# Patient Record
Sex: FEMALE | Race: WHITE | NOT HISPANIC OR LATINO | Employment: OTHER | URBAN - NONMETROPOLITAN AREA
[De-identification: names, ages, dates, MRNs, and addresses within clinical notes are randomized per-mention and may not be internally consistent; named-entity substitution may affect disease eponyms.]

---

## 2017-05-08 ENCOUNTER — HOSPITAL ENCOUNTER (EMERGENCY)
Facility: HOSPITAL | Age: 52
Discharge: HOME | End: 2017-05-08
Attending: EMERGENCY MEDICINE | Admitting: EMERGENCY MEDICINE

## 2017-05-08 VITALS
RESPIRATION RATE: 18 BRPM | TEMPERATURE: 98.3 F | WEIGHT: 168 LBS | OXYGEN SATURATION: 98 % | BODY MASS INDEX: 27 KG/M2 | SYSTOLIC BLOOD PRESSURE: 124 MMHG | HEIGHT: 66 IN | HEART RATE: 81 BPM | DIASTOLIC BLOOD PRESSURE: 82 MMHG

## 2017-05-08 DIAGNOSIS — L03.211 CELLULITIS, FACE: Primary | ICD-10-CM

## 2017-05-08 LAB
ALBUMIN SERPL-MCNC: 4 G/DL (ref 3.5–5)
ALBUMIN/GLOB SERPL: 1.6 G/DL (ref 1.5–2.5)
ALP SERPL-CCNC: 103 U/L (ref 35–104)
ALT SERPL W P-5'-P-CCNC: 11 U/L (ref 10–36)
ANION GAP SERPL CALCULATED.3IONS-SCNC: 9.8 MMOL/L (ref 3.6–11.2)
AST SERPL-CCNC: 18 U/L (ref 10–30)
BASOPHILS # BLD AUTO: 0.02 10*3/MM3 (ref 0–0.3)
BASOPHILS NFR BLD AUTO: 0.2 % (ref 0–2)
BILIRUB SERPL-MCNC: 0.4 MG/DL (ref 0.2–1.8)
BUN BLD-MCNC: 11 MG/DL (ref 7–21)
BUN/CREAT SERPL: 15.7 (ref 7–25)
CALCIUM SPEC-SCNC: 9.2 MG/DL (ref 7.7–10)
CHLORIDE SERPL-SCNC: 107 MMOL/L (ref 99–112)
CO2 SERPL-SCNC: 25.2 MMOL/L (ref 24.3–31.9)
CREAT BLD-MCNC: 0.7 MG/DL (ref 0.43–1.29)
DEPRECATED RDW RBC AUTO: 45.3 FL (ref 37–54)
EOSINOPHIL # BLD AUTO: 0.16 10*3/MM3 (ref 0–0.7)
EOSINOPHIL NFR BLD AUTO: 1.8 % (ref 0–5)
ERYTHROCYTE [DISTWIDTH] IN BLOOD BY AUTOMATED COUNT: 16.6 % (ref 11.5–14.5)
GFR SERPL CREATININE-BSD FRML MDRD: 89 ML/MIN/1.73
GLOBULIN UR ELPH-MCNC: 2.5 GM/DL
GLUCOSE BLD-MCNC: 102 MG/DL (ref 70–110)
HCT VFR BLD AUTO: 33.3 % (ref 37–47)
HGB BLD-MCNC: 10.2 G/DL (ref 12–16)
IMM GRANULOCYTES # BLD: 0.01 10*3/MM3 (ref 0–0.03)
IMM GRANULOCYTES NFR BLD: 0.1 % (ref 0–0.5)
LYMPHOCYTES # BLD AUTO: 1.39 10*3/MM3 (ref 1–3)
LYMPHOCYTES NFR BLD AUTO: 15.6 % (ref 21–51)
MCH RBC QN AUTO: 23 PG (ref 27–33)
MCHC RBC AUTO-ENTMCNC: 30.6 G/DL (ref 33–37)
MCV RBC AUTO: 75.2 FL (ref 80–94)
MONOCYTES # BLD AUTO: 0.57 10*3/MM3 (ref 0.1–0.9)
MONOCYTES NFR BLD AUTO: 6.4 % (ref 0–10)
NEUTROPHILS # BLD AUTO: 6.78 10*3/MM3 (ref 1.4–6.5)
NEUTROPHILS NFR BLD AUTO: 75.9 % (ref 30–70)
OSMOLALITY SERPL CALC.SUM OF ELEC: 282.7 MOSM/KG (ref 273–305)
PLATELET # BLD AUTO: 209 10*3/MM3 (ref 130–400)
PMV BLD AUTO: 9.7 FL (ref 6–10)
POTASSIUM BLD-SCNC: 3.2 MMOL/L (ref 3.5–5.3)
PROT SERPL-MCNC: 6.5 G/DL (ref 6–8)
RBC # BLD AUTO: 4.43 10*6/MM3 (ref 4.2–5.4)
SODIUM BLD-SCNC: 142 MMOL/L (ref 135–153)
WBC NRBC COR # BLD: 8.93 10*3/MM3 (ref 4.5–12.5)

## 2017-05-08 PROCEDURE — 25010000002 DEXAMETHASONE PER 1 MG: Performed by: EMERGENCY MEDICINE

## 2017-05-08 PROCEDURE — 80053 COMPREHEN METABOLIC PANEL: CPT | Performed by: EMERGENCY MEDICINE

## 2017-05-08 PROCEDURE — 96375 TX/PRO/DX INJ NEW DRUG ADDON: CPT

## 2017-05-08 PROCEDURE — 25010000002 HYDROMORPHONE PER 4 MG: Performed by: EMERGENCY MEDICINE

## 2017-05-08 PROCEDURE — 99283 EMERGENCY DEPT VISIT LOW MDM: CPT

## 2017-05-08 PROCEDURE — 85025 COMPLETE CBC W/AUTO DIFF WBC: CPT | Performed by: EMERGENCY MEDICINE

## 2017-05-08 PROCEDURE — 96365 THER/PROPH/DIAG IV INF INIT: CPT

## 2017-05-08 PROCEDURE — 25010000002 HEPARIN FLUSH (PORCINE) 100 UNIT/ML SOLUTION

## 2017-05-08 RX ORDER — CLINDAMYCIN HYDROCHLORIDE 300 MG/1
300 CAPSULE ORAL 3 TIMES DAILY
Qty: 18 CAPSULE | Refills: 0 | Status: SHIPPED | OUTPATIENT
Start: 2017-05-08 | End: 2017-07-22

## 2017-05-08 RX ORDER — DEXAMETHASONE SODIUM PHOSPHATE 4 MG/ML
8 INJECTION, SOLUTION INTRA-ARTICULAR; INTRALESIONAL; INTRAMUSCULAR; INTRAVENOUS; SOFT TISSUE ONCE
Status: COMPLETED | OUTPATIENT
Start: 2017-05-08 | End: 2017-05-08

## 2017-05-08 RX ORDER — SODIUM CHLORIDE 0.9 % (FLUSH) 0.9 %
10 SYRINGE (ML) INJECTION AS NEEDED
Status: DISCONTINUED | OUTPATIENT
Start: 2017-05-08 | End: 2017-05-08 | Stop reason: HOSPADM

## 2017-05-08 RX ORDER — CLINDAMYCIN PHOSPHATE 600 MG/50ML
600 INJECTION INTRAVENOUS ONCE
Status: COMPLETED | OUTPATIENT
Start: 2017-05-08 | End: 2017-05-08

## 2017-05-08 RX ADMIN — HEPARIN SODIUM (PORCINE) LOCK FLUSH IV SOLN 100 UNIT/ML 300 UNITS: 100 SOLUTION at 10:30

## 2017-05-08 RX ADMIN — CLINDAMYCIN PHOSPHATE 600 MG: 12 INJECTION, SOLUTION INTRAVENOUS at 10:03

## 2017-05-08 RX ADMIN — DEXAMETHASONE SODIUM PHOSPHATE 8 MG: 4 INJECTION, SOLUTION INTRAMUSCULAR; INTRAVENOUS at 09:53

## 2017-05-08 RX ADMIN — HYDROMORPHONE HYDROCHLORIDE 1 MG: 1 INJECTION, SOLUTION INTRAMUSCULAR; INTRAVENOUS; SUBCUTANEOUS at 09:56

## 2017-07-05 ENCOUNTER — APPOINTMENT (OUTPATIENT)
Dept: CT IMAGING | Facility: HOSPITAL | Age: 52
End: 2017-07-05

## 2017-07-05 ENCOUNTER — HOSPITAL ENCOUNTER (EMERGENCY)
Facility: HOSPITAL | Age: 52
Discharge: OTHER INSTITUTION | End: 2017-07-05
Attending: EMERGENCY MEDICINE | Admitting: EMERGENCY MEDICINE

## 2017-07-05 VITALS
WEIGHT: 168 LBS | OXYGEN SATURATION: 99 % | RESPIRATION RATE: 18 BRPM | HEIGHT: 66 IN | SYSTOLIC BLOOD PRESSURE: 144 MMHG | DIASTOLIC BLOOD PRESSURE: 89 MMHG | BODY MASS INDEX: 27 KG/M2 | TEMPERATURE: 99 F | HEART RATE: 98 BPM

## 2017-07-05 DIAGNOSIS — L03.211 FACIAL CELLULITIS: Primary | ICD-10-CM

## 2017-07-05 DIAGNOSIS — L02.01 FACIAL ABSCESS: ICD-10-CM

## 2017-07-05 LAB
6-ACETYL MORPHINE: NEGATIVE
ALBUMIN SERPL-MCNC: 4.1 G/DL (ref 3.5–5)
ALBUMIN/GLOB SERPL: 1.4 G/DL (ref 1.5–2.5)
ALP SERPL-CCNC: 123 U/L (ref 35–104)
ALT SERPL W P-5'-P-CCNC: 19 U/L (ref 10–36)
AMPHET+METHAMPHET UR QL: NEGATIVE
ANION GAP SERPL CALCULATED.3IONS-SCNC: 6.6 MMOL/L (ref 3.6–11.2)
AST SERPL-CCNC: 26 U/L (ref 10–30)
B-HCG UR QL: NEGATIVE
BACTERIA UR QL AUTO: ABNORMAL /HPF
BARBITURATES UR QL SCN: NEGATIVE
BASOPHILS # BLD AUTO: 0.05 10*3/MM3 (ref 0–0.3)
BASOPHILS NFR BLD AUTO: 0.5 % (ref 0–2)
BENZODIAZ UR QL SCN: NEGATIVE
BILIRUB SERPL-MCNC: 0.4 MG/DL (ref 0.2–1.8)
BILIRUB UR QL STRIP: NEGATIVE
BUN BLD-MCNC: 6 MG/DL (ref 7–21)
BUN/CREAT SERPL: 9.7 (ref 7–25)
BUPRENORPHINE SERPL-MCNC: NEGATIVE NG/ML
CALCIUM SPEC-SCNC: 9.7 MG/DL (ref 7.7–10)
CANNABINOIDS SERPL QL: NEGATIVE
CHLORIDE SERPL-SCNC: 105 MMOL/L (ref 99–112)
CLARITY UR: ABNORMAL
CO2 SERPL-SCNC: 29.4 MMOL/L (ref 24.3–31.9)
COCAINE UR QL: NEGATIVE
COLOR UR: YELLOW
CREAT BLD-MCNC: 0.62 MG/DL (ref 0.43–1.29)
CRP SERPL-MCNC: 2.53 MG/DL (ref 0–0.99)
DEPRECATED RDW RBC AUTO: 50 FL (ref 37–54)
EOSINOPHIL # BLD AUTO: 0.31 10*3/MM3 (ref 0–0.7)
EOSINOPHIL NFR BLD AUTO: 2.8 % (ref 0–5)
ERYTHROCYTE [DISTWIDTH] IN BLOOD BY AUTOMATED COUNT: 18.6 % (ref 11.5–14.5)
ERYTHROCYTE [SEDIMENTATION RATE] IN BLOOD: 9 MM/HR (ref 0–20)
GFR SERPL CREATININE-BSD FRML MDRD: 102 ML/MIN/1.73
GLOBULIN UR ELPH-MCNC: 2.9 GM/DL
GLUCOSE BLD-MCNC: 104 MG/DL (ref 70–110)
GLUCOSE UR STRIP-MCNC: NEGATIVE MG/DL
HCT VFR BLD AUTO: 35.3 % (ref 37–47)
HGB BLD-MCNC: 10.9 G/DL (ref 12–16)
HGB UR QL STRIP.AUTO: ABNORMAL
HYALINE CASTS UR QL AUTO: ABNORMAL /LPF
IMM GRANULOCYTES # BLD: 0.01 10*3/MM3 (ref 0–0.03)
IMM GRANULOCYTES NFR BLD: 0.1 % (ref 0–0.5)
KETONES UR QL STRIP: NEGATIVE
LEUKOCYTE ESTERASE UR QL STRIP.AUTO: ABNORMAL
LYMPHOCYTES # BLD AUTO: 1.46 10*3/MM3 (ref 1–3)
LYMPHOCYTES NFR BLD AUTO: 13.4 % (ref 21–51)
MCH RBC QN AUTO: 24.1 PG (ref 27–33)
MCHC RBC AUTO-ENTMCNC: 30.9 G/DL (ref 33–37)
MCV RBC AUTO: 78.1 FL (ref 80–94)
MDMA UR QL SCN: NEGATIVE
METHADONE UR QL SCN: NEGATIVE
MONOCYTES # BLD AUTO: 0.86 10*3/MM3 (ref 0.1–0.9)
MONOCYTES NFR BLD AUTO: 7.9 % (ref 0–10)
NEUTROPHILS # BLD AUTO: 8.19 10*3/MM3 (ref 1.4–6.5)
NEUTROPHILS NFR BLD AUTO: 75.3 % (ref 30–70)
NITRITE UR QL STRIP: NEGATIVE
OPIATES UR QL: NEGATIVE
OSMOLALITY SERPL CALC.SUM OF ELEC: 279.2 MOSM/KG (ref 273–305)
OXYCODONE UR QL SCN: POSITIVE
PCP UR QL SCN: NEGATIVE
PH UR STRIP.AUTO: 7 [PH] (ref 5–8)
PLATELET # BLD AUTO: 234 10*3/MM3 (ref 130–400)
PMV BLD AUTO: 9.6 FL (ref 6–10)
POTASSIUM BLD-SCNC: 3.4 MMOL/L (ref 3.5–5.3)
PROT SERPL-MCNC: 7 G/DL (ref 6–8)
PROT UR QL STRIP: NEGATIVE
RBC # BLD AUTO: 4.52 10*6/MM3 (ref 4.2–5.4)
RBC # UR: ABNORMAL /HPF
REF LAB TEST METHOD: ABNORMAL
SODIUM BLD-SCNC: 141 MMOL/L (ref 135–153)
SP GR UR STRIP: 1.02 (ref 1–1.03)
SQUAMOUS #/AREA URNS HPF: ABNORMAL /HPF
UROBILINOGEN UR QL STRIP: ABNORMAL
WBC NRBC COR # BLD: 10.88 10*3/MM3 (ref 4.5–12.5)
WBC UR QL AUTO: ABNORMAL /HPF

## 2017-07-05 PROCEDURE — 80307 DRUG TEST PRSMV CHEM ANLYZR: CPT | Performed by: PHYSICIAN ASSISTANT

## 2017-07-05 PROCEDURE — 87077 CULTURE AEROBIC IDENTIFY: CPT | Performed by: PHYSICIAN ASSISTANT

## 2017-07-05 PROCEDURE — 87040 BLOOD CULTURE FOR BACTERIA: CPT | Performed by: EMERGENCY MEDICINE

## 2017-07-05 PROCEDURE — 70487 CT MAXILLOFACIAL W/DYE: CPT | Performed by: RADIOLOGY

## 2017-07-05 PROCEDURE — 81001 URINALYSIS AUTO W/SCOPE: CPT | Performed by: PHYSICIAN ASSISTANT

## 2017-07-05 PROCEDURE — 85025 COMPLETE CBC W/AUTO DIFF WBC: CPT | Performed by: PHYSICIAN ASSISTANT

## 2017-07-05 PROCEDURE — 25010000002 LORAZEPAM PER 2 MG: Performed by: EMERGENCY MEDICINE

## 2017-07-05 PROCEDURE — 87186 SC STD MICRODIL/AGAR DIL: CPT | Performed by: PHYSICIAN ASSISTANT

## 2017-07-05 PROCEDURE — 96375 TX/PRO/DX INJ NEW DRUG ADDON: CPT

## 2017-07-05 PROCEDURE — 80053 COMPREHEN METABOLIC PANEL: CPT | Performed by: PHYSICIAN ASSISTANT

## 2017-07-05 PROCEDURE — 70487 CT MAXILLOFACIAL W/DYE: CPT

## 2017-07-05 PROCEDURE — 85652 RBC SED RATE AUTOMATED: CPT | Performed by: PHYSICIAN ASSISTANT

## 2017-07-05 PROCEDURE — 81025 URINE PREGNANCY TEST: CPT | Performed by: PHYSICIAN ASSISTANT

## 2017-07-05 PROCEDURE — 87147 CULTURE TYPE IMMUNOLOGIC: CPT | Performed by: PHYSICIAN ASSISTANT

## 2017-07-05 PROCEDURE — 25010000002 MORPHINE SULFATE (PF) 2 MG/ML SOLUTION: Performed by: EMERGENCY MEDICINE

## 2017-07-05 PROCEDURE — 25010000002 LORAZEPAM PER 2 MG

## 2017-07-05 PROCEDURE — 99285 EMERGENCY DEPT VISIT HI MDM: CPT

## 2017-07-05 PROCEDURE — 87086 URINE CULTURE/COLONY COUNT: CPT | Performed by: PHYSICIAN ASSISTANT

## 2017-07-05 PROCEDURE — 86140 C-REACTIVE PROTEIN: CPT | Performed by: PHYSICIAN ASSISTANT

## 2017-07-05 PROCEDURE — 25010000002 DIPHENHYDRAMINE PER 50 MG

## 2017-07-05 PROCEDURE — 96376 TX/PRO/DX INJ SAME DRUG ADON: CPT

## 2017-07-05 PROCEDURE — 25010000002 DALBAVANCIN 500 MG RECONSTITUTED SOLUTION 1 EACH VIAL: Performed by: PHYSICIAN ASSISTANT

## 2017-07-05 PROCEDURE — 0 IOPAMIDOL 61 % SOLUTION: Performed by: EMERGENCY MEDICINE

## 2017-07-05 PROCEDURE — 25010000002 MORPHINE PER 10 MG: Performed by: EMERGENCY MEDICINE

## 2017-07-05 PROCEDURE — 25010000002 MORPHINE SULFATE (PF) 2 MG/ML SOLUTION

## 2017-07-05 PROCEDURE — 96365 THER/PROPH/DIAG IV INF INIT: CPT

## 2017-07-05 RX ORDER — LORAZEPAM 2 MG/ML
1 INJECTION INTRAMUSCULAR ONCE
Status: COMPLETED | OUTPATIENT
Start: 2017-07-05 | End: 2017-07-05

## 2017-07-05 RX ORDER — DEXTROSE 5 % IN WATER
50 PIGGYBACK WITH THREADED PORT (ML) INTRAVENOUS ONCE
Status: COMPLETED | OUTPATIENT
Start: 2017-07-05 | End: 2017-07-05

## 2017-07-05 RX ORDER — SODIUM CHLORIDE 0.9 % (FLUSH) 0.9 %
10 SYRINGE (ML) INJECTION AS NEEDED
Status: DISCONTINUED | OUTPATIENT
Start: 2017-07-05 | End: 2017-07-05 | Stop reason: HOSPADM

## 2017-07-05 RX ORDER — DIPHENHYDRAMINE HYDROCHLORIDE 50 MG/ML
25 INJECTION INTRAMUSCULAR; INTRAVENOUS ONCE
Status: COMPLETED | OUTPATIENT
Start: 2017-07-05 | End: 2017-07-05

## 2017-07-05 RX ORDER — MORPHINE SULFATE 2 MG/ML
2 INJECTION, SOLUTION INTRAMUSCULAR; INTRAVENOUS ONCE
Status: COMPLETED | OUTPATIENT
Start: 2017-07-05 | End: 2017-07-05

## 2017-07-05 RX ORDER — MORPHINE SULFATE 2 MG/ML
INJECTION, SOLUTION INTRAMUSCULAR; INTRAVENOUS
Status: COMPLETED
Start: 2017-07-05 | End: 2017-07-05

## 2017-07-05 RX ORDER — DIPHENHYDRAMINE HYDROCHLORIDE 50 MG/ML
INJECTION INTRAMUSCULAR; INTRAVENOUS
Status: COMPLETED
Start: 2017-07-05 | End: 2017-07-05

## 2017-07-05 RX ORDER — LORAZEPAM 2 MG/ML
INJECTION INTRAMUSCULAR
Status: DISCONTINUED
Start: 2017-07-05 | End: 2017-07-05 | Stop reason: HOSPADM

## 2017-07-05 RX ORDER — LORAZEPAM 2 MG/ML
INJECTION INTRAMUSCULAR
Status: COMPLETED
Start: 2017-07-05 | End: 2017-07-05

## 2017-07-05 RX ADMIN — DALBAVANCIN 1500 MG: 500 INJECTION, POWDER, FOR SOLUTION INTRAVENOUS at 10:19

## 2017-07-05 RX ADMIN — MORPHINE SULFATE 4 MG: 4 INJECTION, SOLUTION INTRAMUSCULAR; INTRAVENOUS at 13:29

## 2017-07-05 RX ADMIN — IOPAMIDOL 100 ML: 612 INJECTION, SOLUTION INTRAVENOUS at 08:54

## 2017-07-05 RX ADMIN — DEXTROSE MONOHYDRATE 25 ML: 5 INJECTION, SOLUTION INTRAVENOUS at 10:14

## 2017-07-05 RX ADMIN — LORAZEPAM 1 MG: 2 INJECTION INTRAMUSCULAR at 10:48

## 2017-07-05 RX ADMIN — LORAZEPAM 1 MG: 2 INJECTION INTRAMUSCULAR; INTRAVENOUS at 13:30

## 2017-07-05 RX ADMIN — MORPHINE SULFATE 2 MG: 2 INJECTION, SOLUTION INTRAMUSCULAR; INTRAVENOUS at 08:27

## 2017-07-05 RX ADMIN — DIPHENHYDRAMINE HYDROCHLORIDE 25 MG: 50 INJECTION INTRAMUSCULAR; INTRAVENOUS at 10:47

## 2017-07-05 RX ADMIN — MORPHINE SULFATE 4 MG: 4 INJECTION, SOLUTION INTRAMUSCULAR; INTRAVENOUS at 11:19

## 2017-07-05 RX ADMIN — MORPHINE SULFATE 2 MG: 2 INJECTION, SOLUTION INTRAMUSCULAR; INTRAVENOUS at 06:46

## 2017-07-05 RX ADMIN — LORAZEPAM 1 MG: 2 INJECTION INTRAMUSCULAR; INTRAVENOUS at 10:48

## 2017-07-05 NOTE — ED NOTES
Called CT for a disc at this     [facility] Clarisa Dominguez RN  07/05/17 1325

## 2017-07-05 NOTE — ED NOTES
"Upon entering patient's room to administer antibiotics pt is requesting to speak with \"whoever is in charge and the house patient care person\"; pt reports she is dissatisfied with the care she has received thus far. Patient reports she is scared to go home and feels like she needs to be admitted to this hospital. Lead nurse made aware of pt situation.      Sin Dominguez RN  07/05/17 1024       Sin Dominguez RN  07/05/17 1024    "

## 2017-07-05 NOTE — ED PROVIDER NOTES
Subjective   Patient is a 51 y.o. female presenting with rash.   History provided by:  Patient   used: No    Rash   Location:  Face  Facial rash location:  Face  Quality: not blistering and not bruising    Severity:  Moderate  Onset quality:  Sudden  Duration:  1 day  Timing:  Constant  Progression:  Worsening  Chronicity:  New  Context: not animal contact, not chemical exposure, not hot tub use, not insect bite/sting, not medications, not new detergent/soap, not pollen and not pregnancy    Relieved by:  Nothing  Worsened by:  Nothing  Associated symptoms: no abdominal pain, no myalgias and no nausea        Review of Systems   Gastrointestinal: Negative for abdominal pain and nausea.   Musculoskeletal: Negative for myalgias.   Skin: Positive for rash.   All other systems reviewed and are negative.      Past Medical History:   Diagnosis Date   • Anxiety    • Anxiety    • Chronic headache    • Depression    • Disease of thyroid gland     Patient states she has no thyroid   • Gastritis    • Henoch-Schonlein purpura    • Hypertension    • Lower GI bleeding    • Migraine    • Patent foramen ovale    • Pneumonia    • Renal disorder    • Rhabdomyolysis    • Sjogren's syndrome    • Stroke    • Systemic lupus erythematosus    • Temporal arteritis        Allergies   Allergen Reactions   • Compazine [Prochlorperazine Edisylate]    • Imitrex [Sumatriptan]    • Nsaids    • Reglan [Metoclopramide]    • Solu-Medrol [Methylprednisolone]    • Zyprexa [Olanzapine]        Past Surgical History:   Procedure Laterality Date   • APPENDECTOMY     • CARDIAC CATHETERIZATION      Pt reports she had a hole in her heart repaired last week and had a loop monitor placed.    •  SECTION     • ENDOSCOPY     • LUMBAR FUSION     • THYROID SURGERY         Family History   Problem Relation Age of Onset   • Hypertension Mother    • Hypertension Father        Social History     Social History   • Marital status:       Spouse name: N/A   • Number of children: N/A   • Years of education: N/A     Social History Main Topics   • Smoking status: Never Smoker   • Smokeless tobacco: None   • Alcohol use No      Comment: Occassionally   • Drug use: No   • Sexual activity: Defer     Other Topics Concern   • None     Social History Narrative           Objective   Physical Exam   Constitutional: She is oriented to person, place, and time. She appears well-developed and well-nourished.   HENT:   Head: Normocephalic.   Right Ear: External ear normal.   Left Ear: External ear normal.   Nose: Nose normal.   Mouth/Throat: Oropharynx is clear and moist.   Eyes: Conjunctivae and EOM are normal. Pupils are equal, round, and reactive to light.   Neck: Normal range of motion. Neck supple. No tracheal deviation present. No thyromegaly present.   Cardiovascular: Normal rate, regular rhythm, normal heart sounds and intact distal pulses.    Pulmonary/Chest: Effort normal and breath sounds normal.   Abdominal: Soft. Bowel sounds are normal.   Musculoskeletal: Normal range of motion.   Neurological: She is alert and oriented to person, place, and time. She has normal reflexes.   Skin: Skin is dry. Rash noted.   Extensive facial cellulitis right mandible and left mandible that extends distally to neck.  No definite fluctuance, induration.   Psychiatric: She has a normal mood and affect. Her behavior is normal. Judgment and thought content normal.   Nursing note and vitals reviewed.      Procedures         ED Course  ED Course   Comment By Time   Endorsed to NAVDEEP Lopez PA 07/05 4113   IMPRESSION:   1. Probable changes of right facial soft tissue tissue cellulitis mainly  in the right mandibular region.  2. Ill-defined questionable 0.6 cm area of low attenuation within the  inflamed tissues adjacent to the right mandibular body also likely  infectious in etiology and could represent early immature abscess.  3. Inflammatory changes  which are situated in close proximity to the  right mandible may reflect secondary infection stemming from dental  etiology. Correlation with dental exam is recommended.  4. Chronic appearing ethmoid and right maxillary sinusitis. Reyes Blakely PA-C 07/05 1052   Gave patient option to go to . She states that she would like to wait and talk to her family. Patient was seen previously and given Im injections of steroids and rocephin for facial cellulitis. Reyes Blakely PA-C 07/05 1053   Discussed care with Dr. Borges at [facility]. Advised to transfer patient to ER for further evaluation. Reyes Blakely PA-C 07/05 1117                  [facility]    Final diagnoses:   Facial cellulitis   Facial abscess            Reyes Blakely PA-C  07/05/17 1120

## 2017-07-05 NOTE — ED NOTES
INTO ROOM WITH PT AND DR. SEQUEIRA.  PT VERY AGITATED AND  NERVOUS ABOUT HER CARE AND POSSIBLE TRANSFER.  AFTER SPEAKING WITH PT AND THEN DISCUSSING THE PROS AND CONS PT IS NOW MORE CALM AND PT WAS REMINDED SHE CAN CALL OUT ALL THE TIME     Katia Delarosa RN  07/05/17 1475

## 2017-07-05 NOTE — ED NOTES
Pt states that she has SLE lupus, states that for about a week she has had an open area/wound to her chin and right side of face that she has been going to her doctors office for, states they have been giving her Rocephin and steroid shots. States that yesterday the swelling started to get worse, right side of pts face appears moderately swollen. Pt c/o SOA when lying flat     Diana Liu RN  07/05/17 0461

## 2017-07-05 NOTE — ED PROVIDER NOTES
Subjective   HPI Comments: Patient presents to the ED with complaints of facial swelling with draining.  Patient reports that she has SLE lupus and has recurrent skin irritations/wounds on her face.  Reported that 1 week ago the irritations started getting swollen.  Patients PCP has been seeing her every other day for the last week to get rocephin and decadron shots. Reports that it is getting worse.  Reports increased swelling, yellow drainage from facial lesions and worsening in pain.     Patient is a 51 y.o. female presenting with abscess.   History provided by:  Patient   used: No    Abscess   Location:  Face  Abscess quality: draining, fluctuance, induration, painful and redness    Red streaking: no    Duration:  1 week  Progression:  Worsening  Pain details:     Quality:  Throbbing    Severity:  Moderate    Duration:  1 week    Timing:  Constant    Progression:  Worsening  Chronicity:  Recurrent  Relieved by:  Nothing  Worsened by:  Nothing  Ineffective treatments:  None tried  Risk factors: hx of MRSA        Review of Systems   Constitutional: Negative.    HENT: Negative.    Eyes: Negative.    Respiratory: Negative.    Cardiovascular: Negative.    Gastrointestinal: Negative.    Endocrine: Negative.    Genitourinary: Negative.    Musculoskeletal: Negative.    Skin: Negative.    Allergic/Immunologic: Negative.    Neurological: Negative.    Hematological: Negative.    Psychiatric/Behavioral: Negative.    All other systems reviewed and are negative.      Past Medical History:   Diagnosis Date   • Anxiety    • Anxiety    • Chronic headache    • Depression    • Disease of thyroid gland     Patient states she has no thyroid   • Gastritis    • Henoch-Schonlein purpura    • Hypertension    • Lower GI bleeding    • Migraine    • Patent foramen ovale    • Pneumonia    • Renal disorder    • Rhabdomyolysis    • Sjogren's syndrome    • Stroke    • Systemic lupus erythematosus    • Temporal arteritis         Allergies   Allergen Reactions   • Compazine [Prochlorperazine Edisylate]    • Imitrex [Sumatriptan]    • Nsaids    • Reglan [Metoclopramide]    • Solu-Medrol [Methylprednisolone]    • Zyprexa [Olanzapine]        Past Surgical History:   Procedure Laterality Date   • APPENDECTOMY     • CARDIAC CATHETERIZATION      Pt reports she had a hole in her heart repaired last week and had a loop monitor placed.    •  SECTION     • ENDOSCOPY     • LUMBAR FUSION     • THYROID SURGERY         Family History   Problem Relation Age of Onset   • Hypertension Mother    • Hypertension Father        Social History     Social History   • Marital status:      Spouse name: N/A   • Number of children: N/A   • Years of education: N/A     Social History Main Topics   • Smoking status: Never Smoker   • Smokeless tobacco: None   • Alcohol use No      Comment: Occassionally   • Drug use: No   • Sexual activity: Defer     Other Topics Concern   • None     Social History Narrative           Objective   Physical Exam   Constitutional: She is oriented to person, place, and time. She appears well-developed and well-nourished.   HENT:   Head: Normocephalic and atraumatic.   Right Ear: External ear normal.   Left Ear: External ear normal.   Nose: Nose normal.   Mouth/Throat: Oropharynx is clear and moist.   Eyes: Conjunctivae and EOM are normal. Pupils are equal, round, and reactive to light.   Neck: Normal range of motion. Neck supple.   Cardiovascular: Normal rate, regular rhythm, normal heart sounds and intact distal pulses.    Pulmonary/Chest: Effort normal and breath sounds normal.   Abdominal: Soft. Bowel sounds are normal.   Musculoskeletal: Normal range of motion.   Neurological: She is alert and oriented to person, place, and time.   Skin: Skin is warm and dry.        Nursing note and vitals reviewed.      Procedures         ED Course  ED Course   Comment By Time   Endorsed to NAVDEEP Lopez PA  07/05 0802   IMPRESSION:   1. Probable changes of right facial soft tissue tissue cellulitis mainly  in the right mandibular region.  2. Ill-defined questionable 0.6 cm area of low attenuation within the  inflamed tissues adjacent to the right mandibular body also likely  infectious in etiology and could represent early immature abscess.  3. Inflammatory changes which are situated in close proximity to the  right mandible may reflect secondary infection stemming from dental  etiology. Correlation with dental exam is recommended.  4. Chronic appearing ethmoid and right maxillary sinusitis. Reyes Blakely PA-C 07/05 4242   Gave patient option to go to . She states that she would like to wait and talk to her family. Patient was seen previously and given Im injections of steroids and rocephin for facial cellulitis. Reyes Blakely PA-C 07/05 6056   Discussed care with Dr. Borges at [facility]. Advised to transfer patient to ER for further evaluation. Reyes Blakely PA-C 07/05 5860                  [facility]    Final diagnoses:   Facial cellulitis   Facial abscess            VICTOR HUGO Romano  07/10/17 1954

## 2017-07-05 NOTE — ED NOTES
"Patient now reporting she feels much better after administration of benadryl and ativan. Pt states \"I'm positive I was having a panic attack, I've been going through so much\"     Sin Dominguez RN  07/05/17 1111    "

## 2017-07-08 LAB
BACTERIA SPEC AEROBE CULT: ABNORMAL
STREP GROUPING: ABNORMAL

## 2017-07-10 LAB
BACTERIA SPEC AEROBE CULT: NORMAL
BACTERIA SPEC AEROBE CULT: NORMAL

## 2017-07-21 ENCOUNTER — HOSPITAL ENCOUNTER (OUTPATIENT)
Facility: HOSPITAL | Age: 52
Setting detail: OBSERVATION
Discharge: HOME | End: 2017-07-23
Attending: FAMILY MEDICINE | Admitting: INTERNAL MEDICINE

## 2017-07-21 ENCOUNTER — APPOINTMENT (OUTPATIENT)
Dept: CT IMAGING | Facility: HOSPITAL | Age: 52
End: 2017-07-21

## 2017-07-21 ENCOUNTER — APPOINTMENT (OUTPATIENT)
Dept: GENERAL RADIOLOGY | Facility: HOSPITAL | Age: 52
End: 2017-07-21

## 2017-07-21 DIAGNOSIS — R00.1 BRADYCARDIA WITH 51-60 BEATS PER MINUTE: ICD-10-CM

## 2017-07-21 DIAGNOSIS — R07.9 CHEST PAIN IN ADULT: Primary | ICD-10-CM

## 2017-07-21 DIAGNOSIS — R42 DIZZINESS: ICD-10-CM

## 2017-07-21 LAB
6-ACETYL MORPHINE: NEGATIVE
ALBUMIN SERPL-MCNC: 3.8 G/DL (ref 3.5–5)
ALBUMIN/GLOB SERPL: 1.4 G/DL (ref 1.5–2.5)
ALP SERPL-CCNC: 125 U/L (ref 35–104)
ALT SERPL W P-5'-P-CCNC: 15 U/L (ref 10–36)
AMPHET+METHAMPHET UR QL: NEGATIVE
AMYLASE SERPL-CCNC: 34 U/L (ref 28–100)
ANION GAP SERPL CALCULATED.3IONS-SCNC: 2.7 MMOL/L (ref 3.6–11.2)
ANISOCYTOSIS BLD QL: NORMAL
AST SERPL-CCNC: 21 U/L (ref 10–30)
BACTERIA UR QL AUTO: ABNORMAL /HPF
BARBITURATES UR QL SCN: NEGATIVE
BASOPHILS # BLD MANUAL: 0.07 10*3/MM3 (ref 0–0.3)
BASOPHILS NFR BLD AUTO: 1 % (ref 0–2)
BENZODIAZ UR QL SCN: NEGATIVE
BILIRUB SERPL-MCNC: 0.2 MG/DL (ref 0.2–1.8)
BILIRUB UR QL STRIP: NEGATIVE
BNP SERPL-MCNC: 60 PG/ML (ref 0–100)
BUN BLD-MCNC: 14 MG/DL (ref 7–21)
BUN/CREAT SERPL: 18.4 (ref 7–25)
BUPRENORPHINE SERPL-MCNC: NEGATIVE NG/ML
CALCIUM SPEC-SCNC: 8.9 MG/DL (ref 7.7–10)
CANNABINOIDS SERPL QL: NEGATIVE
CHLORIDE SERPL-SCNC: 109 MMOL/L (ref 99–112)
CLARITY UR: ABNORMAL
CO2 SERPL-SCNC: 29.3 MMOL/L (ref 24.3–31.9)
COCAINE UR QL: NEGATIVE
COLOR UR: YELLOW
CREAT BLD-MCNC: 0.76 MG/DL (ref 0.43–1.29)
CRP SERPL-MCNC: <0.5 MG/DL (ref 0–0.99)
D-LACTATE SERPL-SCNC: 0.8 MMOL/L (ref 0.5–2)
DEPRECATED RDW RBC AUTO: 45.8 FL (ref 37–54)
EOSINOPHIL # BLD MANUAL: 0.14 10*3/MM3 (ref 0–0.7)
EOSINOPHIL NFR BLD MANUAL: 2 % (ref 0–5)
ERYTHROCYTE [DISTWIDTH] IN BLOOD BY AUTOMATED COUNT: 17.5 % (ref 11.5–14.5)
ERYTHROCYTE [SEDIMENTATION RATE] IN BLOOD: 31 MM/HR (ref 0–30)
ETHANOL BLD-MCNC: <10 MG/DL
ETHANOL UR QL: <0.01 %
GFR SERPL CREATININE-BSD FRML MDRD: 80 ML/MIN/1.73
GLOBULIN UR ELPH-MCNC: 2.8 GM/DL
GLUCOSE BLD-MCNC: 111 MG/DL (ref 70–110)
GLUCOSE UR STRIP-MCNC: NEGATIVE MG/DL
HCG SERPL QL: NEGATIVE
HCT VFR BLD AUTO: 31.8 % (ref 37–47)
HGB BLD-MCNC: 10 G/DL (ref 12–16)
HGB UR QL STRIP.AUTO: ABNORMAL
HYALINE CASTS UR QL AUTO: ABNORMAL /LPF
HYPOCHROMIA BLD QL: NORMAL
KETONES UR QL STRIP: NEGATIVE
LEUKOCYTE ESTERASE UR QL STRIP.AUTO: ABNORMAL
LIPASE SERPL-CCNC: 34 U/L (ref 13–60)
LYMPHOCYTES # BLD MANUAL: 2.81 10*3/MM3 (ref 1–3)
LYMPHOCYTES NFR BLD MANUAL: 40 % (ref 21–51)
LYMPHOCYTES NFR BLD MANUAL: 7 % (ref 0–10)
MAGNESIUM SERPL-MCNC: 1.6 MG/DL (ref 1.7–2.6)
MCH RBC QN AUTO: 23.6 PG (ref 27–33)
MCHC RBC AUTO-ENTMCNC: 31.4 G/DL (ref 33–37)
MCV RBC AUTO: 75 FL (ref 80–94)
METHADONE UR QL SCN: NEGATIVE
MICROCYTES BLD QL: NORMAL
MONOCYTES # BLD AUTO: 0.49 10*3/MM3 (ref 0.1–0.9)
NEUTROPHILS # BLD AUTO: 3.51 10*3/MM3 (ref 1.4–6.5)
NEUTROPHILS NFR BLD MANUAL: 50 % (ref 30–70)
NITRITE UR QL STRIP: NEGATIVE
OPIATES UR QL: NEGATIVE
OSMOLALITY SERPL CALC.SUM OF ELEC: 282.4 MOSM/KG (ref 273–305)
OXYCODONE UR QL SCN: POSITIVE
PCP UR QL SCN: NEGATIVE
PH UR STRIP.AUTO: 6 [PH] (ref 5–8)
PHOSPHATE SERPL-MCNC: 3.4 MG/DL (ref 2.7–4.5)
PLAT MORPH BLD: NORMAL
PLATELET # BLD AUTO: 260 10*3/MM3 (ref 130–400)
PMV BLD AUTO: 9.8 FL (ref 6–10)
POTASSIUM BLD-SCNC: 3 MMOL/L (ref 3.5–5.3)
PROT SERPL-MCNC: 6.6 G/DL (ref 6–8)
PROT UR QL STRIP: NEGATIVE
RBC # BLD AUTO: 4.24 10*6/MM3 (ref 4.2–5.4)
RBC # UR: ABNORMAL /HPF
REF LAB TEST METHOD: ABNORMAL
SCAN SLIDE: NORMAL
SODIUM BLD-SCNC: 141 MMOL/L (ref 135–153)
SP GR UR STRIP: 1.02 (ref 1–1.03)
SQUAMOUS #/AREA URNS HPF: ABNORMAL /HPF
TROPONIN I SERPL-MCNC: <0.006 NG/ML
TROPONIN I SERPL-MCNC: <0.006 NG/ML
UROBILINOGEN UR QL STRIP: ABNORMAL
WBC NRBC COR # BLD: 7.02 10*3/MM3 (ref 4.5–12.5)
WBC UR QL AUTO: ABNORMAL /HPF

## 2017-07-21 PROCEDURE — 71010 XR CHEST 1 VW: CPT | Performed by: RADIOLOGY

## 2017-07-21 PROCEDURE — 96376 TX/PRO/DX INJ SAME DRUG ADON: CPT

## 2017-07-21 PROCEDURE — 87086 URINE CULTURE/COLONY COUNT: CPT | Performed by: FAMILY MEDICINE

## 2017-07-21 PROCEDURE — 80053 COMPREHEN METABOLIC PANEL: CPT | Performed by: FAMILY MEDICINE

## 2017-07-21 PROCEDURE — 93005 ELECTROCARDIOGRAM TRACING: CPT | Performed by: FAMILY MEDICINE

## 2017-07-21 PROCEDURE — 83690 ASSAY OF LIPASE: CPT | Performed by: FAMILY MEDICINE

## 2017-07-21 PROCEDURE — 83605 ASSAY OF LACTIC ACID: CPT | Performed by: FAMILY MEDICINE

## 2017-07-21 PROCEDURE — 99285 EMERGENCY DEPT VISIT HI MDM: CPT

## 2017-07-21 PROCEDURE — 80307 DRUG TEST PRSMV CHEM ANLYZR: CPT | Performed by: FAMILY MEDICINE

## 2017-07-21 PROCEDURE — 83880 ASSAY OF NATRIURETIC PEPTIDE: CPT | Performed by: FAMILY MEDICINE

## 2017-07-21 PROCEDURE — 87040 BLOOD CULTURE FOR BACTERIA: CPT | Performed by: FAMILY MEDICINE

## 2017-07-21 PROCEDURE — 82150 ASSAY OF AMYLASE: CPT | Performed by: FAMILY MEDICINE

## 2017-07-21 PROCEDURE — 84484 ASSAY OF TROPONIN QUANT: CPT | Performed by: FAMILY MEDICINE

## 2017-07-21 PROCEDURE — 83735 ASSAY OF MAGNESIUM: CPT | Performed by: FAMILY MEDICINE

## 2017-07-21 PROCEDURE — 84100 ASSAY OF PHOSPHORUS: CPT | Performed by: FAMILY MEDICINE

## 2017-07-21 PROCEDURE — 71275 CT ANGIOGRAPHY CHEST: CPT

## 2017-07-21 PROCEDURE — 25010000003 POTASSIUM CHLORIDE 10 MEQ/100ML SOLUTION: Performed by: FAMILY MEDICINE

## 2017-07-21 PROCEDURE — 25010000002 LORAZEPAM PER 2 MG: Performed by: FAMILY MEDICINE

## 2017-07-21 PROCEDURE — 85652 RBC SED RATE AUTOMATED: CPT | Performed by: FAMILY MEDICINE

## 2017-07-21 PROCEDURE — 36415 COLL VENOUS BLD VENIPUNCTURE: CPT

## 2017-07-21 PROCEDURE — 0 IOPAMIDOL PER 1 ML: Performed by: FAMILY MEDICINE

## 2017-07-21 PROCEDURE — 93010 ELECTROCARDIOGRAM REPORT: CPT | Performed by: INTERNAL MEDICINE

## 2017-07-21 PROCEDURE — 96361 HYDRATE IV INFUSION ADD-ON: CPT

## 2017-07-21 PROCEDURE — 96375 TX/PRO/DX INJ NEW DRUG ADDON: CPT

## 2017-07-21 PROCEDURE — 86140 C-REACTIVE PROTEIN: CPT | Performed by: FAMILY MEDICINE

## 2017-07-21 PROCEDURE — 71275 CT ANGIOGRAPHY CHEST: CPT | Performed by: RADIOLOGY

## 2017-07-21 PROCEDURE — 84703 CHORIONIC GONADOTROPIN ASSAY: CPT | Performed by: FAMILY MEDICINE

## 2017-07-21 PROCEDURE — 81001 URINALYSIS AUTO W/SCOPE: CPT | Performed by: FAMILY MEDICINE

## 2017-07-21 PROCEDURE — 85025 COMPLETE CBC W/AUTO DIFF WBC: CPT | Performed by: FAMILY MEDICINE

## 2017-07-21 PROCEDURE — 71010 HC CHEST PA OR AP: CPT

## 2017-07-21 PROCEDURE — 85007 BL SMEAR W/DIFF WBC COUNT: CPT | Performed by: FAMILY MEDICINE

## 2017-07-21 PROCEDURE — 96365 THER/PROPH/DIAG IV INF INIT: CPT

## 2017-07-21 RX ORDER — SODIUM CHLORIDE 9 MG/ML
75 INJECTION, SOLUTION INTRAVENOUS CONTINUOUS
Status: DISCONTINUED | OUTPATIENT
Start: 2017-07-21 | End: 2017-07-23 | Stop reason: HOSPADM

## 2017-07-21 RX ORDER — MECLIZINE HCL 12.5 MG/1
25 TABLET ORAL ONCE
Status: COMPLETED | OUTPATIENT
Start: 2017-07-21 | End: 2017-07-22

## 2017-07-21 RX ORDER — LORAZEPAM 2 MG/ML
0.5 INJECTION INTRAMUSCULAR ONCE
Status: COMPLETED | OUTPATIENT
Start: 2017-07-21 | End: 2017-07-21

## 2017-07-21 RX ORDER — POTASSIUM CHLORIDE 7.45 MG/ML
10 INJECTION INTRAVENOUS ONCE
Status: COMPLETED | OUTPATIENT
Start: 2017-07-21 | End: 2017-07-21

## 2017-07-21 RX ORDER — SODIUM CHLORIDE 0.9 % (FLUSH) 0.9 %
10 SYRINGE (ML) INJECTION AS NEEDED
Status: DISCONTINUED | OUTPATIENT
Start: 2017-07-21 | End: 2017-07-23 | Stop reason: HOSPADM

## 2017-07-21 RX ORDER — ACETAMINOPHEN AND CODEINE PHOSPHATE 300; 30 MG/1; MG/1
1 TABLET ORAL ONCE
Status: COMPLETED | OUTPATIENT
Start: 2017-07-21 | End: 2017-07-21

## 2017-07-21 RX ORDER — PANTOPRAZOLE SODIUM 40 MG/1
40 TABLET, DELAYED RELEASE ORAL DAILY
COMMUNITY
End: 2019-07-18

## 2017-07-21 RX ORDER — POTASSIUM CHLORIDE 20 MEQ/1
40 TABLET, EXTENDED RELEASE ORAL ONCE
Status: COMPLETED | OUTPATIENT
Start: 2017-07-21 | End: 2017-07-21

## 2017-07-21 RX ORDER — QUETIAPINE FUMARATE 50 MG/1
50 TABLET, FILM COATED ORAL NIGHTLY
COMMUNITY
End: 2017-07-22

## 2017-07-21 RX ORDER — ASPIRIN 81 MG/1
324 TABLET, CHEWABLE ORAL ONCE
Status: COMPLETED | OUTPATIENT
Start: 2017-07-21 | End: 2017-07-21

## 2017-07-21 RX ORDER — LEVETIRACETAM 500 MG/1
500 TABLET ORAL 2 TIMES DAILY PRN
COMMUNITY
End: 2021-11-06 | Stop reason: HOSPADM

## 2017-07-21 RX ADMIN — POTASSIUM CHLORIDE 10 MEQ: 7.46 INJECTION, SOLUTION INTRAVENOUS at 21:29

## 2017-07-21 RX ADMIN — POTASSIUM CHLORIDE 40 MEQ: 1500 TABLET, EXTENDED RELEASE ORAL at 21:24

## 2017-07-21 RX ADMIN — ASPIRIN 324 MG: 81 TABLET, CHEWABLE ORAL at 23:07

## 2017-07-21 RX ADMIN — LORAZEPAM 0.5 MG: 2 INJECTION INTRAMUSCULAR; INTRAVENOUS at 21:25

## 2017-07-21 RX ADMIN — IOPAMIDOL 80 ML: 755 INJECTION, SOLUTION INTRAVENOUS at 21:13

## 2017-07-21 RX ADMIN — SODIUM CHLORIDE 1000 ML: 9 INJECTION, SOLUTION INTRAVENOUS at 21:44

## 2017-07-21 RX ADMIN — ACETAMINOPHEN AND CODEINE PHOSPHATE 1 TABLET: 300; 30 TABLET ORAL at 23:05

## 2017-07-21 RX ADMIN — SODIUM CHLORIDE 1000 ML: 9 INJECTION, SOLUTION INTRAVENOUS at 20:10

## 2017-07-21 RX ADMIN — LORAZEPAM 0.5 MG: 2 INJECTION INTRAMUSCULAR; INTRAVENOUS at 22:19

## 2017-07-21 RX ADMIN — SODIUM CHLORIDE 125 ML/HR: 9 INJECTION, SOLUTION INTRAVENOUS at 23:04

## 2017-07-21 NOTE — ED PROVIDER NOTES
Subjective   History of Present Illness  53 y/o F w/h/o SLE and previous blood clots p/w dizziness and CP. Pt states that she has had palpitations and L sided CP. Pt states that she has SOA. Pt is not on anticoagulation b/c of h/o gastric ulcer. Pt states that her dizziness is worse with standing and head movement. Pt denies any fevers/chills.   Review of Systems   Constitutional: Negative for chills, fatigue and fever.   HENT: Negative for trouble swallowing and voice change.    Eyes: Negative for photophobia and visual disturbance.   Respiratory: Positive for shortness of breath. Negative for cough, chest tightness and wheezing.    Cardiovascular: Positive for chest pain and palpitations.   Gastrointestinal: Negative for abdominal distention, abdominal pain, constipation, diarrhea, nausea and vomiting.   Genitourinary: Negative for difficulty urinating.   Musculoskeletal: Negative for neck pain and neck stiffness.   Skin: Negative for color change and pallor.   Neurological: Positive for dizziness, weakness and light-headedness. Negative for headaches.   Hematological: Does not bruise/bleed easily.   All other systems reviewed and are negative.      Past Medical History:   Diagnosis Date   • Anxiety    • Anxiety    • Chronic headache    • Depression    • Disease of thyroid gland     Patient states she has no thyroid   • Gastritis    • Henoch-Schonlein purpura    • Hypertension    • Lower GI bleeding    • Migraine    • Patent foramen ovale    • Pneumonia    • Renal disorder    • Rhabdomyolysis    • Sjogren's syndrome    • Stroke    • Systemic lupus erythematosus    • Temporal arteritis        Allergies   Allergen Reactions   • Compazine [Prochlorperazine Edisylate]    • Imitrex [Sumatriptan]    • Nsaids    • Reglan [Metoclopramide]    • Solu-Medrol [Methylprednisolone]    • Zyprexa [Olanzapine]        Past Surgical History:   Procedure Laterality Date   • APPENDECTOMY     • CARDIAC CATHETERIZATION      Pt reports  she had a hole in her heart repaired last week and had a loop monitor placed.    •  SECTION     • ENDOSCOPY     • LUMBAR FUSION     • THYROID SURGERY         Family History   Problem Relation Age of Onset   • Hypertension Mother    • Hypertension Father        Social History     Social History   • Marital status:      Spouse name: N/A   • Number of children: N/A   • Years of education: N/A     Social History Main Topics   • Smoking status: Never Smoker   • Smokeless tobacco: Never Used   • Alcohol use No      Comment: Occassionally   • Drug use: No   • Sexual activity: Defer     Other Topics Concern   • None     Social History Narrative   • None           Objective   Physical Exam   Constitutional: She is oriented to person, place, and time. She appears well-developed and well-nourished. She is active.   HENT:   Head: Normocephalic and atraumatic.       Right Ear: Hearing, tympanic membrane, external ear and ear canal normal.   Left Ear: Hearing, tympanic membrane, external ear and ear canal normal.   Nose: Nose normal.   Mouth/Throat: Uvula is midline, oropharynx is clear and moist and mucous membranes are normal.   Eyes: Conjunctivae, EOM and lids are normal. Pupils are equal, round, and reactive to light.   Neck: Trachea normal, normal range of motion, full passive range of motion without pain and phonation normal. Neck supple.   Cardiovascular: Normal rate, regular rhythm and normal heart sounds.    Pulmonary/Chest: Effort normal and breath sounds normal.   Abdominal: Normal appearance. There is no tenderness.   Neurological: She is alert and oriented to person, place, and time.   Skin: Skin is warm, dry and intact.   Psychiatric: She has a normal mood and affect. Her speech is normal and behavior is normal. Judgment and thought content normal. Cognition and memory are normal.   Nursing note and vitals reviewed.      Procedures         ED Course  ED Course   Value Comment By Time   ECG 12 Lead  Sinus rhythm, rate 55. No st abnormalities concerning for STEMI Reyes Jarvis MD 07/21 1954      Pt noted to be extremely anxious after returning from CT scan and requested something to help with her anxiety. Pt given ativan IV which she states greatly relieved her symptoms of palpitations and CP. Pt states that her CP is worse with deep breathing rather than exertion. Vitals while laying, sitting, standing were negative for orthostasis. Pt states that her dizziness is worse with standing and head movement. I spoke with Dr Bacon who recommended that the pt be admitted to medicine given chronic medical issues. Dr Bacon reviewed pt's EKG and stated that pt had no acute cardiac issues but recommended admission so that he could contact pt's loop recorder representative in the morning to find out why they called pt to come to ED.            MDM  Number of Diagnoses or Management Options  Bradycardia with 51-60 beats per minute: established and worsening  Chest pain in adult: new and requires workup  Dizziness: new and requires workup     Amount and/or Complexity of Data Reviewed  Clinical lab tests: reviewed and ordered  Tests in the radiology section of CPT®: reviewed and ordered  Tests in the medicine section of CPT®: reviewed and ordered  Decide to obtain previous medical records or to obtain history from someone other than the patient: yes  Discuss the patient with other providers: yes  Independent visualization of images, tracings, or specimens: yes    Risk of Complications, Morbidity, and/or Mortality  Presenting problems: high  Diagnostic procedures: high  Management options: high    Patient Progress  Patient progress: stable      Final diagnoses:   Chest pain in adult   Dizziness   Bradycardia with 51-60 beats per minute            Reyes Jarvis MD  07/22/17 0023

## 2017-07-22 PROBLEM — R07.9 CHEST PAIN IN ADULT: Status: ACTIVE | Noted: 2017-07-22

## 2017-07-22 LAB
CK MB SERPL-CCNC: 1.53 NG/ML (ref 0–5)
CK MB SERPL-CCNC: 1.79 NG/ML (ref 0–5)
CK MB SERPL-CCNC: 2.01 NG/ML (ref 0–5)
CK MB SERPL-RTO: 2.9 % (ref 0–3)
CK SERPL-CCNC: 65 U/L (ref 24–173)
CK SERPL-CCNC: 70 U/L (ref 24–173)
CK SERPL-CCNC: 78 U/L (ref 24–173)
MYOGLOBIN SERPL-MCNC: 39 NG/ML (ref 0–109)
MYOGLOBIN SERPL-MCNC: 39 NG/ML (ref 0–109)
MYOGLOBIN SERPL-MCNC: 40 NG/ML (ref 0–109)
POTASSIUM BLD-SCNC: 3.7 MMOL/L (ref 3.5–5.3)
TROPONIN I SERPL-MCNC: <0.006 NG/ML

## 2017-07-22 PROCEDURE — 83874 ASSAY OF MYOGLOBIN: CPT | Performed by: INTERNAL MEDICINE

## 2017-07-22 PROCEDURE — 82550 ASSAY OF CK (CPK): CPT | Performed by: INTERNAL MEDICINE

## 2017-07-22 PROCEDURE — G0378 HOSPITAL OBSERVATION PER HR: HCPCS

## 2017-07-22 PROCEDURE — 84484 ASSAY OF TROPONIN QUANT: CPT | Performed by: INTERNAL MEDICINE

## 2017-07-22 PROCEDURE — 25010000002 LORAZEPAM PER 2 MG: Performed by: INTERNAL MEDICINE

## 2017-07-22 PROCEDURE — 84132 ASSAY OF SERUM POTASSIUM: CPT | Performed by: PHYSICIAN ASSISTANT

## 2017-07-22 PROCEDURE — 99220 PR INITIAL OBSERVATION CARE/DAY 70 MINUTES: CPT | Performed by: INTERNAL MEDICINE

## 2017-07-22 PROCEDURE — 94799 UNLISTED PULMONARY SVC/PX: CPT

## 2017-07-22 PROCEDURE — 96372 THER/PROPH/DIAG INJ SC/IM: CPT

## 2017-07-22 PROCEDURE — 96376 TX/PRO/DX INJ SAME DRUG ADON: CPT

## 2017-07-22 PROCEDURE — 82553 CREATINE MB FRACTION: CPT | Performed by: INTERNAL MEDICINE

## 2017-07-22 PROCEDURE — 99214 OFFICE O/P EST MOD 30 MIN: CPT | Performed by: INTERNAL MEDICINE

## 2017-07-22 PROCEDURE — 25010000002 HEPARIN (PORCINE) PER 1000 UNITS: Performed by: INTERNAL MEDICINE

## 2017-07-22 RX ORDER — LORAZEPAM 2 MG/ML
0.5 INJECTION INTRAMUSCULAR ONCE
Status: COMPLETED | OUTPATIENT
Start: 2017-07-22 | End: 2017-07-22

## 2017-07-22 RX ORDER — POTASSIUM CHLORIDE 20 MEQ/1
20 TABLET, EXTENDED RELEASE ORAL DAILY
Status: DISCONTINUED | OUTPATIENT
Start: 2017-07-22 | End: 2017-07-23 | Stop reason: HOSPADM

## 2017-07-22 RX ORDER — SULFAMETHOXAZOLE AND TRIMETHOPRIM 800; 160 MG/1; MG/1
1 TABLET ORAL ONCE
Status: COMPLETED | OUTPATIENT
Start: 2017-07-22 | End: 2017-07-22

## 2017-07-22 RX ORDER — ASPIRIN 81 MG/1
81 TABLET, CHEWABLE ORAL DAILY
Status: DISCONTINUED | OUTPATIENT
Start: 2017-07-22 | End: 2017-07-22

## 2017-07-22 RX ORDER — POTASSIUM CHLORIDE 1.5 G/1.77G
40 POWDER, FOR SOLUTION ORAL AS NEEDED
Status: DISCONTINUED | OUTPATIENT
Start: 2017-07-22 | End: 2017-07-23 | Stop reason: HOSPADM

## 2017-07-22 RX ORDER — OXYCODONE HYDROCHLORIDE 15 MG/1
15 TABLET ORAL EVERY 8 HOURS PRN
Status: DISCONTINUED | OUTPATIENT
Start: 2017-07-22 | End: 2017-07-23 | Stop reason: HOSPADM

## 2017-07-22 RX ORDER — POTASSIUM CHLORIDE 750 MG/1
40 CAPSULE, EXTENDED RELEASE ORAL AS NEEDED
Status: DISCONTINUED | OUTPATIENT
Start: 2017-07-22 | End: 2017-07-23 | Stop reason: HOSPADM

## 2017-07-22 RX ORDER — CLONAZEPAM 0.5 MG/1
0.5 TABLET ORAL EVERY 8 HOURS SCHEDULED
Status: DISCONTINUED | OUTPATIENT
Start: 2017-07-22 | End: 2017-07-23 | Stop reason: HOSPADM

## 2017-07-22 RX ORDER — ONDANSETRON 4 MG/1
8 TABLET, ORALLY DISINTEGRATING ORAL EVERY 6 HOURS PRN
Status: DISCONTINUED | OUTPATIENT
Start: 2017-07-22 | End: 2017-07-23 | Stop reason: HOSPADM

## 2017-07-22 RX ORDER — ASPIRIN 81 MG/1
81 TABLET, CHEWABLE ORAL DAILY
COMMUNITY
End: 2019-02-17

## 2017-07-22 RX ORDER — FENTANYL 25 UG/H
1 PATCH TRANSDERMAL
COMMUNITY
End: 2018-06-21

## 2017-07-22 RX ORDER — ASPIRIN 81 MG/1
81 TABLET, CHEWABLE ORAL DAILY
Status: DISCONTINUED | OUTPATIENT
Start: 2017-07-22 | End: 2017-07-23 | Stop reason: HOSPADM

## 2017-07-22 RX ORDER — LEVETIRACETAM 500 MG/1
500 TABLET ORAL EVERY 12 HOURS SCHEDULED
Status: DISCONTINUED | OUTPATIENT
Start: 2017-07-22 | End: 2017-07-23 | Stop reason: HOSPADM

## 2017-07-22 RX ORDER — SULFAMETHOXAZOLE AND TRIMETHOPRIM 800; 160 MG/1; MG/1
1 TABLET ORAL EVERY 12 HOURS SCHEDULED
Status: DISCONTINUED | OUTPATIENT
Start: 2017-07-22 | End: 2017-07-23 | Stop reason: HOSPADM

## 2017-07-22 RX ORDER — OXYCODONE HYDROCHLORIDE 15 MG/1
15 TABLET ORAL EVERY 8 HOURS PRN
COMMUNITY
End: 2019-02-17

## 2017-07-22 RX ORDER — POTASSIUM CHLORIDE 7.45 MG/ML
10 INJECTION INTRAVENOUS
Status: DISCONTINUED | OUTPATIENT
Start: 2017-07-22 | End: 2017-07-23 | Stop reason: HOSPADM

## 2017-07-22 RX ORDER — LEVOTHYROXINE SODIUM 175 UG/1
175 TABLET ORAL
Status: DISCONTINUED | OUTPATIENT
Start: 2017-07-22 | End: 2017-07-23 | Stop reason: HOSPADM

## 2017-07-22 RX ORDER — MAGNESIUM SULFATE HEPTAHYDRATE 40 MG/ML
2 INJECTION, SOLUTION INTRAVENOUS AS NEEDED
Status: DISCONTINUED | OUTPATIENT
Start: 2017-07-22 | End: 2017-07-23 | Stop reason: HOSPADM

## 2017-07-22 RX ORDER — MAGNESIUM SULFATE HEPTAHYDRATE 40 MG/ML
4 INJECTION, SOLUTION INTRAVENOUS ONCE
Status: COMPLETED | OUTPATIENT
Start: 2017-07-22 | End: 2017-07-22

## 2017-07-22 RX ORDER — NITROGLYCERIN 0.4 MG/1
0.4 TABLET SUBLINGUAL
Status: DISCONTINUED | OUTPATIENT
Start: 2017-07-22 | End: 2017-07-23 | Stop reason: HOSPADM

## 2017-07-22 RX ORDER — BACITRACIN ZINC AND POLYMYXIN B SULFATE 500; 1000 [USP'U]/G; [USP'U]/G
1 OINTMENT TOPICAL 2 TIMES DAILY
COMMUNITY
End: 2019-02-17

## 2017-07-22 RX ORDER — MAGNESIUM SULFATE HEPTAHYDRATE 40 MG/ML
4 INJECTION, SOLUTION INTRAVENOUS AS NEEDED
Status: DISCONTINUED | OUTPATIENT
Start: 2017-07-22 | End: 2017-07-23 | Stop reason: HOSPADM

## 2017-07-22 RX ORDER — HEPARIN SODIUM 5000 [USP'U]/ML
5000 INJECTION, SOLUTION INTRAVENOUS; SUBCUTANEOUS EVERY 12 HOURS SCHEDULED
Status: DISCONTINUED | OUTPATIENT
Start: 2017-07-22 | End: 2017-07-23 | Stop reason: HOSPADM

## 2017-07-22 RX ORDER — FENTANYL 25 UG/H
1 PATCH TRANSDERMAL
Status: DISCONTINUED | OUTPATIENT
Start: 2017-07-22 | End: 2017-07-23 | Stop reason: HOSPADM

## 2017-07-22 RX ORDER — METOPROLOL TARTRATE 50 MG/1
50 TABLET, FILM COATED ORAL DAILY
Status: DISCONTINUED | OUTPATIENT
Start: 2017-07-22 | End: 2017-07-22

## 2017-07-22 RX ORDER — HYDROXYCHLOROQUINE SULFATE 200 MG/1
200 TABLET, FILM COATED ORAL 2 TIMES DAILY
Status: DISCONTINUED | OUTPATIENT
Start: 2017-07-22 | End: 2017-07-23 | Stop reason: HOSPADM

## 2017-07-22 RX ORDER — PANTOPRAZOLE SODIUM 40 MG/1
40 TABLET, DELAYED RELEASE ORAL DAILY
Status: DISCONTINUED | OUTPATIENT
Start: 2017-07-22 | End: 2017-07-23 | Stop reason: HOSPADM

## 2017-07-22 RX ORDER — SULFAMETHOXAZOLE AND TRIMETHOPRIM 800; 160 MG/1; MG/1
1 TABLET ORAL 2 TIMES DAILY
COMMUNITY
End: 2019-02-17

## 2017-07-22 RX ORDER — DULOXETIN HYDROCHLORIDE 20 MG/1
40 CAPSULE, DELAYED RELEASE ORAL DAILY
Status: DISCONTINUED | OUTPATIENT
Start: 2017-07-22 | End: 2017-07-23 | Stop reason: HOSPADM

## 2017-07-22 RX ORDER — PROMETHAZINE HYDROCHLORIDE 25 MG/1
25 SUPPOSITORY RECTAL EVERY 6 HOURS PRN
Status: DISCONTINUED | OUTPATIENT
Start: 2017-07-22 | End: 2017-07-23 | Stop reason: HOSPADM

## 2017-07-22 RX ORDER — SODIUM CHLORIDE 0.9 % (FLUSH) 0.9 %
1-10 SYRINGE (ML) INJECTION AS NEEDED
Status: DISCONTINUED | OUTPATIENT
Start: 2017-07-22 | End: 2017-07-23 | Stop reason: HOSPADM

## 2017-07-22 RX ORDER — BACITRACIN, NEOMYCIN, POLYMYXIN B 400; 3.5; 5 [USP'U]/G; MG/G; [USP'U]/G
OINTMENT TOPICAL EVERY 12 HOURS SCHEDULED
Status: DISCONTINUED | OUTPATIENT
Start: 2017-07-22 | End: 2017-07-23 | Stop reason: HOSPADM

## 2017-07-22 RX ORDER — HYDROCHLOROTHIAZIDE 25 MG/1
25 TABLET ORAL DAILY
Status: CANCELLED | OUTPATIENT
Start: 2017-07-22

## 2017-07-22 RX ADMIN — CLONAZEPAM 0.5 MG: 0.5 TABLET ORAL at 21:59

## 2017-07-22 RX ADMIN — DULOXETINE HYDROCHLORIDE 40 MG: 20 CAPSULE, DELAYED RELEASE ORAL at 07:51

## 2017-07-22 RX ADMIN — OXYCODONE HYDROCHLORIDE 15 MG: 15 TABLET ORAL at 15:44

## 2017-07-22 RX ADMIN — OXYCODONE HYDROCHLORIDE 15 MG: 15 TABLET ORAL at 07:20

## 2017-07-22 RX ADMIN — MECLIZINE HCL 25 MG: 12.5 TABLET ORAL at 00:17

## 2017-07-22 RX ADMIN — HYDROXYCHLOROQUINE SULFATE 200 MG: 200 TABLET ORAL at 07:50

## 2017-07-22 RX ADMIN — ONDANSETRON 8 MG: 4 TABLET, ORALLY DISINTEGRATING ORAL at 07:20

## 2017-07-22 RX ADMIN — NEOMYCIN AND POLYMYXIN B SULFATES AND BACITRACIN ZINC: 400; 3.5; 5 OINTMENT TOPICAL at 19:41

## 2017-07-22 RX ADMIN — SODIUM CHLORIDE 75 ML/HR: 9 INJECTION, SOLUTION INTRAVENOUS at 10:42

## 2017-07-22 RX ADMIN — HEPARIN SODIUM 5000 UNITS: 5000 INJECTION, SOLUTION INTRAVENOUS; SUBCUTANEOUS at 01:36

## 2017-07-22 RX ADMIN — METOPROLOL TARTRATE 25 MG: 25 TABLET, FILM COATED ORAL at 19:48

## 2017-07-22 RX ADMIN — LEVOTHYROXINE SODIUM 175 MCG: 175 TABLET ORAL at 07:50

## 2017-07-22 RX ADMIN — HEPARIN SODIUM 5000 UNITS: 5000 INJECTION, SOLUTION INTRAVENOUS; SUBCUTANEOUS at 07:48

## 2017-07-22 RX ADMIN — FENTANYL 1 PATCH: 25 PATCH, EXTENDED RELEASE TRANSDERMAL at 17:43

## 2017-07-22 RX ADMIN — MAGNESIUM SULFATE HEPTAHYDRATE 4 G: 40 INJECTION, SOLUTION INTRAVENOUS at 07:52

## 2017-07-22 RX ADMIN — METOPROLOL TARTRATE 50 MG: 50 TABLET, FILM COATED ORAL at 13:09

## 2017-07-22 RX ADMIN — SULFAMETHOXAZOLE AND TRIMETHOPRIM 160 MG: 800; 160 TABLET ORAL at 19:41

## 2017-07-22 RX ADMIN — SODIUM CHLORIDE 75 ML/HR: 9 INJECTION, SOLUTION INTRAVENOUS at 19:42

## 2017-07-22 RX ADMIN — Medication 10 ML: at 19:42

## 2017-07-22 RX ADMIN — NEOMYCIN AND POLYMYXIN B SULFATES AND BACITRACIN ZINC: 400; 3.5; 5 OINTMENT TOPICAL at 07:49

## 2017-07-22 RX ADMIN — ONDANSETRON 8 MG: 4 TABLET, ORALLY DISINTEGRATING ORAL at 19:43

## 2017-07-22 RX ADMIN — CLONAZEPAM 0.5 MG: 0.5 TABLET ORAL at 06:08

## 2017-07-22 RX ADMIN — PANTOPRAZOLE SODIUM 40 MG: 40 TABLET, DELAYED RELEASE ORAL at 07:51

## 2017-07-22 RX ADMIN — LEVETIRACETAM 500 MG: 500 TABLET, FILM COATED ORAL at 19:41

## 2017-07-22 RX ADMIN — SULFAMETHOXAZOLE AND TRIMETHOPRIM 160 MG: 800; 160 TABLET ORAL at 00:42

## 2017-07-22 RX ADMIN — LORAZEPAM 0.5 MG: 2 INJECTION INTRAMUSCULAR; INTRAVENOUS at 07:47

## 2017-07-22 RX ADMIN — LEVETIRACETAM 500 MG: 500 TABLET, FILM COATED ORAL at 07:51

## 2017-07-22 RX ADMIN — CLONAZEPAM 0.5 MG: 0.5 TABLET ORAL at 13:42

## 2017-07-22 RX ADMIN — HEPARIN SODIUM 5000 UNITS: 5000 INJECTION, SOLUTION INTRAVENOUS; SUBCUTANEOUS at 19:41

## 2017-07-22 RX ADMIN — SULFAMETHOXAZOLE AND TRIMETHOPRIM 160 MG: 800; 160 TABLET ORAL at 07:51

## 2017-07-22 RX ADMIN — HYDROXYCHLOROQUINE SULFATE 200 MG: 200 TABLET ORAL at 17:44

## 2017-07-22 RX ADMIN — OXYCODONE HYDROCHLORIDE 15 MG: 15 TABLET ORAL at 23:17

## 2017-07-22 RX ADMIN — ONDANSETRON 8 MG: 4 TABLET, ORALLY DISINTEGRATING ORAL at 13:36

## 2017-07-22 NOTE — PROGRESS NOTES
Brief Note:     General was admitted earlier today by Dr. Hastings for an abnormal cardiac rhythm of unknown etiology.  Today, the patient's telemetry has revealed sinus rhythm in the 60s to 70s with a first-degree AV block and frequent PVCs.  When I evaluated the patient this evening she states that overall she is feeling better.  She states that it was hard for her to differentiate the palpitations from her anxiety attacks.  She also states that she was concerned when the loop recorder company and started her to report to the nearest emergency department.  She currently denies any palpitations.  She denies any nausea and/or vomiting.  She has no chest pain.  He was also found to be hypokalemic and also had hypomagnesemia.    On exam, the patient is well-nourished and well-developed.  She is in no acute distress.  She is noted to have some minimal edema in the right mandible area with a healing scar.  Her cardiac exam reveals an occasional PVC.  I do not appreciate any murmur and or rub.  Her lungs are clear to auscultation bilaterally.  Abdomen is soft nontender, nondistended with normoactive bowel sounds.  Her lower extremities are without edema and neurologically she is intact.    I have decreased the dose of her metoprolol tartrate but have increased in frequency.  I have changed her dose from 50 mg daily to 25 mg twice a day with holding parameters.  Patient has had some low blood pressures today but overall they are improving.  Continue with her gentle IV fluid hydration and aggressive supplementation of her electrolytes.  She has been evaluated by Dr. Bacon and I appreciate his assistance.    I will repeat a CBC, CMP and Mg level in the morning. Further management pending final results from the [employer] company. Continue to monitor closely on telemetry.    Present during visit: TIFF Dow

## 2017-07-22 NOTE — CONSULTS
Inpatient Consult to Cardiology  Consult performed by: CODEY BACON  Consult ordered by: JULEE FITCH        Date of Admit: 7/21/2017  Date of Consult: 07/22/17  No ref. provider found  Karely Villarreal  1965  Consulting Physician: Dr. Codey Bacon MD, [facility]    Cardiology consultation    Reason for consultation: Abnormal loop recorder reading.     Assessment:  1. Unspecified arrhythmia appreciated on loop recorder home download.   2. Frequent PVCs on telemetry  3. Hypokalemia  4. Hypomagnesemia   5. History of type II NSTEMI in the setting of sepsis and gastric ulcer in 03/2016.  6. History of CVA/TIA with subsequent PFO closure  7. Hypertension  8. Systemic lupus erythematosus   9. Sjogren's syndrome    Recommendations:  1. [employer] has been contacted to come interrogate her loop recorder.   2. For now, we will continue to monitor for any significant tachy or janina- arrhythmias and intervene as needed.   3. If she has any appreciated atrial fibrillation, will have to consider chronic oral anticoagulation as she has already had multiple strokes.     History of Present Illness    Subjective     Chief Complaint   Patient presents with   • Dizziness   • Shortness of Breath       Karely Villarreal is a 52 y.o. female with past medical history significant for multiple autoimmune and connective tissue disorders, including sjogren's syndrome, systemic lupus erythematosus, rheumatoid arthritis.She also has a history of hypertension, diastolic congestive heart failure, and history of CVA and TIA for a total of 3 with the last being about a year ago.  At that time she had a PFO closure and a loop recorder implanted.  She denies there being any appreciated any atrial fibrillation or atrial flutter on her loop recorder interrogations.  She was recently admitted to [facility] from July 6 - July 11, 2017 due to MRSA infection.  Since discharge, she has been having low blood pressure and recently her Norvasc was  discontinued and her metoprolol tartrate was changed from 50 mg twice a day to 50 mg once a day.  She has continued to have intermittent dizziness as well as low blood pressure and stopped the metoprolol altogether for the last 3 days.  She has had near-syncope but no actual syncopal episodes.  When these spells occur she quickly sits or will lay down and the dizziness improves.  She was contacted yesterday that there were some abnormalities on her home download from her loop recorder and she was instructed to come to the emergency department.  At this time, it is unclear what arrhythmia was occurring.  Since admission to the telemetry floor she has been in a sinus rhythm in the 60s with frequent PVCs.  She denies any chest discomfort or shortness of breath. She was noted to have hypokalemia and hypomagnesemia.  She does report recent diarrhea since she was discharged from [facility].    Cardiac risk factors:hypertension    Past Medical History:   Diagnosis Date   • Anxiety    • Chronic headache    • Depression    • Diastolic CHF, chronic    • DVT (deep venous thrombosis)     left leg   • Encephalitis 12/2016    treated at the [facility]   • Gastric ulcer with perforation 03/2016    Microperforation and air in the biliary tree   • Gastritis    • Henoch-Schonlein purpura    • Hypertension    • Hypothyroidism (acquired)     Removed due to groiter   • Lower GI bleeding    • Migraine    • Mixed connective tissue disease    • MRSA cellulitis    • NSTEMI (non-ST elevated myocardial infarction)    • Patent foramen ovale    • Pneumonia    • PVC (premature ventricular contraction)    • RA (rheumatoid arthritis)    • Renal disorder    • Rhabdomyolysis    • Seizures     when had encephalitis   • Sjogren's syndrome    • Stroke 09/2015    x 1   • Systemic lupus erythematosus     Discoid and systemic   • Temporal arteritis    • TIA (transient ischemic attack)     x 3     Past Surgical History:   Procedure  Laterality Date   • APPENDECTOMY     • CARDIAC CATHETERIZATION  2016    PFO repair and had a loop monitor placed at the [facility]   •  SECTION      x 2   • ENDOSCOPY     • KNEE ARTHROSCOPY Left    • LUMBAR FUSION     • PORTACATH PLACEMENT Right 2016   • THYROID SURGERY      Removed due to a goiter     Family History   Problem Relation Age of Onset   • Hypertension Mother    • Arthritis Mother      RA   • Hypertension Father    • Arthritis Father      RA   • Diabetes Father      Social History   Substance Use Topics   • Smoking status: Never Smoker   • Smokeless tobacco: Never Used   • Alcohol use No      Comment: Occassionally, social drinker in the past     Prescriptions Prior to Admission   Medication Sig Dispense Refill Last Dose   • aspirin 81 MG chewable tablet Chew 81 mg Daily.   2017 at am   • bacitracin-polymyxin b (POLYSPORIN) 500-28412 UNIT/GM ointment Apply 1 application topically 2 (Two) Times a Day.   2017 at pm   • clonazePAM (KlonoPIN) 0.5 MG tablet Take 0.5 mg by mouth 3 (three) times a day.   2017 at midday   • DULoxetine (CYMBALTA) 60 MG capsule Take 40 mg by mouth daily      2017 at am   • fentaNYL (DURAGESIC) 25 MCG/HR patch Place 1 patch on the skin Every 72 (Seventy-Two) Hours. Due for change 17 pm   2017 at Unknown time   • hydrochlorothiazide (HYDRODIURIL) 25 MG tablet Take 25 mg by mouth Daily.   2017 at am   • hydroxychloroquine (PLAQUENIL) 200 MG tablet Take 200 mg by mouth 2 (two) times a day.   2017 at am   • levETIRAcetam (KEPPRA) 500 MG tablet Take 500 mg by mouth 2 (Two) Times a Day.   2017 at am   • levothyroxine (SYNTHROID, LEVOTHROID) 175 MCG tablet Take 175 mcg by mouth Daily.   2017 at am   • metoprolol tartrate (LOPRESSOR) 50 MG tablet Take 50 mg by mouth Daily.   2017 at am   • ondansetron ODT (ZOFRAN-ODT) 4 MG disintegrating tablet Take 1 tablet by mouth every 6 (six) hours as needed for nausea  or vomiting. (Patient taking differently: Take 8 mg by mouth Every 6 (Six) Hours As Needed for Nausea or Vomiting.) 10 tablet 0 Past Week at Unknown time   • oxyCODONE (ROXICODONE) 15 MG immediate release tablet Take 15 mg by mouth Every 8 (Eight) Hours As Needed for Moderate Pain (4-6).   7/21/2017 at midday   • pantoprazole (PROTONIX) 40 MG EC tablet Take 40 mg by mouth Daily.   7/21/2017 at am   • potassium chloride (KLOR-CON) 20 MEQ CR tablet Take 20 mEq by mouth daily.   7/21/2017 at am   • promethazine (PHENERGAN) 25 MG suppository Insert 1 suppository into the rectum every 6 (six) hours as needed for nausea or vomiting. 4 suppository 0 Past Month at Unknown time   • sulfamethoxazole-trimethoprim (BACTRIM DS,SEPTRA DS) 800-160 MG per tablet Take 1 tablet by mouth 2 (Two) Times a Day. Two days left in course of therapy   7/21/2017 at am     Allergies:  Compazine [prochlorperazine edisylate]; Imitrex [sumatriptan]; Nsaids; Reglan [metoclopramide]; Solu-medrol [methylprednisolone]; and Zyprexa [olanzapine]    Review of Systems   Constitutional: Positive for fatigue. Negative for chills, diaphoresis and fever.   Respiratory: Negative for cough and shortness of breath.    Cardiovascular: Positive for palpitations and leg swelling (Intermittent). Negative for chest pain.   Gastrointestinal: Positive for diarrhea and nausea. Negative for rectal pain and vomiting.   Genitourinary: Negative for hematuria.   Musculoskeletal: Positive for joint swelling.   Skin: Positive for wound.   Neurological: Positive for dizziness and light-headedness. Negative for syncope and weakness.   Hematological: Does not bruise/bleed easily.   Psychiatric/Behavioral: Negative for behavioral problems and confusion.     Objective      Vital Signs  Temp:  [96.9 °F (36.1 °C)-98.6 °F (37 °C)] 96.9 °F (36.1 °C)  Heart Rate:  [52-72] 64  Resp:  [16-18] 18  BP: ()/(43-89) 112/74  Body mass index is 32.44 kg/(m^2).    Intake/Output Summary  (Last 24 hours) at 07/22/17 0915  Last data filed at 07/21/17 2304   Gross per 24 hour   Intake             2000 ml   Output                0 ml   Net             2000 ml       Physical Exam   Constitutional: She is oriented to person, place, and time. She appears well-developed and well-nourished. No distress.   HENT:   Head: Normocephalic and atraumatic.   Bandage on her left chin.   Eyes: Conjunctivae are normal. Right eye exhibits no discharge. Left eye exhibits no discharge. No scleral icterus.   Neck: Normal range of motion. Neck supple. No JVD present. Carotid bruit is not present.   Cardiovascular: Normal rate, regular rhythm and normal heart sounds.  Exam reveals no gallop and no friction rub.    No murmur heard.  Pulmonary/Chest: Effort normal and breath sounds normal. No respiratory distress. She has no wheezes. She has no rales. She exhibits no tenderness.   Abdominal: Soft. Bowel sounds are normal. She exhibits no distension and no mass. There is no tenderness. There is no rebound and no guarding. No hernia.   Musculoskeletal: Normal range of motion. She exhibits no edema.   Neurological: She is alert and oriented to person, place, and time.   Skin: Skin is warm and dry. No rash noted. She is not diaphoretic. No erythema. No pallor.   Psychiatric: She has a normal mood and affect. Her behavior is normal.   Nursing note and vitals reviewed.    Results Review:   I reviewed the patient's new clinical results.    Results from last 7 days  Lab Units 07/22/17  0534 07/22/17  0055 07/21/17  2219 07/21/17 2005   CK TOTAL U/L 65 70  --   --    TROPONIN I ng/mL <0.006 <0.006 <0.006 <0.006   CKMB ng/mL 1.53 2.01  --   --    MYOGLOBIN ng/mL 39.0 40.0  --   --        Results from last 7 days  Lab Units 07/21/17 2005   WBC 10*3/mm3 7.02   HEMOGLOBIN g/dL 10.0*   PLATELETS 10*3/mm3 260       Results from last 7 days  Lab Units 07/21/17 2005   SODIUM mmol/L 141   POTASSIUM mmol/L 3.0*   CHLORIDE mmol/L 109   CO2  mmol/L 29.3   BUN mg/dL 14   CREATININE mg/dL 0.76   CALCIUM mg/dL 8.9   GLUCOSE mg/dL 111*   ALT (SGPT) U/L 15   AST (SGOT) U/L 21     Lab Results   Component Value Date    INR 0.93 11/13/2016    INR 0.90 04/10/2016    INR <0.90 03/20/2016    INR 0.92 12/01/2015    INR 0.97 11/02/2015     Lab Results   Component Value Date    MG 1.6 (L) 07/21/2017    MG 1.9 04/10/2016    MG 1.8 03/20/2016     Lab Results   Component Value Date    TSH 1.662 11/13/2016    CHLPL 149 03/13/2016    TRIG 90 03/13/2016    HDL 26 (L) 03/13/2016     (H) 03/13/2016      Lab Results   Component Value Date    BNP 60.0 07/21/2017    BNP 9.0 12/24/2016    BNP 17 04/10/2016     EKG:       CXR: 07/21/17      Imaging Results (last 72 hours)     Procedure Component Value Units Date/Time    XR Chest 1 View [[id number]] Resulted:  07/21/17 2013     Updated:  07/21/17 2013    CT Chest Pulmonary Embolism With Contrast [[id number]] Resulted:  07/21/17 2147     Updated:  07/21/17 2148        Thank you very much for asking us to be involved in this patient's care.  We will follow along with you.    VICTOR UHGO Carter acting as scribe for Dr. Codey Bacon MD, [facility]  07/22/17  9:15 AM    Attestation: I have seen and evaluated Ms. Villarreal and did a full history and physical and formulated the impression and recommendation plan.  I have discussed with Mary Teixeira who has scribed this note for me.    Dr. Codey Bacon MD, [facility]   07/22/17  9:15 AM

## 2017-07-22 NOTE — PLAN OF CARE
Problem: Pain, Acute (Adult)  Goal: Identify Related Risk Factors and Signs and Symptoms  Outcome: Ongoing (interventions implemented as appropriate)    07/22/17 0917   Pain, Acute   Related Risk Factors (Acute Pain) disease process;fear       Goal: Acceptable Pain Control/Comfort Level  Outcome: Ongoing (interventions implemented as appropriate)    07/22/17 0917   Pain, Acute (Adult)   Acceptable Pain Control/Comfort Level making progress toward outcome         Problem: Cardiac Output, Decreased (Adult)  Goal: Identify Related Risk Factors and Signs and Symptoms  Outcome: Ongoing (interventions implemented as appropriate)    07/22/17 0917   Cardiac Output, Decreased   Cardiac Output, Decreased: Related Risk Factors heart rhythm altered;medication effects   Signs and Symptoms (Cardiac Output Decreased) heart rate/rhythm alteration       Goal: Adequate Cardiac Output/Effective Tissue Perfusion  Outcome: Ongoing (interventions implemented as appropriate)    07/22/17 0134   Cardiac Output, Decreased (Adult)   Adequate Cardiac Output/Effective Tissue Perfusion making progress toward outcome         Problem: Arrhythmia/Dysrhythmia (Symptomatic) (Adult)  Goal: Signs and Symptoms of Listed Potential Problems Will be Absent or Manageable (Arrhythmia/Dysrhythmia)  Outcome: Ongoing (interventions implemented as appropriate)    07/22/17 0917   Arrhythmia/Dysrhythmia (Symptomatic)   Problems Assessed (Arrhythmia/Dysrhythmia) all   Problems Present (Arrhythmia/Dysrhythmia) chest pain (angina)         Problem: Patient Care Overview (Adult)  Goal: Plan of Care Review  Outcome: Ongoing (interventions implemented as appropriate)    07/22/17 0917   Patient Care Overview   Progress improving   Coping/Psychosocial Response Interventions   Plan Of Care Reviewed With patient       Goal: Discharge Needs Assessment  Outcome: Ongoing (interventions implemented as appropriate)    07/22/17 0917   Discharge Needs Assessment   Concerns To Be  Addressed no discharge needs identified

## 2017-07-22 NOTE — ED NOTES
Accessed pts port on right infraclavicular fossa at this time. Sterile technique maintained throughout procedure. Pt tolerated well. Able to obtain blood return and flushes with no difficulty. 19 g, 1.5 in needle used. Dressed line appropriately and labeled.      Valentina Clifford RN  07/21/17 2013

## 2017-07-22 NOTE — PLAN OF CARE
Problem: Pain, Acute (Adult)  Goal: Identify Related Risk Factors and Signs and Symptoms  Outcome: Ongoing (interventions implemented as appropriate)  Goal: Acceptable Pain Control/Comfort Level  Outcome: Ongoing (interventions implemented as appropriate)    Problem: Cardiac Output, Decreased (Adult)  Goal: Identify Related Risk Factors and Signs and Symptoms  Outcome: Ongoing (interventions implemented as appropriate)  Goal: Adequate Cardiac Output/Effective Tissue Perfusion  Outcome: Ongoing (interventions implemented as appropriate)    Problem: Arrhythmia/Dysrhythmia (Symptomatic) (Adult)  Goal: Signs and Symptoms of Listed Potential Problems Will be Absent or Manageable (Arrhythmia/Dysrhythmia)  Outcome: Ongoing (interventions implemented as appropriate)

## 2017-07-22 NOTE — PLAN OF CARE
Problem: Pain, Acute (Adult)  Goal: Identify Related Risk Factors and Signs and Symptoms  Outcome: Ongoing (interventions implemented as appropriate)  Goal: Acceptable Pain Control/Comfort Level  Outcome: Ongoing (interventions implemented as appropriate)    Problem: Cardiac Output, Decreased (Adult)  Goal: Identify Related Risk Factors and Signs and Symptoms  Outcome: Ongoing (interventions implemented as appropriate)  Goal: Adequate Cardiac Output/Effective Tissue Perfusion  Outcome: Ongoing (interventions implemented as appropriate)    Problem: Arrhythmia/Dysrhythmia (Symptomatic) (Adult)  Goal: Signs and Symptoms of Listed Potential Problems Will be Absent or Manageable (Arrhythmia/Dysrhythmia)  Outcome: Ongoing (interventions implemented as appropriate)    Problem: Patient Care Overview (Adult)  Goal: Plan of Care Review  Outcome: Ongoing (interventions implemented as appropriate)  Goal: Adult Individualization and Mutuality  Outcome: Ongoing (interventions implemented as appropriate)  Goal: Discharge Needs Assessment  Outcome: Ongoing (interventions implemented as appropriate)

## 2017-07-22 NOTE — H&P
"    [facility] HOSPITALIST HISTORY AND PHYSICAL    Patient Identification:  Name:  Karely Villarreal  Age:  52 y.o.  Sex:  female  :  1965  MRN:  [id number]   Visit Number:  [id number]  Primary Care Physician:  Wang Contreras MD     Chief complaint:  Told to come in due to abnormality on loop recorder    History of presenting illness:  52 y.o. female who presented to [facility] emergency department at the request of the cardiology department at the [facility] because of an abnormality on her loop recorder.  The patient has many autoimmune diseases (rheumatoid arthritis, lupus, mixed connective tissue disease) and is currently on Plaquenil.  She was recently hospitalized at [facility] from 2017 through 7/10/2017 because of an MRSA infection of the lupus skin lesion on her chin; she states that she still has to have 2 teeth removed because they have \"\" and that she nearly lost her jaw; she thinks that the bone was also infected.  She is currently taking Bactrim for 2 weeks after receiving vancomycin in the hospital.  She has follow-up with [facility] with both ENT and infectious disease.  The patient does have a history of a non-ST elevation MI when she had a microperforation of a gastric ulcer and she also has history of stroke for which she underwent PFO closure in 2016 at [facility].  During the PFO closure, the loop recorder was inserted in her chest to monitor for left ventricular damage from multiple blood clots.  The patient has automatic download of her loop recorder at 1 AM every day.  She was contacted by the loop recorder currently at 3 PM on 2017 and told that there was a problem.  She did a manual download and afterwards they told her to come to the hospital.  She denies any chest pain and denies any passing out.  She has experienced low blood pressure for last 7 days.  Her cardiologist at [facility], " Dr. Bateman, has been adjusting her blood pressure medications.  Recently the Norvasc was stopped because of low blood pressures.  For the last week, the patient's blood pressures have been systolic of 70-80 with a diastolic of 50-60.  Her heart rates have been running 40s to 50s and her metoprolol was changed from twice a day to once a day.  She states that occasionally she becomes dizzy with changing positions.  She states that the lesion on her chin is healing well and that everybody is happy with the healing of this MRSA abscess.  She denies any syncopal episodes recently.  ---------------------------------------------------------------------------------------------------------------------   Review of Systems   Constitutional: Negative for chills, fatigue and fever.        Low blood pressures for last week.   HENT: Negative for postnasal drip, rhinorrhea, sneezing and sore throat.    Eyes: Negative for discharge and redness.   Respiratory: Negative for cough, shortness of breath and wheezing.    Cardiovascular: Negative for chest pain, palpitations and leg swelling.   Gastrointestinal: Negative for diarrhea, nausea and vomiting.   Genitourinary: Negative for decreased urine volume, dysuria and hematuria.   Musculoskeletal: Negative for arthralgias and joint swelling.   Skin: Positive for wound (but it is improving). Negative for pallor and rash.   Neurological: Negative for seizures, speech difficulty and headaches.   Hematological: Does not bruise/bleed easily.   Psychiatric/Behavioral: Negative for confusion, self-injury and suicidal ideas. The patient is not nervous/anxious.     ---------------------------------------------------------------------------------------------------------------------   Past Medical History:   Diagnosis Date   • Anxiety    • Chronic headache    • Depression    • Diastolic CHF, chronic    • DVT (deep venous thrombosis)     left leg   • Encephalitis 12/2016    treated at [facility]   • Gastric ulcer with perforation 2016    Microperforation and air in the biliary tree   • Gastritis    • Henoch-Schonlein purpura    • Hypertension    • Hypothyroidism (acquired)     Removed due to groiter   • Lower GI bleeding    • Migraine    • Mixed connective tissue disease    • MRSA cellulitis    • NSTEMI (non-ST elevated myocardial infarction)    • Patent foramen ovale    • Pneumonia    • PVC (premature ventricular contraction)    • RA (rheumatoid arthritis)    • Renal disorder    • Rhabdomyolysis    • Seizures     when had encephalitis   • Sjogren's syndrome    • Stroke 09/2015    x 1   • Systemic lupus erythematosus     Discoid and systemic   • Temporal arteritis    • TIA (transient ischemic attack)     x 3     Past Surgical History:   Procedure Laterality Date   • APPENDECTOMY     • CARDIAC CATHETERIZATION  2016    PFO repair and had a loop monitor placed at the [facility]   •  SECTION      x 2   • ENDOSCOPY     • KNEE ARTHROSCOPY Left    • LUMBAR FUSION     • PORTACATH PLACEMENT Right 2016   • THYROID SURGERY      Removed due to a goiter     Family History   Problem Relation Age of Onset   • Hypertension Mother    • Arthritis Mother      RA   • Hypertension Father    • Arthritis Father      RA   • Diabetes Father      Social History   • Marital status:      Social History Main Topics   • Smoking status: Never Smoker   • Smokeless tobacco: Never Used   • Alcohol use No      Comment: Occassionally, social drinker in the past   • Drug use: No   • Sexual activity: Defer   ---------------------------------------------------------------------------------------------------------------------   Allergies:  Compazine [prochlorperazine edisylate]; Imitrex [sumatriptan]; Nsaids; Reglan [metoclopramide]; Solu-medrol [methylprednisolone]; and Zyprexa  [olanzapine]  ---------------------------------------------------------------------------------------------------------------------   Prior to Admission Medications     Prescriptions Last Dose Informant Patient Reported? Taking?    aspirin 81 MG chewable tablet 7/21/2017 Medication Bottle Yes Yes    Chew 81 mg Daily.    bacitracin-polymyxin b (POLYSPORIN) 500-20289 UNIT/GM ointment 7/21/2017 Self Yes Yes    Apply 1 application topically 2 (Two) Times a Day.    clonazePAM (KlonoPIN) 0.5 MG tablet 7/21/2017 Medication Bottle Yes Yes    Take 0.5 mg by mouth 3 (three) times a day.    DULoxetine (CYMBALTA) 60 MG capsule 7/21/2017 Medication Bottle Yes Yes    Take 40 mg by mouth daily       fentaNYL (DURAGESIC) 25 MCG/HR patch 07/19/2017 Self Yes Yes    Place 1 patch on the skin Every 72 (Seventy-Two) Hours. Due for change 7/22/17 pm    hydrochlorothiazide (HYDRODIURIL) 25 MG tablet 7/21/2017 Medication Bottle Yes Yes    Take 25 mg by mouth Daily.    hydroxychloroquine (PLAQUENIL) 200 MG tablet 7/21/2017 Medication Bottle Yes Yes    Take 200 mg by mouth 2 (two) times a day.    levETIRAcetam (KEPPRA) 500 MG tablet 7/21/2017 Medication Bottle Yes Yes    Take 500 mg by mouth 2 (Two) Times a Day.    levothyroxine (SYNTHROID, LEVOTHROID) 175 MCG tablet 7/21/2017 Medication Bottle Yes Yes    Take 175 mcg by mouth Daily.    metoprolol tartrate (LOPRESSOR) 50 MG tablet 7/21/2017 Medication Bottle Yes Yes    Take 50 mg by mouth Daily.    ondansetron ODT (ZOFRAN-ODT) 4 MG disintegrating tablet Past Week Medication Bottle No Yes    Take 1 tablet by mouth every 6 (six) hours as needed for nausea or vomiting.    Patient taking differently:  Take 8 mg by mouth Every 6 (Six) Hours As Needed for Nausea or Vomiting.    oxyCODONE (ROXICODONE) 15 MG immediate release tablet 7/21/2017 Medication Bottle Yes Yes    Take 15 mg by mouth Every 8 (Eight) Hours As Needed for Moderate Pain (4-6).    pantoprazole (PROTONIX) 40 MG EC tablet 7/21/2017  Medication Bottle Yes Yes    Take 40 mg by mouth Daily.    potassium chloride (KLOR-CON) 20 MEQ CR tablet 7/21/2017 Medication Bottle Yes Yes    Take 20 mEq by mouth daily.    promethazine (PHENERGAN) 25 MG suppository Past Month Self No Yes    Insert 1 suppository into the rectum every 6 (six) hours as needed for nausea or vomiting.    sulfamethoxazole-trimethoprim (BACTRIM DS,SEPTRA DS) 800-160 MG per tablet 7/21/2017 Medication Bottle Yes Yes    Take 1 tablet by mouth 2 (Two) Times a Day. Two days left in course of therapy        Hospital Scheduled Meds:  aspirin 81 mg Oral Daily   heparin (porcine) 5,000 Units Subcutaneous Q12H   Pharmacy Meds to Bed Consult  Does not apply Daily     sodium chloride 75 mL/hr Last Rate: Stopped (07/22/17 0137)   ---------------------------------------------------------------------------------------------------------------------   Vital Signs:  Temp:  [97.6 °F (36.4 °C)-98.6 °F (37 °C)] 97.6 °F (36.4 °C)  Heart Rate:  [52-72] 67  Resp:  [16-18] 18  BP: ()/(43-89) 103/48  Last 3 weights    07/21/17  1851 07/22/17  0128   Weight: 160 lb (72.6 kg) 201 lb (91.2 kg)     Body mass index is 32.44 kg/(m^2).  ---------------------------------------------------------------------------------------------------------------------   Physical Exam:  Constitutional:  Well-developed and well-nourished.  No respiratory distress.      HENT:  Head: Normocephalic and atraumatic.  Mouth:  Moist mucous membranes.    Eyes:  Conjunctivae and EOM are normal.  Pupils are equal, round, and reactive to light.  No scleral icterus.  Neck:  Neck supple.  No JVD present.    Cardiovascular:  Normal rate, regular rhythm and normal heart sounds with no murmur.  Pulmonary/Chest:  No respiratory distress, no wheezes, no crackles, with normal breath sounds and good air movement.  Abdominal:  Soft.  Bowel sounds are normal.  No distension and no tenderness.   Musculoskeletal:  No edema, no tenderness, and no  deformity.  No red or swollen joints anywhere.    Neurological:  Alert and oriented to person, place, and time.  No cranial nerve deficit.  No tongue deviation.  No facial droop.  No slurred speech.   Skin:  Skin is warm and dry.  No rash noted.  No pallor.  Chin with a healing vertical incision on the right side with no drainage and no cellulitis.  Peripheral vascular:  No edema and strong pulses on all 4 extremities.  Genitourinary:  No Crowley catheter in place.  ---------------------------------------------------------------------------------------------------------------------  EKG:  Sinus bradycardia with heart rate of 59 and QTc 467 ms.  Telemetry:  Sinus bradycardia 50-60.  I have personally looked at both the EKG and the telemetry strips.  ---------------------------------------------------------------------------------------------------------------------  Results from last 7 days  Lab Units 07/21/17 2005   CRP mg/dL <0.50   LACTATE mmol/L 0.8   WBC 10*3/mm3 7.02   HEMOGLOBIN g/dL 10.0*   HEMATOCRIT % 31.8*   MCV fL 75.0*   MCHC g/dL 31.4*   PLATELETS 10*3/mm3 260     Results from last 7 days  Lab Units 07/21/17 2005   SODIUM mmol/L 141   POTASSIUM mmol/L 3.0*   MAGNESIUM mg/dL 1.6*   CHLORIDE mmol/L 109   CO2 mmol/L 29.3   BUN mg/dL 14   CREATININE mg/dL 0.76   EGFR IF NONAFRICN AM mL/min/1.73 80   CALCIUM mg/dL 8.9   GLUCOSE mg/dL 111*   ALBUMIN g/dL 3.80   BILIRUBIN mg/dL 0.2   ALK PHOS U/L 125*   AST (SGOT) U/L 21   ALT (SGPT) U/L 15   Estimated Creatinine Clearance: 99.7 mL/min (by C-G formula based on Cr of 0.76).    Results from last 7 days  Lab Units 07/22/17  0055 07/21/17  2219 07/21/17 2005   CK TOTAL U/L 70  --   --    TROPONIN I ng/mL <0.006 <0.006 <0.006   CK MB INDEX % 2.9  --   --    MYOGLOBIN ng/mL 40.0  --   --        Lab Results   Component Value Date    HGBA1C 5.7 03/20/2016     Lab Results   Component Value Date    TSH 1.662 11/13/2016    FREET4 0.57 (L) 04/11/2016     Lab Results    Component Value Date    PREGTESTUR Negative 07/05/2017     I have personally looked at the labs and they are stated above.  ---------------------------------------------------------------------------------------------------------------------  Imaging Results (last 7 days)     Procedure Component Value Units Date/Time    XR Chest 1 View [[id number]]:  I have personally looked at the images and I do not see any infiltrates; the loop recorder is in the left chest and the port is in the right chest.     Resulted:  07/21/17 2013  Updated:  07/21/17 2013         CT Chest Pulmonary Embolism With Contrast [[id number]] Resulted:  07/21/17 2147     Updated:  07/21/17 2148      ---------------------------------------------------------------------------------------------------------------------  Assessment and Plan:  -Abnormality on the loop recorder that is unknown at this time as the company did not tell the patient with probable was and we're unable to contact field  -Acute hypokalemia and acute hypomagnesemia  -Microcytic anemia  -Mixed connective tissue disorder, systemic and discoid lupus, and rheumatoid arthritis  -History of essential hypertension but now with hypotension and lightheadedness/presyncope  -Sinus bradycardia, mild  -History of premature ventricular contractions  -History of diastolic congestive heart failure with no current exacerbation  -Recent MRSA infection of her shin with possible osteomyelitis; awaiting the [facility] records  -Hypothyroidism  -History of stroke and TIA as result of vasculitis from her multiple autoimmune diseases  -Past type II myocardial infarction/non-ST elevation MI when she had microperforation of a gastric ulcer in March 2016  -Borderline prolonged QTc of 467 ms    I have admitted the patient to telemetry floor.  We will consult cardiology about the abnormality on the loop recorder.  Based on what the abnormality on the loop recorder is, further instructions on her  medications may be necessary.  For now, we'll monitor on telemetry and see if any abnormality show on that.  I will replace her potassium and magnesium per our protocols.  She will also receive serial cardiac enzymes.  I await cardiology to give any further instructions on any cardiac test as she does have a cardiologist at the [facility] and likely has had recent cardiac studies.  For now, I will hold all her blood pressure medications but continue her Toprol as her heart rates are high 50s to low 60s.  I will also continue her home aspirin and her antiseizure medication.  Her TSH was at an acceptable level within last year and thus will continue her current dose of Synthroid.    Alonzo Hastings MD  07/22/17  4:45 AM

## 2017-07-22 NOTE — NURSING NOTE
"DR. FITCH MADE AWARE OF PATIENT C/O NAUSEA.  NO NEW ORDERS NOTED AT THIS TIME.  ZOFRAN ODT REQUESTED FROM PHARMACY.  WILL ADMINISTER WHEN IT ARRIVES TO THE FLOOR.  PATIENT STATES THAT SHE \"THREW UP\" THE KLONOPIN GIVEN TO HER EARLIER, MD MADE AWARE.    "

## 2017-07-23 VITALS
RESPIRATION RATE: 18 BRPM | SYSTOLIC BLOOD PRESSURE: 113 MMHG | OXYGEN SATURATION: 97 % | HEART RATE: 60 BPM | HEIGHT: 66 IN | TEMPERATURE: 97.5 F | BODY MASS INDEX: 32.16 KG/M2 | DIASTOLIC BLOOD PRESSURE: 59 MMHG | WEIGHT: 200.13 LBS

## 2017-07-23 LAB
ALBUMIN SERPL-MCNC: 3.6 G/DL (ref 3.5–5)
ALBUMIN/GLOB SERPL: 1.4 G/DL (ref 1.5–2.5)
ALP SERPL-CCNC: 113 U/L (ref 35–104)
ALT SERPL W P-5'-P-CCNC: 12 U/L (ref 10–36)
ANION GAP SERPL CALCULATED.3IONS-SCNC: 6.3 MMOL/L (ref 3.6–11.2)
ANISOCYTOSIS BLD QL: ABNORMAL
AST SERPL-CCNC: 17 U/L (ref 10–30)
BILIRUB SERPL-MCNC: 0.1 MG/DL (ref 0.2–1.8)
BUN BLD-MCNC: 8 MG/DL (ref 7–21)
BUN/CREAT SERPL: 10.5 (ref 7–25)
CALCIUM SPEC-SCNC: 8.7 MG/DL (ref 7.7–10)
CHLORIDE SERPL-SCNC: 106 MMOL/L (ref 99–112)
CO2 SERPL-SCNC: 28.7 MMOL/L (ref 24.3–31.9)
CREAT BLD-MCNC: 0.76 MG/DL (ref 0.43–1.29)
DEPRECATED RDW RBC AUTO: 46.1 FL (ref 37–54)
EOSINOPHIL # BLD MANUAL: 0.13 10*3/MM3 (ref 0–0.7)
EOSINOPHIL NFR BLD MANUAL: 2 % (ref 0–5)
ERYTHROCYTE [DISTWIDTH] IN BLOOD BY AUTOMATED COUNT: 17.6 % (ref 11.5–14.5)
GFR SERPL CREATININE-BSD FRML MDRD: 80 ML/MIN/1.73
GLOBULIN UR ELPH-MCNC: 2.6 GM/DL
GLUCOSE BLD-MCNC: 100 MG/DL (ref 70–110)
HCT VFR BLD AUTO: 31.1 % (ref 37–47)
HGB BLD-MCNC: 9.3 G/DL (ref 12–16)
HYPOCHROMIA BLD QL: ABNORMAL
LYMPHOCYTES # BLD MANUAL: 3.64 10*3/MM3 (ref 1–3)
LYMPHOCYTES NFR BLD MANUAL: 54 % (ref 21–51)
LYMPHOCYTES NFR BLD MANUAL: 7 % (ref 0–10)
MAGNESIUM SERPL-MCNC: 2.2 MG/DL (ref 1.7–2.6)
MCH RBC QN AUTO: 22.7 PG (ref 27–33)
MCHC RBC AUTO-ENTMCNC: 29.9 G/DL (ref 33–37)
MCV RBC AUTO: 76 FL (ref 80–94)
MICROCYTES BLD QL: ABNORMAL
MONOCYTES # BLD AUTO: 0.47 10*3/MM3 (ref 0.1–0.9)
NEUTROPHILS # BLD AUTO: 2.49 10*3/MM3 (ref 1.4–6.5)
NEUTROPHILS NFR BLD MANUAL: 37 % (ref 30–70)
OSMOLALITY SERPL CALC.SUM OF ELEC: 279.7 MOSM/KG (ref 273–305)
PLAT MORPH BLD: NORMAL
PLATELET # BLD AUTO: 220 10*3/MM3 (ref 130–400)
PMV BLD AUTO: 9.4 FL (ref 6–10)
POTASSIUM BLD-SCNC: 3.7 MMOL/L (ref 3.5–5.3)
PROT SERPL-MCNC: 6.2 G/DL (ref 6–8)
RBC # BLD AUTO: 4.09 10*6/MM3 (ref 4.2–5.4)
SODIUM BLD-SCNC: 141 MMOL/L (ref 135–153)
WBC NRBC COR # BLD: 6.74 10*3/MM3 (ref 4.5–12.5)

## 2017-07-23 PROCEDURE — 99213 OFFICE O/P EST LOW 20 MIN: CPT | Performed by: INTERNAL MEDICINE

## 2017-07-23 PROCEDURE — 99217 PR OBSERVATION CARE DISCHARGE MANAGEMENT: CPT | Performed by: INTERNAL MEDICINE

## 2017-07-23 PROCEDURE — 85007 BL SMEAR W/DIFF WBC COUNT: CPT | Performed by: INTERNAL MEDICINE

## 2017-07-23 PROCEDURE — 83735 ASSAY OF MAGNESIUM: CPT | Performed by: INTERNAL MEDICINE

## 2017-07-23 PROCEDURE — 94799 UNLISTED PULMONARY SVC/PX: CPT

## 2017-07-23 PROCEDURE — 96372 THER/PROPH/DIAG INJ SC/IM: CPT

## 2017-07-23 PROCEDURE — G0378 HOSPITAL OBSERVATION PER HR: HCPCS

## 2017-07-23 PROCEDURE — 80053 COMPREHEN METABOLIC PANEL: CPT | Performed by: INTERNAL MEDICINE

## 2017-07-23 PROCEDURE — 25010000002 HEPARIN (PORCINE) PER 1000 UNITS: Performed by: INTERNAL MEDICINE

## 2017-07-23 PROCEDURE — 85025 COMPLETE CBC W/AUTO DIFF WBC: CPT | Performed by: INTERNAL MEDICINE

## 2017-07-23 RX ORDER — SODIUM CHLORIDE 0.9 % (FLUSH) 0.9 %
10 SYRINGE (ML) INJECTION AS NEEDED
Status: DISCONTINUED | OUTPATIENT
Start: 2017-07-23 | End: 2017-07-23 | Stop reason: HOSPADM

## 2017-07-23 RX ORDER — SODIUM CHLORIDE 9 MG/ML
INJECTION, SOLUTION INTRAVENOUS
Status: DISCONTINUED
Start: 2017-07-23 | End: 2017-07-23 | Stop reason: HOSPADM

## 2017-07-23 RX ORDER — SODIUM CHLORIDE 0.9 % (FLUSH) 0.9 %
10 SYRINGE (ML) INJECTION EVERY 12 HOURS SCHEDULED
Status: DISCONTINUED | OUTPATIENT
Start: 2017-07-23 | End: 2017-07-23 | Stop reason: HOSPADM

## 2017-07-23 RX ADMIN — OXYCODONE HYDROCHLORIDE 15 MG: 15 TABLET ORAL at 08:12

## 2017-07-23 RX ADMIN — ASPIRIN 81 MG: 81 TABLET, CHEWABLE ORAL at 11:46

## 2017-07-23 RX ADMIN — HEPARIN SODIUM 5000 UNITS: 5000 INJECTION, SOLUTION INTRAVENOUS; SUBCUTANEOUS at 08:11

## 2017-07-23 RX ADMIN — LEVETIRACETAM 500 MG: 500 TABLET, FILM COATED ORAL at 08:11

## 2017-07-23 RX ADMIN — DULOXETINE HYDROCHLORIDE 40 MG: 20 CAPSULE, DELAYED RELEASE ORAL at 08:11

## 2017-07-23 RX ADMIN — METOPROLOL TARTRATE 25 MG: 25 TABLET, FILM COATED ORAL at 08:12

## 2017-07-23 RX ADMIN — NEOMYCIN AND POLYMYXIN B SULFATES AND BACITRACIN ZINC: 400; 3.5; 5 OINTMENT TOPICAL at 08:13

## 2017-07-23 RX ADMIN — ONDANSETRON 8 MG: 4 TABLET, ORALLY DISINTEGRATING ORAL at 05:12

## 2017-07-23 RX ADMIN — PANTOPRAZOLE SODIUM 40 MG: 40 TABLET, DELAYED RELEASE ORAL at 08:11

## 2017-07-23 RX ADMIN — CLONAZEPAM 0.5 MG: 0.5 TABLET ORAL at 05:12

## 2017-07-23 RX ADMIN — HYDROXYCHLOROQUINE SULFATE 200 MG: 200 TABLET ORAL at 08:11

## 2017-07-23 RX ADMIN — SULFAMETHOXAZOLE AND TRIMETHOPRIM 160 MG: 800; 160 TABLET ORAL at 08:11

## 2017-07-23 RX ADMIN — LEVOTHYROXINE SODIUM 175 MCG: 175 TABLET ORAL at 05:12

## 2017-07-23 RX ADMIN — CLONAZEPAM 0.5 MG: 0.5 TABLET ORAL at 13:11

## 2017-07-23 RX ADMIN — POTASSIUM CHLORIDE 20 MEQ: 1500 TABLET, EXTENDED RELEASE ORAL at 08:11

## 2017-07-23 NOTE — PROGRESS NOTES
LOS: 0 days     Name: Karely Villarreal  Age/Sex: 52 y.o. female  :  1965        PCP: Wang Contreras MD    Active Problems:    Chest pain in adult      Admission Information: Karely Villarreal is a 52 y.o. female with a past medical history significant for  multiple autoimmune and connective tissue disorders, including sjogren's syndrome, systemic lupus erythematosus, rheumatoid arthritis.She also has a history of hypertension, diastolic congestive heart failure, and history of CVA and TIA for a total of 3 with the last being about a year ago.  At that time she had a PFO closure and a loop recorder implanted.  She denies there being any appreciated any atrial fibrillation or atrial flutter on her loop recorder interrogations.  She was recently admitted to [facility] from  - 2017 due to MRSA infection.  Since discharge, she has been having low blood pressure and recently her Norvasc was discontinued and her metoprolol tartrate was changed from 50 mg twice a day to 50 mg once a day.  She has continued to have intermittent dizziness as well as low blood pressure and stopped the metoprolol altogether for the last 3 days. She was contacted for some abnormalities on her home download from her loop recorder and she was instructed to come to the emergency department.  At this time, it is unclear what arrhythmia was occurring.  Since admission to the telemetry floor she has been in a sinus rhythm in the 60s with frequent PVCs.  She denies any chest discomfort or shortness of breath. She was noted to have hypokalemia and hypomagnesemia.  She does report recent diarrhea since she was discharged from [facility].    Following for: Frequent PVCs/bigeminy.     Telemetry Monitoring: Sinus 60s with intermittent trigeminal PVCs.     Interval history: She still has some mild dizziness when she sits up, but overall this has improved. She has some lateral left chest discomfort which is pleuritic in  nature. CT with PE protocol at admission was negative.     Review of Systems   Cardiovascular: Positive for chest pain. Negative for leg swelling, near-syncope and syncope.   Respiratory: Positive for shortness of breath.    Neurological: Positive for dizziness.     Vital Signs  Vital Signs (last 72 hrs)       07/20 0700  -  07/21 0659 07/21 0700  -  07/22 0659 07/22 0700  -  07/23 0659 07/23 0700  -  07/23 1137   Most Recent    Temp (°F)   96.9 -  98.6    97.2 -  98.3      97     97 (36.1)    Heart Rate   52 -  72    53 -  85    59 -  68     68    Resp   16 -  18    18 -  20      20     20    BP   (!)85/50 -  119/71    90/48 -  144/70      126/64     126/64    SpO2 (%)   94 -  100    95 -  98      95     95        Temp:  [97 °F (36.1 °C)-98.3 °F (36.8 °C)] 97 °F (36.1 °C)  Heart Rate:  [53-85] 68  Resp:  [18-20] 20  BP: ()/(48-92) 126/64  Body mass index is 32.3 kg/(m^2).      Intake/Output Summary (Last 24 hours) at 07/23/17 1137  Last data filed at 07/23/17 0304   Gross per 24 hour   Intake          1469.58 ml   Output              900 ml   Net           569.58 ml     Physical Exam   Constitutional: She is oriented to person, place, and time. She appears well-developed and well-nourished. No distress.   HENT:   Head: Normocephalic and atraumatic.   Eyes: Conjunctivae are normal. Right eye exhibits no discharge. Left eye exhibits no discharge.   Neck: Normal range of motion. Neck supple. Carotid bruit is not present.   Cardiovascular: Normal rate, regular rhythm and normal heart sounds.  Exam reveals no gallop and no friction rub.    No murmur heard.  Regularly irregular.    Pulmonary/Chest: Effort normal and breath sounds normal. No respiratory distress. She has no wheezes. She has no rales. She exhibits no tenderness.   Musculoskeletal: Normal range of motion. She exhibits no edema.   Neurological: She is alert and oriented to person, place, and time.   Skin: Skin is warm and dry. No rash noted. She is not  diaphoretic. No erythema. No pallor.   Psychiatric: She has a normal mood and affect. Her behavior is normal.   Nursing note and vitals reviewed.    Results Review:       Results from last 7 days  Lab Units 07/23/17 0217 07/21/17 2005   WBC 10*3/mm3 6.74 7.02   HEMOGLOBIN g/dL 9.3* 10.0*   PLATELETS 10*3/mm3 220 260       Results from last 7 days  Lab Units 07/23/17 0217 07/22/17  1304 07/21/17 2005   SODIUM mmol/L 141  --  141   POTASSIUM mmol/L 3.7 3.7 3.0*   CHLORIDE mmol/L 106  --  109   CO2 mmol/L 28.7  --  29.3   BUN mg/dL 8  --  14   CREATININE mg/dL 0.76  --  0.76   CALCIUM mg/dL 8.7  --  8.9   GLUCOSE mg/dL 100  --  111*       Results from last 7 days  Lab Units 07/22/17  1304 07/22/17  0534 07/22/17  0055 07/21/17 2219 07/21/17 2005   CK TOTAL U/L 78 65 70  --   --    TROPONIN I ng/mL <0.006 <0.006 <0.006 <0.006 <0.006   CKMB ng/mL 1.79 1.53 2.01  --   --    MYOGLOBIN ng/mL 39.0 39.0 40.0  --   --      CT CHEST PULMONARY EMBOLISM WITH CONTRAST-      CLINICAL INDICATION: Evaluate for PE.      COMPARISON: Chest radiograph 07/21/2017.       PROCEDURE: Thin cut axial images were acquired through the pulmonary  vessels during the rapid infusion of IV contrast.      Additional 3-D reformatted images obtained via post-processing for  improved diagnostic accuracy and procedural planning.      Radiation dose reduction techniques were utilized per ALARA protocol.   Automated exposure control was initiated through either or Globitel or  DoseRight software packages by  protocol.         FINDINGS: Today's study demonstrates opacification of the central  pulmonary vessels.  There are no filling defects.   There is no truncation.      No evidence of a pulmonary embolus.      Otherwise there are no parenchymal soft tissue nodules or masses.      There is no mediastinal lymph node enlargement.      No pericardial or pleural effusion.      IMPRESSION:  No evidence of a pulmonary embolus.      This report  was finalized on 7/22/2017 10:47 AM by Dr. Masood Staley MD.    I reviewed the patient's new clinical results.  I reviewed the patient's new imaging results and agree with the interpretation.  I personally viewed and interpreted the patient's EKG/Telemetry data    Medication Review:     aspirin 81 mg Oral Daily   clonazePAM 0.5 mg Oral Q8H   DULoxetine 40 mg Oral Daily   fentaNYL 1 patch Transdermal Q72H   heparin (porcine) 5,000 Units Subcutaneous Q12H   hydroxychloroquine 200 mg Oral BID   levETIRAcetam 500 mg Oral Q12H   levothyroxine 175 mcg Oral Q AM   metoprolol tartrate 25 mg Oral Q12H   neomycin-bacitracin-polymyxin  Topical Q12H   pantoprazole 40 mg Oral Daily   Pharmacy Meds to Bed Consult  Does not apply Daily   potassium chloride 20 mEq Oral Daily   sulfamethoxazole-trimethoprim 1 tablet Oral Q12H       sodium chloride 75 mL/hr Last Rate: 75 mL/hr (07/23/17 1042)     Assessment:  1. Frequent PVCs and bigeminy noted on loop recorder interrogation. No evidence of atrial fibrillation.  2. Hypokalemia: corrected.  3. Hypomagnesemia: corrected.  4. History of type II NSTEMI in the setting of sepsis and gastric ulcer in 03/2016.  5. History of CVA/TIA with subsequent PFO closure  6. Hypertension: with recent hypotension: improved with medication adjustment.  7. Systemic lupus erythematosus   8. Sjogren's syndrome    Recommendations:  1. Loop recorder interrogation revealed frequent PVCs/Bigeminy.  No evidence of atrial fibrillation.  Her electrolytes have been corrected and she has been restarted on a lower dose of the metoprolol.    2. Continue current therapy and she will be monitored as an outpatient with her loop recorder.  3. She is okay for discharge from cardiac standpoint.  She will continue to follow-up with [facility]ology as an outpatient.  Will go ahead and sign off.  Thanks.    I discussed the patients findings and my recommendations with patient and Dr. Whittaker.      VICTOR HUGO Crater,  acting as scribe for Dr. Codey Bacon MD, [facility].  07/23/17  11:37 AM    Addendum:         Disposition        [facility]. Code[facility] Bacon [facility], [facility]  07/23/17  11:37 AM

## 2017-07-23 NOTE — DISCHARGE SUMMARY
Discharge Summary:    Date of Admission: 7/21/2017  Date of Discharge: 7/23/2017    PCP: Wang Contreras MD      DISCHARGE DIAGNOSIS  -Frequent premature ventricular contractions with bigeminy and trigeminy noted on loop recorder interrogation  -Presyncope that has improved  -Acute hypomagnesemia that has resolved with supplementation  -Acute hypokalemia that has corrected with supplementation  -Essential hypertension with recent hypotension  -Recent MRSA mandibular infection with possible osteomyelitis on Bactrim therapy    SECONDARY DIAGNOSES  Past Medical History:   Diagnosis Date   • Anxiety    • Chronic headache    • Depression    • Diastolic CHF, chronic    • DVT (deep venous thrombosis)    • Encephalitis 12/2016   • Gastric ulcer with perforation 03/2016    Microperforation and air in the biliary tree   • Henoch-Schonlein purpura    • Hypothyroidism (acquired)    • Lower GI bleeding    • Migraine    • Mixed connective tissue disease    • NSTEMI (non-ST elevated myocardial infarction)    • Patent foramen ovale    • Pneumonia    • RA (rheumatoid arthritis)    • Renal disorder    • Rhabdomyolysis    • Seizures during encephalitis    • Sjogren's syndrome    • Stroke 09/2015   • Systemic lupus erythematosus     Discoid and systemic   • Temporal arteritis    • TIA (transient ischemic attack) x3        CONSULTS   Dr. Bacon - Cardiology    PROCEDURES PERFORMED  None    HOSPITAL COURSE  Patient is a 52 y.o. female presented to [facility] complaining of loop recorder abnormality.  Please see the admitting history and physical for further details.        The patient reported to the emergency department at radiology Department [facility] on her regarding a possible abnormality on her loop recorder.  The patient's EKG on admission revealed a sinus bradycardia at 59bpm.  She was admitted to the telemetry and was followed throughout the duration of her hospital stay.  Patient was found to have  "hypomagnesemia and hypokalemia and these were replaced per protocol.  Her magnesium and potassium have normalized by the time of discharge.  Patient was evaluated by cardiology and medicine for chronic was contacted to interrogate her loop recorder.  The loop recorder interrogation revealed frequent PVCs and bigeminy.  There was no evidence of atrial fibrillation.  The patient's continuous telemetry monitor here in the hospital has revealed sinus rhythm with frequent PVCs (with some bigeminy and intermittent trigeminal PVCs).    Prior to admission, the patient states she recently decreased her metoprolol tartrate from 50 mg twice a day to 50 mg daily.  During her hospitalization, we have instructed the patient to take 25 mg of metoprolol tartrate twice a day.  Patient's electrolytes have been supplemented and overall she states that her eating is improving.  The patient has been ambulatory in her room and is tolerating her diet well.  The patient had reported some diarrhea but this seems to be improving.  The patient also reports some left-sided pleuritic-like chest pain.  The patient had a CT scan of her chest with PE protocol in the emergency department which was negative for pulmonary embolism.  The patient had no mediastinal lymph node enlargement and no pericardial or pleural effusion.    Patient is encouraged to follow-up with her primary cardiologist within the upcoming week.  The patient can be discharged home.  The patient is encouraged to return to the nearest emergency department should her symptoms recur.                      CONDITION ON DISCHARGE  Stable.      VITAL SIGNS  /84 (BP Location: Right arm)  Pulse 73  Temp 98 °F (36.7 °C) (Oral)   Resp 20  Ht 66\" (167.6 cm)  Wt 200 lb 2 oz (90.8 kg)  SpO2 98%  BMI 32.3 kg/m2  Objective:  General Appearance:  Comfortable, well-appearing and in no acute distress.    Vital signs: (most recent): Blood pressure 122/84, pulse 73, temperature 98 °F " "(36.7 °C), temperature source Oral, resp. rate 20, height 66\" (167.6 cm), weight 200 lb 2 oz (90.8 kg), SpO2 98 %.  Vital signs are normal.    HEENT: Normal HEENT exam.    Lungs:  Normal effort.  Breath sounds clear to auscultation.  No wheezes, rales or rhonchi.    Heart: Normal rate.  S1 normal and S2 normal.  No murmur, gallop or friction rub.   Chest: Symmetric chest wall expansion.   Abdomen: Abdomen is soft and non-distended.  Bowel sounds are normal.   There is no abdominal tenderness.     Extremities: Normal range of motion.  There is no deformity or dependent edema.    Pulses: Distal pulses are intact.    Neurological: Patient is alert and oriented to person, place and time.  Normal strength.    Skin:  Warm and dry.  No rash or ulceration. (Healing wound noted on right mandible)         Present during visit: TIFF Dow    DISCHARGE DISPOSITION   Home or Self Care    DISCHARGE MEDICATIONS   Karely Villarreal   [place] Medication Instructions TU:[id number]    Printed on:07/23/17 1321   Medication Information                      aspirin 81 MG chewable tablet  Chew 81 mg Daily.             bacitracin-polymyxin b (POLYSPORIN) 500-78035 UNIT/GM ointment  Apply 1 application topically 2 (Two) Times a Day.             clonazePAM (KlonoPIN) 0.5 MG tablet  Take 0.5 mg by mouth 3 (three) times a day.             DULoxetine (CYMBALTA) 60 MG capsule  Take 40 mg by mouth daily                fentaNYL (DURAGESIC) 25 MCG/HR patch  Place 1 patch on the skin Every 72 (Seventy-Two) Hours. Due for change 7/22/17 pm             hydroxychloroquine (PLAQUENIL) 200 MG tablet  Take 200 mg by mouth 2 (two) times a day.             levETIRAcetam (KEPPRA) 500 MG tablet  Take 500 mg by mouth 2 (Two) Times a Day.             levothyroxine (SYNTHROID, LEVOTHROID) 175 MCG tablet  Take 175 mcg by mouth Daily.             metoprolol tartrate (LOPRESSOR) 25 MG tablet  Take 1 tablet by mouth Every 12 (Twelve) Hours. Do not take if BP <100/60 " and/or HR <60    **Patient has 50 mg tablets: take 0.5 tablets by mouth every 12 hours.**             ondansetron ODT (ZOFRAN-ODT) 4 MG disintegrating tablet  Take 1 tablet by mouth every 6 (six) hours as needed for nausea or vomiting.             oxyCODONE (ROXICODONE) 15 MG immediate release tablet  Take 15 mg by mouth Every 8 (Eight) Hours As Needed for Moderate Pain (4-6).             pantoprazole (PROTONIX) 40 MG EC tablet  Take 40 mg by mouth Daily.             promethazine (PHENERGAN) 25 MG suppository  Insert 1 suppository into the rectum every 6 (six) hours as needed for nausea or vomiting.             sulfamethoxazole-trimethoprim (BACTRIM DS,SEPTRA DS) 800-160 MG per tablet  Take 1 tablet by mouth 2 (Two) Times a Day. Two days left in course of therapy                 DISCHARGE DIET  cardiac diet    ACTIVITY AT DISCHARGE   activity as tolerated; caution with change in position    Additional Instructions for the Follow-ups that You Need to Schedule     Discharge Follow-Up With Specified Provider    As directed    To:  Dr. Bateman ([facility])   Follow Up:  1 Week   Follow Up Details:  Hospital follow up: frequent PVCs       Discharge Follow-up with PCP    As directed    Follow Up Details:  7-10 days                 TEST  RESULTS PENDING AT DISCHARGE   Order Current Status    Blood Culture Preliminary result    Blood Culture Preliminary result    Urine Culture Preliminary result                     Tiffany Whittaker DO  07/23/17  1:21 PM      Time: less than 30 minutes.

## 2017-07-24 ENCOUNTER — TELEPHONE (OUTPATIENT)
Dept: TELEMETRY | Facility: HOSPITAL | Age: 52
End: 2017-07-24

## 2017-07-24 LAB — BACTERIA SPEC AEROBE CULT: NORMAL

## 2017-07-24 NOTE — TELEPHONE ENCOUNTER
Calling patient to give her the follow up appts made but no answer. Left message for Ms. Villarreal to call back, but will try again this afternoon.

## 2017-07-24 NOTE — TELEPHONE ENCOUNTER
Called Ms. Villarreal to give her the follow appts made for her. Left message because no answer. Will try again this afternoon.

## 2017-07-26 LAB
BACTERIA SPEC AEROBE CULT: NORMAL
BACTERIA SPEC AEROBE CULT: NORMAL

## 2017-11-05 ENCOUNTER — HOSPITAL ENCOUNTER (EMERGENCY)
Facility: HOSPITAL | Age: 52
Discharge: HOME | End: 2017-11-05
Attending: EMERGENCY MEDICINE | Admitting: EMERGENCY MEDICINE

## 2017-11-05 VITALS
WEIGHT: 168 LBS | DIASTOLIC BLOOD PRESSURE: 68 MMHG | HEIGHT: 66 IN | HEART RATE: 72 BPM | OXYGEN SATURATION: 100 % | BODY MASS INDEX: 27 KG/M2 | TEMPERATURE: 98 F | RESPIRATION RATE: 16 BRPM | SYSTOLIC BLOOD PRESSURE: 128 MMHG

## 2017-11-05 DIAGNOSIS — G43.101 MIGRAINE WITH AURA AND WITH STATUS MIGRAINOSUS, NOT INTRACTABLE: Primary | ICD-10-CM

## 2017-11-05 LAB
6-ACETYL MORPHINE: NEGATIVE
ALBUMIN SERPL-MCNC: 3.9 G/DL (ref 3.5–5)
ALBUMIN/GLOB SERPL: 1.4 G/DL (ref 1.5–2.5)
ALP SERPL-CCNC: 149 U/L (ref 35–104)
ALT SERPL W P-5'-P-CCNC: 51 U/L (ref 10–36)
AMPHET+METHAMPHET UR QL: NEGATIVE
ANION GAP SERPL CALCULATED.3IONS-SCNC: 7.5 MMOL/L (ref 3.6–11.2)
ANISOCYTOSIS BLD QL: NORMAL
AST SERPL-CCNC: 58 U/L (ref 10–30)
BACTERIA UR QL AUTO: ABNORMAL /HPF
BARBITURATES UR QL SCN: NEGATIVE
BASOPHILS # BLD AUTO: 0.02 10*3/MM3 (ref 0–0.3)
BASOPHILS NFR BLD AUTO: 0.3 % (ref 0–2)
BENZODIAZ UR QL SCN: NEGATIVE
BILIRUB SERPL-MCNC: 0.2 MG/DL (ref 0.2–1.8)
BILIRUB UR QL STRIP: NEGATIVE
BUN BLD-MCNC: 9 MG/DL (ref 7–21)
BUN/CREAT SERPL: 13.2 (ref 7–25)
BUPRENORPHINE SERPL-MCNC: NEGATIVE NG/ML
CALCIUM SPEC-SCNC: 9.6 MG/DL (ref 7.7–10)
CANNABINOIDS SERPL QL: NEGATIVE
CHLORIDE SERPL-SCNC: 107 MMOL/L (ref 99–112)
CLARITY UR: CLEAR
CO2 SERPL-SCNC: 28.5 MMOL/L (ref 24.3–31.9)
COCAINE UR QL: NEGATIVE
COLOR UR: YELLOW
CREAT BLD-MCNC: 0.68 MG/DL (ref 0.43–1.29)
DEPRECATED RDW RBC AUTO: 41.9 FL (ref 37–54)
EOSINOPHIL # BLD AUTO: 0.16 10*3/MM3 (ref 0–0.7)
EOSINOPHIL NFR BLD AUTO: 2.2 % (ref 0–5)
ERYTHROCYTE [DISTWIDTH] IN BLOOD BY AUTOMATED COUNT: 16.5 % (ref 11.5–14.5)
GFR SERPL CREATININE-BSD FRML MDRD: 91 ML/MIN/1.73
GLOBULIN UR ELPH-MCNC: 2.7 GM/DL
GLUCOSE BLD-MCNC: 123 MG/DL (ref 70–110)
GLUCOSE UR STRIP-MCNC: NEGATIVE MG/DL
HCT VFR BLD AUTO: 35.4 % (ref 37–47)
HGB BLD-MCNC: 10.8 G/DL (ref 12–16)
HGB UR QL STRIP.AUTO: NEGATIVE
HYALINE CASTS UR QL AUTO: ABNORMAL /LPF
HYPOCHROMIA BLD QL: NORMAL
IMM GRANULOCYTES # BLD: 0.02 10*3/MM3 (ref 0–0.03)
IMM GRANULOCYTES NFR BLD: 0.3 % (ref 0–0.5)
KETONES UR QL STRIP: NEGATIVE
LEUKOCYTE ESTERASE UR QL STRIP.AUTO: ABNORMAL
LYMPHOCYTES # BLD AUTO: 1.18 10*3/MM3 (ref 1–3)
LYMPHOCYTES NFR BLD AUTO: 16.5 % (ref 21–51)
MCH RBC QN AUTO: 22.2 PG (ref 27–33)
MCHC RBC AUTO-ENTMCNC: 30.5 G/DL (ref 33–37)
MCV RBC AUTO: 72.8 FL (ref 80–94)
METHADONE UR QL SCN: NEGATIVE
MICROCYTES BLD QL: NORMAL
MONOCYTES # BLD AUTO: 0.47 10*3/MM3 (ref 0.1–0.9)
MONOCYTES NFR BLD AUTO: 6.6 % (ref 0–10)
NEUTROPHILS # BLD AUTO: 5.3 10*3/MM3 (ref 1.4–6.5)
NEUTROPHILS NFR BLD AUTO: 74.1 % (ref 30–70)
NITRITE UR QL STRIP: NEGATIVE
OPIATES UR QL: POSITIVE
OSMOLALITY SERPL CALC.SUM OF ELEC: 285 MOSM/KG (ref 273–305)
OXYCODONE UR QL SCN: POSITIVE
PCP UR QL SCN: NEGATIVE
PH UR STRIP.AUTO: 6.5 [PH] (ref 5–8)
PLAT MORPH BLD: NORMAL
PLATELET # BLD AUTO: 330 10*3/MM3 (ref 130–400)
PMV BLD AUTO: 10 FL (ref 6–10)
POTASSIUM BLD-SCNC: 3.1 MMOL/L (ref 3.5–5.3)
PROT SERPL-MCNC: 6.6 G/DL (ref 6–8)
PROT UR QL STRIP: NEGATIVE
RBC # BLD AUTO: 4.86 10*6/MM3 (ref 4.2–5.4)
RBC # UR: ABNORMAL /HPF
REF LAB TEST METHOD: ABNORMAL
SODIUM BLD-SCNC: 143 MMOL/L (ref 135–153)
SP GR UR STRIP: 1.01 (ref 1–1.03)
SQUAMOUS #/AREA URNS HPF: ABNORMAL /HPF
UROBILINOGEN UR QL STRIP: ABNORMAL
WBC NRBC COR # BLD: 7.15 10*3/MM3 (ref 4.5–12.5)
WBC UR QL AUTO: ABNORMAL /HPF

## 2017-11-05 PROCEDURE — 80307 DRUG TEST PRSMV CHEM ANLYZR: CPT | Performed by: PHYSICIAN ASSISTANT

## 2017-11-05 PROCEDURE — 85007 BL SMEAR W/DIFF WBC COUNT: CPT | Performed by: PHYSICIAN ASSISTANT

## 2017-11-05 PROCEDURE — 25010000002 PROMETHAZINE PER 50 MG: Performed by: EMERGENCY MEDICINE

## 2017-11-05 PROCEDURE — 96361 HYDRATE IV INFUSION ADD-ON: CPT

## 2017-11-05 PROCEDURE — 96375 TX/PRO/DX INJ NEW DRUG ADDON: CPT

## 2017-11-05 PROCEDURE — 25010000002 HYDROMORPHONE PER 4 MG: Performed by: EMERGENCY MEDICINE

## 2017-11-05 PROCEDURE — 96376 TX/PRO/DX INJ SAME DRUG ADON: CPT

## 2017-11-05 PROCEDURE — 80053 COMPREHEN METABOLIC PANEL: CPT | Performed by: PHYSICIAN ASSISTANT

## 2017-11-05 PROCEDURE — 96374 THER/PROPH/DIAG INJ IV PUSH: CPT

## 2017-11-05 PROCEDURE — 99284 EMERGENCY DEPT VISIT MOD MDM: CPT

## 2017-11-05 PROCEDURE — 81001 URINALYSIS AUTO W/SCOPE: CPT | Performed by: PHYSICIAN ASSISTANT

## 2017-11-05 PROCEDURE — 85025 COMPLETE CBC W/AUTO DIFF WBC: CPT | Performed by: PHYSICIAN ASSISTANT

## 2017-11-05 RX ORDER — PROMETHAZINE HYDROCHLORIDE 25 MG/ML
12.5 INJECTION, SOLUTION INTRAMUSCULAR; INTRAVENOUS ONCE
Status: COMPLETED | OUTPATIENT
Start: 2017-11-05 | End: 2017-11-05

## 2017-11-05 RX ORDER — SODIUM CHLORIDE 0.9 % (FLUSH) 0.9 %
10 SYRINGE (ML) INJECTION AS NEEDED
Status: DISCONTINUED | OUTPATIENT
Start: 2017-11-05 | End: 2017-11-05 | Stop reason: HOSPADM

## 2017-11-05 RX ORDER — OXYCODONE AND ACETAMINOPHEN 10; 325 MG/1; MG/1
1 TABLET ORAL ONCE
Status: COMPLETED | OUTPATIENT
Start: 2017-11-05 | End: 2017-11-05

## 2017-11-05 RX ADMIN — PROMETHAZINE HYDROCHLORIDE 12.5 MG: 25 INJECTION INTRAMUSCULAR; INTRAVENOUS at 12:09

## 2017-11-05 RX ADMIN — HYDROMORPHONE HYDROCHLORIDE 1 MG: 1 INJECTION, SOLUTION INTRAMUSCULAR; INTRAVENOUS; SUBCUTANEOUS at 13:24

## 2017-11-05 RX ADMIN — SODIUM CHLORIDE 500 ML: 9 INJECTION, SOLUTION INTRAVENOUS at 12:07

## 2017-11-05 RX ADMIN — HYDROMORPHONE HYDROCHLORIDE 1 MG: 1 INJECTION, SOLUTION INTRAMUSCULAR; INTRAVENOUS; SUBCUTANEOUS at 12:07

## 2017-11-05 RX ADMIN — OXYCODONE HYDROCHLORIDE AND ACETAMINOPHEN 1 TABLET: 10; 325 TABLET ORAL at 14:14

## 2017-11-05 RX ADMIN — Medication 10 ML: at 13:21

## 2017-11-05 RX ADMIN — PROMETHAZINE HYDROCHLORIDE 12.5 MG: 25 INJECTION INTRAMUSCULAR; INTRAVENOUS at 13:21

## 2017-11-05 NOTE — ED PROVIDER NOTES
Subjective   HPI Comments: 52-year-old female presents with chief complaint migraine headache for the past day.  Patient describes left-sided sharp, temporal pain.  Patient does state she has had associated nausea, vomiting.  Patient denies any fever, chills, neck stiffness.  Patient is port long history of migraine headaches and feels this feels like a typical migraine.    Patient is a 52 y.o. female presenting with migraines.   History provided by:  Patient   used: No    Migraine   Pain location:  L temporal  Quality:  Sharp  Severity at highest:  8/10  Onset quality:  Sudden  Duration:  1 day  Timing:  Constant  Progression:  Worsening  Similar to prior headaches: no    Context: bright light and loud noise    Context: not caffeine and not coughing    Relieved by:  Nothing  Worsened by:  Nothing  Ineffective treatments:  None tried  Associated symptoms: nausea and vomiting    Associated symptoms: no abdominal pain, no blurred vision, no cough, no diarrhea, no facial pain, no fatigue, no loss of balance, no neck pain, no neck stiffness and no paresthesias    Risk factors: no anger, no family hx of SAH and does not have insomnia        Review of Systems   Constitutional: Negative for fatigue.   Eyes: Negative for blurred vision.   Respiratory: Negative for cough.    Gastrointestinal: Positive for nausea and vomiting. Negative for abdominal pain and diarrhea.   Musculoskeletal: Negative for neck pain and neck stiffness.   Neurological: Negative for paresthesias and loss of balance.   All other systems reviewed and are negative.      Past Medical History:   Diagnosis Date   • Anxiety    • Chronic headache    • Depression    • Diastolic CHF, chronic    • DVT (deep venous thrombosis)     left leg   • Encephalitis 12/2016    treated at the [facility]   • Gastric ulcer with perforation 03/2016    Microperforation and air in the biliary tree   • Gastritis    • Henoch-Schonlein purpura    •  Hypertension    • Hypothyroidism (acquired)     Removed due to groiter   • Lower GI bleeding    • Migraine    • Mixed connective tissue disease    • MRSA cellulitis    • NSTEMI (non-ST elevated myocardial infarction)    • Patent foramen ovale    • Pneumonia    • PVC (premature ventricular contraction)    • RA (rheumatoid arthritis)    • Renal disorder    • Rhabdomyolysis    • Seizures     when had encephalitis   • Sjogren's syndrome    • Stroke 09/2015    x 1   • Systemic lupus erythematosus     Discoid and systemic   • Temporal arteritis    • TIA (transient ischemic attack)     x 3       Allergies   Allergen Reactions   • Compazine [Prochlorperazine Edisylate]    • Imitrex [Sumatriptan]    • Nsaids    • Reglan [Metoclopramide]    • Solu-Medrol [Methylprednisolone]    • Zyprexa [Olanzapine]        Past Surgical History:   Procedure Laterality Date   • APPENDECTOMY     • CARDIAC CATHETERIZATION  2016    PFO repair and had a loop monitor placed at the [facility]   •  SECTION      x 2   • ENDOSCOPY     • KNEE ARTHROSCOPY Left    • LUMBAR FUSION     • PORTACATH PLACEMENT Right 2016   • THYROID SURGERY      Removed due to a goiter       Family History   Problem Relation Age of Onset   • Hypertension Mother    • Arthritis Mother      RA   • Hypertension Father    • Arthritis Father      RA   • Diabetes Father        Social History     Social History   • Marital status:      Spouse name: N/A   • Number of children: N/A   • Years of education: N/A     Social History Main Topics   • Smoking status: Never Smoker   • Smokeless tobacco: Never Used   • Alcohol use No      Comment: Occassionally, social drinker in the past   • Drug use: No   • Sexual activity: Defer     Other Topics Concern   • None     Social History Narrative           Objective   Physical Exam   Constitutional: She is oriented to person, place, and time. She appears well-developed and well-nourished.   HENT:   Head:  Normocephalic.   Right Ear: External ear normal.   Left Ear: External ear normal.   Nose: Nose normal.   Mouth/Throat: Oropharynx is clear and moist.   Eyes: Conjunctivae and EOM are normal. Pupils are equal, round, and reactive to light.   Neck: Normal range of motion. Neck supple. No rigidity. No tracheal deviation, no edema, no erythema and normal range of motion present. No thyromegaly present.   Cardiovascular: Normal rate, regular rhythm, normal heart sounds and intact distal pulses.    Pulmonary/Chest: Effort normal and breath sounds normal.   Abdominal: Soft. Bowel sounds are normal.   Musculoskeletal: Normal range of motion.   Neurological: She is alert and oriented to person, place, and time. She has normal reflexes.   Skin: Skin is warm and dry.   Psychiatric: She has a normal mood and affect. Her behavior is normal. Judgment and thought content normal.   Nursing note and vitals reviewed.      Procedures         ED Course  ED Course   Comment By Time   Patient still complains of pain at this time. Reyes Blakely PA-C 11/05 1305                  MDM  Number of Diagnoses or Management Options  Migraine with aura and with status migrainosus, not intractable: new and requires workup     Amount and/or Complexity of Data Reviewed  Clinical lab tests: ordered and reviewed  Decide to obtain previous medical records or to obtain history from someone other than the patient: yes    Risk of Complications, Morbidity, and/or Mortality  Presenting problems: moderate  Diagnostic procedures: moderate  Management options: moderate    Patient Progress  Patient progress: stable      Final diagnoses:   Migraine with aura and with status migrainosus, not intractable            eRyes Blakely PA-C  11/05/17 2048       Reyes Blakely PA-C  11/05/17 9526

## 2017-12-18 ENCOUNTER — HOSPITAL ENCOUNTER (EMERGENCY)
Facility: HOSPITAL | Age: 52
Discharge: HOME | End: 2017-12-18
Attending: EMERGENCY MEDICINE | Admitting: EMERGENCY MEDICINE

## 2017-12-18 VITALS
RESPIRATION RATE: 18 BRPM | WEIGHT: 185 LBS | OXYGEN SATURATION: 95 % | SYSTOLIC BLOOD PRESSURE: 142 MMHG | HEART RATE: 114 BPM | HEIGHT: 66 IN | TEMPERATURE: 97.8 F | DIASTOLIC BLOOD PRESSURE: 82 MMHG | BODY MASS INDEX: 29.73 KG/M2

## 2017-12-18 DIAGNOSIS — L98.491: Primary | ICD-10-CM

## 2017-12-18 DIAGNOSIS — L03.211 CELLULITIS OF FACE: ICD-10-CM

## 2017-12-18 DIAGNOSIS — L93.0 DISCOID LUPUS: ICD-10-CM

## 2017-12-18 PROCEDURE — 99282 EMERGENCY DEPT VISIT SF MDM: CPT

## 2017-12-18 RX ORDER — SULFAMETHOXAZOLE AND TRIMETHOPRIM 800; 160 MG/1; MG/1
2 TABLET ORAL 2 TIMES DAILY
Qty: 40 TABLET | Refills: 0 | Status: SHIPPED | OUTPATIENT
Start: 2017-12-18 | End: 2018-06-19

## 2017-12-18 RX ORDER — GINSENG 100 MG
CAPSULE ORAL 2 TIMES DAILY
Qty: 14 G | Refills: 0 | Status: SHIPPED | OUTPATIENT
Start: 2017-12-18 | End: 2019-02-17

## 2017-12-18 NOTE — ED PROVIDER NOTES
Subjective   History of Present Illness  52-year-old white female here today for infection on her chin.  Patient has a history of abscess due to MRSA which was drained at  6 months ago.  She also has a history of discoid lupus and has new recent discoid lesions on her chin.  She is afraid that she is having an infection and abscess that will need to be drained again.  She denies any fever, does complain of some sore lymph nodes in her neck.  She recently finished a course of clindamycin.  She also previously taken oral vancomycin.  She shows picture on her phone of these lesions prior to her course of clindamycin, and they appear to be improved at this time.  Review of Systems   All other systems reviewed and are negative.      Past Medical History:   Diagnosis Date   • Anxiety    • Chronic headache    • Depression    • Diastolic CHF, chronic    • DVT (deep venous thrombosis)     left leg   • Encephalitis 12/2016    treated at the [facility]   • Gastric ulcer with perforation 03/2016    Microperforation and air in the biliary tree   • Gastritis    • Henoch-Schonlein purpura    • Hypertension    • Hypothyroidism (acquired)     Removed due to groiter   • Lower GI bleeding    • Migraine    • Mixed connective tissue disease    • MRSA cellulitis    • NSTEMI (non-ST elevated myocardial infarction)    • Patent foramen ovale    • Pneumonia    • PVC (premature ventricular contraction)    • RA (rheumatoid arthritis)    • Renal disorder    • Rhabdomyolysis    • Seizures     when had encephalitis   • Sjogren's syndrome    • Stroke 09/2015    x 1   • Systemic lupus erythematosus     Discoid and systemic   • Temporal arteritis    • TIA (transient ischemic attack)     x 3       Allergies   Allergen Reactions   • Compazine [Prochlorperazine Edisylate]    • Imitrex [Sumatriptan]    • Nsaids    • Reglan [Metoclopramide]    • Solu-Medrol [Methylprednisolone]    • Zyprexa [Olanzapine]        Past Surgical History:    Procedure Laterality Date   • APPENDECTOMY     • CARDIAC CATHETERIZATION  2016    PFO repair and had a loop monitor placed at the [facility]   •  SECTION      x 2   • ENDOSCOPY     • KNEE ARTHROSCOPY Left    • LUMBAR FUSION     • PORTACATH PLACEMENT Right 2016   • THYROID SURGERY      Removed due to a goiter       Family History   Problem Relation Age of Onset   • Hypertension Mother    • Arthritis Mother      RA   • Hypertension Father    • Arthritis Father      RA   • Diabetes Father        Social History     Social History   • Marital status:      Spouse name: N/A   • Number of children: N/A   • Years of education: N/A     Social History Main Topics   • Smoking status: Never Smoker   • Smokeless tobacco: Never Used   • Alcohol use No      Comment: Occassionally, social drinker in the past   • Drug use: No   • Sexual activity: Defer     Other Topics Concern   • None     Social History Narrative           Objective   Physical Exam   Constitutional: She is oriented to person, place, and time. She appears well-developed and well-nourished.   HENT:   Head: Normocephalic and atraumatic.   Neck: Neck supple.   Pulmonary/Chest: Effort normal.   Lymphadenopathy:     She has no cervical adenopathy (Mild right submandibular lymph node tenderness.).   Neurological: She is alert and oriented to person, place, and time.   Skin: Skin is warm and dry.   Superficial ulcerations on her anterior and right chin about one and a half centimeters in diameter.  These are mildly tender with some serosanguineous drainage.  There is no fluctuance.  Multiple other scars consistent with her history of discoid lupus.   Psychiatric: She has a normal mood and affect.   Nursing note and vitals reviewed.      Procedures         ED Course  ED Course                  MDM  Number of Diagnoses or Management Options  Cellulitis of face:   Discoid lupus:   Skin ulcer of chin, limited to breakdown of skin:   Risk of  Complications, Morbidity, and/or Mortality  Presenting problems: moderate  Diagnostic procedures: minimal  Management options: moderate        Final diagnoses:   Skin ulcer of chin, limited to breakdown of skin   Cellulitis of face   Discoid lupus            Frank Saldana MD  12/18/17 1274

## 2018-04-28 ENCOUNTER — HOSPITAL ENCOUNTER (EMERGENCY)
Facility: HOSPITAL | Age: 53
Discharge: HOME | End: 2018-04-28
Attending: EMERGENCY MEDICINE | Admitting: EMERGENCY MEDICINE

## 2018-04-28 ENCOUNTER — APPOINTMENT (OUTPATIENT)
Dept: ULTRASOUND IMAGING | Facility: HOSPITAL | Age: 53
End: 2018-04-28

## 2018-04-28 ENCOUNTER — APPOINTMENT (OUTPATIENT)
Dept: GENERAL RADIOLOGY | Facility: HOSPITAL | Age: 53
End: 2018-04-28

## 2018-04-28 VITALS
WEIGHT: 185 LBS | SYSTOLIC BLOOD PRESSURE: 98 MMHG | HEART RATE: 67 BPM | OXYGEN SATURATION: 99 % | BODY MASS INDEX: 29.73 KG/M2 | RESPIRATION RATE: 18 BRPM | TEMPERATURE: 97 F | HEIGHT: 66 IN | DIASTOLIC BLOOD PRESSURE: 61 MMHG

## 2018-04-28 DIAGNOSIS — M71.21 POPLITEAL CYST, RIGHT: Primary | ICD-10-CM

## 2018-04-28 PROCEDURE — 99283 EMERGENCY DEPT VISIT LOW MDM: CPT

## 2018-04-28 PROCEDURE — 73564 X-RAY EXAM KNEE 4 OR MORE: CPT | Performed by: RADIOLOGY

## 2018-04-28 PROCEDURE — 73564 X-RAY EXAM KNEE 4 OR MORE: CPT

## 2018-04-28 PROCEDURE — 93971 EXTREMITY STUDY: CPT

## 2018-04-28 PROCEDURE — 93971 EXTREMITY STUDY: CPT | Performed by: RADIOLOGY

## 2018-04-28 RX ORDER — HYDROCODONE BITARTRATE AND ACETAMINOPHEN 5; 325 MG/1; MG/1
1 TABLET ORAL ONCE
Status: COMPLETED | OUTPATIENT
Start: 2018-04-28 | End: 2018-04-28

## 2018-04-28 RX ADMIN — HYDROCODONE BITARTRATE AND ACETAMINOPHEN 1 TABLET: 5; 325 TABLET ORAL at 16:19

## 2018-05-26 ENCOUNTER — HOSPITAL ENCOUNTER (EMERGENCY)
Facility: HOSPITAL | Age: 53
Discharge: HOME | End: 2018-05-27
Attending: FAMILY MEDICINE | Admitting: FAMILY MEDICINE

## 2018-05-26 DIAGNOSIS — L03.211 CELLULITIS OF CHIN: Primary | ICD-10-CM

## 2018-05-26 LAB
6-ACETYL MORPHINE: NEGATIVE
ALBUMIN SERPL-MCNC: 3.9 G/DL (ref 3.5–5)
ALBUMIN/GLOB SERPL: 1.4 G/DL (ref 1.5–2.5)
ALP SERPL-CCNC: 150 U/L (ref 35–104)
ALT SERPL W P-5'-P-CCNC: 37 U/L (ref 10–36)
AMPHET+METHAMPHET UR QL: NEGATIVE
ANION GAP SERPL CALCULATED.3IONS-SCNC: 7.5 MMOL/L (ref 3.6–11.2)
APTT PPP: 39.3 SECONDS (ref 23.8–36.1)
AST SERPL-CCNC: 32 U/L (ref 10–30)
B-HCG UR QL: NEGATIVE
BARBITURATES UR QL SCN: NEGATIVE
BASOPHILS # BLD AUTO: 0.03 10*3/MM3 (ref 0–0.3)
BASOPHILS NFR BLD AUTO: 0.4 % (ref 0–2)
BENZODIAZ UR QL SCN: POSITIVE
BILIRUB SERPL-MCNC: 0.2 MG/DL (ref 0.2–1.8)
BUN BLD-MCNC: 10 MG/DL (ref 7–21)
BUN/CREAT SERPL: 16.4 (ref 7–25)
BUPRENORPHINE SERPL-MCNC: NEGATIVE NG/ML
CALCIUM SPEC-SCNC: 9 MG/DL (ref 7.7–10)
CANNABINOIDS SERPL QL: NEGATIVE
CHLORIDE SERPL-SCNC: 107 MMOL/L (ref 99–112)
CO2 SERPL-SCNC: 24.5 MMOL/L (ref 24.3–31.9)
COCAINE UR QL: NEGATIVE
CREAT BLD-MCNC: 0.61 MG/DL (ref 0.43–1.29)
CRP SERPL-MCNC: 2.73 MG/DL (ref 0–0.99)
D-LACTATE SERPL-SCNC: 1.8 MMOL/L (ref 0.5–2)
DEPRECATED RDW RBC AUTO: 46 FL (ref 37–54)
EOSINOPHIL # BLD AUTO: 0.14 10*3/MM3 (ref 0–0.7)
EOSINOPHIL NFR BLD AUTO: 2 % (ref 0–5)
ERYTHROCYTE [DISTWIDTH] IN BLOOD BY AUTOMATED COUNT: 16.9 % (ref 11.5–14.5)
GFR SERPL CREATININE-BSD FRML MDRD: 103 ML/MIN/1.73
GLOBULIN UR ELPH-MCNC: 2.8 GM/DL
GLUCOSE BLD-MCNC: 111 MG/DL (ref 70–110)
HCT VFR BLD AUTO: 32.9 % (ref 37–47)
HGB BLD-MCNC: 10 G/DL (ref 12–16)
IMM GRANULOCYTES # BLD: 0.02 10*3/MM3 (ref 0–0.03)
IMM GRANULOCYTES NFR BLD: 0.3 % (ref 0–0.5)
INR PPP: 1.04 (ref 0.9–1.1)
LYMPHOCYTES # BLD AUTO: 1.35 10*3/MM3 (ref 1–3)
LYMPHOCYTES NFR BLD AUTO: 19 % (ref 21–51)
MCH RBC QN AUTO: 23.7 PG (ref 27–33)
MCHC RBC AUTO-ENTMCNC: 30.4 G/DL (ref 33–37)
MCV RBC AUTO: 78 FL (ref 80–94)
METHADONE UR QL SCN: NEGATIVE
MONOCYTES # BLD AUTO: 0.45 10*3/MM3 (ref 0.1–0.9)
MONOCYTES NFR BLD AUTO: 6.3 % (ref 0–10)
NEUTROPHILS # BLD AUTO: 5.11 10*3/MM3 (ref 1.4–6.5)
NEUTROPHILS NFR BLD AUTO: 72 % (ref 30–70)
OPIATES UR QL: POSITIVE
OSMOLALITY SERPL CALC.SUM OF ELEC: 277.3 MOSM/KG (ref 273–305)
OXYCODONE UR QL SCN: POSITIVE
PCP UR QL SCN: NEGATIVE
PLATELET # BLD AUTO: 322 10*3/MM3 (ref 130–400)
PMV BLD AUTO: 8.8 FL (ref 6–10)
POTASSIUM BLD-SCNC: 3.2 MMOL/L (ref 3.5–5.3)
PROT SERPL-MCNC: 6.7 G/DL (ref 6–8)
PROTHROMBIN TIME: 13.8 SECONDS (ref 11–15.4)
RBC # BLD AUTO: 4.22 10*6/MM3 (ref 4.2–5.4)
SODIUM BLD-SCNC: 139 MMOL/L (ref 135–153)
TROPONIN I SERPL-MCNC: <0.006 NG/ML
WBC NRBC COR # BLD: 7.1 10*3/MM3 (ref 4.5–12.5)

## 2018-05-26 PROCEDURE — 96376 TX/PRO/DX INJ SAME DRUG ADON: CPT

## 2018-05-26 PROCEDURE — 80307 DRUG TEST PRSMV CHEM ANLYZR: CPT | Performed by: FAMILY MEDICINE

## 2018-05-26 PROCEDURE — 96366 THER/PROPH/DIAG IV INF ADDON: CPT

## 2018-05-26 PROCEDURE — 85730 THROMBOPLASTIN TIME PARTIAL: CPT | Performed by: FAMILY MEDICINE

## 2018-05-26 PROCEDURE — 25010000002 PROMETHAZINE PER 50 MG: Performed by: FAMILY MEDICINE

## 2018-05-26 PROCEDURE — 36415 COLL VENOUS BLD VENIPUNCTURE: CPT

## 2018-05-26 PROCEDURE — 25010000002 ONDANSETRON PER 1 MG: Performed by: FAMILY MEDICINE

## 2018-05-26 PROCEDURE — 87040 BLOOD CULTURE FOR BACTERIA: CPT | Performed by: FAMILY MEDICINE

## 2018-05-26 PROCEDURE — 96365 THER/PROPH/DIAG IV INF INIT: CPT

## 2018-05-26 PROCEDURE — 85025 COMPLETE CBC W/AUTO DIFF WBC: CPT | Performed by: FAMILY MEDICINE

## 2018-05-26 PROCEDURE — 84484 ASSAY OF TROPONIN QUANT: CPT | Performed by: FAMILY MEDICINE

## 2018-05-26 PROCEDURE — 81025 URINE PREGNANCY TEST: CPT | Performed by: FAMILY MEDICINE

## 2018-05-26 PROCEDURE — 25010000002 HYDROMORPHONE PER 4 MG: Performed by: FAMILY MEDICINE

## 2018-05-26 PROCEDURE — 25010000002 VANCOMYCIN PER 500 MG: Performed by: FAMILY MEDICINE

## 2018-05-26 PROCEDURE — 96375 TX/PRO/DX INJ NEW DRUG ADDON: CPT

## 2018-05-26 PROCEDURE — 99285 EMERGENCY DEPT VISIT HI MDM: CPT

## 2018-05-26 PROCEDURE — 85610 PROTHROMBIN TIME: CPT | Performed by: FAMILY MEDICINE

## 2018-05-26 PROCEDURE — 80053 COMPREHEN METABOLIC PANEL: CPT | Performed by: FAMILY MEDICINE

## 2018-05-26 PROCEDURE — 86140 C-REACTIVE PROTEIN: CPT | Performed by: FAMILY MEDICINE

## 2018-05-26 PROCEDURE — 25010000002 MORPHINE PER 10 MG: Performed by: FAMILY MEDICINE

## 2018-05-26 PROCEDURE — 83605 ASSAY OF LACTIC ACID: CPT | Performed by: FAMILY MEDICINE

## 2018-05-26 RX ORDER — HYDROMORPHONE HCL 110MG/55ML
0.5 PATIENT CONTROLLED ANALGESIA SYRINGE INTRAVENOUS ONCE
Status: COMPLETED | OUTPATIENT
Start: 2018-05-26 | End: 2018-05-26

## 2018-05-26 RX ORDER — PROMETHAZINE HYDROCHLORIDE 25 MG/ML
6.25 INJECTION, SOLUTION INTRAMUSCULAR; INTRAVENOUS ONCE
Status: COMPLETED | OUTPATIENT
Start: 2018-05-26 | End: 2018-05-26

## 2018-05-26 RX ORDER — SODIUM CHLORIDE 0.9 % (FLUSH) 0.9 %
10 SYRINGE (ML) INJECTION AS NEEDED
Status: DISCONTINUED | OUTPATIENT
Start: 2018-05-26 | End: 2018-05-27 | Stop reason: HOSPADM

## 2018-05-26 RX ORDER — ONDANSETRON 2 MG/ML
4 INJECTION INTRAMUSCULAR; INTRAVENOUS ONCE
Status: COMPLETED | OUTPATIENT
Start: 2018-05-26 | End: 2018-05-26

## 2018-05-26 RX ORDER — MORPHINE SULFATE 2 MG/ML
2 INJECTION, SOLUTION INTRAMUSCULAR; INTRAVENOUS ONCE
Status: COMPLETED | OUTPATIENT
Start: 2018-05-26 | End: 2018-05-26

## 2018-05-26 RX ADMIN — MORPHINE SULFATE 2 MG: 2 INJECTION, SOLUTION INTRAMUSCULAR; INTRAVENOUS at 20:38

## 2018-05-26 RX ADMIN — PROMETHAZINE HYDROCHLORIDE 6.25 MG: 25 INJECTION INTRAMUSCULAR; INTRAVENOUS at 23:19

## 2018-05-26 RX ADMIN — ONDANSETRON 4 MG: 2 INJECTION, SOLUTION INTRAMUSCULAR; INTRAVENOUS at 22:30

## 2018-05-26 RX ADMIN — VANCOMYCIN HYDROCHLORIDE 1750 MG: 5 INJECTION, POWDER, LYOPHILIZED, FOR SOLUTION INTRAVENOUS at 20:41

## 2018-05-26 RX ADMIN — HYDROMORPHONE HYDROCHLORIDE 0.5 MG: 2 INJECTION INTRAMUSCULAR; INTRAVENOUS; SUBCUTANEOUS at 23:07

## 2018-05-26 RX ADMIN — SODIUM CHLORIDE 1000 ML: 9 INJECTION, SOLUTION INTRAVENOUS at 23:36

## 2018-05-26 RX ADMIN — ONDANSETRON 4 MG: 2 INJECTION, SOLUTION INTRAMUSCULAR; INTRAVENOUS at 20:35

## 2018-05-27 VITALS
HEIGHT: 66 IN | HEART RATE: 89 BPM | DIASTOLIC BLOOD PRESSURE: 76 MMHG | WEIGHT: 185 LBS | OXYGEN SATURATION: 99 % | BODY MASS INDEX: 29.73 KG/M2 | TEMPERATURE: 98.5 F | RESPIRATION RATE: 16 BRPM | SYSTOLIC BLOOD PRESSURE: 132 MMHG

## 2018-05-27 RX ADMIN — MUPIROCIN: 20 OINTMENT TOPICAL at 00:10

## 2018-05-31 LAB
BACTERIA SPEC AEROBE CULT: NORMAL
BACTERIA SPEC AEROBE CULT: NORMAL

## 2018-06-04 ENCOUNTER — HOSPITAL ENCOUNTER (EMERGENCY)
Facility: HOSPITAL | Age: 53
Discharge: HOME | End: 2018-06-04
Attending: EMERGENCY MEDICINE | Admitting: EMERGENCY MEDICINE

## 2018-06-04 ENCOUNTER — APPOINTMENT (OUTPATIENT)
Dept: GENERAL RADIOLOGY | Facility: HOSPITAL | Age: 53
End: 2018-06-04

## 2018-06-04 VITALS
HEART RATE: 88 BPM | RESPIRATION RATE: 17 BRPM | OXYGEN SATURATION: 98 % | HEIGHT: 66 IN | SYSTOLIC BLOOD PRESSURE: 141 MMHG | TEMPERATURE: 98 F | DIASTOLIC BLOOD PRESSURE: 77 MMHG | WEIGHT: 185 LBS | BODY MASS INDEX: 29.73 KG/M2

## 2018-06-04 DIAGNOSIS — T78.40XA ALLERGIC REACTION TO DRUG, INITIAL ENCOUNTER: ICD-10-CM

## 2018-06-04 DIAGNOSIS — R10.13 EPIGASTRIC ABDOMINAL PAIN: Primary | ICD-10-CM

## 2018-06-04 DIAGNOSIS — R11.2 NAUSEA AND VOMITING IN ADULT: ICD-10-CM

## 2018-06-04 LAB
6-ACETYL MORPHINE: NEGATIVE
ALBUMIN SERPL-MCNC: 4.5 G/DL (ref 3.5–5)
ALBUMIN/GLOB SERPL: 1.5 G/DL (ref 1.5–2.5)
ALP SERPL-CCNC: 133 U/L (ref 35–104)
ALT SERPL W P-5'-P-CCNC: 21 U/L (ref 10–36)
AMPHET+METHAMPHET UR QL: NEGATIVE
AMYLASE SERPL-CCNC: 33 U/L (ref 28–100)
ANION GAP SERPL CALCULATED.3IONS-SCNC: 5.7 MMOL/L (ref 3.6–11.2)
AST SERPL-CCNC: 24 U/L (ref 10–30)
BACTERIA UR QL AUTO: ABNORMAL /HPF
BARBITURATES UR QL SCN: NEGATIVE
BASOPHILS # BLD AUTO: 0.02 10*3/MM3 (ref 0–0.3)
BASOPHILS NFR BLD AUTO: 0.2 % (ref 0–2)
BENZODIAZ UR QL SCN: NEGATIVE
BILIRUB SERPL-MCNC: 0.4 MG/DL (ref 0.2–1.8)
BILIRUB UR QL STRIP: NEGATIVE
BUN BLD-MCNC: 9 MG/DL (ref 7–21)
BUN/CREAT SERPL: 13.2 (ref 7–25)
BUPRENORPHINE SERPL-MCNC: NEGATIVE NG/ML
CALCIUM SPEC-SCNC: 9.6 MG/DL (ref 7.7–10)
CANNABINOIDS SERPL QL: NEGATIVE
CHLORIDE SERPL-SCNC: 107 MMOL/L (ref 99–112)
CLARITY UR: CLEAR
CO2 SERPL-SCNC: 26.3 MMOL/L (ref 24.3–31.9)
COCAINE UR QL: NEGATIVE
COLOR UR: YELLOW
CREAT BLD-MCNC: 0.68 MG/DL (ref 0.43–1.29)
DEPRECATED RDW RBC AUTO: 45.8 FL (ref 37–54)
EOSINOPHIL # BLD AUTO: 0.03 10*3/MM3 (ref 0–0.7)
EOSINOPHIL NFR BLD AUTO: 0.3 % (ref 0–5)
ERYTHROCYTE [DISTWIDTH] IN BLOOD BY AUTOMATED COUNT: 16.6 % (ref 11.5–14.5)
GFR SERPL CREATININE-BSD FRML MDRD: 91 ML/MIN/1.73
GLOBULIN UR ELPH-MCNC: 3.1 GM/DL
GLUCOSE BLD-MCNC: 91 MG/DL (ref 70–110)
GLUCOSE UR STRIP-MCNC: NEGATIVE MG/DL
HCT VFR BLD AUTO: 36.5 % (ref 37–47)
HGB BLD-MCNC: 11.1 G/DL (ref 12–16)
HGB UR QL STRIP.AUTO: ABNORMAL
HYALINE CASTS UR QL AUTO: ABNORMAL /LPF
IMM GRANULOCYTES # BLD: 0.02 10*3/MM3 (ref 0–0.03)
IMM GRANULOCYTES NFR BLD: 0.2 % (ref 0–0.5)
KETONES UR QL STRIP: ABNORMAL
LEUKOCYTE ESTERASE UR QL STRIP.AUTO: NEGATIVE
LIPASE SERPL-CCNC: 38 U/L (ref 13–60)
LYMPHOCYTES # BLD AUTO: 1.65 10*3/MM3 (ref 1–3)
LYMPHOCYTES NFR BLD AUTO: 17.5 % (ref 21–51)
MCH RBC QN AUTO: 23.2 PG (ref 27–33)
MCHC RBC AUTO-ENTMCNC: 30.4 G/DL (ref 33–37)
MCV RBC AUTO: 76.2 FL (ref 80–94)
METHADONE UR QL SCN: NEGATIVE
MONOCYTES # BLD AUTO: 0.62 10*3/MM3 (ref 0.1–0.9)
MONOCYTES NFR BLD AUTO: 6.6 % (ref 0–10)
NEUTROPHILS # BLD AUTO: 7.09 10*3/MM3 (ref 1.4–6.5)
NEUTROPHILS NFR BLD AUTO: 75.2 % (ref 30–70)
NITRITE UR QL STRIP: NEGATIVE
OPIATES UR QL: NEGATIVE
OSMOLALITY SERPL CALC.SUM OF ELEC: 275.8 MOSM/KG (ref 273–305)
OXYCODONE UR QL SCN: POSITIVE
PCP UR QL SCN: NEGATIVE
PH UR STRIP.AUTO: 6.5 [PH] (ref 5–8)
PLATELET # BLD AUTO: 400 10*3/MM3 (ref 130–400)
PMV BLD AUTO: 8.6 FL (ref 6–10)
POTASSIUM BLD-SCNC: 3.8 MMOL/L (ref 3.5–5.3)
PROT SERPL-MCNC: 7.6 G/DL (ref 6–8)
PROT UR QL STRIP: NEGATIVE
RBC # BLD AUTO: 4.79 10*6/MM3 (ref 4.2–5.4)
RBC # UR: ABNORMAL /HPF
REF LAB TEST METHOD: ABNORMAL
SODIUM BLD-SCNC: 139 MMOL/L (ref 135–153)
SP GR UR STRIP: 1.02 (ref 1–1.03)
SQUAMOUS #/AREA URNS HPF: ABNORMAL /HPF
UROBILINOGEN UR QL STRIP: ABNORMAL
WBC NRBC COR # BLD: 9.43 10*3/MM3 (ref 4.5–12.5)
WBC UR QL AUTO: ABNORMAL /HPF

## 2018-06-04 PROCEDURE — 96375 TX/PRO/DX INJ NEW DRUG ADDON: CPT

## 2018-06-04 PROCEDURE — 74022 RADEX COMPL AQT ABD SERIES: CPT | Performed by: RADIOLOGY

## 2018-06-04 PROCEDURE — 85025 COMPLETE CBC W/AUTO DIFF WBC: CPT | Performed by: EMERGENCY MEDICINE

## 2018-06-04 PROCEDURE — 25010000002 DEXAMETHASONE PER 1 MG: Performed by: EMERGENCY MEDICINE

## 2018-06-04 PROCEDURE — 96361 HYDRATE IV INFUSION ADD-ON: CPT

## 2018-06-04 PROCEDURE — 99283 EMERGENCY DEPT VISIT LOW MDM: CPT

## 2018-06-04 PROCEDURE — 80307 DRUG TEST PRSMV CHEM ANLYZR: CPT | Performed by: EMERGENCY MEDICINE

## 2018-06-04 PROCEDURE — 82150 ASSAY OF AMYLASE: CPT | Performed by: EMERGENCY MEDICINE

## 2018-06-04 PROCEDURE — 25010000002 DIPHENHYDRAMINE PER 50 MG: Performed by: PHYSICIAN ASSISTANT

## 2018-06-04 PROCEDURE — 80053 COMPREHEN METABOLIC PANEL: CPT | Performed by: EMERGENCY MEDICINE

## 2018-06-04 PROCEDURE — 81001 URINALYSIS AUTO W/SCOPE: CPT | Performed by: EMERGENCY MEDICINE

## 2018-06-04 PROCEDURE — 74022 RADEX COMPL AQT ABD SERIES: CPT

## 2018-06-04 PROCEDURE — 25010000002 PROMETHAZINE PER 50 MG: Performed by: PHYSICIAN ASSISTANT

## 2018-06-04 PROCEDURE — 83690 ASSAY OF LIPASE: CPT | Performed by: EMERGENCY MEDICINE

## 2018-06-04 PROCEDURE — 96374 THER/PROPH/DIAG INJ IV PUSH: CPT

## 2018-06-04 PROCEDURE — 25010000002 MORPHINE PER 10 MG: Performed by: EMERGENCY MEDICINE

## 2018-06-04 RX ORDER — MEPERIDINE HYDROCHLORIDE 25 MG/ML
25 INJECTION INTRAMUSCULAR; INTRAVENOUS; SUBCUTANEOUS ONCE
Status: COMPLETED | OUTPATIENT
Start: 2018-06-04 | End: 2018-06-04

## 2018-06-04 RX ORDER — DIPHENHYDRAMINE HYDROCHLORIDE 50 MG/ML
25 INJECTION INTRAMUSCULAR; INTRAVENOUS EVERY 6 HOURS PRN
Status: DISCONTINUED | OUTPATIENT
Start: 2018-06-04 | End: 2018-06-04 | Stop reason: HOSPADM

## 2018-06-04 RX ORDER — DIPHENHYDRAMINE HYDROCHLORIDE 50 MG/ML
25 INJECTION INTRAMUSCULAR; INTRAVENOUS ONCE
Status: DISCONTINUED | OUTPATIENT
Start: 2018-06-04 | End: 2018-06-04 | Stop reason: HOSPADM

## 2018-06-04 RX ORDER — PROMETHAZINE HYDROCHLORIDE 25 MG/1
25 TABLET ORAL EVERY 6 HOURS PRN
Qty: 20 TABLET | Refills: 0 | Status: SHIPPED | OUTPATIENT
Start: 2018-06-04 | End: 2019-02-17

## 2018-06-04 RX ORDER — PROMETHAZINE HYDROCHLORIDE 25 MG/ML
12.5 INJECTION, SOLUTION INTRAMUSCULAR; INTRAVENOUS ONCE
Status: COMPLETED | OUTPATIENT
Start: 2018-06-04 | End: 2018-06-04

## 2018-06-04 RX ORDER — SODIUM CHLORIDE 0.9 % (FLUSH) 0.9 %
10 SYRINGE (ML) INJECTION AS NEEDED
Status: DISCONTINUED | OUTPATIENT
Start: 2018-06-04 | End: 2018-06-04 | Stop reason: HOSPADM

## 2018-06-04 RX ORDER — PANTOPRAZOLE SODIUM 40 MG/10ML
40 INJECTION, POWDER, LYOPHILIZED, FOR SOLUTION INTRAVENOUS ONCE
Status: COMPLETED | OUTPATIENT
Start: 2018-06-04 | End: 2018-06-04

## 2018-06-04 RX ADMIN — SODIUM CHLORIDE 1000 ML: 9 INJECTION, SOLUTION INTRAVENOUS at 17:58

## 2018-06-04 RX ADMIN — SODIUM CHLORIDE 500 ML: 9 INJECTION, SOLUTION INTRAVENOUS at 15:10

## 2018-06-04 RX ADMIN — MORPHINE SULFATE 4 MG: 4 INJECTION, SOLUTION INTRAMUSCULAR; INTRAVENOUS at 15:10

## 2018-06-04 RX ADMIN — DEXAMETHASONE SODIUM PHOSPHATE 10 MG: 4 INJECTION, SOLUTION INTRAMUSCULAR; INTRAVENOUS at 17:48

## 2018-06-04 RX ADMIN — PANTOPRAZOLE SODIUM 40 MG: 40 INJECTION, POWDER, FOR SOLUTION INTRAVENOUS at 15:10

## 2018-06-04 RX ADMIN — PROMETHAZINE HYDROCHLORIDE 12.5 MG: 25 INJECTION INTRAMUSCULAR; INTRAVENOUS at 16:34

## 2018-06-04 RX ADMIN — DIPHENHYDRAMINE HYDROCHLORIDE 25 MG: 50 INJECTION, SOLUTION INTRAMUSCULAR; INTRAVENOUS at 17:10

## 2018-06-04 RX ADMIN — MEPERIDINE HYDROCHLORIDE 25 MG: 25 INJECTION INTRAMUSCULAR; INTRAVENOUS; SUBCUTANEOUS at 17:58

## 2018-06-04 NOTE — ED PROVIDER NOTES
Subjective   This is a 52-year-old female comes in with chief complaint nausea, vomiting, epigastric abdominal pain times one day.  Patient states she has had bright red blood since emesis.  Does complain of gnawing epigastric pain.  Denies any associated fever, chills, diarrhea, constipation.  Patient does state she has a history of bleeding ulcers. Patient does have history of htn, hypothyroidism.         History provided by:  Patient   used: No    Abdominal Pain   Pain location:  Epigastric  Pain quality: gnawing and stabbing    Pain radiates to:  Does not radiate  Pain severity:  Moderate  Onset quality:  Sudden  Duration:  2 days  Timing:  Intermittent  Progression:  Worsening  Chronicity:  New  Context: not alcohol use, not awakening from sleep, not diet changes, not eating, not medication withdrawal, not previous surgeries, not recent illness, not recent sexual activity, not retching and not sick contacts    Relieved by:  Nothing  Worsened by:  Nothing  Ineffective treatments:  None tried  Associated symptoms: fatigue, nausea and vomiting    Associated symptoms: no anorexia, no belching, no chills, no cough, no diarrhea, no flatus, no sore throat and no vaginal bleeding    Risk factors: no alcohol abuse, no aspirin use, has not had multiple surgeries and not pregnant        Review of Systems   Constitutional: Positive for diaphoresis and fatigue. Negative for chills.   HENT: Negative for sore throat.    Respiratory: Negative for apnea, cough, choking and chest tightness.    Gastrointestinal: Positive for abdominal distention, abdominal pain, nausea and vomiting. Negative for anorexia, diarrhea and flatus.   Genitourinary: Negative for vaginal bleeding.   Musculoskeletal: Negative for arthralgias, back pain and gait problem.   All other systems reviewed and are negative.      Past Medical History:   Diagnosis Date   • Anxiety    • Chronic headache    • Depression    • Diastolic CHF, chronic     • DVT (deep venous thrombosis)     left leg   • Encephalitis 2016    treated at the [facility]   • Gastric ulcer with perforation 2016    Microperforation and air in the biliary tree   • Gastritis    • Henoch-Schonlein purpura    • Hypertension    • Hypothyroidism (acquired)     Removed due to groiter   • Lower GI bleeding    • Migraine    • Mixed connective tissue disease    • MRSA cellulitis    • NSTEMI (non-ST elevated myocardial infarction)    • Patent foramen ovale    • Pneumonia    • PVC (premature ventricular contraction)    • RA (rheumatoid arthritis)    • Renal disorder    • Rhabdomyolysis    • Seizures     when had encephalitis   • Sjogren's syndrome    • Stroke 09/2015    x 1   • Systemic lupus erythematosus     Discoid and systemic   • Temporal arteritis    • TIA (transient ischemic attack)     x 3       Allergies   Allergen Reactions   • Compazine [Prochlorperazine Edisylate]    • Imitrex [Sumatriptan]    • Nsaids    • Reglan [Metoclopramide]    • Solu-Medrol [Methylprednisolone]    • Zyprexa [Olanzapine]        Past Surgical History:   Procedure Laterality Date   • APPENDECTOMY     • CARDIAC CATHETERIZATION  2016    PFO repair and had a loop monitor placed at the [facility]   •  SECTION      x 2   • ENDOSCOPY     • KNEE ARTHROSCOPY Left    • LUMBAR FUSION     • PORTACATH PLACEMENT Right 2016   • THYROID SURGERY      Removed due to a goiter       Family History   Problem Relation Age of Onset   • Hypertension Mother    • Arthritis Mother         RA   • Hypertension Father    • Arthritis Father         RA   • Diabetes Father        Social History     Social History   • Marital status:      Social History Main Topics   • Smoking status: Never Smoker   • Smokeless tobacco: Never Used   • Alcohol use No      Comment: Occassionally, social drinker in the past   • Drug use: No   • Sexual activity: Defer     Other Topics Concern   • Not on file            Objective   Physical Exam   Constitutional: She is oriented to person, place, and time. She appears well-developed and well-nourished.   HENT:   Head: Normocephalic and atraumatic.   Right Ear: External ear normal.   Left Ear: External ear normal.   Nose: Nose normal.   Mouth/Throat: Oropharynx is clear and moist. No oropharyngeal exudate.   Eyes: Conjunctivae and EOM are normal. Pupils are equal, round, and reactive to light. Right eye exhibits no discharge. Left eye exhibits no discharge. No scleral icterus.   Neck: Normal range of motion. Neck supple. No JVD present. No tracheal deviation present. No thyromegaly present.   Cardiovascular: Normal rate, regular rhythm, normal heart sounds and intact distal pulses.  Exam reveals no friction rub.    No murmur heard.  Pulmonary/Chest: Effort normal and breath sounds normal. No stridor. No respiratory distress. She has no wheezes. She has no rales.   Abdominal: Soft. Normal appearance and bowel sounds are normal. There is generalized tenderness. There is guarding.   Musculoskeletal: Normal range of motion.   Neurological: She is alert and oriented to person, place, and time. No cranial nerve deficit. Coordination normal.   Skin: Skin is warm and dry. Capillary refill takes less than 2 seconds. No rash noted. No erythema. No pallor.   Psychiatric: She has a normal mood and affect. Her behavior is normal. Judgment and thought content normal.   Nursing note and vitals reviewed.      Procedures           ED Course  ED Course as of Jun 04 1832   Mon Jun 04, 2018   1548 IMPRESSION:   1. Unremarkable chest.  2. Non-obstructive bowel gas pattern.    This report was finalized on 6/4/2018 3:45 PM by Dr. Arie Sancehz MD.   [BH]   2152 Patient continues to complain of abdominal discomfort.  Patient also now complains of rash located to her face and chest.  Rash does appear to be hives.  She has had medication that was given previously with no reaction.  [BH]      ED  Course User Index  [BH] Reyes Blakely PA-C                  [facility]      Final diagnoses:   Epigastric abdominal pain   Allergic reaction to drug, initial encounter   Nausea and vomiting in adult            Reyes Blakely PA-C  06/04/18 8298

## 2018-06-19 ENCOUNTER — APPOINTMENT (OUTPATIENT)
Dept: CT IMAGING | Facility: HOSPITAL | Age: 53
End: 2018-06-19

## 2018-06-19 ENCOUNTER — HOSPITAL ENCOUNTER (EMERGENCY)
Facility: HOSPITAL | Age: 53
Discharge: HOME | End: 2018-06-19
Attending: INTERNAL MEDICINE | Admitting: INTERNAL MEDICINE

## 2018-06-19 VITALS
TEMPERATURE: 98.6 F | WEIGHT: 185 LBS | HEIGHT: 66 IN | SYSTOLIC BLOOD PRESSURE: 137 MMHG | HEART RATE: 105 BPM | DIASTOLIC BLOOD PRESSURE: 79 MMHG | BODY MASS INDEX: 29.73 KG/M2 | RESPIRATION RATE: 18 BRPM | OXYGEN SATURATION: 97 %

## 2018-06-19 DIAGNOSIS — L03.211 FACIAL CELLULITIS: Primary | ICD-10-CM

## 2018-06-19 DIAGNOSIS — L03.116 LEFT LEG CELLULITIS: ICD-10-CM

## 2018-06-19 LAB
ALBUMIN SERPL-MCNC: 4.2 G/DL (ref 3.5–5)
ALBUMIN/GLOB SERPL: 1.6 G/DL (ref 1.5–2.5)
ALP SERPL-CCNC: 149 U/L (ref 35–104)
ALT SERPL W P-5'-P-CCNC: 24 U/L (ref 10–36)
ANION GAP SERPL CALCULATED.3IONS-SCNC: 7.7 MMOL/L (ref 3.6–11.2)
AST SERPL-CCNC: 32 U/L (ref 10–30)
BACTERIA UR QL AUTO: ABNORMAL /HPF
BASOPHILS # BLD AUTO: 0.04 10*3/MM3 (ref 0–0.3)
BASOPHILS NFR BLD AUTO: 0.3 % (ref 0–2)
BILIRUB SERPL-MCNC: 0.4 MG/DL (ref 0.2–1.8)
BILIRUB UR QL STRIP: NEGATIVE
BUN BLD-MCNC: <5 MG/DL (ref 7–21)
BUN/CREAT SERPL: ABNORMAL (ref 7–25)
CALCIUM SPEC-SCNC: 9 MG/DL (ref 7.7–10)
CHLORIDE SERPL-SCNC: 102 MMOL/L (ref 99–112)
CLARITY UR: CLEAR
CO2 SERPL-SCNC: 30.3 MMOL/L (ref 24.3–31.9)
COLOR UR: YELLOW
CREAT BLD-MCNC: 0.63 MG/DL (ref 0.43–1.29)
CRP SERPL-MCNC: 6.89 MG/DL (ref 0–0.99)
D-LACTATE SERPL-SCNC: 1.9 MMOL/L (ref 0.5–2)
DEPRECATED RDW RBC AUTO: 47.5 FL (ref 37–54)
EOSINOPHIL # BLD AUTO: 0.16 10*3/MM3 (ref 0–0.7)
EOSINOPHIL NFR BLD AUTO: 1.1 % (ref 0–5)
ERYTHROCYTE [DISTWIDTH] IN BLOOD BY AUTOMATED COUNT: 17 % (ref 11.5–14.5)
ERYTHROCYTE [SEDIMENTATION RATE] IN BLOOD: 20 MM/HR (ref 0–30)
GFR SERPL CREATININE-BSD FRML MDRD: 100 ML/MIN/1.73
GLOBULIN UR ELPH-MCNC: 2.7 GM/DL
GLUCOSE BLD-MCNC: 97 MG/DL (ref 70–110)
GLUCOSE UR STRIP-MCNC: ABNORMAL MG/DL
HCT VFR BLD AUTO: 37.3 % (ref 37–47)
HGB BLD-MCNC: 11.5 G/DL (ref 12–16)
HGB UR QL STRIP.AUTO: ABNORMAL
HYALINE CASTS UR QL AUTO: ABNORMAL /LPF
IMM GRANULOCYTES # BLD: 0.03 10*3/MM3 (ref 0–0.03)
IMM GRANULOCYTES NFR BLD: 0.2 % (ref 0–0.5)
KETONES UR QL STRIP: NEGATIVE
LEUKOCYTE ESTERASE UR QL STRIP.AUTO: NEGATIVE
LYMPHOCYTES # BLD AUTO: 1.56 10*3/MM3 (ref 1–3)
LYMPHOCYTES NFR BLD AUTO: 11 % (ref 21–51)
MCH RBC QN AUTO: 24.2 PG (ref 27–33)
MCHC RBC AUTO-ENTMCNC: 30.8 G/DL (ref 33–37)
MCV RBC AUTO: 78.5 FL (ref 80–94)
MONOCYTES # BLD AUTO: 1.05 10*3/MM3 (ref 0.1–0.9)
MONOCYTES NFR BLD AUTO: 7.4 % (ref 0–10)
NEUTROPHILS # BLD AUTO: 11.33 10*3/MM3 (ref 1.4–6.5)
NEUTROPHILS NFR BLD AUTO: 80 % (ref 30–70)
NITRITE UR QL STRIP: NEGATIVE
OSMOLALITY SERPL CALC.SUM OF ELEC: NORMAL MOSM/KG (ref 273–305)
PH UR STRIP.AUTO: 8.5 [PH] (ref 5–8)
PLATELET # BLD AUTO: 270 10*3/MM3 (ref 130–400)
PMV BLD AUTO: 9.5 FL (ref 6–10)
POTASSIUM BLD-SCNC: 2.9 MMOL/L (ref 3.5–5.3)
PROT SERPL-MCNC: 6.9 G/DL (ref 6–8)
PROT UR QL STRIP: NEGATIVE
RBC # BLD AUTO: 4.75 10*6/MM3 (ref 4.2–5.4)
RBC # UR: ABNORMAL /HPF
REF LAB TEST METHOD: ABNORMAL
SODIUM BLD-SCNC: 140 MMOL/L (ref 135–153)
SP GR UR STRIP: 1.02 (ref 1–1.03)
SQUAMOUS #/AREA URNS HPF: ABNORMAL /HPF
UROBILINOGEN UR QL STRIP: ABNORMAL
WBC NRBC COR # BLD: 14.17 10*3/MM3 (ref 4.5–12.5)
WBC UR QL AUTO: ABNORMAL /HPF

## 2018-06-19 PROCEDURE — 25010000002 ONDANSETRON PER 1 MG: Performed by: PHYSICIAN ASSISTANT

## 2018-06-19 PROCEDURE — 25010000002 DALBAVANCIN 500 MG RECONSTITUTED SOLUTION 1 EACH VIAL: Performed by: PHYSICIAN ASSISTANT

## 2018-06-19 PROCEDURE — 81001 URINALYSIS AUTO W/SCOPE: CPT | Performed by: PHYSICIAN ASSISTANT

## 2018-06-19 PROCEDURE — 73700 CT LOWER EXTREMITY W/O DYE: CPT | Performed by: RADIOLOGY

## 2018-06-19 PROCEDURE — 83605 ASSAY OF LACTIC ACID: CPT | Performed by: PHYSICIAN ASSISTANT

## 2018-06-19 PROCEDURE — 96375 TX/PRO/DX INJ NEW DRUG ADDON: CPT

## 2018-06-19 PROCEDURE — 25010000002 MORPHINE PER 10 MG: Performed by: INTERNAL MEDICINE

## 2018-06-19 PROCEDURE — 96361 HYDRATE IV INFUSION ADD-ON: CPT

## 2018-06-19 PROCEDURE — 70487 CT MAXILLOFACIAL W/DYE: CPT

## 2018-06-19 PROCEDURE — 70487 CT MAXILLOFACIAL W/DYE: CPT | Performed by: RADIOLOGY

## 2018-06-19 PROCEDURE — 99284 EMERGENCY DEPT VISIT MOD MDM: CPT

## 2018-06-19 PROCEDURE — 96376 TX/PRO/DX INJ SAME DRUG ADON: CPT

## 2018-06-19 PROCEDURE — 25010000002 MORPHINE (PF) 10 MG/ML SOLUTION: Performed by: INTERNAL MEDICINE

## 2018-06-19 PROCEDURE — 87040 BLOOD CULTURE FOR BACTERIA: CPT | Performed by: PHYSICIAN ASSISTANT

## 2018-06-19 PROCEDURE — 80053 COMPREHEN METABOLIC PANEL: CPT | Performed by: PHYSICIAN ASSISTANT

## 2018-06-19 PROCEDURE — 96365 THER/PROPH/DIAG IV INF INIT: CPT

## 2018-06-19 PROCEDURE — 86140 C-REACTIVE PROTEIN: CPT | Performed by: PHYSICIAN ASSISTANT

## 2018-06-19 PROCEDURE — 85025 COMPLETE CBC W/AUTO DIFF WBC: CPT | Performed by: PHYSICIAN ASSISTANT

## 2018-06-19 PROCEDURE — 73700 CT LOWER EXTREMITY W/O DYE: CPT

## 2018-06-19 PROCEDURE — 85652 RBC SED RATE AUTOMATED: CPT | Performed by: PHYSICIAN ASSISTANT

## 2018-06-19 PROCEDURE — 25010000002 IOPAMIDOL 61 % SOLUTION: Performed by: INTERNAL MEDICINE

## 2018-06-19 RX ORDER — MORPHINE SULFATE 10 MG/ML
4 INJECTION INTRAMUSCULAR; INTRAVENOUS; SUBCUTANEOUS ONCE
Status: COMPLETED | OUTPATIENT
Start: 2018-06-19 | End: 2018-06-19

## 2018-06-19 RX ORDER — ONDANSETRON 2 MG/ML
4 INJECTION INTRAMUSCULAR; INTRAVENOUS ONCE
Status: COMPLETED | OUTPATIENT
Start: 2018-06-19 | End: 2018-06-19

## 2018-06-19 RX ORDER — SODIUM CHLORIDE 0.9 % (FLUSH) 0.9 %
10 SYRINGE (ML) INJECTION AS NEEDED
Status: DISCONTINUED | OUTPATIENT
Start: 2018-06-19 | End: 2018-06-19 | Stop reason: HOSPADM

## 2018-06-19 RX ORDER — POTASSIUM CHLORIDE 20 MEQ/1
40 TABLET, EXTENDED RELEASE ORAL ONCE
Status: COMPLETED | OUTPATIENT
Start: 2018-06-19 | End: 2018-06-19

## 2018-06-19 RX ORDER — ACETAMINOPHEN 500 MG
1000 TABLET ORAL ONCE
Status: COMPLETED | OUTPATIENT
Start: 2018-06-19 | End: 2018-06-19

## 2018-06-19 RX ADMIN — MORPHINE SULFATE 4 MG: 10 INJECTION, SOLUTION INTRAMUSCULAR; INTRAVENOUS at 17:29

## 2018-06-19 RX ADMIN — MORPHINE SULFATE 4 MG: 10 INJECTION INTRAVENOUS at 14:43

## 2018-06-19 RX ADMIN — SODIUM CHLORIDE 1000 ML: 9 INJECTION, SOLUTION INTRAVENOUS at 14:40

## 2018-06-19 RX ADMIN — POTASSIUM CHLORIDE 40 MEQ: 1500 TABLET, EXTENDED RELEASE ORAL at 15:55

## 2018-06-19 RX ADMIN — IOPAMIDOL 100 ML: 612 INJECTION, SOLUTION INTRAVENOUS at 16:13

## 2018-06-19 RX ADMIN — ONDANSETRON 4 MG: 2 INJECTION, SOLUTION INTRAMUSCULAR; INTRAVENOUS at 14:40

## 2018-06-19 RX ADMIN — ACETAMINOPHEN 1000 MG: 500 TABLET ORAL at 15:56

## 2018-06-19 RX ADMIN — DALBAVANCIN 1500 MG: 500 INJECTION, POWDER, FOR SOLUTION INTRAVENOUS at 17:38

## 2018-06-19 NOTE — ED NOTES
Pt alert and oriented, skin pwd, no respiratory distress. Pt reports feels better.      Shaneka Chan RN  06/19/18 9737

## 2018-06-19 NOTE — ED NOTES
Pt alert and oriented, skin pwd, no respiratory distress. Pt assisted to bathroom and back to bed. Pt tolerated well.      Shaneka Chan RN  06/19/18 4602

## 2018-06-19 NOTE — DISCHARGE INSTRUCTIONS
Cellulitis, Adult  Cellulitis is a skin infection. The infected area is usually red and tender. This condition occurs most often in the arms and lower legs. The infection can travel to the muscles, blood, and underlying tissue and become serious. It is very important to get treated for this condition.  What are the causes?  Cellulitis is caused by bacteria. The bacteria enter through a break in the skin, such as a cut, burn, insect bite, open sore, or crack.  What increases the risk?  This condition is more likely to occur in people who:  · Have a weak defense system (immune system).  · Have open wounds on the skin such as cuts, burns, bites, and scrapes. Bacteria can enter the body through these open wounds.  · Are older.  · Have diabetes.  · Have a type of long-lasting (chronic) liver disease (cirrhosis) or kidney disease.  · Use IV drugs.    What are the signs or symptoms?  Symptoms of this condition include:  · Redness, streaking, or spotting on the skin.  · Swollen area of the skin.  · Tenderness or pain when an area of the skin is touched.  · Warm skin.  · Fever.  · Chills.  · Blisters.    How is this diagnosed?  This condition is diagnosed based on a medical history and physical exam. You may also have tests, including:  · Blood tests.  · Lab tests.  · Imaging tests.    How is this treated?  Treatment for this condition may include:  · Medicines, such as antibiotic medicines or antihistamines.  · Supportive care, such as rest and application of cold or warm cloths (cold or warm compresses) to the skin.  · Hospital care, if the condition is severe.    The infection usually gets better within 1-2 days of treatment.  Follow these instructions at home:  · Take over-the-counter and prescription medicines only as told by your health care provider.  · If you were prescribed an antibiotic medicine, take it as told by your health care provider. Do not stop taking the antibiotic even if you start to feel  better.  · Drink enough fluid to keep your urine clear or pale yellow.  · Do not touch or rub the infected area.  · Raise (elevate) the infected area above the level of your heart while you are sitting or lying down.  · Apply warm or cold compresses to the affected area as told by your health care provider.  · Keep all follow-up visits as told by your health care provider. This is important. These visits let your health care provider make sure a more serious infection is not developing.  Contact a health care provider if:  · You have a fever.  · Your symptoms do not improve within 1-2 days of starting treatment.  · Your bone or joint underneath the infected area becomes painful after the skin has healed.  · Your infection returns in the same area or another area.  · You notice a swollen bump in the infected area.  · You develop new symptoms.  · You have a general ill feeling (malaise) with muscle aches and pains.  Get help right away if:  · Your symptoms get worse.  · You feel very sleepy.  · You develop vomiting or diarrhea that persists.  · You notice red streaks coming from the infected area.  · Your red area gets larger or turns dark in color.  This information is not intended to replace advice given to you by your health care provider. Make sure you discuss any questions you have with your health care provider.  Document Released: 09/27/2006 Document Revised: 04/27/2017 Document Reviewed: 10/26/2016  [employer] Interactive Patient Education © 2018 [employer]

## 2018-06-19 NOTE — ED PROVIDER NOTES
Subjective   52-year-old female who presents to the ED today for an abscess to the face as well as the right axilla and left upper leg.  She states this started last night.  Since the swelling to the face started this morning.  She has a chronic history of frequent abscesses due to a history of lupus and pyoderma.  She had a facial abscess lanced at the [facility] about a month ago.  It tested positive for MRSA.  She states she's had these issues with abscesses for about a year.  She is followed by Dr. Crenshaw at the [facility] dermatology.  She states she did get a little drainage out of the abscess in the right axilla but none of the other areas are draining.  She states they are painful to touch.  She denies any fever or nausea or vomiting.        History provided by:  Patient  Abscess   Location:  Face, leg and shoulder/arm  Facial abscess location:  Chin  Shoulder/arm abscess location:  R axilla  Leg abscess location:  L upper leg  Abscess quality: induration, painful, redness and warmth    Abscess quality: not draining    Red streaking: no    Duration:  1 day  Progression:  Worsening  Pain details:     Quality:  Throbbing    Severity:  Moderate    Timing:  Constant    Progression:  Worsening  Chronicity:  Recurrent  Relieved by:  Nothing  Worsened by:  Nothing  Ineffective treatments:  None tried  Associated symptoms: no fever, no nausea and no vomiting    Risk factors: hx of MRSA and prior abscess        Review of Systems   Constitutional: Negative for fever.   HENT: Positive for facial swelling.    Eyes: Negative.    Respiratory: Negative.    Cardiovascular: Negative.    Gastrointestinal: Negative for nausea and vomiting.   Genitourinary: Negative.    Musculoskeletal: Negative.    Skin: Positive for color change and wound.   Neurological: Negative.    Psychiatric/Behavioral: Negative.    All other systems reviewed and are negative.      Past Medical History:   Diagnosis Date   •  Anxiety    • Chronic headache    • Depression    • Diastolic CHF, chronic    • DVT (deep venous thrombosis)     left leg   • Encephalitis 2016    treated at the [facility]   • Gastric ulcer with perforation 2016    Microperforation and air in the biliary tree   • Gastritis    • Henoch-Schonlein purpura    • Hypertension    • Hypothyroidism (acquired)     Removed due to groiter   • Lower GI bleeding    • Migraine    • Mixed connective tissue disease    • MRSA cellulitis    • NSTEMI (non-ST elevated myocardial infarction)    • Patent foramen ovale    • Pneumonia    • PVC (premature ventricular contraction)    • RA (rheumatoid arthritis)    • Renal disorder    • Rhabdomyolysis    • Seizures     when had encephalitis   • Sjogren's syndrome    • Stroke 09/2015    x 1   • Systemic lupus erythematosus     Discoid and systemic   • Temporal arteritis    • TIA (transient ischemic attack)     x 3       Allergies   Allergen Reactions   • Compazine [Prochlorperazine Edisylate]    • Imitrex [Sumatriptan]    • Nsaids    • Reglan [Metoclopramide]    • Solu-Medrol [Methylprednisolone]    • Zyprexa [Olanzapine]        Past Surgical History:   Procedure Laterality Date   • APPENDECTOMY     • CARDIAC CATHETERIZATION  2016    PFO repair and had a loop monitor placed at the [facility]   •  SECTION      x 2   • ENDOSCOPY     • KNEE ARTHROSCOPY Left    • LUMBAR FUSION     • PORTACATH PLACEMENT Right 2016   • THYROID SURGERY      Removed due to a goiter       Family History   Problem Relation Age of Onset   • Hypertension Mother    • Arthritis Mother         RA   • Hypertension Father    • Arthritis Father         RA   • Diabetes Father        Social History     Social History   • Marital status:      Social History Main Topics   • Smoking status: Never Smoker   • Smokeless tobacco: Never Used   • Alcohol use No      Comment: Occassionally, social drinker in the past   • Drug use: No   •  Sexual activity: Defer     Other Topics Concern   • Not on file           Objective   Physical Exam   Constitutional: She is oriented to person, place, and time. She appears well-developed and well-nourished. No distress.   HENT:   Head: Normocephalic and atraumatic.   Right Ear: External ear normal.   Left Ear: External ear normal.   Nose: Nose normal.   Mouth/Throat: Oropharynx is clear and moist.   Patient has 2 areas of superficial ulcerations to her chin with soft tissue swelling to the left chin, no areas of drainage.  Area is firm and indurated, tender to palpation, warm to touch.  No abscess seen inside the mouth.   Eyes: Conjunctivae and EOM are normal. Pupils are equal, round, and reactive to light.   Neck: Normal range of motion. Neck supple. No tracheal deviation present.   Cardiovascular: Regular rhythm, normal heart sounds and intact distal pulses.  Tachycardia present.    Pulmonary/Chest: Effort normal and breath sounds normal. No stridor. No respiratory distress. She has no wheezes. She exhibits no tenderness.   Abdominal: Soft. Bowel sounds are normal. There is no tenderness. There is no rebound and no guarding.   Musculoskeletal: Normal range of motion.   Lymphadenopathy:     She has no cervical adenopathy.   Neurological: She is alert and oriented to person, place, and time.   Skin: Skin is warm and dry. Capillary refill takes less than 2 seconds. There is erythema.   Patient has an area of cellulitis to the left upper anterior leg, no palpable abscess, area is warm to touch and indurated, tender to palpation.   Psychiatric: She has a normal mood and affect. Her behavior is normal. Judgment and thought content normal.   Nursing note and vitals reviewed.      Procedures           ED Course  ED Course as of Jun 19 1853   Tue Jun 19, 2018   1659 No areas of abscess seen on CT scans.  Will give a dose of Dalvance and then patient will be discharged home to follow up with her dermatologist.  [AH]       ED Course User Index  [AH] VICTOR HUGO Renteria                  MDM  Number of Diagnoses or Management Options  Facial cellulitis:   Left leg cellulitis:      Amount and/or Complexity of Data Reviewed  Clinical lab tests: reviewed  Tests in the radiology section of CPT®: reviewed    Patient Progress  Patient progress: stable        Final diagnoses:   Facial cellulitis   Left leg cellulitis            VICTOR HUGO Renteria  06/19/18 5482

## 2018-06-19 NOTE — ED NOTES
Pt resting quietly with eyes closed, awakens easily to verbal stimuli.      Shaneka Chan RN  06/19/18 2694

## 2018-06-21 ENCOUNTER — HOSPITAL ENCOUNTER (EMERGENCY)
Facility: HOSPITAL | Age: 53
Discharge: ANOTHER HOSPITAL | End: 2018-06-21
Attending: EMERGENCY MEDICINE | Admitting: EMERGENCY MEDICINE

## 2018-06-21 VITALS
WEIGHT: 185 LBS | BODY MASS INDEX: 29.73 KG/M2 | SYSTOLIC BLOOD PRESSURE: 140 MMHG | OXYGEN SATURATION: 98 % | TEMPERATURE: 98.3 F | DIASTOLIC BLOOD PRESSURE: 76 MMHG | HEART RATE: 96 BPM | HEIGHT: 66 IN | RESPIRATION RATE: 18 BRPM

## 2018-06-21 DIAGNOSIS — L03.211 CELLULITIS OF FACE: Primary | ICD-10-CM

## 2018-06-21 DIAGNOSIS — L88 PYODERMA GANGRENOSA: ICD-10-CM

## 2018-06-21 DIAGNOSIS — L03.111 CELLULITIS OF RIGHT AXILLA: ICD-10-CM

## 2018-06-21 DIAGNOSIS — L03.116 CELLULITIS OF LEFT THIGH: ICD-10-CM

## 2018-06-21 LAB
ALBUMIN SERPL-MCNC: 3.7 G/DL (ref 3.5–5)
ALBUMIN/GLOB SERPL: 1.5 G/DL (ref 1.5–2.5)
ALP SERPL-CCNC: 125 U/L (ref 35–104)
ALT SERPL W P-5'-P-CCNC: 28 U/L (ref 10–36)
ANION GAP SERPL CALCULATED.3IONS-SCNC: 4.9 MMOL/L (ref 3.6–11.2)
AST SERPL-CCNC: 18 U/L (ref 10–30)
BASOPHILS # BLD AUTO: 0.03 10*3/MM3 (ref 0–0.3)
BASOPHILS NFR BLD AUTO: 0.3 % (ref 0–2)
BILIRUB SERPL-MCNC: 0.3 MG/DL (ref 0.2–1.8)
BUN BLD-MCNC: 7 MG/DL (ref 7–21)
BUN/CREAT SERPL: 10.4 (ref 7–25)
CALCIUM SPEC-SCNC: 8.6 MG/DL (ref 7.7–10)
CHLORIDE SERPL-SCNC: 105 MMOL/L (ref 99–112)
CO2 SERPL-SCNC: 30.1 MMOL/L (ref 24.3–31.9)
CREAT BLD-MCNC: 0.67 MG/DL (ref 0.43–1.29)
CRP SERPL-MCNC: 8.23 MG/DL (ref 0–0.99)
D-LACTATE SERPL-SCNC: 0.8 MMOL/L (ref 0.5–2)
DEPRECATED RDW RBC AUTO: 47.8 FL (ref 37–54)
EOSINOPHIL # BLD AUTO: 0.34 10*3/MM3 (ref 0–0.7)
EOSINOPHIL NFR BLD AUTO: 3 % (ref 0–5)
ERYTHROCYTE [DISTWIDTH] IN BLOOD BY AUTOMATED COUNT: 17 % (ref 11.5–14.5)
ERYTHROCYTE [SEDIMENTATION RATE] IN BLOOD: 41 MM/HR (ref 0–30)
GFR SERPL CREATININE-BSD FRML MDRD: 93 ML/MIN/1.73
GLOBULIN UR ELPH-MCNC: 2.4 GM/DL
GLUCOSE BLD-MCNC: 100 MG/DL (ref 70–110)
HCT VFR BLD AUTO: 32.3 % (ref 37–47)
HGB BLD-MCNC: 9.6 G/DL (ref 12–16)
IMM GRANULOCYTES # BLD: 0.04 10*3/MM3 (ref 0–0.03)
IMM GRANULOCYTES NFR BLD: 0.4 % (ref 0–0.5)
LYMPHOCYTES # BLD AUTO: 1.59 10*3/MM3 (ref 1–3)
LYMPHOCYTES NFR BLD AUTO: 14 % (ref 21–51)
MCH RBC QN AUTO: 23.6 PG (ref 27–33)
MCHC RBC AUTO-ENTMCNC: 29.7 G/DL (ref 33–37)
MCV RBC AUTO: 79.4 FL (ref 80–94)
MONOCYTES # BLD AUTO: 0.82 10*3/MM3 (ref 0.1–0.9)
MONOCYTES NFR BLD AUTO: 7.2 % (ref 0–10)
NEUTROPHILS # BLD AUTO: 8.57 10*3/MM3 (ref 1.4–6.5)
NEUTROPHILS NFR BLD AUTO: 75.1 % (ref 30–70)
OSMOLALITY SERPL CALC.SUM OF ELEC: 277.5 MOSM/KG (ref 273–305)
PLATELET # BLD AUTO: 235 10*3/MM3 (ref 130–400)
PMV BLD AUTO: 9.4 FL (ref 6–10)
POTASSIUM BLD-SCNC: 3.4 MMOL/L (ref 3.5–5.3)
PROT SERPL-MCNC: 6.1 G/DL (ref 6–8)
RBC # BLD AUTO: 4.07 10*6/MM3 (ref 4.2–5.4)
SODIUM BLD-SCNC: 140 MMOL/L (ref 135–153)
WBC NRBC COR # BLD: 11.39 10*3/MM3 (ref 4.5–12.5)

## 2018-06-21 PROCEDURE — 25010000002 VANCOMYCIN PER 500 MG: Performed by: EMERGENCY MEDICINE

## 2018-06-21 PROCEDURE — 25010000002 HYDROMORPHONE PER 4 MG: Performed by: EMERGENCY MEDICINE

## 2018-06-21 PROCEDURE — 96366 THER/PROPH/DIAG IV INF ADDON: CPT

## 2018-06-21 PROCEDURE — 99284 EMERGENCY DEPT VISIT MOD MDM: CPT

## 2018-06-21 PROCEDURE — 96367 TX/PROPH/DG ADDL SEQ IV INF: CPT

## 2018-06-21 PROCEDURE — 96376 TX/PRO/DX INJ SAME DRUG ADON: CPT

## 2018-06-21 PROCEDURE — 83605 ASSAY OF LACTIC ACID: CPT | Performed by: EMERGENCY MEDICINE

## 2018-06-21 PROCEDURE — 25010000002 ONDANSETRON PER 1 MG: Performed by: EMERGENCY MEDICINE

## 2018-06-21 PROCEDURE — 25010000002 MORPHINE PER 10 MG: Performed by: EMERGENCY MEDICINE

## 2018-06-21 PROCEDURE — 96365 THER/PROPH/DIAG IV INF INIT: CPT

## 2018-06-21 PROCEDURE — 96375 TX/PRO/DX INJ NEW DRUG ADDON: CPT

## 2018-06-21 PROCEDURE — 86140 C-REACTIVE PROTEIN: CPT | Performed by: EMERGENCY MEDICINE

## 2018-06-21 PROCEDURE — 36415 COLL VENOUS BLD VENIPUNCTURE: CPT

## 2018-06-21 PROCEDURE — 87040 BLOOD CULTURE FOR BACTERIA: CPT | Performed by: EMERGENCY MEDICINE

## 2018-06-21 PROCEDURE — 80053 COMPREHEN METABOLIC PANEL: CPT | Performed by: EMERGENCY MEDICINE

## 2018-06-21 PROCEDURE — 85652 RBC SED RATE AUTOMATED: CPT | Performed by: EMERGENCY MEDICINE

## 2018-06-21 PROCEDURE — 25010000002 PIPERACILLIN-TAZOBACTAM: Performed by: EMERGENCY MEDICINE

## 2018-06-21 PROCEDURE — 85025 COMPLETE CBC W/AUTO DIFF WBC: CPT | Performed by: EMERGENCY MEDICINE

## 2018-06-21 RX ORDER — SODIUM CHLORIDE 9 MG/ML
125 INJECTION, SOLUTION INTRAVENOUS CONTINUOUS
Status: DISCONTINUED | OUTPATIENT
Start: 2018-06-21 | End: 2018-06-21 | Stop reason: HOSPADM

## 2018-06-21 RX ORDER — POTASSIUM CHLORIDE 1.5 G/1.77G
20 POWDER, FOR SOLUTION ORAL 2 TIMES DAILY
COMMUNITY
End: 2019-02-17

## 2018-06-21 RX ORDER — MORPHINE SULFATE 30 MG/1
30 TABLET, FILM COATED, EXTENDED RELEASE ORAL 2 TIMES DAILY
Status: ON HOLD | COMMUNITY
End: 2019-05-04

## 2018-06-21 RX ORDER — ONDANSETRON 2 MG/ML
4 INJECTION INTRAMUSCULAR; INTRAVENOUS ONCE
Status: COMPLETED | OUTPATIENT
Start: 2018-06-21 | End: 2018-06-21

## 2018-06-21 RX ORDER — MORPHINE SULFATE 10 MG/ML
4 INJECTION INTRAMUSCULAR; INTRAVENOUS; SUBCUTANEOUS ONCE
Status: COMPLETED | OUTPATIENT
Start: 2018-06-21 | End: 2018-06-21

## 2018-06-21 RX ORDER — SODIUM CHLORIDE 0.9 % (FLUSH) 0.9 %
10 SYRINGE (ML) INJECTION AS NEEDED
Status: DISCONTINUED | OUTPATIENT
Start: 2018-06-21 | End: 2018-06-21 | Stop reason: HOSPADM

## 2018-06-21 RX ADMIN — TAZOBACTAM SODIUM AND PIPERACILLIN SODIUM 4.5 G: .5; 4 INJECTION, POWDER, LYOPHILIZED, FOR SOLUTION INTRAVENOUS at 05:15

## 2018-06-21 RX ADMIN — HYDROMORPHONE HYDROCHLORIDE 1 MG: 1 INJECTION, SOLUTION INTRAMUSCULAR; INTRAVENOUS; SUBCUTANEOUS at 06:24

## 2018-06-21 RX ADMIN — MORPHINE SULFATE 4 MG: 10 INJECTION INTRAVENOUS at 05:38

## 2018-06-21 RX ADMIN — HYDROMORPHONE HYDROCHLORIDE 1 MG: 1 INJECTION, SOLUTION INTRAMUSCULAR; INTRAVENOUS; SUBCUTANEOUS at 07:59

## 2018-06-21 RX ADMIN — VANCOMYCIN HYDROCHLORIDE 1750 MG: 5 INJECTION, POWDER, LYOPHILIZED, FOR SOLUTION INTRAVENOUS at 05:50

## 2018-06-21 RX ADMIN — SODIUM CHLORIDE 125 ML/HR: 9 INJECTION, SOLUTION INTRAVENOUS at 05:38

## 2018-06-21 RX ADMIN — ONDANSETRON 4 MG: 2 INJECTION, SOLUTION INTRAMUSCULAR; INTRAVENOUS at 05:38

## 2018-06-24 LAB
BACTERIA SPEC AEROBE CULT: NORMAL
BACTERIA SPEC AEROBE CULT: NORMAL

## 2018-06-26 LAB
BACTERIA SPEC AEROBE CULT: NORMAL
BACTERIA SPEC AEROBE CULT: NORMAL

## 2018-11-21 ENCOUNTER — HOSPITAL ENCOUNTER (EMERGENCY)
Facility: HOSPITAL | Age: 53
Discharge: HOME | End: 2018-11-21
Attending: EMERGENCY MEDICINE | Admitting: EMERGENCY MEDICINE

## 2018-11-21 ENCOUNTER — APPOINTMENT (OUTPATIENT)
Dept: GENERAL RADIOLOGY | Facility: HOSPITAL | Age: 53
End: 2018-11-21

## 2018-11-21 VITALS
HEART RATE: 90 BPM | DIASTOLIC BLOOD PRESSURE: 70 MMHG | BODY MASS INDEX: 29.73 KG/M2 | TEMPERATURE: 97.9 F | RESPIRATION RATE: 24 BRPM | OXYGEN SATURATION: 96 % | HEIGHT: 66 IN | SYSTOLIC BLOOD PRESSURE: 141 MMHG | WEIGHT: 185 LBS

## 2018-11-21 DIAGNOSIS — B96.89 ACUTE BACTERIAL BRONCHITIS: Primary | ICD-10-CM

## 2018-11-21 DIAGNOSIS — J20.8 ACUTE BACTERIAL BRONCHITIS: Primary | ICD-10-CM

## 2018-11-21 LAB
A-A DO2: 30.3 MMHG (ref 0–300)
ALBUMIN SERPL-MCNC: 4.4 G/DL (ref 3.5–5)
ALBUMIN/GLOB SERPL: 1.6 G/DL (ref 1.5–2.5)
ALP SERPL-CCNC: 142 U/L (ref 35–104)
ALT SERPL W P-5'-P-CCNC: 15 U/L (ref 10–36)
ANION GAP SERPL CALCULATED.3IONS-SCNC: 11.8 MMOL/L (ref 3.6–11.2)
ARTERIAL PATENCY WRIST A: POSITIVE
AST SERPL-CCNC: 24 U/L (ref 10–30)
ATMOSPHERIC PRESS: 734 MMHG
BASE EXCESS BLDA CALC-SCNC: 4.5 MMOL/L
BASOPHILS # BLD AUTO: 0.04 10*3/MM3 (ref 0–0.3)
BASOPHILS NFR BLD AUTO: 0.3 % (ref 0–2)
BDY SITE: ABNORMAL
BILIRUB SERPL-MCNC: 0.3 MG/DL (ref 0.2–1.8)
BNP SERPL-MCNC: 19 PG/ML (ref 0–100)
BODY TEMPERATURE: 98.6 C
BUN BLD-MCNC: 12 MG/DL (ref 7–21)
BUN/CREAT SERPL: 16.2 (ref 7–25)
CALCIUM SPEC-SCNC: 9.4 MG/DL (ref 7.7–10)
CHLORIDE SERPL-SCNC: 100 MMOL/L (ref 99–112)
CO2 SERPL-SCNC: 29.2 MMOL/L (ref 24.3–31.9)
COHGB MFR BLD: 1.1 % (ref 0–5)
CREAT BLD-MCNC: 0.74 MG/DL (ref 0.43–1.29)
D-LACTATE SERPL-SCNC: 3 MMOL/L (ref 0.5–2)
DEPRECATED RDW RBC AUTO: 49.8 FL (ref 37–54)
EOSINOPHIL # BLD AUTO: 0.24 10*3/MM3 (ref 0–0.7)
EOSINOPHIL NFR BLD AUTO: 2 % (ref 0–5)
ERYTHROCYTE [DISTWIDTH] IN BLOOD BY AUTOMATED COUNT: 17.5 % (ref 11.5–14.5)
FLUAV AG NPH QL: NEGATIVE
FLUBV AG NPH QL IA: NEGATIVE
GFR SERPL CREATININE-BSD FRML MDRD: 82 ML/MIN/1.73
GLOBULIN UR ELPH-MCNC: 2.8 GM/DL
GLUCOSE BLD-MCNC: 116 MG/DL (ref 70–110)
HCO3 BLDA-SCNC: 29.7 MMOL/L (ref 22–26)
HCT VFR BLD AUTO: 36 % (ref 37–47)
HCT VFR BLD CALC: 34 % (ref 37–47)
HGB BLD-MCNC: 10.9 G/DL (ref 12–16)
HGB BLDA-MCNC: 11.7 G/DL (ref 12–16)
HOLD SPECIMEN: NORMAL
HOROWITZ INDEX BLD+IHG-RTO: 21 %
IMM GRANULOCYTES # BLD: 0.03 10*3/MM3 (ref 0–0.03)
IMM GRANULOCYTES NFR BLD: 0.3 % (ref 0–0.5)
LIPASE SERPL-CCNC: 29 U/L (ref 13–60)
LYMPHOCYTES # BLD AUTO: 1.9 10*3/MM3 (ref 1–3)
LYMPHOCYTES NFR BLD AUTO: 16.2 % (ref 21–51)
MCH RBC QN AUTO: 24.1 PG (ref 27–33)
MCHC RBC AUTO-ENTMCNC: 30.3 G/DL (ref 33–37)
MCV RBC AUTO: 79.5 FL (ref 80–94)
METHGB BLD QL: 0.5 % (ref 0–3)
MODALITY: ABNORMAL
MONOCYTES # BLD AUTO: 0.64 10*3/MM3 (ref 0.1–0.9)
MONOCYTES NFR BLD AUTO: 5.4 % (ref 0–10)
NEUTROPHILS # BLD AUTO: 8.91 10*3/MM3 (ref 1.4–6.5)
NEUTROPHILS NFR BLD AUTO: 75.8 % (ref 30–70)
OSMOLALITY SERPL CALC.SUM OF ELEC: 282 MOSM/KG (ref 273–305)
OXYHGB MFR BLDV: 88.9 % (ref 85–100)
PCO2 BLDA: 47.1 MM HG (ref 35–45)
PH BLDA: 7.42 PH UNITS (ref 7.35–7.45)
PLATELET # BLD AUTO: 259 10*3/MM3 (ref 130–400)
PMV BLD AUTO: 9.3 FL (ref 6–10)
PO2 BLDA: 57.6 MM HG (ref 80–100)
POTASSIUM BLD-SCNC: 3.3 MMOL/L (ref 3.5–5.3)
PROT SERPL-MCNC: 7.2 G/DL (ref 6–8)
RBC # BLD AUTO: 4.53 10*6/MM3 (ref 4.2–5.4)
SAO2 % BLDCOA: 90.3 % (ref 90–100)
SODIUM BLD-SCNC: 141 MMOL/L (ref 135–153)
TROPONIN I SERPL-MCNC: <0.006 NG/ML
WBC NRBC COR # BLD: 11.76 10*3/MM3 (ref 4.5–12.5)
WHOLE BLOOD HOLD SPECIMEN: NORMAL
WHOLE BLOOD HOLD SPECIMEN: NORMAL

## 2018-11-21 PROCEDURE — 82805 BLOOD GASES W/O2 SATURATION: CPT | Performed by: EMERGENCY MEDICINE

## 2018-11-21 PROCEDURE — 71045 X-RAY EXAM CHEST 1 VIEW: CPT | Performed by: RADIOLOGY

## 2018-11-21 PROCEDURE — 71045 X-RAY EXAM CHEST 1 VIEW: CPT

## 2018-11-21 PROCEDURE — 94640 AIRWAY INHALATION TREATMENT: CPT

## 2018-11-21 PROCEDURE — 83605 ASSAY OF LACTIC ACID: CPT | Performed by: EMERGENCY MEDICINE

## 2018-11-21 PROCEDURE — 87804 INFLUENZA ASSAY W/OPTIC: CPT | Performed by: EMERGENCY MEDICINE

## 2018-11-21 PROCEDURE — 83880 ASSAY OF NATRIURETIC PEPTIDE: CPT | Performed by: EMERGENCY MEDICINE

## 2018-11-21 PROCEDURE — 96365 THER/PROPH/DIAG IV INF INIT: CPT

## 2018-11-21 PROCEDURE — 25010000002 DEXAMETHASONE PER 1 MG: Performed by: EMERGENCY MEDICINE

## 2018-11-21 PROCEDURE — 83050 HGB METHEMOGLOBIN QUAN: CPT | Performed by: EMERGENCY MEDICINE

## 2018-11-21 PROCEDURE — 84484 ASSAY OF TROPONIN QUANT: CPT | Performed by: EMERGENCY MEDICINE

## 2018-11-21 PROCEDURE — 36600 WITHDRAWAL OF ARTERIAL BLOOD: CPT | Performed by: EMERGENCY MEDICINE

## 2018-11-21 PROCEDURE — 85025 COMPLETE CBC W/AUTO DIFF WBC: CPT | Performed by: EMERGENCY MEDICINE

## 2018-11-21 PROCEDURE — 93005 ELECTROCARDIOGRAM TRACING: CPT | Performed by: EMERGENCY MEDICINE

## 2018-11-21 PROCEDURE — 25010000002 CEFTRIAXONE: Performed by: EMERGENCY MEDICINE

## 2018-11-21 PROCEDURE — 80053 COMPREHEN METABOLIC PANEL: CPT | Performed by: EMERGENCY MEDICINE

## 2018-11-21 PROCEDURE — 82375 ASSAY CARBOXYHB QUANT: CPT | Performed by: EMERGENCY MEDICINE

## 2018-11-21 PROCEDURE — 83690 ASSAY OF LIPASE: CPT | Performed by: EMERGENCY MEDICINE

## 2018-11-21 PROCEDURE — 87040 BLOOD CULTURE FOR BACTERIA: CPT | Performed by: EMERGENCY MEDICINE

## 2018-11-21 PROCEDURE — 96375 TX/PRO/DX INJ NEW DRUG ADDON: CPT

## 2018-11-21 PROCEDURE — 99285 EMERGENCY DEPT VISIT HI MDM: CPT

## 2018-11-21 PROCEDURE — 94799 UNLISTED PULMONARY SVC/PX: CPT

## 2018-11-21 RX ORDER — METOPROLOL TARTRATE 5 MG/5ML
5 INJECTION INTRAVENOUS ONCE
Status: COMPLETED | OUTPATIENT
Start: 2018-11-21 | End: 2018-11-21

## 2018-11-21 RX ORDER — POTASSIUM CHLORIDE 20 MEQ/1
20 TABLET, EXTENDED RELEASE ORAL ONCE
Status: DISCONTINUED | OUTPATIENT
Start: 2018-11-21 | End: 2018-11-21 | Stop reason: HOSPADM

## 2018-11-21 RX ORDER — LEVOFLOXACIN 750 MG/1
750 TABLET ORAL DAILY
Qty: 8 TABLET | Refills: 0 | Status: SHIPPED | OUTPATIENT
Start: 2018-11-21 | End: 2019-02-17

## 2018-11-21 RX ORDER — GUAIFENESIN/DEXTROMETHORPHAN 100-10MG/5
5 SYRUP ORAL ONCE
Status: COMPLETED | OUTPATIENT
Start: 2018-11-21 | End: 2018-11-21

## 2018-11-21 RX ORDER — SODIUM CHLORIDE 0.9 % (FLUSH) 0.9 %
10 SYRINGE (ML) INJECTION AS NEEDED
Status: DISCONTINUED | OUTPATIENT
Start: 2018-11-21 | End: 2018-11-21 | Stop reason: HOSPADM

## 2018-11-21 RX ORDER — IPRATROPIUM BROMIDE AND ALBUTEROL SULFATE 2.5; .5 MG/3ML; MG/3ML
3 SOLUTION RESPIRATORY (INHALATION) ONCE
Status: COMPLETED | OUTPATIENT
Start: 2018-11-21 | End: 2018-11-21

## 2018-11-21 RX ORDER — DEXAMETHASONE SODIUM PHOSPHATE 4 MG/ML
4 INJECTION, SOLUTION INTRA-ARTICULAR; INTRALESIONAL; INTRAMUSCULAR; INTRAVENOUS; SOFT TISSUE ONCE
Status: COMPLETED | OUTPATIENT
Start: 2018-11-21 | End: 2018-11-21

## 2018-11-21 RX ADMIN — METOPROLOL TARTRATE 5 MG: 1 INJECTION, SOLUTION INTRAVENOUS at 15:16

## 2018-11-21 RX ADMIN — METOROPROLOL TARTRATE 5 MG: 5 INJECTION, SOLUTION INTRAVENOUS at 15:38

## 2018-11-21 RX ADMIN — IPRATROPIUM BROMIDE AND ALBUTEROL SULFATE 3 ML: .5; 3 SOLUTION RESPIRATORY (INHALATION) at 14:20

## 2018-11-21 RX ADMIN — DEXAMETHASONE SODIUM PHOSPHATE 4 MG: 4 INJECTION, SOLUTION INTRA-ARTICULAR; INTRALESIONAL; INTRAMUSCULAR; INTRAVENOUS; SOFT TISSUE at 14:03

## 2018-11-21 RX ADMIN — SODIUM CHLORIDE 1000 ML: 9 INJECTION, SOLUTION INTRAVENOUS at 15:14

## 2018-11-21 RX ADMIN — CEFTRIAXONE 1 G: 1 INJECTION, POWDER, FOR SOLUTION INTRAMUSCULAR; INTRAVENOUS at 15:19

## 2018-11-21 RX ADMIN — GUAIFENESIN AND DEXTROMETHORPHAN 5 ML: 100; 10 SYRUP ORAL at 14:03

## 2018-11-21 NOTE — ED NOTES
Patient left ER via wheelchair, patient refused to take discharge instructions and prescriptions with her, patient is alert and oriented X4, NADN. Lead nurse and provider made aware     Janel Morales RN  11/21/18 6489

## 2018-11-21 NOTE — ED NOTES
"Pt states that she feels as though staff \"Are not listening to me, I've been through this before.  I'm not telling you how to do your job, but I know what I need.\"  Reassurance given to patient, attempted to alleviate pt concerns.  Dr Shaw made aware.  [facility], Jillian RN notified that pt requests to speak to the Charge RN, (me), then requests to speak to an  about her concerns.      Galilea Carrera RN  11/21/18 0169    "

## 2018-11-21 NOTE — ED NOTES
Pt sitting up on side of bed, encouraged pt to remain lying in bed.  Pt declines.  Fall precautions maintained.     Galilea Carrera RN  11/21/18 0873

## 2018-11-21 NOTE — ED PROVIDER NOTES
Subjective   Patient presents to ER with cough, shortness of air.        Shortness of Breath   Severity:  Moderate  Onset quality:  Gradual  Timing:  Constant  Progression:  Worsening  Chronicity:  Recurrent  Context: activity    Relieved by:  Nothing  Worsened by:  Exertion and movement  Ineffective treatments:  None tried  Associated symptoms: wheezing        Review of Systems   Constitutional: Positive for activity change and fatigue.   HENT: Negative.    Eyes: Negative.    Respiratory: Positive for shortness of breath and wheezing.    Cardiovascular: Negative.    Gastrointestinal: Negative.    Endocrine: Negative.    Genitourinary: Negative.    Musculoskeletal: Negative.    Allergic/Immunologic: Negative.    Neurological: Negative.    Hematological: Negative.    Psychiatric/Behavioral: Negative.        Past Medical History:   Diagnosis Date   • Anxiety    • Chronic headache    • Depression    • Diastolic CHF, chronic (CMS/[facility])    • DVT (deep venous thrombosis) (CMS/[facility])     left leg   • Encephalitis 12/2016    treated at the [facility]   • Gastric ulcer with perforation (CMS/[facility]) 03/2016    Microperforation and air in the biliary tree   • Gastritis    • Henoch-Schonlein purpura (CMS/[facility])    • Hypertension    • Hypothyroidism (acquired)     Removed due to groiter   • Lower GI bleeding    • Migraine    • Mixed connective tissue disease (CMS/[facility])    • MRSA cellulitis    • NSTEMI (non-ST elevated myocardial infarction) (CMS/[facility])    • Patent foramen ovale    • Pneumonia    • PVC (premature ventricular contraction)    • RA (rheumatoid arthritis) (CMS/[facility])    • Renal disorder    • Rhabdomyolysis    • Seizures (CMS/[facility])     when had encephalitis   • Sjogren's syndrome (CMS/[facility])    • Stroke (CMS/[facility]) 09/2015    x 1   • Systemic lupus erythematosus (CMS/[facility])     Discoid and systemic   • Temporal arteritis (CMS/[facility])    • TIA (transient ischemic attack)     x 3       Allergies   Allergen Reactions   • Compazine  [Prochlorperazine Edisylate]    • Imitrex [Sumatriptan]    • Nsaids    • Reglan [Metoclopramide]    • Solu-Medrol [Methylprednisolone]    • Zyprexa [Olanzapine]        Past Surgical History:   Procedure Laterality Date   • APPENDECTOMY     • CARDIAC CATHETERIZATION  2016    PFO repair and had a loop monitor placed at the [facility]   •  SECTION      x 2   • ENDOSCOPY     • KNEE ARTHROSCOPY Left    • LUMBAR FUSION     • PORTACATH PLACEMENT Right 2016   • THYROID SURGERY      Removed due to a goiter       Family History   Problem Relation Age of Onset   • Hypertension Mother    • Arthritis Mother         RA   • Hypertension Father    • Arthritis Father         RA   • Diabetes Father        Social History     Socioeconomic History   • Marital status:      Spouse name: Not on file   • Number of children: Not on file   • Years of education: Not on file   • Highest education level: Not on file   Tobacco Use   • Smoking status: Never Smoker   • Smokeless tobacco: Never Used   Substance and Sexual Activity   • Alcohol use: No     Comment: Occassionally, social drinker in the past   • Drug use: No   • Sexual activity: Defer           Objective   Physical Exam   Constitutional: She appears well-developed and well-nourished.   HENT:   Head: Normocephalic and atraumatic.   Mouth/Throat: Oropharynx is clear and moist.   Eyes: Pupils are equal, round, and reactive to light.   Neck: Normal range of motion.   Cardiovascular:   Tachycardic, hasnt take metoprolol today   Pulmonary/Chest: She has decreased breath sounds. She has wheezes.   Abdominal: Soft.   Musculoskeletal: Normal range of motion.        Right lower leg: Normal.   Neurological: She is alert.   Skin: Skin is warm.   Psychiatric: She has a normal mood and affect.   Nursing note and vitals reviewed.      Procedures           ED Course  ED Course as of    1535 Oxygen sat has improved to 98  [ULI]   1540  Patients condition has improved. He oxygen saturation is now 98% on room air  [ULI]      ED Course User Index  [ULI] Bijan Shaw MD                  [facility]      Final diagnoses:   Acute bacterial bronchitis            Bijan Shaw MD  11/21/18 9995

## 2018-11-21 NOTE — ED NOTES
"Pt continues to bend right arm, stopping IV infusion.  Educated patient on need to leave right arm in neutral position.  Pt states \"I can't hold my arm straight forever!\".  Offered patient assistance in repositioning, pt declines. IV site appears patent, no sign of infiltration, flushes without difficulty, blood return present.  Pt educated on medication, plan of care extensively.        Galilea Carrera, RN  11/21/18 7205    "

## 2018-11-21 NOTE — ED NOTES
"Pt declines Rocephin admin at this time.  Pt states \"I know more about my infection, Vancomycin is the only thing that works.\".  Pt continually bending right arm, holding cloth to genital area.  Educated pt on need to leave right arm in neutral position in order to have infusion of medication.  Dr Shaw to bedside to discuss plan of care with patient. Pt voiced concern to MD that her preferred antibiotic is Vancomycin.  Dr Shaw educates patient on clinical appropriateness of treatment. Pt consents to Rocephin infusion.     Galilea Carrera, RN  11/21/18 1527    "

## 2018-11-21 NOTE — ED NOTES
Dr Shaw made aware simple sepsis positive screening, abnormal vital signs. Protocol orders in place.     Galilea Carrera RN  11/21/18 5042

## 2018-11-21 NOTE — DISCHARGE INSTRUCTIONS
Call one of the offices below to establish a primary care provider.  If you are unable to get an appointment and feel it is an emergency and need to be seen immediately please return to the Emergency Department.    Call one of the office below to set up a primary care provider.    Dr. Alessandro Villar                                                                                                       [address] [facility] [address]  [phone]    Dr. Alejo, Dr. SHERRY Calderon, Dr. CHANTELL Calderon ([facility])  [address] [facility] [address]  [phone]    Dr. Iglesias, Dr. Jung, Dr. Lopez ([facility])  [address] [facility] [address]  [phone]    Dr. Cheng  [address] [facility] [address]  [phone]    Dr. Lott, Dr. Barclay, Dr. Wyatt, Dr. Garay ([facility])  [address] [place] [address] [address]  [facility] [address]  [phone]    Dr. María Molina  [address] [facility] [place] [address]  [phone]    Dr. Kavitha Clemons  [address]   [address]  [facility] [address]  [phone]    Dr. Villar  [address] [facility] [address]  [phone]    Dr. Roger, Dr. Noel  [address] [facility] [place] [address]  [phone]    Dr. Chester  [address]                                                              [address]  [facility] [address]  [phone]    Dr. Woodall  [address] [facility] [id number] [phone]    Dr. Jacklyn Garrett  [address]  [address]  [facility] [address]  [phone]

## 2018-11-21 NOTE — ED NOTES
Dr Shaw to bedside to discuss plan of care, pt verbalization to staff that she wants to sign out AMA.  Dr Shaw speaks to patient and  at bedside at great length about pt concerns, plan of care, medications, risk of hypoxia related to sedating medications.  Pt voices concerns about medication for treatment of cough.  Dr Shaw advises that he will give pt medication to treat cough.  Dr Shaw advises pt to continue care, follow up with PCP/return to ED.       Galilea Carrera RN  11/21/18 4119

## 2018-11-26 LAB
BACTERIA SPEC AEROBE CULT: NORMAL
BACTERIA SPEC AEROBE CULT: NORMAL

## 2019-01-25 ENCOUNTER — APPOINTMENT (OUTPATIENT)
Dept: CT IMAGING | Facility: HOSPITAL | Age: 54
End: 2019-01-25

## 2019-01-25 ENCOUNTER — HOSPITAL ENCOUNTER (EMERGENCY)
Facility: HOSPITAL | Age: 54
Discharge: HOME | End: 2019-01-25
Attending: EMERGENCY MEDICINE | Admitting: EMERGENCY MEDICINE

## 2019-01-25 VITALS
RESPIRATION RATE: 18 BRPM | BODY MASS INDEX: 29.73 KG/M2 | TEMPERATURE: 97.8 F | SYSTOLIC BLOOD PRESSURE: 148 MMHG | DIASTOLIC BLOOD PRESSURE: 78 MMHG | OXYGEN SATURATION: 97 % | HEIGHT: 66 IN | HEART RATE: 78 BPM | WEIGHT: 185 LBS

## 2019-01-25 DIAGNOSIS — H57.89 PERIORBITAL SWELLING: Primary | ICD-10-CM

## 2019-01-25 DIAGNOSIS — H10.9 CONJUNCTIVITIS OF LEFT EYE, UNSPECIFIED CONJUNCTIVITIS TYPE: ICD-10-CM

## 2019-01-25 LAB
ALBUMIN SERPL-MCNC: 4.2 G/DL (ref 3.5–5)
ALBUMIN/GLOB SERPL: 1.6 G/DL (ref 1.5–2.5)
ALP SERPL-CCNC: 119 U/L (ref 35–104)
ALT SERPL W P-5'-P-CCNC: 23 U/L (ref 10–36)
ANION GAP SERPL CALCULATED.3IONS-SCNC: 5.1 MMOL/L (ref 3.6–11.2)
AST SERPL-CCNC: 29 U/L (ref 10–30)
BASOPHILS # BLD AUTO: 0.03 10*3/MM3 (ref 0–0.3)
BASOPHILS NFR BLD AUTO: 0.5 % (ref 0–2)
BILIRUB SERPL-MCNC: 0.5 MG/DL (ref 0.2–1.8)
BUN BLD-MCNC: 9 MG/DL (ref 7–21)
BUN/CREAT SERPL: 14.8 (ref 7–25)
CALCIUM SPEC-SCNC: 9.1 MG/DL (ref 7.7–10)
CHLORIDE SERPL-SCNC: 105 MMOL/L (ref 99–112)
CO2 SERPL-SCNC: 26.9 MMOL/L (ref 24.3–31.9)
CREAT BLD-MCNC: 0.61 MG/DL (ref 0.43–1.29)
CRP SERPL-MCNC: 1.32 MG/DL (ref 0–0.99)
DEPRECATED RDW RBC AUTO: 46.6 FL (ref 37–54)
EOSINOPHIL # BLD AUTO: 0.2 10*3/MM3 (ref 0–0.7)
EOSINOPHIL NFR BLD AUTO: 3.2 % (ref 0–5)
ERYTHROCYTE [DISTWIDTH] IN BLOOD BY AUTOMATED COUNT: 16.3 % (ref 11.5–14.5)
ERYTHROCYTE [SEDIMENTATION RATE] IN BLOOD: 35 MM/HR (ref 0–30)
GFR SERPL CREATININE-BSD FRML MDRD: 103 ML/MIN/1.73
GLOBULIN UR ELPH-MCNC: 2.6 GM/DL
GLUCOSE BLD-MCNC: 87 MG/DL (ref 70–110)
HCT VFR BLD AUTO: 33.1 % (ref 37–47)
HGB BLD-MCNC: 9.8 G/DL (ref 12–16)
IMM GRANULOCYTES # BLD AUTO: 0.01 10*3/MM3 (ref 0–0.03)
IMM GRANULOCYTES NFR BLD AUTO: 0.2 % (ref 0–0.5)
LYMPHOCYTES # BLD AUTO: 1.24 10*3/MM3 (ref 1–3)
LYMPHOCYTES NFR BLD AUTO: 19.7 % (ref 21–51)
MCH RBC QN AUTO: 23.7 PG (ref 27–33)
MCHC RBC AUTO-ENTMCNC: 29.6 G/DL (ref 33–37)
MCV RBC AUTO: 80 FL (ref 80–94)
MONOCYTES # BLD AUTO: 0.44 10*3/MM3 (ref 0.1–0.9)
MONOCYTES NFR BLD AUTO: 7 % (ref 0–10)
NEUTROPHILS # BLD AUTO: 4.37 10*3/MM3 (ref 1.4–6.5)
NEUTROPHILS NFR BLD AUTO: 69.4 % (ref 30–70)
OSMOLALITY SERPL CALC.SUM OF ELEC: 271.9 MOSM/KG (ref 273–305)
PLATELET # BLD AUTO: 274 10*3/MM3 (ref 130–400)
PMV BLD AUTO: 9.2 FL (ref 6–10)
POTASSIUM BLD-SCNC: 4.5 MMOL/L (ref 3.5–5.3)
PROT SERPL-MCNC: 6.8 G/DL (ref 6–8)
RBC # BLD AUTO: 4.14 10*6/MM3 (ref 4.2–5.4)
SODIUM BLD-SCNC: 137 MMOL/L (ref 135–153)
WBC NRBC COR # BLD: 6.29 10*3/MM3 (ref 4.5–12.5)

## 2019-01-25 PROCEDURE — 25010000002 ONDANSETRON PER 1 MG: Performed by: EMERGENCY MEDICINE

## 2019-01-25 PROCEDURE — 80053 COMPREHEN METABOLIC PANEL: CPT | Performed by: PHYSICIAN ASSISTANT

## 2019-01-25 PROCEDURE — 96374 THER/PROPH/DIAG INJ IV PUSH: CPT

## 2019-01-25 PROCEDURE — 25010000002 IOPAMIDOL 61 % SOLUTION: Performed by: EMERGENCY MEDICINE

## 2019-01-25 PROCEDURE — 70481 CT ORBIT/EAR/FOSSA W/DYE: CPT | Performed by: RADIOLOGY

## 2019-01-25 PROCEDURE — 70450 CT HEAD/BRAIN W/O DYE: CPT | Performed by: RADIOLOGY

## 2019-01-25 PROCEDURE — 25010000002 MORPHINE PER 10 MG: Performed by: EMERGENCY MEDICINE

## 2019-01-25 PROCEDURE — 85025 COMPLETE CBC W/AUTO DIFF WBC: CPT | Performed by: PHYSICIAN ASSISTANT

## 2019-01-25 PROCEDURE — 96375 TX/PRO/DX INJ NEW DRUG ADDON: CPT

## 2019-01-25 PROCEDURE — 86140 C-REACTIVE PROTEIN: CPT | Performed by: PHYSICIAN ASSISTANT

## 2019-01-25 PROCEDURE — 99284 EMERGENCY DEPT VISIT MOD MDM: CPT

## 2019-01-25 PROCEDURE — 85652 RBC SED RATE AUTOMATED: CPT | Performed by: PHYSICIAN ASSISTANT

## 2019-01-25 PROCEDURE — 70450 CT HEAD/BRAIN W/O DYE: CPT

## 2019-01-25 PROCEDURE — 70481 CT ORBIT/EAR/FOSSA W/DYE: CPT

## 2019-01-25 RX ORDER — SODIUM CHLORIDE 0.9 % (FLUSH) 0.9 %
10 SYRINGE (ML) INJECTION AS NEEDED
Status: DISCONTINUED | OUTPATIENT
Start: 2019-01-25 | End: 2019-01-25 | Stop reason: HOSPADM

## 2019-01-25 RX ORDER — PURIFIED WATER 986 MG/ML
SOLUTION OPHTHALMIC ONCE AS NEEDED
Status: COMPLETED | OUTPATIENT
Start: 2019-01-25 | End: 2019-01-25

## 2019-01-25 RX ORDER — ERYTHROMYCIN 5 MG/G
OINTMENT OPHTHALMIC NIGHTLY
Qty: 3.5 G | Refills: 0 | Status: SHIPPED | OUTPATIENT
Start: 2019-01-25 | End: 2019-02-17

## 2019-01-25 RX ORDER — AMOXICILLIN 500 MG/1
500 CAPSULE ORAL 2 TIMES DAILY
Qty: 20 CAPSULE | Refills: 0 | Status: SHIPPED | OUTPATIENT
Start: 2019-01-25 | End: 2019-02-04

## 2019-01-25 RX ORDER — MORPHINE SULFATE 10 MG/ML
2 INJECTION INTRAMUSCULAR; INTRAVENOUS; SUBCUTANEOUS ONCE
Status: COMPLETED | OUTPATIENT
Start: 2019-01-25 | End: 2019-01-25

## 2019-01-25 RX ORDER — TETRACAINE HYDROCHLORIDE 5 MG/ML
2 SOLUTION OPHTHALMIC ONCE
Status: COMPLETED | OUTPATIENT
Start: 2019-01-25 | End: 2019-01-25

## 2019-01-25 RX ORDER — POLYMYXIN B SULFATE AND TRIMETHOPRIM 1; 10000 MG/ML; [USP'U]/ML
1 SOLUTION OPHTHALMIC EVERY 6 HOURS
Qty: 10 ML | Refills: 0 | Status: SHIPPED | OUTPATIENT
Start: 2019-01-25 | End: 2019-02-17

## 2019-01-25 RX ORDER — ONDANSETRON 2 MG/ML
4 INJECTION INTRAMUSCULAR; INTRAVENOUS ONCE
Status: COMPLETED | OUTPATIENT
Start: 2019-01-25 | End: 2019-01-25

## 2019-01-25 RX ADMIN — ONDANSETRON 4 MG: 2 INJECTION, SOLUTION INTRAMUSCULAR; INTRAVENOUS at 17:01

## 2019-01-25 RX ADMIN — PURIFIED WATER 1 DROP: 986 SOLUTION OPHTHALMIC at 16:50

## 2019-01-25 RX ADMIN — MORPHINE SULFATE 2 MG: 10 INJECTION INTRAVENOUS at 17:01

## 2019-01-25 RX ADMIN — IOPAMIDOL 100 ML: 612 INJECTION, SOLUTION INTRAVENOUS at 16:48

## 2019-01-25 RX ADMIN — FLUORESCEIN SODIUM 1 STRIP: 1 STRIP OPHTHALMIC at 17:07

## 2019-01-25 RX ADMIN — TETRACAINE HYDROCHLORIDE 2 DROP: 5 SOLUTION OPHTHALMIC at 16:50

## 2019-01-25 NOTE — ED PROVIDER NOTES
Subjective   Pt presents to ED with c/o left eye pain that began suddenly this morning upon waking. Pt denies any trauma or possible foreign body, but does state she has been moving boxes at home, so it is possible something could have gotten into her eye without noticing. Pt states she had some crusting over left eye upon waking. States her vision in that eye is very blurry. She is able to see objects in front of her, but is unable to make out any letters or any specifics. She states she does wear contacts, she took them out before she went to bed last night. States she just saw her optometrist for a regular checkup last week and they adjusted her contact prescription, but otherwise everything checked out well. She denies a sensation of pressure behind eye.         History provided by:  Patient   used: No    Eye Problem   Location:  Left eye  Quality:  Burning and aching  Severity:  Moderate  Onset quality:  Sudden  Duration:  8 hours  Timing:  Constant  Progression:  Worsening  Chronicity:  New  Context: not burn, not chemical exposure, not contact lens problem, not direct trauma, not foreign body, not scratch and not smoke exposure    Relieved by:  Nothing  Worsened by:  Contact  Ineffective treatments:  None tried  Associated symptoms: blurred vision, decreased vision, discharge, inflammation, redness and tearing    Associated symptoms: no headaches, no nausea and no vomiting    Risk factors: no conjunctival hemorrhage and no exposure to pinkeye        Review of Systems   Constitutional: Negative for activity change and fever.   HENT: Negative for congestion, rhinorrhea and sore throat.    Eyes: Positive for blurred vision, pain, discharge, redness and visual disturbance.   Respiratory: Negative for cough, shortness of breath and wheezing.    Cardiovascular: Negative for chest pain.   Gastrointestinal: Negative for abdominal distention, diarrhea, nausea and vomiting.   Endocrine: Negative for  polyphagia and polyuria.   Genitourinary: Negative for decreased urine volume, difficulty urinating and dysuria.   Musculoskeletal: Negative for arthralgias and myalgias.   Skin: Negative for rash and wound.   Allergic/Immunologic: Negative for food allergies and immunocompromised state.   Neurological: Negative for dizziness and headaches.   Hematological: Negative for adenopathy. Does not bruise/bleed easily.   Psychiatric/Behavioral: Negative for agitation and confusion.   All other systems reviewed and are negative.      Past Medical History:   Diagnosis Date   • Anxiety    • Chronic headache    • Depression    • Diastolic CHF, chronic (CMS/[facility])    • DVT (deep venous thrombosis) (CMS/[facility])     left leg   • Encephalitis 12/2016    treated at the [facility]   • Gastric ulcer with perforation (CMS/[facility]) 03/2016    Microperforation and air in the biliary tree   • Gastritis    • Henoch-Schonlein purpura (CMS/[facility])    • Hypertension    • Hypothyroidism (acquired)     Removed due to groiter   • Lower GI bleeding    • Migraine    • Mixed connective tissue disease (CMS/[facility])    • MRSA cellulitis    • NSTEMI (non-ST elevated myocardial infarction) (CMS/[facility])    • Patent foramen ovale    • Pneumonia    • PVC (premature ventricular contraction)    • RA (rheumatoid arthritis) (CMS/[facility])    • Renal disorder    • Rhabdomyolysis    • Seizures (CMS/[facility])     when had encephalitis   • Sjogren's syndrome (CMS/[facility])    • Stroke (CMS/[facility]) 09/2015    x 1   • Systemic lupus erythematosus (CMS/[facility])     Discoid and systemic   • Temporal arteritis (CMS/[facility])    • TIA (transient ischemic attack)     x 3       Allergies   Allergen Reactions   • Compazine [Prochlorperazine Edisylate]    • Imitrex [Sumatriptan]    • Nsaids    • Reglan [Metoclopramide]    • Solu-Medrol [Methylprednisolone]    • Zyprexa [Olanzapine]        Past Surgical History:   Procedure Laterality Date   • APPENDECTOMY     • CARDIAC CATHETERIZATION  08/2016    PFO  repair and had a loop monitor placed at the [facility]   •  SECTION      x 2   • ENDOSCOPY     • KNEE ARTHROSCOPY Left    • LUMBAR FUSION     • PORTACATH PLACEMENT Right 2016   • THYROID SURGERY      Removed due to a goiter       Family History   Problem Relation Age of Onset   • Hypertension Mother    • Arthritis Mother         RA   • Hypertension Father    • Arthritis Father         RA   • Diabetes Father        Social History     Socioeconomic History   • Marital status:      Spouse name: Not on file   • Number of children: Not on file   • Years of education: Not on file   • Highest education level: Not on file   Tobacco Use   • Smoking status: Never Smoker   • Smokeless tobacco: Never Used   Substance and Sexual Activity   • Alcohol use: No     Comment: Occassionally, social drinker in the past   • Drug use: No   • Sexual activity: Defer           Objective   Physical Exam   Constitutional: She is oriented to person, place, and time. She appears well-developed and well-nourished.   HENT:   Head: Normocephalic and atraumatic.   Eyes: EOM are normal. Pupils are equal, round, and reactive to light. Left conjunctiva is injected. Left conjunctiva has no hemorrhage.   Slit lamp exam:       The left eye shows no corneal abrasion, no corneal ulcer, no foreign body and no fluorescein uptake.   Bilateral periorbital swelling   Neck: Normal range of motion. Neck supple.   Cardiovascular: Normal rate, regular rhythm and normal heart sounds.   Pulmonary/Chest: Effort normal and breath sounds normal.   Abdominal: Soft. Bowel sounds are normal.   Musculoskeletal: Normal range of motion.   Neurological: She is alert and oriented to person, place, and time.   Skin: Skin is warm and dry.   Psychiatric: She has a normal mood and affect. Her behavior is normal. Judgment and thought content normal.   Nursing note and vitals reviewed.      Procedures           ED Course  ED Course as of 2000    Fri Jan 25, 2019   1625 PT came up to nurses station and said that she wanted to leave prior to CT scanning and fluoroscein staining. She is advised that she will have to sign out AGAINST MEDICAL ADVICE, and she is agreeable with this. She is alert and oriented x3. Encouraged to return if necessary.   [JULISSA]   1633 Pt is now agreeable to stay for workup.   [JULISSA]   1722 D/w Dr. Reynolds who recommended polytrim q6hrs, erythromycin at bedtime, and amoxicillin, and will see patient in the office Monday morning at 8am.   [JULISSA]      ED Course User Index  [JULISSA] Ti Calles PA                  MDM  Number of Diagnoses or Management Options  Irritation of left eye:   Periorbital swelling:      Amount and/or Complexity of Data Reviewed  Clinical lab tests: ordered and reviewed  Tests in the radiology section of CPT®: reviewed and ordered  Tests in the medicine section of CPT®: reviewed and ordered  Decide to obtain previous medical records or to obtain history from someone other than the patient: yes  Independent visualization of images, tracings, or specimens: yes    Patient Progress  Patient progress: stable        Final diagnoses:   Periorbital swelling   Conjunctivitis of left eye, unspecified conjunctivitis type            Ti Calles PA  01/25/19 2001

## 2019-02-17 ENCOUNTER — HOSPITAL ENCOUNTER (EMERGENCY)
Facility: HOSPITAL | Age: 54
Discharge: PSYCHIATRIC OR SUBSTANCE USE FACILITY | End: 2019-02-18
Attending: FAMILY MEDICINE | Admitting: GENERAL ACUTE CARE HOSPITAL

## 2019-02-17 DIAGNOSIS — F32.A DEPRESSION WITH SUICIDAL IDEATION: Primary | ICD-10-CM

## 2019-02-17 DIAGNOSIS — E87.6 HYPOKALEMIA: ICD-10-CM

## 2019-02-17 DIAGNOSIS — R45.851 DEPRESSION WITH SUICIDAL IDEATION: Primary | ICD-10-CM

## 2019-02-17 LAB
6-ACETYL MORPHINE: NEGATIVE
ALBUMIN SERPL-MCNC: 4.1 G/DL (ref 3.5–5)
ALBUMIN/GLOB SERPL: 1.5 G/DL (ref 1.5–2.5)
ALP SERPL-CCNC: 103 U/L (ref 35–104)
ALT SERPL W P-5'-P-CCNC: 13 U/L (ref 10–36)
AMPHET+METHAMPHET UR QL: NEGATIVE
ANION GAP SERPL CALCULATED.3IONS-SCNC: 10 MMOL/L (ref 3.6–11.2)
AST SERPL-CCNC: 19 U/L (ref 10–30)
BACTERIA UR QL AUTO: ABNORMAL /HPF
BARBITURATES UR QL SCN: NEGATIVE
BASOPHILS # BLD AUTO: 0.03 10*3/MM3 (ref 0–0.3)
BASOPHILS NFR BLD AUTO: 0.3 % (ref 0–2)
BENZODIAZ UR QL SCN: POSITIVE
BILIRUB SERPL-MCNC: 0.3 MG/DL (ref 0.2–1.8)
BILIRUB UR QL STRIP: ABNORMAL
BUN BLD-MCNC: 11 MG/DL (ref 7–21)
BUN/CREAT SERPL: 12.9 (ref 7–25)
BUPRENORPHINE SERPL-MCNC: POSITIVE NG/ML
CALCIUM SPEC-SCNC: 9.3 MG/DL (ref 7.7–10)
CANNABINOIDS SERPL QL: NEGATIVE
CHLORIDE SERPL-SCNC: 102 MMOL/L (ref 99–112)
CLARITY UR: ABNORMAL
CO2 SERPL-SCNC: 29 MMOL/L (ref 24.3–31.9)
COCAINE UR QL: NEGATIVE
COLOR UR: ABNORMAL
CREAT BLD-MCNC: 0.85 MG/DL (ref 0.43–1.29)
DEPRECATED RDW RBC AUTO: 46.1 FL (ref 37–54)
EOSINOPHIL # BLD AUTO: 0.19 10*3/MM3 (ref 0–0.7)
EOSINOPHIL NFR BLD AUTO: 2.1 % (ref 0–5)
ERYTHROCYTE [DISTWIDTH] IN BLOOD BY AUTOMATED COUNT: 16.1 % (ref 11.5–14.5)
ETHANOL BLD-MCNC: <10 MG/DL
ETHANOL UR QL: <0.01 %
GFR SERPL CREATININE-BSD FRML MDRD: 70 ML/MIN/1.73
GLOBULIN UR ELPH-MCNC: 2.8 GM/DL
GLUCOSE BLD-MCNC: 92 MG/DL (ref 70–110)
GLUCOSE UR STRIP-MCNC: NEGATIVE MG/DL
HCT VFR BLD AUTO: 38.4 % (ref 37–47)
HGB BLD-MCNC: 11.5 G/DL (ref 12–16)
HGB UR QL STRIP.AUTO: ABNORMAL
HYALINE CASTS UR QL AUTO: ABNORMAL /LPF
IMM GRANULOCYTES # BLD AUTO: 0.02 10*3/MM3 (ref 0–0.03)
IMM GRANULOCYTES NFR BLD AUTO: 0.2 % (ref 0–0.5)
KETONES UR QL STRIP: ABNORMAL
LEUKOCYTE ESTERASE UR QL STRIP.AUTO: ABNORMAL
LYMPHOCYTES # BLD AUTO: 1.81 10*3/MM3 (ref 1–3)
LYMPHOCYTES NFR BLD AUTO: 19.8 % (ref 21–51)
MAGNESIUM SERPL-MCNC: 1.9 MG/DL (ref 1.7–2.6)
MCH RBC QN AUTO: 23.8 PG (ref 27–33)
MCHC RBC AUTO-ENTMCNC: 29.9 G/DL (ref 33–37)
MCV RBC AUTO: 79.5 FL (ref 80–94)
METHADONE UR QL SCN: NEGATIVE
MONOCYTES # BLD AUTO: 0.65 10*3/MM3 (ref 0.1–0.9)
MONOCYTES NFR BLD AUTO: 7.1 % (ref 0–10)
NEUTROPHILS # BLD AUTO: 6.46 10*3/MM3 (ref 1.4–6.5)
NEUTROPHILS NFR BLD AUTO: 70.5 % (ref 30–70)
NITRITE UR QL STRIP: NEGATIVE
OPIATES UR QL: POSITIVE
OSMOLALITY SERPL CALC.SUM OF ELEC: 280.3 MOSM/KG (ref 273–305)
OXYCODONE UR QL SCN: POSITIVE
PCP UR QL SCN: NEGATIVE
PH UR STRIP.AUTO: 6 [PH] (ref 5–8)
PLATELET # BLD AUTO: 261 10*3/MM3 (ref 130–400)
PMV BLD AUTO: 9.6 FL (ref 6–10)
POTASSIUM BLD-SCNC: 2.7 MMOL/L (ref 3.5–5.3)
POTASSIUM BLD-SCNC: 2.9 MMOL/L (ref 3.5–5.3)
PROT SERPL-MCNC: 6.9 G/DL (ref 6–8)
PROT UR QL STRIP: NEGATIVE
RBC # BLD AUTO: 4.83 10*6/MM3 (ref 4.2–5.4)
RBC # UR: ABNORMAL /HPF
REF LAB TEST METHOD: ABNORMAL
SODIUM BLD-SCNC: 141 MMOL/L (ref 135–153)
SP GR UR STRIP: 1.02 (ref 1–1.03)
SQUAMOUS #/AREA URNS HPF: ABNORMAL /HPF
UROBILINOGEN UR QL STRIP: ABNORMAL
WBC NRBC COR # BLD: 9.16 10*3/MM3 (ref 4.5–12.5)
WBC UR QL AUTO: ABNORMAL /HPF

## 2019-02-17 PROCEDURE — 80307 DRUG TEST PRSMV CHEM ANLYZR: CPT | Performed by: PHYSICIAN ASSISTANT

## 2019-02-17 PROCEDURE — 99284 EMERGENCY DEPT VISIT MOD MDM: CPT

## 2019-02-17 PROCEDURE — 83735 ASSAY OF MAGNESIUM: CPT | Performed by: PHYSICIAN ASSISTANT

## 2019-02-17 PROCEDURE — 80053 COMPREHEN METABOLIC PANEL: CPT | Performed by: PHYSICIAN ASSISTANT

## 2019-02-17 PROCEDURE — 93005 ELECTROCARDIOGRAM TRACING: CPT | Performed by: NURSE PRACTITIONER

## 2019-02-17 PROCEDURE — 85025 COMPLETE CBC W/AUTO DIFF WBC: CPT | Performed by: PHYSICIAN ASSISTANT

## 2019-02-17 PROCEDURE — 81001 URINALYSIS AUTO W/SCOPE: CPT | Performed by: PHYSICIAN ASSISTANT

## 2019-02-17 PROCEDURE — 84132 ASSAY OF SERUM POTASSIUM: CPT | Performed by: NURSE PRACTITIONER

## 2019-02-17 PROCEDURE — 93010 ELECTROCARDIOGRAM REPORT: CPT | Performed by: INTERNAL MEDICINE

## 2019-02-17 RX ORDER — ZOLPIDEM TARTRATE 5 MG/1
10 TABLET ORAL NIGHTLY PRN
Status: CANCELLED | OUTPATIENT
Start: 2019-02-17

## 2019-02-17 RX ORDER — QUETIAPINE FUMARATE 100 MG/1
100 TABLET, FILM COATED ORAL NIGHTLY
COMMUNITY
End: 2019-02-19 | Stop reason: HOSPADM

## 2019-02-17 RX ORDER — METOPROLOL SUCCINATE 25 MG/1
25 TABLET, EXTENDED RELEASE ORAL DAILY
Status: ON HOLD | COMMUNITY
End: 2019-05-04

## 2019-02-17 RX ORDER — ZOLPIDEM TARTRATE 10 MG/1
10 TABLET ORAL NIGHTLY PRN
COMMUNITY
End: 2019-02-19 | Stop reason: HOSPADM

## 2019-02-17 RX ORDER — HYDROCHLOROTHIAZIDE 25 MG/1
25 TABLET ORAL DAILY
Status: CANCELLED | OUTPATIENT
Start: 2019-02-17

## 2019-02-17 RX ORDER — BUPROPION HYDROCHLORIDE 100 MG/1
100 TABLET, EXTENDED RELEASE ORAL DAILY
COMMUNITY
End: 2019-02-19 | Stop reason: HOSPADM

## 2019-02-17 RX ORDER — OXYCODONE HCL 10 MG/1
20 TABLET, FILM COATED, EXTENDED RELEASE ORAL EVERY 12 HOURS SCHEDULED
Status: CANCELLED | OUTPATIENT
Start: 2019-02-17

## 2019-02-17 RX ORDER — LEVOTHYROXINE SODIUM 175 UG/1
175 TABLET ORAL DAILY
Status: CANCELLED | OUTPATIENT
Start: 2019-02-17

## 2019-02-17 RX ORDER — LEVETIRACETAM 250 MG/1
500 TABLET ORAL EVERY 12 HOURS SCHEDULED
Status: CANCELLED | OUTPATIENT
Start: 2019-02-17

## 2019-02-17 RX ORDER — POTASSIUM CHLORIDE 7.45 MG/ML
10 INJECTION INTRAVENOUS
Status: COMPLETED | OUTPATIENT
Start: 2019-02-17 | End: 2019-02-18

## 2019-02-17 RX ORDER — LEVOTHYROXINE SODIUM 175 UG/1
175 TABLET ORAL DAILY
COMMUNITY
End: 2019-07-22 | Stop reason: SDUPTHER

## 2019-02-17 RX ORDER — POTASSIUM CHLORIDE 20 MEQ/1
20 TABLET, EXTENDED RELEASE ORAL ONCE
Status: COMPLETED | OUTPATIENT
Start: 2019-02-17 | End: 2019-02-18

## 2019-02-17 RX ORDER — MORPHINE SULFATE 30 MG/1
30 TABLET, FILM COATED, EXTENDED RELEASE ORAL EVERY 12 HOURS SCHEDULED
Status: CANCELLED | OUTPATIENT
Start: 2019-02-17

## 2019-02-17 RX ORDER — PANTOPRAZOLE SODIUM 40 MG/1
40 TABLET, DELAYED RELEASE ORAL DAILY
Status: CANCELLED | OUTPATIENT
Start: 2019-02-17

## 2019-02-17 RX ORDER — HYDROXYCHLOROQUINE SULFATE 200 MG/1
200 TABLET, FILM COATED ORAL
Status: CANCELLED | OUTPATIENT
Start: 2019-02-17

## 2019-02-17 RX ORDER — BUPROPION HYDROCHLORIDE 100 MG/1
100 TABLET, EXTENDED RELEASE ORAL DAILY
Status: CANCELLED | OUTPATIENT
Start: 2019-02-17

## 2019-02-17 RX ORDER — CLONAZEPAM 1 MG/1
1 TABLET ORAL EVERY 6 HOURS PRN
Status: ON HOLD | COMMUNITY
End: 2019-02-19 | Stop reason: SDUPTHER

## 2019-02-17 RX ORDER — POTASSIUM CHLORIDE 20 MEQ/1
20 TABLET, EXTENDED RELEASE ORAL DAILY
COMMUNITY
End: 2020-04-07 | Stop reason: SDUPTHER

## 2019-02-17 RX ORDER — POTASSIUM CHLORIDE 20 MEQ/1
40 TABLET, EXTENDED RELEASE ORAL ONCE
Status: COMPLETED | OUTPATIENT
Start: 2019-02-17 | End: 2019-02-17

## 2019-02-17 RX ORDER — CLONAZEPAM 0.5 MG/1
1 TABLET ORAL EVERY 6 HOURS PRN
Status: CANCELLED | OUTPATIENT
Start: 2019-02-17

## 2019-02-17 RX ORDER — METOPROLOL SUCCINATE 25 MG/1
25 TABLET, EXTENDED RELEASE ORAL DAILY
Status: CANCELLED | OUTPATIENT
Start: 2019-02-17

## 2019-02-17 RX ORDER — DULOXETIN HYDROCHLORIDE 20 MG/1
40 CAPSULE, DELAYED RELEASE ORAL 2 TIMES DAILY
Status: CANCELLED | OUTPATIENT
Start: 2019-02-17

## 2019-02-17 RX ORDER — QUETIAPINE FUMARATE 100 MG/1
100 TABLET, FILM COATED ORAL NIGHTLY
Status: CANCELLED | OUTPATIENT
Start: 2019-02-17

## 2019-02-17 RX ORDER — HYDROCHLOROTHIAZIDE 25 MG/1
25 TABLET ORAL DAILY
COMMUNITY
End: 2021-11-06 | Stop reason: HOSPADM

## 2019-02-17 RX ORDER — POTASSIUM CHLORIDE 20 MEQ/1
20 TABLET, EXTENDED RELEASE ORAL DAILY
Status: CANCELLED | OUTPATIENT
Start: 2019-02-17

## 2019-02-17 RX ADMIN — POTASSIUM CHLORIDE 40 MEQ: 1500 TABLET, EXTENDED RELEASE ORAL at 19:03

## 2019-02-17 NOTE — NURSING NOTE
Patient presented to ED with suicidal thoughts with a plan to slit wrist in a bathtub. Patient rated anxiety and depression 10/10 Patient reported her stressors as I lost my daughter 10 years ago and I have never recovered from this loss and I have multiple health related issues as well as always being depressed and full of anxiety.Patient denies HI. Patient denies all drug and ETOH use. Patient is tearful during assessment. Patient reported poor sleep and appetite.Patient had a potassium of 2.9 treated with 40Meq of K-Miriam

## 2019-02-17 NOTE — NURSING NOTE
Patient pockets emptied. Search completed with two staff members present.The patient was placed in hospital attire. Items logged and placed in cabinet in intake area.Room was swept for any potential safety hazards room cleared and patient placed in treatment room for evaluation. Will continue to monitor pt status.Patient intake assessment complete. Waiting for ED provider to clear pt medically. Waiting on Lab results.

## 2019-02-18 ENCOUNTER — HOSPITAL ENCOUNTER (INPATIENT)
Facility: HOSPITAL | Age: 54
LOS: 1 days | Discharge: HOME | End: 2019-02-19
Attending: PSYCHIATRY & NEUROLOGY | Admitting: PSYCHIATRY & NEUROLOGY

## 2019-02-18 VITALS
SYSTOLIC BLOOD PRESSURE: 118 MMHG | RESPIRATION RATE: 17 BRPM | OXYGEN SATURATION: 100 % | WEIGHT: 185 LBS | DIASTOLIC BLOOD PRESSURE: 86 MMHG | HEIGHT: 66 IN | TEMPERATURE: 97.2 F | HEART RATE: 71 BPM | BODY MASS INDEX: 29.73 KG/M2

## 2019-02-18 PROBLEM — F33.2 SEVERE RECURRENT MAJOR DEPRESSION WITHOUT PSYCHOTIC FEATURES (HCC): Status: ACTIVE | Noted: 2019-02-18

## 2019-02-18 PROBLEM — E03.9 HYPOTHYROIDISM (ACQUIRED): Status: ACTIVE | Noted: 2019-02-18

## 2019-02-18 PROBLEM — M32.9 SYSTEMIC LUPUS ERYTHEMATOSUS (HCC): Status: ACTIVE | Noted: 2019-02-18

## 2019-02-18 PROBLEM — F41.1 GAD (GENERALIZED ANXIETY DISORDER): Status: ACTIVE | Noted: 2019-02-18

## 2019-02-18 PROBLEM — F32.A DEPRESSION WITH SUICIDAL IDEATION: Status: ACTIVE | Noted: 2019-02-18

## 2019-02-18 PROBLEM — I10 HYPERTENSION: Status: ACTIVE | Noted: 2019-02-18

## 2019-02-18 PROBLEM — R45.851 DEPRESSION WITH SUICIDAL IDEATION: Status: ACTIVE | Noted: 2019-02-18

## 2019-02-18 PROBLEM — R07.9 CHEST PAIN IN ADULT: Status: RESOLVED | Noted: 2017-07-22 | Resolved: 2019-02-18

## 2019-02-18 LAB
ANION GAP SERPL CALCULATED.3IONS-SCNC: 7 MMOL/L (ref 3.6–11.2)
BUN BLD-MCNC: 10 MG/DL (ref 7–21)
BUN/CREAT SERPL: 12.8 (ref 7–25)
CALCIUM SPEC-SCNC: 9.2 MG/DL (ref 7.7–10)
CHLORIDE SERPL-SCNC: 103 MMOL/L (ref 99–112)
CO2 SERPL-SCNC: 34 MMOL/L (ref 24.3–31.9)
CREAT BLD-MCNC: 0.78 MG/DL (ref 0.43–1.29)
GFR SERPL CREATININE-BSD FRML MDRD: 78 ML/MIN/1.73
GLUCOSE BLD-MCNC: 128 MG/DL (ref 70–110)
OSMOLALITY SERPL CALC.SUM OF ELEC: 287.5 MOSM/KG (ref 273–305)
POTASSIUM BLD-SCNC: 2.8 MMOL/L (ref 3.5–5.3)
POTASSIUM BLD-SCNC: 3.2 MMOL/L (ref 3.5–5.3)
SODIUM BLD-SCNC: 144 MMOL/L (ref 135–153)

## 2019-02-18 PROCEDURE — 99223 1ST HOSP IP/OBS HIGH 75: CPT | Performed by: PSYCHIATRY & NEUROLOGY

## 2019-02-18 PROCEDURE — 96375 TX/PRO/DX INJ NEW DRUG ADDON: CPT

## 2019-02-18 PROCEDURE — 80048 BASIC METABOLIC PNL TOTAL CA: CPT | Performed by: NURSE PRACTITIONER

## 2019-02-18 PROCEDURE — 25010000002 ZIPRASIDONE MESYLATE PER 10 MG: Performed by: NURSE PRACTITIONER

## 2019-02-18 PROCEDURE — 84132 ASSAY OF SERUM POTASSIUM: CPT | Performed by: NURSE PRACTITIONER

## 2019-02-18 PROCEDURE — 96366 THER/PROPH/DIAG IV INF ADDON: CPT

## 2019-02-18 PROCEDURE — 25010000003 POTASSIUM CHLORIDE 10 MEQ/100ML SOLUTION: Performed by: NURSE PRACTITIONER

## 2019-02-18 PROCEDURE — 25010000002 LORAZEPAM PER 2 MG: Performed by: GENERAL ACUTE CARE HOSPITAL

## 2019-02-18 PROCEDURE — 96372 THER/PROPH/DIAG INJ SC/IM: CPT

## 2019-02-18 PROCEDURE — 96365 THER/PROPH/DIAG IV INF INIT: CPT

## 2019-02-18 RX ORDER — OXYCODONE HCL 10 MG/1
20 TABLET, FILM COATED, EXTENDED RELEASE ORAL EVERY 12 HOURS SCHEDULED
Status: CANCELLED | OUTPATIENT
Start: 2019-02-18

## 2019-02-18 RX ORDER — POTASSIUM CHLORIDE 7.45 MG/ML
INJECTION INTRAVENOUS
Status: DISCONTINUED
Start: 2019-02-18 | End: 2019-02-18 | Stop reason: HOSPADM

## 2019-02-18 RX ORDER — BENZONATATE 100 MG/1
100 CAPSULE ORAL 3 TIMES DAILY PRN
Status: DISCONTINUED | OUTPATIENT
Start: 2019-02-18 | End: 2019-02-19 | Stop reason: HOSPADM

## 2019-02-18 RX ORDER — ONDANSETRON 4 MG/1
4 TABLET, FILM COATED ORAL EVERY 6 HOURS PRN
Status: DISCONTINUED | OUTPATIENT
Start: 2019-02-18 | End: 2019-02-19 | Stop reason: HOSPADM

## 2019-02-18 RX ORDER — HYDROXYCHLOROQUINE SULFATE 200 MG/1
200 TABLET, FILM COATED ORAL EVERY 12 HOURS SCHEDULED
Status: DISCONTINUED | OUTPATIENT
Start: 2019-02-18 | End: 2019-02-19 | Stop reason: HOSPADM

## 2019-02-18 RX ORDER — ACETAMINOPHEN 325 MG/1
650 TABLET ORAL EVERY 4 HOURS PRN
Status: DISCONTINUED | OUTPATIENT
Start: 2019-02-18 | End: 2019-02-19 | Stop reason: HOSPADM

## 2019-02-18 RX ORDER — ECHINACEA PURPUREA EXTRACT 125 MG
2 TABLET ORAL AS NEEDED
Status: DISCONTINUED | OUTPATIENT
Start: 2019-02-18 | End: 2019-02-19 | Stop reason: HOSPADM

## 2019-02-18 RX ORDER — ZOLPIDEM TARTRATE 5 MG/1
5 TABLET ORAL NIGHTLY PRN
Status: DISCONTINUED | OUTPATIENT
Start: 2019-02-18 | End: 2019-02-19 | Stop reason: HOSPADM

## 2019-02-18 RX ORDER — ALUMINA, MAGNESIA, AND SIMETHICONE 2400; 2400; 240 MG/30ML; MG/30ML; MG/30ML
15 SUSPENSION ORAL EVERY 6 HOURS PRN
Status: DISCONTINUED | OUTPATIENT
Start: 2019-02-18 | End: 2019-02-19 | Stop reason: HOSPADM

## 2019-02-18 RX ORDER — TRAZODONE HYDROCHLORIDE 50 MG/1
50 TABLET ORAL NIGHTLY PRN
Status: DISCONTINUED | OUTPATIENT
Start: 2019-02-18 | End: 2019-02-19 | Stop reason: HOSPADM

## 2019-02-18 RX ORDER — CLONAZEPAM 1 MG/1
1 TABLET ORAL EVERY 6 HOURS PRN
Status: DISCONTINUED | OUTPATIENT
Start: 2019-02-18 | End: 2019-02-19 | Stop reason: HOSPADM

## 2019-02-18 RX ORDER — BENZTROPINE MESYLATE 1 MG/1
1 TABLET ORAL DAILY PRN
Status: DISCONTINUED | OUTPATIENT
Start: 2019-02-18 | End: 2019-02-19 | Stop reason: HOSPADM

## 2019-02-18 RX ORDER — LEVETIRACETAM 500 MG/1
500 TABLET ORAL EVERY 12 HOURS SCHEDULED
Status: DISCONTINUED | OUTPATIENT
Start: 2019-02-18 | End: 2019-02-19 | Stop reason: HOSPADM

## 2019-02-18 RX ORDER — HYDROXYZINE 50 MG/1
50 TABLET, FILM COATED ORAL EVERY 6 HOURS PRN
Status: DISCONTINUED | OUTPATIENT
Start: 2019-02-18 | End: 2019-02-19 | Stop reason: HOSPADM

## 2019-02-18 RX ORDER — HYDROCHLOROTHIAZIDE 25 MG/1
25 TABLET ORAL DAILY
Status: DISCONTINUED | OUTPATIENT
Start: 2019-02-18 | End: 2019-02-19 | Stop reason: HOSPADM

## 2019-02-18 RX ORDER — BENZTROPINE MESYLATE 1 MG/ML
0.5 INJECTION INTRAMUSCULAR; INTRAVENOUS DAILY PRN
Status: DISCONTINUED | OUTPATIENT
Start: 2019-02-18 | End: 2019-02-19 | Stop reason: HOSPADM

## 2019-02-18 RX ORDER — POTASSIUM CHLORIDE 20 MEQ/1
40 TABLET, EXTENDED RELEASE ORAL ONCE
Status: COMPLETED | OUTPATIENT
Start: 2019-02-18 | End: 2019-02-18

## 2019-02-18 RX ORDER — LORAZEPAM 2 MG/ML
1 INJECTION INTRAMUSCULAR ONCE
Status: COMPLETED | OUTPATIENT
Start: 2019-02-18 | End: 2019-02-18

## 2019-02-18 RX ORDER — LOPERAMIDE HYDROCHLORIDE 2 MG/1
2 CAPSULE ORAL 4 TIMES DAILY PRN
Status: DISCONTINUED | OUTPATIENT
Start: 2019-02-18 | End: 2019-02-19 | Stop reason: HOSPADM

## 2019-02-18 RX ORDER — METOPROLOL SUCCINATE 25 MG/1
25 TABLET, EXTENDED RELEASE ORAL DAILY
Status: DISCONTINUED | OUTPATIENT
Start: 2019-02-18 | End: 2019-02-19 | Stop reason: HOSPADM

## 2019-02-18 RX ORDER — MORPHINE SULFATE 30 MG/1
30 TABLET, FILM COATED, EXTENDED RELEASE ORAL EVERY 12 HOURS SCHEDULED
Status: DISCONTINUED | OUTPATIENT
Start: 2019-02-18 | End: 2019-02-19 | Stop reason: HOSPADM

## 2019-02-18 RX ORDER — PANTOPRAZOLE SODIUM 40 MG/1
40 TABLET, DELAYED RELEASE ORAL DAILY
Status: DISCONTINUED | OUTPATIENT
Start: 2019-02-18 | End: 2019-02-19 | Stop reason: HOSPADM

## 2019-02-18 RX ORDER — POTASSIUM CHLORIDE 20 MEQ/1
20 TABLET, EXTENDED RELEASE ORAL DAILY
Status: DISCONTINUED | OUTPATIENT
Start: 2019-02-18 | End: 2019-02-19 | Stop reason: HOSPADM

## 2019-02-18 RX ORDER — BUPROPION HYDROCHLORIDE 100 MG/1
100 TABLET, EXTENDED RELEASE ORAL DAILY
Status: DISCONTINUED | OUTPATIENT
Start: 2019-02-18 | End: 2019-02-19 | Stop reason: HOSPADM

## 2019-02-18 RX ORDER — POTASSIUM CHLORIDE 7.45 MG/ML
10 INJECTION INTRAVENOUS
Status: COMPLETED | OUTPATIENT
Start: 2019-02-18 | End: 2019-02-18

## 2019-02-18 RX ORDER — DULOXETIN HYDROCHLORIDE 20 MG/1
40 CAPSULE, DELAYED RELEASE ORAL 2 TIMES DAILY
Status: DISCONTINUED | OUTPATIENT
Start: 2019-02-18 | End: 2019-02-19 | Stop reason: HOSPADM

## 2019-02-18 RX ORDER — QUETIAPINE FUMARATE 100 MG/1
100 TABLET, FILM COATED ORAL NIGHTLY
Status: DISCONTINUED | OUTPATIENT
Start: 2019-02-18 | End: 2019-02-18

## 2019-02-18 RX ORDER — LEVOTHYROXINE SODIUM 175 UG/1
175 TABLET ORAL EVERY MORNING
Status: DISCONTINUED | OUTPATIENT
Start: 2019-02-18 | End: 2019-02-19 | Stop reason: HOSPADM

## 2019-02-18 RX ORDER — FAMOTIDINE 20 MG/1
20 TABLET, FILM COATED ORAL 2 TIMES DAILY PRN
Status: DISCONTINUED | OUTPATIENT
Start: 2019-02-18 | End: 2019-02-19 | Stop reason: HOSPADM

## 2019-02-18 RX ORDER — ARIPIPRAZOLE 2 MG/1
2 TABLET ORAL DAILY
Status: DISCONTINUED | OUTPATIENT
Start: 2019-02-18 | End: 2019-02-19 | Stop reason: HOSPADM

## 2019-02-18 RX ADMIN — LEVETIRACETAM 500 MG: 500 TABLET, FILM COATED ORAL at 20:04

## 2019-02-18 RX ADMIN — ALUMINUM HYDROXIDE, MAGNESIUM HYDROXIDE, AND DIMETHICONE 15 ML: 400; 400; 40 SUSPENSION ORAL at 14:32

## 2019-02-18 RX ADMIN — HYDROCHLOROTHIAZIDE 25 MG: 25 TABLET ORAL at 10:26

## 2019-02-18 RX ADMIN — HYDROXYCHLOROQUINE SULFATE 200 MG: 200 TABLET, FILM COATED ORAL at 10:25

## 2019-02-18 RX ADMIN — ACETAMINOPHEN 650 MG: 325 TABLET, FILM COATED ORAL at 17:11

## 2019-02-18 RX ADMIN — CLONAZEPAM 1 MG: 1 TABLET ORAL at 16:28

## 2019-02-18 RX ADMIN — POTASSIUM CHLORIDE 40 MEQ: 1500 TABLET, EXTENDED RELEASE ORAL at 04:05

## 2019-02-18 RX ADMIN — BUPROPION HYDROCHLORIDE 100 MG: 100 TABLET, EXTENDED RELEASE ORAL at 10:25

## 2019-02-18 RX ADMIN — ZOLPIDEM TARTRATE 5 MG: 5 TABLET ORAL at 20:04

## 2019-02-18 RX ADMIN — WATER 10 MG: 1 INJECTION INTRAMUSCULAR; INTRAVENOUS; SUBCUTANEOUS at 00:33

## 2019-02-18 RX ADMIN — METOPROLOL SUCCINATE 25 MG: 25 TABLET, FILM COATED, EXTENDED RELEASE ORAL at 10:26

## 2019-02-18 RX ADMIN — LEVETIRACETAM 500 MG: 500 TABLET, FILM COATED ORAL at 10:26

## 2019-02-18 RX ADMIN — ARIPIPRAZOLE 2 MG: 2 TABLET ORAL at 10:26

## 2019-02-18 RX ADMIN — POTASSIUM CHLORIDE 10 MEQ: 10 INJECTION, SOLUTION INTRAVENOUS at 00:35

## 2019-02-18 RX ADMIN — POTASSIUM CHLORIDE 10 MEQ: 7.46 INJECTION, SOLUTION INTRAVENOUS at 04:05

## 2019-02-18 RX ADMIN — POTASSIUM CHLORIDE 10 MEQ: 7.46 INJECTION, SOLUTION INTRAVENOUS at 05:19

## 2019-02-18 RX ADMIN — ONDANSETRON 4 MG: 4 TABLET, FILM COATED ORAL at 14:32

## 2019-02-18 RX ADMIN — CLONAZEPAM 1 MG: 1 TABLET ORAL at 10:30

## 2019-02-18 RX ADMIN — LEVOTHYROXINE SODIUM 175 MCG: 175 TABLET ORAL at 10:25

## 2019-02-18 RX ADMIN — MORPHINE SULFATE 30 MG: 30 TABLET, EXTENDED RELEASE ORAL at 11:38

## 2019-02-18 RX ADMIN — LORAZEPAM 1 MG: 2 INJECTION INTRAMUSCULAR; INTRAVENOUS at 04:30

## 2019-02-18 RX ADMIN — PANTOPRAZOLE SODIUM 40 MG: 40 TABLET, DELAYED RELEASE ORAL at 10:26

## 2019-02-18 RX ADMIN — DULOXETINE HYDROCHLORIDE 40 MG: 20 CAPSULE, DELAYED RELEASE ORAL at 10:25

## 2019-02-18 RX ADMIN — MORPHINE SULFATE 30 MG: 30 TABLET, EXTENDED RELEASE ORAL at 20:04

## 2019-02-18 RX ADMIN — POTASSIUM CHLORIDE 20 MEQ: 1500 TABLET, EXTENDED RELEASE ORAL at 00:34

## 2019-02-18 RX ADMIN — POTASSIUM CHLORIDE 10 MEQ: 10 INJECTION, SOLUTION INTRAVENOUS at 01:35

## 2019-02-18 RX ADMIN — POTASSIUM CHLORIDE 20 MEQ: 1500 TABLET, EXTENDED RELEASE ORAL at 10:26

## 2019-02-18 NOTE — ED PROVIDER NOTES
Subjective   History of Present Illness    Review of Systems    Past Medical History:   Diagnosis Date   • Anxiety    • Chronic headache    • Depression    • Diastolic CHF, chronic (CMS/[facility])    • DVT (deep venous thrombosis) (CMS/[facility])     left leg   • Encephalitis 2016    treated at the [facility]   • Gastric ulcer with perforation (CMS/[facility]) 2016    Microperforation and air in the biliary tree   • Gastritis    • Henoch-Schonlein purpura (CMS/[facility])    • Hypertension    • Hypothyroidism (acquired)     Removed due to groiter   • Lower GI bleeding    • Migraine    • Mixed connective tissue disease (CMS/[facility])    • MRSA cellulitis    • NSTEMI (non-ST elevated myocardial infarction) (CMS/[facility])    • Patent foramen ovale    • Pneumonia    • PVC (premature ventricular contraction)    • RA (rheumatoid arthritis) (CMS/[facility])    • Renal disorder    • Rhabdomyolysis    • Seizures (CMS/[facility])     when had encephalitis   • Sjogren's syndrome (CMS/[facility])    • Stroke (CMS/[facility]) 09/2015    x 1   • Systemic lupus erythematosus (CMS/[facility])     Discoid and systemic   • Temporal arteritis (CMS/[facility])    • TIA (transient ischemic attack)     x 3       Allergies   Allergen Reactions   • Compazine [Prochlorperazine Edisylate]    • Imitrex [Sumatriptan]    • Nsaids    • Reglan [Metoclopramide]    • Solu-Medrol [Methylprednisolone]    • Zyprexa [Olanzapine]        Past Surgical History:   Procedure Laterality Date   • APPENDECTOMY     • CARDIAC CATHETERIZATION  2016    PFO repair and had a loop monitor placed at the [facility]   •  SECTION      x 2   • ENDOSCOPY     • KNEE ARTHROSCOPY Left    • LUMBAR FUSION     • PORTACATH PLACEMENT Right 2016   • THYROID SURGERY      Removed due to a goiter       Family History   Problem Relation Age of Onset   • Hypertension Mother    • Arthritis Mother         RA   • Hypertension Father    • Arthritis Father         RA   • Diabetes Father        Social History      Socioeconomic History   • Marital status:      Spouse name: Not on file   • Number of children: Not on file   • Years of education: Not on file   • Highest education level: Not on file   Tobacco Use   • Smoking status: Never Smoker   • Smokeless tobacco: Never Used   Substance and Sexual Activity   • Alcohol use: No     Comment: Occassionally, social drinker in the past   • Drug use: No     Comment: Patient denies all street drugs and ETOH.   • Sexual activity: Yes     Partners: Male           Objective   Physical Exam    Procedures           ED Course  ED Course as of Feb 18 0658   Sun Feb 17, 2019   1924 Mentally clear for psych  []   Mon Feb 18, 2019   0001 Patient demanding her pain medication. Patient adv her home medications will have to be reconciled and started by psychiatry due to her complaint of SI  [KK]      ED Course User Index  [AH] Jeny Love, PA  [KK] Rafi Gregory, MP                  [facility]      Final diagnoses:   Depression with suicidal ideation   Hypokalemia            Rafi Gregory, MP  02/18/19 0659

## 2019-02-18 NOTE — NURSING NOTE
Pt clinicals presented to dr suarez; he requests an ekg, and pt's potassium level to be at least 3.2; ed provider , ayush, made aware; pt also advised of prolonged wait time. Pt has attempted to recant her story, stating she is not suicidal; advised pt of her initial information given to triage, intake, and to ed provider. Ed provider advises pt will be placed on 72 hr hold if not voluntarily admitted. Pt continues to be monitored in intake area. Also given gatorade while waiting for potassium redraw.

## 2019-02-18 NOTE — H&P
"INITIAL PSYCHIATRIC HISTORY & PHYSICAL    Patient Identification:  Name:   Karely Villarreal  Age:   53 y.o.  Sex:   female  :   1965  MRN:   [id number]  Visit Number:   [id number]  Primary Care Physician:   Provider, No Known    SUBJECTIVE    CC/Focus of Exam: MDD, SI, KEREN    HPI: Karely Villarreal is a 53 y.o. female who was admitted on 2019 with complaints of suicidal thoughts with a plan to slit wrist in a bathtub. Patient rated anxiety and depression 10/10. Patient is tearful during initial assessment. Patient denies HI..  Patient reported her stressors as\" I lost my daughter 10 years ago and I have never recovered from this loss and I have multiple health related issues as well as always being depressed and full of anxiety. 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   Patient reported poor sleep and appetite. She reports feeling lonely and inability to be alone. \"Why do I have to suffer so much.\"  She reports feeling abandoned, \" my  just couldn't handle my illness and the death of my daughter after three years I guess, we were  for 25 years.\"  Reports that she isolates from her other daughter, granddaughter, mother, father and others to avoid being a burden to her family. She reports that her isolation is due to her chronic illnesses of Lupus, Back pain r/t extensive back surgery, thyroidectomy.  Pt reports a hx of poor sleep related to encephalitis and being in a coma.  Pt reports, \" I haven't slept well since.\" She reports her recent stressors as her Father being dx with cancer. She also reports, financial stressors as, \" I don't have extra money to go do things I enjoy with the amount of money I have from disability of 1400.00 monthly, reports current availability " "of funds to be $9.00. She reports that with her financial stress she will have to ask her Father to help financially. Upon initial assessment with medical Emergency room staff it is reported that patient become non-compliant and agitated when she felt accused of \"having a problem with her medications.\" It is reported that pt expressed toward ER staff that she \"was having to wait too long.\" Pt escalated in agitation toward medical staff verbalizing to ER provider to \"kiss my ass.\"   Pt is advised on medication overdosing and risks of her home medication list and use of suboxone that pt denies being aware of using.   Pt has an extensive home medication list that she reports a poor historical memory on the current pain medications and the dates she has filled them. She reports passing out in her home, \"passing out for 24 hrs and they found me.\"     UDS- positive for Benzodiazepine, Bupronorphine, Opiate and Oxycodone.    Patient denies all drug and ETOH use. Pt reports that her only substance use is her prescription medications. She denies memory of using suboxone and retracts with a possibility of a friend could have given it to patient without pt being aware.     Patient had a potassium of 2.9 treated with 40Meq of K-Miriam.   Pt has an extensive medical hx: Lupus, Thyroidectomy, extensive back surgery, vasculitis, knee and joint pain, neuropathy pain of legs, hx of MRSA on facial area with full treatment. Pt has a port due to her reported vasculitis.         Available medical/psychiatric records reviewed and incorporated into the current document.   PAST PSYCHIATRIC HX: Pt reports that she has a long time psychiatric provider in [place], [place]. (Dr. Demetri Lay) Pt has had 6 inpt admissions for length of 5 days each admission, over the past 8 yrs at [facility] in [place].   Psychiatric Medications recently hx: Cymbalta, Wellbutrin, Seroquel, Ambien, Morphine, Clonopin.   SUBSTANCE USE HX: Pt denies any hx " "of substance abuse and reports that her only use is her prescribed medications. Pt reports that she has no memory of how her UDS was positive for suboxone.     SOCIAL HX: Pt is a 53 year old female. She moved to [place], [place] due to cheaper apartment rent. After living in [place] for 4 years, her apartment home was needed by her renters for a family member. She reports that she is not able to live closer to her family due to her financial stress and inability to afford more expensive rent. She reports her monthly disability is $1400.00. 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 She reports that she was employed with [employer] for 25 years. She has one daughter and granddaughter. She reports her younger daughter was killed in an ATV accident at age 13. Her older daughter is graduated from Law School and recently has pt's first grandchild. Pt reports that she is   after being  to her  for 25 years. She reports living in [facility] [place] to her hx of employment. Her parents live in [place]. She reports that her Mother is her Healthcare surrogate for [employer]. She self isolates she reports in an attempt to \"not be a burden to my family.\"    Past Medical History:   Diagnosis Date   • Anxiety    • Chronic headache    • Depression    • Diastolic CHF, chronic (CMS/HCC)    • DVT (deep venous thrombosis) (CMS/HCC)     left leg   • Encephalitis 12/2016    [facility]   • Gastric ulcer with perforation (CMS/HCC) 03/2016    Microperforation and air in the biliary tree   • Gastritis    • Henoch-Schonlein purpura (CMS/HCC)    • Hypertension    • Hypothyroidism (acquired)     Removed due to groiter   • Lower GI bleeding    • Migraine    • Mixed connective tissue disease (CMS/HCC)    • MRSA cellulitis    • NSTEMI (non-ST elevated myocardial infarction) (CMS/HCC)    • Patent foramen ovale    • Pneumonia  "   • PVC (premature ventricular contraction)    • RA (rheumatoid arthritis) (CMS/HCC)    • Renal disorder    • Rhabdomyolysis    • Seizures (CMS/HCC)     when had encephalitis   • Sjogren's syndrome (CMS/HCC)    • Stroke (CMS/HCC) 09/2015    x 1   • Systemic lupus erythematosus (CMS/HCC)     Discoid and systemic   • Temporal arteritis (CMS/HCC)    • TIA (transient ischemic attack)     x 3       Past Surgical History:   Procedure Laterality Date   • APPENDECTOMY     • CARDIAC CATHETERIZATION  2016    PFO repair and had a loop monitor placed at the [facility]   •  SECTION      x 2   • ENDOSCOPY     • KNEE ARTHROSCOPY Left    • LUMBAR FUSION     • PORTACATH PLACEMENT Right 2016   • THYROID SURGERY      Removed due to a goiter       Family History   Problem Relation Age of Onset   • Hypertension Mother    • Arthritis Mother         RA   • Hypertension Father    • Arthritis Father         RA   • Diabetes Father          Medications Prior to Admission   Medication Sig Dispense Refill Last Dose   • buPROPion SR (WELLBUTRIN SR) 100 MG 12 hr tablet Take 100 mg by mouth Daily.   2019 at Unknown time   • clonazePAM (KlonoPIN) 1 MG tablet Take 1 mg by mouth Every 6 (Six) Hours As Needed for Anxiety.   2019 at Unknown time   • DULoxetine (CYMBALTA) 60 MG capsule Take 40 mg by mouth 2 (Two) Times a Day.   2019 at Unknown time   • hydrochlorothiazide (HYDRODIURIL) 25 MG tablet Take 25 mg by mouth Daily.   2019 at Unknown time   • hydroxychloroquine (PLAQUENIL) 200 MG tablet Take 200 mg by mouth 2 (two) times a day.   2019 at Unknown time   • levETIRAcetam (KEPPRA) 500 MG tablet Take 500 mg by mouth 2 (Two) Times a Day.   2019 at Unknown time   • levothyroxine (SYNTHROID, LEVOTHROID) 175 MCG tablet Take 175 mcg by mouth Daily.   2019 at Unknown time   • metoprolol succinate XL (TOPROL-XL) 25 MG 24 hr tablet Take 25 mg by mouth Daily.   2019 at Unknown time   •  Morphine (MS CONTIN) 30 MG 12 hr tablet Take 30 mg by mouth 2 (Two) Times a Day.   2/17/2019 at Unknown time   • oxyMORphone ER (OPANA ER) 10 MG tablet extended-release 12 hour 12 hr tablet Take 10 mg by mouth Every 12 (Twelve) Hours.   2/17/2019 at Unknown time   • pantoprazole (PROTONIX) 40 MG EC tablet Take 40 mg by mouth Daily.   2/17/2019 at Unknown time   • potassium chloride (K-DUR,KLOR-CON) 20 MEQ CR tablet Take 20 mEq by mouth Daily.   2/17/2019 at Unknown time   • QUEtiapine (SEROquel) 100 MG tablet Take 100 mg by mouth Every Night.   2/16/2019   • zolpidem (AMBIEN) 10 MG tablet Take 10 mg by mouth At Night As Needed for Sleep.   2/16/2019           ALLERGIES:  Compazine [prochlorperazine edisylate]; Imitrex [sumatriptan]; Nsaids; Reglan [metoclopramide]; Solu-medrol [methylprednisolone]; and Zyprexa [olanzapine]    Temp:  [96.6 °F (35.9 °C)-97.8 °F (36.6 °C)] 96.6 °F (35.9 °C)  Heart Rate:  [71-94] 76  Resp:  [17-18] 18  BP: (112-133)/(70-87) 116/76    REVIEW OF SYSTEMS:  Review of Systems   Constitutional: Positive for fatigue.   HENT: Negative.    Eyes: Negative.    Respiratory: Negative.    Cardiovascular: Negative.    Gastrointestinal: Negative.    Endocrine: Negative.    Genitourinary: Negative.    Musculoskeletal: Positive for arthralgias, back pain, myalgias and neck pain.        See hpi   Skin: Positive for color change.        Facial color pigment change, pt reports due to hx of MRSA tx of facial area.    Allergic/Immunologic: Negative.    Neurological: Negative.    Hematological: Negative.    Psychiatric/Behavioral: Positive for agitation, dysphoric mood and suicidal ideas. The patient is nervous/anxious.         See hpi      OBJECTIVE    PHYSICAL EXAM:  Physical Exam     Physical Exam   Constitutional: oriented to person, place, and time. Appears well-developed and well-nourished.   HENT:   Head: Normocephalic and atraumatic.   Right Ear: External ear normal.   Left Ear: External ear normal.    Mouth/Throat: Oropharynx is clear and moist.   Eyes: Pupils are equal, round, and reactive to light. Conjunctivae and EOM are normal.   Neck: Normal range of motion. Neck supple.   Cardiovascular: Normal rate, regular rhythm and normal heart sounds.    Pulmonary/Chest: Effort normal and breath sounds normal. No respiratory distress. No wheezes.   Abdominal: Soft. Bowel sounds are normal.No distension. There is no tenderness.   Musculoskeletal: Normal range of motion. No edema or deformity.   Neurological:Alert and oriented to person, place, and time. No cranial nerve deficit. Coordination normal.   Skin: Skin is warm and dry. No rash noted. No erythema.         MENTAL STATUS EXAM:               Hygiene:   fair  Cooperation:  Evasive  Eye Contact:  Fair  Psychomotor Behavior:  Restless  Affect:  Appropriate  Hopelessness: 7  Speech:  Normal  Thought Progress:  Linear  Thought Content:  Mood congurent  Suicidal:  Suicidal Ideation  Homicidal:  None  Hallucinations:  None  Delusion:  None  Memory:  Deficits  Orientation:  Person, Place, Time and Situation  Reliability:  fair  Insight:  Poor  Judgement:  Poor  Impulse Control:  Poor      Imaging Results (last 24 hours)     ** No results found for the last 24 hours. **           ECG/EMG Results (most recent)     None           Lab Results   Component Value Date    GLUCOSE 128 (H) 02/18/2019    BUN 10 02/18/2019    CREATININE 0.78 02/18/2019    EGFRIFNONA 78 02/18/2019    EGFRIFAFRI  09/11/2016      Comment:      <15 Indicative of kidney failure.    BCR 12.8 02/18/2019    CO2 34.0 (H) 02/18/2019    CALCIUM 9.2 02/18/2019    ALBUMIN 4.10 02/17/2019    LABIL2 1.2 (L) 05/11/2016    AST 19 02/17/2019    ALT 13 02/17/2019       Lab Results   Component Value Date    WBC 9.16 02/17/2019    HGB 11.5 (L) 02/17/2019    HCT 38.4 02/17/2019    MCV 79.5 (L) 02/17/2019     02/17/2019       Pain Management Panel     Pain Management Panel Latest Ref Rng & Units 2/17/2019  6/4/2018    AMPHETAMINES SCREEN, URINE Negative Negative Negative    BARBITURATES SCREEN Negative Negative Negative    BENZODIAZEPINE SCREEN, URINE Negative Positive(A) Negative    BUPRENORPHINE Negative Positive(A) Negative    COCAINE SCREEN, URINE Negative Negative Negative    METHADONE SCREEN, URINE Negative Negative Negative          Brief Urine Lab Results  (Last result in the past 365 days)      Color   Clarity   Blood   Leuk Est   Nitrite   Protein   CREAT   Urine HCG        02/17/19 1744 Dark Yellow Cloudy Trace Small (1+) Negative Negative               Reviewed labs and studies done with this admission.       ASSESSMENT & PLAN:      Severe recurrent major depression without psychotic features (CMS/HCC)  - Continue Cymbalta  - Continue Wellbutrin  - Stop Seroquel  - Start Abilify       Depression with suicidal ideation  - SP3       Systemic lupus erythematosus (CMS/HCC)  - Plaquenil       Hypertension  - HCTZ  - Toprol XL       Hypothyroidism (acquired)  - Synthroid       KEREN (generalized anxiety disorder)  - Cymbalta  - Klonopin       Chronic pain  - MS Contin        Seizure disorder  - Keppra        Hypokalemia  - Potassium supplementation    The patient has been advised of the risks of developing dependency on her medications and the also the risk of accidental overdose on the combination of her medication. She is unable to explain the presence of Suboxone in her system, but claims she has been taking her medications as prescribed. She agreed for us contact her provider (Dr. Demetri Tobin in [place], [place]) writing the pain medications, Klonopin and Ambien but later revoked the consent.      The patient has been admitted for safety and stabilization.  Patient will be monitored for suicidality daily and maintained on Suicide precaution Level 3 (q15 min checks) .  The patient will have individual and group therapy with a master's level therapist. A master treatment plan will be developed and agreed upon  by the patient and his/her treatment team.  The patient's estimated length of stay in the hospital is 5-7 days.       Written by Candelaria Looney, acting as scribe for Dr. MOSES Jean. Dr. MOSES Jean's signature on this note affirms that the note adequately documents the care provided.     Candelaria Looney  02/18/19  12:40 PM    I, Myriam Jean MD, personally performed the services described in this documentation as scribed by the above named individual in my presence, and it is both accurate and complete.

## 2019-02-18 NOTE — ED PROVIDER NOTES
Subjective   53-year-old female who presents to the ED today for mental health evaluation.  She states she has been having suicidal ideations for about 2 weeks.  She states they have progressively gotten worse.  She states her plan is to lay in the bathtub and slit her wrists.  She states she has a history of anxiety and PTSD as well as depression.  She states her 13-year-old child passed away 10 years ago due to an ATV accident.  She states she recently moved to [place] from [place] and is no longer near her family.  She states she has lots of health problems and is tired of dealing with them every day.  She states she just doesn't feel happy.  She thinks her family would be better off without having to deal with her.  She denies any drug or alcohol use.  She states her appetite and sleep have been poor.  She denies any hallucinations.        History provided by:  Patient  Mental Health Problem   Presenting symptoms: depression and suicidal thoughts    Presenting symptoms: no hallucinations    Degree of incapacity (severity):  Severe  Onset quality:  Gradual  Duration:  2 weeks  Timing:  Constant  Progression:  Worsening  Chronicity:  Recurrent  Context: stressful life event    Context: not alcohol use and not drug abuse    Relieved by:  Nothing  Worsened by:  Nothing  Associated symptoms: anxiety, appetite change, feelings of worthlessness, insomnia and irritability    Risk factors: hx of mental illness        Review of Systems   Constitutional: Positive for appetite change and irritability.   HENT: Negative.    Eyes: Negative.    Respiratory: Negative.    Cardiovascular: Negative.    Gastrointestinal: Negative.    Genitourinary: Negative.    Musculoskeletal: Negative.    Skin: Negative.    Neurological: Negative.    Psychiatric/Behavioral: Positive for dysphoric mood, sleep disturbance and suicidal ideas. Negative for hallucinations. The patient is nervous/anxious and has insomnia.    All other systems reviewed  and are negative.      Past Medical History:   Diagnosis Date   • Anxiety    • Chronic headache    • Depression    • Diastolic CHF, chronic (CMS/[facility])    • DVT (deep venous thrombosis) (CMS/[facility])     left leg   • Encephalitis 2016    treated at the [facility]   • Gastric ulcer with perforation (CMS/[facility]) 2016    Microperforation and air in the biliary tree   • Gastritis    • Henoch-Schonlein purpura (CMS/[facility])    • Hypertension    • Hypothyroidism (acquired)     Removed due to groiter   • Lower GI bleeding    • Migraine    • Mixed connective tissue disease (CMS/[facility])    • MRSA cellulitis    • NSTEMI (non-ST elevated myocardial infarction) (CMS/[facility])    • Patent foramen ovale    • Pneumonia    • PVC (premature ventricular contraction)    • RA (rheumatoid arthritis) (CMS/[facility])    • Renal disorder    • Rhabdomyolysis    • Seizures (CMS/[facility])     when had encephalitis   • Sjogren's syndrome (CMS/[facility])    • Stroke (CMS/[facility]) 09/2015    x 1   • Systemic lupus erythematosus (CMS/[facility])     Discoid and systemic   • Temporal arteritis (CMS/[facility])    • TIA (transient ischemic attack)     x 3       Allergies   Allergen Reactions   • Compazine [Prochlorperazine Edisylate]    • Imitrex [Sumatriptan]    • Nsaids    • Reglan [Metoclopramide]    • Solu-Medrol [Methylprednisolone]    • Zyprexa [Olanzapine]        Past Surgical History:   Procedure Laterality Date   • APPENDECTOMY     • CARDIAC CATHETERIZATION  2016    PFO repair and had a loop monitor placed at the [facility]   •  SECTION      x 2   • ENDOSCOPY     • KNEE ARTHROSCOPY Left    • LUMBAR FUSION     • PORTACATH PLACEMENT Right 2016   • THYROID SURGERY      Removed due to a goiter       Family History   Problem Relation Age of Onset   • Hypertension Mother    • Arthritis Mother         RA   • Hypertension Father    • Arthritis Father         RA   • Diabetes Father        Social History     Socioeconomic History   • Marital status:       Spouse name: Not on file   • Number of children: Not on file   • Years of education: Not on file   • Highest education level: Not on file   Tobacco Use   • Smoking status: Never Smoker   • Smokeless tobacco: Never Used   Substance and Sexual Activity   • Alcohol use: No     Comment: Occassionally, social drinker in the past   • Drug use: No     Comment: Patient denies all street drugs and ETOH.   • Sexual activity: Yes     Partners: Male           Objective   Physical Exam   Constitutional: She is oriented to person, place, and time. She appears well-developed and well-nourished. No distress.   HENT:   Head: Normocephalic and atraumatic.   Right Ear: External ear normal.   Left Ear: External ear normal.   Nose: Nose normal.   Mouth/Throat: Oropharynx is clear and moist.   Eyes: Conjunctivae and EOM are normal. Pupils are equal, round, and reactive to light.   Neck: Normal range of motion. Neck supple.   Cardiovascular: Normal rate, regular rhythm, normal heart sounds and intact distal pulses.   Pulmonary/Chest: Effort normal and breath sounds normal.   Abdominal: Soft. Bowel sounds are normal.   Musculoskeletal: Normal range of motion.   Neurological: She is alert and oriented to person, place, and time.   Skin: Skin is warm and dry. Capillary refill takes less than 2 seconds.   Psychiatric: Her speech is normal and behavior is normal. Judgment normal. Her mood appears anxious. Cognition and memory are normal. She exhibits a depressed mood (crying). She expresses suicidal ideation. She expresses no homicidal ideation. She expresses suicidal plans.   Nursing note and vitals reviewed.      Procedures           ED Course  ED Course as of Feb 18 1122   Sun Feb 17, 2019   1924 Mentally clear for psych  [AH]   Mon Feb 18, 2019   0001 Patient demanding her pain medication. Patient adv her home medications will have to be reconciled and started by psychiatry due to her complaint of SI  [KK]      ED Course User  Index  [AH] Jeyn Love, PA  [KK] Rafi Gregory, APRN                  MDM  Number of Diagnoses or Management Options  Depression with suicidal ideation:   Hypokalemia:      Amount and/or Complexity of Data Reviewed  Clinical lab tests: reviewed    Patient Progress  Patient progress: stable        Final diagnoses:   Depression with suicidal ideation   Hypokalemia            Jeny Love PA  02/17/19 1926       Jeny Love PA  02/18/19 1122

## 2019-02-18 NOTE — NURSING NOTE
Received call from dr suarez regarding pt status; he has looked at the ekg, and the drop in potassium; stated the ekg  is abnormal. He also requests pt have repeat cmp, and potassium > 3.2. Pt advised of need to go to medical bed in ed, and additional wait time. Appears to understand.

## 2019-02-18 NOTE — NURSING NOTE
Pt's potassium of 2.7 reported to ayush ed provider. Pt now moved to ed rm 105. Per johanna ed lead.

## 2019-02-18 NOTE — NURSING NOTE
Pt's potassium being redrawn at this time; pt has been more calm; given blankets and pillow; pt did state she was sorry for being so upset.advised we were here to help her and sorry for the required time delay for lab to be drawn. continue to monitor.

## 2019-02-18 NOTE — NURSING NOTE
Pt states she is anxious, getting more upset about having to stay; continually asking about time frame for additional lab and ekg; advised ed provider, ayush, who told pt we had to wait to give potassium level time to increase. Pt expressed not being satisfied, stating she was being accused of prob's with her meds, having to wait too long, and finally told ed provider to kiss her ass. Advised pt test and lab would be done as soon as possible in attempt to de-escalate her current behavior.

## 2019-02-18 NOTE — NURSING NOTE
Called and reviewed info and labs with  admit orders received [id number].Patient and ED provider notified.Dr Jean made aware of the health history of this patient.Routine orders SP3.

## 2019-02-19 VITALS
RESPIRATION RATE: 18 BRPM | WEIGHT: 204.4 LBS | HEIGHT: 66 IN | TEMPERATURE: 97.2 F | BODY MASS INDEX: 32.85 KG/M2 | OXYGEN SATURATION: 98 % | DIASTOLIC BLOOD PRESSURE: 60 MMHG | SYSTOLIC BLOOD PRESSURE: 101 MMHG | HEART RATE: 75 BPM

## 2019-02-19 PROCEDURE — 99238 HOSP IP/OBS DSCHRG MGMT 30/<: CPT | Performed by: PSYCHIATRY & NEUROLOGY

## 2019-02-19 RX ORDER — ARIPIPRAZOLE 2 MG/1
2 TABLET ORAL DAILY
Qty: 30 TABLET | Refills: 0 | Status: ON HOLD | OUTPATIENT
Start: 2019-02-20 | End: 2019-05-04

## 2019-02-19 RX ORDER — CLONAZEPAM 1 MG/1
1 TABLET ORAL 3 TIMES DAILY PRN
Status: ON HOLD
Start: 2019-02-19 | End: 2019-05-04

## 2019-02-19 RX ADMIN — HYDROXYCHLOROQUINE SULFATE 200 MG: 200 TABLET, FILM COATED ORAL at 08:08

## 2019-02-19 RX ADMIN — ARIPIPRAZOLE 2 MG: 2 TABLET ORAL at 08:08

## 2019-02-19 RX ADMIN — LEVETIRACETAM 500 MG: 500 TABLET, FILM COATED ORAL at 08:08

## 2019-02-19 RX ADMIN — MORPHINE SULFATE 30 MG: 30 TABLET, EXTENDED RELEASE ORAL at 08:09

## 2019-02-19 RX ADMIN — BUPROPION HYDROCHLORIDE 100 MG: 100 TABLET, EXTENDED RELEASE ORAL at 08:07

## 2019-02-19 RX ADMIN — CLONAZEPAM 1 MG: 1 TABLET ORAL at 00:14

## 2019-02-19 RX ADMIN — DULOXETINE HYDROCHLORIDE 40 MG: 20 CAPSULE, DELAYED RELEASE ORAL at 08:08

## 2019-02-19 RX ADMIN — PANTOPRAZOLE SODIUM 40 MG: 40 TABLET, DELAYED RELEASE ORAL at 08:08

## 2019-02-19 RX ADMIN — ACETAMINOPHEN 650 MG: 325 TABLET, FILM COATED ORAL at 00:14

## 2019-02-19 RX ADMIN — POTASSIUM CHLORIDE 20 MEQ: 1500 TABLET, EXTENDED RELEASE ORAL at 08:08

## 2019-02-19 RX ADMIN — LEVOTHYROXINE SODIUM 175 MCG: 175 TABLET ORAL at 06:09

## 2019-02-19 RX ADMIN — HYDROXYZINE HYDROCHLORIDE 50 MG: 50 TABLET, FILM COATED ORAL at 08:09

## 2019-02-19 NOTE — PLAN OF CARE
Problem: Patient Care Overview  Goal: Plan of Care Review  Outcome: Ongoing (interventions implemented as appropriate)   02/19/19 0103   Coping/Psychosocial   Plan of Care Reviewed With patient   Coping/Psychosocial   Patient Agreement with Plan of Care agrees   Plan of Care Review   Progress no change   OTHER   Outcome Summary PT RATED ANXIETY 6, DEPRESSION 8, DENIED SI/HI/HALLUCINATIONS.  QUIET UNEVENTFUL NIGHT.       Problem: Overarching Goals (Adult)  Goal: Adheres to Safety Considerations for Self and Others  Outcome: Ongoing (interventions implemented as appropriate)    Goal: Optimized Coping Skills in Response to Life Stressors  Outcome: Ongoing (interventions implemented as appropriate)    Goal: Develops/Participates in Therapeutic [place] to Support Successful Transition  Outcome: Ongoing (interventions implemented as appropriate)

## 2019-02-19 NOTE — DISCHARGE SUMMARY
PSYCHIATRIC DISCHARGE SUMMARY     Patient Identification:  Name:  Karely Villarreal  Age:  53 y.o.  Sex:  female  :  1965  MRN:  [id number]  Visit Number:  [id number]      Date of Admission:2019   Date of Discharge:  2019    Discharge Diagnosis:  Principal Problem:    Severe recurrent major depression without psychotic features (CMS/HCC)  Active Problems:    Systemic lupus erythematosus (CMS/HCC)    Hypertension    Hypothyroidism (acquired)    KEREN (generalized anxiety disorder)        Admission Diagnosis:  Depression with suicidal ideation [F32.9, R45.851]     Hospital Course  Patient is a 53 y.o. female presented with depression and suicidal ideations. She reported struggling with losing her daughter 10 years ago, and recently moving away from her parents because she didn't want to be a burden and was feeling alone. She came to the ED and had low potassium and after initial supplementation her potassium level decreased and she was given more potassium and was eventually admitted to the psych unit when her potassium came up to 3.2. She reported thoughts of suicide but no plans. She reported a history of chronic pain and anxiety and was on prescription opioids along with Klonopin and Ambien. Her UDS was also positive for buprenorphine but she was not able to provide a reason for it as she denied taking any. The patient was informed about the risks of taking these medications long term including dependence and accidental overdose. She was advised to consider reducing the medications. She was not interested and stated that she would discuss it with her precribing provider.  Her medications were reviewed. She reported Seroquel was not helping and it was stopped also because her QTc was prolonged and she was started on Abilify.   The patient also reported a history of seizures, and was also taking Wellbutrin and it was stopped.   The patient was able to sleep good overnight and the next day reported feeling  "better and denied any thoughts of harm to self or others. She felt ready to be discharged and continue her outpatient follow-up.      Mental Status Exam upon discharge:   Mood \"good\"   Affect-congruent, appropriate, stable  Thought Content-goal directed, no delusional material present  Thought process-linear, organized.  Suicidality: No SI  Homicidality: No HI  Perception: No AH/VH    Procedures Performed         Consults:   Consults     No orders found for last 30 day(s).          Pertinent Test Results:   Lab Results (last 7 days)     ** No results found for the last 168 hours. **          Condition on Discharge:  improved    Vital Signs  Temp:  [97.2 °F (36.2 °C)-97.9 °F (36.6 °C)] 97.2 °F (36.2 °C)  Heart Rate:  [68-81] 75  Resp:  [18] 18  BP: (101-134)/(60-84) 101/60      Discharge Disposition:  Home or Self Care    Discharge Medications:     Discharge Medications      New Medications      Instructions Start Date   ARIPiprazole 2 MG tablet  Commonly known as:  ABILIFY   2 mg, Oral, Daily         Changes to Medications      Instructions Start Date   clonazePAM 1 MG tablet  Commonly known as:  KlonoPIN  What changed:  when to take this   1 mg, Oral, 3 Times Daily PRN         Continue These Medications      Instructions Start Date   DULoxetine 60 MG capsule  Commonly known as:  CYMBALTA   40 mg, Oral, 2 Times Daily      hydrochlorothiazide 25 MG tablet  Commonly known as:  HYDRODIURIL   25 mg, Oral, Daily      hydroxychloroquine 200 MG tablet  Commonly known as:  PLAQUENIL   200 mg, Oral, 2 Times Daily      levETIRAcetam 500 MG tablet  Commonly known as:  KEPPRA   500 mg, Oral, 2 Times Daily      levothyroxine 175 MCG tablet  Commonly known as:  SYNTHROID, LEVOTHROID   175 mcg, Oral, Daily      metoprolol succinate XL 25 MG 24 hr tablet  Commonly known as:  TOPROL-XL   25 mg, Oral, Daily      Morphine 30 MG 12 hr tablet  Commonly known as:  MS CONTIN   30 mg, Oral, 2 Times Daily      pantoprazole 40 MG EC " tablet  Commonly known as:  PROTONIX   40 mg, Oral, Daily      potassium chloride 20 MEQ CR tablet  Commonly known as:  K-DUR,KLOR-CON   20 mEq, Oral, Daily         Stop These Medications    buPROPion  MG 12 hr tablet  Commonly known as:  WELLBUTRIN SR     oxyMORphone ER 10 MG tablet extended-release 12 hour 12 hr tablet  Commonly known as:  OPANA ER     QUEtiapine 100 MG tablet  Commonly known as:  SEROquel     zolpidem 10 MG tablet  Commonly known as:  AMBIEN            Discharge Diet: Regular    Activity at Discharge: As tolerated    Follow-up Appointments  Future Appointments   Date Time Provider Department Center   2/25/2019  9:30 AM Lucero Deluna, [facility] None         Test Results Pending at Discharge      Clinician:   Myriam Jean MD  02/19/19  9:31 AM

## 2019-02-19 NOTE — PLAN OF CARE
Problem: Patient Care Overview  Goal: Plan of Care Review  Outcome: Outcome(s) achieved Date Met: 02/19/19 02/19/19 0963   Coping/Psychosocial   Plan of Care Reviewed With patient   Coping/Psychosocial   Patient Agreement with Plan of Care agrees   Plan of Care Review   Progress improving   OTHER   Outcome Summary Ready for discharge.       Problem: Overarching Goals (Adult)  Goal: Adheres to Safety Considerations for Self and Others  Outcome: Outcome(s) achieved Date Met: 02/19/19    Goal: Optimized Coping Skills in Response to Life Stressors  Outcome: Outcome(s) achieved Date Met: 02/19/19    Goal: Develops/Participates in Therapeutic [place] to Support Successful Transition  Outcome: Outcome(s) achieved Date Met: 02/19/19      Problem: Depression (Adult,Obstetrics,Pediatric)  Goal: Identify Related Risk Factors and Signs and Symptoms  Outcome: Outcome(s) achieved Date Met: 02/19/19    Goal: Establish/Maintain Self-Care  Outcome: Outcome(s) achieved Date Met: 02/19/19    Goal: Improved/Stable Mood  Outcome: Outcome(s) achieved Date Met: 02/19/19

## 2019-02-19 NOTE — PLAN OF CARE
Problem: Patient Care Overview  Goal: Plan of Care Review  Outcome: Ongoing (interventions implemented as appropriate)   02/19/19 0913   Coping/Psychosocial   Plan of Care Reviewed With patient   Coping/Psychosocial   Patient Agreement with Plan of Care agrees   Plan of Care Review   Progress no change   OTHER   Outcome Summary Therapist met with Patient to complete initial assessment and afterare recommendations; Patient agreeable.    Coping/Psychosocial   Consent Given to Review Plan with Gail Garcia      Goal: Individualization and Mutuality  Outcome: Ongoing (interventions implemented as appropriate)   02/19/19 0852 02/19/19 0913   Individualization   Patient Specific Goals (Include Timeframe) --  Identify healthy coping skills and process emotions effectively during treatment stay.    Patient Specific Interventions --  Therapist to offer 1-4 therapy sessions, daily group, family eduation, and saftey planning.    Personal Strengths/Vulnerabilities   Patient Personal Strengths self-reliant;socioeconomic stability;spiritual/Sikh support;stable living environment;tolerant;self-awareness;resourceful;resilient;realistic evaluation of current/future capabilities;positive attitude;positive educational history;positive vocational history;no history of violence;independent living skills;insight into illness/situation;expressive of needs;expressive of emotions --    Patient Vulnerabilities multiple stressors, ineffective coping, limited support  --      Goal: Discharge Needs Assessment  Outcome: Ongoing (interventions implemented as appropriate)   02/19/19 0913   Discharge Needs Assessment   Readmission Within the Last 30 Days no previous admission in last 30 days   Concerns to be Addressed mental health;medication;suicidal;financial/insurance;grief and loss;compliance issue;coping/stress;decision making   Patient/Family Anticipates Transition to home   Patient/Family Anticipated Services at Transition mental  "health services;medical specialist;outpatient care   Transportation Concerns car, none   Transportation Anticipated family or friend will provide   Offered/Gave Vendor List yes   Patient's Choice of Community Agency(s) [facility]    Current Discharge Risk psychiatric illness;lives alone   Discharge Coordination/Progress Therapist met with Ric to complete discharge needs assessment; Patient agreeable.    Discharge Needs Assessment,    Outpatient/Agency/Support Group Needs outpatient counseling;outpatient medication management   Anticipated Discharge Disposition home or self-care     Goal: Interprofessional Rounds/Family Conf  Outcome: Ongoing (interventions implemented as appropriate)   02/19/19 0913   Interdisciplinary Rounds/Family Conf   Summary Treatment Team Evaluations and Staffing    Interdisciplinary Rounds/Family Conf   Participants psychiatrist;social work;patient;nursing;other (see comments)  ( Navigator )     [id number]  DATA:    Therapist met individually with Patient this date for initial evaluation.  Introduced self as Therapist and the role of a positive therapeutic relationship; Patient agreeable. Patient engaged Therapist stating the need to \"change doctors\". Patient reports the belief that Dr. Jean thinks Patient is \"hiding something\". Patient was educated as to how to change providers.     Therapist completed psychosocial assessment, integrated summary, reviewed care plans, disposition planning and discussed hospitalization expectations and treatment goals this date. Patient agreeable for [facility] for outpatient therapy.     ASSESSMENT:   Ms. Karely Villarreal is a 53 year old, unemployed, , , female who recently resides alone in [place], [place]. Patient presented self to treatment due to worsening depression and SI with plan to cut wrist. Patient reports multiple stressors including the death of her daughter due to a ATV accident 10 years ago whenever she was out of town due to " "work responsibilities. Patient reports that after the death of her daughter the dynamics of her family drastically changed. Patient reports that her  requested a divorce three years after the death of their daughter.Patient reports multiple health concerns and physical discomfort. Patient reports being financially stressed and worried related to her family members due to the belief that she is a \"burden\".     PLAN:   Patient will receive 24/7 nursing monitoring and daily psychiatrist evaluation by a multidisciplinary team.    Patient will continue stabilization at this time.     Patient is agreeable for outpatient services with [facility].     Public assistance with transportation will not be needed. Patient drove self to treatment.       "

## 2019-05-03 ENCOUNTER — HOSPITAL ENCOUNTER (EMERGENCY)
Facility: HOSPITAL | Age: 54
Discharge: HOME | End: 2019-05-03
Attending: EMERGENCY MEDICINE | Admitting: EMERGENCY MEDICINE

## 2019-05-03 VITALS
RESPIRATION RATE: 18 BRPM | TEMPERATURE: 98.1 F | HEART RATE: 98 BPM | DIASTOLIC BLOOD PRESSURE: 65 MMHG | WEIGHT: 185 LBS | BODY MASS INDEX: 29.73 KG/M2 | OXYGEN SATURATION: 96 % | HEIGHT: 66 IN | SYSTOLIC BLOOD PRESSURE: 111 MMHG

## 2019-05-03 DIAGNOSIS — L02.411 ABSCESS OF RIGHT AXILLA: Primary | ICD-10-CM

## 2019-05-03 PROCEDURE — 99283 EMERGENCY DEPT VISIT LOW MDM: CPT

## 2019-05-03 PROCEDURE — 0X940ZZ DRAINAGE OF RIGHT AXILLA, OPEN APPROACH: ICD-10-PCS | Performed by: EMERGENCY MEDICINE

## 2019-05-03 RX ORDER — LIDOCAINE HYDROCHLORIDE 10 MG/ML
10 INJECTION, SOLUTION EPIDURAL; INFILTRATION; INTRACAUDAL; PERINEURAL ONCE
Status: COMPLETED | OUTPATIENT
Start: 2019-05-03 | End: 2019-05-03

## 2019-05-03 RX ORDER — HYDROCODONE BITARTRATE AND ACETAMINOPHEN 5; 325 MG/1; MG/1
1 TABLET ORAL EVERY 6 HOURS PRN
Qty: 8 TABLET | Refills: 0 | Status: ON HOLD | OUTPATIENT
Start: 2019-05-03 | End: 2019-05-04

## 2019-05-03 RX ORDER — SULFAMETHOXAZOLE AND TRIMETHOPRIM 800; 160 MG/1; MG/1
1 TABLET ORAL 2 TIMES DAILY
Qty: 20 TABLET | Refills: 0 | Status: ON HOLD | OUTPATIENT
Start: 2019-05-03 | End: 2019-05-04

## 2019-05-03 RX ADMIN — LIDOCAINE HYDROCHLORIDE 10 ML: 10 INJECTION, SOLUTION EPIDURAL; INFILTRATION; INTRACAUDAL; PERINEURAL at 11:23

## 2019-05-03 NOTE — ED PROVIDER NOTES
Subjective   53-year-old female who comes in with chief complaint abscess located in the right axilla for the past 2 days.  Patient states she has had increased swelling, tenderness over the past 2 days.  Denies any fever, chills, body aches.        History provided by:  Patient   used: No    Abscess   Location:  Shoulder/arm  Shoulder/arm abscess location:  R axilla  Size:  3.0  Abscess quality: painful and redness    Abscess quality: not draining, no fluctuance, no induration and no itching    Red streaking: no    Duration:  2 days  Progression:  Worsening  Pain details:     Quality:  Pressure and throbbing    Severity:  Moderate    Duration:  2 days    Timing:  Intermittent    Progression:  Worsening  Chronicity:  New  Context: not diabetes, not immunosuppression, not injected drug use and not insect bite/sting    Relieved by:  Nothing  Worsened by:  Nothing  Ineffective treatments:  None tried  Associated symptoms: no anorexia, no fatigue, no fever, no headaches, no nausea and no vomiting    Risk factors: no family hx of MRSA        Review of Systems   Constitutional: Negative.  Negative for fatigue and fever.   HENT: Negative.    Respiratory: Negative.  Negative for apnea, choking, chest tightness and shortness of breath.    Gastrointestinal: Negative for anorexia, nausea and vomiting.   Genitourinary: Negative.  Negative for difficulty urinating, dyspareunia, enuresis, flank pain, frequency, genital sores and hematuria.   Musculoskeletal: Negative.  Negative for back pain, gait problem, joint swelling and myalgias.   Skin: Positive for wound. Negative for color change and pallor.   Neurological: Negative for headaches.   Hematological: Negative.  Negative for adenopathy. Does not bruise/bleed easily.   Psychiatric/Behavioral: Negative.  Negative for behavioral problems, decreased concentration, dysphoric mood and hallucinations.   All other systems reviewed and are negative.      Past  Medical History:   Diagnosis Date   • Anxiety    • Chronic headache    • Depression    • Diastolic CHF, chronic (CMS/[facility])    • DVT (deep venous thrombosis) (CMS/[facility])     left leg   • Encephalitis 2016    treated at the [facility]   • Gastric ulcer with perforation (CMS/[facility]) 2016    Microperforation and air in the biliary tree   • Gastritis    • Henoch-Schonlein purpura (CMS/[facility])    • Hypertension    • Hypothyroidism (acquired)     Removed due to groiter   • Lower GI bleeding    • Migraine    • Mixed connective tissue disease (CMS/[facility])    • MRSA cellulitis    • NSTEMI (non-ST elevated myocardial infarction) (CMS/[facility])    • Patent foramen ovale    • Pneumonia    • PVC (premature ventricular contraction)    • RA (rheumatoid arthritis) (CMS/[facility])    • Renal disorder    • Rhabdomyolysis    • Seizures (CMS/[facility])     when had encephalitis   • Sjogren's syndrome (CMS/[facility])    • Stroke (CMS/[facility]) 09/2015    x 1   • Systemic lupus erythematosus (CMS/[facility])     Discoid and systemic   • Temporal arteritis (CMS/[facility])    • TIA (transient ischemic attack)     x 3       Allergies   Allergen Reactions   • Compazine [Prochlorperazine Edisylate]    • Imitrex [Sumatriptan]    • Nsaids    • Reglan [Metoclopramide]    • Solu-Medrol [Methylprednisolone]    • Zyprexa [Olanzapine]        Past Surgical History:   Procedure Laterality Date   • APPENDECTOMY     • CARDIAC CATHETERIZATION  2016    PFO repair and had a loop monitor placed at the [facility]   •  SECTION      x 2   • ENDOSCOPY     • KNEE ARTHROSCOPY Left    • LUMBAR FUSION     • PORTACATH PLACEMENT Right 2016   • THYROID SURGERY      Removed due to a goiter       Family History   Problem Relation Age of Onset   • Hypertension Mother    • Arthritis Mother         RA   • Hypertension Father    • Arthritis Father         RA   • Diabetes Father        Social History     Socioeconomic History   • Marital status:      Spouse name: Not on file    • Number of children: Not on file   • Years of education: Not on file   • Highest education level: Not on file   Tobacco Use   • Smoking status: Never Smoker   • Smokeless tobacco: Never Used   Substance and Sexual Activity   • Alcohol use: No     Comment: Occassionally, social drinker in the past   • Drug use: No     Comment: Patient denies all street drugs and ETOH.   • Sexual activity: Yes     Partners: Male           Objective   Physical Exam   Constitutional: She is oriented to person, place, and time. She appears well-developed and well-nourished. No distress.   HENT:   Head: Normocephalic and atraumatic.   Right Ear: External ear normal.   Left Ear: External ear normal.   Nose: Nose normal.   Mouth/Throat: Oropharynx is clear and moist. No oropharyngeal exudate.   Eyes: Conjunctivae and EOM are normal. Pupils are equal, round, and reactive to light. Right eye exhibits no discharge. Left eye exhibits no discharge. No scleral icterus.   Neck: Normal range of motion. Neck supple. No JVD present. No tracheal deviation present. No thyromegaly present.   Cardiovascular: Normal rate, regular rhythm, normal heart sounds and intact distal pulses. Exam reveals no friction rub.   No murmur heard.  Pulmonary/Chest: Effort normal and breath sounds normal. No stridor. No respiratory distress. She has no wheezes. She has no rales. She exhibits no tenderness.   Abdominal: Soft. Bowel sounds are normal. She exhibits no distension and no mass. There is no tenderness. There is no rebound and no guarding.   Musculoskeletal: Normal range of motion. She exhibits no edema, tenderness or deformity.   Lymphadenopathy:     She has no cervical adenopathy.   Neurological: She is alert and oriented to person, place, and time. She displays normal reflexes. No cranial nerve deficit or sensory deficit. She exhibits normal muscle tone. Coordination normal.   Skin: Skin is warm and dry. Capillary refill takes less than 2 seconds. Rash  noted. She is not diaphoretic. There is erythema. No pallor.   Abscess to right axilla. Moderate fluctuance, tenderness, induration. Small amount of pointing present. Surrounding cellulitis.    Psychiatric: She has a normal mood and affect. Her behavior is normal. Judgment and thought content normal.   Nursing note and vitals reviewed.      Incision & Drainage  Date/Time: 5/3/2019 11:26 AM  Performed by: Reyes Blakely PA-C  Authorized by: Ivan Ashton MD     Consent:     Consent obtained:  Verbal    Consent given by:  Patient    Risks discussed:  Pain    Alternatives discussed:  No treatment  Location:     Type:  Abscess    Size:  3.0    Location:  Upper extremity    Upper extremity location:  Arm    Arm location:  R upper arm  Anesthesia (see MAR for exact dosages):     Anesthesia method:  None  Procedure details:     Needle aspiration: no      Incision types:  Stab incision    Incision depth:  Subcutaneous    Scalpel blade:  11    Wound management:  Probed and deloculated    Drainage:  Purulent    Drainage amount:  Copious    Wound treatment:  Wound left open    Packing materials:  None  Post-procedure details:     Patient tolerance of procedure:  Tolerated well, no immediate complications               ED Course                  MDM      Final diagnoses:   Abscess of right axilla            Reyes Blakely PA-C  05/03/19 1130

## 2019-05-03 NOTE — DISCHARGE INSTRUCTIONS
Call one of the offices below to establish a primary care provider.  If you are unable to get an appointment and feel it is an emergency and need to be seen immediately please return to the Emergency Department.    Call one of the office below to set up a primary care provider.    Dr. Alessandro Villar                                                                                                       [address] [facility] [address]  [phone]    Dr. Alejo, Dr. SHERRY Calderon, Dr. CHANTELL Calderon ([facility])  [address] [facility] [address]  [phone]    Dr. Iglesias, Dr. Jung, Dr. Lopez ([facility])  [address] [facility] [address]  [phone]    Dr. Cheng  [address] [facility] [address]  [phone]    Dr. Lott, Dr. Barclay, Dr. Wyatt, Dr. Garay ([facility])  [address] [place] [address] [address]  [facility] [address]  [phone]    Dr. María Molina  [address] [facility] [place] [address]  [phone]    Dr. Kavitha Clemons  [address]   [address]  [facility] [address]  [phone]    Dr. Vilalr  [address] [facility] [address]  [phone]    Dr. Roger, Dr. Noel  [address] [facility] [place] [address]  [phone]    Dr. Chester  [address] [facility] [address]                                                              [address]  [facility] [address]  [phone]    Dr. Woodall  [address] [facility] [id number] [phone]    Dr. Jacklyn Garrett  [address]  [address]  [facility] [address]  [phone]

## 2019-05-04 ENCOUNTER — HOSPITAL ENCOUNTER (INPATIENT)
Facility: HOSPITAL | Age: 54
LOS: 1 days | Discharge: HOME | End: 2019-05-05
Attending: EMERGENCY MEDICINE | Admitting: SURGERY

## 2019-05-04 DIAGNOSIS — L02.411 ABSCESS OF AXILLA, RIGHT: ICD-10-CM

## 2019-05-04 DIAGNOSIS — L02.413 ABSCESS OF RIGHT ARM: Primary | ICD-10-CM

## 2019-05-04 LAB
ALBUMIN SERPL-MCNC: 4 G/DL (ref 3.5–5.2)
ALBUMIN/GLOB SERPL: 1.2 G/DL
ALP SERPL-CCNC: 185 U/L (ref 39–117)
ALT SERPL W P-5'-P-CCNC: 45 U/L (ref 1–33)
ANION GAP SERPL CALCULATED.3IONS-SCNC: 13.7 MMOL/L
AST SERPL-CCNC: 43 U/L (ref 1–32)
BASOPHILS # BLD AUTO: 0.04 10*3/MM3 (ref 0–0.2)
BASOPHILS NFR BLD AUTO: 0.3 % (ref 0–1.5)
BILIRUB SERPL-MCNC: 0.2 MG/DL (ref 0.2–1.2)
BUN BLD-MCNC: 10 MG/DL (ref 6–20)
BUN/CREAT SERPL: 14.9 (ref 7–25)
CALCIUM SPEC-SCNC: 9.1 MG/DL (ref 8.6–10.5)
CHLORIDE SERPL-SCNC: 95 MMOL/L (ref 98–107)
CO2 SERPL-SCNC: 30.3 MMOL/L (ref 22–29)
CREAT BLD-MCNC: 0.67 MG/DL (ref 0.57–1)
CRP SERPL-MCNC: 6.22 MG/DL (ref 0–0.5)
D-LACTATE SERPL-SCNC: 1.6 MMOL/L (ref 0.5–2)
DEPRECATED RDW RBC AUTO: 48.9 FL (ref 37–54)
EOSINOPHIL # BLD AUTO: 0.34 10*3/MM3 (ref 0–0.4)
EOSINOPHIL NFR BLD AUTO: 2.9 % (ref 0.3–6.2)
ERYTHROCYTE [DISTWIDTH] IN BLOOD BY AUTOMATED COUNT: 17.9 % (ref 12.3–15.4)
GFR SERPL CREATININE-BSD FRML MDRD: 92 ML/MIN/1.73
GLOBULIN UR ELPH-MCNC: 3.4 GM/DL
GLUCOSE BLD-MCNC: 141 MG/DL (ref 65–99)
HCT VFR BLD AUTO: 34.2 % (ref 34–46.6)
HGB BLD-MCNC: 10.5 G/DL (ref 12–15.9)
IMM GRANULOCYTES # BLD AUTO: 0.02 10*3/MM3 (ref 0–0.05)
IMM GRANULOCYTES NFR BLD AUTO: 0.2 % (ref 0–0.5)
LYMPHOCYTES # BLD AUTO: 1.5 10*3/MM3 (ref 0.7–3.1)
LYMPHOCYTES NFR BLD AUTO: 12.8 % (ref 19.6–45.3)
MCH RBC QN AUTO: 24 PG (ref 26.6–33)
MCHC RBC AUTO-ENTMCNC: 30.7 G/DL (ref 31.5–35.7)
MCV RBC AUTO: 78.3 FL (ref 79–97)
MONOCYTES # BLD AUTO: 0.62 10*3/MM3 (ref 0.1–0.9)
MONOCYTES NFR BLD AUTO: 5.3 % (ref 5–12)
NEUTROPHILS # BLD AUTO: 9.22 10*3/MM3 (ref 1.7–7)
NEUTROPHILS NFR BLD AUTO: 78.5 % (ref 42.7–76)
PLATELET # BLD AUTO: 317 10*3/MM3 (ref 140–450)
PMV BLD AUTO: 8.8 FL (ref 6–12)
POTASSIUM BLD-SCNC: 3 MMOL/L (ref 3.5–5.2)
PROT SERPL-MCNC: 7.4 G/DL (ref 6–8.5)
RBC # BLD AUTO: 4.37 10*6/MM3 (ref 3.77–5.28)
SODIUM BLD-SCNC: 139 MMOL/L (ref 136–145)
WBC NRBC COR # BLD: 11.74 10*3/MM3 (ref 3.4–10.8)

## 2019-05-04 PROCEDURE — 25010000002 VANCOMYCIN 5 G RECONSTITUTED SOLUTION 5,000 MG VIAL: Performed by: PHYSICIAN ASSISTANT

## 2019-05-04 PROCEDURE — 99223 1ST HOSP IP/OBS HIGH 75: CPT | Performed by: SURGERY

## 2019-05-04 PROCEDURE — 25010000002 HEPARIN (PORCINE) PER 1000 UNITS: Performed by: SURGERY

## 2019-05-04 PROCEDURE — 85025 COMPLETE CBC W/AUTO DIFF WBC: CPT | Performed by: PHYSICIAN ASSISTANT

## 2019-05-04 PROCEDURE — 80053 COMPREHEN METABOLIC PANEL: CPT | Performed by: PHYSICIAN ASSISTANT

## 2019-05-04 PROCEDURE — 25010000002 PIPERACILLIN-TAZOBACTAM: Performed by: PHYSICIAN ASSISTANT

## 2019-05-04 PROCEDURE — 83605 ASSAY OF LACTIC ACID: CPT | Performed by: PHYSICIAN ASSISTANT

## 2019-05-04 PROCEDURE — 99284 EMERGENCY DEPT VISIT MOD MDM: CPT

## 2019-05-04 PROCEDURE — 25010000002 HYDROMORPHONE 1 MG/ML SOLUTION: Performed by: EMERGENCY MEDICINE

## 2019-05-04 PROCEDURE — 25010000002 ONDANSETRON PER 1 MG: Performed by: EMERGENCY MEDICINE

## 2019-05-04 PROCEDURE — 87040 BLOOD CULTURE FOR BACTERIA: CPT | Performed by: PHYSICIAN ASSISTANT

## 2019-05-04 PROCEDURE — 86140 C-REACTIVE PROTEIN: CPT | Performed by: PHYSICIAN ASSISTANT

## 2019-05-04 RX ORDER — CLONAZEPAM 0.5 MG/1
0.25 TABLET ORAL 4 TIMES DAILY
COMMUNITY
End: 2019-09-18 | Stop reason: HOSPADM

## 2019-05-04 RX ORDER — ONDANSETRON 2 MG/ML
4 INJECTION INTRAMUSCULAR; INTRAVENOUS ONCE
Status: COMPLETED | OUTPATIENT
Start: 2019-05-04 | End: 2019-05-04

## 2019-05-04 RX ORDER — OXYCODONE HCL 20 MG/1
20 TABLET, FILM COATED, EXTENDED RELEASE ORAL EVERY 12 HOURS SCHEDULED
Status: CANCELLED | OUTPATIENT
Start: 2019-05-04

## 2019-05-04 RX ORDER — BUPROPION HYDROCHLORIDE 100 MG/1
100 TABLET, EXTENDED RELEASE ORAL DAILY
Status: DISCONTINUED | OUTPATIENT
Start: 2019-05-05 | End: 2019-05-05 | Stop reason: HOSPADM

## 2019-05-04 RX ORDER — SODIUM CHLORIDE 0.9 % (FLUSH) 0.9 %
3 SYRINGE (ML) INJECTION EVERY 12 HOURS SCHEDULED
Status: DISCONTINUED | OUTPATIENT
Start: 2019-05-04 | End: 2019-05-05 | Stop reason: HOSPADM

## 2019-05-04 RX ORDER — OXYCODONE HYDROCHLORIDE 15 MG/1
15 TABLET ORAL EVERY 6 HOURS PRN
Status: CANCELLED | OUTPATIENT
Start: 2019-05-04

## 2019-05-04 RX ORDER — DEXTROSE, SODIUM CHLORIDE, AND POTASSIUM CHLORIDE 5; .45; .15 G/100ML; G/100ML; G/100ML
100 INJECTION INTRAVENOUS CONTINUOUS
Status: DISCONTINUED | OUTPATIENT
Start: 2019-05-04 | End: 2019-05-05 | Stop reason: HOSPADM

## 2019-05-04 RX ORDER — ARIPIPRAZOLE 10 MG/1
5 TABLET ORAL DAILY
Status: DISCONTINUED | OUTPATIENT
Start: 2019-05-05 | End: 2019-05-05 | Stop reason: HOSPADM

## 2019-05-04 RX ORDER — HEPARIN SODIUM 5000 [USP'U]/ML
5000 INJECTION, SOLUTION INTRAVENOUS; SUBCUTANEOUS EVERY 8 HOURS SCHEDULED
Status: DISCONTINUED | OUTPATIENT
Start: 2019-05-04 | End: 2019-05-05 | Stop reason: HOSPADM

## 2019-05-04 RX ORDER — HYDROCODONE BITARTRATE AND ACETAMINOPHEN 5; 325 MG/1; MG/1
1 TABLET ORAL EVERY 6 HOURS PRN
Status: DISCONTINUED | OUTPATIENT
Start: 2019-05-04 | End: 2019-05-05 | Stop reason: HOSPADM

## 2019-05-04 RX ORDER — ARIPIPRAZOLE 5 MG/1
5 TABLET ORAL DAILY
COMMUNITY
End: 2019-07-18

## 2019-05-04 RX ORDER — POTASSIUM CHLORIDE 20 MEQ/1
40 TABLET, EXTENDED RELEASE ORAL ONCE
Status: COMPLETED | OUTPATIENT
Start: 2019-05-04 | End: 2019-05-04

## 2019-05-04 RX ORDER — BUPROPION HYDROCHLORIDE 100 MG/1
100 TABLET, EXTENDED RELEASE ORAL DAILY
COMMUNITY
End: 2019-09-18 | Stop reason: HOSPADM

## 2019-05-04 RX ORDER — HYDROXYCHLOROQUINE SULFATE 200 MG/1
200 TABLET, FILM COATED ORAL EVERY 12 HOURS SCHEDULED
Status: DISCONTINUED | OUTPATIENT
Start: 2019-05-04 | End: 2019-05-05 | Stop reason: HOSPADM

## 2019-05-04 RX ORDER — HYDROCHLOROTHIAZIDE 25 MG/1
25 TABLET ORAL DAILY
Status: DISCONTINUED | OUTPATIENT
Start: 2019-05-05 | End: 2019-05-05 | Stop reason: HOSPADM

## 2019-05-04 RX ORDER — ACETAMINOPHEN 325 MG/1
650 TABLET ORAL EVERY 4 HOURS PRN
Status: DISCONTINUED | OUTPATIENT
Start: 2019-05-04 | End: 2019-05-05 | Stop reason: HOSPADM

## 2019-05-04 RX ORDER — POTASSIUM CHLORIDE 20 MEQ/1
20 TABLET, EXTENDED RELEASE ORAL DAILY
Status: DISCONTINUED | OUTPATIENT
Start: 2019-05-05 | End: 2019-05-05 | Stop reason: HOSPADM

## 2019-05-04 RX ORDER — CLONAZEPAM 0.5 MG/1
0.5 TABLET ORAL 4 TIMES DAILY
Status: DISCONTINUED | OUTPATIENT
Start: 2019-05-04 | End: 2019-05-05 | Stop reason: HOSPADM

## 2019-05-04 RX ORDER — OXYCODONE HYDROCHLORIDE 15 MG/1
15 TABLET ORAL EVERY 6 HOURS PRN
COMMUNITY
End: 2019-07-18

## 2019-05-04 RX ORDER — LEVOTHYROXINE SODIUM 175 UG/1
175 TABLET ORAL DAILY
Status: DISCONTINUED | OUTPATIENT
Start: 2019-05-05 | End: 2019-05-05 | Stop reason: HOSPADM

## 2019-05-04 RX ORDER — SODIUM CHLORIDE 0.9 % (FLUSH) 0.9 %
3-10 SYRINGE (ML) INJECTION AS NEEDED
Status: DISCONTINUED | OUTPATIENT
Start: 2019-05-04 | End: 2019-05-05 | Stop reason: HOSPADM

## 2019-05-04 RX ORDER — PANTOPRAZOLE SODIUM 40 MG/1
40 TABLET, DELAYED RELEASE ORAL DAILY
Status: DISCONTINUED | OUTPATIENT
Start: 2019-05-05 | End: 2019-05-05 | Stop reason: HOSPADM

## 2019-05-04 RX ORDER — DULOXETIN HYDROCHLORIDE 60 MG/1
60 CAPSULE, DELAYED RELEASE ORAL 2 TIMES DAILY
Status: DISCONTINUED | OUTPATIENT
Start: 2019-05-04 | End: 2019-05-05 | Stop reason: HOSPADM

## 2019-05-04 RX ORDER — LEVETIRACETAM 500 MG/1
500 TABLET ORAL EVERY 12 HOURS SCHEDULED
Status: DISCONTINUED | OUTPATIENT
Start: 2019-05-04 | End: 2019-05-05 | Stop reason: HOSPADM

## 2019-05-04 RX ORDER — SODIUM CHLORIDE 0.9 % (FLUSH) 0.9 %
10 SYRINGE (ML) INJECTION AS NEEDED
Status: DISCONTINUED | OUTPATIENT
Start: 2019-05-04 | End: 2019-05-05 | Stop reason: HOSPADM

## 2019-05-04 RX ADMIN — ONDANSETRON 4 MG: 2 INJECTION, SOLUTION INTRAMUSCULAR; INTRAVENOUS at 16:52

## 2019-05-04 RX ADMIN — PIPERACILLIN SODIUM,TAZOBACTAM SODIUM 3.38 G: 3; .375 INJECTION, POWDER, FOR SOLUTION INTRAVENOUS at 16:38

## 2019-05-04 RX ADMIN — HYDROXYCHLOROQUINE SULFATE 200 MG: 200 TABLET ORAL at 20:25

## 2019-05-04 RX ADMIN — HYDROMORPHONE HYDROCHLORIDE 1 MG: 1 INJECTION, SOLUTION INTRAMUSCULAR; INTRAVENOUS; SUBCUTANEOUS at 16:53

## 2019-05-04 RX ADMIN — POTASSIUM CHLORIDE, DEXTROSE MONOHYDRATE AND SODIUM CHLORIDE 100 ML/HR: 150; 5; 450 INJECTION, SOLUTION INTRAVENOUS at 20:30

## 2019-05-04 RX ADMIN — HYDROCODONE BITARTRATE AND ACETAMINOPHEN 1 TABLET: 5; 325 TABLET ORAL at 20:41

## 2019-05-04 RX ADMIN — LEVETIRACETAM 500 MG: 500 TABLET, FILM COATED ORAL at 20:26

## 2019-05-04 RX ADMIN — HEPARIN SODIUM 5000 UNITS: 5000 INJECTION, SOLUTION INTRAVENOUS; SUBCUTANEOUS at 20:26

## 2019-05-04 RX ADMIN — SODIUM CHLORIDE, PRESERVATIVE FREE 3 ML: 5 INJECTION INTRAVENOUS at 20:27

## 2019-05-04 RX ADMIN — POTASSIUM CHLORIDE 40 MEQ: 1500 TABLET, EXTENDED RELEASE ORAL at 17:34

## 2019-05-04 RX ADMIN — VANCOMYCIN HYDROCHLORIDE 1750 MG: 5 INJECTION, POWDER, LYOPHILIZED, FOR SOLUTION INTRAVENOUS at 17:33

## 2019-05-04 RX ADMIN — DULOXETINE HYDROCHLORIDE 60 MG: 60 CAPSULE, DELAYED RELEASE ORAL at 20:25

## 2019-05-04 RX ADMIN — CLONAZEPAM 0.5 MG: 0.5 TABLET ORAL at 20:25

## 2019-05-04 NOTE — PROGRESS NOTES
Discharge Planning Assessment   [facility]     Patient Name: Karely Villarreal  MRN: [id number]  Today's Date: 5/4/2019    Admit Date: 5/4/2019    Discharge Needs Assessment     Row Name 05/04/19 161       Living Environment    Lives With  friend(s)    Name(s) of Who Lives With Patient  LIVES WITH JOAQUIM AHN ([phone])    Current Living Arrangements  home/apartment/condo    Primary Care Provided by  self    Quality of Family Relationships  supportive    Able to Return to Prior Arrangements  yes       Resource/Environmental Concerns    Resource/Environmental Concerns  none       Transition Planning    Patient/Family Anticipates Transition to  home    Patient/Family Anticipated Services at Transition  none    Transportation Anticipated  family or friend will provide       Discharge Needs Assessment    Readmission Within the Last 30 Days  no previous admission in last 30 days    Concerns to be Addressed  no discharge needs identified;denies needs/concerns at this time    Equipment Currently Used at Home  walker, rolling    Anticipated Changes Related to Illness  none    Equipment Needed After Discharge  none        Discharge Plan     Row Name 05/04/19 0955       Plan    Plan  PT LIVES AT HOME WITH FRIEND JOAQUIM AHN AND PLANS TO RETURN HOME UPON DISCHARGE, JOAQUIM WHO IS AT BEDSIDE STATES SHE CAN PROVIDE TRANSPORTATION HOME. PT STATES SHE JUST MOVED HERE FROM [place] AND DOESN'T HAVE A LOCAL PCP BUT WAS SEEING DR NEERU GRAJEDA, SHE USES [facility] AND HAS MEDICARE INSURANCE. DENIES ANY ISSUES WITH MEDS OR COPAYS. PT STATES SHE HAS AN ADVANCED DIRECTIVE THAT IS ON FILE HERE. PT DOES USE A ROLLING WALKER PRN SHE HAS MS, DOES NOT HAVE HOME HEALTH OR O2. NO NEEDS IDENTIFIED AT THIS TIME.         Destination      No service coordination in this encounter.      Durable Medical Equipment      No service coordination in this encounter.      Dialysis/Infusion      No service coordination in this encounter.       Home Medical Care      No service coordination in this encounter.      Therapy      No service coordination in this encounter.      Community Resources      No service coordination in this encounter.          Demographic Summary     Row Name 05/04/19 1618       General Information    Admission Type  inpatient    Arrived From  home    Referral Source  emergency department    Reason for Consult  discharge planning    Preferred Language  English        Functional Status     Row Name 05/04/19 1618       Functional Status    Usual Activity Tolerance  fair    Current Activity Tolerance  fair       Mental Status    General Appearance WDL  WDL        Psychosocial     Row Name 05/04/19 1619       Behavior WDL    Behavior WDL  WDL       Emotion Mood WDL    Emotion/Mood/Affect WDL  WDL       Speech WDL    Speech WDL  WDL        Abuse/Neglect    No documentation.       Legal    No documentation.       Substance Abuse    No documentation.       Patient Forms    No documentation.           Lisa Duong RN

## 2019-05-04 NOTE — ED PROVIDER NOTES
Subjective     Abscess   Location:  Shoulder/arm  Shoulder/arm abscess location:  R axilla and R upper arm  Size:  Golf ball size  Abscess quality: draining, induration, painful and redness    Red streaking: yes    Duration:  5 days  Progression:  Worsening  Pain details:     Quality:  Sharp    Severity:  Moderate    Duration:  5 days    Timing:  Constant  Context: immunosuppression    Context comment:  She was seen yesterday.  She had I&D.  This drained quite a bit though unfortunately has progressively gotten worse.  Associated symptoms: no fever        Review of Systems   Constitutional: Negative.  Negative for fever.   HENT: Negative.    Respiratory: Negative.    Cardiovascular: Negative.  Negative for chest pain.   Gastrointestinal: Negative.  Negative for abdominal pain.   Endocrine: Negative.    Genitourinary: Negative.  Negative for dysuria.   Skin: Negative.    Neurological: Negative.    Psychiatric/Behavioral: Negative.    All other systems reviewed and are negative.      Past Medical History:   Diagnosis Date   • Anxiety    • Chronic headache    • Depression    • Diastolic CHF, chronic (CMS/[facility])    • DVT (deep venous thrombosis) (CMS/[facility])     left leg   • Encephalitis 12/2016    treated at the [facility], [place], operated by [facility]   • Gastric ulcer with perforation (CMS/[facility]) 03/2016    Microperforation and air in the biliary tree   • Gastritis    • Henoch-Schonlein purpura (CMS/[facility])    • Hypertension    • Hypothyroidism (acquired)     Removed due to groiter   • Lower GI bleeding    • Migraine    • Mixed connective tissue disease (CMS/[facility])    • MRSA cellulitis    • NSTEMI (non-ST elevated myocardial infarction) (CMS/[facility])    • Patent foramen ovale    • Pneumonia    • PVC (premature ventricular contraction)    • RA (rheumatoid arthritis) (CMS/[facility])    • Renal disorder    • Rhabdomyolysis    • Seizures (CMS/[facility])     when had encephalitis   • Sjogren's syndrome (CMS/[facility])    • Stroke (CMS/[facility]) 09/2015    x 1   • Systemic  lupus erythematosus (CMS/HCC)     Discoid and systemic   • Temporal arteritis (CMS/HCC)    • TIA (transient ischemic attack)     x 3       Allergies   Allergen Reactions   • Compazine [Prochlorperazine Edisylate]    • Imitrex [Sumatriptan]    • Nsaids    • Reglan [Metoclopramide]    • Solu-Medrol [Methylprednisolone]    • Zyprexa [Olanzapine]        Past Surgical History:   Procedure Laterality Date   • APPENDECTOMY     • CARDIAC CATHETERIZATION  2016    PFO repair and had a loop monitor placed at the [facility]   •  SECTION      x 2   • ENDOSCOPY     • KNEE ARTHROSCOPY Left    • LUMBAR FUSION     • PORTACATH PLACEMENT Right 2016   • THYROID SURGERY      Removed due to a goiter       Family History   Problem Relation Age of Onset   • Hypertension Mother    • Arthritis Mother         RA   • Hypertension Father    • Arthritis Father         RA   • Diabetes Father        Social History     Socioeconomic History   • Marital status:      Spouse name: Not on file   • Number of children: Not on file   • Years of education: Not on file   • Highest education level: Not on file   Tobacco Use   • Smoking status: Never Smoker   • Smokeless tobacco: Never Used   Substance and Sexual Activity   • Alcohol use: No     Comment: Occassionally, social drinker in the past   • Drug use: No     Comment: Patient denies all street drugs and ETOH.   • Sexual activity: Yes     Partners: Male           Objective   Physical Exam   Constitutional: She is oriented to person, place, and time. She appears well-developed and well-nourished. No distress.   HENT:   Head: Normocephalic and atraumatic.   Right Ear: External ear normal.   Left Ear: External ear normal.   Nose: Nose normal.   Eyes: Conjunctivae and EOM are normal. Pupils are equal, round, and reactive to light.   Neck: Normal range of motion. Neck supple. No JVD present. No tracheal deviation present.   Cardiovascular: Normal rate, regular rhythm and  normal heart sounds.   No murmur heard.  Pulmonary/Chest: Effort normal and breath sounds normal. No respiratory distress. She has no wheezes.   Abdominal: Soft. Bowel sounds are normal. There is no tenderness.   Musculoskeletal: Normal range of motion. She exhibits no edema or deformity.   Neurological: She is alert and oriented to person, place, and time. No cranial nerve deficit.   Skin: Skin is warm and dry. No rash noted. She is not diaphoretic. No erythema. No pallor.   Golf ball size abscess right axilla/right upper arm.  There is streaking and redness throughout the entire medial aspect of patient's bicep region.   Psychiatric: She has a normal mood and affect. Her behavior is normal. Thought content normal.   Nursing note and vitals reviewed.      Procedures           ED Course  ED Course as of May 04 1624   Sat May 04, 2019   1607 D/w Dr Martin- admit. NPO  [JI]      ED Course User Index  [JI] Gabo Bonilla PA                  MDM  Number of Diagnoses or Management Options  Abscess of right arm: established and worsening     Amount and/or Complexity of Data Reviewed  Clinical lab tests: ordered and reviewed  Discuss the patient with other providers: yes    Risk of Complications, Morbidity, and/or Mortality  Presenting problems: moderate          Final diagnoses:   Abscess of right arm            Gabo Bonilla PA  05/04/19 8537

## 2019-05-04 NOTE — H&P
Patient Care Team:  Provider, No Known as PCP - General    Chief complaint R axilla abscess    Subjective     52 yo F with SLE presenting with abscess of the R axilla.  Over the last year she has had MRSA abscesses of the left thigh and face.  She underwent incision and drainage in the ER yesterday but now has worsening induration and cellulitis.  No fever or systemic symptoms.  Patient with multiple medical comorbidities.  No anticoagulation.        Review of Systems   Constitutional: Negative for activity change, appetite change, chills and fever.   HENT: Negative for sore throat and trouble swallowing.    Eyes: Negative for visual disturbance.   Respiratory: Negative for cough and shortness of breath.    Cardiovascular: Negative for chest pain and palpitations.   Gastrointestinal: Negative for abdominal distention, abdominal pain, blood in stool, constipation, diarrhea, nausea and vomiting.   Endocrine: Negative for cold intolerance and heat intolerance.   Genitourinary: Negative for dysuria.   Musculoskeletal: Negative for joint swelling.   Skin: Positive for wound. Negative for color change and rash.   Allergic/Immunologic: Negative for immunocompromised state.   Neurological: Negative for dizziness, seizures, weakness and headaches.   Hematological: Negative for adenopathy. Does not bruise/bleed easily.   Psychiatric/Behavioral: Negative for agitation and confusion.        Past Medical History:   Diagnosis Date   • Anxiety    • Chronic headache    • Depression    • Diastolic CHF, chronic (CMS/HCC)    • DVT (deep venous thrombosis) (CMS/HCC)     left leg   • Encephalitis 12/2016    treated at the [facility]   • Gastric ulcer with perforation (CMS/HCC) 03/2016    Microperforation and air in the biliary tree   • Gastritis    • Henoch-Schonlein purpura (CMS/HCC)    • Hypertension    • Hypothyroidism (acquired)     Removed due to groiter   • Lower GI bleeding    • Migraine    • Mixed connective  tissue disease (CMS/HCC)    • MRSA cellulitis    • NSTEMI (non-ST elevated myocardial infarction) (CMS/HCC)    • Patent foramen ovale    • Pneumonia    • PVC (premature ventricular contraction)    • RA (rheumatoid arthritis) (CMS/HCC)    • Renal disorder    • Rhabdomyolysis    • Seizures (CMS/HCC)     when had encephalitis   • Sjogren's syndrome (CMS/HCC)    • Stroke (CMS/HCC) 09/2015    x 1   • Systemic lupus erythematosus (CMS/HCC)     Discoid and systemic   • Temporal arteritis (CMS/HCC)    • TIA (transient ischemic attack)     x 3     Past Surgical History:   Procedure Laterality Date   • APPENDECTOMY     • CARDIAC CATHETERIZATION  2016    PFO repair and had a loop monitor placed at the [facility]   •  SECTION      x 2   • ENDOSCOPY     • KNEE ARTHROSCOPY Left    • LUMBAR FUSION     • PORTACATH PLACEMENT Right 2016   • THYROID SURGERY      Removed due to a goiter     Family History   Problem Relation Age of Onset   • Hypertension Mother    • Arthritis Mother         RA   • Hypertension Father    • Arthritis Father         RA   • Diabetes Father      Social History     Tobacco Use   • Smoking status: Never Smoker   • Smokeless tobacco: Never Used   Substance Use Topics   • Alcohol use: No     Comment: Occassionally, social drinker in the past   • Drug use: No     Comment: Patient denies all street drugs and ETOH.       (Not in a hospital admission)  Allergies:  Compazine [prochlorperazine edisylate]; Imitrex [sumatriptan]; Nsaids; Reglan [metoclopramide]; Solu-medrol [methylprednisolone]; and Zyprexa [olanzapine]    Objective      Vital Signs  Temp:  [98.3 °F (36.8 °C)] 98.3 °F (36.8 °C)  Heart Rate:  [105] 105  Resp:  [18] 18  BP: (135)/(81) 135/81    Physical Exam   Constitutional: She is oriented to person, place, and time. She appears well-developed.   HENT:   Head: Normocephalic and atraumatic.   Mouth/Throat: Mucous membranes are normal.   Eyes: Conjunctivae are normal. Pupils are  equal, round, and reactive to light.   Neck: Neck supple. No JVD present. No tracheal deviation present. No thyromegaly present.   Cardiovascular: Normal rate and regular rhythm. Exam reveals no gallop and no friction rub.   No murmur heard.  Pulmonary/Chest: Effort normal and breath sounds normal.   Abdominal: Soft. She exhibits no distension. There is no splenomegaly or hepatomegaly. There is no tenderness. No hernia.   Musculoskeletal: Normal range of motion. She exhibits no deformity.   Neurological: She is alert and oriented to person, place, and time.   Skin: Skin is warm and dry.   Egg sized induration of right axilla with cellulitis extending distally along the medial biceps to the mid biceps region.  No purulent drainage   Psychiatric: She has a normal mood and affect.       Results Review:   I reviewed the patient's new clinical results.      Assessment/Plan       Abscess of axilla, right      Assessment:  (54 yo F with right axillary abscess and cellulitis.).     Plan:   (Admit to floor  IV antibiotics  OR in AM for incision and drainage, possible debridement).       I discussed the patients findings and my recommendations with patient    Zak Martin MD  05/04/19  5:18 PM

## 2019-05-04 NOTE — ED NOTES
Heather rn on [address] reports pt room is not ready at this time, reports she will call me back when it becomes available.     Shaneka Chan RN  05/04/19 3180

## 2019-05-04 NOTE — ED NOTES
Pt alert and oriented, skin pwd, no resp distress. Pt reports right arm pain is still present and rates pain 5/10. vad remains accessed with 20 gauge with vanc ifusing at 250ml/hr and continued to floor. Pt ready for transport to floor.     Shaneka Chan, TIFF  05/04/19 2991

## 2019-05-05 ENCOUNTER — ANESTHESIA EVENT (OUTPATIENT)
Dept: PERIOP | Facility: HOSPITAL | Age: 54
End: 2019-05-05

## 2019-05-05 ENCOUNTER — ANESTHESIA (OUTPATIENT)
Dept: PERIOP | Facility: HOSPITAL | Age: 54
End: 2019-05-05

## 2019-05-05 VITALS
DIASTOLIC BLOOD PRESSURE: 83 MMHG | RESPIRATION RATE: 20 BRPM | TEMPERATURE: 97.5 F | SYSTOLIC BLOOD PRESSURE: 135 MMHG | BODY MASS INDEX: 36 KG/M2 | OXYGEN SATURATION: 96 % | HEART RATE: 100 BPM | HEIGHT: 66 IN | WEIGHT: 224 LBS

## 2019-05-05 LAB
ANION GAP SERPL CALCULATED.3IONS-SCNC: 14.2 MMOL/L
BUN BLD-MCNC: 8 MG/DL (ref 6–20)
BUN/CREAT SERPL: 11 (ref 7–25)
CALCIUM SPEC-SCNC: 8.7 MG/DL (ref 8.6–10.5)
CHLORIDE SERPL-SCNC: 97 MMOL/L (ref 98–107)
CO2 SERPL-SCNC: 26.8 MMOL/L (ref 22–29)
CREAT BLD-MCNC: 0.73 MG/DL (ref 0.57–1)
DEPRECATED RDW RBC AUTO: 51.3 FL (ref 37–54)
ERYTHROCYTE [DISTWIDTH] IN BLOOD BY AUTOMATED COUNT: 18 % (ref 12.3–15.4)
GFR SERPL CREATININE-BSD FRML MDRD: 84 ML/MIN/1.73
GLUCOSE BLD-MCNC: 112 MG/DL (ref 65–99)
HCT VFR BLD AUTO: 36.2 % (ref 34–46.6)
HGB BLD-MCNC: 10.8 G/DL (ref 12–15.9)
MCH RBC QN AUTO: 23.7 PG (ref 26.6–33)
MCHC RBC AUTO-ENTMCNC: 29.8 G/DL (ref 31.5–35.7)
MCV RBC AUTO: 79.6 FL (ref 79–97)
PLATELET # BLD AUTO: 319 10*3/MM3 (ref 140–450)
PMV BLD AUTO: 9.1 FL (ref 6–12)
POTASSIUM BLD-SCNC: 3.7 MMOL/L (ref 3.5–5.2)
RBC # BLD AUTO: 4.55 10*6/MM3 (ref 3.77–5.28)
SODIUM BLD-SCNC: 138 MMOL/L (ref 136–145)
WBC NRBC COR # BLD: 10 10*3/MM3 (ref 3.4–10.8)

## 2019-05-05 PROCEDURE — S0260 H&P FOR SURGERY: HCPCS | Performed by: SURGERY

## 2019-05-05 PROCEDURE — 25010000002 FENTANYL CITRATE (PF) 100 MCG/2ML SOLUTION: Performed by: NURSE ANESTHETIST, CERTIFIED REGISTERED

## 2019-05-05 PROCEDURE — 25010000002 DEXAMETHASONE PER 1 MG: Performed by: NURSE ANESTHETIST, CERTIFIED REGISTERED

## 2019-05-05 PROCEDURE — 85027 COMPLETE CBC AUTOMATED: CPT | Performed by: SURGERY

## 2019-05-05 PROCEDURE — 25010000002 MIDAZOLAM PER 1 MG: Performed by: NURSE ANESTHETIST, CERTIFIED REGISTERED

## 2019-05-05 PROCEDURE — 99024 POSTOP FOLLOW-UP VISIT: CPT | Performed by: SURGERY

## 2019-05-05 PROCEDURE — 25010000002 ONDANSETRON PER 1 MG: Performed by: NURSE ANESTHETIST, CERTIFIED REGISTERED

## 2019-05-05 PROCEDURE — 25010000002 VANCOMYCIN 5 G RECONSTITUTED SOLUTION 5,000 MG VIAL: Performed by: SURGERY

## 2019-05-05 PROCEDURE — 0X9400Z DRAINAGE OF RIGHT AXILLA WITH DRAINAGE DEVICE, OPEN APPROACH: ICD-10-PCS | Performed by: SURGERY

## 2019-05-05 PROCEDURE — 80048 BASIC METABOLIC PNL TOTAL CA: CPT | Performed by: SURGERY

## 2019-05-05 PROCEDURE — 10061 I&D ABSCESS COMP/MULTIPLE: CPT | Performed by: SURGERY

## 2019-05-05 RX ORDER — OXYCODONE HYDROCHLORIDE AND ACETAMINOPHEN 5; 325 MG/1; MG/1
1 TABLET ORAL ONCE AS NEEDED
Status: DISCONTINUED | OUTPATIENT
Start: 2019-05-05 | End: 2019-05-05 | Stop reason: HOSPADM

## 2019-05-05 RX ORDER — MAGNESIUM HYDROXIDE 1200 MG/15ML
LIQUID ORAL AS NEEDED
Status: DISCONTINUED | OUTPATIENT
Start: 2019-05-05 | End: 2019-05-05 | Stop reason: HOSPADM

## 2019-05-05 RX ORDER — IPRATROPIUM BROMIDE AND ALBUTEROL SULFATE 2.5; .5 MG/3ML; MG/3ML
3 SOLUTION RESPIRATORY (INHALATION) ONCE AS NEEDED
Status: DISCONTINUED | OUTPATIENT
Start: 2019-05-05 | End: 2019-05-05 | Stop reason: HOSPADM

## 2019-05-05 RX ORDER — DEXAMETHASONE SODIUM PHOSPHATE 4 MG/ML
INJECTION, SOLUTION INTRA-ARTICULAR; INTRALESIONAL; INTRAMUSCULAR; INTRAVENOUS; SOFT TISSUE AS NEEDED
Status: DISCONTINUED | OUTPATIENT
Start: 2019-05-05 | End: 2019-05-05 | Stop reason: SURG

## 2019-05-05 RX ORDER — ONDANSETRON 2 MG/ML
INJECTION INTRAMUSCULAR; INTRAVENOUS AS NEEDED
Status: DISCONTINUED | OUTPATIENT
Start: 2019-05-05 | End: 2019-05-05 | Stop reason: SURG

## 2019-05-05 RX ORDER — ONDANSETRON 2 MG/ML
4 INJECTION INTRAMUSCULAR; INTRAVENOUS ONCE AS NEEDED
Status: DISCONTINUED | OUTPATIENT
Start: 2019-05-05 | End: 2019-05-05 | Stop reason: HOSPADM

## 2019-05-05 RX ORDER — MEPERIDINE HYDROCHLORIDE 25 MG/ML
12.5 INJECTION INTRAMUSCULAR; INTRAVENOUS; SUBCUTANEOUS
Status: COMPLETED | OUTPATIENT
Start: 2019-05-05 | End: 2019-05-05

## 2019-05-05 RX ORDER — SODIUM CHLORIDE 0.9 % (FLUSH) 0.9 %
3-10 SYRINGE (ML) INJECTION AS NEEDED
Status: DISCONTINUED | OUTPATIENT
Start: 2019-05-05 | End: 2019-05-05 | Stop reason: HOSPADM

## 2019-05-05 RX ORDER — FENTANYL CITRATE 50 UG/ML
50 INJECTION, SOLUTION INTRAMUSCULAR; INTRAVENOUS
Status: DISCONTINUED | OUTPATIENT
Start: 2019-05-05 | End: 2019-05-05 | Stop reason: HOSPADM

## 2019-05-05 RX ORDER — LIDOCAINE HYDROCHLORIDE 20 MG/ML
INJECTION, SOLUTION EPIDURAL; INFILTRATION; INTRACAUDAL; PERINEURAL AS NEEDED
Status: DISCONTINUED | OUTPATIENT
Start: 2019-05-05 | End: 2019-05-05 | Stop reason: SURG

## 2019-05-05 RX ORDER — MIDAZOLAM HYDROCHLORIDE 1 MG/ML
INJECTION INTRAMUSCULAR; INTRAVENOUS AS NEEDED
Status: DISCONTINUED | OUTPATIENT
Start: 2019-05-05 | End: 2019-05-05 | Stop reason: SURG

## 2019-05-05 RX ORDER — SULFAMETHOXAZOLE AND TRIMETHOPRIM 800; 160 MG/1; MG/1
1 TABLET ORAL 2 TIMES DAILY
Qty: 6 TABLET | Refills: 0 | Status: SHIPPED | OUTPATIENT
Start: 2019-05-05 | End: 2019-07-18

## 2019-05-05 RX ORDER — FENTANYL CITRATE 50 UG/ML
INJECTION, SOLUTION INTRAMUSCULAR; INTRAVENOUS AS NEEDED
Status: DISCONTINUED | OUTPATIENT
Start: 2019-05-05 | End: 2019-05-05 | Stop reason: SURG

## 2019-05-05 RX ORDER — SODIUM CHLORIDE 0.9 % (FLUSH) 0.9 %
3 SYRINGE (ML) INJECTION EVERY 12 HOURS SCHEDULED
Status: DISCONTINUED | OUTPATIENT
Start: 2019-05-05 | End: 2019-05-05 | Stop reason: HOSPADM

## 2019-05-05 RX ADMIN — LIDOCAINE HYDROCHLORIDE 3 ML: 20 INJECTION, SOLUTION EPIDURAL; INFILTRATION; INTRACAUDAL; PERINEURAL at 10:06

## 2019-05-05 RX ADMIN — HYDROCHLOROTHIAZIDE 25 MG: 25 TABLET ORAL at 08:14

## 2019-05-05 RX ADMIN — POTASSIUM CHLORIDE 20 MEQ: 1500 TABLET, EXTENDED RELEASE ORAL at 08:14

## 2019-05-05 RX ADMIN — CLONAZEPAM 0.5 MG: 0.5 TABLET ORAL at 12:56

## 2019-05-05 RX ADMIN — HYDROCODONE BITARTRATE AND ACETAMINOPHEN 1 TABLET: 5; 325 TABLET ORAL at 12:58

## 2019-05-05 RX ADMIN — PANTOPRAZOLE SODIUM 40 MG: 40 TABLET, DELAYED RELEASE ORAL at 08:14

## 2019-05-05 RX ADMIN — FENTANYL CITRATE 50 MCG: 50 INJECTION INTRAMUSCULAR; INTRAVENOUS at 10:48

## 2019-05-05 RX ADMIN — VANCOMYCIN HYDROCHLORIDE 1250 MG: 5 INJECTION, POWDER, LYOPHILIZED, FOR SOLUTION INTRAVENOUS at 05:54

## 2019-05-05 RX ADMIN — FENTANYL CITRATE 50 MCG: 50 INJECTION INTRAMUSCULAR; INTRAVENOUS at 10:54

## 2019-05-05 RX ADMIN — LEVOTHYROXINE SODIUM 175 MCG: 0.17 TABLET ORAL at 08:14

## 2019-05-05 RX ADMIN — MEPERIDINE HYDROCHLORIDE 12.5 MG: 25 INJECTION INTRAMUSCULAR; INTRAVENOUS; SUBCUTANEOUS at 11:12

## 2019-05-05 RX ADMIN — HYDROXYCHLOROQUINE SULFATE 200 MG: 200 TABLET ORAL at 08:14

## 2019-05-05 RX ADMIN — ARIPIPRAZOLE 5 MG: 10 TABLET ORAL at 08:13

## 2019-05-05 RX ADMIN — BUPROPION HYDROCHLORIDE 100 MG: 100 TABLET, EXTENDED RELEASE ORAL at 08:13

## 2019-05-05 RX ADMIN — MIDAZOLAM HYDROCHLORIDE 2 MG: 1 INJECTION, SOLUTION INTRAMUSCULAR; INTRAVENOUS at 10:04

## 2019-05-05 RX ADMIN — MEPERIDINE HYDROCHLORIDE 12.5 MG: 25 INJECTION INTRAMUSCULAR; INTRAVENOUS; SUBCUTANEOUS at 11:05

## 2019-05-05 RX ADMIN — FENTANYL CITRATE 50 MCG: 50 INJECTION INTRAMUSCULAR; INTRAVENOUS at 10:11

## 2019-05-05 RX ADMIN — LEVETIRACETAM 500 MG: 500 TABLET, FILM COATED ORAL at 08:14

## 2019-05-05 RX ADMIN — ONDANSETRON 4 MG: 2 INJECTION, SOLUTION INTRAMUSCULAR; INTRAVENOUS at 10:15

## 2019-05-05 RX ADMIN — ACETAMINOPHEN 650 MG: 325 TABLET, FILM COATED ORAL at 05:54

## 2019-05-05 RX ADMIN — POTASSIUM CHLORIDE, DEXTROSE MONOHYDRATE AND SODIUM CHLORIDE 100 ML/HR: 150; 5; 450 INJECTION, SOLUTION INTRAVENOUS at 05:54

## 2019-05-05 RX ADMIN — HYDROCODONE BITARTRATE AND ACETAMINOPHEN 1 TABLET: 5; 325 TABLET ORAL at 03:24

## 2019-05-05 RX ADMIN — DULOXETINE HYDROCHLORIDE 60 MG: 60 CAPSULE, DELAYED RELEASE ORAL at 08:14

## 2019-05-05 RX ADMIN — CLONAZEPAM 0.5 MG: 0.5 TABLET ORAL at 08:14

## 2019-05-05 RX ADMIN — DEXAMETHASONE SODIUM PHOSPHATE 4 MG: 4 INJECTION, SOLUTION INTRAMUSCULAR; INTRAVENOUS at 10:15

## 2019-05-05 NOTE — PLAN OF CARE
Problem: Patient Care Overview  Goal: Plan of Care Review  Outcome: Ongoing (interventions implemented as appropriate)   05/05/19 0955   Coping/Psychosocial   Plan of Care Reviewed With patient   Plan of Care Review   Progress no change     Goal: Individualization and Mutuality  Outcome: Ongoing (interventions implemented as appropriate)    Goal: Discharge Needs Assessment  Outcome: Ongoing (interventions implemented as appropriate)    Goal: Interprofessional Rounds/Family Conf  Outcome: Ongoing (interventions implemented as appropriate)      Problem: Pain, Acute (Adult)  Goal: Acceptable Pain Control/Comfort Level  Outcome: Ongoing (interventions implemented as appropriate)   05/05/19 0955   Pain, Acute (Adult)   Acceptable Pain Control/Comfort Level making progress toward outcome       Problem: Skin and Soft Tissue Infection (Adult)  Goal: Signs and Symptoms of Listed Potential Problems Will be Absent, Minimized or Managed (Skin and Soft Tissue Infection)  Outcome: Ongoing (interventions implemented as appropriate)   05/05/19 0038 05/05/19 0955   Goal/Outcome Evaluation   Problems Assessed (Skin and Soft Tissue Infection) --  all   Problems Present (Skin/Soft Tissue Inf) infection progression;pain --

## 2019-05-05 NOTE — PROGRESS NOTES
Pharmacokinetics Service Note:    Ms. Villarreal was initiated on vancomycin 1250 mg q12hrs following a 1750 mg loading dose to treat her R axilla abscess.  The goal trough range is 15-20 mg/L.  Will obtain a trough level when steady state is achieved if treatment is to continue beyond 3 days.      Thank you.  Lindsey Woodward, Pharm.D.  5/5/2019  10:47 AM

## 2019-05-05 NOTE — ANESTHESIA PREPROCEDURE EVALUATION
Anesthesia Evaluation                  Airway   Mallampati: III  TM distance: >3 FB  Neck ROM: full  Possible difficult intubation  Dental - normal exam   (+) poor dentition    Pulmonary - normal exam   (+) pneumonia ,   Cardiovascular - normal exam    (+) hypertension, past MI , CHF, PVD, DVT,       Neuro/Psych  (+) seizures, TIA, CVA, headaches, psychiatric history,     GI/Hepatic/Renal/Endo    (+)  PUD, GI bleeding, hypothyroidism,     Musculoskeletal     Abdominal  - normal exam    Bowel sounds: normal.   Substance History      OB/GYN          Other   (+) arthritis                     Anesthesia Plan    ASA 3     general     intravenous induction   Anesthetic plan, all risks, benefits, and alternatives have been provided, discussed and informed consent has been obtained with: patient.    Plan discussed with CRNA.

## 2019-05-05 NOTE — OP NOTE
INCISION AND DRAINAGE ABSCESS  Procedure Note    Karely Villarreal  5/4/2019 - 5/5/2019    Pre-op Diagnosis:   Abscess of axilla, right [L02.411]    Post-op Diagnosis:     Post-Op Diagnosis Codes:     * Abscess of axilla, right [L02.411]    Procedure(s):  INCISION AND DRAINAGE ABSCESS RIGHT AXILLA    Surgeon(s):  Zak Martin MD    Anesthesia: Choice    Staff:   Circulator: Candelaria Kiser RN  Assistant: Jonn Muñiz  Other: Maryana Bello    Findings: 5 cm abscess of skin and subcutaneous fat of the right axilla and upper extremity    Operative Procedure: Patient was taken operating suite placed prone on operating table.  Bilateral sequential compression devices were applied and LMA anesthesia was administered.  The right axilla and upper extremity were prepped and draped in usual sterile fashion.  Timeout procedure was performed.  The patient was on scheduled antibiotics.  At the site of previous attempted incision and drainage and incision was made in the hemostat inserted.  A large volume purulence was expressed and suctioned free.  The abscess tunneled inferiorly along the medial aspect of the right biceps and at the inferior most extension the counterincision was made.  Blunt dissection broke up all loculations and all necrotic fat and purulence was removed.  No evidence of fascial or muscular involvement was noted.  The wound was irrigated clear with copious amounts of normal saline and all irrigant was suctioned free.  Penrose drain was placed through the counterincisions and secured in loop fashion with a nylon suture.  Wound was then packed with Kerlix gauze and dressed with a bandage.  The patient was awakened from anesthesia and taken to recovery.    Estimated Blood Loss: 10 mL    Specimens:   None           Drains: Penrose drain right axilla    Grafts/Implants: None    Complications: None      Zak Martin MD     Date: 5/5/2019  Time: 10:35 AM

## 2019-05-05 NOTE — PROGRESS NOTES
Chief complaint: Right axillary abscess    Patient doing well overnight.  Cellulitis improving greatly as he is surrounding induration.    Afebrile, vital signs stable  Clear to auscultation bilaterally  Abdomen is soft nontender nondistended  Right axillary cellulitis greatly improved.  Excise induration somewhat decreased with no fluctuance    53-year-old female with MRSA abscess that did not respond to minimal incision and drainage in the emergency department.  Her cellulitis is resolving with antibiotic therapy and she will be taken to the OR today for more extensive drainage and debridement.

## 2019-05-05 NOTE — ANESTHESIA PROCEDURE NOTES
Airway  Urgency: elective    Date/Time: 5/5/2019 10:09 AM  End Time:5/5/2019 10:09 AM    General Information and Staff    Patient location during procedure: OR  CRNA: Aydin Arroyo CRNA    Indications and Patient Condition  Indications for airway management: airway protection    Preoxygenated: yes  MILS maintained throughout  Mask difficulty assessment: 0 - not attempted    Final Airway Details  Final airway type: supraglottic airway      Successful airway: unique  Size 3  Airway Seal Pressure (cm H2O): 20    Number of attempts at approach: 1    Additional Comments  Atraumatic

## 2019-05-05 NOTE — ANESTHESIA POSTPROCEDURE EVALUATION
Patient: Karely Villarreal    Procedure Summary     Date:  05/05/19 Room / Location:  [facility] OR 01 / [facility] OR    Anesthesia Start:  1004 Anesthesia Stop:  1035    Procedure:  INCISION AND DRAINAGE ABSCESS RIGHT AXILLA (Right Abdomen) Diagnosis:       Abscess of axilla, right      (Abscess of axilla, right [L02.411])    Surgeon:  Zak Martin MD Provider:  Osmin Daly DO    Anesthesia Type:  general ASA Status:  3          Anesthesia Type: general  Last vitals  BP   115/71 (05/05/19 1117)   Temp   97.9 °F (36.6 °C) (05/05/19 1107)   Pulse   80 (05/05/19 1117)   Resp   18 (05/05/19 1117)     SpO2   99 % (05/05/19 1117)     Post Anesthesia Care and Evaluation    Patient location during evaluation: ED  Patient participation: complete - patient participated  Level of consciousness: awake and alert  Pain score: 1  Pain management: adequate  Airway patency: patent  Anesthetic complications: No anesthetic complications  PONV Status: controlled  Cardiovascular status: acceptable  Respiratory status: acceptable  Hydration status: acceptable

## 2019-05-05 NOTE — PLAN OF CARE
Problem: Patient Care Overview  Goal: Plan of Care Review  Outcome: Ongoing (interventions implemented as appropriate)   05/05/19 0038   Coping/Psychosocial   Plan of Care Reviewed With patient     Goal: Individualization and Mutuality  Outcome: Ongoing (interventions implemented as appropriate)      Problem: Pain, Acute (Adult)  Goal: Identify Related Risk Factors and Signs and Symptoms  Outcome: Outcome(s) achieved Date Met: 05/05/19 05/05/19 0038   Pain, Acute (Adult)   Related Risk Factors (Acute Pain) infection;persistent pain;positioning   Signs and Symptoms (Acute Pain) facial mask of pain/grimace;verbalization of pain descriptors     Goal: Acceptable Pain Control/Comfort Level  Outcome: Ongoing (interventions implemented as appropriate)   05/05/19 0038   Pain, Acute (Adult)   Acceptable Pain Control/Comfort Level making progress toward outcome       Problem: Skin and Soft Tissue Infection (Adult)  Goal: Signs and Symptoms of Listed Potential Problems Will be Absent, Minimized or Managed (Skin and Soft Tissue Infection)  Outcome: Ongoing (interventions implemented as appropriate)   05/05/19 0038   Goal/Outcome Evaluation   Problems Assessed (Skin and Soft Tissue Infection) all   Problems Present (Skin/Soft Tissue Inf) infection progression;pain

## 2019-05-06 ENCOUNTER — READMISSION MANAGEMENT (OUTPATIENT)
Dept: CALL CENTER | Facility: HOSPITAL | Age: 54
End: 2019-05-06

## 2019-05-06 ENCOUNTER — OFFICE VISIT (OUTPATIENT)
Dept: SURGERY | Facility: CLINIC | Age: 54
End: 2019-05-06

## 2019-05-06 VITALS
DIASTOLIC BLOOD PRESSURE: 71 MMHG | WEIGHT: 224 LBS | BODY MASS INDEX: 36 KG/M2 | SYSTOLIC BLOOD PRESSURE: 140 MMHG | HEIGHT: 66 IN | HEART RATE: 82 BPM

## 2019-05-06 DIAGNOSIS — L02.411 ABSCESS OF AXILLA, RIGHT: Primary | ICD-10-CM

## 2019-05-06 PROCEDURE — 99024 POSTOP FOLLOW-UP VISIT: CPT | Performed by: SURGERY

## 2019-05-06 NOTE — OUTREACH NOTE
Prep Survey      Responses   Facility patient discharged from?  [facility]   Is patient eligible?  Yes   Discharge diagnosis  I&D abscess of axilla, debridement   Does the patient have one of the following disease processes/diagnoses(primary or secondary)?  General Surgery   Does the patient have Home health ordered?  No   Is there a DME ordered?  No   Comments regarding appointments  see AVS   Prep survey completed?  Yes          Michelle Gongora RN

## 2019-05-06 NOTE — DISCHARGE SUMMARY
Date of Discharge:  5/6/2019    Discharge Diagnosis: Axillary abscess    Presenting Problem/History of Present Illness  Active Hospital Problems    Diagnosis  POA   • Abscess of axilla, right [L02.411]  Yes      Resolved Hospital Problems   No resolved problems to display.          Hospital Course  Patient is a 53 y.o. female presented with right axillary abscess.  She was admitted and placed on antibiotics.  She underwent incision and drainage with drain placement.  Over the course of her stay her cellulitis resolved.  She was discharged home after surgery.    Procedures Performed    Procedure(s):  INCISION AND DRAINAGE ABSCESS RIGHT AXILLA  -------------------       Consults:   Consults     No orders found from 4/5/2019 to 5/5/2019.            Condition on Discharge: Penrose drain in place.  She will return tomorrow to have packing removed in the office at which time she will flush her drain twice daily and flush twice daily.    Vital Signs  Temp:  [97.5 °F (36.4 °C)-98.4 °F (36.9 °C)] 97.5 °F (36.4 °C)  Heart Rate:  [] 82  Resp:  [10-20] 20  BP: (102-140)/(65-85) 140/71      Discharge Disposition  Home or Self Care    Discharge Medications     Discharge Medications      New Medications      Instructions Start Date   sulfamethoxazole-trimethoprim 800-160 MG per tablet  Commonly known as:  BACTRIM DS   1 tablet, Oral, 2 Times Daily         Continue These Medications      Instructions Start Date   ARIPiprazole 5 MG tablet  Commonly known as:  ABILIFY   5 mg, Oral, Daily      buPROPion  MG 12 hr tablet  Commonly known as:  WELLBUTRIN SR   100 mg, Oral, Daily      clonazePAM 0.5 MG tablet  Commonly known as:  KlonoPIN   0.5 mg, Oral, 4 Times Daily      DULoxetine 60 MG capsule  Commonly known as:  CYMBALTA   60 mg, Oral, 2 Times Daily      hydrochlorothiazide 25 MG tablet  Commonly known as:  HYDRODIURIL   25 mg, Oral, Daily      hydroxychloroquine 200 MG tablet  Commonly known as:  PLAQUENIL   200 mg,  Oral, 2 Times Daily      levETIRAcetam 500 MG tablet  Commonly known as:  KEPPRA   500 mg, Oral, 2 Times Daily      levothyroxine 175 MCG tablet  Commonly known as:  SYNTHROID, LEVOTHROID   175 mcg, Oral, Daily      metoprolol tartrate 25 MG tablet  Commonly known as:  LOPRESSOR   25 mg, Oral, Daily      oxyCODONE 15 MG immediate release tablet  Commonly known as:  ROXICODONE   15 mg, Oral, Every 6 Hours PRN      oxyMORphone ER 10 MG tablet extended-release 12 hour 12 hr tablet  Commonly known as:  OPANA ER   10 mg, Oral, Every 12 Hours Scheduled      pantoprazole 40 MG EC tablet  Commonly known as:  PROTONIX   40 mg, Oral, Daily      potassium chloride 20 MEQ CR tablet  Commonly known as:  K-DUR,KLOR-CON   20 mEq, Oral, Daily             Discharge Diet:   Diet Instructions     Advance Diet as Tolerated            Activity at Discharge:   Activity Instructions     Activity as tolerated            Follow-up Appointments  Future Appointments   Date Time Provider Department Center   5/6/2019  9:30 AM Zak Martin MD [facility] None     Additional Instructions for the Follow-ups that You Need to Schedule     Discharge Follow-up with Specialty: 1 Day   As directed      Follow Up:  1 Day               Test Results Pending at Discharge   Order Current Status    Blood Culture - Blood, Arm, Left Preliminary result    Blood Culture - Blood, Arm, Right Preliminary result           Zak Martin MD  05/06/19  8:39 AM

## 2019-05-08 ENCOUNTER — READMISSION MANAGEMENT (OUTPATIENT)
Dept: CALL CENTER | Facility: HOSPITAL | Age: 54
End: 2019-05-08

## 2019-05-08 NOTE — OUTREACH NOTE
General Surgery Week 1 Survey      Responses   Facility patient discharged from?  [facility]   Does the patient have one of the following disease processes/diagnoses(primary or secondary)?  General Surgery   Is there a successful TCM telephone encounter documented?  No   Week 1 attempt successful?  Yes   Call start time  1018   Call end time  1041   General alerts for this patient  Her SO is a RN. Pt has Lupus and connective tissue disease.    Discharge diagnosis  I&D abscess of axilla, debridement   Is patient permission given to speak with other caregiver?  No   Meds reviewed with patient/caregiver?  Yes   Is the patient having any side effects they believe may be caused by any medication additions or changes?  No   Does the patient have all medications related to this admission filled (includes all antibiotics, pain medications, etc.)  Yes   Is the patient taking all medications as directed (includes completed medication regime)?  Yes   Does the patient have a follow up appointment scheduled with their surgeon?  Yes   Has the patient kept scheduled appointments due by today?  N/A   Psychosocial issues?  No   Comments  axillary area is covered with guaze and ace bandage. Drain in place and is still draining large amt.    Did the patient receive a copy of their discharge instructions?  Yes   Nursing interventions  Reviewed instructions with patient   What is the patient's perception of their health status since discharge?  Improving   Nursing interventions  Nurse provided patient education   Is the patient /caregiver able to teach back basic post-op care?  Continue use of incentive spirometry at least 1 week post discharge, Drive as instructed by MD in discharge instructions, No tub bath, swimming, or hot tub until instructed by MD, Do not remove steri-strips   Is the patient/caregiver able to teach back signs and symptoms of incisional infection?  Increased drainage or bleeding, Incisional warmth, Pus or odor from  incision   Is the patient/caregiver able to teach back steps to recovery at home?  Set small, achievable goals for return to baseline health, Eat a well-balance diet, Practice good oral hygiene   Is the patient/caregiver able to teach back the hierarchy of who to call/visit for symptoms/problems? PCP, Specialist, Home health nurse, Urgent Care, ED, 911  Yes   Week 1 call completed?  Yes          Jada Asencio RN

## 2019-05-08 NOTE — PROGRESS NOTES
Subjective   Karely Villarreal is a 53 y.o. female  is here today for follow-up.         Karely Villarreal is a 53 y.o. female here for follow up after incision and drainage of right axillary abscess.  The patient packing has been removed.  Her drain is in place.  Cellulitis and induration have resolved.  No purulent drainage.  No fever.  She has some pain but is doing well.          Assessment     Karely was seen today for s/p i&d.    Diagnoses and all orders for this visit:    Abscess of axilla, right      Karely Villarreal is a 53 y.o. female s/p incision and drainage of right axillary abscess.  Penrose drain in place.  She will follow up in 2 weeks and her drain will be removed.  She will complete oral antibiotics.

## 2019-05-09 LAB
BACTERIA SPEC AEROBE CULT: NORMAL
BACTERIA SPEC AEROBE CULT: NORMAL

## 2019-05-15 ENCOUNTER — READMISSION MANAGEMENT (OUTPATIENT)
Dept: CALL CENTER | Facility: HOSPITAL | Age: 54
End: 2019-05-15

## 2019-05-15 NOTE — OUTREACH NOTE
General Surgery Week 2 Survey      Responses   Facility patient discharged from?  [facility]   Does the patient have one of the following disease processes/diagnoses(primary or secondary)?  General Surgery   Week 2 attempt successful?  No   Unsuccessful attempts  Attempt 1          Nilam Ho RN

## 2019-05-16 ENCOUNTER — READMISSION MANAGEMENT (OUTPATIENT)
Dept: CALL CENTER | Facility: HOSPITAL | Age: 54
End: 2019-05-16

## 2019-05-16 NOTE — OUTREACH NOTE
General Surgery Week 2 Survey      Responses   Facility patient discharged from?  [facility]   Does the patient have one of the following disease processes/diagnoses(primary or secondary)?  General Surgery   Week 2 attempt successful?  No   Unsuccessful attempts  Attempt 2          Lorrie Klein RN

## 2019-05-20 ENCOUNTER — READMISSION MANAGEMENT (OUTPATIENT)
Dept: CALL CENTER | Facility: HOSPITAL | Age: 54
End: 2019-05-20

## 2019-05-20 NOTE — OUTREACH NOTE
General Surgery Week 3 Survey      Responses   Facility patient discharged from?  [facility]   Does the patient have one of the following disease processes/diagnoses(primary or secondary)?  General Surgery   Week 3 attempt successful?  No   Unsuccessful attempts  Attempt 1          Lorrie Klein RN

## 2019-05-21 ENCOUNTER — READMISSION MANAGEMENT (OUTPATIENT)
Dept: CALL CENTER | Facility: HOSPITAL | Age: 54
End: 2019-05-21

## 2019-05-21 NOTE — OUTREACH NOTE
General Surgery Week 3 Survey      Responses   Facility patient discharged from?  [facility]   Does the patient have one of the following disease processes/diagnoses(primary or secondary)?  General Surgery   Week 3 attempt successful?  No   Unsuccessful attempts  Attempt 2          Jada Asencio RN

## 2019-07-14 ENCOUNTER — HOSPITAL ENCOUNTER (EMERGENCY)
Facility: HOSPITAL | Age: 54
Discharge: LEFT BEFORE DISCHARGE | End: 2019-07-14

## 2019-07-14 VITALS
DIASTOLIC BLOOD PRESSURE: 87 MMHG | OXYGEN SATURATION: 100 % | RESPIRATION RATE: 17 BRPM | HEART RATE: 74 BPM | HEIGHT: 66 IN | SYSTOLIC BLOOD PRESSURE: 153 MMHG | TEMPERATURE: 98 F | WEIGHT: 185 LBS | BODY MASS INDEX: 29.73 KG/M2

## 2019-07-18 ENCOUNTER — OFFICE VISIT (OUTPATIENT)
Dept: FAMILY MEDICINE CLINIC | Facility: CLINIC | Age: 54
End: 2019-07-18

## 2019-07-18 VITALS
HEART RATE: 95 BPM | TEMPERATURE: 98 F | OXYGEN SATURATION: 99 % | WEIGHT: 222.8 LBS | HEIGHT: 66 IN | DIASTOLIC BLOOD PRESSURE: 80 MMHG | BODY MASS INDEX: 35.81 KG/M2 | SYSTOLIC BLOOD PRESSURE: 130 MMHG

## 2019-07-18 DIAGNOSIS — Z12.31 SCREENING MAMMOGRAM, ENCOUNTER FOR: ICD-10-CM

## 2019-07-18 DIAGNOSIS — R73.9 HYPERGLYCEMIA: ICD-10-CM

## 2019-07-18 DIAGNOSIS — Z13.220 SCREENING CHOLESTEROL LEVEL: ICD-10-CM

## 2019-07-18 DIAGNOSIS — L73.2 HIDRADENITIS: Primary | ICD-10-CM

## 2019-07-18 DIAGNOSIS — E03.9 HYPOTHYROIDISM, UNSPECIFIED TYPE: ICD-10-CM

## 2019-07-18 PROCEDURE — 36415 COLL VENOUS BLD VENIPUNCTURE: CPT | Performed by: NURSE PRACTITIONER

## 2019-07-18 PROCEDURE — 84443 ASSAY THYROID STIM HORMONE: CPT | Performed by: NURSE PRACTITIONER

## 2019-07-18 PROCEDURE — 83036 HEMOGLOBIN GLYCOSYLATED A1C: CPT | Performed by: NURSE PRACTITIONER

## 2019-07-18 PROCEDURE — 99203 OFFICE O/P NEW LOW 30 MIN: CPT | Performed by: NURSE PRACTITIONER

## 2019-07-18 PROCEDURE — 80061 LIPID PANEL: CPT | Performed by: NURSE PRACTITIONER

## 2019-07-18 RX ORDER — PROMETHAZINE HYDROCHLORIDE 25 MG/1
25 TABLET ORAL EVERY 6 HOURS PRN
COMMUNITY
Start: 2017-01-03 | End: 2019-09-18 | Stop reason: HOSPADM

## 2019-07-18 RX ORDER — CLINDAMYCIN PHOSPHATE 11.9 MG/ML
SOLUTION TOPICAL 2 TIMES DAILY
Qty: 60 ML | Refills: 0 | Status: ON HOLD | OUTPATIENT
Start: 2019-07-18 | End: 2019-09-14

## 2019-07-18 RX ORDER — DOXYCYCLINE HYCLATE 100 MG/1
100 TABLET, DELAYED RELEASE ORAL 2 TIMES DAILY
Qty: 20 TABLET | Refills: 0 | Status: ON HOLD | OUTPATIENT
Start: 2019-07-18 | End: 2019-09-14

## 2019-07-18 RX ORDER — CHLORHEXIDINE GLUCONATE 4 G/100ML
SOLUTION TOPICAL DAILY PRN
Qty: 236 ML | Refills: 5 | Status: ON HOLD | OUTPATIENT
Start: 2019-07-18 | End: 2019-09-14

## 2019-07-18 RX ORDER — DEXAMETHASONE 4 MG/1
1 TABLET ORAL EVERY 12 HOURS
Status: ON HOLD | COMMUNITY
End: 2019-09-14

## 2019-07-18 RX ORDER — OXYCODONE HYDROCHLORIDE 30 MG/1
30 TABLET ORAL EVERY 4 HOURS PRN
COMMUNITY
End: 2019-09-18 | Stop reason: HOSPADM

## 2019-07-18 RX ORDER — LORAZEPAM 1 MG/1
1 TABLET ORAL EVERY 8 HOURS PRN
COMMUNITY
Start: 2017-12-31 | End: 2019-09-18 | Stop reason: HOSPADM

## 2019-07-18 NOTE — PATIENT INSTRUCTIONS

## 2019-07-19 LAB
CHOLEST SERPL-MCNC: 153 MG/DL (ref 0–200)
HBA1C MFR BLD: 5.8 % (ref 4.8–5.6)
HDLC SERPL-MCNC: 54 MG/DL (ref 40–60)
LDLC SERPL CALC-MCNC: 90 MG/DL (ref 0–100)
LDLC/HDLC SERPL: 1.67 {RATIO}
TRIGL SERPL-MCNC: 45 MG/DL (ref 0–150)
TSH SERPL DL<=0.05 MIU/L-ACNC: 17.6 MIU/ML (ref 0.27–4.2)
VLDLC SERPL-MCNC: 9 MG/DL (ref 5–40)

## 2019-07-22 DIAGNOSIS — E03.9 HYPOTHYROIDISM (ACQUIRED): Primary | ICD-10-CM

## 2019-07-22 RX ORDER — LEVOTHYROXINE SODIUM 175 UG/1
175 TABLET ORAL DAILY
Qty: 30 TABLET | Refills: 0 | Status: SHIPPED | OUTPATIENT
Start: 2019-07-22 | End: 2021-11-06 | Stop reason: HOSPADM

## 2019-07-23 ENCOUNTER — OFFICE VISIT (OUTPATIENT)
Dept: FAMILY MEDICINE CLINIC | Facility: CLINIC | Age: 54
End: 2019-07-23

## 2019-07-23 VITALS
BODY MASS INDEX: 34.82 KG/M2 | HEART RATE: 93 BPM | OXYGEN SATURATION: 99 % | DIASTOLIC BLOOD PRESSURE: 76 MMHG | WEIGHT: 216.7 LBS | HEIGHT: 66 IN | SYSTOLIC BLOOD PRESSURE: 134 MMHG | TEMPERATURE: 97.9 F

## 2019-07-23 DIAGNOSIS — N95.1 PERI-MENOPAUSE: Primary | ICD-10-CM

## 2019-07-23 DIAGNOSIS — L73.2 HIDRADENITIS AXILLARIS: ICD-10-CM

## 2019-07-23 PROCEDURE — 10061 I&D ABSCESS COMP/MULTIPLE: CPT | Performed by: NURSE PRACTITIONER

## 2019-07-23 PROCEDURE — 99214 OFFICE O/P EST MOD 30 MIN: CPT | Performed by: NURSE PRACTITIONER

## 2019-07-23 NOTE — PROGRESS NOTES
Subjective   Karely Villarreal is a 54 y.o. female.     Chief Complaint   Patient presents with   • Abscess       She presents for follow up of worsening hidradenitis. She states Sunday that a lot of green thick foul smelling pus started draining from her right axilla abscess. She states now her left axilla has 5 areas. She states she is taking the doxycycline. She states she has been cleaning with the hibiclens. She states the pharmacy had to order clindamycin. She states she was reading hormones can make this worse. She states she gets hot a clammy sometimes and she thinks it may be hormones. She states she has noticed excessive dark hair growth on her face in the past year or so. She states she has a history of having her right ovary removed. She states she has not had a regular period in about 6 months. She states she has some spotting.          The following portions of the patient's history were reviewed and updated as appropriate: allergies, current medications, past family history, past medical history, past social history, past surgical history and problem list.    Review of Systems   Constitutional: Positive for diaphoresis. Negative for fatigue, fever and unexpected weight change.   HENT: Negative for ear pain, rhinorrhea and sore throat.    Eyes: Negative for visual disturbance.   Respiratory: Negative for cough, chest tightness and shortness of breath.    Cardiovascular: Negative for chest pain, palpitations and leg swelling.   Gastrointestinal: Negative for abdominal pain, blood in stool, constipation, diarrhea, nausea and vomiting.   Endocrine: Negative for cold intolerance and heat intolerance.   Genitourinary: Negative for dysuria and hematuria.   Musculoskeletal: Negative for arthralgias and myalgias.   Skin: Positive for wound. Negative for color change.   Allergic/Immunologic: Negative for environmental allergies.   Hematological: Negative for adenopathy.   Psychiatric/Behavioral: Negative for suicidal  "ideas. The patient is not nervous/anxious.        Objective     /76 (BP Location: Right arm, Patient Position: Sitting, Cuff Size: Large Adult)   Pulse 93   Temp 97.9 °F (36.6 °C)   Ht 167.6 cm (66\")   Wt 98.3 kg (216 lb 11.2 oz)   LMP  (LMP Unknown)   SpO2 99%   BMI 34.98 kg/m²     Physical Exam   Constitutional: She is oriented to person, place, and time. She appears well-developed and well-nourished. No distress.   HENT:   Head: Normocephalic and atraumatic.   Cardiovascular: Normal rate, regular rhythm, normal heart sounds and intact distal pulses. Exam reveals no gallop and no friction rub.   No murmur heard.  Pulmonary/Chest: Effort normal and breath sounds normal. No respiratory distress.   Abdominal: Soft. Bowel sounds are normal. She exhibits no distension. There is no tenderness.   Musculoskeletal: She exhibits no edema.   Neurological: She is alert and oriented to person, place, and time.   Skin: Skin is warm and dry. She is not diaphoretic. There is erythema.   Abscess right axilla, erythematous, edematous, warm to touch appears to be healing. Multiple abscesses left axilla with yellow pustules forming, erythematous, edematous, hot to touch.    Psychiatric: She has a normal mood and affect. Her behavior is normal. Judgment and thought content normal.       Assessment/Plan     Problem List Items Addressed This Visit     None      Visit Diagnoses     Jackeline-menopause    -  Primary    Relevant Orders    Ambulatory Referral to Gynecology        Procedure: Consent obtained for incision and drainage of multiple abscesses of left axilla. Left axilla prepped with betadine x3. Injected with lidocaine 1%, 1ml. 3 3mm punch biopsy incisions made to drain abscess. Thick white pus with blood draining from each site. Patient tolerated well. Dressed with abd pad and tape.       Plan: Refer to gynecology for hormone treatment per her request. Hormones could be making you sweat more, making your hidradenitis " worse. Allow your incisions to drain. Keep areas clean and dry. Continue to take doxycycline, use hibiclens, and topical clindamycin. Follow up one as needed.     Patient's Body mass index is 34.98 kg/m². BMI is above normal parameters. Recommendations include: educational material.   (Normal BMI:  18.5-24.9, OW 25-29.9, Obesity 30 or greater)          Understands disease processes and need for medications.  Understands reasons for urgent and emergent care.  Patient (& family) verbalized agreement for treatment plan.   Emotional support and active listening provided.  Patient provided time to verbalize feelings.               This document has been electronically signed by MP Benson   July 23, 2019 12:16 PM

## 2019-07-23 NOTE — PATIENT INSTRUCTIONS

## 2019-07-29 ENCOUNTER — TELEPHONE (OUTPATIENT)
Dept: FAMILY MEDICINE CLINIC | Facility: CLINIC | Age: 54
End: 2019-07-29

## 2019-07-29 NOTE — TELEPHONE ENCOUNTER
----- Message from MP Martínez sent at 7/18/2019  4:42 PM EDT -----  She sees many specialists that I listed in the HPI of her note. Will you see if we can get records from them?    Thank You,   Tracie

## 2019-08-12 ENCOUNTER — HOSPITAL ENCOUNTER (EMERGENCY)
Facility: HOSPITAL | Age: 54
Discharge: ANOTHER HOSPITAL | End: 2019-08-12
Attending: EMERGENCY MEDICINE | Admitting: EMERGENCY MEDICINE

## 2019-08-12 ENCOUNTER — APPOINTMENT (OUTPATIENT)
Dept: CT IMAGING | Facility: HOSPITAL | Age: 54
End: 2019-08-12

## 2019-08-12 VITALS
OXYGEN SATURATION: 98 % | DIASTOLIC BLOOD PRESSURE: 84 MMHG | BODY MASS INDEX: 31.34 KG/M2 | SYSTOLIC BLOOD PRESSURE: 141 MMHG | RESPIRATION RATE: 18 BRPM | TEMPERATURE: 98.2 F | HEIGHT: 66 IN | HEART RATE: 108 BPM | WEIGHT: 195 LBS

## 2019-08-12 DIAGNOSIS — R51.9 ACUTE INTRACTABLE HEADACHE, UNSPECIFIED HEADACHE TYPE: Primary | ICD-10-CM

## 2019-08-12 DIAGNOSIS — H53.8 BLURRED VISION, RIGHT EYE: ICD-10-CM

## 2019-08-12 LAB
6-ACETYL MORPHINE: NEGATIVE
ALBUMIN SERPL-MCNC: 4.14 G/DL (ref 3.5–5.2)
ALBUMIN/GLOB SERPL: 1 G/DL
ALP SERPL-CCNC: 131 U/L (ref 39–117)
ALT SERPL W P-5'-P-CCNC: 14 U/L (ref 1–33)
AMPHET+METHAMPHET UR QL: NEGATIVE
ANION GAP SERPL CALCULATED.3IONS-SCNC: 18.4 MMOL/L (ref 5–15)
AST SERPL-CCNC: 19 U/L (ref 1–32)
B-HCG UR QL: NEGATIVE
BACTERIA UR QL AUTO: ABNORMAL /HPF
BARBITURATES UR QL SCN: NEGATIVE
BASOPHILS # BLD AUTO: 0.02 10*3/MM3 (ref 0–0.2)
BASOPHILS NFR BLD AUTO: 0.3 % (ref 0–1.5)
BENZODIAZ UR QL SCN: NEGATIVE
BILIRUB SERPL-MCNC: 0.5 MG/DL (ref 0.2–1.2)
BILIRUB UR QL STRIP: NEGATIVE
BUN BLD-MCNC: 10 MG/DL (ref 6–20)
BUN/CREAT SERPL: 13 (ref 7–25)
BUPRENORPHINE SERPL-MCNC: POSITIVE NG/ML
CALCIUM SPEC-SCNC: 10.4 MG/DL (ref 8.6–10.5)
CANNABINOIDS SERPL QL: NEGATIVE
CHLORIDE SERPL-SCNC: 94 MMOL/L (ref 98–107)
CLARITY UR: ABNORMAL
CO2 SERPL-SCNC: 27.6 MMOL/L (ref 22–29)
COCAINE UR QL: NEGATIVE
COLOR UR: ABNORMAL
CREAT BLD-MCNC: 0.77 MG/DL (ref 0.57–1)
DEPRECATED RDW RBC AUTO: 43 FL (ref 37–54)
EOSINOPHIL # BLD AUTO: 0.09 10*3/MM3 (ref 0–0.4)
EOSINOPHIL NFR BLD AUTO: 1.2 % (ref 0.3–6.2)
ERYTHROCYTE [DISTWIDTH] IN BLOOD BY AUTOMATED COUNT: 15.4 % (ref 12.3–15.4)
GFR SERPL CREATININE-BSD FRML MDRD: 78 ML/MIN/1.73
GLOBULIN UR ELPH-MCNC: 4.1 GM/DL
GLUCOSE BLD-MCNC: 162 MG/DL (ref 65–99)
GLUCOSE UR STRIP-MCNC: NEGATIVE MG/DL
HCT VFR BLD AUTO: 40.4 % (ref 34–46.6)
HGB BLD-MCNC: 12.4 G/DL (ref 12–15.9)
HGB UR QL STRIP.AUTO: ABNORMAL
HYALINE CASTS UR QL AUTO: ABNORMAL /LPF
IMM GRANULOCYTES # BLD AUTO: 0.01 10*3/MM3 (ref 0–0.05)
IMM GRANULOCYTES NFR BLD AUTO: 0.1 % (ref 0–0.5)
KETONES UR QL STRIP: ABNORMAL
LEUKOCYTE ESTERASE UR QL STRIP.AUTO: ABNORMAL
LYMPHOCYTES # BLD AUTO: 1.34 10*3/MM3 (ref 0.7–3.1)
LYMPHOCYTES NFR BLD AUTO: 18.4 % (ref 19.6–45.3)
MAGNESIUM SERPL-MCNC: 1.9 MG/DL (ref 1.6–2.6)
MCH RBC QN AUTO: 23.6 PG (ref 26.6–33)
MCHC RBC AUTO-ENTMCNC: 30.7 G/DL (ref 31.5–35.7)
MCV RBC AUTO: 76.8 FL (ref 79–97)
METHADONE UR QL SCN: NEGATIVE
MONOCYTES # BLD AUTO: 0.73 10*3/MM3 (ref 0.1–0.9)
MONOCYTES NFR BLD AUTO: 10 % (ref 5–12)
NEUTROPHILS # BLD AUTO: 5.11 10*3/MM3 (ref 1.7–7)
NEUTROPHILS NFR BLD AUTO: 70 % (ref 42.7–76)
NITRITE UR QL STRIP: NEGATIVE
OPIATES UR QL: POSITIVE
OXYCODONE UR QL SCN: POSITIVE
PCP UR QL SCN: NEGATIVE
PH UR STRIP.AUTO: 6 [PH] (ref 5–8)
PLATELET # BLD AUTO: 401 10*3/MM3 (ref 140–450)
PMV BLD AUTO: 9.6 FL (ref 6–12)
POTASSIUM BLD-SCNC: 2.7 MMOL/L (ref 3.5–5.2)
PROT SERPL-MCNC: 8.2 G/DL (ref 6–8.5)
PROT UR QL STRIP: ABNORMAL
RBC # BLD AUTO: 5.26 10*6/MM3 (ref 3.77–5.28)
RBC # UR: ABNORMAL /HPF
REF LAB TEST METHOD: ABNORMAL
SODIUM BLD-SCNC: 140 MMOL/L (ref 136–145)
SP GR UR STRIP: 1.02 (ref 1–1.03)
SQUAMOUS #/AREA URNS HPF: ABNORMAL /HPF
UROBILINOGEN UR QL STRIP: ABNORMAL
WBC NRBC COR # BLD: 7.3 10*3/MM3 (ref 3.4–10.8)
WBC UR QL AUTO: ABNORMAL /HPF

## 2019-08-12 PROCEDURE — 25010000002 LORAZEPAM PER 2 MG: Performed by: EMERGENCY MEDICINE

## 2019-08-12 PROCEDURE — 80307 DRUG TEST PRSMV CHEM ANLYZR: CPT | Performed by: PHYSICIAN ASSISTANT

## 2019-08-12 PROCEDURE — 99284 EMERGENCY DEPT VISIT MOD MDM: CPT

## 2019-08-12 PROCEDURE — 96375 TX/PRO/DX INJ NEW DRUG ADDON: CPT

## 2019-08-12 PROCEDURE — 81025 URINE PREGNANCY TEST: CPT | Performed by: PHYSICIAN ASSISTANT

## 2019-08-12 PROCEDURE — 93010 ELECTROCARDIOGRAM REPORT: CPT | Performed by: INTERNAL MEDICINE

## 2019-08-12 PROCEDURE — 25010000002 ONDANSETRON PER 1 MG: Performed by: EMERGENCY MEDICINE

## 2019-08-12 PROCEDURE — 25010000003 POTASSIUM CHLORIDE 10 MEQ/100ML SOLUTION: Performed by: PHYSICIAN ASSISTANT

## 2019-08-12 PROCEDURE — 85025 COMPLETE CBC W/AUTO DIFF WBC: CPT | Performed by: PHYSICIAN ASSISTANT

## 2019-08-12 PROCEDURE — 93005 ELECTROCARDIOGRAM TRACING: CPT | Performed by: PHYSICIAN ASSISTANT

## 2019-08-12 PROCEDURE — 96366 THER/PROPH/DIAG IV INF ADDON: CPT

## 2019-08-12 PROCEDURE — 25010000002 HYDROMORPHONE 1 MG/ML SOLUTION: Performed by: EMERGENCY MEDICINE

## 2019-08-12 PROCEDURE — 96365 THER/PROPH/DIAG IV INF INIT: CPT

## 2019-08-12 PROCEDURE — 81001 URINALYSIS AUTO W/SCOPE: CPT | Performed by: PHYSICIAN ASSISTANT

## 2019-08-12 PROCEDURE — 70450 CT HEAD/BRAIN W/O DYE: CPT

## 2019-08-12 PROCEDURE — 70450 CT HEAD/BRAIN W/O DYE: CPT | Performed by: RADIOLOGY

## 2019-08-12 PROCEDURE — 80053 COMPREHEN METABOLIC PANEL: CPT | Performed by: PHYSICIAN ASSISTANT

## 2019-08-12 PROCEDURE — 83735 ASSAY OF MAGNESIUM: CPT | Performed by: PHYSICIAN ASSISTANT

## 2019-08-12 PROCEDURE — 96376 TX/PRO/DX INJ SAME DRUG ADON: CPT

## 2019-08-12 PROCEDURE — 25010000002 HYDROMORPHONE PER 4 MG: Performed by: EMERGENCY MEDICINE

## 2019-08-12 RX ORDER — HYDROMORPHONE HYDROCHLORIDE 1 MG/ML
0.5 INJECTION, SOLUTION INTRAMUSCULAR; INTRAVENOUS; SUBCUTANEOUS ONCE
Status: COMPLETED | OUTPATIENT
Start: 2019-08-12 | End: 2019-08-12

## 2019-08-12 RX ORDER — POTASSIUM CHLORIDE 7.45 MG/ML
10 INJECTION INTRAVENOUS ONCE
Status: COMPLETED | OUTPATIENT
Start: 2019-08-12 | End: 2019-08-12

## 2019-08-12 RX ORDER — ONDANSETRON 2 MG/ML
4 INJECTION INTRAMUSCULAR; INTRAVENOUS ONCE
Status: COMPLETED | OUTPATIENT
Start: 2019-08-12 | End: 2019-08-12

## 2019-08-12 RX ORDER — SODIUM CHLORIDE 0.9 % (FLUSH) 0.9 %
10 SYRINGE (ML) INJECTION AS NEEDED
Status: DISCONTINUED | OUTPATIENT
Start: 2019-08-12 | End: 2019-08-12 | Stop reason: HOSPADM

## 2019-08-12 RX ORDER — LORAZEPAM 2 MG/ML
0.5 INJECTION INTRAMUSCULAR ONCE
Status: COMPLETED | OUTPATIENT
Start: 2019-08-12 | End: 2019-08-12

## 2019-08-12 RX ORDER — SODIUM CHLORIDE 0.9 % (FLUSH) 0.9 %
10 SYRINGE (ML) INJECTION AS NEEDED
Status: DISCONTINUED | OUTPATIENT
Start: 2019-08-12 | End: 2019-08-12

## 2019-08-12 RX ORDER — POTASSIUM CHLORIDE 20 MEQ/1
40 TABLET, EXTENDED RELEASE ORAL ONCE
Status: COMPLETED | OUTPATIENT
Start: 2019-08-12 | End: 2019-08-12

## 2019-08-12 RX ADMIN — HYDROMORPHONE HYDROCHLORIDE 1 MG: 1 INJECTION, SOLUTION INTRAMUSCULAR; INTRAVENOUS; SUBCUTANEOUS at 09:39

## 2019-08-12 RX ADMIN — HYDROMORPHONE HYDROCHLORIDE 1 MG: 1 INJECTION, SOLUTION INTRAMUSCULAR; INTRAVENOUS; SUBCUTANEOUS at 11:54

## 2019-08-12 RX ADMIN — POTASSIUM CHLORIDE 10 MEQ: 7.46 INJECTION, SOLUTION INTRAVENOUS at 11:52

## 2019-08-12 RX ADMIN — ONDANSETRON 4 MG: 2 INJECTION, SOLUTION INTRAMUSCULAR; INTRAVENOUS at 09:39

## 2019-08-12 RX ADMIN — LORAZEPAM 0.5 MG: 2 INJECTION INTRAMUSCULAR; INTRAVENOUS at 11:55

## 2019-08-12 RX ADMIN — POTASSIUM CHLORIDE 40 MEQ: 1500 TABLET, EXTENDED RELEASE ORAL at 10:18

## 2019-08-12 RX ADMIN — HYDROMORPHONE HYDROCHLORIDE 0.5 MG: 1 INJECTION, SOLUTION INTRAMUSCULAR; INTRAVENOUS; SUBCUTANEOUS at 14:45

## 2019-08-12 RX ADMIN — POTASSIUM CHLORIDE 10 MEQ: 7.46 INJECTION, SOLUTION INTRAVENOUS at 10:15

## 2019-08-12 RX ADMIN — SODIUM CHLORIDE 500 ML: 9 INJECTION, SOLUTION INTRAVENOUS at 09:38

## 2019-08-12 NOTE — ED NOTES
Pt resting quietly on stretcher with no new complaints, headache remains with improvement noted.  Pt AAOx4 with no resp distress noted, respirations even and unlabored.  Pt denies any needs at this time.  Skin PWD.  Will continue to monitor and follow plan of care.  Bed rails up x2, bed in lowest position, call light in reach.       Tamika Zaidi, RN  08/12/19 9963

## 2019-08-12 NOTE — ED NOTES
Called [facility] to request S truck to transfer pt to [facility]. [facility] to call back.      Adrianna Sandoval  08/12/19 0936

## 2019-08-12 NOTE — ED PROVIDER NOTES
Subjective   54-year-old white female presents secondary to headache and blurred vision in her right eye.  She states that she started having a headache last evening.  This is a throbbing sensation behind her right eye.  Approximately 3 hours ago her vision became blurry in her right eye.  She has had no recent falls.  No trauma.  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  No other complaints at this time.  Her symptoms are moderate to severe and worsening.            Review of Systems   Constitutional: Negative.  Negative for fever.   HENT: Negative.    Respiratory: Negative.    Cardiovascular: Negative.  Negative for chest pain.   Gastrointestinal: Negative.  Negative for abdominal pain.   Endocrine: Negative.    Genitourinary: Negative.  Negative for dysuria.   Skin: Negative.    Neurological: Negative.    Psychiatric/Behavioral: Negative.    All other systems reviewed and are negative.      Past Medical History:   Diagnosis Date   • Anxiety    • Brain tumor (CMS/HCC)    • Chronic headache    • Depression    • Diastolic CHF, chronic (CMS/HCC)    • DVT (deep venous thrombosis) (CMS/HCC)     left leg   • Encephalitis 12/2016    treated at the [facility]   • Epilepsy (CMS/HCC)    • Gastric ulcer with perforation (CMS/HCC) 03/2016    Microperforation and air in the biliary tree   • Gastritis    • Heart disease    • Henoch-Schonlein purpura (CMS/HCC)    • Hypertension    • Hypothyroidism (acquired)     Removed due to groiter   • Kidney disease    • Lower GI bleeding    • Lupus    • Memory disorder    • Migraine    • Mixed connective tissue disease (CMS/HCC)    • MRSA cellulitis    • NSTEMI (non-ST elevated myocardial infarction) (CMS/HCC)    • Patent foramen ovale    • Pneumonia    • PVC (premature ventricular contraction)    • RA (rheumatoid arthritis) (CMS/HCC)    • Renal disorder    • Rhabdomyolysis    • Seizures (CMS/HCC)     when had encephalitis    • Sjogren's syndrome (CMS/HCC)    • Stroke (CMS/HCC) 09/2015    x 1   • Systemic lupus erythematosus (CMS/HCC)     Discoid and systemic   • Temporal arteritis (CMS/HCC)    • Thyroid disease    • TIA (transient ischemic attack)     x 3   • Ulcer, stomach peptic, chronic        Allergies   Allergen Reactions   • Compazine [Prochlorperazine Edisylate] Dystonia   • Imitrex [Sumatriptan] Other (See Comments)     Previous stroke   • Nsaids GI Bleeding   • Reglan [Metoclopramide] Dystonia   • Solu-Medrol [Methylprednisolone] Dystonia   • Zyprexa [Olanzapine] Swelling   • Prednisone Anxiety       Past Surgical History:   Procedure Laterality Date   • APPENDECTOMY     • CARDIAC CATHETERIZATION  2016    PFO repair and had a loop monitor placed at the [facility]   •  SECTION      x 2   • ENDOSCOPY     • FACIAL RECONSTRUCTION SURGERY      clean out MRSA    • INCISION AND DRAINAGE ABSCESS Right 2019    Procedure: INCISION AND DRAINAGE ABSCESS RIGHT AXILLA;  Surgeon: Zak Martin MD;  Location: [facility];  Service: General   • KNEE ARTHROSCOPY Left    • LUMBAR FUSION     • PORTACATH PLACEMENT Right 2016   • THYROID SURGERY      Removed due to a goiter       Family History   Problem Relation Age of Onset   • Hypertension Mother    • Arthritis Mother         RA   • Osteoarthritis Mother    • Heart disease Mother    • Hypertension Father    • Arthritis Father         RA   • Diabetes Father    • Heart disease Father        Social History     Socioeconomic History   • Marital status:      Spouse name: Not on file   • Number of children: Not on file   • Years of education: Not on file   • Highest education level: Not on file   Tobacco Use   • Smoking status: Never Smoker   • Smokeless tobacco: Never Used   Substance and Sexual Activity   • Alcohol use: No   • Drug use: No   • Sexual activity: Yes     Partners: Male           Objective   Physical Exam   Constitutional: She is oriented to  person, place, and time. She appears well-developed and well-nourished. No distress.   HENT:   Head: Normocephalic and atraumatic.   Right Ear: External ear normal.   Left Ear: External ear normal.   Nose: Nose normal.   Eyes: Conjunctivae and EOM are normal. Pupils are equal, round, and reactive to light.   Patient reports only having a blur in her right eye.   Neck: Normal range of motion. Neck supple. No JVD present. No tracheal deviation present.   Cardiovascular: Normal rate, regular rhythm and normal heart sounds.   No murmur heard.  Pulmonary/Chest: Effort normal and breath sounds normal. No respiratory distress. She has no wheezes.   Abdominal: Soft. Bowel sounds are normal. There is no tenderness.   Musculoskeletal: Normal range of motion. She exhibits no edema or deformity.   Neurological: She is alert and oriented to person, place, and time. No cranial nerve deficit.   Skin: Skin is warm and dry. No rash noted. She is not diaphoretic. No erythema. No pallor.   Psychiatric: She has a normal mood and affect. Her behavior is normal. Thought content normal.   Nursing note and vitals reviewed.      Procedures           ED Course  ED Course as of Aug 12 1408   Mon Aug 12, 2019   1056 Patient continues to have blurred vision in her right eye.  Neurologically she is fine otherwise.  She states that pain medication did seem to help for a while though she still has headache.  Seen previously with Dr Saldana.  She is reluctant to be transferred to .  She is going to discuss this with her family.  She has been counseled that this could be symptoms of stroke.  Differential diagnosis would also include migraine headache.  Unfortunately we are unable to get an MRI to rule out any further underlying pathology.  She is aware of all this.  [JI]   1148 Patient has decided that she will be transferred to .  [JI]   1153  on divert  [JI]   1228 [facility] is on divert. Patient request to try [facility].  [JI]      ED Course User  Index  [JI] Gabo Bonilla PA                  MDM  Number of Diagnoses or Management Options  Acute intractable headache, unspecified headache type: new and requires workup  Blurred vision, right eye: new and requires workup     Amount and/or Complexity of Data Reviewed  Clinical lab tests: reviewed and ordered  Tests in the radiology section of CPT®: reviewed and ordered  Discuss the patient with other providers: yes  Independent visualization of images, tracings, or specimens: yes    Risk of Complications, Morbidity, and/or Mortality  Presenting problems: moderate          Final diagnoses:   Acute intractable headache, unspecified headache type   Blurred vision, right eye            Gabo Bonilla PA  08/12/19 1400

## 2019-08-12 NOTE — ED NOTES
Called [facility]. [facility] paged. Speaking with Bennett Bonilla at this time.      Adrianna Sandoval  08/12/19 5901

## 2019-08-12 NOTE — ED NOTES
[facility] accepted transfer. Aaron gerardo in dispatch. Eta less than 30 min     Adrianna Sandoval  08/12/19 9477

## 2019-08-12 NOTE — ED NOTES
Pt resting quietly on stretcher with no new complaints, reports that her headache remains but is much improved.  Pt AAOx4 with no resp distress noted, respirations even and unlabored.  Pt denies any needs at this time.  Skin PWD.  Will continue to monitor and follow plan of care.  Bed rails up x2, bed in lowest position, call light in reach.       Tamika Zaidi RN  08/12/19 2614

## 2019-08-12 NOTE — ED NOTES
Patient has had a headache for the last 4-5 hours localized behind her right eye. Patient is complaining of blurry vision with this headache. Patient has a history of brain tumors and lupus. Patient is very anxious today. Patient has small reddened areas on face.     Edil Arellano RN  08/12/19 0920

## 2019-09-14 ENCOUNTER — APPOINTMENT (OUTPATIENT)
Dept: GENERAL RADIOLOGY | Facility: HOSPITAL | Age: 54
End: 2019-09-14

## 2019-09-14 ENCOUNTER — HOSPITAL ENCOUNTER (EMERGENCY)
Facility: HOSPITAL | Age: 54
Discharge: PSYCHIATRIC OR SUBSTANCE USE FACILITY | End: 2019-09-14
Attending: FAMILY MEDICINE | Admitting: FAMILY MEDICINE

## 2019-09-14 ENCOUNTER — HOSPITAL ENCOUNTER (INPATIENT)
Facility: HOSPITAL | Age: 54
LOS: 4 days | Discharge: LEFT BEFORE DISCHARGE | End: 2019-09-18
Attending: PSYCHIATRY & NEUROLOGY | Admitting: PSYCHIATRY & NEUROLOGY

## 2019-09-14 VITALS
RESPIRATION RATE: 18 BRPM | WEIGHT: 200 LBS | SYSTOLIC BLOOD PRESSURE: 148 MMHG | OXYGEN SATURATION: 97 % | TEMPERATURE: 98.4 F | BODY MASS INDEX: 33.32 KG/M2 | HEIGHT: 65 IN | HEART RATE: 84 BPM | DIASTOLIC BLOOD PRESSURE: 89 MMHG

## 2019-09-14 DIAGNOSIS — R45.851 SUICIDAL IDEATION: Primary | ICD-10-CM

## 2019-09-14 PROBLEM — F32.9 MDD (MAJOR DEPRESSIVE DISORDER): Status: ACTIVE | Noted: 2019-09-14

## 2019-09-14 LAB
ALBUMIN SERPL-MCNC: 4.22 G/DL (ref 3.5–5.2)
ALBUMIN/GLOB SERPL: 1.2 G/DL
ALP SERPL-CCNC: 118 U/L (ref 39–117)
ALT SERPL W P-5'-P-CCNC: 15 U/L (ref 1–33)
AMPHET+METHAMPHET UR QL: NEGATIVE
ANION GAP SERPL CALCULATED.3IONS-SCNC: 14.8 MMOL/L (ref 5–15)
AST SERPL-CCNC: 16 U/L (ref 1–32)
B-HCG UR QL: NEGATIVE
BACTERIA UR QL AUTO: ABNORMAL /HPF
BARBITURATES UR QL SCN: NEGATIVE
BASOPHILS # BLD AUTO: 0.02 10*3/MM3 (ref 0–0.2)
BASOPHILS NFR BLD AUTO: 0.2 % (ref 0–1.5)
BENZODIAZ UR QL SCN: NEGATIVE
BILIRUB SERPL-MCNC: 0.3 MG/DL (ref 0.2–1.2)
BILIRUB UR QL STRIP: NEGATIVE
BUN BLD-MCNC: 7 MG/DL (ref 6–20)
BUN/CREAT SERPL: 12.1 (ref 7–25)
CALCIUM SPEC-SCNC: 9.7 MG/DL (ref 8.6–10.5)
CANNABINOIDS SERPL QL: NEGATIVE
CHLORIDE SERPL-SCNC: 101 MMOL/L (ref 98–107)
CLARITY UR: CLEAR
CO2 SERPL-SCNC: 24.2 MMOL/L (ref 22–29)
COCAINE UR QL: NEGATIVE
COLOR UR: YELLOW
CREAT BLD-MCNC: 0.58 MG/DL (ref 0.57–1)
DEPRECATED RDW RBC AUTO: 45.9 FL (ref 37–54)
EOSINOPHIL # BLD AUTO: 0.08 10*3/MM3 (ref 0–0.4)
EOSINOPHIL NFR BLD AUTO: 0.9 % (ref 0.3–6.2)
ERYTHROCYTE [DISTWIDTH] IN BLOOD BY AUTOMATED COUNT: 16.6 % (ref 12.3–15.4)
ETHANOL BLD-MCNC: <10 MG/DL (ref 0–10)
ETHANOL UR QL: <0.01 %
GFR SERPL CREATININE-BSD FRML MDRD: 109 ML/MIN/1.73
GLOBULIN UR ELPH-MCNC: 3.4 GM/DL
GLUCOSE BLD-MCNC: 141 MG/DL (ref 65–99)
GLUCOSE UR STRIP-MCNC: NEGATIVE MG/DL
HCT VFR BLD AUTO: 37.7 % (ref 34–46.6)
HGB BLD-MCNC: 11.5 G/DL (ref 12–15.9)
HGB UR QL STRIP.AUTO: ABNORMAL
HYALINE CASTS UR QL AUTO: ABNORMAL /LPF
IMM GRANULOCYTES # BLD AUTO: 0.02 10*3/MM3 (ref 0–0.05)
IMM GRANULOCYTES NFR BLD AUTO: 0.2 % (ref 0–0.5)
KETONES UR QL STRIP: NEGATIVE
LEUKOCYTE ESTERASE UR QL STRIP.AUTO: NEGATIVE
LYMPHOCYTES # BLD AUTO: 1.55 10*3/MM3 (ref 0.7–3.1)
LYMPHOCYTES NFR BLD AUTO: 16.6 % (ref 19.6–45.3)
MAGNESIUM SERPL-MCNC: 1.8 MG/DL (ref 1.6–2.6)
MCH RBC QN AUTO: 23.2 PG (ref 26.6–33)
MCHC RBC AUTO-ENTMCNC: 30.5 G/DL (ref 31.5–35.7)
MCV RBC AUTO: 76 FL (ref 79–97)
METHADONE UR QL SCN: NEGATIVE
MONOCYTES # BLD AUTO: 0.43 10*3/MM3 (ref 0.1–0.9)
MONOCYTES NFR BLD AUTO: 4.6 % (ref 5–12)
NEUTROPHILS # BLD AUTO: 7.26 10*3/MM3 (ref 1.7–7)
NEUTROPHILS NFR BLD AUTO: 77.5 % (ref 42.7–76)
NITRITE UR QL STRIP: NEGATIVE
OPIATES UR QL: NEGATIVE
OXYCODONE UR QL SCN: POSITIVE
PH UR STRIP.AUTO: 6.5 [PH] (ref 5–8)
PLATELET # BLD AUTO: 362 10*3/MM3 (ref 140–450)
PMV BLD AUTO: 8.9 FL (ref 6–12)
POTASSIUM BLD-SCNC: 3.6 MMOL/L (ref 3.5–5.2)
PROT SERPL-MCNC: 7.6 G/DL (ref 6–8.5)
PROT UR QL STRIP: NEGATIVE
RBC # BLD AUTO: 4.96 10*6/MM3 (ref 3.77–5.28)
RBC # UR: ABNORMAL /HPF
REF LAB TEST METHOD: ABNORMAL
SODIUM BLD-SCNC: 140 MMOL/L (ref 136–145)
SP GR UR STRIP: 1.01 (ref 1–1.03)
SQUAMOUS #/AREA URNS HPF: ABNORMAL /HPF
TROPONIN T SERPL-MCNC: <0.01 NG/ML (ref 0–0.03)
TSH SERPL DL<=0.05 MIU/L-ACNC: 3.13 UIU/ML (ref 0.27–4.2)
UROBILINOGEN UR QL STRIP: ABNORMAL
WBC NRBC COR # BLD: 9.36 10*3/MM3 (ref 3.4–10.8)
WBC UR QL AUTO: ABNORMAL /HPF

## 2019-09-14 PROCEDURE — 80053 COMPREHEN METABOLIC PANEL: CPT | Performed by: FAMILY MEDICINE

## 2019-09-14 PROCEDURE — 80307 DRUG TEST PRSMV CHEM ANLYZR: CPT | Performed by: FAMILY MEDICINE

## 2019-09-14 PROCEDURE — 81001 URINALYSIS AUTO W/SCOPE: CPT | Performed by: FAMILY MEDICINE

## 2019-09-14 PROCEDURE — 84443 ASSAY THYROID STIM HORMONE: CPT | Performed by: FAMILY MEDICINE

## 2019-09-14 PROCEDURE — 81025 URINE PREGNANCY TEST: CPT | Performed by: FAMILY MEDICINE

## 2019-09-14 PROCEDURE — 25010000002 ZIPRASIDONE MESYLATE PER 10 MG: Performed by: FAMILY MEDICINE

## 2019-09-14 PROCEDURE — HZ2ZZZZ DETOXIFICATION SERVICES FOR SUBSTANCE ABUSE TREATMENT: ICD-10-PCS | Performed by: PSYCHIATRY & NEUROLOGY

## 2019-09-14 PROCEDURE — 84484 ASSAY OF TROPONIN QUANT: CPT | Performed by: FAMILY MEDICINE

## 2019-09-14 PROCEDURE — 99223 1ST HOSP IP/OBS HIGH 75: CPT | Performed by: PSYCHIATRY & NEUROLOGY

## 2019-09-14 PROCEDURE — 71045 X-RAY EXAM CHEST 1 VIEW: CPT

## 2019-09-14 PROCEDURE — 93005 ELECTROCARDIOGRAM TRACING: CPT | Performed by: FAMILY MEDICINE

## 2019-09-14 PROCEDURE — 96372 THER/PROPH/DIAG INJ SC/IM: CPT

## 2019-09-14 PROCEDURE — 85025 COMPLETE CBC W/AUTO DIFF WBC: CPT | Performed by: FAMILY MEDICINE

## 2019-09-14 PROCEDURE — 99285 EMERGENCY DEPT VISIT HI MDM: CPT

## 2019-09-14 PROCEDURE — 83735 ASSAY OF MAGNESIUM: CPT | Performed by: FAMILY MEDICINE

## 2019-09-14 RX ORDER — ZOLPIDEM TARTRATE 5 MG/1
10 TABLET ORAL NIGHTLY PRN
Status: CANCELLED | OUTPATIENT
Start: 2019-09-14

## 2019-09-14 RX ORDER — CLONIDINE HYDROCHLORIDE 0.1 MG/1
0.1 TABLET ORAL 2 TIMES DAILY PRN
Status: ACTIVE | OUTPATIENT
Start: 2019-09-17 | End: 2019-09-18

## 2019-09-14 RX ORDER — LORAZEPAM 2 MG/1
2 TABLET ORAL
Status: DISCONTINUED | OUTPATIENT
Start: 2019-09-14 | End: 2019-09-15

## 2019-09-14 RX ORDER — LEVETIRACETAM 500 MG/1
500 TABLET ORAL EVERY 12 HOURS SCHEDULED
Status: DISCONTINUED | OUTPATIENT
Start: 2019-09-14 | End: 2019-09-18 | Stop reason: HOSPADM

## 2019-09-14 RX ORDER — TRAZODONE HYDROCHLORIDE 50 MG/1
50 TABLET ORAL NIGHTLY PRN
Status: DISCONTINUED | OUTPATIENT
Start: 2019-09-14 | End: 2019-09-14

## 2019-09-14 RX ORDER — HYDROXYCHLOROQUINE SULFATE 200 MG/1
200 TABLET, FILM COATED ORAL EVERY 12 HOURS SCHEDULED
Status: DISCONTINUED | OUTPATIENT
Start: 2019-09-14 | End: 2019-09-18 | Stop reason: HOSPADM

## 2019-09-14 RX ORDER — OXYCODONE HYDROCHLORIDE 15 MG/1
30 TABLET ORAL EVERY 4 HOURS PRN
Status: CANCELLED | OUTPATIENT
Start: 2019-09-14

## 2019-09-14 RX ORDER — CLONAZEPAM 0.5 MG/1
0.25 TABLET ORAL 4 TIMES DAILY
Status: CANCELLED | OUTPATIENT
Start: 2019-09-14

## 2019-09-14 RX ORDER — LORAZEPAM 1 MG/1
1 TABLET ORAL EVERY 8 HOURS PRN
Status: CANCELLED | OUTPATIENT
Start: 2019-09-14

## 2019-09-14 RX ORDER — CLONIDINE HYDROCHLORIDE 0.1 MG/1
0.1 TABLET ORAL 4 TIMES DAILY PRN
Status: ACTIVE | OUTPATIENT
Start: 2019-09-15 | End: 2019-09-16

## 2019-09-14 RX ORDER — HYDROXYZINE 50 MG/1
50 TABLET, FILM COATED ORAL EVERY 6 HOURS PRN
Status: DISCONTINUED | OUTPATIENT
Start: 2019-09-14 | End: 2019-09-18 | Stop reason: HOSPADM

## 2019-09-14 RX ORDER — FAMOTIDINE 20 MG/1
20 TABLET, FILM COATED ORAL 2 TIMES DAILY PRN
Status: DISCONTINUED | OUTPATIENT
Start: 2019-09-14 | End: 2019-09-18 | Stop reason: HOSPADM

## 2019-09-14 RX ORDER — DICYCLOMINE HYDROCHLORIDE 10 MG/1
10 CAPSULE ORAL 3 TIMES DAILY PRN
Status: DISCONTINUED | OUTPATIENT
Start: 2019-09-14 | End: 2019-09-18 | Stop reason: HOSPADM

## 2019-09-14 RX ORDER — CLONIDINE HYDROCHLORIDE 0.1 MG/1
0.1 TABLET ORAL 4 TIMES DAILY PRN
Status: ACTIVE | OUTPATIENT
Start: 2019-09-14 | End: 2019-09-15

## 2019-09-14 RX ORDER — LEVOTHYROXINE SODIUM 175 UG/1
175 TABLET ORAL DAILY
Status: DISCONTINUED | OUTPATIENT
Start: 2019-09-14 | End: 2019-09-18 | Stop reason: HOSPADM

## 2019-09-14 RX ORDER — BENZTROPINE MESYLATE 1 MG/1
2 TABLET ORAL ONCE AS NEEDED
Status: DISCONTINUED | OUTPATIENT
Start: 2019-09-14 | End: 2019-09-18 | Stop reason: HOSPADM

## 2019-09-14 RX ORDER — ALUMINA, MAGNESIA, AND SIMETHICONE 2400; 2400; 240 MG/30ML; MG/30ML; MG/30ML
15 SUSPENSION ORAL EVERY 6 HOURS PRN
Status: DISCONTINUED | OUTPATIENT
Start: 2019-09-14 | End: 2019-09-18 | Stop reason: HOSPADM

## 2019-09-14 RX ORDER — HYDROCHLOROTHIAZIDE 25 MG/1
25 TABLET ORAL DAILY
Status: DISCONTINUED | OUTPATIENT
Start: 2019-09-14 | End: 2019-09-18 | Stop reason: HOSPADM

## 2019-09-14 RX ORDER — ECHINACEA PURPUREA EXTRACT 125 MG
2 TABLET ORAL AS NEEDED
Status: DISCONTINUED | OUTPATIENT
Start: 2019-09-14 | End: 2019-09-18 | Stop reason: HOSPADM

## 2019-09-14 RX ORDER — LORAZEPAM 0.5 MG/1
0.5 TABLET ORAL EVERY 4 HOURS PRN
Status: DISCONTINUED | OUTPATIENT
Start: 2019-09-18 | End: 2019-09-15

## 2019-09-14 RX ORDER — LOPERAMIDE HYDROCHLORIDE 2 MG/1
2 CAPSULE ORAL
Status: DISCONTINUED | OUTPATIENT
Start: 2019-09-14 | End: 2019-09-18 | Stop reason: HOSPADM

## 2019-09-14 RX ORDER — CLONIDINE HYDROCHLORIDE 0.1 MG/1
0.1 TABLET ORAL 3 TIMES DAILY PRN
Status: ACTIVE | OUTPATIENT
Start: 2019-09-16 | End: 2019-09-17

## 2019-09-14 RX ORDER — ONDANSETRON 4 MG/1
4 TABLET, FILM COATED ORAL EVERY 6 HOURS PRN
Status: DISCONTINUED | OUTPATIENT
Start: 2019-09-14 | End: 2019-09-18 | Stop reason: HOSPADM

## 2019-09-14 RX ORDER — BENZTROPINE MESYLATE 1 MG/ML
1 INJECTION INTRAMUSCULAR; INTRAVENOUS ONCE AS NEEDED
Status: DISCONTINUED | OUTPATIENT
Start: 2019-09-14 | End: 2019-09-18 | Stop reason: HOSPADM

## 2019-09-14 RX ORDER — LORAZEPAM 2 MG/1
2 TABLET ORAL ONCE
Status: COMPLETED | OUTPATIENT
Start: 2019-09-14 | End: 2019-09-14

## 2019-09-14 RX ORDER — LORAZEPAM 2 MG/1
2 TABLET ORAL
Status: DISCONTINUED | OUTPATIENT
Start: 2019-09-15 | End: 2019-09-15

## 2019-09-14 RX ORDER — LORAZEPAM 1 MG/1
1 TABLET ORAL
Status: DISCONTINUED | OUTPATIENT
Start: 2019-09-17 | End: 2019-09-15

## 2019-09-14 RX ORDER — LORAZEPAM 2 MG/1
2 TABLET ORAL EVERY 4 HOURS PRN
Status: DISCONTINUED | OUTPATIENT
Start: 2019-09-15 | End: 2019-09-15

## 2019-09-14 RX ORDER — OXYMORPHONE HYDROCHLORIDE 10 MG/1
10 TABLET ORAL EVERY 12 HOURS
COMMUNITY
End: 2019-09-18 | Stop reason: HOSPADM

## 2019-09-14 RX ORDER — LORAZEPAM 0.5 MG/1
0.5 TABLET ORAL
Status: DISCONTINUED | OUTPATIENT
Start: 2019-09-18 | End: 2019-09-15

## 2019-09-14 RX ORDER — ACETAMINOPHEN 325 MG/1
650 TABLET ORAL EVERY 6 HOURS PRN
Status: DISCONTINUED | OUTPATIENT
Start: 2019-09-14 | End: 2019-09-18 | Stop reason: HOSPADM

## 2019-09-14 RX ORDER — DULOXETIN HYDROCHLORIDE 60 MG/1
60 CAPSULE, DELAYED RELEASE ORAL 2 TIMES DAILY
Status: DISCONTINUED | OUTPATIENT
Start: 2019-09-14 | End: 2019-09-18 | Stop reason: HOSPADM

## 2019-09-14 RX ORDER — BUPROPION HYDROCHLORIDE 100 MG/1
100 TABLET, EXTENDED RELEASE ORAL DAILY
Status: CANCELLED | OUTPATIENT
Start: 2019-09-14

## 2019-09-14 RX ORDER — ZIPRASIDONE HYDROCHLORIDE 20 MG/1
20 CAPSULE ORAL ONCE
Status: COMPLETED | OUTPATIENT
Start: 2019-09-15 | End: 2019-09-14

## 2019-09-14 RX ORDER — CYCLOBENZAPRINE HCL 10 MG
10 TABLET ORAL 3 TIMES DAILY PRN
Status: DISCONTINUED | OUTPATIENT
Start: 2019-09-14 | End: 2019-09-18 | Stop reason: HOSPADM

## 2019-09-14 RX ORDER — ZOLPIDEM TARTRATE 5 MG/1
10 TABLET ORAL NIGHTLY PRN
COMMUNITY
End: 2019-09-18 | Stop reason: HOSPADM

## 2019-09-14 RX ORDER — POTASSIUM CHLORIDE 20 MEQ/1
20 TABLET, EXTENDED RELEASE ORAL DAILY
Status: DISCONTINUED | OUTPATIENT
Start: 2019-09-14 | End: 2019-09-18 | Stop reason: HOSPADM

## 2019-09-14 RX ORDER — LORAZEPAM 1 MG/1
1 TABLET ORAL EVERY 4 HOURS PRN
Status: DISCONTINUED | OUTPATIENT
Start: 2019-09-17 | End: 2019-09-15

## 2019-09-14 RX ORDER — BENZONATATE 100 MG/1
100 CAPSULE ORAL 3 TIMES DAILY PRN
Status: DISCONTINUED | OUTPATIENT
Start: 2019-09-14 | End: 2019-09-18 | Stop reason: HOSPADM

## 2019-09-14 RX ORDER — CLONIDINE HYDROCHLORIDE 0.1 MG/1
0.1 TABLET ORAL ONCE AS NEEDED
Status: DISCONTINUED | OUTPATIENT
Start: 2019-09-18 | End: 2019-09-18 | Stop reason: HOSPADM

## 2019-09-14 RX ORDER — PROMETHAZINE HYDROCHLORIDE 25 MG/1
25 TABLET ORAL EVERY 6 HOURS PRN
Status: CANCELLED | OUTPATIENT
Start: 2019-09-14

## 2019-09-14 RX ADMIN — ONDANSETRON 4 MG: 4 TABLET, FILM COATED ORAL at 21:12

## 2019-09-14 RX ADMIN — CYCLOBENZAPRINE HYDROCHLORIDE 10 MG: 10 TABLET, FILM COATED ORAL at 22:52

## 2019-09-14 RX ADMIN — HYDROCHLOROTHIAZIDE 25 MG: 25 TABLET ORAL at 14:27

## 2019-09-14 RX ADMIN — HYDROXYCHLOROQUINE SULFATE 200 MG: 200 TABLET ORAL at 14:27

## 2019-09-14 RX ADMIN — LORAZEPAM 2 MG: 2 TABLET ORAL at 14:06

## 2019-09-14 RX ADMIN — METOPROLOL TARTRATE 25 MG: 25 TABLET, FILM COATED ORAL at 14:27

## 2019-09-14 RX ADMIN — LEVETIRACETAM 500 MG: 500 TABLET, FILM COATED ORAL at 21:09

## 2019-09-14 RX ADMIN — ACETAMINOPHEN 650 MG: 325 TABLET ORAL at 21:12

## 2019-09-14 RX ADMIN — CYCLOBENZAPRINE HYDROCHLORIDE 10 MG: 10 TABLET, FILM COATED ORAL at 14:06

## 2019-09-14 RX ADMIN — ACETAMINOPHEN 650 MG: 325 TABLET ORAL at 14:30

## 2019-09-14 RX ADMIN — ZIPRASIDONE HYDROCHLORIDE 20 MG: 20 CAPSULE ORAL at 23:22

## 2019-09-14 RX ADMIN — LORAZEPAM 2 MG: 2 TABLET ORAL at 05:56

## 2019-09-14 RX ADMIN — HYDROXYZINE HYDROCHLORIDE 50 MG: 50 TABLET, FILM COATED ORAL at 10:51

## 2019-09-14 RX ADMIN — DULOXETINE HYDROCHLORIDE 60 MG: 60 CAPSULE, DELAYED RELEASE ORAL at 14:06

## 2019-09-14 RX ADMIN — HYDROXYZINE HYDROCHLORIDE 50 MG: 50 TABLET, FILM COATED ORAL at 21:30

## 2019-09-14 RX ADMIN — POTASSIUM CHLORIDE 20 MEQ: 1500 TABLET, EXTENDED RELEASE ORAL at 14:06

## 2019-09-14 RX ADMIN — WATER 10 MG: 1 INJECTION INTRAMUSCULAR; INTRAVENOUS; SUBCUTANEOUS at 06:58

## 2019-09-14 RX ADMIN — LEVETIRACETAM 500 MG: 500 TABLET, FILM COATED ORAL at 14:28

## 2019-09-14 RX ADMIN — LEVOTHYROXINE SODIUM 175 MCG: 0.17 TABLET ORAL at 14:27

## 2019-09-14 NOTE — ED NOTES
Pt appears anxious, states the medication has not helped very much.  Reassurance given, discussed plan of care with patient.  Dr Cole made aware. Suicide precautions maintained.     Galilea Carrera RN  09/14/19 0630

## 2019-09-14 NOTE — ED PROVIDER NOTES
"Subjective   Patient is a 54-year-old female who presents the emergency department for suicidal ideation.  The patient is extremely tearful during examination and it is difficult to obtain most of the history.  She states \"I want to die \".  She states that in 2007 she lost her 13-year-old daughter to a an ATV accident.  She was also a  for [employer] on September 11, 2001, which led to some PTSD and other issues.  She states that she has multiple medical problems, stressors in her life, and states that she just cannot take it anymore.  She states that she cannot die today because it is her dad's birthday.  She states that she feels short of breath and cannot breathe, but thinks is due to anxiety.  She states that she is having trouble figuring out what is real and what is not real.  She states that I cannot tell if I am already dead or not.  She states that I might be dead and be in hell for all I know.  The patient states that she has not had any homicidal thoughts, does not have a particular plan to kill herself, and has not had any hallucinations.            Review of Systems   Constitutional: Positive for activity change and appetite change. Negative for chills, diaphoresis, fatigue and fever.   HENT: Negative for congestion, postnasal drip, rhinorrhea, sinus pressure, sinus pain, sneezing and sore throat.    Eyes: Negative for pain, discharge, redness and itching.   Respiratory: Positive for shortness of breath. Negative for apnea, cough, choking, chest tightness, wheezing and stridor.    Cardiovascular: Negative for chest pain, palpitations and leg swelling.   Gastrointestinal: Negative for abdominal distention, abdominal pain, constipation, diarrhea, nausea and vomiting.   Endocrine: Negative for cold intolerance and heat intolerance.   Genitourinary: Negative for difficulty urinating and flank pain.   Musculoskeletal: Negative for arthralgias and back pain.   Skin: Negative for color " change and pallor.   Neurological: Negative for dizziness and facial asymmetry.   Psychiatric/Behavioral: Positive for agitation, decreased concentration, dysphoric mood, sleep disturbance and suicidal ideas. Negative for behavioral problems, confusion, hallucinations and self-injury. The patient is nervous/anxious. The patient is not hyperactive.        Past Medical History:   Diagnosis Date   • Anxiety    • Brain tumor (CMS/HCC)    • Chronic headache    • Depression    • Diastolic CHF, chronic (CMS/[facility])    • DVT (deep venous thrombosis) (CMS/[facility])     left leg   • Encephalitis 12/2016    treated at the [facility]   • Epilepsy (CMS/[facility])    • Gastric ulcer with perforation (CMS/HCC) 03/2016    Microperforation and air in the biliary tree   • Gastritis    • Heart disease    • Henoch-Schonlein purpura (CMS/[facility])    • Hypertension    • Hypothyroidism (acquired)     Removed due to groiter   • Kidney disease    • Lower GI bleeding    • Lupus    • Memory disorder    • Migraine    • Mixed connective tissue disease (CMS/[facility])    • MRSA cellulitis    • NSTEMI (non-ST elevated myocardial infarction) (CMS/[facility])    • Patent foramen ovale    • Pneumonia    • PVC (premature ventricular contraction)    • RA (rheumatoid arthritis) (CMS/[facility])    • Renal disorder    • Rhabdomyolysis    • Seizures (CMS/[facility])     when had encephalitis   • Sjogren's syndrome (CMS/[facility])    • Stroke (CMS/[facility]) 09/2015    x 1   • Systemic lupus erythematosus (CMS/[facility])     Discoid and systemic   • Temporal arteritis (CMS/[facility])    • Thyroid disease    • TIA (transient ischemic attack)     x 3   • Ulcer, stomach peptic, chronic        Allergies   Allergen Reactions   • Compazine [Prochlorperazine Edisylate] Dystonia   • Imitrex [Sumatriptan] Other (See Comments)     Previous stroke   • Nsaids GI Bleeding   • Reglan [Metoclopramide] Dystonia   • Solu-Medrol [Methylprednisolone] Dystonia   • Zyprexa [Olanzapine] Swelling   • Prednisone Anxiety       Past  Surgical History:   Procedure Laterality Date   • APPENDECTOMY     • CARDIAC CATHETERIZATION  2016    PFO repair and had a loop monitor placed at the [facility]   •  SECTION      x 2   • ENDOSCOPY     • FACIAL RECONSTRUCTION SURGERY      clean out MRSA    • INCISION AND DRAINAGE ABSCESS Right 2019    Procedure: INCISION AND DRAINAGE ABSCESS RIGHT AXILLA;  Surgeon: Zak Martin MD;  Location: [facility];  Service: General   • KNEE ARTHROSCOPY Left    • LUMBAR FUSION     • PORTACATH PLACEMENT Right 2016   • THYROID SURGERY      Removed due to a goiter       Family History   Problem Relation Age of Onset   • Hypertension Mother    • Arthritis Mother         RA   • Osteoarthritis Mother    • Heart disease Mother    • Hypertension Father    • Arthritis Father         RA   • Diabetes Father    • Heart disease Father        Social History     Socioeconomic History   • Marital status:      Spouse name: Not on file   • Number of children: Not on file   • Years of education: Not on file   • Highest education level: Not on file   Tobacco Use   • Smoking status: Never Smoker   • Smokeless tobacco: Never Used   Substance and Sexual Activity   • Alcohol use: No   • Drug use: No   • Sexual activity: Yes     Partners: Male           Objective   Physical Exam   Constitutional: She is oriented to person, place, and time. She appears well-developed and well-nourished. She appears distressed.   Extremely tearful   HENT:   Head: Normocephalic and atraumatic.   Right Ear: External ear normal.   Left Ear: External ear normal.   Nose: Nose normal.   Mouth/Throat: Oropharynx is clear and moist. No oropharyngeal exudate.   Eyes: Conjunctivae are normal. Pupils are equal, round, and reactive to light. Right eye exhibits no discharge. Left eye exhibits no discharge. No scleral icterus.   Neck: Normal range of motion. Neck supple. No JVD present. No tracheal deviation present. No thyromegaly  present.   Cardiovascular: Normal rate, regular rhythm, normal heart sounds and intact distal pulses. Exam reveals no gallop and no friction rub.   No murmur heard.  Pulmonary/Chest: Effort normal and breath sounds normal. No stridor. No respiratory distress. She has no wheezes. She has no rales. She exhibits no tenderness.   Abdominal: Soft. Bowel sounds are normal. She exhibits no distension and no mass. There is no tenderness. There is no guarding.   Musculoskeletal: Normal range of motion. She exhibits no edema, tenderness or deformity.   Lymphadenopathy:     She has no cervical adenopathy.   Neurological: She is alert and oriented to person, place, and time.   Skin: Skin is warm and dry. Capillary refill takes less than 2 seconds. No rash noted. She is not diaphoretic. No erythema. No pallor.   Psychiatric: Her mood appears anxious. She is agitated. She exhibits a depressed mood. She expresses suicidal ideation.   Nursing note and vitals reviewed.      Procedures           ED Course                  MDM  Number of Diagnoses or Management Options  Suicidal ideation: new and requires workup     Amount and/or Complexity of Data Reviewed  Clinical lab tests: ordered and reviewed  Tests in the radiology section of CPT®: ordered and reviewed  Tests in the medicine section of CPT®: reviewed and ordered  Independent visualization of images, tracings, or specimens: yes    Risk of Complications, Morbidity, and/or Mortality  Presenting problems: high  Diagnostic procedures: high  Management options: high    Critical Care  Total time providing critical care: < 30 minutes    Patient Progress  Patient progress: stable      Final diagnoses:   Suicidal ideation              oSnja Cole DO  09/14/19 0704

## 2019-09-14 NOTE — NURSING NOTE
Patient presents to the ED today with worsening depression and active thoughts of wanting to kill herself. She reports that tried to get out talk to someone yesterday but ended up just going back home and cried and cried and  eventually called the hotline. She reports that she has been struggling the past two weeks and has been isolating, not bathing or eating, having flash backs of 911 and all the trauma she experienced that day, and reliving the death of her child, and just feels hopeless, feels life is pointless and cant go on no more. She reports that she does not have a plan because she is too much of a courerd. She rates anxiety and depression 10/10. Not taking any of her meds in two days. I'm trying it that way I guess she states. Pt noted to be very anxious and in panic mode when she arrived and ED provider noted they gave her Geodon and that seemed to help. She is tearful in intake but calmer and able to talk with staff rambling speech noted at times.  Emotional support provided to pt. She denies any substance abuse hx. States I only take what is mine from the

## 2019-09-14 NOTE — NURSING NOTE
SI precautions maintained. Pt reports that she is scared she is going to die if she does not get help. Ensured patient that she was safe and that she is here now and can get help. Waiting on urine tests.

## 2019-09-14 NOTE — NURSING NOTE
Called and spoke to Dr. Jean. Intake information discussed.labs reviewed. Dr. Jean aware uds not available right now. Meds and vital signs reviewed. Instructed to admit to adult psych unit with routine orders only at this time. [id number].

## 2019-09-14 NOTE — NURSING NOTE
"Pt bp elevated,  Pt complains of SOA. Pt states \" I believe I am going to die, please dont leave me,  I have been on the phone with Suicide hotline for last 2 hrs and I am scared.  ER provider made aware of bp.  Pt moved to Room 15 at this time.  Report given to Diana Mejía RN at this time. .  Room swept for safety.   "

## 2019-09-14 NOTE — H&P
"INITIAL PSYCHIATRIC HISTORY & PHYSICAL    Patient Identification:  Name:   Karely Villarreal  Age:   54 y.o.  Sex:   female  :   1965  MRN:   [id number]  Visit Number:   [id number]  Primary Care Physician:   Tracie Levi APRN    SUBJECTIVE    CC/Focus of Exam: Suicidal Ideation    HPI: Karely Villarreal is a 54 y.o. female who was admitted on 2019 with complaints of suicidal ideation.  Patient presents to [facility] for suicidal ideation. She had reported worsening depression and active  thoughts of wanting to kill herself. She reports that tried to get out talk to someone yesterday but ended up just going back home and cried and cried and  eventually called the hotline. She reports that she has been struggling the past two weeks and has been isolating, not bathing or eating, having flash backs of 911 and all the trauma she experienced that day, and reliving the death of her child, and just feels hopeless, feels life is pointless and cant go on no more. She reports that she does not have a plan because she is too much of a coward.  The patient is extremely tearful during examination and it is difficult to obtain most of the history.  She states \"I want to die \".  She states that in  she lost her 13-year-old daughter to a an ATV accident.  She was also a  for [employer] on 2001, which led to some PTSD and other issues.  She states that she has multiple medical problems, stressors in her life, and states that she just cannot take it anymore.  She states that she cannot die today because it is her dad's birthday.  She states that she feels short of breath and cannot breathe, but thinks is due to anxiety.  She states that she is having trouble figuring out what is real and what is not real.  She states that I cannot tell if I am already dead or not.  She states that I might be dead and be in hell for all I know.  The patient states that she has not had any " homicidal thoughts, does not have a particular plan to kill herself, and has not had any hallucinations. She does report that she has been non compliant with her medication for the past three days because she thought that it could be a way that she could die. When asked to rate her current anxiety and depression she states that it is a 10/10 with 10 being the most severe. Patient had reported to intake that she does see her PCP for medication prescriptions and that she only takes them. During assessment admits to selling her prescription medication for suboxone on the street. She states that is the only thing that helps her and she cannot find a doctor around here that prescribes it. Patient reports that she has been having feelings of hot and cold sweats and anxiety but did not seem to think that it was withdrawal related. She denies any homicidal ideation or hallucinations.  She reports having poor sleep and little to no appetite for the past 3 days.  Patient reports that she has a history of seizures.  She has been admitted to the adult psychiatric unit for further safety and stabilization.    Available medical/psychiatric records reviewed and incorporated into the current document.     PAST PSYCHIATRIC HX: Patient has a history of inpatient psychiatric admissions at this facility with the last being on 2/18/2019 to 2/19/2019 for similar presentation and a discharge diagnosis of severe recurrent major depression without psychotic features, systemic lupus erythematosus, hypertension, hypothyroidism and generalized anxiety disorder.  At that time she was started on Abilify.  Patient does report that her PCP has been prescribing all of her medications including Wellbutrin, Ambien. Pt reports that she has a long time psychiatric provider in [place], [place]. (Dr. Demetri Lay) Pt has had 6 inpt admissions for length of 5 days each admission, over the past 8 yrs at [facility] in [place].   Psychiatric  "Medications recently hx: Cymbalta, Wellbutrin, Seroquel, Ambien, Morphine, Klonopin.     SUBSTANCE USE HX: UDS is currently in progress. Patient denied any abuse but then later reported that she has been buying suboxone off the street because that is only thing that helps and she sells her prescription pain medications to buy Suboxone. She reports symptoms of tolerance and withdrawals. She also reports she runs out of benzodiazepine scripts before time as she takes more than prescribed and cannot function without benzos. She is reporting tolerance and withdrawals and wants to be started on detox medicacations. She has been prescribed medication by her PCP.     SOCIAL HX: Pt is a 53 year old female. She moved to [place], [place] due to cheaper apartment rent. After living in [place] for 4 years, her apartment home was needed by her renters for a family member. She reports that she is not able to live closer to her family due to her financial stress and inability to afford more expensive rent. She reports her monthly disability is $1400.00. 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 She reports that she was employed with [employer] for 25 years. She has one daughter and granddaughter. She reports her younger daughter was killed in an ATV accident at age 13. Pt reports that she is   after being  to her  for 25 years. [place] hx of employment. Her parents live in [place]. She reports that her Mother is her Healthcare surrogate for [employer]. She self isolates she reports in an attempt to \"not be a burden to my family.\" She then added that she told her family about her drug use problems and ever since then they have pushed her away and she is not allowed to be with her daughter.    Past Medical History:   Diagnosis Date   • Anxiety    • Brain tumor (CMS/HCC)    • Chronic headache    • " Depression    • Diastolic CHF, chronic (CMS/HCC)    • DVT (deep venous thrombosis) (CMS/HCC)     left leg   • Encephalitis 2016    treated at the [facility]   • Epilepsy (CMS/HCC)    • Gastric ulcer with perforation (CMS/HCC) 2016    Microperforation and air in the biliary tree   • Gastritis    • Heart disease    • Henoch-Schonlein purpura (CMS/HCC)    • Hypertension    • Hypothyroidism (acquired)     Removed due to groiter   • Kidney disease    • Lower GI bleeding    • Lupus    • Memory disorder    • Migraine    • Mixed connective tissue disease (CMS/HCC)    • MRSA cellulitis    • NSTEMI (non-ST elevated myocardial infarction) (CMS/[facility])    • Panic disorder    • Patent foramen ovale    • Pneumonia    • PTSD (post-traumatic stress disorder)     trauma from 911   • PVC (premature ventricular contraction)    • RA (rheumatoid arthritis) (CMS/[facility])    • Renal disorder    • Rhabdomyolysis    • Seizures (CMS/[facility]ly [facility])     when had encephalitis   • Sjogren's syndrome (CMS/[facility]ly [facility])    • Stroke (CMS/[facility]) 09/2015    x 1   • Systemic lupus erythematosus (CMS/[facility])     Discoid and systemic   • Temporal arteritis (CMS/[facility])    • Thyroid disease    • TIA (transient ischemic attack)     x 3   • Ulcer, stomach peptic, chronic        Past Surgical History:   Procedure Laterality Date   • APPENDECTOMY     • CARDIAC CATHETERIZATION  2016    PFO repair and had a loop monitor placed at the [facility]   • CARDIAC SURGERY     •  SECTION      x 2   • ENDOSCOPY     • FACIAL RECONSTRUCTION SURGERY      clean out MRSA    • INCISION AND DRAINAGE ABSCESS Right 2019    Procedure: INCISION AND DRAINAGE ABSCESS RIGHT AXILLA;  Surgeon: Zak Martin MD;  Location: [facility];  Service: General   • KNEE ARTHROSCOPY Left    • LUMBAR FUSION     • PORTACATH PLACEMENT Right 2016   • THYROID SURGERY      Removed due to a goiter       Family History   Problem Relation Age of Onset   • Hypertension Mother    •  Arthritis Mother         RA   • Osteoarthritis Mother    • Heart disease Mother    • Hypertension Father    • Arthritis Father         RA   • Diabetes Father    • Heart disease Father          Medications Prior to Admission   Medication Sig Dispense Refill Last Dose   • buPROPion SR (WELLBUTRIN SR) 100 MG 12 hr tablet Take 100 mg by mouth Daily.   Past Week at Unknown time   • clonazePAM (KlonoPIN) 0.5 MG tablet Take 0.25 mg by mouth 4 (Four) Times a Day.   Past Week at Unknown time   • DULoxetine (CYMBALTA) 60 MG capsule Take 60 mg by mouth 2 (Two) Times a Day.   Past Week at Unknown time   • hydrochlorothiazide (HYDRODIURIL) 25 MG tablet Take 25 mg by mouth Daily.   Past Week at Unknown time   • hydroxychloroquine (PLAQUENIL) 200 MG tablet Take 200 mg by mouth 2 (two) times a day.   Past Week at Unknown time   • levETIRAcetam (KEPPRA) 500 MG tablet Take 500 mg by mouth 2 (Two) Times a Day.   Past Week at Unknown time   • levothyroxine (SYNTHROID, LEVOTHROID) 175 MCG tablet Take 1 tablet by mouth Daily. 30 tablet 0 Past Week at Unknown time   • LORazepam (ATIVAN) 1 MG tablet Take 1 mg by mouth Every 8 (Eight) Hours As Needed for Anxiety.   Past Week at Unknown time   • metoprolol tartrate (LOPRESSOR) 25 MG tablet Take 25 mg by mouth 2 (Two) Times a Day.   Past Week at Unknown time   • oxyCODONE (ROXICODONE) 30 MG immediate release tablet Take 30 mg by mouth Every 4 (Four) Hours As Needed for Moderate Pain .   Past Week at Unknown time   • oxymorphone (OPANA) 10 MG tablet Take 10 mg by mouth Every 12 (Twelve) Hours.   9/14/2019 at am   • potassium chloride (K-DUR,KLOR-CON) 20 MEQ CR tablet Take 20 mEq by mouth Daily.   Past Week at Unknown time   • promethazine (PHENERGAN) 25 MG tablet Take 25 mg by mouth Every 6 (Six) Hours As Needed for Nausea or Vomiting.   Past Week at Unknown time   • zolpidem (AMBIEN) 5 MG tablet Take 10 mg by mouth At Night As Needed for Sleep.   9/13/2019 at pm           ALLERGIES:   Compazine [prochlorperazine edisylate]; Imitrex [sumatriptan]; Nsaids; Reglan [metoclopramide]; Solu-medrol [methylprednisolone]; Zyprexa [olanzapine]; and Prednisone    Temp:  [98 °F (36.7 °C)-98.4 °F (36.9 °C)] 98.1 °F (36.7 °C)  Heart Rate:  [] 76  Resp:  [18] 18  BP: (141-178)/() 141/85    REVIEW OF SYSTEMS:  Constitutional: Positive for chills and diaphoresis.   HENT: Negative.    Eyes: Negative.    Respiratory: Negative.    Cardiovascular: Negative.    Gastrointestinal: Positive for abdominal pain and nausea.   Endocrine: Negative.    Genitourinary: Negative.    Musculoskeletal: Positive for back pain and myalgias.   Skin: Negative.    Allergic/Immunologic: Negative.    Neurological: Positive for tremors and weakness.   Hematological: Negative.    Psychiatric/Behavioral: Positive for dysphoric mood and suicidal ideas. The patient is nervous/anxious.    OBJECTIVE    PHYSICAL EXAM:  Constitutional: oriented to person, place, and time. Appears well-developed and well-nourished.   HENT:   Head: Normocephalic and atraumatic.   Right Ear: External ear normal.   Left Ear: External ear normal.   Mouth/Throat: Oropharynx is clear and moist.   Eyes: Pupils are equal, round, and reactive to light. Conjunctivae and EOM are normal.   Neck: Normal range of motion. Neck supple.   Cardiovascular: Normal rate, regular rhythm and normal heart sounds.    Pulmonary/Chest: Effort normal and breath sounds normal. No respiratory distress. No wheezes.   Abdominal: Soft. Bowel sounds are normal.No distension. There is no tenderness.   Musculoskeletal: Normal range of motion. No edema or deformity.   Neurological:Alert and oriented to person, place, and time. No cranial nerve deficit. Coordination normal.   Skin: Skin is warm and dry. No rash noted. No erythema.     MENTAL STATUS EXAM:               Hygiene:   fair  Cooperation:  Cooperative  Eye Contact:  Downcast  Psychomotor Behavior:  Restless  Affect:   Restricted  Hopelessness: 4  Speech:  Pressured  Thought Progress:  Pressured  Thought Content:  Mood congruent  Suicidal:  Suicidal Ideation  Homicidal:  None  Hallucinations:  Auditory and Visual  Delusion:  None  Memory:  Intact  Orientation:  Person, Place and Situation  Reliability:  poor  Insight:  Poor  Judgement:  Poor  Impulse Control:  Poor  Physical/Medical Issues:  No       Imaging Results (last 24 hours)     ** No results found for the last 24 hours. **           ECG/EMG Results (most recent)     None           Lab Results   Component Value Date    GLUCOSE 141 (H) 09/14/2019    BUN 7 09/14/2019    CREATININE 0.58 09/14/2019    EGFRIFNONA 109 09/14/2019    EGFRIFAFRI  09/11/2016      Comment:      <15 Indicative of kidney failure.    BCR 12.1 09/14/2019    CO2 24.2 09/14/2019    CALCIUM 9.7 09/14/2019    ALBUMIN 4.22 09/14/2019    LABIL2 1.2 (L) 05/11/2016    AST 16 09/14/2019    ALT 15 09/14/2019       Lab Results   Component Value Date    WBC 9.36 09/14/2019    HGB 11.5 (L) 09/14/2019    HCT 37.7 09/14/2019    MCV 76.0 (L) 09/14/2019     09/14/2019       Pain Management Panel     Pain Management Panel Latest Ref Rng & Units 8/12/2019 2/17/2019    AMPHETAMINES SCREEN, URINE Negative Negative Negative    BARBITURATES SCREEN Negative Negative Negative    BENZODIAZEPINE SCREEN, URINE Negative Negative Positive(A)    BUPRENORPHINEUR Negative Positive(A) Positive(A)    COCAINE SCREEN, URINE Negative Negative Negative    METHADONE SCREEN, URINE Negative Negative Negative          Brief Urine Lab Results  (Last result in the past 365 days)      Color   Clarity   Blood   Leuk Est   Nitrite   Protein   CREAT   Urine HCG        09/14/19 0739 Yellow Clear Small (1+) Negative Negative Negative         09/14/19 0739               Negative           Reviewed labs and studies done with this admission.       ASSESSMENT & PLAN:        Severe recurrent major depression without psychotic features (CMS/HCC)  -  Continue Cymbalta      Depression with suicidal ideation  - SP3      Systemic lupus erythematosus (CMS/HCC)  - Plaquenil 200 mg bid      Hypothyroidism (acquired)  - Synthroid      KEREN (generalized anxiety disorder)  - Continue Cymbalta      HTN  - Lopressor and HCTZ      Opioid use disorder severe  - Clonidine detox  - Short Subutex detox      Benzodiazepine use disorder severe  - Ativan detox    Had a detailed discussion with patient regarding her substance use problems. She agreed that she is taking more than prescribed, and her current pain meds are not effective and she has to resort to Suboxone to get relief. She agreed to detox off the controlled substances and then consider going to a residential rehab or medication assisted treatment. The patient's motivation to change is minimal at this time and she will be encouraged to consider the negative effects of her ongoing substance use.    The patient has been admitted for safety and stabilization.  Patient will be monitored for suicidality daily and maintained on Sucide precaution Level 3 (q15 min checks) Sucide precaution Level 3 (q15 min checks) .  The patient will have individual and group therapy with a master's level therapist. A master treatment plan will be developed and agreed upon by the patient and his/her treatment team.  The patient's estimated length of stay in the hospital is 5-7 days.       Written by RAHEEL Li, acting as scribe for Dr. MOSES Jean signature on this note affirms that the note adequately documents the care provided.     Neyda West MA  09/14/19  10:13 AM    IMyriam MD, personally performed the services described in this documentation as scribed by the above named individual in my presence, and it is both accurate and complete.

## 2019-09-15 LAB
CHOLEST SERPL-MCNC: 166 MG/DL (ref 0–200)
HBA1C MFR BLD: 5.9 % (ref 4.8–5.6)
HDLC SERPL-MCNC: 65 MG/DL (ref 40–60)
LDLC SERPL CALC-MCNC: 94 MG/DL (ref 0–100)
LDLC/HDLC SERPL: 1.45 {RATIO}
TRIGL SERPL-MCNC: 34 MG/DL (ref 0–150)
VLDLC SERPL-MCNC: 6.8 MG/DL

## 2019-09-15 PROCEDURE — 80061 LIPID PANEL: CPT | Performed by: PSYCHIATRY & NEUROLOGY

## 2019-09-15 PROCEDURE — 99232 SBSQ HOSP IP/OBS MODERATE 35: CPT | Performed by: PSYCHIATRY & NEUROLOGY

## 2019-09-15 PROCEDURE — 83036 HEMOGLOBIN GLYCOSYLATED A1C: CPT | Performed by: PSYCHIATRY & NEUROLOGY

## 2019-09-15 RX ORDER — BUPRENORPHINE 2 MG/1
2 TABLET SUBLINGUAL 2 TIMES DAILY
Status: COMPLETED | OUTPATIENT
Start: 2019-09-15 | End: 2019-09-15

## 2019-09-15 RX ORDER — LORAZEPAM 1 MG/1
1 TABLET ORAL EVERY 4 HOURS PRN
Status: ACTIVE | OUTPATIENT
Start: 2019-09-16 | End: 2019-09-17

## 2019-09-15 RX ORDER — LORAZEPAM 0.5 MG/1
0.5 TABLET ORAL
Status: COMPLETED | OUTPATIENT
Start: 2019-09-17 | End: 2019-09-17

## 2019-09-15 RX ORDER — LORAZEPAM 0.5 MG/1
0.5 TABLET ORAL EVERY 4 HOURS PRN
Status: ACTIVE | OUTPATIENT
Start: 2019-09-17 | End: 2019-09-18

## 2019-09-15 RX ORDER — LORAZEPAM 1 MG/1
1 TABLET ORAL
Status: COMPLETED | OUTPATIENT
Start: 2019-09-16 | End: 2019-09-16

## 2019-09-15 RX ADMIN — ACETAMINOPHEN 650 MG: 325 TABLET ORAL at 05:10

## 2019-09-15 RX ADMIN — HYDROXYCHLOROQUINE SULFATE 200 MG: 200 TABLET ORAL at 09:58

## 2019-09-15 RX ADMIN — ONDANSETRON 4 MG: 4 TABLET, FILM COATED ORAL at 05:10

## 2019-09-15 RX ADMIN — DULOXETINE HYDROCHLORIDE 60 MG: 60 CAPSULE, DELAYED RELEASE ORAL at 20:26

## 2019-09-15 RX ADMIN — POTASSIUM CHLORIDE 20 MEQ: 1500 TABLET, EXTENDED RELEASE ORAL at 09:58

## 2019-09-15 RX ADMIN — LORAZEPAM 2 MG: 2 TABLET ORAL at 09:58

## 2019-09-15 RX ADMIN — HYDROXYZINE HYDROCHLORIDE 50 MG: 50 TABLET, FILM COATED ORAL at 05:10

## 2019-09-15 RX ADMIN — CYCLOBENZAPRINE HYDROCHLORIDE 10 MG: 10 TABLET, FILM COATED ORAL at 18:22

## 2019-09-15 RX ADMIN — ACETAMINOPHEN 650 MG: 325 TABLET ORAL at 16:55

## 2019-09-15 RX ADMIN — HYDROXYZINE HYDROCHLORIDE 50 MG: 50 TABLET, FILM COATED ORAL at 23:33

## 2019-09-15 RX ADMIN — LEVETIRACETAM 500 MG: 500 TABLET, FILM COATED ORAL at 09:58

## 2019-09-15 RX ADMIN — BUPRENORPHINE HCL 2 MG: 2 TABLET SUBLINGUAL at 12:07

## 2019-09-15 RX ADMIN — HYDROXYCHLOROQUINE SULFATE 200 MG: 200 TABLET ORAL at 20:26

## 2019-09-15 RX ADMIN — BUPRENORPHINE HCL 2 MG: 2 TABLET SUBLINGUAL at 20:25

## 2019-09-15 RX ADMIN — LEVOTHYROXINE SODIUM 175 MCG: 0.17 TABLET ORAL at 09:58

## 2019-09-15 RX ADMIN — LORAZEPAM 1.5 MG: 1 TABLET ORAL at 14:51

## 2019-09-15 RX ADMIN — CYCLOBENZAPRINE HYDROCHLORIDE 10 MG: 10 TABLET, FILM COATED ORAL at 10:01

## 2019-09-15 RX ADMIN — HYDROCHLOROTHIAZIDE 25 MG: 25 TABLET ORAL at 09:58

## 2019-09-15 RX ADMIN — LORAZEPAM 1.5 MG: 1 TABLET ORAL at 21:05

## 2019-09-15 RX ADMIN — ONDANSETRON 4 MG: 4 TABLET, FILM COATED ORAL at 21:05

## 2019-09-15 RX ADMIN — DULOXETINE HYDROCHLORIDE 60 MG: 60 CAPSULE, DELAYED RELEASE ORAL at 09:58

## 2019-09-15 RX ADMIN — LEVETIRACETAM 500 MG: 500 TABLET, FILM COATED ORAL at 20:26

## 2019-09-15 RX ADMIN — ACETAMINOPHEN 650 MG: 325 TABLET ORAL at 23:33

## 2019-09-15 NOTE — PLAN OF CARE
Problem: Patient Care Overview  Goal: Individualization and Mutuality  Outcome: Ongoing (interventions implemented as appropriate)    Goal: Interprofessional Rounds/Family Conf  Outcome: Ongoing (interventions implemented as appropriate)      Problem: Overarching Goals (Adult)  Goal: Adheres to Safety Considerations for Self and Others  Outcome: Ongoing (interventions implemented as appropriate)

## 2019-09-15 NOTE — PROGRESS NOTES
"INPATIENT PSYCHIATRIC PROGRESS NOTE    Name:  Karely Villarreal  :  1965  MRN:  [id number]  Visit Number:  [id number]  Length of stay:  1    SUBJECTIVE  CC/Focus of Exam: depression, substance use    INTERVAL HISTORY:  The patient states she is feeling some better as she was able to get some rest but she feels that her withdrawals are getting worse, and she believes that she is wiredrawing more from pain medications. States she was taking more Suboxone than any other medication. She states she would like to use a walker because she feels very unsteady on her feet due to back and knee pain.  Depression rating 8/10  Anxiety rating 8/10  Sleep: fair  Withdrawal sx: body aches and pains  Craving: 10/10 for pain    Review of Systems   Respiratory: Negative.    Cardiovascular: Negative.    Gastrointestinal: Positive for nausea.   Musculoskeletal: Positive for arthralgias and myalgias.   Psychiatric/Behavioral: Positive for dysphoric mood. The patient is nervous/anxious.        OBJECTIVE    Temp:  [97.2 °F (36.2 °C)-97.9 °F (36.6 °C)] 97.2 °F (36.2 °C)  Heart Rate:  [74-90] 86  Resp:  [18] 18  BP: (115-149)/(71-88) 118/71    MENTAL STATUS EXAM:  Appearance:Casually dressed, good hygeine.   Cooperation:Cooperative  Psychomotor: No psychomotor agitation/retardation, No EPS, No motor tics  Speech-normal rate, amount.  Mood \"depressed\"   Affect-congruent, appropriate, stable  Thought Content-goal directed, no delusional material present  Thought process-linear, organized.  Suicidality: No SI  Homicidality: No HI  Perception: No AH/VH  Insight-fair   Judgement-fair    Lab Results (last 24 hours)     Procedure Component Value Units Date/Time    Lipid Panel [[id number]]  (Abnormal) Collected:  09/15/19 0542    Specimen:  Blood Updated:  09/15/19 07     Total Cholesterol 166 mg/dL      Triglycerides 34 mg/dL      HDL Cholesterol 65 mg/dL      LDL Cholesterol  94 mg/dL      VLDL Cholesterol 6.8 mg/dL      LDL/HDL Ratio 1.45    " Narrative:       Cholesterol Reference Ranges  (U.S. Department of Health and Human Services ATP III Classifications)    Desirable          <200 mg/dL  Borderline High    200-239 mg/dL  High Risk          >240 mg/dL      Triglyceride Reference Ranges  (U.S. Department of Health and Human Services ATP III Classifications)    Normal           <150 mg/dL  Borderline High  150-199 mg/dL  High             200-499 mg/dL  Very High        >500 mg/dL    HDL Reference Ranges  (U.S. Department of Health and Human Services ATP III Classifcations)    Low     <40 mg/dl (major risk factor for CHD)  High    >60 mg/dl ('negative' risk factor for CHD)        LDL Reference Ranges  (U.S. Department of Health and Human Services ATP III Classifcations)    Optimal          <100 mg/dL  Near Optimal     100-129 mg/dL  Borderline High  130-159 mg/dL  High             160-189 mg/dL  Very High        >189 mg/dL    Hemoglobin A1c [[id number]]  (Abnormal) Collected:  09/15/19 0542    Specimen:  Blood Updated:  09/15/19 0654     Hemoglobin A1C 5.90 %     Narrative:       Hemoglobin A1C Ranges:    Increased Risk for Diabetes  5.7% to 6.4%  Diabetes                     >= 6.5%  Diabetic Goal                < 7.0%             Imaging Results (last 24 hours)     ** No results found for the last 24 hours. **             ECG/EMG Results (most recent)     None           ALLERGIES: Compazine [prochlorperazine edisylate]; Imitrex [sumatriptan]; Nsaids; Reglan [metoclopramide]; Solu-medrol [methylprednisolone]; Zyprexa [olanzapine]; and Prednisone      Current Facility-Administered Medications:   •  acetaminophen (TYLENOL) tablet 650 mg, 650 mg, Oral, Q6H PRN, Myriam Jean MD, 650 mg at 09/15/19 0510  •  aluminum-magnesium hydroxide-simethicone (MAALOX MAX) 400-400-40 MG/5ML suspension 15 mL, 15 mL, Oral, Q6H PRN, Myriam Jean MD  •  benzonatate (TESSALON) capsule 100 mg, 100 mg, Oral, TID PRN, Myriam Jean MD  •  benztropine (COGENTIN) tablet 2 mg,  2 mg, Oral, Once PRN **OR** benztropine (COGENTIN) injection 1 mg, 1 mg, Intramuscular, Once PRN, Myriam Jean MD  •  CloNIDine (CATAPRES) tablet 0.1 mg, 0.1 mg, Oral, 4x Daily PRN **FOLLOWED BY** [START ON 9/16/2019] CloNIDine (CATAPRES) tablet 0.1 mg, 0.1 mg, Oral, TID PRN **FOLLOWED BY** [START ON 9/17/2019] CloNIDine (CATAPRES) tablet 0.1 mg, 0.1 mg, Oral, BID PRN **FOLLOWED BY** [START ON 9/18/2019] CloNIDine (CATAPRES) tablet 0.1 mg, 0.1 mg, Oral, Once PRN, Myriam Jean MD  •  cyclobenzaprine (FLEXERIL) tablet 10 mg, 10 mg, Oral, TID PRN, Myriam Jean MD, 10 mg at 09/15/19 1001  •  dicyclomine (BENTYL) capsule 10 mg, 10 mg, Oral, TID PRN, Myriam Jean MD  •  DULoxetine (CYMBALTA) DR capsule 60 mg, 60 mg, Oral, BID, Myriam Jean MD, 60 mg at 09/15/19 0958  •  famotidine (PEPCID) tablet 20 mg, 20 mg, Oral, BID PRN, Myriam Jean MD  •  hydrochlorothiazide (HYDRODIURIL) tablet 25 mg, 25 mg, Oral, Daily, Myriam Jean MD, 25 mg at 09/15/19 0958  •  hydroxychloroquine (PLAQUENIL) tablet 200 mg, 200 mg, Oral, Q12H, Myriam Jean MD, 200 mg at 09/15/19 0958  •  hydrOXYzine (ATARAX) tablet 50 mg, 50 mg, Oral, Q6H PRN, Myriam Jean MD, 50 mg at 09/15/19 0510  •  levETIRAcetam (KEPPRA) tablet 500 mg, 500 mg, Oral, Q12H, Myriam Jean MD, 500 mg at 09/15/19 0958  •  levothyroxine (SYNTHROID, LEVOTHROID) tablet 175 mcg, 175 mcg, Oral, Daily, Myriam Jean MD, 175 mcg at 09/15/19 0958  •  loperamide (IMODIUM) capsule 2 mg, 2 mg, Oral, Q2H PRN, Myriam Jean MD  •  LORazepam (ATIVAN) tablet 2 mg, 2 mg, Oral, 3 times per day, 2 mg at 09/15/19 0958 **FOLLOWED BY** [START ON 9/16/2019] LORazepam (ATIVAN) tablet 1.5 mg, 1.5 mg, Oral, 3 times per day **FOLLOWED BY** [START ON 9/17/2019] LORazepam (ATIVAN) tablet 1 mg, 1 mg, Oral, 3 times per day **FOLLOWED BY** [START ON 9/18/2019] LORazepam (ATIVAN) tablet 0.5 mg, 0.5 mg, Oral, 3 times per day, Myriam Jean MD  •  LORazepam (ATIVAN) tablet 2 mg, 2 mg, Oral,  Q4H PRN **FOLLOWED BY** [START ON 9/16/2019] LORazepam (ATIVAN) tablet 1.5 mg, 1.5 mg, Oral, Q4H PRN **FOLLOWED BY** [START ON 9/17/2019] LORazepam (ATIVAN) tablet 1 mg, 1 mg, Oral, Q4H PRN **FOLLOWED BY** [START ON 9/18/2019] LORazepam (ATIVAN) tablet 0.5 mg, 0.5 mg, Oral, Q4H PRN, Myriam Jean MD  •  magnesium hydroxide (MILK OF MAGNESIA) suspension 2400 mg/10mL 10 mL, 10 mL, Oral, Daily PRN, Myriam Jean MD  •  metoprolol tartrate (LOPRESSOR) tablet 25 mg, 25 mg, Oral, BID, Myriam Jean MD, 25 mg at 09/14/19 1427  •  ondansetron (ZOFRAN) tablet 4 mg, 4 mg, Oral, Q6H PRN, Myriam Jean MD, 4 mg at 09/15/19 0510  •  potassium chloride (K-DUR,KLOR-CON) CR tablet 20 mEq, 20 mEq, Oral, Daily, Myriam Jean MD, 20 mEq at 09/15/19 0958  •  sodium chloride nasal spray 2 spray, 2 spray, Each Nare, PRN, Myriam Jean MD    ASSESSMENT & PLAN:    Severe recurrent major depression without psychotic features (CMS/HCC)  - Continue Cymbalta       Depression with suicidal ideation  - SP3       Systemic lupus erythematosus (CMS/HCC)  - Plaquenil 200 mg bid       Hypothyroidism (acquired)  - Synthroid       KEREN (generalized anxiety disorder)  - Continue Cymbalta       HTN  - Lopressor and HCTZ       Opioid use disorder severe  - Clonidine detox  - Short Subutex detox       Benzodiazepine use disorder severe  - Ativan detox, shorten the detox as patient reports she is not taking them as regularly as Suboxone.    Suicide precautions: Suicide precaution Level 3 (q15 min checks)     Behavioral Health Treatment Plan and Problem List: I have reviewed and approved the Behavioral Health Treatment Plan and Problem list.  The patient has had a chance to review and agrees with the treatment plan.     Clinician:  Myriam Jean MD  09/15/19  11:00 AM

## 2019-09-15 NOTE — PLAN OF CARE
Problem: Patient Care Overview  Goal: Plan of Care Review  Outcome: Ongoing (interventions implemented as appropriate)    Goal: Individualization and Mutuality  Outcome: Ongoing (interventions implemented as appropriate)    Goal: Discharge Needs Assessment  Outcome: Ongoing (interventions implemented as appropriate)    Goal: Interprofessional Rounds/Family Conf  Outcome: Ongoing (interventions implemented as appropriate)      Problem: Overarching Goals (Adult)  Goal: Adheres to Safety Considerations for Self and Others  Outcome: Ongoing (interventions implemented as appropriate)    Goal: Optimized Coping Skills in Response to Life Stressors  Outcome: Ongoing (interventions implemented as appropriate)    Goal: Develops/Participates in Therapeutic [place] to Support Successful Transition  Outcome: Ongoing (interventions implemented as appropriate)

## 2019-09-15 NOTE — PLAN OF CARE
Problem: Patient Care Overview  Goal: Plan of Care Review  Outcome: Ongoing (interventions implemented as appropriate)   09/15/19 0120   Coping/Psychosocial   Plan of Care Reviewed With patient   Coping/Psychosocial   Patient Agreement with Plan of Care agrees   Plan of Care Review   Progress no change   OTHER   Outcome Summary Pt teaarful, pacing hallways. Frequently asking for ativan for anxiety. Pt did not meet criteria for prn dosing. Pt refused lopressor and cymbalta this evening. Pt rates anxiety depression 10/10. Denies SI HI AVH. Denies craving or w/d symptoms.        Problem: Overarching Goals (Adult)  Goal: Adheres to Safety Considerations for Self and Others  Outcome: Ongoing (interventions implemented as appropriate)    Goal: Optimized Coping Skills in Response to Life Stressors  Outcome: Ongoing (interventions implemented as appropriate)    Goal: Develops/Participates in Therapeutic [place] to Support Successful Transition  Outcome: Ongoing (interventions implemented as appropriate)

## 2019-09-15 NOTE — PLAN OF CARE
Problem: Patient Care Overview  Goal: Plan of Care Review  Outcome: Ongoing (interventions implemented as appropriate)   09/15/19 0772   Coping/Psychosocial   Plan of Care Reviewed With patient   Coping/Psychosocial   Patient Agreement with Plan of Care agrees   Plan of Care Review   Progress no change   OTHER   Outcome Summary CLIENT REPORTS CONTINUED SYMPTOMS OF ANXIETY AND DEPRESSION. ATTITUDE TOWARDS TREATMENT SEEMS MORE POSITIVE TODAY, CLIENT WISHES TO HAVE AN ACTIVE ROLE IN HER OWN TREATMENT.       Problem: Overarching Goals (Adult)  Goal: Adheres to Safety Considerations for Self and Others  Outcome: Ongoing (interventions implemented as appropriate)    Goal: Optimized Coping Skills in Response to Life Stressors  Outcome: Ongoing (interventions implemented as appropriate)    Goal: Develops/Participates in Therapeutic [place] to Support Successful Transition  Outcome: Ongoing (interventions implemented as appropriate)

## 2019-09-15 NOTE — NURSING NOTE
Pt pacing hallways crying, repeatedly asking for ativan. Pt bp 120/80 hr 90. Dr Jean made aware, new orders received VORB x2.

## 2019-09-15 NOTE — NURSING NOTE
"Pt came to nursing station, tearful requesting ativan or to leave. Pt educated over policies and pt remains adamant she needs ativan. Pt does not meet criteria for prn ativan. Vistaril offered and pt refused. Pt states 'that  lied to me and now you want my blood pressure high so I can get my meds\". Pt agreeable to speak with provider in the morning.   "

## 2019-09-16 PROCEDURE — 99232 SBSQ HOSP IP/OBS MODERATE 35: CPT | Performed by: PSYCHIATRY & NEUROLOGY

## 2019-09-16 RX ORDER — BUPRENORPHINE 2 MG/1
2 TABLET SUBLINGUAL 2 TIMES DAILY
Status: COMPLETED | OUTPATIENT
Start: 2019-09-16 | End: 2019-09-16

## 2019-09-16 RX ORDER — ZIPRASIDONE HYDROCHLORIDE 20 MG/1
20 CAPSULE ORAL
Status: DISCONTINUED | OUTPATIENT
Start: 2019-09-16 | End: 2019-09-18 | Stop reason: HOSPADM

## 2019-09-16 RX ORDER — BUPRENORPHINE 2 MG/1
2 TABLET SUBLINGUAL ONCE
Status: COMPLETED | OUTPATIENT
Start: 2019-09-16 | End: 2019-09-16

## 2019-09-16 RX ORDER — BUPRENORPHINE 2 MG/1
2 TABLET SUBLINGUAL DAILY
Status: COMPLETED | OUTPATIENT
Start: 2019-09-18 | End: 2019-09-18

## 2019-09-16 RX ORDER — BUPRENORPHINE 2 MG/1
2 TABLET SUBLINGUAL 2 TIMES DAILY
Status: COMPLETED | OUTPATIENT
Start: 2019-09-17 | End: 2019-09-17

## 2019-09-16 RX ADMIN — DULOXETINE HYDROCHLORIDE 60 MG: 60 CAPSULE, DELAYED RELEASE ORAL at 08:08

## 2019-09-16 RX ADMIN — LORAZEPAM 1 MG: 1 TABLET ORAL at 21:01

## 2019-09-16 RX ADMIN — ACETAMINOPHEN 650 MG: 325 TABLET ORAL at 08:09

## 2019-09-16 RX ADMIN — METOPROLOL TARTRATE 25 MG: 25 TABLET, FILM COATED ORAL at 20:24

## 2019-09-16 RX ADMIN — POTASSIUM CHLORIDE 20 MEQ: 1500 TABLET, EXTENDED RELEASE ORAL at 08:09

## 2019-09-16 RX ADMIN — LORAZEPAM 1 MG: 1 TABLET ORAL at 14:27

## 2019-09-16 RX ADMIN — HYDROXYZINE HYDROCHLORIDE 50 MG: 50 TABLET, FILM COATED ORAL at 08:09

## 2019-09-16 RX ADMIN — CYCLOBENZAPRINE HYDROCHLORIDE 10 MG: 10 TABLET, FILM COATED ORAL at 04:01

## 2019-09-16 RX ADMIN — BUPRENORPHINE HCL 2 MG: 2 TABLET SUBLINGUAL at 17:04

## 2019-09-16 RX ADMIN — BUPRENORPHINE HCL 2 MG: 2 TABLET SUBLINGUAL at 20:24

## 2019-09-16 RX ADMIN — HYDROXYCHLOROQUINE SULFATE 200 MG: 200 TABLET ORAL at 20:24

## 2019-09-16 RX ADMIN — BUPRENORPHINE HCL 2 MG: 2 TABLET SUBLINGUAL at 13:34

## 2019-09-16 RX ADMIN — ZIPRASIDONE HYDROCHLORIDE 20 MG: 20 CAPSULE ORAL at 17:03

## 2019-09-16 RX ADMIN — HYDROXYCHLOROQUINE SULFATE 200 MG: 200 TABLET ORAL at 08:06

## 2019-09-16 RX ADMIN — CYCLOBENZAPRINE HYDROCHLORIDE 10 MG: 10 TABLET, FILM COATED ORAL at 17:04

## 2019-09-16 RX ADMIN — LORAZEPAM 1 MG: 1 TABLET ORAL at 08:08

## 2019-09-16 RX ADMIN — HYDROCHLOROTHIAZIDE 25 MG: 25 TABLET ORAL at 08:06

## 2019-09-16 RX ADMIN — LEVETIRACETAM 500 MG: 500 TABLET, FILM COATED ORAL at 08:06

## 2019-09-16 RX ADMIN — LEVOTHYROXINE SODIUM 175 MCG: 0.17 TABLET ORAL at 08:06

## 2019-09-16 RX ADMIN — DULOXETINE HYDROCHLORIDE 60 MG: 60 CAPSULE, DELAYED RELEASE ORAL at 20:24

## 2019-09-16 RX ADMIN — ONDANSETRON 4 MG: 4 TABLET, FILM COATED ORAL at 04:01

## 2019-09-16 RX ADMIN — LEVETIRACETAM 500 MG: 500 TABLET, FILM COATED ORAL at 20:24

## 2019-09-16 NOTE — DISCHARGE INSTR - APPOINTMENTS
Dr. Demetri Tobin  [address] [place] [id number]  [phone]    September 30 2019 at 2:40pm.

## 2019-09-16 NOTE — PLAN OF CARE
"Problem: Patient Care Overview  Goal: Plan of Care Review  Outcome: Ongoing (interventions implemented as appropriate)   09/16/19 0119   Coping/Psychosocial   Plan of Care Reviewed With patient   Coping/Psychosocial   Patient Agreement with Plan of Care agrees   Plan of Care Review   Progress no change   OTHER   Outcome Summary Pt continues to seek out staff and ask for medications frequently. Rates anxiety 8/10 depression 10/10. Reports SI-no plam, denies HI AVH. Pt states \"I don't know\" when asked about cravings, states \"I don't want to go through detox I'm going back to my doctor when I leave here to get my medications\".        Problem: Overarching Goals (Adult)  Goal: Adheres to Safety Considerations for Self and Others  Outcome: Ongoing (interventions implemented as appropriate)    Goal: Optimized Coping Skills in Response to Life Stressors  Outcome: Ongoing (interventions implemented as appropriate)    Goal: Develops/Participates in Therapeutic [place] to Support Successful Transition  Outcome: Ongoing (interventions implemented as appropriate)        "

## 2019-09-16 NOTE — PLAN OF CARE
Problem: Patient Care Overview  Goal: Plan of Care Review  Outcome: Ongoing (interventions implemented as appropriate)   09/16/19 6398   Coping/Psychosocial   Plan of Care Reviewed With patient   Coping/Psychosocial   Patient Agreement with Plan of Care agrees   Plan of Care Review   Progress improving   OTHER   Outcome Summary Pt stated she was eating well but not getting any sleep. Pt stated she had numerous medial problems. PT rated her anxiety and depression a 10. PT stated she felt irratable today. Pt was not suicidal or homicidal and denies any hallucinations. Pt rated her craving a 10 and stated she had multiple withdrawl symptoms. Pt continues to seek out staff and ask for medications frequently. PT was calm and cooperative this shift with no issues or concerns.       Problem: Overarching Goals (Adult)  Goal: Adheres to Safety Considerations for Self and Others  Outcome: Ongoing (interventions implemented as appropriate)    Goal: Optimized Coping Skills in Response to Life Stressors  Outcome: Ongoing (interventions implemented as appropriate)    Goal: Develops/Participates in Therapeutic [place] to Support Successful Transition  Outcome: Ongoing (interventions implemented as appropriate)

## 2019-09-16 NOTE — PLAN OF CARE
Problem: Patient Care Overview  Goal: Individualization and Mutuality  Outcome: Ongoing (interventions implemented as appropriate)   09/16/19 1353   Personal Strengths/Vulnerabilities   Patient Personal Strengths stable living environment;positive educational history;positive vocational history;expressive of needs;expressive of emotions   Patient Vulnerabilities Ineffective coping skills; depression; chronic illness; limited supports; anxiety;    Individualization   Patient Specific Goals (Include Timeframe) Patient will deny SI at discharge. Patient will identify 1-2 healthy coping skills prior to discharge.   Patient Specific Interventions Patient will be given daily Psychiatric evaluation daily, 20 minutes each session; Patient will be offered 1-4 individual sessions 20-30 minutes in length and daily groups. Will utilize brief and CBT therapy modalities.     Goal: Discharge Needs Assessment  Outcome: Ongoing (interventions implemented as appropriate)   09/16/19 1353   Discharge Needs Assessment   Readmission Within the Last 30 Days no previous admission in last 30 days   Concerns to be Addressed substance/tobacco abuse/use;suicidal;mental health;medication;coping/stress   Concerns Comments Patient was suicidal at admission; non-compliant with medications; anxiety attacks.   Patient/Family Anticipates Transition to home   Patient/Family Anticipated Services at Transition mental health services   Transportation Anticipated car, drives self   Patient's Choice of Community Agency(s) Dr Demetri Tobin in [place]   Current Discharge Risk substance use/abuse;psychiatric illness;lives alone;lack of support system/caregiver;chronically ill   Discharge Needs Assessment,    Outpatient/Agency/Support Group Needs outpatient counseling;outpatient medication management   Anticipated Discharge Disposition home or self-care                   DATA:         Therapist met individually with patient this date to introduce role  and to discuss hospitalization expectations. Patient agreeable. Patient is very upset with her treatment so far. Feels like she was lied to about her medications. Patient has been prescribed Klonipin and pain medications due to her chronic illnesses. States that she has been taking more than prescribed of these medications at times and also sells her pain medication for suboxone on the street. Patient states that she did not sleep at all last night and was not given anything to help her rest. Patient very attention seeking for medications. Patient reports current withdrawals as insomnia; stomach cramps; watery eyes; hot and cold sweats. Patient is currently on a detox although she plans to continue medications at home. Patient was given the option to see another Dr but they had no availability. Patient also was given the option to transfer to [place] which she declined to do. Patient can't maintain her safety if she were to go home today. She has refused family contact and lives alone. Patient is at a high risk of relapse and overdose. She reports that she does not care because she thinks she only has 4 years to live.     Clinical Maneuvering/Intervention:     Therapist assisted patient in processing above session content; acknowledged and normalized patient’s thoughts, feelings, and concerns.  Discussed the therapist/patient relationship and explain the parameters and limitations of relative confidentiality.  Also discussed the importance active participation, and honesty to the treatment process.  Encouraged the patient to discuss/vent her feelings, frustrations, and fears concerning her ongoing medical issues and validated her feelings.     Discussed the importance of finding enjoyable activities and coping skills that the patient can engage in a regular basis. Discussed healthy coping skills such as distraction, self love, grounding, thought challenges/reframing, etc.  Provided patient with list of healthy coping  skills this date. Discussed the importance of medication compliance.  Praised the patient for seeking help and spent the majority of the session building rapport.       Allowed patient to freely discuss issues without interruption or judgment. Provided safe, confidential environment to facilitate the development of positive therapeutic relationship and encourage open, honest communication.      Therapist addressed discharge safety planning this date. Assisted patient in identifying risk factors which would indicate the need for higher level of care after discharge;  including thoughts to harm self or others and/or self-harming behavior. Encouraged patient to call 911, or present to the nearest emergency room should any of these events occur. Discussed crisis intervention services and means to access.  Encouraged securing any objects of harm.       Therapist completed integrated summary, treatment plan, and initiated social history this date.  Therapist is strongly encouraging family involvement in treatment.  At this time the patient is refusing any family collateral.     ASSESSMENT:      Ms. Karely Villarreal is a 54 year old  female from [facility]. She is  and disabled. She was a  for [employer] for 25 years. She is currently disabled due to multiple health issues. Patient presents to the ER after having a severe panic attack at home. Patient contacted the suicide hotline for help. She reports increased depression for the past 2 weeks. Patient has been isolating herself at home; not bathing or eating well. Patient reports poor sleep. Patient has several triggers for her depression and anxiety. States that she suffers from PTSD due to being a  during 9-11. States that her daughter was also killed 10 years ago in a ATV accident. Patient has multiple health issues and financial issues. She also has very limited supports and isolates at home. Patient also reports that  "she has not been taking her medications for at least 2-3 days. She was hopeful that she would die if she didn't take her medications. Patient admits to selling her prescription medications for subuxone. Patient also admits to taking more benzos than she is prescribed. Patient reports being in \"horrific\" pain at this point. Patient refuses family involvement at this time. She reports that she has an appt with Dr Demetri Stanford in [facility]. Would not sign any consents at this time.       PLAN:       Patient to remain hospitalized this date.     Treatment team will focus efforts on stabilizing patient's acute symptoms while providing education on healthy coping and crisis management to reduce hospitalizations.   Patient requires daily psychiatrist evaluation and 24/7 nursing supervision to promote patient  safety.     Therapist will offer 1-4 individual sessions (20-30 minutes each), 1 therapy group daily, family education, and appropriate referral.    Therapist recommends referral to possible suboxone clinic if that is covered by her insurance. She would not sign consent for her Psychiatrist but states that she has an appt on 9-30-19 @ 2:40PM with Dr Demetri Stanford in [place].      "

## 2019-09-16 NOTE — PROGRESS NOTES
"INPATIENT PSYCHIATRIC PROGRESS NOTE    Name:  Karely Villarreal  :  1965  MRN:  [id number]  Visit Number:  [id number]  Length of stay:  2    SUBJECTIVE  CC/Focus of Exam: depression, substance use    INTERVAL HISTORY:  The patient states she is not feeling good and doesn't care if she lives or dies. She reports ongoing withdrawal symptoms from Suboxone and states she needs more time for detox because she was taking 16 mg of Suboxone daily, in place of her prescribed opioids. She also wants to continue taking her Klonopin, but admits that she runs out of script before time. She is encouraged to avoid benzos as she would be at a higher risk for accidental overdose. She reported that she didn't care.  Depression rating 8/10  Anxiety rating 8/10  Sleep: fair  Withdrawal sx: body aches and pains  Craving: 10/10 for pain    Review of Systems   Respiratory: Negative.    Cardiovascular: Negative.    Gastrointestinal: Positive for nausea.   Musculoskeletal: Positive for arthralgias and myalgias.   Psychiatric/Behavioral: Positive for dysphoric mood. The patient is nervous/anxious.        OBJECTIVE    Temp:  [97.5 °F (36.4 °C)-97.9 °F (36.6 °C)] 97.6 °F (36.4 °C)  Heart Rate:  [] 102  Resp:  [18] 18  BP: (109-124)/(64-74) 118/74    MENTAL STATUS EXAM:  Appearance:Casually dressed, good hygeine.   Cooperation:Cooperative  Psychomotor: No psychomotor agitation/retardation, No EPS, No motor tics  Speech-normal rate, amount.  Mood \"depressed\"   Affect-congruent, appropriate, stable  Thought Content-goal directed, no delusional material present  Thought process-linear, organized.  Suicidality: death wish  Homicidality: No HI  Perception: No AH/VH  Insight-fair   Judgement-fair    Lab Results (last 24 hours)     ** No results found for the last 24 hours. **             Imaging Results (last 24 hours)     ** No results found for the last 24 hours. **             ECG/EMG Results (most recent)     None           ALLERGIES: " Compazine [prochlorperazine edisylate]; Imitrex [sumatriptan]; Nsaids; Reglan [metoclopramide]; Solu-medrol [methylprednisolone]; Zyprexa [olanzapine]; and Prednisone      Current Facility-Administered Medications:   •  acetaminophen (TYLENOL) tablet 650 mg, 650 mg, Oral, Q6H PRN, Myriam Jean MD, 650 mg at 19 0809  •  aluminum-magnesium hydroxide-simethicone (MAALOX MAX) 400-400-40 MG/5ML suspension 15 mL, 15 mL, Oral, Q6H PRN, Myriam Jean MD  •  benzonatate (TESSALON) capsule 100 mg, 100 mg, Oral, TID PRN, Myriam Jean MD  •  benztropine (COGENTIN) tablet 2 mg, 2 mg, Oral, Once PRN **OR** benztropine (COGENTIN) injection 1 mg, 1 mg, Intramuscular, Once PRN, Myriam Jean MD  •  buprenorphine (SUBUTEX) SL tablet 2 mg, 2 mg, Sublingual, BID **FOLLOWED BY** [START ON 2019] buprenorphine (SUBUTEX) SL tablet 2 mg, 2 mg, Sublingual, BID **FOLLOWED BY** [START ON 2019] buprenorphine (SUBUTEX) SL tablet 2 mg, 2 mg, Sublingual, Daily, Myriam Jean MD  •  [] CloNIDine (CATAPRES) tablet 0.1 mg, 0.1 mg, Oral, 4x Daily PRN **FOLLOWED BY** CloNIDine (CATAPRES) tablet 0.1 mg, 0.1 mg, Oral, TID PRN **FOLLOWED BY** [START ON 2019] CloNIDine (CATAPRES) tablet 0.1 mg, 0.1 mg, Oral, BID PRN **FOLLOWED BY** [START ON 2019] CloNIDine (CATAPRES) tablet 0.1 mg, 0.1 mg, Oral, Once PRN, Myriam Jean MD  •  cyclobenzaprine (FLEXERIL) tablet 10 mg, 10 mg, Oral, TID PRN, Myriam Jean MD, 10 mg at 19 0401  •  dicyclomine (BENTYL) capsule 10 mg, 10 mg, Oral, TID PRN, Myriam Jean MD  •  DULoxetine (CYMBALTA) DR capsule 60 mg, 60 mg, Oral, BID, Myriam Jean MD, 60 mg at 19 08  •  famotidine (PEPCID) tablet 20 mg, 20 mg, Oral, BID PRN, Myriam Jean MD  •  hydrochlorothiazide (HYDRODIURIL) tablet 25 mg, 25 mg, Oral, Daily, Myriam Jean MD, 25 mg at 19 08  •  hydroxychloroquine (PLAQUENIL) tablet 200 mg, 200 mg, Oral, Q12H, Myriam Jean MD, 200 mg at 19 08  •   hydrOXYzine (ATARAX) tablet 50 mg, 50 mg, Oral, Q6H PRN, Myriam Jean MD, 50 mg at 19 0809  •  levETIRAcetam (KEPPRA) tablet 500 mg, 500 mg, Oral, Q12H, Myriam Jean MD, 500 mg at 19 0806  •  levothyroxine (SYNTHROID, LEVOTHROID) tablet 175 mcg, 175 mcg, Oral, Daily, Myriam Jean MD, 175 mcg at 19 08  •  loperamide (IMODIUM) capsule 2 mg, 2 mg, Oral, Q2H PRN, Myriam Jean MD  •  [COMPLETED] LORazepam (ATIVAN) tablet 1.5 mg, 1.5 mg, Oral, 3 times per day, 1.5 mg at 09/15/19 210 **FOLLOWED BY** LORazepam (ATIVAN) tablet 1 mg, 1 mg, Oral, 3 times per day, 1 mg at 19 08 **FOLLOWED BY** [START ON 2019] LORazepam (ATIVAN) tablet 0.5 mg, 0.5 mg, Oral, 3 times per day, Myriam Jean MD  •  [] LORazepam (ATIVAN) tablet 1.5 mg, 1.5 mg, Oral, Q4H PRN **FOLLOWED BY** LORazepam (ATIVAN) tablet 1 mg, 1 mg, Oral, Q4H PRN **FOLLOWED BY** [START ON 2019] LORazepam (ATIVAN) tablet 0.5 mg, 0.5 mg, Oral, Q4H PRN, Myriam Jean MD  •  magnesium hydroxide (MILK OF MAGNESIA) suspension 2400 mg/10mL 10 mL, 10 mL, Oral, Daily PRN, Myriam Jean MD  •  metoprolol tartrate (LOPRESSOR) tablet 25 mg, 25 mg, Oral, BID, Myriam Jean MD, 25 mg at 19 1427  •  ondansetron (ZOFRAN) tablet 4 mg, 4 mg, Oral, Q6H PRN, Myriam Jean MD, 4 mg at 19 0401  •  potassium chloride (K-DUR,KLOR-CON) CR tablet 20 mEq, 20 mEq, Oral, Daily, Myriam Jean MD, 20 mEq at 19 0809  •  sodium chloride nasal spray 2 spray, 2 spray, Each Nare, PRN, Myriam Jean MD  •  ziprasidone (GEODON) capsule 20 mg, 20 mg, Oral, Nightly, Myriam Jena MD    ASSESSMENT & PLAN:    Severe recurrent major depression without psychotic features (CMS/HCC)  - Continue Cymbalta  - Add Geodon 20 mg hs       Depression with suicidal ideation  - SP3       Systemic lupus erythematosus (CMS/HCC)  - Plaquenil 200 mg bid       Hypothyroidism (acquired)  - Synthroid       KEREN (generalized anxiety disorder)  - Continue  Cymbalta       HTN  - Lopressor and HCTZ       Opioid use disorder severe  - Clonidine detox  -  Subutex detox       Benzodiazepine use disorder severe  - Ativan detox, shorten the detox as patient reports she is not taking them as regularly as Suboxone.    Suicide precautions: Suicide precaution Level 3 (q15 min checks)     Behavioral Health Treatment Plan and Problem List: I have reviewed and approved the Behavioral Health Treatment Plan and Problem list.  The patient has had a chance to review and agrees with the treatment plan.     Clinician:  Myriam Jean MD  09/16/19  12:23 PM

## 2019-09-17 PROCEDURE — 99232 SBSQ HOSP IP/OBS MODERATE 35: CPT | Performed by: PSYCHIATRY & NEUROLOGY

## 2019-09-17 RX ADMIN — CYCLOBENZAPRINE HYDROCHLORIDE 10 MG: 10 TABLET, FILM COATED ORAL at 01:29

## 2019-09-17 RX ADMIN — METOPROLOL TARTRATE 25 MG: 25 TABLET, FILM COATED ORAL at 20:32

## 2019-09-17 RX ADMIN — POTASSIUM CHLORIDE 20 MEQ: 1500 TABLET, EXTENDED RELEASE ORAL at 09:12

## 2019-09-17 RX ADMIN — HYDROXYZINE HYDROCHLORIDE 50 MG: 50 TABLET, FILM COATED ORAL at 20:32

## 2019-09-17 RX ADMIN — LEVETIRACETAM 500 MG: 500 TABLET, FILM COATED ORAL at 20:32

## 2019-09-17 RX ADMIN — HYDROXYCHLOROQUINE SULFATE 200 MG: 200 TABLET ORAL at 09:11

## 2019-09-17 RX ADMIN — ZIPRASIDONE HYDROCHLORIDE 20 MG: 20 CAPSULE ORAL at 17:04

## 2019-09-17 RX ADMIN — HYDROXYCHLOROQUINE SULFATE 200 MG: 200 TABLET ORAL at 20:32

## 2019-09-17 RX ADMIN — ACETAMINOPHEN 650 MG: 325 TABLET ORAL at 09:54

## 2019-09-17 RX ADMIN — CYCLOBENZAPRINE HYDROCHLORIDE 10 MG: 10 TABLET, FILM COATED ORAL at 20:32

## 2019-09-17 RX ADMIN — BUPRENORPHINE HCL 2 MG: 2 TABLET SUBLINGUAL at 20:32

## 2019-09-17 RX ADMIN — LORAZEPAM 0.5 MG: 0.5 TABLET ORAL at 09:11

## 2019-09-17 RX ADMIN — ACETAMINOPHEN 650 MG: 325 TABLET ORAL at 16:38

## 2019-09-17 RX ADMIN — HYDROXYZINE HYDROCHLORIDE 50 MG: 50 TABLET, FILM COATED ORAL at 01:27

## 2019-09-17 RX ADMIN — DULOXETINE HYDROCHLORIDE 60 MG: 60 CAPSULE, DELAYED RELEASE ORAL at 09:11

## 2019-09-17 RX ADMIN — LEVOTHYROXINE SODIUM 175 MCG: 0.17 TABLET ORAL at 09:12

## 2019-09-17 RX ADMIN — HYDROXYZINE HYDROCHLORIDE 50 MG: 50 TABLET, FILM COATED ORAL at 14:35

## 2019-09-17 RX ADMIN — LORAZEPAM 0.5 MG: 0.5 TABLET ORAL at 14:35

## 2019-09-17 RX ADMIN — HYDROCHLOROTHIAZIDE 25 MG: 25 TABLET ORAL at 09:12

## 2019-09-17 RX ADMIN — BUPRENORPHINE HCL 2 MG: 2 TABLET SUBLINGUAL at 09:11

## 2019-09-17 RX ADMIN — LORAZEPAM 0.5 MG: 0.5 TABLET ORAL at 21:04

## 2019-09-17 RX ADMIN — CYCLOBENZAPRINE HYDROCHLORIDE 10 MG: 10 TABLET, FILM COATED ORAL at 09:54

## 2019-09-17 RX ADMIN — LEVETIRACETAM 500 MG: 500 TABLET, FILM COATED ORAL at 09:12

## 2019-09-17 RX ADMIN — ACETAMINOPHEN 650 MG: 325 TABLET ORAL at 01:29

## 2019-09-17 RX ADMIN — DULOXETINE HYDROCHLORIDE 60 MG: 60 CAPSULE, DELAYED RELEASE ORAL at 20:32

## 2019-09-17 NOTE — PLAN OF CARE
"Problem: Patient Care Overview  Goal: Interprofessional Rounds/Family Conf  Outcome: Ongoing (interventions implemented as appropriate)   09/17/19 3507   Interdisciplinary Rounds/Family Conf   Summary Met with patient along with Dr Jean during patient assessment. Patient continues to be in denial regarding her addiction issues. Will plan to continue with detox protocal.    Interdisciplinary Rounds/Family Conf   Participants psychiatrist;social work       Met with patient and Dr Jean during his assessment. Patient came in and immediately said \"I want to see another Dr\". It was explained to the patient again today that we don't have another Dr for her to see at this time. Dr Shore will be here tomorrow and he can be asked at that time if he has availability to see the patient. Patient continues to seek medications and says \"I need those medications\". Patient is referring to her Klonipin and Suboxone. Patient states that her anxiety is increased. It was explained to the patient that the medications she has been taking is making her anxiety worse in the long run. Patient is encouraged to take SSRI to help with anxiety and implement healthy coping skills. When patient is told that her demands are unrealistic and that her thought processes are irrational she responds by saying \"I dont' care\". But patient was confronted about this statement because she does care. She cares about her comfort level; her family and if she is going to be able to sleep. Patient was educated that if she continues with her usage she is at a high risk of overdose. Discussed recommendation for patient to go to rehab. She responded by saying that \"she won't go to rehab from here\". Patient also refuses to allow us to contact her Psychiatrist in [place]. She has told us when her appt is scheduled. She most likely does not want her Psychiatrist to know that she is selling her pain medications and trading it for suboxone or buying suboxone off the " street. She also admits that she takes more of her medications than what is prescribed.     Patient is not safe for discharge on this date. Patient has irrational thought patterns. She is in denial regarding her addiction. She is not receptive to help at this time. She is angry at treatment team because she is not getting what she wants. She has requested to leave the hospital but can't maintain that she will be safe when she discharges home. She also has refused family contact. We offered to have the patient transferred to [place] and she refuses.

## 2019-09-17 NOTE — PROGRESS NOTES
INPATIENT PSYCHIATRIC PROGRESS NOTE    Name:  Karely Villarreal  :  1965  MRN:  [id number]  Visit Number:  [id number]  Length of stay:  3    SUBJECTIVE  CC/Focus of Exam: depression, substance use    INTERVAL HISTORY:  The patient states she is not feeling good and is very afraid of having a panic attack and wants to know what medication she can have so that she can prepare herself.  Discussed in detail the treatment plan with the patient. She reported earlier and today also acknoweldged that she has been on Klonopin for a long time and is taking more than prescribed because she cannot live with it and runs out before the month is over. It was explained to her that she has developed dependence on the medication and increasing the dose will make it worse and she will experience rebound anxiety and her panic attacks will be much more worse and it is recommended that due to problematic use, she no longer continue benzos. Similarly she reported the pain medications prescribed to her are not helping and she trades them for buprenorphine. Explained to her that she needs to be detoxed off these medications and ideally she should seek rehab treatment.   The patient continues to be fixated on getting more anxiety pills and pain pills and is not interested in any of the recommendations. She states that she cannot think clearly and doesn't care if she lives or dies and doesn't feel safe leaving the hospital. She also wanted to see another provider but was informed that we do not have any other available at this time and was offered transfer to [place] but she declined.   Depression rating 8/10  Anxiety rating 8/10  Sleep: fair  Withdrawal sx: body aches and pains  Craving: 10/10 for pain    Review of Systems   Respiratory: Negative.    Cardiovascular: Negative.    Gastrointestinal: Positive for nausea.   Musculoskeletal: Positive for arthralgias and myalgias.   Psychiatric/Behavioral: Positive for dysphoric mood. The patient  "is nervous/anxious.        OBJECTIVE    Temp:  [97.1 °F (36.2 °C)-98.4 °F (36.9 °C)] 98.4 °F (36.9 °C)  Heart Rate:  [80-90] 80  Resp:  [16-18] 16  BP: (101-112)/(65-71) 109/70    MENTAL STATUS EXAM:  Appearance:Casually dressed, good hygeine.   Cooperation:Cooperative  Psychomotor: No psychomotor agitation/retardation, No EPS, No motor tics  Speech-normal rate, amount.  Mood \"depressed\"   Affect-congruent, appropriate, stable  Thought Content-goal directed, no delusional material present  Thought process-linear, organized.  Suicidality: death wish  Homicidality: No HI  Perception: No AH/VH  Insight-poor  Judgement-poor    Lab Results (last 24 hours)     ** No results found for the last 24 hours. **             Imaging Results (last 24 hours)     ** No results found for the last 24 hours. **             ECG/EMG Results (most recent)     None           ALLERGIES: Compazine [prochlorperazine edisylate]; Imitrex [sumatriptan]; Nsaids; Reglan [metoclopramide]; Solu-medrol [methylprednisolone]; Zyprexa [olanzapine]; and Prednisone      Current Facility-Administered Medications:   •  acetaminophen (TYLENOL) tablet 650 mg, 650 mg, Oral, Q6H PRN, Myriam Jaen MD, 650 mg at 19 09  •  aluminum-magnesium hydroxide-simethicone (MAALOX MAX) 400-400-40 MG/5ML suspension 15 mL, 15 mL, Oral, Q6H PRN, Myriam Jean MD  •  benzonatate (TESSALON) capsule 100 mg, 100 mg, Oral, TID PRN, Myriam Jean MD  •  benztropine (COGENTIN) tablet 2 mg, 2 mg, Oral, Once PRN **OR** benztropine (COGENTIN) injection 1 mg, 1 mg, Intramuscular, Once PRN, Myriam Jean MD  •  [COMPLETED] buprenorphine (SUBUTEX) SL tablet 2 mg, 2 mg, Sublingual, BID, 2 mg at 19 **FOLLOWED BY** buprenorphine (SUBUTEX) SL tablet 2 mg, 2 mg, Sublingual, BID, 2 mg at 19 0911 **FOLLOWED BY** [START ON 2019] buprenorphine (SUBUTEX) SL tablet 2 mg, 2 mg, Sublingual, Daily, Myriam Jean MD  •  [] CloNIDine (CATAPRES) tablet 0.1 mg, 0.1 " mg, Oral, 4x Daily PRN **FOLLOWED BY** [] CloNIDine (CATAPRES) tablet 0.1 mg, 0.1 mg, Oral, TID PRN **FOLLOWED BY** CloNIDine (CATAPRES) tablet 0.1 mg, 0.1 mg, Oral, BID PRN **FOLLOWED BY** [START ON 2019] CloNIDine (CATAPRES) tablet 0.1 mg, 0.1 mg, Oral, Once PRN, Myriam Jean MD  •  cyclobenzaprine (FLEXERIL) tablet 10 mg, 10 mg, Oral, TID PRN, Myriam Jean MD, 10 mg at 19 0954  •  dicyclomine (BENTYL) capsule 10 mg, 10 mg, Oral, TID PRN, Myriam Jean MD  •  DULoxetine (CYMBALTA) DR capsule 60 mg, 60 mg, Oral, BID, Myriam Jean MD, 60 mg at 19 0911  •  famotidine (PEPCID) tablet 20 mg, 20 mg, Oral, BID PRN, Myriam Jean MD  •  hydrochlorothiazide (HYDRODIURIL) tablet 25 mg, 25 mg, Oral, Daily, Myriam Jean MD, 25 mg at 19 0912  •  hydroxychloroquine (PLAQUENIL) tablet 200 mg, 200 mg, Oral, Q12H, Myriam Jean MD, 200 mg at 19 0911  •  hydrOXYzine (ATARAX) tablet 50 mg, 50 mg, Oral, Q6H PRN, Myriam Jean MD, 50 mg at 19 0127  •  levETIRAcetam (KEPPRA) tablet 500 mg, 500 mg, Oral, Q12H, Myriam Jean MD, 500 mg at 19 09  •  levothyroxine (SYNTHROID, LEVOTHROID) tablet 175 mcg, 175 mcg, Oral, Daily, Myriam Jean MD, 175 mcg at 19 0912  •  loperamide (IMODIUM) capsule 2 mg, 2 mg, Oral, Q2H PRN, Myriam Jean MD  •  [COMPLETED] LORazepam (ATIVAN) tablet 1.5 mg, 1.5 mg, Oral, 3 times per day, 1.5 mg at 09/15/19 2105 **FOLLOWED BY** [COMPLETED] LORazepam (ATIVAN) tablet 1 mg, 1 mg, Oral, 3 times per day, 1 mg at 19 **FOLLOWED BY** LORazepam (ATIVAN) tablet 0.5 mg, 0.5 mg, Oral, 3 times per day, Myriam Jean MD, 0.5 mg at 19  •  [] LORazepam (ATIVAN) tablet 1.5 mg, 1.5 mg, Oral, Q4H PRN **FOLLOWED BY** [] LORazepam (ATIVAN) tablet 1 mg, 1 mg, Oral, Q4H PRN **FOLLOWED BY** LORazepam (ATIVAN) tablet 0.5 mg, 0.5 mg, Oral, Q4H PRN, Myriam Jean MD  •  magnesium hydroxide (MILK OF MAGNESIA) suspension 2400 mg/10mL  10 mL, 10 mL, Oral, Daily PRN, Myriam Jean MD  •  metoprolol tartrate (LOPRESSOR) tablet 25 mg, 25 mg, Oral, BID, Myriam Jean MD, 25 mg at 09/16/19 2024  •  ondansetron (ZOFRAN) tablet 4 mg, 4 mg, Oral, Q6H PRN, Myriam Jean MD, 4 mg at 09/16/19 0401  •  potassium chloride (K-DUR,KLOR-CON) CR tablet 20 mEq, 20 mEq, Oral, Daily, Myriam Jean MD, 20 mEq at 09/17/19 0912  •  sodium chloride nasal spray 2 spray, 2 spray, Each Nare, PRN, Myriam Jean MD  •  ziprasidone (GEODON) capsule 20 mg, 20 mg, Oral, Daily With Dinner, Myriam Jean MD, 20 mg at 09/16/19 1703    ASSESSMENT & PLAN:    Severe recurrent major depression without psychotic features (CMS/HCC)  - Continue Cymbalta  - Added Geodon 20 mg hs  - Advised patient that her depression and anxiety is being exacerbated by her ongoing opioid and benzo use but she is not receptive, and plans to resume her medications after she leaves the hospital. Prognosis for her is guarded and she has been informed of the risks of ongoing use but she states she doesn't care.         Depression with suicidal ideation  - SP3       Systemic lupus erythematosus (CMS/HCC)  - Plaquenil 200 mg bid       Hypothyroidism (acquired)  - Synthroid       KEREN (generalized anxiety disorder)  - Continue Cymbalta       HTN  - Lopressor and HCTZ       Opioid use disorder severe  - Clonidine detox  - Subutex detox       Benzodiazepine use disorder severe  - Ativan detox to be completed today.    Suicide precautions: Suicide precaution Level 3 (q15 min checks)     Behavioral Health Treatment Plan and Problem List: I have reviewed and approved the Behavioral Health Treatment Plan and Problem list.  The patient has had a chance to review and agrees with the treatment plan.     Clinician:  Myriam Jean MD  09/17/19  11:56 AM

## 2019-09-17 NOTE — NURSING NOTE
"Patient approached the nursing station and states \"I'm not going to be able to stay here, I'm not going to make it until 2100 for another Subutex.\"   "

## 2019-09-17 NOTE — PLAN OF CARE
Problem: Patient Care Overview  Goal: Plan of Care Review  Outcome: Ongoing (interventions implemented as appropriate)   09/17/19 0936   Coping/Psychosocial   Plan of Care Reviewed With patient   Coping/Psychosocial   Patient Agreement with Plan of Care agrees   Plan of Care Review   Progress improving   OTHER   Outcome Summary Patient calm and cooperative. A&Ox3. Rates anxiety 8. Depression 10. Denies SI/HI/[place]. C/O pain all over 8/10. No other issues noted at this time. Will continue to monitor.       Problem: Overarching Goals (Adult)  Goal: Adheres to Safety Considerations for Self and Others  Outcome: Ongoing (interventions implemented as appropriate)    Goal: Optimized Coping Skills in Response to Life Stressors  Outcome: Ongoing (interventions implemented as appropriate)    Goal: Develops/Participates in Therapeutic [place] to Support Successful Transition  Outcome: Ongoing (interventions implemented as appropriate)

## 2019-09-17 NOTE — PLAN OF CARE
Problem: Patient Care Overview  Goal: Plan of Care Review  Outcome: Ongoing (interventions implemented as appropriate)   09/16/19 0022   Coping/Psychosocial   Plan of Care Reviewed With patient   Coping/Psychosocial   Patient Agreement with Plan of Care agrees   Plan of Care Review   Progress improving   OTHER   Outcome Summary Pt cooperative. Pt rates anxiety 5 and depression 9. Denies SI, HI or AVH. Pt reports appettie as good and poor sleep. 9/16/19 2447       Problem: Overarching Goals (Adult)  Goal: Adheres to Safety Considerations for Self and Others  Outcome: Ongoing (interventions implemented as appropriate)    Goal: Optimized Coping Skills in Response to Life Stressors  Outcome: Ongoing (interventions implemented as appropriate)    Goal: Develops/Participates in Therapeutic [place] to Support Successful Transition  Outcome: Ongoing (interventions implemented as appropriate)

## 2019-09-18 VITALS
OXYGEN SATURATION: 98 % | HEART RATE: 77 BPM | DIASTOLIC BLOOD PRESSURE: 56 MMHG | TEMPERATURE: 98.5 F | SYSTOLIC BLOOD PRESSURE: 116 MMHG | HEIGHT: 66 IN | BODY MASS INDEX: 34.55 KG/M2 | WEIGHT: 215 LBS | RESPIRATION RATE: 16 BRPM

## 2019-09-18 PROCEDURE — 99238 HOSP IP/OBS DSCHRG MGMT 30/<: CPT | Performed by: PSYCHIATRY & NEUROLOGY

## 2019-09-18 RX ADMIN — LEVETIRACETAM 500 MG: 500 TABLET, FILM COATED ORAL at 08:07

## 2019-09-18 RX ADMIN — POTASSIUM CHLORIDE 20 MEQ: 1500 TABLET, EXTENDED RELEASE ORAL at 09:15

## 2019-09-18 RX ADMIN — CYCLOBENZAPRINE HYDROCHLORIDE 10 MG: 10 TABLET, FILM COATED ORAL at 09:15

## 2019-09-18 RX ADMIN — HYDROXYZINE HYDROCHLORIDE 50 MG: 50 TABLET, FILM COATED ORAL at 09:15

## 2019-09-18 RX ADMIN — DULOXETINE HYDROCHLORIDE 60 MG: 60 CAPSULE, DELAYED RELEASE ORAL at 09:15

## 2019-09-18 RX ADMIN — HYDROCHLOROTHIAZIDE 25 MG: 25 TABLET ORAL at 08:08

## 2019-09-18 RX ADMIN — BUPRENORPHINE HCL 2 MG: 2 TABLET SUBLINGUAL at 08:49

## 2019-09-18 RX ADMIN — LEVOTHYROXINE SODIUM 175 MCG: 0.17 TABLET ORAL at 08:07

## 2019-09-18 RX ADMIN — ACETAMINOPHEN 650 MG: 325 TABLET ORAL at 09:15

## 2019-09-18 NOTE — PLAN OF CARE
Problem: Patient Care Overview  Goal: Plan of Care Review  Outcome: Ongoing (interventions implemented as appropriate)   09/17/19 2103   Coping/Psychosocial   Plan of Care Reviewed With patient   Coping/Psychosocial   Patient Agreement with Plan of Care agrees   Plan of Care Review   Progress no change   OTHER   Outcome Summary PT REPORTED ANXIETY 9, DEPRESSION 10, FEELING IRRITABLE, HOPELESS/HELPLESS/ NO SI/HI/HALLUCINATIONS, CRAVING 10 FOR BENZO'S & OPIATES, CIWI 4, COWS 4. PRE-OCCUPIED WITH MEDICATIONA & SAYS SHE IS NOT GETTING ENOUGH HERE/SOMATIC.       Problem: Overarching Goals (Adult)  Goal: Adheres to Safety Considerations for Self and Others  Outcome: Ongoing (interventions implemented as appropriate)    Goal: Optimized Coping Skills in Response to Life Stressors  Outcome: Ongoing (interventions implemented as appropriate)    Goal: Develops/Participates in Therapeutic [place] to Support Successful Transition  Outcome: Ongoing (interventions implemented as appropriate)      Problem: Fall Risk (Adult)  Goal: Identify Related Risk Factors and Signs and Symptoms  Outcome: Ongoing (interventions implemented as appropriate)    Goal: Absence of Fall  Outcome: Ongoing (interventions implemented as appropriate)

## 2019-09-18 NOTE — DISCHARGE SUMMARY
PSYCHIATRIC DISCHARGE SUMMARY     Patient Identification:  Name:  Karely Villarreal  Age:  54 y.o.  Sex:  female  :  1965  MRN:  [id number]  Visit Number:  [id number]      Date of Admission:2019   Date of Discharge:  2019    Discharge Diagnosis:  Principal Problem:    Severe recurrent major depression without psychotic features (CMS/HCC)  Active Problems:    Depression with suicidal ideation    Systemic lupus erythematosus (CMS/HCC)    Hypothyroidism (acquired)    KEREN (generalized anxiety disorder)    Opioid dependence    Benzodiazepine dependence        Admission Diagnosis:  MDD (major depressive disorder) [F32.9]     Hospital Course  Patient is a 54 y.o. female presented with severe depression, anxiety and suicidal ideations. She was very agitated in the ED and needed prn Geodon and Ativan. The patient was admitted to the [facility] unit for safety, further evaluation and treatment. The patient's UDS was positive for buprenorphine and at first she denied any use but she was informed that it was positive the last time she was admitted and it was positive again, and she admitted that she was using Suboxone from street as the pain medications she was being prescribed were not helping her and she was selling them to buy Suboxone. She also reported that she had run out of her Klonopin as she was taking more than prescribed. She was informed that she had developed a dependence on these medications and her pattern of use was dangerous and the recommended treatment was detox and then rehab. The patient reluctantly agreed to start a detox, and was started on Ativan, Clonidine and Subutex detox regimens. She was continued on her other home medications including Cymbalta. The patient continued to report subjective discomfort, and wanted to take more medications and when the Ativan detox was completed, she wanted to leave. Her vital signs were stable. She was asked about her after care plans, she  "reported her appointment with the provider writing her pain and nerve meds were on 9/30/19 and she would buy medications off the street until then. She was informed that it was not safe and was encouraged to stay and work with the treatment team to go to rehab treatment, but she said she would stay if she could get more benzodiazepines. She insisted on leaving and was informed that although she didn't meet criteria for a hold, her plan to buy drugs off the street was not appropriate and with her pattern of use of opioids and benzos, it is highly recommended that she seek rehab treatment but she was not interested and was discharged AMA.    Mental Status Exam upon discharge:   Mood \"anxious\"   Affect-congruent, appropriate, stable  Thought Content-goal directed, no delusional material present  Thought process-linear, organized.  Suicidality: No SI  Homicidality: No HI  Perception: No AH/VH    Procedures Performed         Consults:   Consults     No orders found from 8/16/2019 to 9/15/2019.          Pertinent Test Results:   Lab Results (last 7 days)     Procedure Component Value Units Date/Time    Lipid Panel [[id number]]  (Abnormal) Collected:  09/15/19 0542    Specimen:  Blood Updated:  09/15/19 0702     Total Cholesterol 166 mg/dL      Triglycerides 34 mg/dL      HDL Cholesterol 65 mg/dL      LDL Cholesterol  94 mg/dL      VLDL Cholesterol 6.8 mg/dL      LDL/HDL Ratio 1.45    Narrative:       Cholesterol Reference Ranges  (U.S. Department of Health and Human Services ATP III Classifications)    Desirable          <200 mg/dL  Borderline High    200-239 mg/dL  High Risk          >240 mg/dL      Triglyceride Reference Ranges  (U.S. Department of Health and Human Services ATP III Classifications)    Normal           <150 mg/dL  Borderline High  150-199 mg/dL  High             200-499 mg/dL  Very High        >500 mg/dL    HDL Reference Ranges  (U.S. Department of Health and Human Services ATP III Classifcations)    Low  "    <40 mg/dl (major risk factor for CHD)  High    >60 mg/dl ('negative' risk factor for CHD)        LDL Reference Ranges  (U.S. Department of Health and Human Services ATP III Classifcations)    Optimal          <100 mg/dL  Near Optimal     100-129 mg/dL  Borderline High  130-159 mg/dL  High             160-189 mg/dL  Very High        >189 mg/dL    Hemoglobin A1c [[id number]]  (Abnormal) Collected:  09/15/19 0542    Specimen:  Blood Updated:  09/15/19 0654     Hemoglobin A1C 5.90 %     Narrative:       Hemoglobin A1C Ranges:    Increased Risk for Diabetes  5.7% to 6.4%  Diabetes                     >= 6.5%  Diabetic Goal                < 7.0%          Condition on Discharge:  guarded    Vital Signs  Temp:  [97.3 °F (36.3 °C)-98.5 °F (36.9 °C)] 98.5 °F (36.9 °C)  Heart Rate:  [77-88] 77  Resp:  [16-18] 16  BP: (107-132)/(56-88) 116/56      Discharge Disposition:  Left Against Medical Advice    Discharge Medications:     Discharge Medications      Continue These Medications      Instructions Start Date   DULoxetine 60 MG capsule  Commonly known as:  CYMBALTA   60 mg, Oral, 2 Times Daily      hydrochlorothiazide 25 MG tablet  Commonly known as:  HYDRODIURIL   25 mg, Oral, Daily      hydroxychloroquine 200 MG tablet  Commonly known as:  PLAQUENIL   200 mg, Oral, 2 Times Daily      levETIRAcetam 500 MG tablet  Commonly known as:  KEPPRA   500 mg, Oral, 2 Times Daily      levothyroxine 175 MCG tablet  Commonly known as:  SYNTHROID, LEVOTHROID   175 mcg, Oral, Daily      metoprolol tartrate 25 MG tablet  Commonly known as:  LOPRESSOR   25 mg, Oral, 2 Times Daily      potassium chloride 20 MEQ CR tablet  Commonly known as:  K-DUR,KLOR-CON   20 mEq, Oral, Daily         Stop These Medications    buPROPion  MG 12 hr tablet  Commonly known as:  WELLBUTRIN SR     clonazePAM 0.5 MG tablet  Commonly known as:  KlonoPIN     LORazepam 1 MG tablet  Commonly known as:  ATIVAN     oxyCODONE 30 MG immediate release tablet  Commonly  known as:  ROXICODONE     oxymorphone 10 MG tablet  Commonly known as:  OPANA     promethazine 25 MG tablet  Commonly known as:  PHENERGAN     zolpidem 5 MG tablet  Commonly known as:  AMBIEN            Discharge Diet: Regular    Activity at Discharge: As tolerated    Follow-up Appointments  No future appointments.      Test Results Pending at Discharge      Clinician:   Myriam Jean MD  09/18/19  10:03 AM

## 2019-09-18 NOTE — PLAN OF CARE
Problem: Patient Care Overview  Goal: Plan of Care Review  Outcome: Outcome(s) achieved Date Met: 09/18/19 09/18/19 1140   Coping/Psychosocial   Plan of Care Reviewed With patient   Coping/Psychosocial   Patient Agreement with Plan of Care agrees   Plan of Care Review   Progress improving   OTHER   Outcome Summary Ready for discharge.       Problem: Overarching Goals (Adult)  Goal: Adheres to Safety Considerations for Self and Others  Outcome: Outcome(s) achieved Date Met: 09/18/19    Goal: Optimized Coping Skills in Response to Life Stressors  Outcome: Outcome(s) achieved Date Met: 09/18/19    Goal: Develops/Participates in Therapeutic [place] to Support Successful Transition  Outcome: Outcome(s) achieved Date Met: 09/18/19      Problem: Pain, Chronic (Adult)  Goal: Identify Related Risk Factors and Signs and Symptoms  Outcome: Outcome(s) achieved Date Met: 09/18/19    Goal: Acceptable Pain/Comfort Level and Functional Ability  Outcome: Unable to achieve outcome(s) by discharge Date Met: 09/18/19      Problem: Suicidal Behavior (Adult)  Goal: Suicidal Behavior is Absent/Minimized/Managed  Outcome: Outcome(s) achieved Date Met: 09/18/19      Problem: Mood Impairment (Depressive Signs/Symptoms) (Adult)  Goal: Improved Mood Symptoms (Depressive Signs/Symptoms)  Outcome: Outcome(s) achieved Date Met: 09/18/19      Problem: Hypertensive Disease/Crisis (Arterial) (Adult)  Goal: Signs and Symptoms of Listed Potential Problems Will be Absent, Minimized or Managed (Hypertensive Disease/Crisis)  Outcome: Outcome(s) achieved Date Met: 09/18/19      Problem: Impaired Control (Excessive Substance Use) (Adult)  Goal: Participates in Recovery Program (Excessive Substance Use)  Outcome: Outcome(s) achieved Date Met: 09/18/19      Problem: Seizure Disorder/Epilepsy (Adult)  Goal: Signs and Symptoms of Listed Potential Problems Will be Absent, Minimized or Managed (Seizure Disorder/Epilepsy)  Outcome: Outcome(s) achieved Date  Met: 09/18/19      Problem: Fall Risk (Adult)  Goal: Identify Related Risk Factors and Signs and Symptoms  Outcome: Outcome(s) achieved Date Met: 09/18/19    Goal: Absence of Fall  Outcome: Outcome(s) achieved Date Met: 09/18/19

## 2019-09-18 NOTE — NURSING NOTE
Patient asked Dr. Jean to be discharged. Order put in for discharge ama. Patient states she is not happy with treatment here. Risk and benefits were explained to patient-she verbalized understanding.

## 2020-02-20 ENCOUNTER — HOSPITAL ENCOUNTER (EMERGENCY)
Facility: HOSPITAL | Age: 55
Discharge: LEFT BEFORE DISCHARGE | End: 2020-02-20
Attending: EMERGENCY MEDICINE

## 2020-02-20 VITALS
TEMPERATURE: 97.9 F | BODY MASS INDEX: 29.73 KG/M2 | HEIGHT: 66 IN | HEART RATE: 72 BPM | SYSTOLIC BLOOD PRESSURE: 150 MMHG | OXYGEN SATURATION: 98 % | DIASTOLIC BLOOD PRESSURE: 97 MMHG | RESPIRATION RATE: 20 BRPM | WEIGHT: 185 LBS

## 2020-02-20 PROCEDURE — 99211 OFF/OP EST MAY X REQ PHY/QHP: CPT

## 2020-02-20 NOTE — ED NOTES
Patient left without being seen after being told of risks and benefits.     Edil Arellano, RN  02/20/20 0792

## 2020-02-20 NOTE — ED NOTES
Patient left without being seen after being explained of the risks and benefits.      Edil Arellano, RN  02/20/20 3520

## 2020-04-06 ENCOUNTER — HOSPITAL ENCOUNTER (EMERGENCY)
Facility: HOSPITAL | Age: 55
Discharge: HOME | End: 2020-04-07
Attending: FAMILY MEDICINE | Admitting: FAMILY MEDICINE

## 2020-04-06 DIAGNOSIS — E87.6 HYPOKALEMIA: ICD-10-CM

## 2020-04-06 DIAGNOSIS — L98.9 SKIN SORE: Primary | ICD-10-CM

## 2020-04-06 PROCEDURE — 99283 EMERGENCY DEPT VISIT LOW MDM: CPT

## 2020-04-07 VITALS
BODY MASS INDEX: 29.73 KG/M2 | SYSTOLIC BLOOD PRESSURE: 140 MMHG | HEIGHT: 66 IN | OXYGEN SATURATION: 97 % | DIASTOLIC BLOOD PRESSURE: 90 MMHG | HEART RATE: 98 BPM | RESPIRATION RATE: 16 BRPM | TEMPERATURE: 98.2 F | WEIGHT: 185 LBS

## 2020-04-07 LAB
ALBUMIN SERPL-MCNC: 4.11 G/DL (ref 3.5–5.2)
ALBUMIN/GLOB SERPL: 1 G/DL
ALP SERPL-CCNC: 172 U/L (ref 39–117)
ALT SERPL W P-5'-P-CCNC: 17 U/L (ref 1–33)
ANION GAP SERPL CALCULATED.3IONS-SCNC: 16.4 MMOL/L (ref 5–15)
AST SERPL-CCNC: 15 U/L (ref 1–32)
BASOPHILS # BLD AUTO: 0.06 10*3/MM3 (ref 0–0.2)
BASOPHILS NFR BLD AUTO: 0.6 % (ref 0–1.5)
BILIRUB SERPL-MCNC: 0.4 MG/DL (ref 0.2–1.2)
BUN BLD-MCNC: 12 MG/DL (ref 6–20)
BUN/CREAT SERPL: 16.2 (ref 7–25)
CALCIUM SPEC-SCNC: 9.7 MG/DL (ref 8.6–10.5)
CHLORIDE SERPL-SCNC: 97 MMOL/L (ref 98–107)
CO2 SERPL-SCNC: 28.6 MMOL/L (ref 22–29)
CREAT BLD-MCNC: 0.74 MG/DL (ref 0.57–1)
CRP SERPL-MCNC: 1.92 MG/DL (ref 0–0.5)
DEPRECATED RDW RBC AUTO: 42.4 FL (ref 37–54)
EOSINOPHIL # BLD AUTO: 0.29 10*3/MM3 (ref 0–0.4)
EOSINOPHIL NFR BLD AUTO: 2.7 % (ref 0.3–6.2)
ERYTHROCYTE [DISTWIDTH] IN BLOOD BY AUTOMATED COUNT: 15.6 % (ref 12.3–15.4)
GFR SERPL CREATININE-BSD FRML MDRD: 82 ML/MIN/1.73
GLOBULIN UR ELPH-MCNC: 4.3 GM/DL
GLUCOSE BLD-MCNC: 134 MG/DL (ref 65–99)
HCT VFR BLD AUTO: 38 % (ref 34–46.6)
HGB BLD-MCNC: 11.5 G/DL (ref 12–15.9)
IMM GRANULOCYTES # BLD AUTO: 0.02 10*3/MM3 (ref 0–0.05)
IMM GRANULOCYTES NFR BLD AUTO: 0.2 % (ref 0–0.5)
LYMPHOCYTES # BLD AUTO: 1.83 10*3/MM3 (ref 0.7–3.1)
LYMPHOCYTES NFR BLD AUTO: 16.8 % (ref 19.6–45.3)
MCH RBC QN AUTO: 22.9 PG (ref 26.6–33)
MCHC RBC AUTO-ENTMCNC: 30.3 G/DL (ref 31.5–35.7)
MCV RBC AUTO: 75.5 FL (ref 79–97)
MONOCYTES # BLD AUTO: 0.66 10*3/MM3 (ref 0.1–0.9)
MONOCYTES NFR BLD AUTO: 6.1 % (ref 5–12)
NEUTROPHILS # BLD AUTO: 8.01 10*3/MM3 (ref 1.7–7)
NEUTROPHILS NFR BLD AUTO: 73.6 % (ref 42.7–76)
NRBC BLD AUTO-RTO: 0 /100 WBC (ref 0–0.2)
PLATELET # BLD AUTO: 362 10*3/MM3 (ref 140–450)
PMV BLD AUTO: 8.7 FL (ref 6–12)
POTASSIUM BLD-SCNC: 2.8 MMOL/L (ref 3.5–5.2)
PROT SERPL-MCNC: 8.4 G/DL (ref 6–8.5)
RBC # BLD AUTO: 5.03 10*6/MM3 (ref 3.77–5.28)
SODIUM BLD-SCNC: 142 MMOL/L (ref 136–145)
WBC NRBC COR # BLD: 10.87 10*3/MM3 (ref 3.4–10.8)

## 2020-04-07 PROCEDURE — 80053 COMPREHEN METABOLIC PANEL: CPT | Performed by: FAMILY MEDICINE

## 2020-04-07 PROCEDURE — 86140 C-REACTIVE PROTEIN: CPT | Performed by: FAMILY MEDICINE

## 2020-04-07 PROCEDURE — 25010000002 LORAZEPAM PER 2 MG: Performed by: FAMILY MEDICINE

## 2020-04-07 PROCEDURE — 85025 COMPLETE CBC W/AUTO DIFF WBC: CPT | Performed by: FAMILY MEDICINE

## 2020-04-07 PROCEDURE — 96374 THER/PROPH/DIAG INJ IV PUSH: CPT

## 2020-04-07 RX ORDER — SULFAMETHOXAZOLE AND TRIMETHOPRIM 800; 160 MG/1; MG/1
1 TABLET ORAL 2 TIMES DAILY
Qty: 14 TABLET | Refills: 0 | Status: ON HOLD | OUTPATIENT
Start: 2020-04-07 | End: 2021-10-25

## 2020-04-07 RX ORDER — ACETAMINOPHEN 325 MG/1
650 TABLET ORAL ONCE
Status: COMPLETED | OUTPATIENT
Start: 2020-04-07 | End: 2020-04-07

## 2020-04-07 RX ORDER — SODIUM CHLORIDE 0.9 % (FLUSH) 0.9 %
10 SYRINGE (ML) INJECTION AS NEEDED
Status: DISCONTINUED | OUTPATIENT
Start: 2020-04-07 | End: 2020-04-07 | Stop reason: HOSPADM

## 2020-04-07 RX ORDER — POTASSIUM CHLORIDE 20 MEQ/1
40 TABLET, EXTENDED RELEASE ORAL ONCE
Status: COMPLETED | OUTPATIENT
Start: 2020-04-07 | End: 2020-04-07

## 2020-04-07 RX ORDER — LORAZEPAM 2 MG/ML
0.5 INJECTION INTRAMUSCULAR ONCE
Status: COMPLETED | OUTPATIENT
Start: 2020-04-07 | End: 2020-04-07

## 2020-04-07 RX ORDER — POTASSIUM CHLORIDE 20 MEQ/1
40 TABLET, EXTENDED RELEASE ORAL DAILY
Qty: 6 TABLET | Refills: 0 | Status: ON HOLD | OUTPATIENT
Start: 2020-04-07 | End: 2021-10-26

## 2020-04-07 RX ADMIN — LORAZEPAM 0.5 MG: 2 INJECTION, SOLUTION INTRAMUSCULAR; INTRAVENOUS at 00:48

## 2020-04-07 RX ADMIN — ACETAMINOPHEN 650 MG: 325 TABLET ORAL at 01:13

## 2020-04-07 RX ADMIN — POTASSIUM CHLORIDE 40 MEQ: 20 TABLET, EXTENDED RELEASE ORAL at 01:13
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
## 2020-11-27 ENCOUNTER — APPOINTMENT (OUTPATIENT)
Dept: MRI IMAGING | Facility: HOSPITAL | Age: 55
End: 2020-11-27

## 2020-11-27 ENCOUNTER — APPOINTMENT (OUTPATIENT)
Dept: GENERAL RADIOLOGY | Facility: HOSPITAL | Age: 55
End: 2020-11-27

## 2020-11-27 ENCOUNTER — HOSPITAL ENCOUNTER (EMERGENCY)
Facility: HOSPITAL | Age: 55
Discharge: HOME | End: 2020-11-27
Attending: FAMILY MEDICINE | Admitting: FAMILY MEDICINE

## 2020-11-27 ENCOUNTER — APPOINTMENT (OUTPATIENT)
Dept: CT IMAGING | Facility: HOSPITAL | Age: 55
End: 2020-11-27

## 2020-11-27 ENCOUNTER — HOSPITAL ENCOUNTER (EMERGENCY)
Facility: HOSPITAL | Age: 55
Discharge: HOME | End: 2020-11-27
Attending: STUDENT IN AN ORGANIZED HEALTH CARE EDUCATION/TRAINING PROGRAM | Admitting: STUDENT IN AN ORGANIZED HEALTH CARE EDUCATION/TRAINING PROGRAM

## 2020-11-27 VITALS
HEART RATE: 76 BPM | DIASTOLIC BLOOD PRESSURE: 80 MMHG | TEMPERATURE: 97.9 F | OXYGEN SATURATION: 98 % | RESPIRATION RATE: 18 BRPM | BODY MASS INDEX: 29.73 KG/M2 | HEIGHT: 66 IN | SYSTOLIC BLOOD PRESSURE: 145 MMHG | WEIGHT: 185 LBS

## 2020-11-27 VITALS
BODY MASS INDEX: 29.73 KG/M2 | HEIGHT: 66 IN | SYSTOLIC BLOOD PRESSURE: 153 MMHG | DIASTOLIC BLOOD PRESSURE: 93 MMHG | RESPIRATION RATE: 18 BRPM | OXYGEN SATURATION: 99 % | HEART RATE: 93 BPM | TEMPERATURE: 97.9 F | WEIGHT: 185 LBS

## 2020-11-27 DIAGNOSIS — M79.10 MYALGIA: ICD-10-CM

## 2020-11-27 DIAGNOSIS — M54.30 SCIATICA, UNSPECIFIED LATERALITY: ICD-10-CM

## 2020-11-27 DIAGNOSIS — M32.9 LUPUS (HCC): ICD-10-CM

## 2020-11-27 DIAGNOSIS — S39.012A STRAIN OF LUMBAR REGION, INITIAL ENCOUNTER: Primary | ICD-10-CM

## 2020-11-27 DIAGNOSIS — R03.0 ELEVATED BLOOD PRESSURE READING: ICD-10-CM

## 2020-11-27 DIAGNOSIS — R51.9 HEADACHE, UNSPECIFIED HEADACHE TYPE: Primary | ICD-10-CM

## 2020-11-27 LAB
A-A DO2: 15.4 MMHG (ref 0–300)
ALBUMIN SERPL-MCNC: 3.16 G/DL (ref 3.5–5.2)
ALBUMIN SERPL-MCNC: 3.84 G/DL (ref 3.5–5.2)
ALBUMIN/GLOB SERPL: 1.1 G/DL
ALBUMIN/GLOB SERPL: 1.3 G/DL
ALP SERPL-CCNC: 108 U/L (ref 39–117)
ALP SERPL-CCNC: 91 U/L (ref 39–117)
ALT SERPL W P-5'-P-CCNC: 11 U/L (ref 1–33)
ALT SERPL W P-5'-P-CCNC: 12 U/L (ref 1–33)
ANION GAP SERPL CALCULATED.3IONS-SCNC: 11 MMOL/L (ref 5–15)
ANION GAP SERPL CALCULATED.3IONS-SCNC: 12.1 MMOL/L (ref 5–15)
APTT PPP: 26.6 SECONDS (ref 25.6–35.3)
APTT PPP: 26.7 SECONDS (ref 25.6–35.3)
ARTERIAL PATENCY WRIST A: POSITIVE
AST SERPL-CCNC: 13 U/L (ref 1–32)
AST SERPL-CCNC: 24 U/L (ref 1–32)
ATMOSPHERIC PRESS: 730 MMHG
BACTERIA UR QL AUTO: ABNORMAL /HPF
BASE EXCESS BLDA CALC-SCNC: 1.1 MMOL/L (ref 0–2)
BASOPHILS # BLD AUTO: 0.01 10*3/MM3 (ref 0–0.2)
BASOPHILS # BLD AUTO: 0.03 10*3/MM3 (ref 0–0.2)
BASOPHILS NFR BLD AUTO: 0.1 % (ref 0–1.5)
BASOPHILS NFR BLD AUTO: 0.5 % (ref 0–1.5)
BDY SITE: ABNORMAL
BILIRUB SERPL-MCNC: 0.2 MG/DL (ref 0–1.2)
BILIRUB SERPL-MCNC: 0.2 MG/DL (ref 0–1.2)
BILIRUB UR QL STRIP: NEGATIVE
BODY TEMPERATURE: 0 C
BUN SERPL-MCNC: 10 MG/DL (ref 6–20)
BUN SERPL-MCNC: 11 MG/DL (ref 6–20)
BUN/CREAT SERPL: 15.9 (ref 7–25)
BUN/CREAT SERPL: 17.5 (ref 7–25)
CALCIUM SPEC-SCNC: 8.7 MG/DL (ref 8.6–10.5)
CALCIUM SPEC-SCNC: 9.3 MG/DL (ref 8.6–10.5)
CHLORIDE SERPL-SCNC: 109 MMOL/L (ref 98–107)
CHLORIDE SERPL-SCNC: 110 MMOL/L (ref 98–107)
CK SERPL-CCNC: 85 U/L (ref 20–180)
CLARITY UR: CLEAR
CO2 BLDA-SCNC: 27.2 MMOL/L (ref 22–33)
CO2 SERPL-SCNC: 21.9 MMOL/L (ref 22–29)
CO2 SERPL-SCNC: 24 MMOL/L (ref 22–29)
COHGB MFR BLD: 1 % (ref 0–5)
COLOR UR: YELLOW
CREAT SERPL-MCNC: 0.63 MG/DL (ref 0.57–1)
CREAT SERPL-MCNC: 0.63 MG/DL (ref 0.57–1)
CRP SERPL-MCNC: 0.42 MG/DL (ref 0–0.5)
D-LACTATE SERPL-SCNC: 1.5 MMOL/L (ref 0.5–2)
D-LACTATE SERPL-SCNC: 1.9 MMOL/L (ref 0.5–2)
DEPRECATED RDW RBC AUTO: 42.6 FL (ref 37–54)
DEPRECATED RDW RBC AUTO: 47.8 FL (ref 37–54)
EOSINOPHIL # BLD AUTO: 0 10*3/MM3 (ref 0–0.4)
EOSINOPHIL # BLD AUTO: 0.19 10*3/MM3 (ref 0–0.4)
EOSINOPHIL NFR BLD AUTO: 0 % (ref 0.3–6.2)
EOSINOPHIL NFR BLD AUTO: 2.9 % (ref 0.3–6.2)
ERYTHROCYTE [DISTWIDTH] IN BLOOD BY AUTOMATED COUNT: 14.7 % (ref 12.3–15.4)
ERYTHROCYTE [DISTWIDTH] IN BLOOD BY AUTOMATED COUNT: 14.8 % (ref 12.3–15.4)
GFR SERPL CREATININE-BSD FRML MDRD: 98 ML/MIN/1.73
GFR SERPL CREATININE-BSD FRML MDRD: 98 ML/MIN/1.73
GLOBULIN UR ELPH-MCNC: 2.8 GM/DL
GLOBULIN UR ELPH-MCNC: 3 GM/DL
GLUCOSE SERPL-MCNC: 119 MG/DL (ref 65–99)
GLUCOSE SERPL-MCNC: 172 MG/DL (ref 65–99)
GLUCOSE UR STRIP-MCNC: NEGATIVE MG/DL
HCG SERPL QL: NEGATIVE
HCO3 BLDA-SCNC: 25.9 MMOL/L (ref 20–26)
HCT VFR BLD AUTO: 34.1 % (ref 34–46.6)
HCT VFR BLD AUTO: 37 % (ref 34–46.6)
HCT VFR BLD CALC: 29.4 % (ref 38–51)
HGB BLD-MCNC: 10.2 G/DL (ref 12–15.9)
HGB BLD-MCNC: 10.4 G/DL (ref 12–15.9)
HGB BLDA-MCNC: 9.6 G/DL (ref 13.5–17.5)
HGB UR QL STRIP.AUTO: NEGATIVE
HYALINE CASTS UR QL AUTO: ABNORMAL /LPF
HYPOCHROMIA BLD QL: NORMAL
IMM GRANULOCYTES # BLD AUTO: 0.02 10*3/MM3 (ref 0–0.05)
IMM GRANULOCYTES # BLD AUTO: 0.03 10*3/MM3 (ref 0–0.05)
IMM GRANULOCYTES NFR BLD AUTO: 0.3 % (ref 0–0.5)
IMM GRANULOCYTES NFR BLD AUTO: 0.4 % (ref 0–0.5)
INHALED O2 CONCENTRATION: 21 %
INR PPP: 0.98 (ref 0.9–1.1)
INR PPP: 0.99 (ref 0.9–1.1)
KETONES UR QL STRIP: NEGATIVE
LEUKOCYTE ESTERASE UR QL STRIP.AUTO: ABNORMAL
LIPASE SERPL-CCNC: 29 U/L (ref 13–60)
LYMPHOCYTES # BLD AUTO: 0.49 10*3/MM3 (ref 0.7–3.1)
LYMPHOCYTES # BLD AUTO: 1.57 10*3/MM3 (ref 0.7–3.1)
LYMPHOCYTES NFR BLD AUTO: 24.3 % (ref 19.6–45.3)
LYMPHOCYTES NFR BLD AUTO: 6.7 % (ref 19.6–45.3)
Lab: ABNORMAL
MAGNESIUM SERPL-MCNC: 1.7 MG/DL (ref 1.6–2.6)
MCH RBC QN AUTO: 24.2 PG (ref 26.6–33)
MCH RBC QN AUTO: 24.4 PG (ref 26.6–33)
MCHC RBC AUTO-ENTMCNC: 27.6 G/DL (ref 31.5–35.7)
MCHC RBC AUTO-ENTMCNC: 30.5 G/DL (ref 31.5–35.7)
MCV RBC AUTO: 79.9 FL (ref 79–97)
MCV RBC AUTO: 87.7 FL (ref 79–97)
METHGB BLD QL: 0.3 % (ref 0–3)
MODALITY: ABNORMAL
MONOCYTES # BLD AUTO: 0.06 10*3/MM3 (ref 0.1–0.9)
MONOCYTES # BLD AUTO: 0.54 10*3/MM3 (ref 0.1–0.9)
MONOCYTES NFR BLD AUTO: 0.8 % (ref 5–12)
MONOCYTES NFR BLD AUTO: 8.3 % (ref 5–12)
NEUTROPHILS NFR BLD AUTO: 4.12 10*3/MM3 (ref 1.7–7)
NEUTROPHILS NFR BLD AUTO: 6.75 10*3/MM3 (ref 1.7–7)
NEUTROPHILS NFR BLD AUTO: 63.7 % (ref 42.7–76)
NEUTROPHILS NFR BLD AUTO: 92 % (ref 42.7–76)
NITRITE UR QL STRIP: NEGATIVE
NOTE: ABNORMAL
NOTIFIED BY: ABNORMAL
NOTIFIED WHO: ABNORMAL
NRBC BLD AUTO-RTO: 0 /100 WBC (ref 0–0.2)
NRBC BLD AUTO-RTO: 0 /100 WBC (ref 0–0.2)
OXYHGB MFR BLDV: 95.1 % (ref 94–99)
PCO2 BLDA: 41.1 MM HG (ref 35–45)
PCO2 TEMP ADJ BLD: ABNORMAL MM[HG]
PH BLDA: 7.41 PH UNITS (ref 7.35–7.45)
PH UR STRIP.AUTO: 7 [PH] (ref 5–8)
PH, TEMP CORRECTED: ABNORMAL
PHOSPHATE SERPL-MCNC: 3.9 MG/DL (ref 2.5–4.5)
PLAT MORPH BLD: NORMAL
PLATELET # BLD AUTO: 245 10*3/MM3 (ref 140–450)
PLATELET # BLD AUTO: 248 10*3/MM3 (ref 140–450)
PMV BLD AUTO: 9.5 FL (ref 6–12)
PMV BLD AUTO: 9.7 FL (ref 6–12)
PO2 BLDA: 81.6 MM HG (ref 83–108)
PO2 TEMP ADJ BLD: ABNORMAL MM[HG]
POTASSIUM SERPL-SCNC: 3.4 MMOL/L (ref 3.5–5.2)
POTASSIUM SERPL-SCNC: 3.9 MMOL/L (ref 3.5–5.2)
PROT SERPL-MCNC: 6 G/DL (ref 6–8.5)
PROT SERPL-MCNC: 6.8 G/DL (ref 6–8.5)
PROT UR QL STRIP: NEGATIVE
PROTHROMBIN TIME: 12.8 SECONDS (ref 11.9–14.1)
PROTHROMBIN TIME: 12.9 SECONDS (ref 11.9–14.1)
RBC # BLD AUTO: 4.22 10*6/MM3 (ref 3.77–5.28)
RBC # BLD AUTO: 4.27 10*6/MM3 (ref 3.77–5.28)
RBC # UR: ABNORMAL /HPF
REF LAB TEST METHOD: ABNORMAL
SAO2 % BLDCOA: 96.4 % (ref 94–99)
SODIUM SERPL-SCNC: 143 MMOL/L (ref 136–145)
SODIUM SERPL-SCNC: 145 MMOL/L (ref 136–145)
SP GR UR STRIP: 1.01 (ref 1–1.03)
SQUAMOUS #/AREA URNS HPF: ABNORMAL /HPF
TROPONIN T SERPL-MCNC: <0.01 NG/ML (ref 0–0.03)
UROBILINOGEN UR QL STRIP: ABNORMAL
VENTILATOR MODE: ABNORMAL
WBC # BLD AUTO: 6.47 10*3/MM3 (ref 3.4–10.8)
WBC # BLD AUTO: 7.34 10*3/MM3 (ref 3.4–10.8)
WBC UR QL AUTO: ABNORMAL /HPF

## 2020-11-27 PROCEDURE — 82375 ASSAY CARBOXYHB QUANT: CPT

## 2020-11-27 PROCEDURE — 96365 THER/PROPH/DIAG IV INF INIT: CPT

## 2020-11-27 PROCEDURE — 84484 ASSAY OF TROPONIN QUANT: CPT | Performed by: STUDENT IN AN ORGANIZED HEALTH CARE EDUCATION/TRAINING PROGRAM

## 2020-11-27 PROCEDURE — 85730 THROMBOPLASTIN TIME PARTIAL: CPT | Performed by: STUDENT IN AN ORGANIZED HEALTH CARE EDUCATION/TRAINING PROGRAM

## 2020-11-27 PROCEDURE — 99284 EMERGENCY DEPT VISIT MOD MDM: CPT

## 2020-11-27 PROCEDURE — 25010000002 LORAZEPAM PER 2 MG: Performed by: FAMILY MEDICINE

## 2020-11-27 PROCEDURE — 83735 ASSAY OF MAGNESIUM: CPT | Performed by: STUDENT IN AN ORGANIZED HEALTH CARE EDUCATION/TRAINING PROGRAM

## 2020-11-27 PROCEDURE — 25010000002 HYDROMORPHONE 1 MG/ML SOLUTION: Performed by: FAMILY MEDICINE

## 2020-11-27 PROCEDURE — 86140 C-REACTIVE PROTEIN: CPT | Performed by: FAMILY MEDICINE

## 2020-11-27 PROCEDURE — 25010000002 ONDANSETRON PER 1 MG: Performed by: STUDENT IN AN ORGANIZED HEALTH CARE EDUCATION/TRAINING PROGRAM

## 2020-11-27 PROCEDURE — 82805 BLOOD GASES W/O2 SATURATION: CPT

## 2020-11-27 PROCEDURE — 85025 COMPLETE CBC W/AUTO DIFF WBC: CPT | Performed by: STUDENT IN AN ORGANIZED HEALTH CARE EDUCATION/TRAINING PROGRAM

## 2020-11-27 PROCEDURE — 70450 CT HEAD/BRAIN W/O DYE: CPT

## 2020-11-27 PROCEDURE — A9577 INJ MULTIHANCE: HCPCS | Performed by: FAMILY MEDICINE

## 2020-11-27 PROCEDURE — 25010000002 HYDROMORPHONE 1 MG/ML SOLUTION: Performed by: STUDENT IN AN ORGANIZED HEALTH CARE EDUCATION/TRAINING PROGRAM

## 2020-11-27 PROCEDURE — 72131 CT LUMBAR SPINE W/O DYE: CPT | Performed by: RADIOLOGY

## 2020-11-27 PROCEDURE — 85025 COMPLETE CBC W/AUTO DIFF WBC: CPT | Performed by: FAMILY MEDICINE

## 2020-11-27 PROCEDURE — 84703 CHORIONIC GONADOTROPIN ASSAY: CPT | Performed by: STUDENT IN AN ORGANIZED HEALTH CARE EDUCATION/TRAINING PROGRAM

## 2020-11-27 PROCEDURE — 36600 WITHDRAWAL OF ARTERIAL BLOOD: CPT

## 2020-11-27 PROCEDURE — 82550 ASSAY OF CK (CPK): CPT | Performed by: STUDENT IN AN ORGANIZED HEALTH CARE EDUCATION/TRAINING PROGRAM

## 2020-11-27 PROCEDURE — 80053 COMPREHEN METABOLIC PANEL: CPT | Performed by: FAMILY MEDICINE

## 2020-11-27 PROCEDURE — 25010000002 MAGNESIUM SULFATE IN D5W 1G/100ML (PREMIX) 1-5 GM/100ML-% SOLUTION: Performed by: STUDENT IN AN ORGANIZED HEALTH CARE EDUCATION/TRAINING PROGRAM

## 2020-11-27 PROCEDURE — 96376 TX/PRO/DX INJ SAME DRUG ADON: CPT

## 2020-11-27 PROCEDURE — 85610 PROTHROMBIN TIME: CPT | Performed by: STUDENT IN AN ORGANIZED HEALTH CARE EDUCATION/TRAINING PROGRAM

## 2020-11-27 PROCEDURE — 83605 ASSAY OF LACTIC ACID: CPT | Performed by: FAMILY MEDICINE

## 2020-11-27 PROCEDURE — 83605 ASSAY OF LACTIC ACID: CPT | Performed by: STUDENT IN AN ORGANIZED HEALTH CARE EDUCATION/TRAINING PROGRAM

## 2020-11-27 PROCEDURE — 25010000003 HEPARIN LOCK FLUSH PER 10 UNITS: Performed by: FAMILY MEDICINE

## 2020-11-27 PROCEDURE — 85730 THROMBOPLASTIN TIME PARTIAL: CPT | Performed by: FAMILY MEDICINE

## 2020-11-27 PROCEDURE — 81001 URINALYSIS AUTO W/SCOPE: CPT | Performed by: STUDENT IN AN ORGANIZED HEALTH CARE EDUCATION/TRAINING PROGRAM

## 2020-11-27 PROCEDURE — 25010000002 DEXAMETHASONE PER 1 MG: Performed by: STUDENT IN AN ORGANIZED HEALTH CARE EDUCATION/TRAINING PROGRAM

## 2020-11-27 PROCEDURE — 85007 BL SMEAR W/DIFF WBC COUNT: CPT | Performed by: STUDENT IN AN ORGANIZED HEALTH CARE EDUCATION/TRAINING PROGRAM

## 2020-11-27 PROCEDURE — 72131 CT LUMBAR SPINE W/O DYE: CPT

## 2020-11-27 PROCEDURE — 99283 EMERGENCY DEPT VISIT LOW MDM: CPT

## 2020-11-27 PROCEDURE — 96374 THER/PROPH/DIAG INJ IV PUSH: CPT

## 2020-11-27 PROCEDURE — 25010000002 MORPHINE PER 10 MG: Performed by: STUDENT IN AN ORGANIZED HEALTH CARE EDUCATION/TRAINING PROGRAM

## 2020-11-27 PROCEDURE — 0 GADOBENATE DIMEGLUMINE 529 MG/ML SOLUTION: Performed by: FAMILY MEDICINE

## 2020-11-27 PROCEDURE — 71045 X-RAY EXAM CHEST 1 VIEW: CPT

## 2020-11-27 PROCEDURE — 80053 COMPREHEN METABOLIC PANEL: CPT | Performed by: STUDENT IN AN ORGANIZED HEALTH CARE EDUCATION/TRAINING PROGRAM

## 2020-11-27 PROCEDURE — 72158 MRI LUMBAR SPINE W/O & W/DYE: CPT | Performed by: RADIOLOGY

## 2020-11-27 PROCEDURE — 83690 ASSAY OF LIPASE: CPT | Performed by: FAMILY MEDICINE

## 2020-11-27 PROCEDURE — 85610 PROTHROMBIN TIME: CPT | Performed by: FAMILY MEDICINE

## 2020-11-27 PROCEDURE — 36415 COLL VENOUS BLD VENIPUNCTURE: CPT

## 2020-11-27 PROCEDURE — 87040 BLOOD CULTURE FOR BACTERIA: CPT | Performed by: FAMILY MEDICINE

## 2020-11-27 PROCEDURE — 83050 HGB METHEMOGLOBIN QUAN: CPT

## 2020-11-27 PROCEDURE — 96375 TX/PRO/DX INJ NEW DRUG ADDON: CPT

## 2020-11-27 PROCEDURE — 72158 MRI LUMBAR SPINE W/O & W/DYE: CPT

## 2020-11-27 PROCEDURE — 25010000002 HYDROMORPHONE PER 4 MG: Performed by: FAMILY MEDICINE

## 2020-11-27 PROCEDURE — 84100 ASSAY OF PHOSPHORUS: CPT | Performed by: STUDENT IN AN ORGANIZED HEALTH CARE EDUCATION/TRAINING PROGRAM

## 2020-11-27 RX ORDER — LORAZEPAM 2 MG/ML
0.5 INJECTION INTRAMUSCULAR ONCE
Status: COMPLETED | OUTPATIENT
Start: 2020-11-27 | End: 2020-11-27

## 2020-11-27 RX ORDER — ONDANSETRON 2 MG/ML
4 INJECTION INTRAMUSCULAR; INTRAVENOUS ONCE
Status: COMPLETED | OUTPATIENT
Start: 2020-11-27 | End: 2020-11-27

## 2020-11-27 RX ORDER — HEPARIN SODIUM (PORCINE) LOCK FLUSH IV SOLN 100 UNIT/ML 100 UNIT/ML
300 SOLUTION INTRAVENOUS ONCE
Status: COMPLETED | OUTPATIENT
Start: 2020-11-27 | End: 2020-11-27

## 2020-11-27 RX ORDER — HYDROMORPHONE HYDROCHLORIDE 1 MG/ML
0.5 INJECTION, SOLUTION INTRAMUSCULAR; INTRAVENOUS; SUBCUTANEOUS ONCE
Status: COMPLETED | OUTPATIENT
Start: 2020-11-27 | End: 2020-11-27

## 2020-11-27 RX ORDER — SODIUM CHLORIDE 0.9 % (FLUSH) 0.9 %
10 SYRINGE (ML) INJECTION AS NEEDED
Status: DISCONTINUED | OUTPATIENT
Start: 2020-11-27 | End: 2020-11-27 | Stop reason: HOSPADM

## 2020-11-27 RX ORDER — DEXAMETHASONE SODIUM PHOSPHATE 10 MG/ML
10 INJECTION INTRAMUSCULAR; INTRAVENOUS ONCE
Status: COMPLETED | OUTPATIENT
Start: 2020-11-27 | End: 2020-11-27

## 2020-11-27 RX ORDER — MAGNESIUM SULFATE 1 G/100ML
1 INJECTION INTRAVENOUS ONCE
Status: COMPLETED | OUTPATIENT
Start: 2020-11-27 | End: 2020-11-27

## 2020-11-27 RX ORDER — CYCLOBENZAPRINE HCL 10 MG
10 TABLET ORAL 2 TIMES DAILY PRN
Qty: 5 TABLET | Refills: 0 | Status: SHIPPED | OUTPATIENT
Start: 2020-11-27 | End: 2021-10-23

## 2020-11-27 RX ORDER — DEXAMETHASONE 4 MG/1
4 TABLET ORAL
Qty: 4 TABLET | Refills: 0 | Status: SHIPPED | OUTPATIENT
Start: 2020-11-27 | End: 2020-12-01

## 2020-11-27 RX ADMIN — ONDANSETRON 4 MG: 2 INJECTION INTRAMUSCULAR; INTRAVENOUS at 01:07

## 2020-11-27 RX ADMIN — HYDROMORPHONE HYDROCHLORIDE 0.5 MG: 1 INJECTION, SOLUTION INTRAMUSCULAR; INTRAVENOUS; SUBCUTANEOUS at 13:52

## 2020-11-27 RX ADMIN — Medication 300 UNITS: at 15:21

## 2020-11-27 RX ADMIN — GADOBENATE DIMEGLUMINE 17 ML: 529 INJECTION, SOLUTION INTRAVENOUS at 14:30

## 2020-11-27 RX ADMIN — SODIUM CHLORIDE 1000 ML: 9 INJECTION, SOLUTION INTRAVENOUS at 01:07

## 2020-11-27 RX ADMIN — HYDROMORPHONE HYDROCHLORIDE 0.5 MG: 1 INJECTION, SOLUTION INTRAMUSCULAR; INTRAVENOUS; SUBCUTANEOUS at 09:59

## 2020-11-27 RX ADMIN — LORAZEPAM 0.5 MG: 2 INJECTION, SOLUTION INTRAMUSCULAR; INTRAVENOUS at 12:56

## 2020-11-27 RX ADMIN — DEXAMETHASONE SODIUM PHOSPHATE 10 MG: 10 INJECTION INTRAMUSCULAR; INTRAVENOUS at 03:25

## 2020-11-27 RX ADMIN — MAGNESIUM SULFATE IN DEXTROSE 1 G: 10 INJECTION, SOLUTION INTRAVENOUS at 02:58

## 2020-11-27 RX ADMIN — MORPHINE SULFATE 4 MG: 4 INJECTION, SOLUTION INTRAMUSCULAR; INTRAVENOUS at 01:06

## 2020-11-27 RX ADMIN — HYDROMORPHONE HYDROCHLORIDE 0.5 MG: 1 INJECTION, SOLUTION INTRAMUSCULAR; INTRAVENOUS; SUBCUTANEOUS at 03:25

## 2020-12-02 LAB
BACTERIA SPEC AEROBE CULT: NORMAL
BACTERIA SPEC AEROBE CULT: NORMAL

## 2020-12-09 ENCOUNTER — HOSPITAL ENCOUNTER (EMERGENCY)
Facility: HOSPITAL | Age: 55
Discharge: HOME | End: 2020-12-09
Attending: STUDENT IN AN ORGANIZED HEALTH CARE EDUCATION/TRAINING PROGRAM | Admitting: EMERGENCY MEDICINE

## 2020-12-09 ENCOUNTER — HOSPITAL ENCOUNTER (EMERGENCY)
Facility: HOSPITAL | Age: 55
End: 2020-12-09

## 2020-12-09 VITALS
OXYGEN SATURATION: 98 % | TEMPERATURE: 98.8 F | WEIGHT: 185 LBS | HEIGHT: 66 IN | DIASTOLIC BLOOD PRESSURE: 90 MMHG | HEART RATE: 95 BPM | SYSTOLIC BLOOD PRESSURE: 145 MMHG | BODY MASS INDEX: 29.73 KG/M2 | RESPIRATION RATE: 18 BRPM

## 2020-12-09 DIAGNOSIS — E87.6 HYPOKALEMIA: ICD-10-CM

## 2020-12-09 DIAGNOSIS — G89.29 CHRONIC BILATERAL BACK PAIN, UNSPECIFIED BACK LOCATION: Primary | ICD-10-CM

## 2020-12-09 DIAGNOSIS — F41.9 ANXIETY: ICD-10-CM

## 2020-12-09 DIAGNOSIS — M54.9 CHRONIC BILATERAL BACK PAIN, UNSPECIFIED BACK LOCATION: Primary | ICD-10-CM

## 2020-12-09 LAB
A-A DO2: 13.2 MMHG (ref 0–300)
ALBUMIN SERPL-MCNC: 4.33 G/DL (ref 3.5–5.2)
ALBUMIN/GLOB SERPL: 1.1 G/DL
ALP SERPL-CCNC: 154 U/L (ref 39–117)
ALT SERPL W P-5'-P-CCNC: 37 U/L (ref 1–33)
ANION GAP SERPL CALCULATED.3IONS-SCNC: 19.9 MMOL/L (ref 5–15)
ARTERIAL PATENCY WRIST A: POSITIVE
AST SERPL-CCNC: 26 U/L (ref 1–32)
ATMOSPHERIC PRESS: 728 MMHG
BASE EXCESS BLDA CALC-SCNC: 2.8 MMOL/L (ref 0–2)
BASOPHILS # BLD AUTO: 0.09 10*3/MM3 (ref 0–0.2)
BASOPHILS NFR BLD AUTO: 0.6 % (ref 0–1.5)
BDY SITE: ABNORMAL
BILIRUB SERPL-MCNC: 0.8 MG/DL (ref 0–1.2)
BODY TEMPERATURE: 0 C
BUN SERPL-MCNC: 16 MG/DL (ref 6–20)
BUN/CREAT SERPL: 17.6 (ref 7–25)
CALCIUM SPEC-SCNC: 10.6 MG/DL (ref 8.6–10.5)
CHLORIDE SERPL-SCNC: 92 MMOL/L (ref 98–107)
CK SERPL-CCNC: 47 U/L (ref 20–180)
CO2 BLDA-SCNC: 26 MMOL/L (ref 22–33)
CO2 SERPL-SCNC: 20.1 MMOL/L (ref 22–29)
COHGB MFR BLD: 0.9 % (ref 0–5)
CREAT SERPL-MCNC: 0.91 MG/DL (ref 0.57–1)
D-LACTATE SERPL-SCNC: 0.8 MMOL/L (ref 0.5–2)
D-LACTATE SERPL-SCNC: 2.3 MMOL/L (ref 0.5–2)
DEPRECATED RDW RBC AUTO: 39.7 FL (ref 37–54)
EOSINOPHIL # BLD AUTO: 0.12 10*3/MM3 (ref 0–0.4)
EOSINOPHIL NFR BLD AUTO: 0.8 % (ref 0.3–6.2)
ERYTHROCYTE [DISTWIDTH] IN BLOOD BY AUTOMATED COUNT: 14.6 % (ref 12.3–15.4)
GFR SERPL CREATININE-BSD FRML MDRD: 64 ML/MIN/1.73
GLOBULIN UR ELPH-MCNC: 3.9 GM/DL
GLUCOSE SERPL-MCNC: 172 MG/DL (ref 65–99)
HCO3 BLDA-SCNC: 25.1 MMOL/L (ref 20–26)
HCT VFR BLD AUTO: 45.6 % (ref 34–46.6)
HCT VFR BLD CALC: 41.7 % (ref 38–51)
HGB BLD-MCNC: 14.2 G/DL (ref 12–15.9)
HGB BLDA-MCNC: 13.6 G/DL (ref 13.5–17.5)
IMM GRANULOCYTES # BLD AUTO: 0.04 10*3/MM3 (ref 0–0.05)
IMM GRANULOCYTES NFR BLD AUTO: 0.3 % (ref 0–0.5)
INHALED O2 CONCENTRATION: 21 %
LACTATE HOLD SPECIMEN: NORMAL
LYMPHOCYTES # BLD AUTO: 2.55 10*3/MM3 (ref 0.7–3.1)
LYMPHOCYTES NFR BLD AUTO: 17.6 % (ref 19.6–45.3)
Lab: ABNORMAL
MAGNESIUM SERPL-MCNC: 1.7 MG/DL (ref 1.6–2.6)
MCH RBC QN AUTO: 23.8 PG (ref 26.6–33)
MCHC RBC AUTO-ENTMCNC: 31.1 G/DL (ref 31.5–35.7)
MCV RBC AUTO: 76.4 FL (ref 79–97)
METHGB BLD QL: 0.2 % (ref 0–3)
MODALITY: ABNORMAL
MONOCYTES # BLD AUTO: 0.91 10*3/MM3 (ref 0.1–0.9)
MONOCYTES NFR BLD AUTO: 6.3 % (ref 5–12)
NEUTROPHILS NFR BLD AUTO: 10.81 10*3/MM3 (ref 1.7–7)
NEUTROPHILS NFR BLD AUTO: 74.4 % (ref 42.7–76)
NOTE: ABNORMAL
NOTIFIED WHO: ABNORMAL
NRBC BLD AUTO-RTO: 0 /100 WBC (ref 0–0.2)
OXYHGB MFR BLDV: 97.1 % (ref 94–99)
PCO2 BLDA: 31 MM HG (ref 35–45)
PCO2 TEMP ADJ BLD: ABNORMAL MM[HG]
PH BLDA: 7.52 PH UNITS (ref 7.35–7.45)
PH, TEMP CORRECTED: ABNORMAL
PHOSPHATE SERPL-MCNC: 2 MG/DL (ref 2.5–4.5)
PLATELET # BLD AUTO: 427 10*3/MM3 (ref 140–450)
PMV BLD AUTO: 9 FL (ref 6–12)
PO2 BLDA: 94.8 MM HG (ref 83–108)
PO2 TEMP ADJ BLD: ABNORMAL MM[HG]
POTASSIUM SERPL-SCNC: 2.8 MMOL/L (ref 3.5–5.2)
PROT SERPL-MCNC: 8.2 G/DL (ref 6–8.5)
QT INTERVAL: 344 MS
QTC INTERVAL: 474 MS
RBC # BLD AUTO: 5.97 10*6/MM3 (ref 3.77–5.28)
SAO2 % BLDCOA: 98.2 % (ref 94–99)
SODIUM SERPL-SCNC: 132 MMOL/L (ref 136–145)
TROPONIN T SERPL-MCNC: <0.01 NG/ML (ref 0–0.03)
VENTILATOR MODE: ABNORMAL
WBC # BLD AUTO: 14.52 10*3/MM3 (ref 3.4–10.8)

## 2020-12-09 PROCEDURE — 85025 COMPLETE CBC W/AUTO DIFF WBC: CPT | Performed by: STUDENT IN AN ORGANIZED HEALTH CARE EDUCATION/TRAINING PROGRAM

## 2020-12-09 PROCEDURE — 80053 COMPREHEN METABOLIC PANEL: CPT | Performed by: STUDENT IN AN ORGANIZED HEALTH CARE EDUCATION/TRAINING PROGRAM

## 2020-12-09 PROCEDURE — 82550 ASSAY OF CK (CPK): CPT | Performed by: STUDENT IN AN ORGANIZED HEALTH CARE EDUCATION/TRAINING PROGRAM

## 2020-12-09 PROCEDURE — 93010 ELECTROCARDIOGRAM REPORT: CPT | Performed by: INTERNAL MEDICINE

## 2020-12-09 PROCEDURE — 25010000002 MAGNESIUM SULFATE 2 GM/50ML SOLUTION: Performed by: STUDENT IN AN ORGANIZED HEALTH CARE EDUCATION/TRAINING PROGRAM

## 2020-12-09 PROCEDURE — 25010000002 MORPHINE PER 10 MG: Performed by: EMERGENCY MEDICINE

## 2020-12-09 PROCEDURE — 93005 ELECTROCARDIOGRAM TRACING: CPT | Performed by: STUDENT IN AN ORGANIZED HEALTH CARE EDUCATION/TRAINING PROGRAM

## 2020-12-09 PROCEDURE — 25010000002 ONDANSETRON PER 1 MG: Performed by: EMERGENCY MEDICINE

## 2020-12-09 PROCEDURE — 83050 HGB METHEMOGLOBIN QUAN: CPT

## 2020-12-09 PROCEDURE — 96375 TX/PRO/DX INJ NEW DRUG ADDON: CPT

## 2020-12-09 PROCEDURE — 82805 BLOOD GASES W/O2 SATURATION: CPT

## 2020-12-09 PROCEDURE — 84100 ASSAY OF PHOSPHORUS: CPT | Performed by: STUDENT IN AN ORGANIZED HEALTH CARE EDUCATION/TRAINING PROGRAM

## 2020-12-09 PROCEDURE — 99284 EMERGENCY DEPT VISIT MOD MDM: CPT

## 2020-12-09 PROCEDURE — 83605 ASSAY OF LACTIC ACID: CPT | Performed by: STUDENT IN AN ORGANIZED HEALTH CARE EDUCATION/TRAINING PROGRAM

## 2020-12-09 PROCEDURE — 36600 WITHDRAWAL OF ARTERIAL BLOOD: CPT

## 2020-12-09 PROCEDURE — 83735 ASSAY OF MAGNESIUM: CPT | Performed by: STUDENT IN AN ORGANIZED HEALTH CARE EDUCATION/TRAINING PROGRAM

## 2020-12-09 PROCEDURE — 25010000003 HEPARIN LOCK FLUSH PER 10 UNITS: Performed by: EMERGENCY MEDICINE

## 2020-12-09 PROCEDURE — 36415 COLL VENOUS BLD VENIPUNCTURE: CPT

## 2020-12-09 PROCEDURE — 25010000002 LORAZEPAM PER 2 MG: Performed by: EMERGENCY MEDICINE

## 2020-12-09 PROCEDURE — 96365 THER/PROPH/DIAG IV INF INIT: CPT

## 2020-12-09 PROCEDURE — 82375 ASSAY CARBOXYHB QUANT: CPT

## 2020-12-09 PROCEDURE — 84484 ASSAY OF TROPONIN QUANT: CPT | Performed by: STUDENT IN AN ORGANIZED HEALTH CARE EDUCATION/TRAINING PROGRAM

## 2020-12-09 RX ORDER — HEPARIN SODIUM (PORCINE) LOCK FLUSH IV SOLN 100 UNIT/ML 100 UNIT/ML
300 SOLUTION INTRAVENOUS ONCE
Status: COMPLETED | OUTPATIENT
Start: 2020-12-09 | End: 2020-12-09

## 2020-12-09 RX ORDER — MAGNESIUM SULFATE HEPTAHYDRATE 40 MG/ML
2 INJECTION, SOLUTION INTRAVENOUS ONCE
Status: COMPLETED | OUTPATIENT
Start: 2020-12-09 | End: 2020-12-09

## 2020-12-09 RX ORDER — LORAZEPAM 2 MG/ML
1 INJECTION INTRAMUSCULAR ONCE
Status: COMPLETED | OUTPATIENT
Start: 2020-12-09 | End: 2020-12-09

## 2020-12-09 RX ORDER — POTASSIUM CHLORIDE 20 MEQ/1
40 TABLET, EXTENDED RELEASE ORAL ONCE
Status: COMPLETED | OUTPATIENT
Start: 2020-12-09 | End: 2020-12-09

## 2020-12-09 RX ORDER — CYCLOBENZAPRINE HCL 10 MG
10 TABLET ORAL ONCE
Status: COMPLETED | OUTPATIENT
Start: 2020-12-09 | End: 2020-12-09

## 2020-12-09 RX ORDER — LORAZEPAM 2 MG/1
2 TABLET ORAL ONCE
Status: COMPLETED | OUTPATIENT
Start: 2020-12-09 | End: 2020-12-09

## 2020-12-09 RX ORDER — ONDANSETRON 2 MG/ML
4 INJECTION INTRAMUSCULAR; INTRAVENOUS ONCE
Status: COMPLETED | OUTPATIENT
Start: 2020-12-09 | End: 2020-12-09

## 2020-12-09 RX ORDER — POTASSIUM CHLORIDE 20 MEQ/1
40 TABLET, EXTENDED RELEASE ORAL DAILY
Status: DISCONTINUED | OUTPATIENT
Start: 2020-12-09 | End: 2020-12-09 | Stop reason: HOSPADM

## 2020-12-09 RX ADMIN — POTASSIUM CHLORIDE 40 MEQ: 1500 TABLET, EXTENDED RELEASE ORAL at 09:34

## 2020-12-09 RX ADMIN — LORAZEPAM 2 MG: 2 TABLET ORAL at 04:30

## 2020-12-09 RX ADMIN — Medication 300 UNITS: at 12:08

## 2020-12-09 RX ADMIN — LORAZEPAM 1 MG: 2 INJECTION INTRAMUSCULAR; INTRAVENOUS at 10:32

## 2020-12-09 RX ADMIN — SODIUM CHLORIDE 1000 ML: 9 INJECTION, SOLUTION INTRAVENOUS at 04:55

## 2020-12-09 RX ADMIN — CYCLOBENZAPRINE HYDROCHLORIDE 10 MG: 10 TABLET, FILM COATED ORAL at 06:43

## 2020-12-09 RX ADMIN — POTASSIUM CHLORIDE 40 MEQ: 1500 TABLET, EXTENDED RELEASE ORAL at 05:24

## 2020-12-09 RX ADMIN — ONDANSETRON 4 MG: 2 INJECTION INTRAMUSCULAR; INTRAVENOUS at 08:22

## 2020-12-09 RX ADMIN — MORPHINE SULFATE 4 MG: 4 INJECTION, SOLUTION INTRAMUSCULAR; INTRAVENOUS at 08:22

## 2020-12-09 RX ADMIN — MAGNESIUM SULFATE IN WATER 2 G: 40 INJECTION, SOLUTION INTRAVENOUS at 05:47

## 2020-12-09 NOTE — ED NOTES
Called nutrition [facility] and requested cardiac diet tray.      Symes, Heather  12/09/20 0838
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
## 2020-12-09 NOTE — ED NOTES
Patient unhooked from monitoring devices and ambulated to the bathroom without any complications.      Lucero Chan RN  12/09/20 0752

## 2020-12-09 NOTE — ED NOTES
Patient is complaining of back pain at this time and is requesting something for pain. Provider notified.      Lucero Chan RN  12/09/20 0807

## 2021-05-17 ENCOUNTER — HOSPITAL ENCOUNTER (EMERGENCY)
Facility: HOSPITAL | Age: 56
Discharge: HOME | End: 2021-05-17
Attending: EMERGENCY MEDICINE | Admitting: EMERGENCY MEDICINE

## 2021-05-17 ENCOUNTER — APPOINTMENT (OUTPATIENT)
Dept: CT IMAGING | Facility: HOSPITAL | Age: 56
End: 2021-05-17

## 2021-05-17 VITALS
HEART RATE: 89 BPM | SYSTOLIC BLOOD PRESSURE: 111 MMHG | OXYGEN SATURATION: 98 % | DIASTOLIC BLOOD PRESSURE: 74 MMHG | RESPIRATION RATE: 18 BRPM | WEIGHT: 196 LBS | TEMPERATURE: 98.3 F | BODY MASS INDEX: 31.5 KG/M2 | HEIGHT: 66 IN

## 2021-05-17 DIAGNOSIS — L03.221 CELLULITIS OF NECK: Primary | ICD-10-CM

## 2021-05-17 DIAGNOSIS — E87.6 HYPOKALEMIA: ICD-10-CM

## 2021-05-17 LAB
ALBUMIN SERPL-MCNC: 3.25 G/DL (ref 3.5–5.2)
ALBUMIN/GLOB SERPL: 0.9 G/DL
ALP SERPL-CCNC: 118 U/L (ref 39–117)
ALT SERPL W P-5'-P-CCNC: 25 U/L (ref 1–33)
ANION GAP SERPL CALCULATED.3IONS-SCNC: 11 MMOL/L (ref 5–15)
AST SERPL-CCNC: 36 U/L (ref 1–32)
BASOPHILS # BLD AUTO: 0.04 10*3/MM3 (ref 0–0.2)
BASOPHILS NFR BLD AUTO: 0.4 % (ref 0–1.5)
BILIRUB SERPL-MCNC: 0.6 MG/DL (ref 0–1.2)
BUN SERPL-MCNC: 7 MG/DL (ref 6–20)
BUN/CREAT SERPL: 7.9 (ref 7–25)
CALCIUM SPEC-SCNC: 9.2 MG/DL (ref 8.6–10.5)
CHLORIDE SERPL-SCNC: 92 MMOL/L (ref 98–107)
CO2 SERPL-SCNC: 33 MMOL/L (ref 22–29)
CREAT SERPL-MCNC: 0.89 MG/DL (ref 0.57–1)
DEPRECATED RDW RBC AUTO: 41.7 FL (ref 37–54)
EOSINOPHIL # BLD AUTO: 0.2 10*3/MM3 (ref 0–0.4)
EOSINOPHIL NFR BLD AUTO: 2.1 % (ref 0.3–6.2)
ERYTHROCYTE [DISTWIDTH] IN BLOOD BY AUTOMATED COUNT: 14.6 % (ref 12.3–15.4)
GFR SERPL CREATININE-BSD FRML MDRD: 66 ML/MIN/1.73
GLOBULIN UR ELPH-MCNC: 3.6 GM/DL
GLUCOSE SERPL-MCNC: 97 MG/DL (ref 65–99)
HCT VFR BLD AUTO: 37.6 % (ref 34–46.6)
HGB BLD-MCNC: 11.3 G/DL (ref 12–15.9)
IMM GRANULOCYTES # BLD AUTO: 0.03 10*3/MM3 (ref 0–0.05)
IMM GRANULOCYTES NFR BLD AUTO: 0.3 % (ref 0–0.5)
LYMPHOCYTES # BLD AUTO: 1.16 10*3/MM3 (ref 0.7–3.1)
LYMPHOCYTES NFR BLD AUTO: 12.4 % (ref 19.6–45.3)
MCH RBC QN AUTO: 23.5 PG (ref 26.6–33)
MCHC RBC AUTO-ENTMCNC: 30.1 G/DL (ref 31.5–35.7)
MCV RBC AUTO: 78.3 FL (ref 79–97)
MONOCYTES # BLD AUTO: 0.46 10*3/MM3 (ref 0.1–0.9)
MONOCYTES NFR BLD AUTO: 4.9 % (ref 5–12)
NEUTROPHILS NFR BLD AUTO: 7.45 10*3/MM3 (ref 1.7–7)
NEUTROPHILS NFR BLD AUTO: 79.9 % (ref 42.7–76)
NRBC BLD AUTO-RTO: 0 /100 WBC (ref 0–0.2)
PLATELET # BLD AUTO: 248 10*3/MM3 (ref 140–450)
PMV BLD AUTO: 8.7 FL (ref 6–12)
POTASSIUM SERPL-SCNC: 2.4 MMOL/L (ref 3.5–5.2)
PROT SERPL-MCNC: 6.8 G/DL (ref 6–8.5)
RBC # BLD AUTO: 4.8 10*6/MM3 (ref 3.77–5.28)
SODIUM SERPL-SCNC: 136 MMOL/L (ref 136–145)
WBC # BLD AUTO: 9.34 10*3/MM3 (ref 3.4–10.8)

## 2021-05-17 PROCEDURE — 70490 CT SOFT TISSUE NECK W/O DYE: CPT | Performed by: RADIOLOGY

## 2021-05-17 PROCEDURE — 25010000003 POTASSIUM CHLORIDE 10 MEQ/100ML SOLUTION: Performed by: EMERGENCY MEDICINE

## 2021-05-17 PROCEDURE — 96365 THER/PROPH/DIAG IV INF INIT: CPT

## 2021-05-17 PROCEDURE — 25010000002 MORPHINE PER 10 MG: Performed by: EMERGENCY MEDICINE

## 2021-05-17 PROCEDURE — 99284 EMERGENCY DEPT VISIT MOD MDM: CPT

## 2021-05-17 PROCEDURE — 80053 COMPREHEN METABOLIC PANEL: CPT | Performed by: EMERGENCY MEDICINE

## 2021-05-17 PROCEDURE — 63710000001 PROMETHAZINE PER 25 MG: Performed by: EMERGENCY MEDICINE

## 2021-05-17 PROCEDURE — 70450 CT HEAD/BRAIN W/O DYE: CPT

## 2021-05-17 PROCEDURE — 96375 TX/PRO/DX INJ NEW DRUG ADDON: CPT

## 2021-05-17 PROCEDURE — 25010000002 HEPARIN LOCK FLUSH PER 10 UNITS: Performed by: EMERGENCY MEDICINE

## 2021-05-17 PROCEDURE — 70450 CT HEAD/BRAIN W/O DYE: CPT | Performed by: RADIOLOGY

## 2021-05-17 PROCEDURE — 85025 COMPLETE CBC W/AUTO DIFF WBC: CPT | Performed by: EMERGENCY MEDICINE

## 2021-05-17 PROCEDURE — 70490 CT SOFT TISSUE NECK W/O DYE: CPT

## 2021-05-17 RX ORDER — POTASSIUM CHLORIDE 7.45 MG/ML
10 INJECTION INTRAVENOUS ONCE
Status: COMPLETED | OUTPATIENT
Start: 2021-05-17 | End: 2021-05-17

## 2021-05-17 RX ORDER — POTASSIUM CHLORIDE 20 MEQ/1
40 TABLET, EXTENDED RELEASE ORAL ONCE
Status: COMPLETED | OUTPATIENT
Start: 2021-05-17 | End: 2021-05-17

## 2021-05-17 RX ORDER — POTASSIUM CHLORIDE 20 MEQ/1
20 TABLET, EXTENDED RELEASE ORAL 2 TIMES DAILY
Qty: 14 TABLET | Refills: 0 | Status: SHIPPED | OUTPATIENT
Start: 2021-05-17 | End: 2021-11-06 | Stop reason: HOSPADM

## 2021-05-17 RX ORDER — CLINDAMYCIN HYDROCHLORIDE 150 MG/1
600 CAPSULE ORAL ONCE
Status: COMPLETED | OUTPATIENT
Start: 2021-05-17 | End: 2021-05-17

## 2021-05-17 RX ORDER — CLINDAMYCIN HYDROCHLORIDE 300 MG/1
300 CAPSULE ORAL 3 TIMES DAILY
Qty: 24 CAPSULE | Refills: 0 | Status: ON HOLD | OUTPATIENT
Start: 2021-05-17 | End: 2021-10-25

## 2021-05-17 RX ORDER — HEPARIN SODIUM (PORCINE) LOCK FLUSH IV SOLN 100 UNIT/ML 100 UNIT/ML
300 SOLUTION INTRAVENOUS ONCE
Status: COMPLETED | OUTPATIENT
Start: 2021-05-17 | End: 2021-05-17

## 2021-05-17 RX ORDER — PROMETHAZINE HYDROCHLORIDE 25 MG/1
25 TABLET ORAL ONCE
Status: COMPLETED | OUTPATIENT
Start: 2021-05-17 | End: 2021-05-17

## 2021-05-17 RX ADMIN — MORPHINE SULFATE 4 MG: 4 INJECTION, SOLUTION INTRAMUSCULAR; INTRAVENOUS at 17:57

## 2021-05-17 RX ADMIN — PROMETHAZINE HYDROCHLORIDE 25 MG: 25 TABLET ORAL at 17:57

## 2021-05-17 RX ADMIN — POTASSIUM CHLORIDE 40 MEQ: 20 TABLET, EXTENDED RELEASE ORAL at 18:40

## 2021-05-17 RX ADMIN — CLINDAMYCIN HYDROCHLORIDE 600 MG: 150 CAPSULE ORAL at 18:40

## 2021-05-17 RX ADMIN — Medication 300 UNITS: at 20:00

## 2021-05-17 RX ADMIN — POTASSIUM CHLORIDE 10 MEQ: 7.46 INJECTION, SOLUTION INTRAVENOUS at 18:40

## 2021-06-09 NOTE — ED NOTES
Patient noted to call out to nurses station at this time stating she was having a reaction to the antibiotic hanging. Upon entering patient's room pt noted to be sitting up on ED stretcher and was trembling and was complaining of chest pressure, low back pain, and slight shortness of breath. No further redness, swelling, rash, welts or signs of reaction noted. VICTOR HUGO Lopez made aware. New orders noted     Sin Dominguez RN  07/05/17 1831     (3) no apparent problem

## 2021-10-23 ENCOUNTER — HOSPITAL ENCOUNTER (EMERGENCY)
Facility: HOSPITAL | Age: 56
Discharge: LEFT BEFORE DISCHARGE | End: 2021-10-23

## 2021-10-23 ENCOUNTER — APPOINTMENT (OUTPATIENT)
Dept: GENERAL RADIOLOGY | Facility: HOSPITAL | Age: 56
End: 2021-10-23

## 2021-10-23 ENCOUNTER — HOSPITAL ENCOUNTER (EMERGENCY)
Facility: HOSPITAL | Age: 56
Discharge: HOME | End: 2021-10-23
Attending: STUDENT IN AN ORGANIZED HEALTH CARE EDUCATION/TRAINING PROGRAM | Admitting: STUDENT IN AN ORGANIZED HEALTH CARE EDUCATION/TRAINING PROGRAM

## 2021-10-23 VITALS
BODY MASS INDEX: 30.73 KG/M2 | RESPIRATION RATE: 18 BRPM | HEIGHT: 64 IN | SYSTOLIC BLOOD PRESSURE: 148 MMHG | WEIGHT: 180 LBS | TEMPERATURE: 97.3 F | OXYGEN SATURATION: 95 % | DIASTOLIC BLOOD PRESSURE: 87 MMHG | HEART RATE: 120 BPM

## 2021-10-23 VITALS
OXYGEN SATURATION: 95 % | BODY MASS INDEX: 29.73 KG/M2 | WEIGHT: 185 LBS | DIASTOLIC BLOOD PRESSURE: 95 MMHG | SYSTOLIC BLOOD PRESSURE: 162 MMHG | RESPIRATION RATE: 18 BRPM | TEMPERATURE: 98.8 F | HEIGHT: 66 IN | HEART RATE: 112 BPM

## 2021-10-23 DIAGNOSIS — M25.562 ARTHRALGIA OF BOTH KNEES: ICD-10-CM

## 2021-10-23 DIAGNOSIS — M32.9 LUPUS (HCC): Primary | ICD-10-CM

## 2021-10-23 DIAGNOSIS — M25.561 ARTHRALGIA OF BOTH KNEES: ICD-10-CM

## 2021-10-23 PROCEDURE — 96372 THER/PROPH/DIAG INJ SC/IM: CPT

## 2021-10-23 PROCEDURE — 99211 OFF/OP EST MAY X REQ PHY/QHP: CPT

## 2021-10-23 PROCEDURE — 73562 X-RAY EXAM OF KNEE 3: CPT

## 2021-10-23 PROCEDURE — 25010000002 DEXAMETHASONE PER 1 MG: Performed by: PHYSICIAN ASSISTANT

## 2021-10-23 PROCEDURE — 99283 EMERGENCY DEPT VISIT LOW MDM: CPT

## 2021-10-23 PROCEDURE — 93005 ELECTROCARDIOGRAM TRACING: CPT | Performed by: STUDENT IN AN ORGANIZED HEALTH CARE EDUCATION/TRAINING PROGRAM

## 2021-10-23 RX ORDER — DEXAMETHASONE SODIUM PHOSPHATE 10 MG/ML
10 INJECTION INTRAMUSCULAR; INTRAVENOUS ONCE
Status: COMPLETED | OUTPATIENT
Start: 2021-10-23 | End: 2021-10-23

## 2021-10-23 RX ORDER — HYDROCODONE BITARTRATE AND ACETAMINOPHEN 7.5; 325 MG/1; MG/1
1 TABLET ORAL ONCE
Status: COMPLETED | OUTPATIENT
Start: 2021-10-23 | End: 2021-10-23

## 2021-10-23 RX ORDER — CYCLOBENZAPRINE HCL 10 MG
10 TABLET ORAL 3 TIMES DAILY PRN
Qty: 21 TABLET | Refills: 0 | Status: ON HOLD | OUTPATIENT
Start: 2021-10-23 | End: 2021-10-25

## 2021-10-23 RX ORDER — DEXAMETHASONE 4 MG/1
4 TABLET ORAL
Qty: 4 TABLET | Refills: 0 | Status: ON HOLD | OUTPATIENT
Start: 2021-10-23 | End: 2021-10-25

## 2021-10-23 RX ADMIN — DEXAMETHASONE SODIUM PHOSPHATE 10 MG: 10 INJECTION INTRAMUSCULAR; INTRAVENOUS at 17:46

## 2021-10-23 RX ADMIN — HYDROCODONE BITARTRATE AND ACETAMINOPHEN 1 TABLET: 7.5; 325 TABLET ORAL at 18:26

## 2021-10-24 ENCOUNTER — APPOINTMENT (OUTPATIENT)
Dept: GENERAL RADIOLOGY | Facility: HOSPITAL | Age: 56
End: 2021-10-24

## 2021-10-24 ENCOUNTER — HOSPITAL ENCOUNTER (INPATIENT)
Facility: HOSPITAL | Age: 56
LOS: 12 days | Discharge: ANOTHER HOSPITAL | End: 2021-11-06
Attending: STUDENT IN AN ORGANIZED HEALTH CARE EDUCATION/TRAINING PROGRAM | Admitting: INTERNAL MEDICINE

## 2021-10-24 DIAGNOSIS — R65.20 SEPSIS DUE TO METHICILLIN SUSCEPTIBLE STAPHYLOCOCCUS AUREUS (MSSA) WITH ACUTE ORGAN DYSFUNCTION WITHOUT SEPTIC SHOCK, UNSPECIFIED TYPE (HCC): ICD-10-CM

## 2021-10-24 DIAGNOSIS — R79.89 ELEVATED D-DIMER: ICD-10-CM

## 2021-10-24 DIAGNOSIS — U07.1 PNEUMONIA DUE TO COVID-19 VIRUS: ICD-10-CM

## 2021-10-24 DIAGNOSIS — N30.00 ACUTE CYSTITIS WITHOUT HEMATURIA: ICD-10-CM

## 2021-10-24 DIAGNOSIS — F41.1 GAD (GENERALIZED ANXIETY DISORDER): ICD-10-CM

## 2021-10-24 DIAGNOSIS — M46.24 OSTEOMYELITIS OF THORACIC SPINE (HCC): ICD-10-CM

## 2021-10-24 DIAGNOSIS — F33.9 EPISODE OF RECURRENT MAJOR DEPRESSIVE DISORDER, UNSPECIFIED DEPRESSION EPISODE SEVERITY (HCC): ICD-10-CM

## 2021-10-24 DIAGNOSIS — A41.01 SEPSIS DUE TO METHICILLIN SUSCEPTIBLE STAPHYLOCOCCUS AUREUS (MSSA) WITH ACUTE ORGAN DYSFUNCTION WITHOUT SEPTIC SHOCK, UNSPECIFIED TYPE (HCC): ICD-10-CM

## 2021-10-24 DIAGNOSIS — A41.9 SEPSIS, DUE TO UNSPECIFIED ORGANISM, UNSPECIFIED WHETHER ACUTE ORGAN DYSFUNCTION PRESENT (HCC): Primary | ICD-10-CM

## 2021-10-24 DIAGNOSIS — M46.20 PARASPINAL ABSCESS (HCC): ICD-10-CM

## 2021-10-24 DIAGNOSIS — J12.82 PNEUMONIA DUE TO COVID-19 VIRUS: ICD-10-CM

## 2021-10-24 LAB
ALBUMIN SERPL-MCNC: 2.53 G/DL (ref 3.5–5.2)
ALBUMIN/GLOB SERPL: 0.7 G/DL
ALP SERPL-CCNC: 191 U/L (ref 39–117)
ALT SERPL W P-5'-P-CCNC: 19 U/L (ref 1–33)
ANION GAP SERPL CALCULATED.3IONS-SCNC: 17.2 MMOL/L (ref 5–15)
ANISOCYTOSIS BLD QL: ABNORMAL
AST SERPL-CCNC: 25 U/L (ref 1–32)
BILIRUB SERPL-MCNC: 2.6 MG/DL (ref 0–1.2)
BUN SERPL-MCNC: 28 MG/DL (ref 6–20)
BUN/CREAT SERPL: 27.5 (ref 7–25)
CALCIUM SPEC-SCNC: 8.7 MG/DL (ref 8.6–10.5)
CHLORIDE SERPL-SCNC: 101 MMOL/L (ref 98–107)
CO2 SERPL-SCNC: 19.8 MMOL/L (ref 22–29)
CREAT SERPL-MCNC: 1.02 MG/DL (ref 0.57–1)
CRP SERPL-MCNC: 25.76 MG/DL (ref 0–0.5)
D DIMER PPP FEU-MCNC: 14.78 MCGFEU/ML (ref 0–0.5)
DEPRECATED RDW RBC AUTO: 45.7 FL (ref 37–54)
ERYTHROCYTE [DISTWIDTH] IN BLOOD BY AUTOMATED COUNT: 16 % (ref 12.3–15.4)
FERRITIN SERPL-MCNC: 588.3 NG/ML (ref 13–150)
FLUAV SUBTYP SPEC NAA+PROBE: NOT DETECTED
FLUBV RNA ISLT QL NAA+PROBE: NOT DETECTED
GFR SERPL CREATININE-BSD FRML MDRD: 56 ML/MIN/1.73
GLOBULIN UR ELPH-MCNC: 3.8 GM/DL
GLUCOSE SERPL-MCNC: 120 MG/DL (ref 65–99)
HCT VFR BLD AUTO: 31.6 % (ref 34–46.6)
HGB BLD-MCNC: 10.3 G/DL (ref 12–15.9)
LDH SERPL-CCNC: 242 U/L (ref 135–214)
LYMPHOCYTES # BLD MANUAL: 0.52 10*3/MM3 (ref 0.7–3.1)
LYMPHOCYTES NFR BLD MANUAL: 3 % (ref 19.6–45.3)
LYMPHOCYTES NFR BLD MANUAL: 3 % (ref 5–12)
MAGNESIUM SERPL-MCNC: 1.8 MG/DL (ref 1.6–2.6)
MCH RBC QN AUTO: 25.5 PG (ref 26.6–33)
MCHC RBC AUTO-ENTMCNC: 32.6 G/DL (ref 31.5–35.7)
MCV RBC AUTO: 78.2 FL (ref 79–97)
METAMYELOCYTES NFR BLD MANUAL: 3 % (ref 0–0)
MICROCYTES BLD QL: ABNORMAL
MONOCYTES # BLD AUTO: 0.52 10*3/MM3 (ref 0.1–0.9)
NEUTROPHILS # BLD AUTO: 15.65 10*3/MM3 (ref 1.7–7)
NEUTROPHILS NFR BLD MANUAL: 74 % (ref 42.7–76)
NEUTS BAND NFR BLD MANUAL: 17 % (ref 0–5)
NT-PROBNP SERPL-MCNC: 2743 PG/ML (ref 0–900)
PLAT MORPH BLD: NORMAL
PLATELET # BLD AUTO: 138 10*3/MM3 (ref 140–450)
PMV BLD AUTO: 9 FL (ref 6–12)
POTASSIUM SERPL-SCNC: 3.4 MMOL/L (ref 3.5–5.2)
PROT SERPL-MCNC: 6.3 G/DL (ref 6–8.5)
QT INTERVAL: 320 MS
QTC INTERVAL: 435 MS
RBC # BLD AUTO: 4.04 10*6/MM3 (ref 3.77–5.28)
SARS-COV-2 RNA PNL SPEC NAA+PROBE: DETECTED
SODIUM SERPL-SCNC: 138 MMOL/L (ref 136–145)
TROPONIN T SERPL-MCNC: <0.01 NG/ML (ref 0–0.03)
WBC # BLD AUTO: 17.2 10*3/MM3 (ref 3.4–10.8)

## 2021-10-24 PROCEDURE — 99285 EMERGENCY DEPT VISIT HI MDM: CPT

## 2021-10-24 PROCEDURE — 83880 ASSAY OF NATRIURETIC PEPTIDE: CPT | Performed by: STUDENT IN AN ORGANIZED HEALTH CARE EDUCATION/TRAINING PROGRAM

## 2021-10-24 PROCEDURE — 87636 SARSCOV2 & INF A&B AMP PRB: CPT | Performed by: STUDENT IN AN ORGANIZED HEALTH CARE EDUCATION/TRAINING PROGRAM

## 2021-10-24 PROCEDURE — 83735 ASSAY OF MAGNESIUM: CPT | Performed by: STUDENT IN AN ORGANIZED HEALTH CARE EDUCATION/TRAINING PROGRAM

## 2021-10-24 PROCEDURE — 36415 COLL VENOUS BLD VENIPUNCTURE: CPT

## 2021-10-24 PROCEDURE — 82728 ASSAY OF FERRITIN: CPT | Performed by: STUDENT IN AN ORGANIZED HEALTH CARE EDUCATION/TRAINING PROGRAM

## 2021-10-24 PROCEDURE — 63710000001 DEXAMETHASONE PER 0.25 MG: Performed by: STUDENT IN AN ORGANIZED HEALTH CARE EDUCATION/TRAINING PROGRAM

## 2021-10-24 PROCEDURE — 85379 FIBRIN DEGRADATION QUANT: CPT | Performed by: STUDENT IN AN ORGANIZED HEALTH CARE EDUCATION/TRAINING PROGRAM

## 2021-10-24 PROCEDURE — 86140 C-REACTIVE PROTEIN: CPT | Performed by: STUDENT IN AN ORGANIZED HEALTH CARE EDUCATION/TRAINING PROGRAM

## 2021-10-24 PROCEDURE — 83615 LACTATE (LD) (LDH) ENZYME: CPT | Performed by: STUDENT IN AN ORGANIZED HEALTH CARE EDUCATION/TRAINING PROGRAM

## 2021-10-24 PROCEDURE — 84484 ASSAY OF TROPONIN QUANT: CPT | Performed by: STUDENT IN AN ORGANIZED HEALTH CARE EDUCATION/TRAINING PROGRAM

## 2021-10-24 PROCEDURE — 85025 COMPLETE CBC W/AUTO DIFF WBC: CPT | Performed by: STUDENT IN AN ORGANIZED HEALTH CARE EDUCATION/TRAINING PROGRAM

## 2021-10-24 PROCEDURE — 25010000002 HYDROMORPHONE 1 MG/ML SOLUTION: Performed by: STUDENT IN AN ORGANIZED HEALTH CARE EDUCATION/TRAINING PROGRAM

## 2021-10-24 PROCEDURE — 93010 ELECTROCARDIOGRAM REPORT: CPT | Performed by: INTERNAL MEDICINE

## 2021-10-24 PROCEDURE — 71045 X-RAY EXAM CHEST 1 VIEW: CPT

## 2021-10-24 PROCEDURE — 85007 BL SMEAR W/DIFF WBC COUNT: CPT | Performed by: STUDENT IN AN ORGANIZED HEALTH CARE EDUCATION/TRAINING PROGRAM

## 2021-10-24 PROCEDURE — 80053 COMPREHEN METABOLIC PANEL: CPT | Performed by: STUDENT IN AN ORGANIZED HEALTH CARE EDUCATION/TRAINING PROGRAM

## 2021-10-24 PROCEDURE — 93005 ELECTROCARDIOGRAM TRACING: CPT | Performed by: STUDENT IN AN ORGANIZED HEALTH CARE EDUCATION/TRAINING PROGRAM

## 2021-10-24 RX ORDER — EPINEPHRINE 1 MG/ML
0.3 INJECTION, SOLUTION INTRAMUSCULAR; SUBCUTANEOUS ONCE AS NEEDED
Status: DISCONTINUED | OUTPATIENT
Start: 2021-10-24 | End: 2021-11-06 | Stop reason: HOSPADM

## 2021-10-24 RX ORDER — DIPHENHYDRAMINE HCL 50 MG
50 CAPSULE ORAL ONCE AS NEEDED
Status: COMPLETED | OUTPATIENT
Start: 2021-10-24 | End: 2021-10-27

## 2021-10-24 RX ORDER — DIPHENHYDRAMINE HYDROCHLORIDE 50 MG/ML
50 INJECTION INTRAMUSCULAR; INTRAVENOUS ONCE AS NEEDED
Status: COMPLETED | OUTPATIENT
Start: 2021-10-24 | End: 2021-10-27

## 2021-10-24 RX ORDER — DEXAMETHASONE 4 MG/1
10 TABLET ORAL ONCE
Status: COMPLETED | OUTPATIENT
Start: 2021-10-24 | End: 2021-10-24

## 2021-10-24 RX ORDER — KETAMINE HCL IN NACL, ISO-OSM 100MG/10ML
0.2 SYRINGE (ML) INJECTION ONCE
Status: COMPLETED | OUTPATIENT
Start: 2021-10-24 | End: 2021-10-24

## 2021-10-24 RX ORDER — METHYLPREDNISOLONE SODIUM SUCCINATE 125 MG/2ML
125 INJECTION, POWDER, LYOPHILIZED, FOR SOLUTION INTRAMUSCULAR; INTRAVENOUS ONCE AS NEEDED
Status: DISCONTINUED | OUTPATIENT
Start: 2021-10-24 | End: 2021-11-06 | Stop reason: HOSPADM

## 2021-10-24 RX ORDER — OXYCODONE AND ACETAMINOPHEN 10; 325 MG/1; MG/1
1 TABLET ORAL ONCE
Status: COMPLETED | OUTPATIENT
Start: 2021-10-24 | End: 2021-10-24

## 2021-10-24 RX ORDER — SODIUM CHLORIDE 9 MG/ML
30 INJECTION, SOLUTION INTRAVENOUS ONCE
Status: COMPLETED | OUTPATIENT
Start: 2021-10-25 | End: 2021-10-25

## 2021-10-24 RX ORDER — HYDROCODONE BITARTRATE AND ACETAMINOPHEN 5; 325 MG/1; MG/1
1 TABLET ORAL ONCE
Status: COMPLETED | OUTPATIENT
Start: 2021-10-24 | End: 2021-10-24

## 2021-10-24 RX ADMIN — OXYCODONE HYDROCHLORIDE AND ACETAMINOPHEN 1 TABLET: 10; 325 TABLET ORAL at 23:08

## 2021-10-24 RX ADMIN — SODIUM CHLORIDE 1000 ML: 9 INJECTION, SOLUTION INTRAVENOUS at 22:20

## 2021-10-24 RX ADMIN — HYDROCODONE BITARTRATE AND ACETAMINOPHEN 1 TABLET: 5; 325 TABLET ORAL at 22:18

## 2021-10-24 RX ADMIN — DEXAMETHASONE 10 MG: 4 TABLET ORAL at 22:18

## 2021-10-24 RX ADMIN — HYDROMORPHONE HYDROCHLORIDE 1 MG: 1 INJECTION, SOLUTION INTRAMUSCULAR; INTRAVENOUS; SUBCUTANEOUS at 23:43

## 2021-10-24 RX ADMIN — Medication 16.8 MG: at 23:37

## 2021-10-25 ENCOUNTER — APPOINTMENT (OUTPATIENT)
Dept: CT IMAGING | Facility: HOSPITAL | Age: 56
End: 2021-10-25

## 2021-10-25 ENCOUNTER — APPOINTMENT (OUTPATIENT)
Dept: ULTRASOUND IMAGING | Facility: HOSPITAL | Age: 56
End: 2021-10-25

## 2021-10-25 PROBLEM — A41.9 SEPSIS (HCC): Status: ACTIVE | Noted: 2021-10-25

## 2021-10-25 LAB
ALBUMIN SERPL-MCNC: 2.39 G/DL (ref 3.5–5.2)
ALBUMIN/GLOB SERPL: 0.7 G/DL
ALP SERPL-CCNC: 172 U/L (ref 39–117)
ALT SERPL W P-5'-P-CCNC: 19 U/L (ref 1–33)
AMPHET+METHAMPHET UR QL: POSITIVE
AMPHETAMINES UR QL: POSITIVE
ANION GAP SERPL CALCULATED.3IONS-SCNC: 16.4 MMOL/L (ref 5–15)
ANISOCYTOSIS BLD QL: ABNORMAL
AST SERPL-CCNC: 30 U/L (ref 1–32)
B PARAPERT DNA SPEC QL NAA+PROBE: NOT DETECTED
B PERT DNA SPEC QL NAA+PROBE: NOT DETECTED
BACTERIA BLD CULT: ABNORMAL
BACTERIA UR QL AUTO: ABNORMAL /HPF
BACTERIA UR QL AUTO: ABNORMAL /HPF
BARBITURATES UR QL SCN: NEGATIVE
BENZODIAZ UR QL SCN: NEGATIVE
BILIRUB CONJ SERPL-MCNC: 2.6 MG/DL (ref 0–0.3)
BILIRUB SERPL-MCNC: 2.9 MG/DL (ref 0–1.2)
BILIRUB UR QL STRIP: ABNORMAL
BILIRUB UR QL STRIP: ABNORMAL
BOTTLE TYPE: ABNORMAL
BUN SERPL-MCNC: 32 MG/DL (ref 6–20)
BUN/CREAT SERPL: 29.4 (ref 7–25)
BUPRENORPHINE SERPL-MCNC: POSITIVE NG/ML
C PNEUM DNA NPH QL NAA+NON-PROBE: NOT DETECTED
CALCIUM SPEC-SCNC: 8.2 MG/DL (ref 8.6–10.5)
CANNABINOIDS SERPL QL: NEGATIVE
CHLORIDE SERPL-SCNC: 99 MMOL/L (ref 98–107)
CLARITY UR: ABNORMAL
CLARITY UR: ABNORMAL
CO2 SERPL-SCNC: 17.6 MMOL/L (ref 22–29)
COCAINE UR QL: NEGATIVE
COLOR UR: ABNORMAL
COLOR UR: ABNORMAL
CREAT SERPL-MCNC: 1.09 MG/DL (ref 0.57–1)
CREAT UR-MCNC: 97.9 MG/DL
CREAT UR-MCNC: 97.9 MG/DL
CRP SERPL-MCNC: 23.56 MG/DL (ref 0–0.5)
D-LACTATE SERPL-SCNC: 2 MMOL/L (ref 0.5–2)
D-LACTATE SERPL-SCNC: 2.1 MMOL/L (ref 0.5–2)
DEPRECATED RDW RBC AUTO: 47.8 FL (ref 37–54)
ERYTHROCYTE [DISTWIDTH] IN BLOOD BY AUTOMATED COUNT: 16.5 % (ref 12.3–15.4)
FLUAV SUBTYP SPEC NAA+PROBE: NOT DETECTED
FLUBV RNA ISLT QL NAA+PROBE: NOT DETECTED
GFR SERPL CREATININE-BSD FRML MDRD: 52 ML/MIN/1.73
GLOBULIN UR ELPH-MCNC: 3.4 GM/DL
GLUCOSE SERPL-MCNC: 131 MG/DL (ref 65–99)
GLUCOSE UR STRIP-MCNC: NEGATIVE MG/DL
GLUCOSE UR STRIP-MCNC: NEGATIVE MG/DL
HADV DNA SPEC NAA+PROBE: NOT DETECTED
HCOV 229E RNA SPEC QL NAA+PROBE: NOT DETECTED
HCOV HKU1 RNA SPEC QL NAA+PROBE: NOT DETECTED
HCOV NL63 RNA SPEC QL NAA+PROBE: NOT DETECTED
HCOV OC43 RNA SPEC QL NAA+PROBE: NOT DETECTED
HCT VFR BLD AUTO: 31.4 % (ref 34–46.6)
HGB BLD-MCNC: 10.2 G/DL (ref 12–15.9)
HGB UR QL STRIP.AUTO: ABNORMAL
HGB UR QL STRIP.AUTO: ABNORMAL
HMPV RNA NPH QL NAA+NON-PROBE: NOT DETECTED
HPIV1 RNA SPEC QL NAA+PROBE: NOT DETECTED
HPIV2 RNA SPEC QL NAA+PROBE: NOT DETECTED
HPIV3 RNA NPH QL NAA+PROBE: NOT DETECTED
HPIV4 P GENE NPH QL NAA+PROBE: NOT DETECTED
HYALINE CASTS UR QL AUTO: ABNORMAL /LPF
HYALINE CASTS UR QL AUTO: ABNORMAL /LPF
HYPOCHROMIA BLD QL: ABNORMAL
KETONES UR QL STRIP: NEGATIVE
KETONES UR QL STRIP: NEGATIVE
L PNEUMO1 AG UR QL IA: NEGATIVE
LEUKOCYTE ESTERASE UR QL STRIP.AUTO: ABNORMAL
LEUKOCYTE ESTERASE UR QL STRIP.AUTO: ABNORMAL
LYMPHOCYTES # BLD MANUAL: 0.62 10*3/MM3 (ref 0.7–3.1)
LYMPHOCYTES NFR BLD MANUAL: 4 % (ref 19.6–45.3)
LYMPHOCYTES NFR BLD MANUAL: 8 % (ref 5–12)
M PNEUMO IGG SER IA-ACNC: NOT DETECTED
MCH RBC QN AUTO: 25.9 PG (ref 26.6–33)
MCHC RBC AUTO-ENTMCNC: 32.5 G/DL (ref 31.5–35.7)
MCV RBC AUTO: 79.7 FL (ref 79–97)
METAMYELOCYTES NFR BLD MANUAL: 1 % (ref 0–0)
METHADONE UR QL SCN: NEGATIVE
MICROCYTES BLD QL: ABNORMAL
MONOCYTES # BLD AUTO: 1.24 10*3/MM3 (ref 0.1–0.9)
MRSA DNA SPEC QL NAA+PROBE: POSITIVE
NEUTROPHILS # BLD AUTO: 13.54 10*3/MM3 (ref 1.7–7)
NEUTROPHILS NFR BLD MANUAL: 78 % (ref 42.7–76)
NEUTS BAND NFR BLD MANUAL: 9 % (ref 0–5)
NITRITE UR QL STRIP: POSITIVE
NITRITE UR QL STRIP: POSITIVE
OPIATES UR QL: POSITIVE
OXYCODONE UR QL SCN: NEGATIVE
PCP UR QL SCN: NEGATIVE
PH UR STRIP.AUTO: 7 [PH] (ref 5–8)
PH UR STRIP.AUTO: 7 [PH] (ref 5–8)
PLATELET # BLD AUTO: 114 10*3/MM3 (ref 140–450)
PMV BLD AUTO: 10 FL (ref 6–12)
POTASSIUM SERPL-SCNC: 3.3 MMOL/L (ref 3.5–5.2)
PROCALCITONIN SERPL-MCNC: 3 NG/ML (ref 0–0.25)
PROPOXYPH UR QL: NEGATIVE
PROT SERPL-MCNC: 5.8 G/DL (ref 6–8.5)
PROT UR QL STRIP: ABNORMAL
PROT UR QL STRIP: ABNORMAL
PROT UR-MCNC: 126 MG/DL
PROT/CREAT UR: 1287 MG/G CREA (ref 0–200)
RBC # BLD AUTO: 3.94 10*6/MM3 (ref 3.77–5.28)
RBC # UR: ABNORMAL /HPF
RBC # UR: ABNORMAL /HPF
REF LAB TEST METHOD: ABNORMAL
REF LAB TEST METHOD: ABNORMAL
RHINOVIRUS RNA SPEC NAA+PROBE: NOT DETECTED
RSV RNA NPH QL NAA+NON-PROBE: NOT DETECTED
S AUREUS DNA SPEC QL NAA+PROBE: POSITIVE
S PNEUM AG SPEC QL LA: NEGATIVE
SCAN SLIDE: NORMAL
SMALL PLATELETS BLD QL SMEAR: ABNORMAL
SODIUM SERPL-SCNC: 133 MMOL/L (ref 136–145)
SP GR UR STRIP: 1.03 (ref 1–1.03)
SP GR UR STRIP: >=1.03 (ref 1–1.03)
SQUAMOUS #/AREA URNS HPF: ABNORMAL /HPF
SQUAMOUS #/AREA URNS HPF: ABNORMAL /HPF
T4 FREE SERPL-MCNC: 0.16 NG/DL (ref 0.93–1.7)
TRICYCLICS UR QL SCN: NEGATIVE
TSH SERPL DL<=0.05 MIU/L-ACNC: 47.2 UIU/ML (ref 0.27–4.2)
UROBILINOGEN UR QL STRIP: ABNORMAL
UROBILINOGEN UR QL STRIP: ABNORMAL
WBC # BLD AUTO: 15.56 10*3/MM3 (ref 3.4–10.8)
WBC UR QL AUTO: ABNORMAL /HPF
WBC UR QL AUTO: ABNORMAL /HPF

## 2021-10-25 PROCEDURE — 85007 BL SMEAR W/DIFF WBC COUNT: CPT | Performed by: PHYSICIAN ASSISTANT

## 2021-10-25 PROCEDURE — 87640 STAPH A DNA AMP PROBE: CPT | Performed by: PHYSICIAN ASSISTANT

## 2021-10-25 PROCEDURE — 25010000002 HEPARIN (PORCINE) PER 1000 UNITS: Performed by: INTERNAL MEDICINE

## 2021-10-25 PROCEDURE — 25010000002 ENOXAPARIN PER 10 MG: Performed by: INTERNAL MEDICINE

## 2021-10-25 PROCEDURE — 25010000002 INJECTION, CASIRIVIMAB AND IMDEVIMAB, 1200 MG: Performed by: STUDENT IN AN ORGANIZED HEALTH CARE EDUCATION/TRAINING PROGRAM

## 2021-10-25 PROCEDURE — 80053 COMPREHEN METABOLIC PANEL: CPT | Performed by: PHYSICIAN ASSISTANT

## 2021-10-25 PROCEDURE — 93970 EXTREMITY STUDY: CPT

## 2021-10-25 PROCEDURE — 84443 ASSAY THYROID STIM HORMONE: CPT | Performed by: PHYSICIAN ASSISTANT

## 2021-10-25 PROCEDURE — 86140 C-REACTIVE PROTEIN: CPT | Performed by: PHYSICIAN ASSISTANT

## 2021-10-25 PROCEDURE — 0 IOPAMIDOL PER 1 ML: Performed by: STUDENT IN AN ORGANIZED HEALTH CARE EDUCATION/TRAINING PROGRAM

## 2021-10-25 PROCEDURE — 84156 ASSAY OF PROTEIN URINE: CPT | Performed by: PHYSICIAN ASSISTANT

## 2021-10-25 PROCEDURE — 80306 DRUG TEST PRSMV INSTRMNT: CPT | Performed by: STUDENT IN AN ORGANIZED HEALTH CARE EDUCATION/TRAINING PROGRAM

## 2021-10-25 PROCEDURE — 87147 CULTURE TYPE IMMUNOLOGIC: CPT | Performed by: INTERNAL MEDICINE

## 2021-10-25 PROCEDURE — 94799 UNLISTED PULMONARY SVC/PX: CPT

## 2021-10-25 PROCEDURE — 25010000002 CEFTRIAXONE PER 250 MG: Performed by: STUDENT IN AN ORGANIZED HEALTH CARE EDUCATION/TRAINING PROGRAM

## 2021-10-25 PROCEDURE — 84439 ASSAY OF FREE THYROXINE: CPT | Performed by: PHYSICIAN ASSISTANT

## 2021-10-25 PROCEDURE — 87040 BLOOD CULTURE FOR BACTERIA: CPT | Performed by: STUDENT IN AN ORGANIZED HEALTH CARE EDUCATION/TRAINING PROGRAM

## 2021-10-25 PROCEDURE — XW033E5 INTRODUCTION OF REMDESIVIR ANTI-INFECTIVE INTO PERIPHERAL VEIN, PERCUTANEOUS APPROACH, NEW TECHNOLOGY GROUP 5: ICD-10-PCS | Performed by: INTERNAL MEDICINE

## 2021-10-25 PROCEDURE — 87899 AGENT NOS ASSAY W/OPTIC: CPT | Performed by: PHYSICIAN ASSISTANT

## 2021-10-25 PROCEDURE — 25010000002 MAGNESIUM SULFATE IN D5W 1G/100ML (PREMIX) 1-5 GM/100ML-% SOLUTION: Performed by: INTERNAL MEDICINE

## 2021-10-25 PROCEDURE — 71275 CT ANGIOGRAPHY CHEST: CPT

## 2021-10-25 PROCEDURE — 87641 MR-STAPH DNA AMP PROBE: CPT | Performed by: PHYSICIAN ASSISTANT

## 2021-10-25 PROCEDURE — 87633 RESP VIRUS 12-25 TARGETS: CPT | Performed by: PHYSICIAN ASSISTANT

## 2021-10-25 PROCEDURE — 87150 DNA/RNA AMPLIFIED PROBE: CPT | Performed by: INTERNAL MEDICINE

## 2021-10-25 PROCEDURE — 25010000002 HYDROMORPHONE PER 4 MG: Performed by: STUDENT IN AN ORGANIZED HEALTH CARE EDUCATION/TRAINING PROGRAM

## 2021-10-25 PROCEDURE — 85025 COMPLETE CBC W/AUTO DIFF WBC: CPT | Performed by: PHYSICIAN ASSISTANT

## 2021-10-25 PROCEDURE — 25010000002 DEXAMETHASONE PER 1 MG: Performed by: PHYSICIAN ASSISTANT

## 2021-10-25 PROCEDURE — 85060 BLOOD SMEAR INTERPRETATION: CPT | Performed by: PHYSICIAN ASSISTANT

## 2021-10-25 PROCEDURE — 81001 URINALYSIS AUTO W/SCOPE: CPT | Performed by: STUDENT IN AN ORGANIZED HEALTH CARE EDUCATION/TRAINING PROGRAM

## 2021-10-25 PROCEDURE — 83605 ASSAY OF LACTIC ACID: CPT | Performed by: STUDENT IN AN ORGANIZED HEALTH CARE EDUCATION/TRAINING PROGRAM

## 2021-10-25 PROCEDURE — 25010000002 VANCOMYCIN 5 G RECONSTITUTED SOLUTION: Performed by: HOSPITALIST

## 2021-10-25 PROCEDURE — 84145 PROCALCITONIN (PCT): CPT | Performed by: PHYSICIAN ASSISTANT

## 2021-10-25 PROCEDURE — 87186 SC STD MICRODIL/AGAR DIL: CPT | Performed by: INTERNAL MEDICINE

## 2021-10-25 PROCEDURE — 82248 BILIRUBIN DIRECT: CPT | Performed by: PHYSICIAN ASSISTANT

## 2021-10-25 PROCEDURE — 82570 ASSAY OF URINE CREATININE: CPT | Performed by: PHYSICIAN ASSISTANT

## 2021-10-25 PROCEDURE — 80177 DRUG SCRN QUAN LEVETIRACETAM: CPT | Performed by: PHYSICIAN ASSISTANT

## 2021-10-25 PROCEDURE — 25010000002 CEFTRIAXONE PER 250 MG: Performed by: PHYSICIAN ASSISTANT

## 2021-10-25 PROCEDURE — 99223 1ST HOSP IP/OBS HIGH 75: CPT | Performed by: PHYSICIAN ASSISTANT

## 2021-10-25 PROCEDURE — 87040 BLOOD CULTURE FOR BACTERIA: CPT | Performed by: INTERNAL MEDICINE

## 2021-10-25 PROCEDURE — 81001 URINALYSIS AUTO W/SCOPE: CPT | Performed by: PHYSICIAN ASSISTANT

## 2021-10-25 PROCEDURE — 87147 CULTURE TYPE IMMUNOLOGIC: CPT | Performed by: STUDENT IN AN ORGANIZED HEALTH CARE EDUCATION/TRAINING PROGRAM

## 2021-10-25 PROCEDURE — M0243 CASIRIVI AND IMDEVI INFUSION: HCPCS | Performed by: STUDENT IN AN ORGANIZED HEALTH CARE EDUCATION/TRAINING PROGRAM

## 2021-10-25 RX ORDER — L.ACID,PARA/B.BIFIDUM/S.THERM 8B CELL
1 CAPSULE ORAL DAILY
Status: DISCONTINUED | OUTPATIENT
Start: 2021-10-25 | End: 2021-11-06 | Stop reason: HOSPADM

## 2021-10-25 RX ORDER — KETAMINE HCL IN NACL, ISO-OSM 100MG/10ML
0.1 SYRINGE (ML) INJECTION ONCE
Status: COMPLETED | OUTPATIENT
Start: 2021-10-25 | End: 2021-10-25

## 2021-10-25 RX ORDER — CLONAZEPAM 1 MG/1
1 TABLET ORAL 3 TIMES DAILY
COMMUNITY
End: 2021-11-06 | Stop reason: HOSPADM

## 2021-10-25 RX ORDER — MAGNESIUM SULFATE 1 G/100ML
1 INJECTION INTRAVENOUS ONCE
Status: COMPLETED | OUTPATIENT
Start: 2021-10-25 | End: 2021-10-25

## 2021-10-25 RX ORDER — LEVETIRACETAM 500 MG/1
500 TABLET ORAL EVERY 12 HOURS SCHEDULED
Status: DISCONTINUED | OUTPATIENT
Start: 2021-10-25 | End: 2021-11-06 | Stop reason: HOSPADM

## 2021-10-25 RX ORDER — PANTOPRAZOLE SODIUM 40 MG/1
40 TABLET, DELAYED RELEASE ORAL
Status: DISCONTINUED | OUTPATIENT
Start: 2021-10-25 | End: 2021-11-06 | Stop reason: HOSPADM

## 2021-10-25 RX ORDER — HYDROMORPHONE HYDROCHLORIDE 1 MG/ML
0.5 INJECTION, SOLUTION INTRAMUSCULAR; INTRAVENOUS; SUBCUTANEOUS ONCE
Status: COMPLETED | OUTPATIENT
Start: 2021-10-25 | End: 2021-10-25

## 2021-10-25 RX ORDER — OXYCODONE HYDROCHLORIDE 15 MG/1
15 TABLET ORAL EVERY 6 HOURS PRN
COMMUNITY
End: 2021-11-06 | Stop reason: HOSPADM

## 2021-10-25 RX ORDER — BUPROPION HYDROCHLORIDE 150 MG/1
150 TABLET, EXTENDED RELEASE ORAL 2 TIMES DAILY
COMMUNITY
End: 2022-09-28 | Stop reason: HOSPADM

## 2021-10-25 RX ORDER — CLONAZEPAM 1 MG/1
1 TABLET ORAL 3 TIMES DAILY
Status: DISCONTINUED | OUTPATIENT
Start: 2021-10-25 | End: 2021-10-26

## 2021-10-25 RX ORDER — HEPARIN SODIUM 5000 [USP'U]/ML
5000 INJECTION, SOLUTION INTRAVENOUS; SUBCUTANEOUS EVERY 12 HOURS SCHEDULED
Status: DISCONTINUED | OUTPATIENT
Start: 2021-10-25 | End: 2021-10-25

## 2021-10-25 RX ORDER — POTASSIUM CHLORIDE 20 MEQ/1
20 TABLET, EXTENDED RELEASE ORAL ONCE
Status: COMPLETED | OUTPATIENT
Start: 2021-10-25 | End: 2021-10-25

## 2021-10-25 RX ORDER — HYDROXYCHLOROQUINE SULFATE 200 MG/1
200 TABLET, FILM COATED ORAL EVERY 12 HOURS SCHEDULED
Status: DISCONTINUED | OUTPATIENT
Start: 2021-10-25 | End: 2021-11-01

## 2021-10-25 RX ORDER — BUPROPION HYDROCHLORIDE 150 MG/1
150 TABLET, EXTENDED RELEASE ORAL 2 TIMES DAILY
Status: DISCONTINUED | OUTPATIENT
Start: 2021-10-25 | End: 2021-11-06 | Stop reason: HOSPADM

## 2021-10-25 RX ORDER — SODIUM CHLORIDE 9 MG/ML
50 INJECTION, SOLUTION INTRAVENOUS CONTINUOUS
Status: DISCONTINUED | OUTPATIENT
Start: 2021-10-25 | End: 2021-10-28

## 2021-10-25 RX ORDER — HYDRALAZINE HYDROCHLORIDE 20 MG/ML
10 INJECTION INTRAMUSCULAR; INTRAVENOUS EVERY 6 HOURS PRN
Status: DISCONTINUED | OUTPATIENT
Start: 2021-10-25 | End: 2021-11-06 | Stop reason: HOSPADM

## 2021-10-25 RX ORDER — HYDROXYZINE HYDROCHLORIDE 25 MG/1
25 TABLET, FILM COATED ORAL 3 TIMES DAILY PRN
Status: DISCONTINUED | OUTPATIENT
Start: 2021-10-25 | End: 2021-10-26

## 2021-10-25 RX ORDER — LEVOTHYROXINE SODIUM 175 UG/1
175 TABLET ORAL DAILY
Status: DISCONTINUED | OUTPATIENT
Start: 2021-10-25 | End: 2021-10-26

## 2021-10-25 RX ORDER — OXYCODONE HYDROCHLORIDE AND ACETAMINOPHEN 5; 325 MG/1; MG/1
1 TABLET ORAL EVERY 4 HOURS PRN
Status: DISCONTINUED | OUTPATIENT
Start: 2021-10-25 | End: 2021-10-26

## 2021-10-25 RX ORDER — MAGNESIUM SULFATE HEPTAHYDRATE 40 MG/ML
2 INJECTION, SOLUTION INTRAVENOUS AS NEEDED
Status: DISCONTINUED | OUTPATIENT
Start: 2021-10-25 | End: 2021-11-06 | Stop reason: HOSPADM

## 2021-10-25 RX ORDER — DULOXETIN HYDROCHLORIDE 60 MG/1
60 CAPSULE, DELAYED RELEASE ORAL EVERY MORNING
Status: DISCONTINUED | OUTPATIENT
Start: 2021-10-26 | End: 2021-11-06 | Stop reason: HOSPADM

## 2021-10-25 RX ORDER — MAGNESIUM SULFATE 1 G/100ML
1 INJECTION INTRAVENOUS AS NEEDED
Status: DISCONTINUED | OUTPATIENT
Start: 2021-10-25 | End: 2021-11-06 | Stop reason: HOSPADM

## 2021-10-25 RX ORDER — DEXAMETHASONE SODIUM PHOSPHATE 4 MG/ML
6 INJECTION, SOLUTION INTRA-ARTICULAR; INTRALESIONAL; INTRAMUSCULAR; INTRAVENOUS; SOFT TISSUE DAILY
Status: DISCONTINUED | OUTPATIENT
Start: 2021-10-25 | End: 2021-11-06 | Stop reason: HOSPADM

## 2021-10-25 RX ORDER — OXYCODONE HYDROCHLORIDE 15 MG/1
15 TABLET ORAL EVERY 8 HOURS PRN
Status: DISCONTINUED | OUTPATIENT
Start: 2021-10-25 | End: 2021-11-06

## 2021-10-25 RX ORDER — SODIUM CHLORIDE 0.9 % (FLUSH) 0.9 %
3-10 SYRINGE (ML) INJECTION AS NEEDED
Status: DISCONTINUED | OUTPATIENT
Start: 2021-10-25 | End: 2021-11-06 | Stop reason: HOSPADM

## 2021-10-25 RX ORDER — SODIUM CHLORIDE 0.9 % (FLUSH) 0.9 %
3 SYRINGE (ML) INJECTION EVERY 12 HOURS SCHEDULED
Status: DISCONTINUED | OUTPATIENT
Start: 2021-10-25 | End: 2021-11-06 | Stop reason: HOSPADM

## 2021-10-25 RX ORDER — CALCIUM CARBONATE 200(500)MG
2 TABLET,CHEWABLE ORAL 3 TIMES DAILY PRN
Status: DISCONTINUED | OUTPATIENT
Start: 2021-10-25 | End: 2021-11-06 | Stop reason: HOSPADM

## 2021-10-25 RX ORDER — DIPHENOXYLATE HYDROCHLORIDE AND ATROPINE SULFATE 2.5; .025 MG/1; MG/1
1 TABLET ORAL DAILY
Status: DISCONTINUED | OUTPATIENT
Start: 2021-10-25 | End: 2021-11-06 | Stop reason: HOSPADM

## 2021-10-25 RX ADMIN — BUPROPION HYDROCHLORIDE 150 MG: 150 TABLET, FILM COATED, EXTENDED RELEASE ORAL at 21:40

## 2021-10-25 RX ADMIN — SODIUM CHLORIDE, PRESERVATIVE FREE 3 ML: 5 INJECTION INTRAVENOUS at 10:45

## 2021-10-25 RX ADMIN — Medication 8.4 MG: at 04:13

## 2021-10-25 RX ADMIN — OXYCODONE HYDROCHLORIDE 15 MG: 15 TABLET ORAL at 17:38

## 2021-10-25 RX ADMIN — CLONAZEPAM 1 MG: 1 TABLET ORAL at 17:38

## 2021-10-25 RX ADMIN — OXYCODONE HYDROCHLORIDE AND ACETAMINOPHEN 1 TABLET: 5; 325 TABLET ORAL at 13:35

## 2021-10-25 RX ADMIN — SODIUM CHLORIDE 50 ML/HR: 9 INJECTION, SOLUTION INTRAVENOUS at 08:38

## 2021-10-25 RX ADMIN — VANCOMYCIN HYDROCHLORIDE 1750 MG: 5 INJECTION, POWDER, LYOPHILIZED, FOR SOLUTION INTRAVENOUS at 22:27

## 2021-10-25 RX ADMIN — IOPAMIDOL 70 ML: 755 INJECTION, SOLUTION INTRAVENOUS at 00:55

## 2021-10-25 RX ADMIN — LEVOTHYROXINE SODIUM 175 MCG: 0.17 TABLET ORAL at 17:38

## 2021-10-25 RX ADMIN — DOXYCYCLINE 100 MG: 100 INJECTION, POWDER, LYOPHILIZED, FOR SOLUTION INTRAVENOUS at 13:35

## 2021-10-25 RX ADMIN — IMDEVIMAB 1200 MG: 1332 INJECTION, SOLUTION, CONCENTRATE INTRAVENOUS at 01:13

## 2021-10-25 RX ADMIN — SODIUM CHLORIDE 2 G: 9 INJECTION, SOLUTION INTRAVENOUS at 17:39

## 2021-10-25 RX ADMIN — CEFTRIAXONE 1 G: 1 INJECTION, POWDER, FOR SOLUTION INTRAMUSCULAR; INTRAVENOUS at 05:46

## 2021-10-25 RX ADMIN — POTASSIUM CHLORIDE 20 MEQ: 20 TABLET, EXTENDED RELEASE ORAL at 10:45

## 2021-10-25 RX ADMIN — HYDROMORPHONE HYDROCHLORIDE 0.5 MG: 1 INJECTION, SOLUTION INTRAMUSCULAR; INTRAVENOUS; SUBCUTANEOUS at 03:18

## 2021-10-25 RX ADMIN — LEVETIRACETAM 500 MG: 500 TABLET, FILM COATED ORAL at 21:40

## 2021-10-25 RX ADMIN — METOPROLOL TARTRATE 25 MG: 25 TABLET, FILM COATED ORAL at 21:40

## 2021-10-25 RX ADMIN — SODIUM CHLORIDE, PRESERVATIVE FREE 3 ML: 5 INJECTION INTRAVENOUS at 21:41

## 2021-10-25 RX ADMIN — HYDROXYCHLOROQUINE SULFATE 200 MG: 200 TABLET ORAL at 21:39

## 2021-10-25 RX ADMIN — HEPARIN SODIUM 5000 UNITS: 5000 INJECTION INTRAVENOUS; SUBCUTANEOUS at 08:29

## 2021-10-25 RX ADMIN — ENOXAPARIN SODIUM 40 MG: 40 INJECTION SUBCUTANEOUS at 17:38

## 2021-10-25 RX ADMIN — DEXAMETHASONE SODIUM PHOSPHATE 6 MG: 4 INJECTION, SOLUTION INTRA-ARTICULAR; INTRALESIONAL; INTRAMUSCULAR; INTRAVENOUS; SOFT TISSUE at 08:38

## 2021-10-25 RX ADMIN — CLONAZEPAM 1 MG: 1 TABLET ORAL at 21:40

## 2021-10-25 RX ADMIN — OXYCODONE HYDROCHLORIDE AND ACETAMINOPHEN 1 TABLET: 5; 325 TABLET ORAL at 08:39

## 2021-10-25 RX ADMIN — SODIUM CHLORIDE 30 ML: 900 INJECTION INTRAVENOUS at 01:34

## 2021-10-25 RX ADMIN — MAGNESIUM SULFATE 1 G: 1 INJECTION INTRAVENOUS at 10:45

## 2021-10-26 ENCOUNTER — APPOINTMENT (OUTPATIENT)
Dept: CARDIOLOGY | Facility: HOSPITAL | Age: 56
End: 2021-10-26

## 2021-10-26 LAB
ALBUMIN SERPL-MCNC: 1.98 G/DL (ref 3.5–5.2)
ALP SERPL-CCNC: 177 U/L (ref 39–117)
ALT SERPL W P-5'-P-CCNC: 16 U/L (ref 1–33)
ANION GAP SERPL CALCULATED.3IONS-SCNC: 13.2 MMOL/L (ref 5–15)
AST SERPL-CCNC: 19 U/L (ref 1–32)
BH CV ECHO MEAS - AO ROOT AREA (BSA CORRECTED): 1.4
BH CV ECHO MEAS - AO ROOT AREA: 6.2 CM^2
BH CV ECHO MEAS - AO ROOT DIAM: 2.8 CM
BH CV ECHO MEAS - BSA(HAYCOCK): 2.1 M^2
BH CV ECHO MEAS - BSA: 2 M^2
BH CV ECHO MEAS - BZI_BMI: 32.6 KILOGRAMS/M^2
BH CV ECHO MEAS - BZI_METRIC_HEIGHT: 167.6 CM
BH CV ECHO MEAS - BZI_METRIC_WEIGHT: 91.6 KG
BH CV ECHO MEAS - EDV(CUBED): 64 ML
BH CV ECHO MEAS - EDV(MOD-SP4): 43.8 ML
BH CV ECHO MEAS - EDV(TEICH): 70 ML
BH CV ECHO MEAS - EF(CUBED): 72.5 %
BH CV ECHO MEAS - EF(MOD-SP4): 59.4 %
BH CV ECHO MEAS - EF(TEICH): 64.8 %
BH CV ECHO MEAS - ESV(CUBED): 17.6 ML
BH CV ECHO MEAS - ESV(MOD-SP4): 17.8 ML
BH CV ECHO MEAS - ESV(TEICH): 24.6 ML
BH CV ECHO MEAS - FS: 35 %
BH CV ECHO MEAS - IVS/LVPW: 1.2
BH CV ECHO MEAS - IVSD: 1.7 CM
BH CV ECHO MEAS - LA DIMENSION: 3.1 CM
BH CV ECHO MEAS - LA/AO: 1.1
BH CV ECHO MEAS - LV DIASTOLIC VOL/BSA (35-75): 21.8 ML/M^2
BH CV ECHO MEAS - LV MASS(C)D: 245.1 GRAMS
BH CV ECHO MEAS - LV MASS(C)DI: 122 GRAMS/M^2
BH CV ECHO MEAS - LV SYSTOLIC VOL/BSA (12-30): 8.9 ML/M^2
BH CV ECHO MEAS - LVIDD: 4 CM
BH CV ECHO MEAS - LVIDS: 2.6 CM
BH CV ECHO MEAS - LVLD AP4: 6.9 CM
BH CV ECHO MEAS - LVLS AP4: 5.8 CM
BH CV ECHO MEAS - LVOT AREA (M): 2.8 CM^2
BH CV ECHO MEAS - LVOT AREA: 2.8 CM^2
BH CV ECHO MEAS - LVOT DIAM: 1.9 CM
BH CV ECHO MEAS - LVPWD: 1.2 CM
BH CV ECHO MEAS - MV A MAX VEL: 66 CM/SEC
BH CV ECHO MEAS - MV E MAX VEL: 77.6 CM/SEC
BH CV ECHO MEAS - MV E/A: 1.2
BH CV ECHO MEAS - PA ACC TIME: 0.12 SEC
BH CV ECHO MEAS - PA PR(ACCEL): 26.8 MMHG
BH CV ECHO MEAS - SI(CUBED): 23.1 ML/M^2
BH CV ECHO MEAS - SI(MOD-SP4): 12.9 ML/M^2
BH CV ECHO MEAS - SI(TEICH): 22.6 ML/M^2
BH CV ECHO MEAS - SV(CUBED): 46.4 ML
BH CV ECHO MEAS - SV(MOD-SP4): 26 ML
BH CV ECHO MEAS - SV(TEICH): 45.4 ML
BH CV ECHO MEAS - TR MAX VEL: 271 CM/SEC
BILIRUB CONJ SERPL-MCNC: 1 MG/DL (ref 0–0.3)
BILIRUB INDIRECT SERPL-MCNC: 0.2 MG/DL
BILIRUB SERPL-MCNC: 1.2 MG/DL (ref 0–1.2)
BUN SERPL-MCNC: 38 MG/DL (ref 6–20)
BUN/CREAT SERPL: 38 (ref 7–25)
CALCIUM SPEC-SCNC: 6.2 MG/DL (ref 8.6–10.5)
CHLORIDE SERPL-SCNC: 111 MMOL/L (ref 98–107)
CO2 SERPL-SCNC: 12.8 MMOL/L (ref 22–29)
CREAT SERPL-MCNC: 1 MG/DL (ref 0.57–1)
CREAT SERPL-MCNC: 1.48 MG/DL (ref 0.57–1)
CRP SERPL-MCNC: 15.17 MG/DL (ref 0–0.5)
CYTOLOGIST CVX/VAG CYTO: NORMAL
DEPRECATED RDW RBC AUTO: 54.4 FL (ref 37–54)
ERYTHROCYTE [DISTWIDTH] IN BLOOD BY AUTOMATED COUNT: 17.4 % (ref 12.3–15.4)
GFR SERPL CREATININE-BSD FRML MDRD: 37 ML/MIN/1.73
GFR SERPL CREATININE-BSD FRML MDRD: 58 ML/MIN/1.73
GLUCOSE SERPL-MCNC: 123 MG/DL (ref 65–99)
HCT VFR BLD AUTO: 28.5 % (ref 34–46.6)
HGB BLD-MCNC: 8.7 G/DL (ref 12–15.9)
MAXIMAL PREDICTED HEART RATE: 165 BPM
MCH RBC QN AUTO: 26 PG (ref 26.6–33)
MCHC RBC AUTO-ENTMCNC: 30.5 G/DL (ref 31.5–35.7)
MCV RBC AUTO: 85.1 FL (ref 79–97)
PATH INTERP BLD-IMP: NORMAL
PLATELET # BLD AUTO: 87 10*3/MM3 (ref 140–450)
PMV BLD AUTO: 10.6 FL (ref 6–12)
POTASSIUM SERPL-SCNC: 3.2 MMOL/L (ref 3.5–5.2)
PROT SERPL-MCNC: 6 G/DL (ref 6–8.5)
QT INTERVAL: 282 MS
QTC INTERVAL: 403 MS
RBC # BLD AUTO: 3.35 10*6/MM3 (ref 3.77–5.28)
SODIUM SERPL-SCNC: 137 MMOL/L (ref 136–145)
STRESS TARGET HR: 140 BPM
VIT B12 BLD-MCNC: 1028 PG/ML (ref 211–946)
WBC # BLD AUTO: 18.15 10*3/MM3 (ref 3.4–10.8)

## 2021-10-26 PROCEDURE — 93306 TTE W/DOPPLER COMPLETE: CPT | Performed by: SPECIALIST

## 2021-10-26 PROCEDURE — 82565 ASSAY OF CREATININE: CPT | Performed by: INTERNAL MEDICINE

## 2021-10-26 PROCEDURE — 90686 IIV4 VACC NO PRSV 0.5 ML IM: CPT | Performed by: INTERNAL MEDICINE

## 2021-10-26 PROCEDURE — 97162 PT EVAL MOD COMPLEX 30 MIN: CPT

## 2021-10-26 PROCEDURE — 82607 VITAMIN B-12: CPT | Performed by: PHYSICIAN ASSISTANT

## 2021-10-26 PROCEDURE — 25010000002 VANCOMYCIN 5 G RECONSTITUTED SOLUTION: Performed by: HOSPITALIST

## 2021-10-26 PROCEDURE — 80048 BASIC METABOLIC PNL TOTAL CA: CPT | Performed by: INTERNAL MEDICINE

## 2021-10-26 PROCEDURE — 25010000002 ENOXAPARIN PER 10 MG: Performed by: INTERNAL MEDICINE

## 2021-10-26 PROCEDURE — G0008 ADMIN INFLUENZA VIRUS VAC: HCPCS | Performed by: INTERNAL MEDICINE

## 2021-10-26 PROCEDURE — 86140 C-REACTIVE PROTEIN: CPT | Performed by: INTERNAL MEDICINE

## 2021-10-26 PROCEDURE — 80076 HEPATIC FUNCTION PANEL: CPT | Performed by: INTERNAL MEDICINE

## 2021-10-26 PROCEDURE — 85027 COMPLETE CBC AUTOMATED: CPT | Performed by: INTERNAL MEDICINE

## 2021-10-26 PROCEDURE — 25010000002 DEXAMETHASONE PER 1 MG: Performed by: PHYSICIAN ASSISTANT

## 2021-10-26 PROCEDURE — 93306 TTE W/DOPPLER COMPLETE: CPT

## 2021-10-26 PROCEDURE — 25010000002 INFLUENZA VAC SPLIT QUAD 0.5 ML SUSPENSION PREFILLED SYRINGE: Performed by: INTERNAL MEDICINE

## 2021-10-26 PROCEDURE — 99233 SBSQ HOSP IP/OBS HIGH 50: CPT | Performed by: INTERNAL MEDICINE

## 2021-10-26 RX ORDER — CLONAZEPAM 0.5 MG/1
0.5 TABLET ORAL 3 TIMES DAILY
Status: DISCONTINUED | OUTPATIENT
Start: 2021-10-26 | End: 2021-10-28

## 2021-10-26 RX ORDER — LEVOTHYROXINE SODIUM 0.12 MG/1
125 TABLET ORAL DAILY
Status: DISCONTINUED | OUTPATIENT
Start: 2021-10-27 | End: 2021-11-06 | Stop reason: HOSPADM

## 2021-10-26 RX ORDER — PANTOPRAZOLE SODIUM 40 MG/1
40 TABLET, DELAYED RELEASE ORAL DAILY
Status: ON HOLD | COMMUNITY
End: 2022-09-22

## 2021-10-26 RX ORDER — PANTOPRAZOLE SODIUM 40 MG/1
40 TABLET, DELAYED RELEASE ORAL DAILY
Status: CANCELLED | OUTPATIENT
Start: 2021-10-26

## 2021-10-26 RX ADMIN — METOPROLOL TARTRATE 25 MG: 25 TABLET, FILM COATED ORAL at 08:41

## 2021-10-26 RX ADMIN — OXYCODONE HYDROCHLORIDE 15 MG: 15 TABLET ORAL at 08:41

## 2021-10-26 RX ADMIN — METOPROLOL TARTRATE 25 MG: 25 TABLET, FILM COATED ORAL at 21:24

## 2021-10-26 RX ADMIN — VANCOMYCIN HYDROCHLORIDE 1500 MG: 5 INJECTION, POWDER, LYOPHILIZED, FOR SOLUTION INTRAVENOUS at 21:24

## 2021-10-26 RX ADMIN — LEVETIRACETAM 500 MG: 500 TABLET, FILM COATED ORAL at 08:41

## 2021-10-26 RX ADMIN — CLONAZEPAM 0.5 MG: 1 TABLET ORAL at 15:42

## 2021-10-26 RX ADMIN — HYDROXYCHLOROQUINE SULFATE 200 MG: 200 TABLET ORAL at 08:41

## 2021-10-26 RX ADMIN — SODIUM CHLORIDE, PRESERVATIVE FREE 3 ML: 5 INJECTION INTRAVENOUS at 08:42

## 2021-10-26 RX ADMIN — BUPROPION HYDROCHLORIDE 150 MG: 150 TABLET, FILM COATED, EXTENDED RELEASE ORAL at 21:24

## 2021-10-26 RX ADMIN — REMDESIVIR 200 MG: 100 INJECTION, POWDER, LYOPHILIZED, FOR SOLUTION INTRAVENOUS at 17:52

## 2021-10-26 RX ADMIN — SODIUM CHLORIDE 50 ML/HR: 9 INJECTION, SOLUTION INTRAVENOUS at 08:51

## 2021-10-26 RX ADMIN — Medication 1 TABLET: at 08:41

## 2021-10-26 RX ADMIN — PANTOPRAZOLE SODIUM 40 MG: 40 TABLET, DELAYED RELEASE ORAL at 05:31

## 2021-10-26 RX ADMIN — LEVOTHYROXINE SODIUM 175 MCG: 0.17 TABLET ORAL at 08:41

## 2021-10-26 RX ADMIN — DULOXETINE HYDROCHLORIDE 60 MG: 60 CAPSULE, DELAYED RELEASE ORAL at 05:31

## 2021-10-26 RX ADMIN — INFLUENZA VIRUS VACCINE 0.5 ML: 15; 15; 15; 15 SUSPENSION INTRAMUSCULAR at 15:42

## 2021-10-26 RX ADMIN — Medication 1 CAPSULE: at 08:41

## 2021-10-26 RX ADMIN — ENOXAPARIN SODIUM 40 MG: 40 INJECTION SUBCUTANEOUS at 17:53

## 2021-10-26 RX ADMIN — CLONAZEPAM 0.5 MG: 1 TABLET ORAL at 21:24

## 2021-10-26 RX ADMIN — DEXAMETHASONE SODIUM PHOSPHATE 6 MG: 4 INJECTION, SOLUTION INTRA-ARTICULAR; INTRALESIONAL; INTRAMUSCULAR; INTRAVENOUS; SOFT TISSUE at 08:41

## 2021-10-26 RX ADMIN — HYDROXYCHLOROQUINE SULFATE 200 MG: 200 TABLET ORAL at 21:24

## 2021-10-26 RX ADMIN — CLONAZEPAM 1 MG: 1 TABLET ORAL at 08:41

## 2021-10-26 RX ADMIN — LEVETIRACETAM 500 MG: 500 TABLET, FILM COATED ORAL at 21:24

## 2021-10-26 RX ADMIN — SODIUM CHLORIDE, PRESERVATIVE FREE 3 ML: 5 INJECTION INTRAVENOUS at 21:24

## 2021-10-26 RX ADMIN — BUPROPION HYDROCHLORIDE 150 MG: 150 TABLET, FILM COATED, EXTENDED RELEASE ORAL at 08:41

## 2021-10-27 ENCOUNTER — ANESTHESIA EVENT (OUTPATIENT)
Dept: PERIOP | Facility: HOSPITAL | Age: 56
End: 2021-10-27

## 2021-10-27 ENCOUNTER — APPOINTMENT (OUTPATIENT)
Dept: CT IMAGING | Facility: HOSPITAL | Age: 56
End: 2021-10-27

## 2021-10-27 ENCOUNTER — ANESTHESIA (OUTPATIENT)
Dept: PERIOP | Facility: HOSPITAL | Age: 56
End: 2021-10-27

## 2021-10-27 LAB
ALBUMIN SERPL-MCNC: 1.98 G/DL (ref 3.5–5.2)
ALP SERPL-CCNC: 171 U/L (ref 39–117)
ALT SERPL W P-5'-P-CCNC: 16 U/L (ref 1–33)
AST SERPL-CCNC: 20 U/L (ref 1–32)
BACTERIA SPEC AEROBE CULT: ABNORMAL
BILIRUB CONJ SERPL-MCNC: 0.6 MG/DL (ref 0–0.3)
BILIRUB INDIRECT SERPL-MCNC: 0.4 MG/DL
BILIRUB SERPL-MCNC: 1 MG/DL (ref 0–1.2)
CREAT SERPL-MCNC: 1.38 MG/DL (ref 0.57–1)
GFR SERPL CREATININE-BSD FRML MDRD: 40 ML/MIN/1.73
GRAM STN SPEC: ABNORMAL
ISOLATED FROM: ABNORMAL
PROT SERPL-MCNC: 6.1 G/DL (ref 6–8.5)
VANCOMYCIN TROUGH SERPL-MCNC: 13 MCG/ML (ref 5–20)

## 2021-10-27 PROCEDURE — 63710000001 DIPHENHYDRAMINE PER 50 MG: Performed by: INTERNAL MEDICINE

## 2021-10-27 PROCEDURE — 25010000002 VANCOMYCIN 5 G RECONSTITUTED SOLUTION: Performed by: HOSPITALIST

## 2021-10-27 PROCEDURE — 87147 CULTURE TYPE IMMUNOLOGIC: CPT | Performed by: INTERNAL MEDICINE

## 2021-10-27 PROCEDURE — 25010000002 LORAZEPAM PER 2 MG: Performed by: HOSPITALIST

## 2021-10-27 PROCEDURE — 87040 BLOOD CULTURE FOR BACTERIA: CPT | Performed by: INTERNAL MEDICINE

## 2021-10-27 PROCEDURE — 82565 ASSAY OF CREATININE: CPT | Performed by: INTERNAL MEDICINE

## 2021-10-27 PROCEDURE — 25010000002 ENOXAPARIN PER 10 MG: Performed by: INTERNAL MEDICINE

## 2021-10-27 PROCEDURE — 80202 ASSAY OF VANCOMYCIN: CPT | Performed by: INTERNAL MEDICINE

## 2021-10-27 PROCEDURE — 80076 HEPATIC FUNCTION PANEL: CPT | Performed by: INTERNAL MEDICINE

## 2021-10-27 PROCEDURE — 99233 SBSQ HOSP IP/OBS HIGH 50: CPT | Performed by: INTERNAL MEDICINE

## 2021-10-27 RX ORDER — LORAZEPAM 2 MG/ML
0.25 INJECTION INTRAMUSCULAR ONCE
Status: COMPLETED | OUTPATIENT
Start: 2021-10-27 | End: 2021-10-27

## 2021-10-27 RX ADMIN — CLONAZEPAM 0.5 MG: 1 TABLET ORAL at 15:51

## 2021-10-27 RX ADMIN — ENOXAPARIN SODIUM 40 MG: 40 INJECTION SUBCUTANEOUS at 18:12

## 2021-10-27 RX ADMIN — LORAZEPAM 0.25 MG: 2 INJECTION INTRAMUSCULAR; INTRAVENOUS at 23:19

## 2021-10-27 RX ADMIN — BUPROPION HYDROCHLORIDE 150 MG: 150 TABLET, FILM COATED, EXTENDED RELEASE ORAL at 09:02

## 2021-10-27 RX ADMIN — VANCOMYCIN HYDROCHLORIDE 1500 MG: 5 INJECTION, POWDER, LYOPHILIZED, FOR SOLUTION INTRAVENOUS at 23:20

## 2021-10-27 RX ADMIN — OXYCODONE HYDROCHLORIDE 15 MG: 15 TABLET ORAL at 15:51

## 2021-10-27 RX ADMIN — LEVETIRACETAM 500 MG: 500 TABLET, FILM COATED ORAL at 19:58

## 2021-10-27 RX ADMIN — HYDROXYCHLOROQUINE SULFATE 200 MG: 200 TABLET ORAL at 09:03

## 2021-10-27 RX ADMIN — DIPHENHYDRAMINE HYDROCHLORIDE 50 MG: 50 CAPSULE ORAL at 09:04

## 2021-10-27 RX ADMIN — METOPROLOL TARTRATE 25 MG: 25 TABLET, FILM COATED ORAL at 19:58

## 2021-10-27 RX ADMIN — LEVOTHYROXINE SODIUM 125 MCG: 125 TABLET ORAL at 09:03

## 2021-10-27 RX ADMIN — Medication 1 CAPSULE: at 09:03

## 2021-10-27 RX ADMIN — HYDROXYCHLOROQUINE SULFATE 200 MG: 200 TABLET ORAL at 19:58

## 2021-10-27 RX ADMIN — BUPROPION HYDROCHLORIDE 150 MG: 150 TABLET, FILM COATED, EXTENDED RELEASE ORAL at 20:01

## 2021-10-27 RX ADMIN — CLONAZEPAM 0.5 MG: 1 TABLET ORAL at 19:57

## 2021-10-27 RX ADMIN — SODIUM CHLORIDE 50 ML/HR: 9 INJECTION, SOLUTION INTRAVENOUS at 04:13

## 2021-10-27 RX ADMIN — METOPROLOL TARTRATE 25 MG: 25 TABLET, FILM COATED ORAL at 09:03

## 2021-10-27 RX ADMIN — Medication 1 TABLET: at 09:02

## 2021-10-27 RX ADMIN — OXYCODONE HYDROCHLORIDE 15 MG: 15 TABLET ORAL at 07:47

## 2021-10-27 RX ADMIN — CLONAZEPAM 0.5 MG: 1 TABLET ORAL at 09:03

## 2021-10-27 RX ADMIN — LEVETIRACETAM 500 MG: 500 TABLET, FILM COATED ORAL at 09:03

## 2021-10-28 ENCOUNTER — APPOINTMENT (OUTPATIENT)
Dept: GENERAL RADIOLOGY | Facility: HOSPITAL | Age: 56
End: 2021-10-28

## 2021-10-28 LAB
ALBUMIN SERPL-MCNC: 2.38 G/DL (ref 3.5–5.2)
ALP SERPL-CCNC: 148 U/L (ref 39–117)
ALT SERPL W P-5'-P-CCNC: 21 U/L (ref 1–33)
AST SERPL-CCNC: 33 U/L (ref 1–32)
BACTERIA SPEC AEROBE CULT: ABNORMAL
BACTERIA SPEC AEROBE CULT: ABNORMAL
BILIRUB CONJ SERPL-MCNC: 0.9 MG/DL (ref 0–0.3)
BILIRUB INDIRECT SERPL-MCNC: 0.5 MG/DL
BILIRUB SERPL-MCNC: 1.4 MG/DL (ref 0–1.2)
CREAT SERPL-MCNC: 1.08 MG/DL (ref 0.57–1)
GFR SERPL CREATININE-BSD FRML MDRD: 53 ML/MIN/1.73
GRAM STN SPEC: ABNORMAL
GRAM STN SPEC: ABNORMAL
ISOLATED FROM: ABNORMAL
ISOLATED FROM: ABNORMAL
PROT SERPL-MCNC: 6.3 G/DL (ref 6–8.5)

## 2021-10-28 PROCEDURE — 25010000002 VANCOMYCIN 5 G RECONSTITUTED SOLUTION: Performed by: SURGERY

## 2021-10-28 PROCEDURE — C1751 CATH, INF, PER/CENT/MIDLINE: HCPCS | Performed by: SURGERY

## 2021-10-28 PROCEDURE — 25010000002 ENOXAPARIN PER 10 MG: Performed by: SURGERY

## 2021-10-28 PROCEDURE — 25010000002 GENTAMICIN PER 80 MG: Performed by: SURGERY

## 2021-10-28 PROCEDURE — 25010000002 DEXAMETHASONE PER 1 MG: Performed by: PHYSICIAN ASSISTANT

## 2021-10-28 PROCEDURE — 36590 REMOVAL TUNNELED CV CATH: CPT | Performed by: SURGERY

## 2021-10-28 PROCEDURE — 25010000002 ONDANSETRON PER 1 MG: Performed by: NURSE ANESTHETIST, CERTIFIED REGISTERED

## 2021-10-28 PROCEDURE — 25010000002 FENTANYL CITRATE (PF) 50 MCG/ML SOLUTION: Performed by: NURSE ANESTHETIST, CERTIFIED REGISTERED

## 2021-10-28 PROCEDURE — 99232 SBSQ HOSP IP/OBS MODERATE 35: CPT | Performed by: INTERNAL MEDICINE

## 2021-10-28 PROCEDURE — 25010000002 PROPOFOL 10 MG/ML EMULSION: Performed by: NURSE ANESTHETIST, CERTIFIED REGISTERED

## 2021-10-28 PROCEDURE — 71045 X-RAY EXAM CHEST 1 VIEW: CPT

## 2021-10-28 PROCEDURE — 02HV33Z INSERTION OF INFUSION DEVICE INTO SUPERIOR VENA CAVA, PERCUTANEOUS APPROACH: ICD-10-PCS | Performed by: SURGERY

## 2021-10-28 PROCEDURE — 36556 INSERT NON-TUNNEL CV CATH: CPT | Performed by: SURGERY

## 2021-10-28 PROCEDURE — 82565 ASSAY OF CREATININE: CPT | Performed by: INTERNAL MEDICINE

## 2021-10-28 PROCEDURE — 71045 X-RAY EXAM CHEST 1 VIEW: CPT | Performed by: RADIOLOGY

## 2021-10-28 PROCEDURE — 94799 UNLISTED PULMONARY SVC/PX: CPT

## 2021-10-28 PROCEDURE — 0JPT0WZ REMOVAL OF TOTALLY IMPLANTABLE VASCULAR ACCESS DEVICE FROM TRUNK SUBCUTANEOUS TISSUE AND FASCIA, OPEN APPROACH: ICD-10-PCS | Performed by: SURGERY

## 2021-10-28 PROCEDURE — 25010000002 GENTAMICIN PER 80 MG: Performed by: INTERNAL MEDICINE

## 2021-10-28 PROCEDURE — 80076 HEPATIC FUNCTION PANEL: CPT | Performed by: INTERNAL MEDICINE

## 2021-10-28 RX ORDER — FENTANYL CITRATE 50 UG/ML
INJECTION, SOLUTION INTRAMUSCULAR; INTRAVENOUS AS NEEDED
Status: DISCONTINUED | OUTPATIENT
Start: 2021-10-28 | End: 2021-10-28 | Stop reason: SURG

## 2021-10-28 RX ORDER — MAGNESIUM HYDROXIDE 1200 MG/15ML
LIQUID ORAL AS NEEDED
Status: DISCONTINUED | OUTPATIENT
Start: 2021-10-28 | End: 2021-10-28 | Stop reason: HOSPADM

## 2021-10-28 RX ORDER — FAMOTIDINE 10 MG/ML
INJECTION, SOLUTION INTRAVENOUS AS NEEDED
Status: DISCONTINUED | OUTPATIENT
Start: 2021-10-28 | End: 2021-10-28 | Stop reason: SURG

## 2021-10-28 RX ORDER — ONDANSETRON 2 MG/ML
4 INJECTION INTRAMUSCULAR; INTRAVENOUS AS NEEDED
Status: DISCONTINUED | OUTPATIENT
Start: 2021-10-28 | End: 2021-10-28 | Stop reason: HOSPADM

## 2021-10-28 RX ORDER — METOPROLOL TARTRATE 5 MG/5ML
INJECTION INTRAVENOUS AS NEEDED
Status: DISCONTINUED | OUTPATIENT
Start: 2021-10-28 | End: 2021-10-28 | Stop reason: SURG

## 2021-10-28 RX ORDER — DROPERIDOL 2.5 MG/ML
0.62 INJECTION, SOLUTION INTRAMUSCULAR; INTRAVENOUS ONCE AS NEEDED
Status: DISCONTINUED | OUTPATIENT
Start: 2021-10-28 | End: 2021-10-28 | Stop reason: HOSPADM

## 2021-10-28 RX ORDER — CLONAZEPAM 0.5 MG/1
0.75 TABLET ORAL 3 TIMES DAILY
Status: DISCONTINUED | OUTPATIENT
Start: 2021-10-28 | End: 2021-11-06 | Stop reason: HOSPADM

## 2021-10-28 RX ORDER — IPRATROPIUM BROMIDE AND ALBUTEROL SULFATE 2.5; .5 MG/3ML; MG/3ML
3 SOLUTION RESPIRATORY (INHALATION) ONCE AS NEEDED
Status: DISCONTINUED | OUTPATIENT
Start: 2021-10-28 | End: 2021-10-28 | Stop reason: HOSPADM

## 2021-10-28 RX ORDER — SODIUM CHLORIDE, SODIUM LACTATE, POTASSIUM CHLORIDE, CALCIUM CHLORIDE 600; 310; 30; 20 MG/100ML; MG/100ML; MG/100ML; MG/100ML
INJECTION, SOLUTION INTRAVENOUS CONTINUOUS PRN
Status: DISCONTINUED | OUTPATIENT
Start: 2021-10-28 | End: 2021-10-28 | Stop reason: SURG

## 2021-10-28 RX ORDER — MIDAZOLAM HYDROCHLORIDE 1 MG/ML
1 INJECTION INTRAMUSCULAR; INTRAVENOUS
Status: DISCONTINUED | OUTPATIENT
Start: 2021-10-28 | End: 2021-10-28 | Stop reason: HOSPADM

## 2021-10-28 RX ORDER — LIDOCAINE HYDROCHLORIDE 20 MG/ML
INJECTION, SOLUTION INFILTRATION; PERINEURAL AS NEEDED
Status: DISCONTINUED | OUTPATIENT
Start: 2021-10-28 | End: 2021-10-28 | Stop reason: SURG

## 2021-10-28 RX ORDER — SODIUM CHLORIDE, SODIUM LACTATE, POTASSIUM CHLORIDE, CALCIUM CHLORIDE 600; 310; 30; 20 MG/100ML; MG/100ML; MG/100ML; MG/100ML
125 INJECTION, SOLUTION INTRAVENOUS ONCE
Status: DISCONTINUED | OUTPATIENT
Start: 2021-10-28 | End: 2021-10-28 | Stop reason: HOSPADM

## 2021-10-28 RX ORDER — SODIUM CHLORIDE 0.9 % (FLUSH) 0.9 %
10 SYRINGE (ML) INJECTION AS NEEDED
Status: DISCONTINUED | OUTPATIENT
Start: 2021-10-28 | End: 2021-10-28 | Stop reason: HOSPADM

## 2021-10-28 RX ORDER — SODIUM CHLORIDE 0.9 % (FLUSH) 0.9 %
10 SYRINGE (ML) INJECTION EVERY 12 HOURS SCHEDULED
Status: DISCONTINUED | OUTPATIENT
Start: 2021-10-28 | End: 2021-10-28 | Stop reason: HOSPADM

## 2021-10-28 RX ORDER — ONDANSETRON 2 MG/ML
INJECTION INTRAMUSCULAR; INTRAVENOUS AS NEEDED
Status: DISCONTINUED | OUTPATIENT
Start: 2021-10-28 | End: 2021-10-28 | Stop reason: SURG

## 2021-10-28 RX ORDER — PROPOFOL 10 MG/ML
VIAL (ML) INTRAVENOUS AS NEEDED
Status: DISCONTINUED | OUTPATIENT
Start: 2021-10-28 | End: 2021-10-28 | Stop reason: SURG

## 2021-10-28 RX ORDER — SODIUM CHLORIDE, SODIUM LACTATE, POTASSIUM CHLORIDE, CALCIUM CHLORIDE 600; 310; 30; 20 MG/100ML; MG/100ML; MG/100ML; MG/100ML
100 INJECTION, SOLUTION INTRAVENOUS ONCE AS NEEDED
Status: DISCONTINUED | OUTPATIENT
Start: 2021-10-28 | End: 2021-10-28 | Stop reason: HOSPADM

## 2021-10-28 RX ORDER — FENTANYL CITRATE 50 UG/ML
50 INJECTION, SOLUTION INTRAMUSCULAR; INTRAVENOUS
Status: DISCONTINUED | OUTPATIENT
Start: 2021-10-28 | End: 2021-10-28 | Stop reason: HOSPADM

## 2021-10-28 RX ORDER — OXYCODONE HYDROCHLORIDE AND ACETAMINOPHEN 5; 325 MG/1; MG/1
1 TABLET ORAL ONCE AS NEEDED
Status: DISCONTINUED | OUTPATIENT
Start: 2021-10-28 | End: 2021-10-28 | Stop reason: HOSPADM

## 2021-10-28 RX ADMIN — METOPROLOL TARTRATE 2.5 MG: 1 INJECTION, SOLUTION INTRAVENOUS at 15:30

## 2021-10-28 RX ADMIN — REMDESIVIR 100 MG: 100 INJECTION, POWDER, LYOPHILIZED, FOR SOLUTION INTRAVENOUS at 17:41

## 2021-10-28 RX ADMIN — ENOXAPARIN SODIUM 40 MG: 40 INJECTION SUBCUTANEOUS at 17:37

## 2021-10-28 RX ADMIN — LEVETIRACETAM 500 MG: 500 TABLET, FILM COATED ORAL at 19:30

## 2021-10-28 RX ADMIN — Medication 1 CAPSULE: at 17:36

## 2021-10-28 RX ADMIN — OXYCODONE HYDROCHLORIDE 15 MG: 15 TABLET ORAL at 19:28

## 2021-10-28 RX ADMIN — OXYCODONE HYDROCHLORIDE 15 MG: 15 TABLET ORAL at 00:41

## 2021-10-28 RX ADMIN — GENTAMICIN SULFATE 70 MG: 40 INJECTION, SOLUTION INTRAMUSCULAR; INTRAVENOUS at 19:25

## 2021-10-28 RX ADMIN — DULOXETINE HYDROCHLORIDE 60 MG: 60 CAPSULE, DELAYED RELEASE ORAL at 06:03

## 2021-10-28 RX ADMIN — DEXAMETHASONE SODIUM PHOSPHATE 6 MG: 4 INJECTION, SOLUTION INTRA-ARTICULAR; INTRALESIONAL; INTRAMUSCULAR; INTRAVENOUS; SOFT TISSUE at 11:05

## 2021-10-28 RX ADMIN — GENTAMICIN SULFATE 70 MG: 40 INJECTION, SOLUTION INTRAMUSCULAR; INTRAVENOUS at 11:57

## 2021-10-28 RX ADMIN — FENTANYL CITRATE 50 MCG: 50 INJECTION INTRAMUSCULAR; INTRAVENOUS at 15:02

## 2021-10-28 RX ADMIN — FAMOTIDINE 20 MG: 10 INJECTION INTRAVENOUS at 15:00

## 2021-10-28 RX ADMIN — SODIUM CHLORIDE, POTASSIUM CHLORIDE, SODIUM LACTATE AND CALCIUM CHLORIDE: 600; 310; 30; 20 INJECTION, SOLUTION INTRAVENOUS at 14:56

## 2021-10-28 RX ADMIN — CLONAZEPAM 0.75 MG: 0.5 TABLET ORAL at 17:37

## 2021-10-28 RX ADMIN — METOPROLOL TARTRATE 25 MG: 25 TABLET, FILM COATED ORAL at 19:30

## 2021-10-28 RX ADMIN — SODIUM CHLORIDE, PRESERVATIVE FREE 3 ML: 5 INJECTION INTRAVENOUS at 21:05

## 2021-10-28 RX ADMIN — PANTOPRAZOLE SODIUM 40 MG: 40 TABLET, DELAYED RELEASE ORAL at 06:03

## 2021-10-28 RX ADMIN — VANCOMYCIN HYDROCHLORIDE 1500 MG: 5 INJECTION, POWDER, LYOPHILIZED, FOR SOLUTION INTRAVENOUS at 21:20

## 2021-10-28 RX ADMIN — LEVOTHYROXINE SODIUM 125 MCG: 125 TABLET ORAL at 17:37

## 2021-10-28 RX ADMIN — ONDANSETRON 4 MG: 2 INJECTION INTRAMUSCULAR; INTRAVENOUS at 15:06

## 2021-10-28 RX ADMIN — SODIUM CHLORIDE, PRESERVATIVE FREE 3 ML: 5 INJECTION INTRAVENOUS at 11:05

## 2021-10-28 RX ADMIN — PROPOFOL 80 MG: 10 INJECTION, EMULSION INTRAVENOUS at 15:00

## 2021-10-28 RX ADMIN — FENTANYL CITRATE 50 MCG: 50 INJECTION INTRAMUSCULAR; INTRAVENOUS at 15:06

## 2021-10-28 RX ADMIN — CLONAZEPAM 0.75 MG: 0.5 TABLET ORAL at 19:29

## 2021-10-28 RX ADMIN — LIDOCAINE HYDROCHLORIDE 60 MG: 20 INJECTION, SOLUTION INFILTRATION; PERINEURAL at 15:00

## 2021-10-28 RX ADMIN — Medication 1 TABLET: at 17:37

## 2021-10-28 RX ADMIN — BUPROPION HYDROCHLORIDE 150 MG: 150 TABLET, FILM COATED, EXTENDED RELEASE ORAL at 19:29

## 2021-10-28 RX ADMIN — HYDROXYCHLOROQUINE SULFATE 200 MG: 200 TABLET ORAL at 19:29

## 2021-10-28 NOTE — ANESTHESIA POSTPROCEDURE EVALUATION
Patient: Karely Villarreal    Procedure Summary     Date: 10/28/21 Room / Location: [facility] OR [address] [place] [address] COR OR    Anesthesia Start: 1456 Anesthesia Stop: 1531    Procedures:       Removal of [id number]. (N/A )      Placement of central line (N/A ) Diagnosis:       Sepsis due to methicillin susceptible Staphylococcus aureus (MSSA) with acute organ dysfunction without septic shock, unspecified type (HCC)      (Sepsis due to methicillin susceptible Staphylococcus aureus (MSSA) with acute organ dysfunction without septic shock, unspecified type (HCC) [A41.01, R65.20])    Surgeons: Ruma Madden MD Provider: Osmin Daly DO    Anesthesia Type: general ASA Status: 3          Anesthesia Type: general    Vitals  Vitals Value Taken Time   /94 10/28/21 1605   Temp 97.9 °F (36.6 °C) 10/28/21 1600   Pulse 87 10/28/21 1605   Resp 20 10/28/21 1605   SpO2 93 % 10/28/21 1605           Post Anesthesia Care and Evaluation    Patient location during evaluation: PACU  Patient participation: complete - patient participated  Level of consciousness: awake and alert  Pain score: 1  Pain management: adequate  Airway patency: patent  Anesthetic complications: No anesthetic complications  PONV Status: controlled  Cardiovascular status: acceptable  Respiratory status: acceptable  Hydration status: acceptable  No anesthesia care post op

## 2021-10-28 NOTE — ANESTHESIA PREPROCEDURE EVALUATION
Anesthesia Evaluation     Patient summary reviewed and Nursing notes reviewed   no history of anesthetic complications:               Airway   Mallampati: III  TM distance: >3 FB  Neck ROM: full  Possible difficult intubation  Dental - normal exam   (+) poor dentition    Pulmonary - normal exam   (+) pneumonia ,   Cardiovascular - normal exam  Exercise tolerance: good (4-7 METS)    NYHA Classification: II    (+) hypertension, past MI , CHF , PVD, DVT,       Neuro/Psych  (+) seizures, TIA, CVA, headaches, psychiatric history,     GI/Hepatic/Renal/Endo    (+)  PUD, GI bleeding , renal disease,     Musculoskeletal     Abdominal  - normal exam    Bowel sounds: normal.   Substance History - negative use     OB/GYN negative ob/gyn ROS         Other   arthritis,                        Anesthesia Plan    ASA 3     general     intravenous induction     Anesthetic plan, all risks, benefits, and alternatives have been provided, discussed and informed consent has been obtained with: patient.    Plan discussed with CRNA.

## 2021-10-28 NOTE — ANESTHESIA PROCEDURE NOTES
Airway  Urgency: elective    Date/Time: 10/28/2021 3:00 PM    General Information and Staff    Patient location during procedure: OR    Indications and Patient Condition    Preoxygenated: yes  Mask difficulty assessment: 0 - not attempted    Final Airway Details  Final airway type: supraglottic airway      Successful airway: unique  Size 4    Number of attempts at approach: 1  Assessment: lips, teeth, and gum same as pre-op

## 2021-10-29 LAB
ANION GAP SERPL CALCULATED.3IONS-SCNC: 12.8 MMOL/L (ref 5–15)
ANION GAP SERPL CALCULATED.3IONS-SCNC: 13.2 MMOL/L (ref 5–15)
BUN SERPL-MCNC: 35 MG/DL (ref 6–20)
BUN SERPL-MCNC: 40 MG/DL (ref 6–20)
BUN/CREAT SERPL: 42.6 (ref 7–25)
BUN/CREAT SERPL: 42.7 (ref 7–25)
CALCIUM SPEC-SCNC: 8.2 MG/DL (ref 8.6–10.5)
CALCIUM SPEC-SCNC: 8.4 MG/DL (ref 8.6–10.5)
CHLORIDE SERPL-SCNC: 104 MMOL/L (ref 98–107)
CHLORIDE SERPL-SCNC: 106 MMOL/L (ref 98–107)
CO2 SERPL-SCNC: 22.2 MMOL/L (ref 22–29)
CO2 SERPL-SCNC: 22.8 MMOL/L (ref 22–29)
CREAT SERPL-MCNC: 0.82 MG/DL (ref 0.57–1)
CREAT SERPL-MCNC: 0.94 MG/DL (ref 0.57–1)
CRP SERPL-MCNC: 12.81 MG/DL (ref 0–0.5)
CRP SERPL-MCNC: 9.37 MG/DL (ref 0–0.5)
DEPRECATED RDW RBC AUTO: 48.3 FL (ref 37–54)
ERYTHROCYTE [DISTWIDTH] IN BLOOD BY AUTOMATED COUNT: 16.6 % (ref 12.3–15.4)
GENTAMICIN SERPL-MCNC: 1.5 MCG/ML (ref 0.5–2)
GFR SERPL CREATININE-BSD FRML MDRD: 62 ML/MIN/1.73
GFR SERPL CREATININE-BSD FRML MDRD: 72 ML/MIN/1.73
GLUCOSE SERPL-MCNC: 142 MG/DL (ref 65–99)
GLUCOSE SERPL-MCNC: 156 MG/DL (ref 65–99)
HCT VFR BLD AUTO: 29.8 % (ref 34–46.6)
HGB BLD-MCNC: 9.3 G/DL (ref 12–15.9)
LEVETIRACETAM SERPL-MCNC: <1 UG/ML (ref 10–40)
MCH RBC QN AUTO: 24.9 PG (ref 26.6–33)
MCHC RBC AUTO-ENTMCNC: 31.2 G/DL (ref 31.5–35.7)
MCV RBC AUTO: 79.7 FL (ref 79–97)
PLATELET # BLD AUTO: 129 10*3/MM3 (ref 140–450)
PMV BLD AUTO: 10.9 FL (ref 6–12)
POTASSIUM SERPL-SCNC: 3.1 MMOL/L (ref 3.5–5.2)
POTASSIUM SERPL-SCNC: 3.6 MMOL/L (ref 3.5–5.2)
RBC # BLD AUTO: 3.74 10*6/MM3 (ref 3.77–5.28)
SODIUM SERPL-SCNC: 139 MMOL/L (ref 136–145)
SODIUM SERPL-SCNC: 142 MMOL/L (ref 136–145)
VANCOMYCIN TROUGH SERPL-MCNC: 9 MCG/ML (ref 5–20)
WBC # BLD AUTO: 22.49 10*3/MM3 (ref 3.4–10.8)

## 2021-10-29 PROCEDURE — 80170 ASSAY OF GENTAMICIN: CPT

## 2021-10-29 PROCEDURE — 99232 SBSQ HOSP IP/OBS MODERATE 35: CPT | Performed by: INTERNAL MEDICINE

## 2021-10-29 PROCEDURE — 25010000002 ENOXAPARIN PER 10 MG: Performed by: SURGERY

## 2021-10-29 PROCEDURE — 97110 THERAPEUTIC EXERCISES: CPT

## 2021-10-29 PROCEDURE — 80048 BASIC METABOLIC PNL TOTAL CA: CPT | Performed by: SURGERY

## 2021-10-29 PROCEDURE — 86140 C-REACTIVE PROTEIN: CPT | Performed by: SURGERY

## 2021-10-29 PROCEDURE — 87040 BLOOD CULTURE FOR BACTERIA: CPT | Performed by: INTERNAL MEDICINE

## 2021-10-29 PROCEDURE — 87186 SC STD MICRODIL/AGAR DIL: CPT | Performed by: INTERNAL MEDICINE

## 2021-10-29 PROCEDURE — 87150 DNA/RNA AMPLIFIED PROBE: CPT | Performed by: INTERNAL MEDICINE

## 2021-10-29 PROCEDURE — 94799 UNLISTED PULMONARY SVC/PX: CPT

## 2021-10-29 PROCEDURE — 25010000002 GENTAMICIN PER 80 MG: Performed by: SURGERY

## 2021-10-29 PROCEDURE — 80202 ASSAY OF VANCOMYCIN: CPT | Performed by: PHYSICIAN ASSISTANT

## 2021-10-29 PROCEDURE — 85027 COMPLETE CBC AUTOMATED: CPT | Performed by: SURGERY

## 2021-10-29 PROCEDURE — 97530 THERAPEUTIC ACTIVITIES: CPT

## 2021-10-29 PROCEDURE — 25010000002 DEXAMETHASONE PER 1 MG: Performed by: SURGERY

## 2021-10-29 PROCEDURE — 87147 CULTURE TYPE IMMUNOLOGIC: CPT | Performed by: INTERNAL MEDICINE

## 2021-10-29 RX ADMIN — Medication 1 CAPSULE: at 08:04

## 2021-10-29 RX ADMIN — HYDROXYCHLOROQUINE SULFATE 200 MG: 200 TABLET ORAL at 08:04

## 2021-10-29 RX ADMIN — OXYCODONE HYDROCHLORIDE 15 MG: 15 TABLET ORAL at 19:58

## 2021-10-29 RX ADMIN — METOPROLOL TARTRATE 25 MG: 25 TABLET, FILM COATED ORAL at 08:04

## 2021-10-29 RX ADMIN — BUPROPION HYDROCHLORIDE 150 MG: 150 TABLET, FILM COATED, EXTENDED RELEASE ORAL at 19:58

## 2021-10-29 RX ADMIN — GENTAMICIN SULFATE 70 MG: 40 INJECTION, SOLUTION INTRAMUSCULAR; INTRAVENOUS at 20:10

## 2021-10-29 RX ADMIN — CLONAZEPAM 0.75 MG: 0.5 TABLET ORAL at 15:18

## 2021-10-29 RX ADMIN — DEXAMETHASONE SODIUM PHOSPHATE 6 MG: 4 INJECTION, SOLUTION INTRA-ARTICULAR; INTRALESIONAL; INTRAMUSCULAR; INTRAVENOUS; SOFT TISSUE at 08:05

## 2021-10-29 RX ADMIN — REMDESIVIR 100 MG: 100 INJECTION, POWDER, LYOPHILIZED, FOR SOLUTION INTRAVENOUS at 15:18

## 2021-10-29 RX ADMIN — OXYCODONE HYDROCHLORIDE 15 MG: 15 TABLET ORAL at 03:51

## 2021-10-29 RX ADMIN — BUPROPION HYDROCHLORIDE 150 MG: 150 TABLET, FILM COATED, EXTENDED RELEASE ORAL at 08:04

## 2021-10-29 RX ADMIN — LEVOTHYROXINE SODIUM 125 MCG: 125 TABLET ORAL at 08:04

## 2021-10-29 RX ADMIN — LEVETIRACETAM 500 MG: 500 TABLET, FILM COATED ORAL at 20:56

## 2021-10-29 RX ADMIN — PANTOPRAZOLE SODIUM 40 MG: 40 TABLET, DELAYED RELEASE ORAL at 06:21

## 2021-10-29 RX ADMIN — METOPROLOL TARTRATE 25 MG: 25 TABLET, FILM COATED ORAL at 20:56

## 2021-10-29 RX ADMIN — DULOXETINE HYDROCHLORIDE 60 MG: 60 CAPSULE, DELAYED RELEASE ORAL at 06:21

## 2021-10-29 RX ADMIN — GENTAMICIN SULFATE 70 MG: 40 INJECTION, SOLUTION INTRAMUSCULAR; INTRAVENOUS at 03:51

## 2021-10-29 RX ADMIN — CLONAZEPAM 0.75 MG: 0.5 TABLET ORAL at 20:56

## 2021-10-29 RX ADMIN — LEVETIRACETAM 500 MG: 500 TABLET, FILM COATED ORAL at 08:04

## 2021-10-29 RX ADMIN — ENOXAPARIN SODIUM 40 MG: 40 INJECTION SUBCUTANEOUS at 17:39

## 2021-10-29 RX ADMIN — CLONAZEPAM 0.75 MG: 0.5 TABLET ORAL at 08:20

## 2021-10-29 RX ADMIN — Medication 1 TABLET: at 08:04

## 2021-10-29 RX ADMIN — OXYCODONE HYDROCHLORIDE 15 MG: 15 TABLET ORAL at 12:13

## 2021-10-29 RX ADMIN — SODIUM CHLORIDE, PRESERVATIVE FREE 3 ML: 5 INJECTION INTRAVENOUS at 20:59

## 2021-10-29 RX ADMIN — HYDROXYCHLOROQUINE SULFATE 200 MG: 200 TABLET ORAL at 19:58

## 2021-10-29 RX ADMIN — SODIUM CHLORIDE, PRESERVATIVE FREE 3 ML: 5 INJECTION INTRAVENOUS at 08:09

## 2021-10-29 RX ADMIN — GENTAMICIN SULFATE 70 MG: 40 INJECTION, SOLUTION INTRAMUSCULAR; INTRAVENOUS at 12:13

## 2021-10-30 LAB
BACTERIA BLD CULT: ABNORMAL
BOTTLE TYPE: ABNORMAL
POTASSIUM SERPL-SCNC: 3.7 MMOL/L (ref 3.5–5.2)
VANCOMYCIN TROUGH SERPL-MCNC: 13.4 MCG/ML (ref 5–20)

## 2021-10-30 PROCEDURE — 94799 UNLISTED PULMONARY SVC/PX: CPT

## 2021-10-30 PROCEDURE — 87040 BLOOD CULTURE FOR BACTERIA: CPT | Performed by: NURSE PRACTITIONER

## 2021-10-30 PROCEDURE — 25010000002 FUROSEMIDE PER 20 MG: Performed by: INTERNAL MEDICINE

## 2021-10-30 PROCEDURE — 99232 SBSQ HOSP IP/OBS MODERATE 35: CPT | Performed by: INTERNAL MEDICINE

## 2021-10-30 PROCEDURE — 25010000002 DEXAMETHASONE PER 1 MG: Performed by: SURGERY

## 2021-10-30 PROCEDURE — 25010000002 CEFTAROLINE FOSAMIL PER 10 MG: Performed by: INTERNAL MEDICINE

## 2021-10-30 PROCEDURE — 25010000002 GENTAMICIN PER 80 MG: Performed by: SURGERY

## 2021-10-30 PROCEDURE — 25010000002 VANCOMYCIN 5 G RECONSTITUTED SOLUTION: Performed by: SURGERY

## 2021-10-30 PROCEDURE — 87147 CULTURE TYPE IMMUNOLOGIC: CPT | Performed by: NURSE PRACTITIONER

## 2021-10-30 PROCEDURE — 80202 ASSAY OF VANCOMYCIN: CPT

## 2021-10-30 PROCEDURE — 25010000002 DAPTOMYCIN PER 1 MG: Performed by: INTERNAL MEDICINE

## 2021-10-30 PROCEDURE — 84132 ASSAY OF SERUM POTASSIUM: CPT | Performed by: INTERNAL MEDICINE

## 2021-10-30 PROCEDURE — 25010000002 ENOXAPARIN PER 10 MG: Performed by: SURGERY

## 2021-10-30 RX ORDER — POTASSIUM CHLORIDE 1.5 G/1.77G
40 POWDER, FOR SOLUTION ORAL EVERY 4 HOURS
Status: COMPLETED | OUTPATIENT
Start: 2021-10-30 | End: 2021-10-30

## 2021-10-30 RX ORDER — POTASSIUM CHLORIDE 1.5 G/1.77G
40 POWDER, FOR SOLUTION ORAL AS NEEDED
Status: DISCONTINUED | OUTPATIENT
Start: 2021-10-30 | End: 2021-11-06 | Stop reason: HOSPADM

## 2021-10-30 RX ORDER — POTASSIUM CHLORIDE 20 MEQ/1
40 TABLET, EXTENDED RELEASE ORAL AS NEEDED
Status: DISCONTINUED | OUTPATIENT
Start: 2021-10-30 | End: 2021-11-06 | Stop reason: HOSPADM

## 2021-10-30 RX ORDER — POTASSIUM CHLORIDE 7.45 MG/ML
10 INJECTION INTRAVENOUS
Status: DISCONTINUED | OUTPATIENT
Start: 2021-10-30 | End: 2021-11-06 | Stop reason: HOSPADM

## 2021-10-30 RX ORDER — FUROSEMIDE 10 MG/ML
40 INJECTION INTRAMUSCULAR; INTRAVENOUS ONCE
Status: COMPLETED | OUTPATIENT
Start: 2021-10-30 | End: 2021-10-30

## 2021-10-30 RX ADMIN — CLONAZEPAM 0.75 MG: 0.5 TABLET ORAL at 16:13

## 2021-10-30 RX ADMIN — CEFTAROLINE FOSAMIL 600 MG: 600 POWDER, FOR SOLUTION INTRAVENOUS at 11:09

## 2021-10-30 RX ADMIN — GENTAMICIN SULFATE 70 MG: 40 INJECTION, SOLUTION INTRAMUSCULAR; INTRAVENOUS at 03:37

## 2021-10-30 RX ADMIN — METOPROLOL TARTRATE 25 MG: 25 TABLET, FILM COATED ORAL at 08:34

## 2021-10-30 RX ADMIN — SODIUM CHLORIDE, PRESERVATIVE FREE 3 ML: 5 INJECTION INTRAVENOUS at 21:21

## 2021-10-30 RX ADMIN — OXYCODONE HYDROCHLORIDE 15 MG: 15 TABLET ORAL at 03:37

## 2021-10-30 RX ADMIN — LEVETIRACETAM 500 MG: 500 TABLET, FILM COATED ORAL at 21:20

## 2021-10-30 RX ADMIN — POTASSIUM CHLORIDE 40 MEQ: 1.5 POWDER, FOR SOLUTION ORAL at 16:12

## 2021-10-30 RX ADMIN — LEVOTHYROXINE SODIUM 125 MCG: 125 TABLET ORAL at 08:34

## 2021-10-30 RX ADMIN — POTASSIUM CHLORIDE 40 MEQ: 1.5 POWDER, FOR SOLUTION ORAL at 11:08

## 2021-10-30 RX ADMIN — SODIUM CHLORIDE, PRESERVATIVE FREE 3 ML: 5 INJECTION INTRAVENOUS at 08:35

## 2021-10-30 RX ADMIN — BUPROPION HYDROCHLORIDE 150 MG: 150 TABLET, FILM COATED, EXTENDED RELEASE ORAL at 21:21

## 2021-10-30 RX ADMIN — METOPROLOL TARTRATE 25 MG: 25 TABLET, FILM COATED ORAL at 21:21

## 2021-10-30 RX ADMIN — PANTOPRAZOLE SODIUM 40 MG: 40 TABLET, DELAYED RELEASE ORAL at 05:58

## 2021-10-30 RX ADMIN — CEFTAROLINE FOSAMIL 600 MG: 600 POWDER, FOR SOLUTION INTRAVENOUS at 21:20

## 2021-10-30 RX ADMIN — CLONAZEPAM 0.75 MG: 0.5 TABLET ORAL at 21:20

## 2021-10-30 RX ADMIN — LEVETIRACETAM 500 MG: 500 TABLET, FILM COATED ORAL at 08:35

## 2021-10-30 RX ADMIN — HYDROXYCHLOROQUINE SULFATE 200 MG: 200 TABLET ORAL at 21:20

## 2021-10-30 RX ADMIN — DULOXETINE HYDROCHLORIDE 60 MG: 60 CAPSULE, DELAYED RELEASE ORAL at 05:58

## 2021-10-30 RX ADMIN — FUROSEMIDE 40 MG: 10 INJECTION INTRAMUSCULAR; INTRAVENOUS at 17:36

## 2021-10-30 RX ADMIN — DEXAMETHASONE SODIUM PHOSPHATE 6 MG: 4 INJECTION, SOLUTION INTRA-ARTICULAR; INTRALESIONAL; INTRAMUSCULAR; INTRAVENOUS; SOFT TISSUE at 08:35

## 2021-10-30 RX ADMIN — HYDROXYCHLOROQUINE SULFATE 200 MG: 200 TABLET ORAL at 08:34

## 2021-10-30 RX ADMIN — REMDESIVIR 100 MG: 100 INJECTION, POWDER, LYOPHILIZED, FOR SOLUTION INTRAVENOUS at 16:13

## 2021-10-30 RX ADMIN — VANCOMYCIN HYDROCHLORIDE 1500 MG: 5 INJECTION, POWDER, LYOPHILIZED, FOR SOLUTION INTRAVENOUS at 00:18

## 2021-10-30 RX ADMIN — BUPROPION HYDROCHLORIDE 150 MG: 150 TABLET, FILM COATED, EXTENDED RELEASE ORAL at 08:35

## 2021-10-30 RX ADMIN — DAPTOMYCIN 550 MG: 500 INJECTION, POWDER, LYOPHILIZED, FOR SOLUTION INTRAVENOUS at 11:09

## 2021-10-30 RX ADMIN — POTASSIUM CHLORIDE 40 MEQ: 1.5 POWDER, FOR SOLUTION ORAL at 05:58

## 2021-10-30 RX ADMIN — CLONAZEPAM 0.75 MG: 0.5 TABLET ORAL at 08:32

## 2021-10-30 RX ADMIN — Medication 1 TABLET: at 08:34

## 2021-10-30 RX ADMIN — ENOXAPARIN SODIUM 40 MG: 40 INJECTION SUBCUTANEOUS at 17:36

## 2021-10-30 RX ADMIN — Medication 1 CAPSULE: at 08:33

## 2021-10-31 LAB
ANION GAP SERPL CALCULATED.3IONS-SCNC: 10.7 MMOL/L (ref 5–15)
BILIRUB CONJ SERPL-MCNC: 0.4 MG/DL (ref 0–0.3)
BILIRUB INDIRECT SERPL-MCNC: 0.6 MG/DL
BILIRUB SERPL-MCNC: 1 MG/DL (ref 0–1.2)
BUN SERPL-MCNC: 34 MG/DL (ref 6–20)
BUN/CREAT SERPL: 32.4 (ref 7–25)
CALCIUM SPEC-SCNC: 8.6 MG/DL (ref 8.6–10.5)
CHLORIDE SERPL-SCNC: 108 MMOL/L (ref 98–107)
CK SERPL-CCNC: 41 U/L (ref 20–180)
CO2 SERPL-SCNC: 24.3 MMOL/L (ref 22–29)
CREAT SERPL-MCNC: 1.05 MG/DL (ref 0.57–1)
CRP SERPL-MCNC: 7.34 MG/DL (ref 0–0.5)
DEPRECATED RDW RBC AUTO: 47 FL (ref 37–54)
ERYTHROCYTE [DISTWIDTH] IN BLOOD BY AUTOMATED COUNT: 16.3 % (ref 12.3–15.4)
GFR SERPL CREATININE-BSD FRML MDRD: 54 ML/MIN/1.73
GLUCOSE SERPL-MCNC: 147 MG/DL (ref 65–99)
HAV IGM SERPL QL IA: ABNORMAL
HBV CORE IGM SERPL QL IA: ABNORMAL
HBV SURFACE AG SERPL QL IA: ABNORMAL
HCT VFR BLD AUTO: 25.2 % (ref 34–46.6)
HCV AB SER DONR QL: REACTIVE
HGB BLD-MCNC: 7.6 G/DL (ref 12–15.9)
HGB BLD-MCNC: 7.7 G/DL (ref 12–15.9)
HIV1+2 AB SER QL: NORMAL
MAGNESIUM SERPL-MCNC: 2.1 MG/DL (ref 1.6–2.6)
MCH RBC QN AUTO: 24.5 PG (ref 26.6–33)
MCHC RBC AUTO-ENTMCNC: 30.6 G/DL (ref 31.5–35.7)
MCV RBC AUTO: 80.3 FL (ref 79–97)
PLATELET # BLD AUTO: 230 10*3/MM3 (ref 140–450)
PMV BLD AUTO: 9.4 FL (ref 6–12)
POTASSIUM SERPL-SCNC: 3.8 MMOL/L (ref 3.5–5.2)
RBC # BLD AUTO: 3.14 10*6/MM3 (ref 3.77–5.28)
SODIUM SERPL-SCNC: 143 MMOL/L (ref 136–145)
WBC # BLD AUTO: 23.43 10*3/MM3 (ref 3.4–10.8)

## 2021-10-31 PROCEDURE — 82550 ASSAY OF CK (CPK): CPT | Performed by: INTERNAL MEDICINE

## 2021-10-31 PROCEDURE — 83735 ASSAY OF MAGNESIUM: CPT | Performed by: PHYSICIAN ASSISTANT

## 2021-10-31 PROCEDURE — 80074 ACUTE HEPATITIS PANEL: CPT | Performed by: INTERNAL MEDICINE

## 2021-10-31 PROCEDURE — 25010000002 DEXAMETHASONE PER 1 MG: Performed by: SURGERY

## 2021-10-31 PROCEDURE — 85027 COMPLETE CBC AUTOMATED: CPT | Performed by: INTERNAL MEDICINE

## 2021-10-31 PROCEDURE — 99233 SBSQ HOSP IP/OBS HIGH 50: CPT | Performed by: INTERNAL MEDICINE

## 2021-10-31 PROCEDURE — 94799 UNLISTED PULMONARY SVC/PX: CPT

## 2021-10-31 PROCEDURE — 80048 BASIC METABOLIC PNL TOTAL CA: CPT | Performed by: SURGERY

## 2021-10-31 PROCEDURE — 82248 BILIRUBIN DIRECT: CPT | Performed by: INTERNAL MEDICINE

## 2021-10-31 PROCEDURE — G0432 EIA HIV-1/HIV-2 SCREEN: HCPCS | Performed by: INTERNAL MEDICINE

## 2021-10-31 PROCEDURE — 85018 HEMOGLOBIN: CPT | Performed by: INTERNAL MEDICINE

## 2021-10-31 PROCEDURE — 25010000002 DAPTOMYCIN PER 1 MG: Performed by: INTERNAL MEDICINE

## 2021-10-31 PROCEDURE — 25010000002 CEFTAROLINE FOSAMIL PER 10 MG: Performed by: INTERNAL MEDICINE

## 2021-10-31 PROCEDURE — 82247 BILIRUBIN TOTAL: CPT | Performed by: INTERNAL MEDICINE

## 2021-10-31 PROCEDURE — 25010000002 ENOXAPARIN PER 10 MG: Performed by: SURGERY

## 2021-10-31 PROCEDURE — 86140 C-REACTIVE PROTEIN: CPT | Performed by: SURGERY

## 2021-10-31 RX ADMIN — Medication 1 CAPSULE: at 10:08

## 2021-10-31 RX ADMIN — DULOXETINE HYDROCHLORIDE 60 MG: 60 CAPSULE, DELAYED RELEASE ORAL at 05:14

## 2021-10-31 RX ADMIN — DAPTOMYCIN 550 MG: 500 INJECTION, POWDER, LYOPHILIZED, FOR SOLUTION INTRAVENOUS at 10:22

## 2021-10-31 RX ADMIN — LEVETIRACETAM 500 MG: 500 TABLET, FILM COATED ORAL at 19:38

## 2021-10-31 RX ADMIN — SODIUM CHLORIDE, PRESERVATIVE FREE 3 ML: 5 INJECTION INTRAVENOUS at 10:10

## 2021-10-31 RX ADMIN — METOPROLOL TARTRATE 25 MG: 25 TABLET, FILM COATED ORAL at 10:09

## 2021-10-31 RX ADMIN — ENOXAPARIN SODIUM 40 MG: 40 INJECTION SUBCUTANEOUS at 17:56

## 2021-10-31 RX ADMIN — BUPROPION HYDROCHLORIDE 150 MG: 150 TABLET, FILM COATED, EXTENDED RELEASE ORAL at 19:38

## 2021-10-31 RX ADMIN — DEXAMETHASONE SODIUM PHOSPHATE 6 MG: 4 INJECTION, SOLUTION INTRA-ARTICULAR; INTRALESIONAL; INTRAMUSCULAR; INTRAVENOUS; SOFT TISSUE at 10:08

## 2021-10-31 RX ADMIN — CEFTAROLINE FOSAMIL 600 MG: 600 POWDER, FOR SOLUTION INTRAVENOUS at 12:04

## 2021-10-31 RX ADMIN — LEVETIRACETAM 500 MG: 500 TABLET, FILM COATED ORAL at 10:08

## 2021-10-31 RX ADMIN — CLONAZEPAM 0.75 MG: 0.5 TABLET ORAL at 10:09

## 2021-10-31 RX ADMIN — SODIUM CHLORIDE, PRESERVATIVE FREE 3 ML: 5 INJECTION INTRAVENOUS at 19:39

## 2021-10-31 RX ADMIN — CEFTAROLINE FOSAMIL 600 MG: 600 POWDER, FOR SOLUTION INTRAVENOUS at 19:37

## 2021-10-31 RX ADMIN — CLONAZEPAM 0.75 MG: 0.5 TABLET ORAL at 17:56

## 2021-10-31 RX ADMIN — CEFTAROLINE FOSAMIL 600 MG: 600 POWDER, FOR SOLUTION INTRAVENOUS at 05:14

## 2021-10-31 RX ADMIN — HYDROXYCHLOROQUINE SULFATE 200 MG: 200 TABLET ORAL at 10:09

## 2021-10-31 RX ADMIN — Medication 1 TABLET: at 10:08

## 2021-10-31 RX ADMIN — BUPROPION HYDROCHLORIDE 150 MG: 150 TABLET, FILM COATED, EXTENDED RELEASE ORAL at 10:08

## 2021-10-31 RX ADMIN — HYDROXYCHLOROQUINE SULFATE 200 MG: 200 TABLET ORAL at 19:38

## 2021-10-31 RX ADMIN — LEVOTHYROXINE SODIUM 125 MCG: 125 TABLET ORAL at 10:08

## 2021-10-31 RX ADMIN — PANTOPRAZOLE SODIUM 40 MG: 40 TABLET, DELAYED RELEASE ORAL at 05:15

## 2021-11-01 ENCOUNTER — APPOINTMENT (OUTPATIENT)
Dept: CT IMAGING | Facility: HOSPITAL | Age: 56
End: 2021-11-01

## 2021-11-01 PROBLEM — F33.2 SEVERE RECURRENT MAJOR DEPRESSION WITHOUT PSYCHOTIC FEATURES (HCC): Status: RESOLVED | Noted: 2019-02-18 | Resolved: 2021-11-01

## 2021-11-01 LAB
ANION GAP SERPL CALCULATED.3IONS-SCNC: 11.5 MMOL/L (ref 5–15)
BACTERIA SPEC AEROBE CULT: ABNORMAL
BUN SERPL-MCNC: 32 MG/DL (ref 6–20)
BUN/CREAT SERPL: 33 (ref 7–25)
CALCIUM SPEC-SCNC: 8.5 MG/DL (ref 8.6–10.5)
CHLORIDE SERPL-SCNC: 107 MMOL/L (ref 98–107)
CO2 SERPL-SCNC: 23.5 MMOL/L (ref 22–29)
CREAT SERPL-MCNC: 0.97 MG/DL (ref 0.57–1)
CRP SERPL-MCNC: 6.7 MG/DL (ref 0–0.5)
DEPRECATED RDW RBC AUTO: 46.7 FL (ref 37–54)
ERYTHROCYTE [DISTWIDTH] IN BLOOD BY AUTOMATED COUNT: 16.3 % (ref 12.3–15.4)
GFR SERPL CREATININE-BSD FRML MDRD: 60 ML/MIN/1.73
GLUCOSE SERPL-MCNC: 111 MG/DL (ref 65–99)
GRAM STN SPEC: ABNORMAL
HCT VFR BLD AUTO: 22.9 % (ref 34–46.6)
HGB BLD-MCNC: 7.3 G/DL (ref 12–15.9)
ISOLATED FROM: ABNORMAL
MAGNESIUM SERPL-MCNC: 2.1 MG/DL (ref 1.6–2.6)
MCH RBC QN AUTO: 25.9 PG (ref 26.6–33)
MCHC RBC AUTO-ENTMCNC: 31.9 G/DL (ref 31.5–35.7)
MCV RBC AUTO: 81.2 FL (ref 79–97)
PLATELET # BLD AUTO: 296 10*3/MM3 (ref 140–450)
PMV BLD AUTO: 9.3 FL (ref 6–12)
POTASSIUM SERPL-SCNC: 3.9 MMOL/L (ref 3.5–5.2)
RBC # BLD AUTO: 2.82 10*6/MM3 (ref 3.77–5.28)
SODIUM SERPL-SCNC: 142 MMOL/L (ref 136–145)
WBC # BLD AUTO: 22.33 10*3/MM3 (ref 3.4–10.8)

## 2021-11-01 PROCEDURE — 83735 ASSAY OF MAGNESIUM: CPT | Performed by: PHYSICIAN ASSISTANT

## 2021-11-01 PROCEDURE — 25010000002 DEXAMETHASONE PER 1 MG: Performed by: SURGERY

## 2021-11-01 PROCEDURE — 74176 CT ABD & PELVIS W/O CONTRAST: CPT

## 2021-11-01 PROCEDURE — 99233 SBSQ HOSP IP/OBS HIGH 50: CPT | Performed by: INTERNAL MEDICINE

## 2021-11-01 PROCEDURE — 87040 BLOOD CULTURE FOR BACTERIA: CPT | Performed by: INTERNAL MEDICINE

## 2021-11-01 PROCEDURE — 25010000002 DAPTOMYCIN PER 1 MG: Performed by: INTERNAL MEDICINE

## 2021-11-01 PROCEDURE — 85027 COMPLETE CBC AUTOMATED: CPT | Performed by: INTERNAL MEDICINE

## 2021-11-01 PROCEDURE — 87147 CULTURE TYPE IMMUNOLOGIC: CPT | Performed by: INTERNAL MEDICINE

## 2021-11-01 PROCEDURE — 80048 BASIC METABOLIC PNL TOTAL CA: CPT | Performed by: SURGERY

## 2021-11-01 PROCEDURE — 74176 CT ABD & PELVIS W/O CONTRAST: CPT | Performed by: RADIOLOGY

## 2021-11-01 PROCEDURE — 70450 CT HEAD/BRAIN W/O DYE: CPT

## 2021-11-01 PROCEDURE — 25010000002 ENOXAPARIN PER 10 MG: Performed by: SURGERY

## 2021-11-01 PROCEDURE — 25010000002 CEFTAROLINE FOSAMIL PER 10 MG: Performed by: INTERNAL MEDICINE

## 2021-11-01 PROCEDURE — 86140 C-REACTIVE PROTEIN: CPT | Performed by: SURGERY

## 2021-11-01 PROCEDURE — 94799 UNLISTED PULMONARY SVC/PX: CPT

## 2021-11-01 RX ORDER — CASTOR OIL AND BALSAM, PERU 788; 87 MG/G; MG/G
1 OINTMENT TOPICAL EVERY 12 HOURS SCHEDULED
Status: DISCONTINUED | OUTPATIENT
Start: 2021-11-01 | End: 2021-11-06 | Stop reason: HOSPADM

## 2021-11-01 RX ADMIN — DEXAMETHASONE SODIUM PHOSPHATE 6 MG: 4 INJECTION, SOLUTION INTRA-ARTICULAR; INTRALESIONAL; INTRAMUSCULAR; INTRAVENOUS; SOFT TISSUE at 08:43

## 2021-11-01 RX ADMIN — DULOXETINE HYDROCHLORIDE 60 MG: 60 CAPSULE, DELAYED RELEASE ORAL at 04:39

## 2021-11-01 RX ADMIN — LEVETIRACETAM 500 MG: 500 TABLET, FILM COATED ORAL at 08:42

## 2021-11-01 RX ADMIN — SODIUM CHLORIDE, PRESERVATIVE FREE 3 ML: 5 INJECTION INTRAVENOUS at 08:43

## 2021-11-01 RX ADMIN — LEVOTHYROXINE SODIUM 125 MCG: 125 TABLET ORAL at 08:42

## 2021-11-01 RX ADMIN — CLONAZEPAM 0.75 MG: 0.5 TABLET ORAL at 08:42

## 2021-11-01 RX ADMIN — PANTOPRAZOLE SODIUM 40 MG: 40 TABLET, DELAYED RELEASE ORAL at 04:38

## 2021-11-01 RX ADMIN — METOPROLOL TARTRATE 25 MG: 25 TABLET, FILM COATED ORAL at 20:22

## 2021-11-01 RX ADMIN — BUPROPION HYDROCHLORIDE 150 MG: 150 TABLET, FILM COATED, EXTENDED RELEASE ORAL at 20:22

## 2021-11-01 RX ADMIN — LEVETIRACETAM 500 MG: 500 TABLET, FILM COATED ORAL at 20:22

## 2021-11-01 RX ADMIN — CEFTAROLINE FOSAMIL 600 MG: 600 POWDER, FOR SOLUTION INTRAVENOUS at 04:38

## 2021-11-01 RX ADMIN — ENOXAPARIN SODIUM 40 MG: 40 INJECTION SUBCUTANEOUS at 17:25

## 2021-11-01 RX ADMIN — METOPROLOL TARTRATE 25 MG: 25 TABLET, FILM COATED ORAL at 08:42

## 2021-11-01 RX ADMIN — Medication 1 CAPSULE: at 08:43

## 2021-11-01 RX ADMIN — SODIUM CHLORIDE, PRESERVATIVE FREE 3 ML: 5 INJECTION INTRAVENOUS at 22:05

## 2021-11-01 RX ADMIN — BUPROPION HYDROCHLORIDE 150 MG: 150 TABLET, FILM COATED, EXTENDED RELEASE ORAL at 08:43

## 2021-11-01 RX ADMIN — HYDROXYCHLOROQUINE SULFATE 200 MG: 200 TABLET ORAL at 08:42

## 2021-11-01 RX ADMIN — CEFTAROLINE FOSAMIL 600 MG: 600 POWDER, FOR SOLUTION INTRAVENOUS at 12:04

## 2021-11-01 RX ADMIN — Medication 1 TABLET: at 08:43

## 2021-11-01 RX ADMIN — DAPTOMYCIN 550 MG: 500 INJECTION, POWDER, LYOPHILIZED, FOR SOLUTION INTRAVENOUS at 12:04

## 2021-11-01 NOTE — PLAN OF CARE
Goal Outcome Evaluation:  Patient has been resting quietly this shift. Patient has remained calm and has made no attempts to pull out central line or telemetry box so sitter was removed from room. Patient has voiced no complaints. No s/s of acute distress noted at this time. Will continue with plan of care..

## 2021-11-01 NOTE — PROGRESS NOTES
[facility] HOSPITALIST PROGRESS NOTE     Patient Identification:  Name:  Karely Villarreal  Age:  56 y.o.  Sex:  female  :  1965  MRN:  [id number]  Visit Number:  [id number]  ROOM: [address] [place] [address]     Primary Care Provider:  Tracie Levi APRN     Date of Admission: 10/24/2021    Length of stay in inpatient status:  7    Subjective     Chief Compliant:    Chief Complaint   Patient presents with   • Shortness of Breath   • Pain     History of Presenting Illness: 56-year-old female admitted on 10/24/2021 with shortness of breath after she took antibiotics for a week for an outpatient diagnosis of bronchitis.  She was Covid 19 negativer at the time of the bronchitis diagnosis but her symptoms worsened and she was positive for COVID-19 in our emergency department.  Also, the patient was noted to be septic and possibly have a urinary tract infection.  She received MAB in the emergency department and at first she was not requiring oxygen.  She was started on empiric Rocephin for the UTI.  However, by hospital day #2, the patient was requiring oxygen and thus she was given 5 days of remdesivir; she is also started on Decadron and remains on the steroid.  Eventually, the patient's blood culture started growing MRSA; infectious disease team was consulted and the patient was first started on vancomycin; this was then changed to daptomycin and ceftaroline as she continued to worsen.  The patient did have a port placed prior to admission; this port has been removed by general surgery on 10/28/2021; a right-sided internal jugular central line was placed in its place.  Transthoracic echocardiogram did not show any obvious vegetations.  Cardiology was consulted but NILESH has been delayed due to the patient's positive COVID-19 status.  Today, when I entered the patient's room, she is laying in bed with her eyes closed.  She appeared to be ill.  She did open her eyes and talk to me but she appeared to have  "generalized weakness.  She is still coughing some but not producing sputum.  She denies chest pain per se but she states that she just kind of hurts all over.  She denies emesis and diarrhea.     Objective     Current Hospital Meds:buPROPion SR, 150 mg, Oral, BID  castor oil-balsam peru, 1 application, Topical, Q12H  ceftaroline, 600 mg, Intravenous, Q8H  clonazePAM, 0.75 mg, Oral, TID  DAPTOmycin, 8 mg/kg (Adjusted), Intravenous, Q24H  dexamethasone, 6 mg, Intravenous, Daily  DULoxetine, 60 mg, Oral, QAM  enoxaparin, 40 mg, Subcutaneous, Q24H  hydroxychloroquine, 200 mg, Oral, Q12H  lactobacillus acidophilus, 1 capsule, Oral, Daily  levETIRAcetam, 500 mg, Oral, Q12H  levothyroxine, 125 mcg, Oral, Daily  metoprolol tartrate, 25 mg, Oral, BID  multivitamin, 1 tablet, Oral, Daily  pantoprazole, 40 mg, Oral, Q AM  sodium chloride, 3 mL, Intravenous, Q12H    Pharmacy Consult - Pharmacy to dose,       Current Antimicrobial Therapy:  Anti-Infectives (From admission, onward)    Ordered     Dose/Rate Route Frequency Start Stop    10/30/21 0952  ceftaroline (TEFLARO) 600 mg in sodium chloride 0.9 % 100 mL IVPB-VTB        Ordering Provider: Poly Singh MD    600 mg Intravenous Every 8 Hours 10/30/21 1200 11/13/21 1159    10/30/21 0944  DAPTOmycin (CUBICIN) 550 mg/50 mL 0.9% sodium chloride IVPB        Ordering Provider: Poly Singh MD    8 mg/kg × 71.7 kg (Adjusted)  over 30 Minutes Intravenous Every 24 Hours 10/30/21 1100 12/11/21 1059    10/26/21 1528  remdesivir 100 mg in 270 mL NS        Ordering Provider: Ruma Madden MD   \"Followed by\" Linked Group Details    100 mg  over 60 Minutes Intravenous Every 24 Hours 10/27/21 1600 10/31/21 1559    10/26/21 1528  remdesivir 200 mg in 290 mL NS        Ordering Provider: Lucia Ardon MD   \"Followed by\" Linked Group Details    200 mg  over 60 Minutes Intravenous Every 24 Hours 10/26/21 1600 10/26/21 1852    10/25/21 2126  vancomycin 1750 mg/500 mL 0.9% NS " IVPB ([facility])        Ordering Provider: Desmond Teixeira MD    20 mg/kg × 88.2 kg  over 120 Minutes Intravenous Once 10/25/21 2215 10/26/21 0027    10/25/21 1613  hydroxychloroquine (PLAQUENIL) tablet 200 mg        Ordering Provider: Ruma Madden MD    200 mg Oral Every 12 Hours Scheduled 10/25/21 2100      10/25/21 0432  cefTRIAXone (ROCEPHIN) 1 g in sodium chloride 0.9 % 100 mL IVPB-VTB        Ordering Provider: Arie Wall MD    1 g  200 mL/hr over 30 Minutes Intravenous Once 10/25/21 0434 10/25/21 0616        Current Diuretic Therapy:  Diuretics (From admission, onward)    Ordered     Dose/Rate Route Frequency Start Stop    10/30/21 1653  furosemide (LASIX) injection 40 mg        Ordering Provider: Lucia Ardon MD    40 mg Intravenous Once 10/30/21 1800 10/30/21 1736        ----------------------------------------------------------------------------------------------------------------------  Vital Signs:  Temp:  [97 °F (36.1 °C)-97.8 °F (36.6 °C)] 97.8 °F (36.6 °C)  Heart Rate:  [78-85] 80  Resp:  [20-24] 20  BP: (110-174)/(74-91) 174/91  SpO2:  [95 %-97 %] 95 %  on  Flow (L/min):  [2.5] 2.5;   Device (Oxygen Therapy): nasal cannula  Body mass index is 31.05 kg/m².    Wt Readings from Last 3 Encounters:   11/01/21 87.3 kg (192 lb 6.4 oz)   10/23/21 83.9 kg (185 lb)   05/17/21 88.9 kg (196 lb)     Intake & Output (last 3 days)       10/29 0701  10/30 0700 10/30 0701  10/31 0700 10/31 0701  11/01 0700 11/01 0701  11/02 0700    P.O.  600    I.V. (mL/kg)  270 (3.1)      IV Piggyback  150      Total Intake(mL/kg) 750 (8.3) 1080 (12.5) 1840 (21.1) 600 (6.9)    Urine (mL/kg/hr) 1500 (0.7) 1560 (0.7) 1750 (0.8) 1300 (1.4)    Stool 0 0 0     Total Output 1500 1560 1750 1300    Net -750 -480 +90 -700            Urine Unmeasured Occurrence 3 x 2 x      Stool Unmeasured Occurrence 3 x 3 x 6 x         Diet  Regular  ----------------------------------------------------------------------------------------------------------------------  Physical Exam  Vitals reviewed.   Constitutional:       General: She is in acute distress.      Appearance: She is well-developed. She is obese. She is not toxic-appearing or diaphoretic.      Interventions: Nasal cannula in place.   HENT:      Head: Normocephalic and atraumatic.      Right Ear: External ear normal.      Left Ear: External ear normal.      Nose: Nose normal.   Eyes:      General: No scleral icterus.        Right eye: No discharge.         Left eye: No discharge.      Pupils: Pupils are equal, round, and reactive to light.   Cardiovascular:      Rate and Rhythm: Normal rate and regular rhythm.      Pulses: Normal pulses.      Heart sounds: No murmur heard.      Pulmonary:      Effort: No accessory muscle usage, prolonged expiration or respiratory distress.      Breath sounds: No stridor. No wheezing or rales.   Abdominal:      General: Bowel sounds are normal. There is no distension.      Palpations: Abdomen is soft.   Musculoskeletal:         General: No deformity or signs of injury.      Comments: Bilateral arms from elbow up does have some nonpitting edema but there is also minimal bruising around this area drawls.   Skin:     Capillary Refill: Capillary refill takes less than 2 seconds.      Coloration: Skin is not jaundiced or pale.      Findings: Bruising present.   Neurological:      Mental Status: She is alert and oriented to person, place, and time. Mental status is at baseline.      Cranial Nerves: No cranial nerve deficit.      Comments: She can follow all commands.   Psychiatric:         Attention and Perception: Attention normal.         Mood and Affect: Affect is blunt.         Speech: Speech normal.         Behavior: Behavior normal. Behavior is cooperative.         Thought Content: Thought content normal.         Cognition and Memory: Cognition normal.        ----------------------------------------------------------------------------------------------------------------------  Tele: Normal sinus rhythm heart rate 70s to 80s.  I personally reviewed the telemetry strips.      Last echocardiogram:  Results for orders placed during the hospital encounter of 10/24/21    Adult Transthoracic Echo Complete W/ Cont if Necessary Per Protocol    Interpretation Summary  · Left ventricular wall thickness is consistent with hypertrophy. Sigmoid-shaped ventricular septum is present.  · Left ventricular ejection fraction appears to be 61 - 65%. Left ventricular systolic function is normal.  · Left ventricular diastolic function was normal.    I have personally read the ECHO final report.    ----------------------------------------------------------------------------------------------------------------------  LABS:  COVID LABS:  Results From Last 14 Days   Lab Units 11/01/21  0459 10/31/21  0120 10/29/21  2304 10/26/21  1257 10/25/21  1026 10/25/21  0653 10/25/21  0528 10/24/21  2210 10/24/21  2210   PROBNP pg/mL  --   --   --   --   --   --   --   --  2,743.0*   CK TOTAL U/L  --  41  --   --   --   --   --   --   --    CRP mg/dL 6.70* 7.34* 9.37*   < > 23.56*  --   --    < > 25.76*   D DIMER QUANT MCGFEU/mL  --   --   --   --   --   --   --   --  14.78*   FERRITIN ng/mL  --   --   --   --   --   --   --   --  588.30*   LACTATE mmol/L  --   --   --   --   --  2.0 2.1*  --   --    LDH U/L  --   --   --   --   --   --   --   --  242*   PROCALCITONIN ng/mL  --   --   --   --  3.00*  --   --   --   --    TROPONIN T ng/mL  --   --   --   --   --   --   --   --  <0.010    < > = values in this interval not displayed.       CBC and coagulation:  Results from last 7 days   Lab Units 11/01/21  0459 10/31/21  1932 10/31/21  0422 10/31/21  0120 10/29/21  2304 10/29/21  0326 10/26/21  1257   CRP mg/dL 6.70*  --   --  7.34* 9.37* 12.81* 15.17*   WBC 10*3/mm3 22.33*  --  23.43*  --   --  22.49*  18.15*   HEMOGLOBIN g/dL 7.3* 7.6* 7.7*  --   --  9.3* 8.7*   HEMATOCRIT % 22.9*  --  25.2*  --   --  29.8* 28.5*   MCV fL 81.2  --  80.3  --   --  79.7 85.1   MCHC g/dL 31.9  --  30.6*  --   --  31.2* 30.5*   PLATELETS 10*3/mm3 296  --  230  --   --  129* 87*     Renal and electrolytes:  Results from last 7 days   Lab Units 11/01/21  0459 10/31/21  0120 10/30/21  2011 10/29/21  2304 10/29/21  0326 10/28/21  0450 10/27/21  0500 10/26/21  1537 10/26/21  1257 10/26/21  1257   SODIUM mmol/L 142 143  --  142 139  --   --   --   --  137   POTASSIUM mmol/L 3.9 3.8 3.7 3.1* 3.6  --   --   --   --  3.2*   MAGNESIUM mg/dL 2.1 2.1  --   --   --   --   --   --   --   --    CHLORIDE mmol/L 107 108*  --  106 104  --   --   --   --  111*   CO2 mmol/L 23.5 24.3  --  22.8 22.2  --   --   --   --  12.8*   BUN mg/dL 32* 34*  --  35* 40*  --   --   --   --  38*   CREATININE mg/dL 0.97 1.05*  --  0.82 0.94 1.08* 1.38* 1.48*   < > 1.00   EGFR IF NONAFRICN AM mL/min/1.73 60* 54*  --  72 62 53* 40* 37*   < > 58*   CALCIUM mg/dL 8.5* 8.6  --  8.2* 8.4*  --   --   --   --  6.2*   GLUCOSE mg/dL 111* 147*  --  156* 142*  --   --   --   --  123*    < > = values in this interval not displayed.     Estimated Creatinine Clearance: 72.9 mL/min (by C-G formula based on SCr of 0.97 mg/dL).    Liver and pancreatic function:  Results from last 7 days   Lab Units 10/31/21  1627 10/28/21  0450 10/27/21  0500 10/26/21  1537   ALBUMIN g/dL  --  2.38* 1.98* 1.98*   BILIRUBIN mg/dL 1.0 1.4* 1.0 1.2   ALK PHOS U/L  --  148* 171* 177*   AST (SGOT) U/L  --  33* 20 19   ALT (SGPT) U/L  --  21 16 16     Endocrine function:  Lab Results   Component Value Date    HGBA1C 5.90 (H) 09/15/2019     Glucose levels from the CMP:  Results from last 7 days   Lab Units 11/01/21  0459 10/31/21  0120 10/29/21  2304 10/29/21  0326 10/26/21  1257   GLUCOSE mg/dL 111* 147* 156* 142* 123*     Lab Results   Component Value Date    TSH 47.200 (H) 10/25/2021    FREET4 0.16 (L)  10/25/2021     Cardiac:  Results from last 7 days   Lab Units 10/31/21  0120   CK TOTAL U/L 41       Cultures:  Lab Results   Component Value Date    COLORU Dark Yellow (A) 10/25/2021    CLARITYU Cloudy (A) 10/25/2021    PHUR 7.0 10/25/2021    PROTEINUR 126.0 10/25/2021    GLUCOSEU Negative 10/25/2021    KETONESU Negative 10/25/2021    BLOODU Moderate (2+) (A) 10/25/2021    NITRITEU Positive (A) 10/25/2021    LEUKOCYTESUR Small (1+) (A) 10/25/2021    BILIRUBINUR Moderate (2+) (A) 10/25/2021    UROBILINOGEN 1.0 E.U./dL 10/25/2021    RBCUA 3-5 (A) 10/25/2021    WBCUA 3-5 (A) 10/25/2021    BACTERIA 2+ (A) 10/25/2021     Microbiology Results (last 10 days)     Procedure Component Value - Date/Time    Blood Culture - Blood, Arm, Right [[id number]]  (Abnormal) Collected: 10/30/21 1212    Lab Status: Final result Specimen: Blood from Arm, Right Updated: 11/01/21 1032     Blood Culture Staphylococcus aureus, MRSA     Comment: Infectious disease consultation is highly recommended to rule out distant foci of infection.  Methicillin resistant Staphylococcus aureus, Patient may be an isolation risk.        Isolated from Aerobic and Anaerobic Bottles     Gram Stain Aerobic Bottle Gram positive cocci in pairs and clusters      Anaerobic Bottle Gram positive cocci in pairs and clusters    Narrative:      No BCID performed, refer to previous blood culture specimen collected on 10- @ 1208 from central line source for MICs    Blood Culture - Blood, Arm, Left [[id number]]  (Abnormal) Collected: 10/30/21 1211    Lab Status: Final result Specimen: Blood from Arm, Left Updated: 11/01/21 1031     Blood Culture Staphylococcus aureus, MRSA     Comment: Infectious disease consultation is highly recommended to rule out distant foci of infection.  Methicillin resistant Staphylococcus aureus, Patient may be an isolation risk.        Isolated from Aerobic and Anaerobic Bottles     Gram Stain Aerobic Bottle Gram positive cocci in pairs and  clusters      Anaerobic Bottle Gram positive cocci in pairs and clusters    Narrative:      No BCID performed, refer to previous blood culture specimen collected on 10- @ 1208 from central line source for MICs      Blood Culture - Blood, Blood, Central Line [[id number]]  (Abnormal)  (Susceptibility) Collected: 10/29/21 1208    Lab Status: Final result Specimen: Blood, Central Line Updated: 11/01/21 1011     Blood Culture Staphylococcus aureus, MRSA     Comment: Infectious disease consultation is highly recommended to rule out distant foci of infection.  Methicillin resistant Staphylococcus aureus, Patient may be an isolation risk.        Isolated from Aerobic and Anaerobic Bottles     Gram Stain Aerobic Bottle Gram positive cocci in clusters      Anaerobic Bottle Gram positive cocci in clusters    Susceptibility      Staphylococcus aureus, MRSA     NEDA     Gentamicin Susceptible     Oxacillin Resistant     Rifampin Susceptible     Vancomycin Susceptible                  Linear View               Susceptibility Comments     Staphylococcus aureus, MRSA    This isolate does not demonstrate inducible clindamycin resistance in vitro.               Blood Culture ID, PCR - Blood, Blood, Central Line [[id number]]  (Abnormal) Collected: 10/29/21 1208    Lab Status: Final result Specimen: Blood, Central Line Updated: 10/30/21 0130     BCID, PCR Staph aureus. mecA/C and MREJ (methicillin resistance gene) detected. Identification by BCID2 PCR.     BOTTLE TYPE Aerobic Bottle    Narrative:      Infectious disease consultation is highly recommended to rule out distant foci of infection.    Blood Culture - Blood, Arm, Right [[id number]]  (Abnormal) Collected: 10/27/21 0559    Lab Status: Final result Specimen: Blood from Arm, Right Updated: 10/28/21 1025     Blood Culture Staphylococcus aureus, MRSA     Comment: Infectious disease consultation is highly recommended to rule out distant foci of infection.  Methicillin resistant  Staphylococcus aureus, Patient may be an isolation risk.        Isolated from Aerobic Bottle     Gram Stain Aerobic Bottle Gram positive cocci in clusters    Narrative:      No BCID performed, refer to previous blood culture specimen collected on 10-25-21 at 5:28.  Refer to previous blood culture collected on 10/25/2021 at 0528 for MICs    Blood Culture - Blood, Arm, Left [[id number]]  (Abnormal) Collected: 10/27/21 0500    Lab Status: Final result Specimen: Blood from Arm, Left Updated: 10/28/21 1025     Blood Culture Staphylococcus aureus, MRSA     Comment: Infectious disease consultation is highly recommended to rule out distant foci of infection.  Methicillin resistant Staphylococcus aureus, Patient may be an isolation risk.        Isolated from Aerobic Bottle     Gram Stain Aerobic Bottle Gram positive cocci in clusters    Narrative:      No BCID performed, refer to previous blood culture specimen collected on 10-25-21 at 5:28.  Refer to previous blood culture collected on 10/25/2021 at 0528 for MICs      S. Pneumo Ag Urine or CSF - Urine, Urine, Clean Catch [[id number]]  (Normal) Collected: 10/25/21 1046    Lab Status: Final result Specimen: Urine, Clean Catch Updated: 10/25/21 2331     Strep Pneumo Ag Negative    Respiratory Panel, PCR (WITHOUT COVID) - Swab, Nasopharynx [[id number]]  (Normal) Collected: 10/25/21 0831    Lab Status: Final result Specimen: Swab from Nasopharynx Updated: 10/25/21 1015     ADENOVIRUS, PCR Not Detected     Coronavirus 229E Not Detected     Coronavirus HKU1 Not Detected     Coronavirus NL63 Not Detected     Coronavirus OC43 Not Detected     Human Metapneumovirus Not Detected     Human Rhinovirus/Enterovirus Not Detected     Influenza B PCR Not Detected     Parainfluenza Virus 1 Not Detected     Parainfluenza Virus 2 Not Detected     Parainfluenza Virus 3 Not Detected     Parainfluenza Virus 4 Not Detected     Bordetella pertussis pcr Not Detected     Chlamydophila pneumoniae PCR Not  Detected     Mycoplasma pneumo by PCR Not Detected     Influenza A PCR Not Detected     RSV, PCR Not Detected     Bordetella parapertussis PCR Not Detected    Narrative:      The coronavirus on the RVP is NOT COVID-19 and is NOT indicative of infection with COVID-19.    In the setting of a positive respiratory panel with a viral infection PLUS a negative procalcitonin without other underlying concern for bacterial infection, consider observing off antibiotics or discontinuation of antibiotics and continue supportive care. If the respiratory panel is positive for atypical bacterial infection (Bordetella pertussis, Chlamydophila pneumoniae, or Mycoplasma pneumoniae), consider antibiotic de-escalation to target atypical bacterial infection.    MRSA Screen, PCR (Inpatient) - Swab, Nares [[id number]]  (Abnormal) Collected: 10/25/21 0831    Lab Status: Final result Specimen: Swab from Nares Updated: 10/25/21 1043     MRSA PCR Positive     Staph aureus by PCR Positive    Blood Culture - Blood, Arm, Right [[id number]]  (Abnormal) Collected: 10/25/21 0653    Lab Status: Final result Specimen: Blood from Arm, Right Updated: 10/27/21 1122     Blood Culture Staphylococcus aureus, MRSA     Comment: Infectious disease consultation is highly recommended to rule out distant foci of infection.  Methicillin resistant Staphylococcus aureus, Patient may be an isolation risk.    Refer to previous blood culture collected on 10/25/2021 at 0528 for MICs        Isolated from Aerobic Bottle     Gram Stain Aerobic Bottle Gram positive cocci in clusters      Anaerobic Bottle Gram positive cocci in clusters    Blood Culture - Blood, Arm, Left [[id number]]  (Abnormal) Collected: 10/25/21 0528    Lab Status: Final result Specimen: Blood from Arm, Left Updated: 10/27/21 0728     Blood Culture Staphylococcus aureus, MRSA     Comment: Infectious disease consultation is highly recommended to rule out distant foci of infection.  Methicillin resistant  Staphylococcus aureus, Patient may be an isolation risk.    Refer to previous blood culture collected on 10/25/2021 at 0528 for MICs        Isolated from Aerobic and Anaerobic Bottles     Gram Stain Aerobic Bottle Gram positive cocci in clusters      Anaerobic Bottle Gram positive cocci in clusters    Blood Culture - Blood, Arm, Right [[id number]]  (Abnormal)  (Susceptibility) Collected: 10/25/21 0528    Lab Status: Final result Specimen: Blood from Arm, Right Updated: 10/27/21 0728     Blood Culture Staphylococcus aureus, MRSA     Comment: Infectious disease consultation is highly recommended to rule out distant foci of infection.  Methicillin resistant Staphylococcus aureus, Patient may be an isolation risk.        Isolated from Aerobic and Anaerobic Bottles     Gram Stain Aerobic Bottle Gram positive cocci in clusters      Anaerobic Bottle Gram positive cocci in clusters    Susceptibility      Staphylococcus aureus, MRSA     NEDA     Gentamicin Susceptible     Oxacillin Resistant     Rifampin Susceptible     Vancomycin Susceptible                  Linear View               Susceptibility Comments     Staphylococcus aureus, MRSA    This isolate does not demonstrate inducible clindamycin resistance in vitro.               Blood Culture ID, PCR - Blood, Arm, Right [[id number]]  (Abnormal) Collected: 10/25/21 0528    Lab Status: Final result Specimen: Blood from Arm, Right Updated: 10/25/21 2112     BCID, PCR Staph aureus. mecA/C and MREJ (methicillin resistance gene) detected. Identification by BCID2 PCR.     BOTTLE TYPE Aerobic Bottle    Narrative:      Infectious disease consultation is highly recommended to rule out distant foci of infection.    Legionella Antigen, Urine - Urine, Urine, Clean Catch [[id number]]  (Normal) Collected: 10/25/21 0327    Lab Status: Final result Specimen: Urine, Clean Catch Updated: 10/25/21 1038     LEGIONELLA ANTIGEN, URINE Negative    Narrative:      Presumptive negative for L.  pneumophilia serogroup 1 antigen, suggesting no recent or current infection.    COVID PRE-OP / PRE-PROCEDURE SCREENING ORDER (NO ISOLATION) - Swab, Nasopharynx [[id number]]  (Abnormal) Collected: 10/24/21 2158    Lab Status: Final result Specimen: Swab from Nasopharynx Updated: 10/24/21 2254    Narrative:      The following orders were created for panel order COVID PRE-OP / PRE-PROCEDURE SCREENING ORDER (NO ISOLATION) - Swab, Nasopharynx.  Procedure                               Abnormality         Status                     ---------                               -----------         ------                     COVID-19 and FLU A/B PCR...[[id number]]  Abnormal            Final result                 Please view results for these tests on the individual orders.    COVID-19 and FLU A/B PCR - Swab, Nasopharynx [[id number]]  (Abnormal) Collected: 10/24/21 2158    Lab Status: Final result Specimen: Swab from Nasopharynx Updated: 10/24/21 2254     COVID19 Detected     Influenza A PCR Not Detected     Influenza B PCR Not Detected    Narrative:      Fact sheet for providers: https://www.fda.gov/media/966731/download    Fact sheet for patients: https://www.fda.gov/media/320413/download    Test performed by PCR.  Influenza A and Influenza B negative results should be considered presumptive in samples that have a positive SARS-CoV-2 result.    Competitive inhibition studies showed that SARS-CoV-2 virus, when present at concentrations above 3.6E+04 copies/mL, can inhibit the detection and amplification of influenza A and influenza B virus RNA if present at or below 1.8E+02 copies/mL or 4.9E+02 copies/mL, respectively, and may lead to false negative influenza virus results. If co-infection with influenza A or influenza B virus is suspected in samples with a positive SARS-CoV-2 result, the sample should be re-tested with another FDA cleared, approved, or authorized influenza test, if influenza virus detection would change clinical  management.              I have personally looked at the labs and they are summarized above.  ----------------------------------------------------------------------------------------------------------------------  Detailed radiology reports for the last 24 hours:    Imaging Results (Last 24 Hours)     Procedure Component Value Units Date/Time    CT Abdomen Pelvis Without Contrast [[id number]] Collected: 11/01/21 1638     Updated: 11/01/21 1652    Narrative:      EXAM:    CT Abdomen and Pelvis Without Intravenous Contrast     EXAM DATE:    10/25/2021 1:35 PM     CLINICAL HISTORY:    Abdominal pain, acute, nonlocalized     TECHNIQUE:    Axial computed tomography images of the abdomen and pelvis without  intravenous contrast.  Sagittal and coronal reformatted images were  created and reviewed.  This CT exam was performed using one or more of  the following dose reduction techniques:  automated exposure control,  adjustment of the mA and/or kV according to patient size, and/or use of  iterative reconstruction technique.     COMPARISON:    10/07/2016     FINDINGS:    LIMITATIONS:  Limited due to motion artifact.    LUNG BASES:  Unremarkable.  No mass.  No consolidation.    PLEURAL SPACE:  Small bilateral pleural effusions.      ABDOMEN:    LIVER:  Unremarkable.    GALLBLADDER AND BILE DUCTS:  Unremarkable.  No calcified stones.  No  ductal dilation.    PANCREAS:  Unremarkable.  No ductal dilation.    SPLEEN:  Unremarkable.  No splenomegaly.    ADRENALS:  Unremarkable.  No mass.    KIDNEYS AND URETERS:  Unremarkable.  No obstructing stones.  No  hydronephrosis.    STOMACH AND BOWEL:  Abundant stool throughout the colon.  No  obstruction.  No mucosal thickening.      PELVIS:    APPENDIX:  No findings to suggest acute appendicitis.    BLADDER:  Unremarkable.  No stones.    REPRODUCTIVE:  Unremarkable as visualized.      ABDOMEN and PELVIS:    INTRAPERITONEAL SPACE:  Unremarkable.  No free air.  No significant  fluid  collection.    BONES/JOINTS:  No acute fracture.  No dislocation.    SOFT TISSUES:  Unremarkable.    VASCULATURE:  Unremarkable.  No abdominal aortic aneurysm.    LYMPH NODES:  Unremarkable.  No enlarged lymph nodes.       Impression:      1.  Abundant stool throughout the colon.  2.  Small bilateral pleural effusions.  3.  Limited due to motion artifact.     This report was finalized on 11/1/2021 4:39 PM by Dr. Miguel Roe MD.           I have personally looked at the radiology images and I have read the available final report.    Assessment & Plan      -Severe sepsis that was present on admission (heart rate 121, CRP 25.76, white blood cell count 17,200, 17% bands, lactic acid 2.1) due to MRSA bacteremia, COVID-19 bilateral pneumonia, suspected embolic pneumonia, and urinary tract infection with hematuria  -Intermittent acute hypoxic respiratory failure that was present starting 2 days after admission and due to the COVID-19 pneumonia as well as suspected aspiration versus MRSA pneumonia  -Acute kidney injury on top of chronic kidney disease stage II with baseline creatinine 0.6-0.8 and admission creatinine of 1.48, now improved  -Swelling of her bilateral arms at the elbows  -History of polysubstance abuse, suspicious behavior of hiding / self medicating klonopin/opoiod in house until purse taken away 10/26; urine drug screen positive for amphetamines, Suboxone, methamphetamines, and opiates  -Positive hepatitis C antibody this admission  -History of discoid lupus, on Plaquenil  -History of hypothyroidism status post thyroidectomy  History of essential hypertension  History of seizures with the last seizure in 2016  History of right parietal ischemic CVA thought cardioembolic with prior PFO repair (all of this occurred in 2015)  -History of major depression and anxiety  -History of peptic ulcer disease status post perforation in 2016    We will continue with the Decadron today and we will try to wean this  tomorrow.  The CRP is improving but the white blood cell count is not; I suspect some of the white blood cell count being elevated is due to the dexamethasone causing peripheralization of neutrophils.  I am repeating the blood cultures daily until her cultures are cleared of bacteria.  I will discuss with cardiology about timing of a NILESH.  We had continued the patient's Plaquenil at first as we were concerned she may be having a flare of SLE.  However, now that she is on the Decadron and her kidneys have improved, I will stop the Plaquenil for now due to the persistent bacteremia.  Hopefully, we can start weaning the Decadron tomorrow.  We will continue with the current Synthroid dose and she will need to have repeat TSH in 1 month.  The patient will need outpatient follow-up further hepatitis C antibody being positive this admission as this is new information for this patient.  We will continue with the IV fluids and continue trending her creatinine.  We will also monitor her inflammatory markers and her electrolytes closely.  I am unsure of what is going on with the nonpitting edema of her arms at her lateral elbow; therefore, I will obtain Doppler venous ultrasound to look for any DVTs.  I will ask radiology to sample the low right-sided pleural effusion to determine what possibly could be causing this.  We will repeat her blood work in the morning.  Please note her blood pressures are elevated and with her having generalized weakness everywhere and MRSA bacteremia, I will order head CT without contrast tonight and try to order an MRI of her head tomorrow if the CT scan is unremarkable.    VTE Prophylaxis:   Mechanical Order History:     None      Pharmalogical Order History:      Ordered     Dose Route Frequency Stop    10/25/21 1614  enoxaparin (LOVENOX) syringe 40 mg         40 mg SC Every 24 Hours --    10/25/21 0616  heparin (porcine) 5000 UNIT/ML injection 5,000 Units  Status:  Discontinued         5,000  Units SC Every 12 Hours Scheduled 10/25/21 8592                The patient is high risk due to the following diagnoses/reasons: Persistent MRSA bacteremia in a patient with known polysubstance use    Disposition: Undetermined at this time.    Alonzo Hastings MD  [facility]  11/01/21  17:17 EDT

## 2021-11-01 NOTE — PLAN OF CARE
Goal Outcome Evaluation:      Patient has rested well tonight. She has been calm with sitter at bedside. Held PM Klonopin due to previous dose being close and patient resting well. He has reported being congested, encouraged to cough and deep breathe. No complaints of chest pain. Vital signs remain stable. Will continue to monitor and follow plan of care.      Problem: Adult Inpatient Plan of Care  Goal: Plan of Care Review  Outcome: Ongoing, Progressing  Goal: Patient-Specific Goal (Individualized)  Outcome: Ongoing, Progressing  Goal: Absence of Hospital-Acquired Illness or Injury  Outcome: Ongoing, Progressing  Intervention: Identify and Manage Fall Risk  Recent Flowsheet Documentation  Taken 11/1/2021 0300 by Jay Holguin, RN  Safety Promotion/Fall Prevention:   assistive device/personal items within reach   clutter free environment maintained   fall prevention program maintained   nonskid shoes/slippers when out of bed   safety round/check completed   room organization consistent  Taken 11/1/2021 0100 by Jay Holguin, RN  Safety Promotion/Fall Prevention:   assistive device/personal items within reach   clutter free environment maintained   fall prevention program maintained   room organization consistent   safety round/check completed  Taken 10/31/2021 2300 by Jay Holguin, RN  Safety Promotion/Fall Prevention:   assistive device/personal items within reach   clutter free environment maintained   fall prevention program maintained   nonskid shoes/slippers when out of bed   room organization consistent   safety round/check completed  Taken 10/31/2021 1955 by Jay Holguin, RN  Safety Promotion/Fall Prevention:   assistive device/personal items within reach   clutter free environment maintained   fall prevention program maintained   nonskid shoes/slippers when out of bed   room organization consistent   safety round/check completed  Intervention: Prevent Skin Injury  Recent Flowsheet  Documentation  Taken 10/31/2021 1955 by Jay Holguin RN  Skin Protection:   adhesive use limited   electrode sites changed   pulse oximeter probe site changed  Goal: Optimal Comfort and Wellbeing  Outcome: Ongoing, Progressing  Intervention: Provide Person-Centered Care  Recent Flowsheet Documentation  Taken 10/31/2021 1955 by Jay Holguin RN  Trust Relationship/Rapport: care explained  Goal: Readiness for Transition of Care  Outcome: Ongoing, Progressing     Problem: Suicide Risk  Goal: Absence of Self-Harm  Outcome: Ongoing, Progressing  Intervention: Promote Psychosocial Wellbeing  Recent Flowsheet Documentation  Taken 10/31/2021 1955 by Jay Holguin RN  Supportive Measures: active listening utilized     Problem: Skin Injury Risk Increased  Goal: Skin Health and Integrity  Outcome: Ongoing, Progressing  Intervention: Optimize Skin Protection  Recent Flowsheet Documentation  Taken 10/31/2021 1955 by Jay Holguin RN  Pressure Reduction Techniques:   frequent weight shift encouraged   weight shift assistance provided  Pressure Reduction Devices:   foam padding utilized   pressure-redistributing mattress utilized  Skin Protection:   adhesive use limited   electrode sites changed   pulse oximeter probe site changed     Problem: Fall Injury Risk  Goal: Absence of Fall and Fall-Related Injury  Outcome: Ongoing, Progressing  Intervention: Promote Injury-Free Environment  Recent Flowsheet Documentation  Taken 11/1/2021 0300 by Jay Holguin RN  Safety Promotion/Fall Prevention:   assistive device/personal items within reach   clutter free environment maintained   fall prevention program maintained   nonskid shoes/slippers when out of bed   safety round/check completed   room organization consistent  Taken 11/1/2021 0100 by Jay Holguin, RN  Safety Promotion/Fall Prevention:   assistive device/personal items within reach   clutter free environment maintained   fall prevention  program maintained   room organization consistent   safety round/check completed  Taken 10/31/2021 2300 by Jay Holguin, RN  Safety Promotion/Fall Prevention:   assistive device/personal items within reach   clutter free environment maintained   fall prevention program maintained   nonskid shoes/slippers when out of bed   room organization consistent   safety round/check completed  Taken 10/31/2021 1955 by Jay Holguin, RN  Safety Promotion/Fall Prevention:   assistive device/personal items within reach   clutter free environment maintained   fall prevention program maintained   nonskid shoes/slippers when out of bed   room organization consistent   safety round/check completed

## 2021-11-01 NOTE — NURSING NOTE
Patient with DTI to left gluteal.  Maroon, nonblanchable.  1.5x1.5cm.  Jackeline wound pink, intact, and blanchable.  Will treat with venelex and leave open to air.

## 2021-11-01 NOTE — PROGRESS NOTES
PROGRESS NOTE         Patient Identification:  Name:  Karely Villarreal  Age:  56 y.o.  Sex:  female  :  1965  MRN:  [id number]  Visit Number:  [id number]  Primary Care Provider:  Tracie Levi APRN         LOS: 7 days       ----------------------------------------------------------------------------------------------------------------------  Subjective       Chief Complaints:    Shortness of Breath and Pain        Interval History:      Patient on slightly decreased oxygen requirement of 2.5 L per nasal cannula today.  Afebrile, no diarrhea.  WBC improving at 22.33.  CRP improving at 6.70.    ----------------------------------------------------------------------------------------------------------------------      Objective       Current [facility] Meds:  buPROPion SR, 150 mg, Oral, BID  ceftaroline, 600 mg, Intravenous, Q8H  clonazePAM, 0.75 mg, Oral, TID  DAPTOmycin, 8 mg/kg (Adjusted), Intravenous, Q24H  dexamethasone, 6 mg, Intravenous, Daily  DULoxetine, 60 mg, Oral, QAM  enoxaparin, 40 mg, Subcutaneous, Q24H  hydroxychloroquine, 200 mg, Oral, Q12H  lactobacillus acidophilus, 1 capsule, Oral, Daily  levETIRAcetam, 500 mg, Oral, Q12H  levothyroxine, 125 mcg, Oral, Daily  metoprolol tartrate, 25 mg, Oral, BID  multivitamin, 1 tablet, Oral, Daily  pantoprazole, 40 mg, Oral, Q AM  sodium chloride, 3 mL, Intravenous, Q12H      Pharmacy Consult - Pharmacy to dose,       ----------------------------------------------------------------------------------------------------------------------    Vital Signs:  Temp:  [97 °F (36.1 °C)-97.8 °F (36.6 °C)] 97 °F (36.1 °C)  Heart Rate:  [81-85] 85  Resp:  [20-24] 24  BP: (110-165)/(74-84) 165/78  No data found.  SpO2 Percentage    10/31/21 1051 10/31/21 1900 21 3219   SpO2: 99% 97% 96%     SpO2:  [96 %-97 %] 96 %  on  Flow (L/min):  [2.5] 2.5;   Device (Oxygen Therapy): nasal cannula    Body mass index is 31.05 kg/m².  Wt Readings from Last 3  Encounters:   11/01/21 87.3 kg (192 lb 6.4 oz)   10/23/21 83.9 kg (185 lb)   05/17/21 88.9 kg (196 lb)        Intake/Output Summary (Last 24 hours) at 11/1/2021 1218  Last data filed at 11/1/2021 1106  Gross per 24 hour   Intake 1840 ml   Output 1000 ml   Net 840 ml     Diet Regular  ----------------------------------------------------------------------------------------------------------------------      Physical Exam:    Deferred due to COVID-19 isolation.  ----------------------------------------------------------------------------------------------------------------------  Results from last 7 days   Lab Units 10/31/21  0120   CK TOTAL U/L 41             Results from last 7 days   Lab Units 11/01/21  0459 10/31/21  1932 10/31/21  0422 10/31/21  0120 10/29/21  2304 10/29/21  0326 10/29/21  0326   CRP mg/dL 6.70*  --   --  7.34* 9.37*   < > 12.81*   WBC 10*3/mm3 22.33*  --  23.43*  --   --   --  22.49*   HEMOGLOBIN g/dL 7.3* 7.6* 7.7*  --   --    < > 9.3*   HEMATOCRIT % 22.9*  --  25.2*  --   --   --  29.8*   MCV fL 81.2  --  80.3  --   --   --  79.7   MCHC g/dL 31.9  --  30.6*  --   --   --  31.2*   PLATELETS 10*3/mm3 296  --  230  --   --   --  129*    < > = values in this interval not displayed.     Results from last 7 days   Lab Units 11/01/21  0459 10/31/21  1627 10/31/21  0120 10/30/21  2011 10/29/21  2304 10/29/21  2304 10/29/21  0326 10/28/21  0450 10/27/21  0500 10/27/21  0500 10/26/21  1537 10/26/21  1537   SODIUM mmol/L 142  --  143  --   --  142   < >  --   --   --   --   --    POTASSIUM mmol/L 3.9  --  3.8 3.7   < > 3.1*   < >  --   --   --   --   --    MAGNESIUM mg/dL 2.1  --  2.1  --   --   --   --   --   --   --   --   --    CHLORIDE mmol/L 107  --  108*  --   --  106   < >  --   --   --   --   --    CO2 mmol/L 23.5  --  24.3  --   --  22.8   < >  --   --   --   --   --    BUN mg/dL 32*  --  34*  --   --  35*   < >  --   --   --   --   --    CREATININE mg/dL 0.97  --  1.05*  --   --  0.82   < >  1.08*   < > 1.38*   < > 1.48*   EGFR IF NONAFRICN AM mL/min/1.73 60*  --  54*  --   --  72   < > 53*   < > 40*   < > 37*   CALCIUM mg/dL 8.5*  --  8.6  --   --  8.2*   < >  --   --   --   --   --    GLUCOSE mg/dL 111*  --  147*  --   --  156*   < >  --   --   --   --   --    ALBUMIN g/dL  --   --   --   --   --   --   --  2.38*  --  1.98*  --  1.98*   BILIRUBIN mg/dL  --  1.0  --   --   --   --   --  1.4*  --  1.0   < > 1.2   ALK PHOS U/L  --   --   --   --   --   --   --  148*  --  171*  --  177*   AST (SGOT) U/L  --   --   --   --   --   --   --  33*  --  20  --  19   ALT (SGPT) U/L  --   --   --   --   --   --   --  21  --  16  --  16    < > = values in this interval not displayed.   Estimated Creatinine Clearance: 72.9 mL/min (by C-G formula based on SCr of 0.97 mg/dL).  No results found for: AMMONIA    No results found for: HGBA1C, POCGLU  Lab Results   Component Value Date    HGBA1C 5.90 (H) 09/15/2019     Lab Results   Component Value Date    TSH 47.200 (H) 10/25/2021    FREET4 0.16 (L) 10/25/2021       Blood Culture   Date Value Ref Range Status   10/25/2021 Staphylococcus aureus, MRSA (C)  Preliminary     Comment:     Infectious disease consultation is highly recommended to rule out distant foci of infection.  Methicillin resistant Staphylococcus aureus, Patient may be an isolation risk.   10/25/2021 Staphylococcus aureus, MRSA (C)  Preliminary     Comment:     Infectious disease consultation is highly recommended to rule out distant foci of infection.  Methicillin resistant Staphylococcus aureus, Patient may be an isolation risk.   10/25/2021 Staphylococcus aureus, MRSA (C)  Preliminary     Comment:     Infectious disease consultation is highly recommended to rule out distant foci of infection.  Methicillin resistant Staphylococcus aureus, Patient may be an isolation risk.     No results found for: URINECX  No results found for: WOUNDCX  No results found for: STOOLCX  No results found for: RESPCX  Pain  Management Panel       Pain Management Panel Latest Ref Rng & Units 10/25/2021 10/25/2021    CREATININE UR mg/dL 97.9 97.9    AMPHETAMINES SCREEN, URINE Negative - Positive(A)    BARBITURATES SCREEN Negative - Negative    BENZODIAZEPINE SCREEN, URINE Negative - Negative    BUPRENORPHINEUR Negative - Positive(A)    COCAINE SCREEN, URINE Negative - Negative    METHADONE SCREEN, URINE Negative - Negative    METHAMPHETAMINEUR Negative - Positive(A)              ----------------------------------------------------------------------------------------------------------------------  Imaging Results (Last 24 Hours)       ** No results found for the last 24 hours. **            ----------------------------------------------------------------------------------------------------------------------    Assessment/Plan       Assessment/Plan     ASSESSMENT:    1.  Severe sepsis with lactic acid greater than 2 on admission  2.  COVID-19 pneumonia  3.  MRSA bacteremia    PLAN:    Patient on slightly decreased oxygen requirement of 2.5 L per nasal cannula today.  Afebrile, no diarrhea.  WBC improving at 22.33.  CRP improving at 6.70.    Blood cultures from 10/30/2021 2 out of 2 sets positive for MRSA. Blood culture from 10/29/2021 1 out of 1 set positive for MRSA.    Chest x-ray from 10/28/2021 reports evolving changes of CHF/edema, slight increase in right pleural effusion.    On 10/28/2021 patient underwent Port-A-Cath removal and placement of central line.    Blood cultures from 10/27/2021 2 out of 2 sets positive for MRSA.  3 out of 3 blood cultures on 10/25/2021 are so far showing MRSA.     Urinalysis was only mildly positive with 2+ bacteria but 0-2 WBCs.  As patient is asymptomatic, this does not need treated. CT chest with PE protocol on 10/25/2021 was negative for PE.  Negative for thoracic aortic aneurysm/dissection.  Moderate right pleural effusion and small left pleural effusion with compressive right basilar  atelectasis/consolidation and minimal left basilar atelectasis.  Nodular and groundglass infiltrates are scattered throughout both lungs, concerning for multifocal pneumonia.  Follow-up to resolution recommended to exclude any underlying pulmonary masses given the presence of the patient's right-sided Port-A-Cath.  COVID-19 reporting criteria: Intermediate.  Imaging features can be seen with COVID-19 pneumonia, although are nonspecific and can occur with a variety of infectious and noninfectious processes.  Chest x-ray on 10/24/2021 shows bilateral lower lobe infiltrates.  Venous duplex of bilateral lower extremities on 10/25/2021 shows no DVT.  COVID-19 and flu A/B PCR on 10/24/2021 detected COVID-19.  Legionella antigen on 10/25/2021 was negative.  Respiratory panel on 10/25/2021 was negative.  MRSA screen on 10/25/2021 was positive. Strep pneumo antigen on 10/25/2021 was negative.    Patient completed remdesivir.  Continues on Decadron.     Recommend to continue daptomycin 8 mg/kg IV every 24 hours and ceftaroline 600 mg IV every 8 hours. Recommend NILESH.  Recommend repeat blood cultures in AM. We will follow closely and adjust antibiotic therapy as appropriate.    Code Status:   Code Status and Medical Interventions:   Ordered at: 10/25/21 0441     Level Of Support Discussed With:    Patient     Code Status:    CPR     Medical Interventions (Level of Support Prior to Arrest):    Full     Scribed for Dr. Poly Singh MD by MP Fuller  11/01/21 12:20 EDT        MP Fuller  11/01/21  12:18 EDT    Physician Attestation:    The documentation recorded by the scribe accurately reflects the service I personally performed and the decisions made by me.    Poly Singh MD  11/02/21  12:18 EDT

## 2021-11-02 ENCOUNTER — APPOINTMENT (OUTPATIENT)
Dept: ULTRASOUND IMAGING | Facility: HOSPITAL | Age: 56
End: 2021-11-02

## 2021-11-02 ENCOUNTER — APPOINTMENT (OUTPATIENT)
Dept: GENERAL RADIOLOGY | Facility: HOSPITAL | Age: 56
End: 2021-11-02

## 2021-11-02 LAB
A-A DO2: 296.5 MMHG (ref 0–300)
ALBUMIN SERPL-MCNC: 2.5 G/DL (ref 3.5–5.2)
ALBUMIN/GLOB SERPL: 0.6 G/DL
ALP SERPL-CCNC: 183 U/L (ref 39–117)
ALT SERPL W P-5'-P-CCNC: 10 U/L (ref 1–33)
ANION GAP SERPL CALCULATED.3IONS-SCNC: 9.1 MMOL/L (ref 5–15)
ANISOCYTOSIS BLD QL: ABNORMAL
APTT PPP: 23.2 SECONDS (ref 25.5–35.4)
ARTERIAL PATENCY WRIST A: POSITIVE
AST SERPL-CCNC: 15 U/L (ref 1–32)
ATMOSPHERIC PRESS: 734 MMHG
BACTERIA BLD CULT: ABNORMAL
BASE EXCESS BLDA CALC-SCNC: 1.7 MMOL/L (ref 0–2)
BDY SITE: ABNORMAL
BILIRUB SERPL-MCNC: 0.8 MG/DL (ref 0–1.2)
BODY TEMPERATURE: 0 C
BUN SERPL-MCNC: 35 MG/DL (ref 6–20)
BUN/CREAT SERPL: 35.4 (ref 7–25)
CALCIUM SPEC-SCNC: 8.6 MG/DL (ref 8.6–10.5)
CHLORIDE SERPL-SCNC: 105 MMOL/L (ref 98–107)
CO2 BLDA-SCNC: 27.4 MMOL/L (ref 22–33)
CO2 SERPL-SCNC: 22.9 MMOL/L (ref 22–29)
COHGB MFR BLD: 1.3 % (ref 0–5)
CREAT SERPL-MCNC: 0.99 MG/DL (ref 0.57–1)
CRP SERPL-MCNC: 6.48 MG/DL (ref 0–0.5)
D DIMER PPP FEU-MCNC: 6.85 MCGFEU/ML (ref 0–0.5)
DEPRECATED RDW RBC AUTO: 49.6 FL (ref 37–54)
ERYTHROCYTE [DISTWIDTH] IN BLOOD BY AUTOMATED COUNT: 17.3 % (ref 12.3–15.4)
FIBRINOGEN PPP-MCNC: 482 MG/DL (ref 173–524)
GAS FLOW AIRWAY: 12 LPM
GFR SERPL CREATININE-BSD FRML MDRD: 58 ML/MIN/1.73
GLOBULIN UR ELPH-MCNC: 4 GM/DL
GLUCOSE SERPL-MCNC: 107 MG/DL (ref 65–99)
HCO3 BLDA-SCNC: 26.2 MMOL/L (ref 20–26)
HCT VFR BLD AUTO: 24.5 % (ref 34–46.6)
HCT VFR BLD CALC: 24.3 % (ref 38–51)
HGB BLD-MCNC: 7.2 G/DL (ref 12–15.9)
HGB BLDA-MCNC: 7.9 G/DL (ref 13.5–17.5)
HYPOCHROMIA BLD QL: ABNORMAL
INHALED O2 CONCENTRATION: 68 %
INR PPP: 1.15 (ref 0.9–1.1)
LYMPHOCYTES # BLD MANUAL: 1.67 10*3/MM3 (ref 0.7–3.1)
LYMPHOCYTES NFR BLD MANUAL: 1 % (ref 5–12)
LYMPHOCYTES NFR BLD MANUAL: 7 % (ref 19.6–45.3)
Lab: ABNORMAL
MAGNESIUM SERPL-MCNC: 2.1 MG/DL (ref 1.6–2.6)
MCH RBC QN AUTO: 25.4 PG (ref 26.6–33)
MCHC RBC AUTO-ENTMCNC: 29.4 G/DL (ref 31.5–35.7)
MCV RBC AUTO: 86.3 FL (ref 79–97)
METHGB BLD QL: 0.5 % (ref 0–3)
MODALITY: ABNORMAL
MONOCYTES # BLD AUTO: 0.24 10*3/MM3 (ref 0.1–0.9)
NEUTROPHILS # BLD AUTO: 21.95 10*3/MM3 (ref 1.7–7)
NEUTROPHILS NFR BLD MANUAL: 86 % (ref 42.7–76)
NEUTS BAND NFR BLD MANUAL: 6 % (ref 0–5)
NOTE: ABNORMAL
OXYHGB MFR BLDV: 97 % (ref 94–99)
PCO2 BLDA: 39.7 MM HG (ref 35–45)
PCO2 TEMP ADJ BLD: ABNORMAL MM[HG]
PH BLDA: 7.43 PH UNITS (ref 7.35–7.45)
PH, TEMP CORRECTED: ABNORMAL
PHOSPHATE SERPL-MCNC: 4.5 MG/DL (ref 2.5–4.5)
PLAT MORPH BLD: NORMAL
PLATELET # BLD AUTO: 380 10*3/MM3 (ref 140–450)
PMV BLD AUTO: 9.2 FL (ref 6–12)
PO2 BLDA: 129 MM HG (ref 83–108)
PO2 TEMP ADJ BLD: ABNORMAL MM[HG]
POTASSIUM SERPL-SCNC: 4 MMOL/L (ref 3.5–5.2)
PROT SERPL-MCNC: 6.5 G/DL (ref 6–8.5)
PROTHROMBIN TIME: 15.2 SECONDS (ref 12.8–14.5)
RBC # BLD AUTO: 2.84 10*6/MM3 (ref 3.77–5.28)
SAO2 % BLDCOA: 98.8 % (ref 94–99)
SCAN SLIDE: NORMAL
SODIUM SERPL-SCNC: 137 MMOL/L (ref 136–145)
VENTILATOR MODE: ABNORMAL
WBC # BLD AUTO: 23.86 10*3/MM3 (ref 3.4–10.8)

## 2021-11-02 PROCEDURE — 25010000002 ENOXAPARIN PER 10 MG: Performed by: INTERNAL MEDICINE

## 2021-11-02 PROCEDURE — 82805 BLOOD GASES W/O2 SATURATION: CPT

## 2021-11-02 PROCEDURE — 86140 C-REACTIVE PROTEIN: CPT | Performed by: SURGERY

## 2021-11-02 PROCEDURE — 94799 UNLISTED PULMONARY SVC/PX: CPT

## 2021-11-02 PROCEDURE — 99233 SBSQ HOSP IP/OBS HIGH 50: CPT | Performed by: INTERNAL MEDICINE

## 2021-11-02 PROCEDURE — 82375 ASSAY CARBOXYHB QUANT: CPT

## 2021-11-02 PROCEDURE — 83050 HGB METHEMOGLOBIN QUAN: CPT

## 2021-11-02 PROCEDURE — 87186 SC STD MICRODIL/AGAR DIL: CPT | Performed by: INTERNAL MEDICINE

## 2021-11-02 PROCEDURE — 85379 FIBRIN DEGRADATION QUANT: CPT | Performed by: INTERNAL MEDICINE

## 2021-11-02 PROCEDURE — 85610 PROTHROMBIN TIME: CPT | Performed by: INTERNAL MEDICINE

## 2021-11-02 PROCEDURE — 93970 EXTREMITY STUDY: CPT | Performed by: RADIOLOGY

## 2021-11-02 PROCEDURE — 25010000002 DAPTOMYCIN PER 1 MG: Performed by: INTERNAL MEDICINE

## 2021-11-02 PROCEDURE — 36600 WITHDRAWAL OF ARTERIAL BLOOD: CPT

## 2021-11-02 PROCEDURE — 99222 1ST HOSP IP/OBS MODERATE 55: CPT | Performed by: SPECIALIST

## 2021-11-02 PROCEDURE — 76604 US EXAM CHEST: CPT

## 2021-11-02 PROCEDURE — 85007 BL SMEAR W/DIFF WBC COUNT: CPT | Performed by: INTERNAL MEDICINE

## 2021-11-02 PROCEDURE — 71045 X-RAY EXAM CHEST 1 VIEW: CPT

## 2021-11-02 PROCEDURE — 93970 EXTREMITY STUDY: CPT

## 2021-11-02 PROCEDURE — 83735 ASSAY OF MAGNESIUM: CPT | Performed by: INTERNAL MEDICINE

## 2021-11-02 PROCEDURE — 85025 COMPLETE CBC W/AUTO DIFF WBC: CPT | Performed by: INTERNAL MEDICINE

## 2021-11-02 PROCEDURE — 25010000002 DEXAMETHASONE PER 1 MG: Performed by: SURGERY

## 2021-11-02 PROCEDURE — 87040 BLOOD CULTURE FOR BACTERIA: CPT | Performed by: INTERNAL MEDICINE

## 2021-11-02 PROCEDURE — 71045 X-RAY EXAM CHEST 1 VIEW: CPT | Performed by: RADIOLOGY

## 2021-11-02 PROCEDURE — 76604 US EXAM CHEST: CPT | Performed by: RADIOLOGY

## 2021-11-02 PROCEDURE — 25010000002 CEFTAROLINE FOSAMIL PER 10 MG: Performed by: INTERNAL MEDICINE

## 2021-11-02 PROCEDURE — 87150 DNA/RNA AMPLIFIED PROBE: CPT | Performed by: INTERNAL MEDICINE

## 2021-11-02 PROCEDURE — 85384 FIBRINOGEN ACTIVITY: CPT | Performed by: INTERNAL MEDICINE

## 2021-11-02 PROCEDURE — 84100 ASSAY OF PHOSPHORUS: CPT | Performed by: INTERNAL MEDICINE

## 2021-11-02 PROCEDURE — 99221 1ST HOSP IP/OBS SF/LOW 40: CPT | Performed by: INTERNAL MEDICINE

## 2021-11-02 PROCEDURE — 85730 THROMBOPLASTIN TIME PARTIAL: CPT | Performed by: INTERNAL MEDICINE

## 2021-11-02 PROCEDURE — 25010000002 HYDRALAZINE PER 20 MG: Performed by: SURGERY

## 2021-11-02 PROCEDURE — 87147 CULTURE TYPE IMMUNOLOGIC: CPT | Performed by: INTERNAL MEDICINE

## 2021-11-02 PROCEDURE — 80053 COMPREHEN METABOLIC PANEL: CPT | Performed by: INTERNAL MEDICINE

## 2021-11-02 RX ORDER — FENTANYL CITRATE/PF 100MCG/2ML
SYRINGE (ML) INTRAVENOUS
Status: DISCONTINUED
Start: 2021-11-02 | End: 2021-11-02 | Stop reason: WASHOUT

## 2021-11-02 RX ORDER — HYDRALAZINE HYDROCHLORIDE 25 MG/1
25 TABLET, FILM COATED ORAL EVERY 8 HOURS SCHEDULED
Status: DISCONTINUED | OUTPATIENT
Start: 2021-11-02 | End: 2021-11-06 | Stop reason: HOSPADM

## 2021-11-02 RX ORDER — SODIUM CHLORIDE 0.9 % (FLUSH) 0.9 %
10 SYRINGE (ML) INJECTION AS NEEDED
Status: DISCONTINUED | OUTPATIENT
Start: 2021-11-02 | End: 2021-11-06 | Stop reason: HOSPADM

## 2021-11-02 RX ORDER — SODIUM CHLORIDE 0.9 % (FLUSH) 0.9 %
20 SYRINGE (ML) INJECTION AS NEEDED
Status: DISCONTINUED | OUTPATIENT
Start: 2021-11-02 | End: 2021-11-06 | Stop reason: HOSPADM

## 2021-11-02 RX ORDER — SODIUM CHLORIDE 0.9 % (FLUSH) 0.9 %
10 SYRINGE (ML) INJECTION EVERY 12 HOURS SCHEDULED
Status: DISCONTINUED | OUTPATIENT
Start: 2021-11-02 | End: 2021-11-06 | Stop reason: HOSPADM

## 2021-11-02 RX ORDER — PROPOFOL 10 MG/ML
VIAL (ML) INTRAVENOUS
Status: DISCONTINUED
Start: 2021-11-02 | End: 2021-11-02 | Stop reason: WASHOUT

## 2021-11-02 RX ADMIN — CLONAZEPAM 0.75 MG: 0.5 TABLET ORAL at 16:03

## 2021-11-02 RX ADMIN — Medication 1 TABLET: at 09:29

## 2021-11-02 RX ADMIN — DEXAMETHASONE SODIUM PHOSPHATE 6 MG: 4 INJECTION, SOLUTION INTRA-ARTICULAR; INTRALESIONAL; INTRAMUSCULAR; INTRAVENOUS; SOFT TISSUE at 09:30

## 2021-11-02 RX ADMIN — LEVETIRACETAM 500 MG: 500 TABLET, FILM COATED ORAL at 09:29

## 2021-11-02 RX ADMIN — HYDRALAZINE HYDROCHLORIDE 25 MG: 25 TABLET, FILM COATED ORAL at 16:02

## 2021-11-02 RX ADMIN — CLONAZEPAM 0.75 MG: 0.5 TABLET ORAL at 20:03

## 2021-11-02 RX ADMIN — SODIUM CHLORIDE, PRESERVATIVE FREE 10 ML: 5 INJECTION INTRAVENOUS at 20:04

## 2021-11-02 RX ADMIN — BUPROPION HYDROCHLORIDE 150 MG: 150 TABLET, FILM COATED, EXTENDED RELEASE ORAL at 09:29

## 2021-11-02 RX ADMIN — PANTOPRAZOLE SODIUM 40 MG: 40 TABLET, DELAYED RELEASE ORAL at 04:13

## 2021-11-02 RX ADMIN — CEFTAROLINE FOSAMIL 600 MG: 600 POWDER, FOR SOLUTION INTRAVENOUS at 12:11

## 2021-11-02 RX ADMIN — Medication 1 CAPSULE: at 09:29

## 2021-11-02 RX ADMIN — HYDRALAZINE HYDROCHLORIDE 10 MG: 20 INJECTION INTRAMUSCULAR; INTRAVENOUS at 04:12

## 2021-11-02 RX ADMIN — CLONAZEPAM 0.75 MG: 0.5 TABLET ORAL at 09:30

## 2021-11-02 RX ADMIN — DAPTOMYCIN 550 MG: 500 INJECTION, POWDER, LYOPHILIZED, FOR SOLUTION INTRAVENOUS at 12:11

## 2021-11-02 RX ADMIN — SODIUM CHLORIDE, PRESERVATIVE FREE 3 ML: 5 INJECTION INTRAVENOUS at 09:30

## 2021-11-02 RX ADMIN — BUPROPION HYDROCHLORIDE 150 MG: 150 TABLET, FILM COATED, EXTENDED RELEASE ORAL at 20:03

## 2021-11-02 RX ADMIN — SODIUM CHLORIDE, PRESERVATIVE FREE 3 ML: 5 INJECTION INTRAVENOUS at 20:03

## 2021-11-02 RX ADMIN — METOPROLOL TARTRATE 25 MG: 25 TABLET, FILM COATED ORAL at 20:03

## 2021-11-02 RX ADMIN — CEFTAROLINE FOSAMIL 600 MG: 600 POWDER, FOR SOLUTION INTRAVENOUS at 22:47

## 2021-11-02 RX ADMIN — LEVOTHYROXINE SODIUM 125 MCG: 125 TABLET ORAL at 09:29

## 2021-11-02 RX ADMIN — HYDRALAZINE HYDROCHLORIDE 25 MG: 25 TABLET, FILM COATED ORAL at 22:47

## 2021-11-02 RX ADMIN — CASTOR OIL AND BALSAM, PERU 1 APPLICATION: 788; 87 OINTMENT TOPICAL at 09:29

## 2021-11-02 RX ADMIN — ENOXAPARIN SODIUM 40 MG: 40 INJECTION SUBCUTANEOUS at 18:10

## 2021-11-02 RX ADMIN — METOPROLOL TARTRATE 25 MG: 25 TABLET, FILM COATED ORAL at 09:29

## 2021-11-02 RX ADMIN — OXYCODONE HYDROCHLORIDE 15 MG: 15 TABLET ORAL at 20:03

## 2021-11-02 RX ADMIN — LEVETIRACETAM 500 MG: 500 TABLET, FILM COATED ORAL at 20:03

## 2021-11-02 RX ADMIN — DULOXETINE HYDROCHLORIDE 60 MG: 60 CAPSULE, DELAYED RELEASE ORAL at 04:13
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
## 2021-11-02 NOTE — PLAN OF CARE
Goal Outcome Evaluation:      No complaints of chest pain or shortness of breath. Will continue to monitor and follow plan of care.      Problem: Adult Inpatient Plan of Care  Goal: Plan of Care Review  Outcome: Ongoing, Progressing  Goal: Patient-Specific Goal (Individualized)  Outcome: Ongoing, Progressing  Goal: Absence of Hospital-Acquired Illness or Injury  Outcome: Ongoing, Progressing  Intervention: Identify and Manage Fall Risk  Recent Flowsheet Documentation  Taken 11/2/2021 0300 by Jay Holguin, RN  Safety Promotion/Fall Prevention:   assistive device/personal items within reach   clutter free environment maintained   fall prevention program maintained   nonskid shoes/slippers when out of bed   room organization consistent   safety round/check completed  Taken 11/2/2021 0100 by Jay Holguin, RN  Safety Promotion/Fall Prevention:   assistive device/personal items within reach   clutter free environment maintained   fall prevention program maintained   nonskid shoes/slippers when out of bed   safety round/check completed  Taken 11/1/2021 2300 by Jay Holguin, RN  Safety Promotion/Fall Prevention:   assistive device/personal items within reach   clutter free environment maintained   fall prevention program maintained   nonskid shoes/slippers when out of bed   room organization consistent   safety round/check completed  Taken 11/1/2021 2022 by Jay Holguin, RN  Safety Promotion/Fall Prevention:   assistive device/personal items within reach   clutter free environment maintained   fall prevention program maintained   nonskid shoes/slippers when out of bed   room organization consistent   safety round/check completed  Intervention: Prevent Skin Injury  Recent Flowsheet Documentation  Taken 11/1/2021 2022 by Jay Holguin, RN  Skin Protection:   adhesive use limited   electrode sites changed   pulse oximeter probe site changed  Intervention: Prevent and Manage VTE (venous  thromboembolism) Risk  Recent Flowsheet Documentation  Taken 11/1/2021 2022 by Jay Holguin RN  VTE Prevention/Management: (See MAR) bleeding risk factor(s) identified  Goal: Optimal Comfort and Wellbeing  Outcome: Ongoing, Progressing  Intervention: Provide Person-Centered Care  Recent Flowsheet Documentation  Taken 11/1/2021 2022 by Jay Holguin RN  Trust Relationship/Rapport: care explained  Goal: Readiness for Transition of Care  Outcome: Ongoing, Progressing     Problem: Suicide Risk  Goal: Absence of Self-Harm  Outcome: Ongoing, Progressing  Intervention: Promote Psychosocial Wellbeing  Recent Flowsheet Documentation  Taken 11/1/2021 2022 by Jay Holguin RN  Supportive Measures: active listening utilized     Problem: Skin Injury Risk Increased  Goal: Skin Health and Integrity  Outcome: Ongoing, Progressing  Intervention: Optimize Skin Protection  Recent Flowsheet Documentation  Taken 11/1/2021 2022 by Jay Holguin RN  Pressure Reduction Techniques:   frequent weight shift encouraged   weight shift assistance provided  Pressure Reduction Devices:   foam padding utilized   pressure-redistributing mattress utilized  Skin Protection:   adhesive use limited   electrode sites changed   pulse oximeter probe site changed     Problem: Fall Injury Risk  Goal: Absence of Fall and Fall-Related Injury  Outcome: Ongoing, Progressing  Intervention: Promote Injury-Free Environment  Recent Flowsheet Documentation  Taken 11/2/2021 0300 by Jay Holguin RN  Safety Promotion/Fall Prevention:   assistive device/personal items within reach   clutter free environment maintained   fall prevention program maintained   nonskid shoes/slippers when out of bed   room organization consistent   safety round/check completed  Taken 11/2/2021 0100 by Jay Holguin RN  Safety Promotion/Fall Prevention:   assistive device/personal items within reach   clutter free environment maintained   fall  prevention program maintained   nonskid shoes/slippers when out of bed   safety round/check completed  Taken 11/1/2021 2300 by Jay Holguin, RN  Safety Promotion/Fall Prevention:   assistive device/personal items within reach   clutter free environment maintained   fall prevention program maintained   nonskid shoes/slippers when out of bed   room organization consistent   safety round/check completed  Taken 11/1/2021 2022 by Jay Holguin, RN  Safety Promotion/Fall Prevention:   assistive device/personal items within reach   clutter free environment maintained   fall prevention program maintained   nonskid shoes/slippers when out of bed   room organization consistent   safety round/check completed

## 2021-11-02 NOTE — PROGRESS NOTES
PROGRESS NOTE         Patient Identification:  Name:  Karely Villarreal  Age:  56 y.o.  Sex:  female  :  1965  MRN:  [id number]  Visit Number:  [id number]  Primary Care Provider:  Tracie Levi APRN         LOS: 8 days       ----------------------------------------------------------------------------------------------------------------------  Subjective       Chief Complaints:    Shortness of Breath and Pain        Interval History:      Patient continues on 2.5 L per nasal cannula today with no apparent distress.  Afebrile, no diarrhea.  WBC 23.86 likely related to steroid therapy.  CRP improving at 6.48.  Blood cultures from 2021 and 21 currently in process.  ----------------------------------------------------------------------------------------------------------------------      Objective       [facility] Meds:  buPROPion SR, 150 mg, Oral, BID  castor oil-balsam peru, 1 application, Topical, Q12H  ceftaroline, 600 mg, Intravenous, Q8H  clonazePAM, 0.75 mg, Oral, TID  DAPTOmycin, 8 mg/kg (Adjusted), Intravenous, Q24H  dexamethasone, 6 mg, Intravenous, Daily  DULoxetine, 60 mg, Oral, QAM  enoxaparin, 40 mg, Subcutaneous, Q24H  hydrALAZINE, 25 mg, Oral, Q8H  lactobacillus acidophilus, 1 capsule, Oral, Daily  levETIRAcetam, 500 mg, Oral, Q12H  levothyroxine, 125 mcg, Oral, Daily  metoprolol tartrate, 25 mg, Oral, BID  multivitamin, 1 tablet, Oral, Daily  pantoprazole, 40 mg, Oral, Q AM  sodium chloride, 3 mL, Intravenous, Q12H      hold, 1 each  Pharmacy Consult - Pharmacy to dose,       ----------------------------------------------------------------------------------------------------------------------    Vital Signs:  Temp:  [97 °F (36.1 °C)-97.9 °F (36.6 °C)] 97.9 °F (36.6 °C)  Heart Rate:  [78-89] 89  Resp:  [20-24] 22  BP: (153-180)/(76-94) 153/80  No data found.  SpO2 Percentage    21 2215 21 0358 21 0700   SpO2: 93% 92% 92%     SpO2:  [92 %-97 %] 92 %   on  Flow (L/min):  [2.5] 2.5;   Device (Oxygen Therapy): nasal cannula    Body mass index is 30.83 kg/m².  Wt Readings from Last 3 Encounters:   11/02/21 86.6 kg (191 lb)   10/23/21 83.9 kg (185 lb)   05/17/21 88.9 kg (196 lb)        Intake/Output Summary (Last 24 hours) at 11/2/2021 1048  Last data filed at 11/2/2021 0401  Gross per 24 hour   Intake 820 ml   Output 1950 ml   Net -1130 ml     Diet Regular  ----------------------------------------------------------------------------------------------------------------------      Physical Exam:    Deferred due to COVID-19 isolation.  ----------------------------------------------------------------------------------------------------------------------  Results from last 7 days   Lab Units 10/31/21  0120   CK TOTAL U/L 41             Results from last 7 days   Lab Units 11/02/21  0121 11/01/21  0459 10/31/21  1932 10/31/21  0422 10/31/21  0422 10/31/21  0120 10/29/21  0326   CRP mg/dL 6.48* 6.70*  --   --   --  7.34*  --    WBC 10*3/mm3 23.86* 22.33*  --   --  23.43*  --    < >   HEMOGLOBIN g/dL 7.2* 7.3* 7.6*   < > 7.7*  --    < >   HEMATOCRIT % 24.5* 22.9*  --   --  25.2*  --    < >   MCV fL 86.3 81.2  --   --  80.3  --    < >   MCHC g/dL 29.4* 31.9  --   --  30.6*  --    < >   PLATELETS 10*3/mm3 380 296  --   --  230  --    < >   INR  1.15*  --   --   --   --   --   --     < > = values in this interval not displayed.     Results from last 7 days   Lab Units 11/02/21  0121 11/01/21  0459 10/31/21  1627 10/31/21  0120 10/29/21  0326 10/28/21  0450 10/27/21  0500 10/27/21  0500   SODIUM mmol/L 137 142  --  143   < >  --   --   --    POTASSIUM mmol/L 4.0 3.9  --  3.8   < >  --   --   --    MAGNESIUM mg/dL 2.1 2.1  --  2.1  --   --   --   --    CHLORIDE mmol/L 105 107  --  108*   < >  --   --   --    CO2 mmol/L 22.9 23.5  --  24.3   < >  --   --   --    BUN mg/dL 35* 32*  --  34*   < >  --   --   --    CREATININE mg/dL 0.99 0.97  --  1.05*   < > 1.08*   < > 1.38*   EGFR  IF NONAFRICN AM mL/min/1.73 58* 60*  --  54*   < > 53*   < > 40*   CALCIUM mg/dL 8.6 8.5*  --  8.6   < >  --   --   --    GLUCOSE mg/dL 107* 111*  --  147*   < >  --   --   --    ALBUMIN g/dL 2.50*  --   --   --   --  2.38*  --  1.98*   BILIRUBIN mg/dL 0.8  --  1.0  --   --  1.4*   < > 1.0   ALK PHOS U/L 183*  --   --   --   --  148*  --  171*   AST (SGOT) U/L 15  --   --   --   --  33*  --  20   ALT (SGPT) U/L 10  --   --   --   --  21  --  16    < > = values in this interval not displayed.   Estimated Creatinine Clearance: 71.2 mL/min (by C-G formula based on SCr of 0.99 mg/dL).  No results found for: AMMONIA    No results found for: HGBA1C, POCGLU  Lab Results   Component Value Date    HGBA1C 5.90 (H) 09/15/2019     Lab Results   Component Value Date    TSH 47.200 (H) 10/25/2021    FREET4 0.16 (L) 10/25/2021       Blood Culture   Date Value Ref Range Status   10/25/2021 Staphylococcus aureus, MRSA (C)  Preliminary     Comment:     Infectious disease consultation is highly recommended to rule out distant foci of infection.  Methicillin resistant Staphylococcus aureus, Patient may be an isolation risk.   10/25/2021 Staphylococcus aureus, MRSA (C)  Preliminary     Comment:     Infectious disease consultation is highly recommended to rule out distant foci of infection.  Methicillin resistant Staphylococcus aureus, Patient may be an isolation risk.   10/25/2021 Staphylococcus aureus, MRSA (C)  Preliminary     Comment:     Infectious disease consultation is highly recommended to rule out distant foci of infection.  Methicillin resistant Staphylococcus aureus, Patient may be an isolation risk.     No results found for: URINECX  No results found for: WOUNDCX  No results found for: STOOLCX  No results found for: RESPCX  Pain Management Panel       Pain Management Panel Latest Ref Rng & Units 10/25/2021 10/25/2021    CREATININE UR mg/dL 97.9 97.9    AMPHETAMINES SCREEN, URINE Negative - Positive(A)    BARBITURATES SCREEN  Negative - Negative    BENZODIAZEPINE SCREEN, URINE Negative - Negative    BUPRENORPHINEUR Negative - Positive(A)    COCAINE SCREEN, URINE Negative - Negative    METHADONE SCREEN, URINE Negative - Negative    METHAMPHETAMINEUR Negative - Positive(A)              ----------------------------------------------------------------------------------------------------------------------  Imaging Results (Last 24 Hours)       Procedure Component Value Units Date/Time    US Thoracentesis [[id number]] Resulted: 11/02/21 1030    Specimen: Body Fluid Updated: 11/02/21 1030    CT Head Without Contrast [[id number]] Collected: 11/01/21 2109     Updated: 11/01/21 2111    Narrative:      CT Head WO: 11/1/2021 10:03 PM    HISTORY:   Mental status change    TECHNIQUE:   Axial unenhanced head CT. Radiation dose reduction techniques included automated exposure control or exposure modulation based on body size. Radiation audit for number of CT and nuclear cardiology exams performed in the last year: 0.      COMPARISON:   CT of the head from 5/17/2021    FINDINGS:   No intracranial hemorrhage, mass, or definite acute infarct. Stable appearing chronic infarct is noted in the right parietal region. No hydrocephalus or extra-axial fluid collection. The skull base, calvarium, and extracranial soft tissues are normal.      Impression:      No acute intracranial abnormality.      Signer Name: Kathryn Rowland MD   Signed: 11/1/2021 9:09 PM   Workstation Name: [id number]    [facility]    CT Abdomen Pelvis Without Contrast [[id number]] Collected: 11/01/21 1638     Updated: 11/01/21 1652    Narrative:      EXAM:    CT Abdomen and Pelvis Without Intravenous Contrast     EXAM DATE:    10/25/2021 1:35 PM     CLINICAL HISTORY:    Abdominal pain, acute, nonlocalized     TECHNIQUE:    Axial computed tomography images of the abdomen and pelvis without  intravenous contrast.  Sagittal and coronal reformatted images were  created and  reviewed.  This CT exam was performed using one or more of  the following dose reduction techniques:  automated exposure control,  adjustment of the mA and/or kV according to patient size, and/or use of  iterative reconstruction technique.     COMPARISON:    10/07/2016     FINDINGS:    LIMITATIONS:  Limited due to motion artifact.    LUNG BASES:  Unremarkable.  No mass.  No consolidation.    PLEURAL SPACE:  Small bilateral pleural effusions.      ABDOMEN:    LIVER:  Unremarkable.    GALLBLADDER AND BILE DUCTS:  Unremarkable.  No calcified stones.  No  ductal dilation.    PANCREAS:  Unremarkable.  No ductal dilation.    SPLEEN:  Unremarkable.  No splenomegaly.    ADRENALS:  Unremarkable.  No mass.    KIDNEYS AND URETERS:  Unremarkable.  No obstructing stones.  No  hydronephrosis.    STOMACH AND BOWEL:  Abundant stool throughout the colon.  No  obstruction.  No mucosal thickening.      PELVIS:    APPENDIX:  No findings to suggest acute appendicitis.    BLADDER:  Unremarkable.  No stones.    REPRODUCTIVE:  Unremarkable as visualized.      ABDOMEN and PELVIS:    INTRAPERITONEAL SPACE:  Unremarkable.  No free air.  No significant  fluid collection.    BONES/JOINTS:  No acute fracture.  No dislocation.    SOFT TISSUES:  Unremarkable.    VASCULATURE:  Unremarkable.  No abdominal aortic aneurysm.    LYMPH NODES:  Unremarkable.  No enlarged lymph nodes.       Impression:      1.  Abundant stool throughout the colon.  2.  Small bilateral pleural effusions.  3.  Limited due to motion artifact.     This report was finalized on 11/1/2021 4:39 PM by Dr. Miguel Roe MD.               ----------------------------------------------------------------------------------------------------------------------    Assessment/Plan       Assessment/Plan     ASSESSMENT:    1.  Severe sepsis with lactic acid greater than 2 on admission  2.  COVID-19 pneumonia  3.  MRSA bacteremia    PLAN:    Patient continues on 2.5 L per nasal cannula today  with no apparent distress.  Afebrile, no diarrhea.  WBC 23.86 likely related to steroid therapy.  CRP improving at 6.48.  Blood cultures from 11/1/2021 and 11/2/21 currently in process.    Blood cultures from 10/30/2021 2 out of 2 sets positive for MRSA. Blood culture from 10/29/2021 1 out of 1 set positive for MRSA.    Chest x-ray from 10/28/2021 reports evolving changes of CHF/edema, slight increase in right pleural effusion.    On 10/28/2021 patient underwent Port-A-Cath removal and placement of central line.    Blood cultures from 10/27/2021 2 out of 2 sets positive for MRSA.  3 out of 3 blood cultures on 10/25/2021 are so far showing MRSA.     Urinalysis was only mildly positive with 2+ bacteria but 0-2 WBCs.  As patient is asymptomatic, this does not need treated. CT chest with PE protocol on 10/25/2021 was negative for PE.  Negative for thoracic aortic aneurysm/dissection.  Moderate right pleural effusion and small left pleural effusion with compressive right basilar atelectasis/consolidation and minimal left basilar atelectasis.  Nodular and groundglass infiltrates are scattered throughout both lungs, concerning for multifocal pneumonia.  Follow-up to resolution recommended to exclude any underlying pulmonary masses given the presence of the patient's right-sided Port-A-Cath.  COVID-19 reporting criteria: Intermediate.  Imaging features can be seen with COVID-19 pneumonia, although are nonspecific and can occur with a variety of infectious and noninfectious processes.  Chest x-ray on 10/24/2021 shows bilateral lower lobe infiltrates.  Venous duplex of bilateral lower extremities on 10/25/2021 shows no DVT.  COVID-19 and flu A/B PCR on 10/24/2021 detected COVID-19.  Legionella antigen on 10/25/2021 was negative.  Respiratory panel on 10/25/2021 was negative.  MRSA screen on 10/25/2021 was positive. Strep pneumo antigen on 10/25/2021 was negative.    Patient completed remdesivir.  Continues on Decadron.      Recommend to continue daptomycin 8 mg/kg IV every 24 hours and ceftaroline 600 mg IV every 8 hours. Recommend NILESH.  We will follow closely and adjust antibiotic therapy as appropriate.    Code Status:   Code Status and Medical Interventions:   Ordered at: 10/25/21 0445     Level Of Support Discussed With:    Patient     Code Status (Patient has no pulse and is not breathing):    CPR (Attempt to Resuscitate)     Medical Interventions (Patient has pulse or is breathing):    Full         MP Fuller  11/02/21  10:48 EDT

## 2021-11-02 NOTE — NURSING NOTE
Patient taken down for STAT head CT. While there Pharmacy called and notified us that they do not have any more stock of Ceftaroline and will hopefully get more tomorrow. Notified Gabby GAY.

## 2021-11-02 NOTE — PROGRESS NOTES
[facility] HOSPITALIST PROGRESS NOTE     Patient Identification:  Name:  Karely Villarreal  Age:  56 y.o.  Sex:  female  :  1965  MRN:  [id number]  Visit Number:  [id number]  ROOM: [address] [place] [address]     Primary Care Provider:  Tracie Levi APRN     Date of Admission: 10/24/2021    Length of stay in inpatient status:  8    Subjective     Chief Compliant:    Chief Complaint   Patient presents with   • Shortness of Breath   • Pain     History of Presenting Illness:  In brief, 56-year-old female admitted on 10/24/2021 with shortness of breath after she took antibiotics for a week for an outpatient diagnosis of bronchitis.  She was Covid 19 negativer at the time of the bronchitis diagnosis but her symptoms worsened and she was positive for COVID-19 in our emergency department.  Also, the patient was noted to be septic and possibly have a urinary tract infection.  She received MAB in the emergency department and at first she was not requiring oxygen.  She was started on empiric Rocephin for the UTI.  However, by hospital day #2, the patient was requiring oxygen and thus she was given 5 days of remdesivir; she is also started on Decadron and remains on the steroid.  Eventually, the patient's blood culture started growing MRSA; infectious disease team was consulted and the patient was first started on vancomycin; this was then changed to daptomycin and ceftaroline as she continued to worsen.  The patient did have a port placed prior to admission; this port has been removed by general surgery on 10/28/2021; a right-sided internal jugular central line was placed in its place.  Transthoracic echocardiogram did not show any obvious vegetations.  Cardiology was consulted but NILESH has been delayed due to the patient's positive COVID-19 status.     Today, patient states that she still feels weak.  She has a bilateral frontal headache that is dull.  She denies chest pain.  She still having a cough productive of some  "clear sputum.  She also has shortness of breath.  She has lost her appetite but she denies nausea, vomiting, and diarrhea.    Objective     Current Hospital Meds:buPROPion SR, 150 mg, Oral, BID  castor oil-balsam peru, 1 application, Topical, Q12H  ceftaroline, 600 mg, Intravenous, Q8H  clonazePAM, 0.75 mg, Oral, TID  DAPTOmycin, 8 mg/kg (Adjusted), Intravenous, Q24H  dexamethasone, 6 mg, Intravenous, Daily  DULoxetine, 60 mg, Oral, QAM  enoxaparin, 40 mg, Subcutaneous, Q24H  lactobacillus acidophilus, 1 capsule, Oral, Daily  levETIRAcetam, 500 mg, Oral, Q12H  levothyroxine, 125 mcg, Oral, Daily  metoprolol tartrate, 25 mg, Oral, BID  multivitamin, 1 tablet, Oral, Daily  pantoprazole, 40 mg, Oral, Q AM  sodium chloride, 3 mL, Intravenous, Q12H    hold, 1 each  Pharmacy Consult - Pharmacy to dose,       Current Antimicrobial Therapy:  Anti-Infectives (From admission, onward)    Ordered     Dose/Rate Route Frequency Start Stop    10/30/21 0952  ceftaroline (TEFLARO) 600 mg in sodium chloride 0.9 % 100 mL IVPB-VTB        Ordering Provider: Poly Singh MD    600 mg Intravenous Every 8 Hours 10/30/21 1200 11/13/21 1159    10/30/21 0944  DAPTOmycin (CUBICIN) 550 mg/50 mL 0.9% sodium chloride IVPB        Ordering Provider: Poly Singh MD    8 mg/kg × 71.7 kg (Adjusted)  over 30 Minutes Intravenous Every 24 Hours 10/30/21 1100 12/11/21 1059    10/26/21 1528  remdesivir 100 mg in 270 mL NS        Ordering Provider: Ruma Madden MD   \"Followed by\" Linked Group Details    100 mg  over 60 Minutes Intravenous Every 24 Hours 10/27/21 1600 10/31/21 1559    10/26/21 1528  remdesivir 200 mg in 290 mL NS        Ordering Provider: Lucia Ardon MD   \"Followed by\" Linked Group Details    200 mg  over 60 Minutes Intravenous Every 24 Hours 10/26/21 1600 10/26/21 1852    10/25/21 2126  vancomycin 1750 mg/500 mL 0.9% NS IVPB ([facility])        Ordering Provider: Desmond Teixeira MD    20 mg/kg × 88.2 kg  over 120 " Minutes Intravenous Once 10/25/21 2215 10/26/21 0027    10/25/21 0432  cefTRIAXone (ROCEPHIN) 1 g in sodium chloride 0.9 % 100 mL IVPB-VTB        Ordering Provider: Arie Wall MD    1 g  200 mL/hr over 30 Minutes Intravenous Once 10/25/21 0434 10/25/21 0616        Current Diuretic Therapy:  Diuretics (From admission, onward)    Ordered     Dose/Rate Route Frequency Start Stop    10/30/21 1653  furosemide (LASIX) injection 40 mg        Ordering Provider: Lucia Ardon MD    40 mg Intravenous Once 10/30/21 1800 10/30/21 1736        ----------------------------------------------------------------------------------------------------------------------  Vital Signs:  Temp:  [97 °F (36.1 °C)-97.9 °F (36.6 °C)] 97.9 °F (36.6 °C)  Heart Rate:  [78-86] 86  Resp:  [20-24] 22  BP: (156-180)/(76-94) 162/84  SpO2:  [92 %-97 %] 92 %  on  Flow (L/min):  [2.5] 2.5;   Device (Oxygen Therapy): nasal cannula  Body mass index is 30.83 kg/m².    Wt Readings from Last 3 Encounters:   11/02/21 86.6 kg (191 lb)   10/23/21 83.9 kg (185 lb)   05/17/21 88.9 kg (196 lb)     Intake & Output (last 3 days)       10/30 0701  10/31 0700 10/31 0701  11/01 0700 11/01 0701 11/02 0700 11/02 0701 11/03 0700    P.O. 660 1840 940     I.V. (mL/kg) 270 (3.1)       IV Piggyback 150       Total Intake(mL/kg) 1080 (12.5) 1840 (21.1) 940 (10.9)     Urine (mL/kg/hr) 1560 (0.7) 1750 (0.8) 1950 (0.9)     Stool 0 0 0     Total Output 1560 1750 1950     Net -480 +90 -1010             Urine Unmeasured Occurrence 2 x  1 x     Stool Unmeasured Occurrence 3 x 6 x 1 x         Diet Regular  ----------------------------------------------------------------------------------------------------------------------  Physical Exam; exam appears unchanged compared to yesterday except that the swelling around the elbows has improved.  Vitals reviewed.   Constitutional:       General: She is in acute distress.      Appearance: She is well-developed. She is obese. She is not  toxic-appearing or diaphoretic.      Interventions: Nasal cannula in place.   HENT:      Head: Normocephalic and atraumatic.      Right Ear: External ear normal.      Left Ear: External ear normal.      Nose: Nose normal.   Eyes:      General: No scleral icterus.        Right eye: No discharge.         Left eye: No discharge.      Pupils: Pupils are equal, round, and reactive to light.   Cardiovascular:      Rate and Rhythm: Normal rate and regular rhythm.      Pulses: Normal pulses.      Heart sounds: No murmur heard.  Pulmonary:      Effort: No accessory muscle usage, prolonged expiration or respiratory distress.      Breath sounds: No stridor. No wheezing or rales.   Abdominal:      General: Bowel sounds are normal. There is no distension.      Palpations: Abdomen is soft.   Musculoskeletal:         General: No deformity or signs of injury.   Skin:     Capillary Refill: Capillary refill takes less than 2 seconds.      Coloration: Skin is not jaundiced or pale.      Findings: Bruising present.   Neurological:      Mental Status: She is alert and oriented to person, place, and time. Mental status is at baseline.      Cranial Nerves: No cranial nerve deficit.      Comments: She can follow all commands.   Psychiatric:         Attention and Perception: Attention normal.         Mood and Affect: Affect is blunt.         Speech: Speech normal.         Behavior: Behavior normal. Behavior is cooperative.         Thought Content: Thought content normal.         Cognition and Memory: Cognition normal.   ----------------------------------------------------------------------------------------------------------------------  Tele:  NS with heart rates 70-90's.  I have personally reviewed/looked at the telemetry strips.  ----------------------------------------------------------------------------------------------------------------------  LABS:  COVID LABS:  Results From Last 14 Days   Lab Units 11/02/21  0121 11/01/21  0459  10/31/21  0120 10/26/21  1257 10/25/21  1026 10/25/21  0653 10/25/21  0528 10/24/21  2210 10/24/21  2210   PROBNP pg/mL  --   --   --   --   --   --   --   --  2,743.0*   CK TOTAL U/L  --   --  41  --   --   --   --   --   --    CRP mg/dL 6.48* 6.70* 7.34*   < > 23.56*  --   --    < > 25.76*   D DIMER QUANT MCGFEU/mL 6.85*  --   --   --   --   --   --   --  14.78*   FERRITIN ng/mL  --   --   --   --   --   --   --   --  588.30*   LACTATE mmol/L  --   --   --   --   --  2.0 2.1*  --   --    LDH U/L  --   --   --   --   --   --   --   --  242*   PROCALCITONIN ng/mL  --   --   --   --  3.00*  --   --   --   --    PROTIME Seconds 15.2*  --   --   --   --   --   --   --   --    INR  1.15*  --   --   --   --   --   --   --   --    TROPONIN T ng/mL  --   --   --   --   --   --   --   --  <0.010    < > = values in this interval not displayed.       CBC and coagulation:  Results from last 7 days   Lab Units 11/02/21  0121 11/01/21  0459 10/31/21  1932 10/31/21  0422 10/31/21  0120 10/29/21  2304 10/29/21  0326 10/26/21  1257   CRP mg/dL 6.48* 6.70*  --   --  7.34* 9.37* 12.81* 15.17*   WBC 10*3/mm3 23.86* 22.33*  --  23.43*  --   --  22.49* 18.15*   HEMOGLOBIN g/dL 7.2* 7.3* 7.6* 7.7*  --   --  9.3* 8.7*   HEMATOCRIT % 24.5* 22.9*  --  25.2*  --   --  29.8* 28.5*   MCV fL 86.3 81.2  --  80.3  --   --  79.7 85.1   MCHC g/dL 29.4* 31.9  --  30.6*  --   --  31.2* 30.5*   PLATELETS 10*3/mm3 380 296  --  230  --   --  129* 87*   INR  1.15*  --   --   --   --   --   --   --    D DIMER QUANT MCGFEU/mL 6.85*  --   --   --   --   --   --   --      Renal and electrolytes:  Results from last 7 days   Lab Units 11/02/21  0121 11/01/21  0459 10/31/21  0120 10/30/21  2011 10/29/21  2304 10/29/21  0326 10/28/21  0450 10/27/21  0500 10/26/21  1537 10/26/21  1257   SODIUM mmol/L 137 142 143  --  142 139  --   --   --  137   POTASSIUM mmol/L 4.0 3.9 3.8 3.7 3.1* 3.6  --   --   --  3.2*   MAGNESIUM mg/dL 2.1 2.1 2.1  --   --   --   --   --    --   --    CHLORIDE mmol/L 105 107 108*  --  106 104  --   --   --  111*   CO2 mmol/L 22.9 23.5 24.3  --  22.8 22.2  --   --   --  12.8*   BUN mg/dL 35* 32* 34*  --  35* 40*  --   --   --  38*   CREATININE mg/dL 0.99 0.97 1.05*  --  0.82 0.94 1.08* 1.38*   < > 1.00   EGFR IF NONAFRICN AM mL/min/1.73 58* 60* 54*  --  72 62 53* 40*   < > 58*   CALCIUM mg/dL 8.6 8.5* 8.6  --  8.2* 8.4*  --   --   --  6.2*   PHOSPHORUS mg/dL 4.5  --   --   --   --   --   --   --   --   --    GLUCOSE mg/dL 107* 111* 147*  --  156* 142*  --   --   --  123*    < > = values in this interval not displayed.     Estimated Creatinine Clearance: 71.2 mL/min (by C-G formula based on SCr of 0.99 mg/dL).    Liver and pancreatic function:  Results from last 7 days   Lab Units 11/02/21  0121 10/31/21  1627 10/28/21  0450 10/27/21  0500 10/26/21  1537   ALBUMIN g/dL 2.50*  --  2.38* 1.98* 1.98*   BILIRUBIN mg/dL 0.8 1.0 1.4* 1.0 1.2   ALK PHOS U/L 183*  --  148* 171* 177*   AST (SGOT) U/L 15  --  33* 20 19   ALT (SGPT) U/L 10  --  21 16 16     Endocrine function:  Lab Results   Component Value Date    HGBA1C 5.90 (H) 09/15/2019     Glucose levels from the [facility]:  Results from last 7 days   Lab Units 11/02/21  0121 11/01/21  0459 10/31/21  0120 10/29/21  2304 10/29/21  0326 10/26/21  1257   GLUCOSE mg/dL 107* 111* 147* 156* 142* 123*     Cardiac:  Results from last 7 days   Lab Units 10/31/21  0120   CK TOTAL U/L 41       Cultures:  Lab Results   Component Value Date    COLORU Dark Yellow (A) 10/25/2021    CLARITYU Cloudy (A) 10/25/2021    PHUR 7.0 10/25/2021    PROTEINUR 126.0 10/25/2021    GLUCOSEU Negative 10/25/2021    KETONESU Negative 10/25/2021    BLOODU Moderate (2+) (A) 10/25/2021    NITRITEU Positive (A) 10/25/2021    LEUKOCYTESUR Small (1+) (A) 10/25/2021    BILIRUBINUR Moderate (2+) (A) 10/25/2021    UROBILINOGEN 1.0 E.U./dL 10/25/2021    RBCUA 3-5 (A) 10/25/2021    WBCUA 3-5 (A) 10/25/2021    BACTERIA 2+ (A) 10/25/2021     Microbiology  Results (last 10 days)     Procedure Component Value - Date/Time    Blood Culture - Blood, Arm, Right [[id number]]  (Abnormal) Collected: 10/30/21 1212    Lab Status: Final result Specimen: Blood from Arm, Right Updated: 11/01/21 1032     Blood Culture Staphylococcus aureus, MRSA     Comment: Infectious disease consultation is highly recommended to rule out distant foci of infection.  Methicillin resistant Staphylococcus aureus, Patient may be an isolation risk.        Isolated from Aerobic and Anaerobic Bottles     Gram Stain Aerobic Bottle Gram positive cocci in pairs and clusters      Anaerobic Bottle Gram positive cocci in pairs and clusters    Narrative:      No BCID performed, refer to previous blood culture specimen collected on 10- @ 1208 from central line source for MICs    Blood Culture - Blood, Arm, Left [[id number]]  (Abnormal) Collected: 10/30/21 1211    Lab Status: Final result Specimen: Blood from Arm, Left Updated: 11/01/21 1031     Blood Culture Staphylococcus aureus, MRSA     Comment: Infectious disease consultation is highly recommended to rule out distant foci of infection.  Methicillin resistant Staphylococcus aureus, Patient may be an isolation risk.        Isolated from Aerobic and Anaerobic Bottles     Gram Stain Aerobic Bottle Gram positive cocci in pairs and clusters      Anaerobic Bottle Gram positive cocci in pairs and clusters    Narrative:      No BCID performed, refer to previous blood culture specimen collected on 10- @ 1208 from central line source for MICs      Blood Culture - Blood, Blood, Central Line [[id number]]  (Abnormal)  (Susceptibility) Collected: 10/29/21 1208    Lab Status: Final result Specimen: Blood, Central Line Updated: 11/01/21 1011     Blood Culture Staphylococcus aureus, MRSA     Comment: Infectious disease consultation is highly recommended to rule out distant foci of infection.  Methicillin resistant Staphylococcus aureus, Patient may be an isolation  risk.        Isolated from Aerobic and Anaerobic Bottles     Gram Stain Aerobic Bottle Gram positive cocci in clusters      Anaerobic Bottle Gram positive cocci in clusters    Susceptibility Comments     Staphylococcus aureus, MRSA    This isolate does not demonstrate inducible clindamycin resistance in vitro.               Blood Culture ID, PCR - Blood, Blood, Central Line [[id number]]  (Abnormal) Collected: 10/29/21 1208    Lab Status: Final result Specimen: Blood, Central Line Updated: 10/30/21 0130     BCID, PCR Staph aureus. mecA/C and MREJ (methicillin resistance gene) detected. Identification by BCID2 PCR.     BOTTLE TYPE Aerobic Bottle    Narrative:      Infectious disease consultation is highly recommended to rule out distant foci of infection.    Blood Culture - Blood, Arm, Right [[id number]]  (Abnormal) Collected: 10/27/21 0559    Lab Status: Final result Specimen: Blood from Arm, Right Updated: 10/28/21 1025     Blood Culture Staphylococcus aureus, MRSA     Comment: Infectious disease consultation is highly recommended to rule out distant foci of infection.  Methicillin resistant Staphylococcus aureus, Patient may be an isolation risk.        Isolated from Aerobic Bottle     Gram Stain Aerobic Bottle Gram positive cocci in clusters    Narrative:      No BCID performed, refer to previous blood culture specimen collected on 10-25-21 at 5:28.  Refer to previous blood culture collected on 10/25/2021 at 0528 for MICs    Blood Culture - Blood, Arm, Left [[id number]]  (Abnormal) Collected: 10/27/21 0500    Lab Status: Final result Specimen: Blood from Arm, Left Updated: 10/28/21 1025     Blood Culture Staphylococcus aureus, MRSA     Comment: Infectious disease consultation is highly recommended to rule out distant foci of infection.  Methicillin resistant Staphylococcus aureus, Patient may be an isolation risk.        Isolated from Aerobic Bottle     Gram Stain Aerobic Bottle Gram positive cocci in clusters     Narrative:      No BCID performed, refer to previous blood culture specimen collected on 10-25-21 at 5:28.  Refer to previous blood culture collected on 10/25/2021 at 0528 for MICs          I have personally looked at the labs and they are summarized above.  ----------------------------------------------------------------------------------------------------------------------  Detailed radiology reports for the last 24 hours:    Imaging Results (Last 24 Hours)     Procedure Component Value Units Date/Time    CT Head Without Contrast [[id number]] Collected: 11/01/21 2109     Updated: 11/01/21 2111    Narrative:      CT Head WO: 11/1/2021 10:03 PM    HISTORY:   Mental status change    TECHNIQUE:   Axial unenhanced head CT. Radiation dose reduction techniques included automated exposure control or exposure modulation based on body size. Radiation audit for number of CT and nuclear cardiology exams performed in the last year: 0.      COMPARISON:   CT of the head from 5/17/2021    FINDINGS:   No intracranial hemorrhage, mass, or definite acute infarct. Stable appearing chronic infarct is noted in the right parietal region. No hydrocephalus or extra-axial fluid collection. The skull base, calvarium, and extracranial soft tissues are normal.      Impression:      No acute intracranial abnormality.      Signer Name: Kathryn Rowland MD   Signed: 11/1/2021 9:09 PM   Workstation Name: [id number]    [facility]    CT Abdomen Pelvis Without Contrast [[id number]] Collected: 11/01/21 1638     Updated: 11/01/21 1652    Narrative:      EXAM:    CT Abdomen and Pelvis Without Intravenous Contrast     EXAM DATE:    10/25/2021 1:35 PM     CLINICAL HISTORY:    Abdominal pain, acute, nonlocalized     TECHNIQUE:    Axial computed tomography images of the abdomen and pelvis without  intravenous contrast.  Sagittal and coronal reformatted images were  created and reviewed.  This CT exam was performed using one or more of  the  following dose reduction techniques:  automated exposure control,  adjustment of the mA and/or kV according to patient size, and/or use of  iterative reconstruction technique.     COMPARISON:    10/07/2016     FINDINGS:    LIMITATIONS:  Limited due to motion artifact.    LUNG BASES:  Unremarkable.  No mass.  No consolidation.    PLEURAL SPACE:  Small bilateral pleural effusions.      ABDOMEN:    LIVER:  Unremarkable.    GALLBLADDER AND BILE DUCTS:  Unremarkable.  No calcified stones.  No  ductal dilation.    PANCREAS:  Unremarkable.  No ductal dilation.    SPLEEN:  Unremarkable.  No splenomegaly.    ADRENALS:  Unremarkable.  No mass.    KIDNEYS AND URETERS:  Unremarkable.  No obstructing stones.  No  hydronephrosis.    STOMACH AND BOWEL:  Abundant stool throughout the colon.  No  obstruction.  No mucosal thickening.      PELVIS:    APPENDIX:  No findings to suggest acute appendicitis.    BLADDER:  Unremarkable.  No stones.    REPRODUCTIVE:  Unremarkable as visualized.      ABDOMEN and PELVIS:    INTRAPERITONEAL SPACE:  Unremarkable.  No free air.  No significant  fluid collection.    BONES/JOINTS:  No acute fracture.  No dislocation.    SOFT TISSUES:  Unremarkable.    VASCULATURE:  Unremarkable.  No abdominal aortic aneurysm.    LYMPH NODES:  Unremarkable.  No enlarged lymph nodes.       Impression:      1.  Abundant stool throughout the colon.  2.  Small bilateral pleural effusions.  3.  Limited due to motion artifact.     This report was finalized on 11/1/2021 4:39 PM by Dr. Miguel Roe MD.           I have personally looked at the radiology images and I have read the available final reports.    Assessment & Plan      -Severe sepsis that was present on admission (heart rate 121, CRP 25.76, white blood cell count 17,200, 17% bands, lactic acid 2.1) due to MRSA bacteremia, COVID-19 bilateral pneumonia, suspected infective endocarditis causing embolic pneumonia, and urinary tract infection with  hematuria  -Intermittent acute hypoxic respiratory failure that was present starting 2 days after admission and due to the COVID-19 pneumonia as well as suspected aspiration versus MRSA pneumonia  -Acute kidney injury on top of chronic kidney disease stage II with baseline creatinine 0.6-0.8 and admission creatinine of 1.48, now improved  -Swelling of her bilateral arms at the elbows on 11/1/2021, improved  -History of polysubstance abuse, suspicious behavior of hiding / self medicating klonopin/opoiod in house until purse taken away 10/26; urine drug screen positive for amphetamines, Suboxone, methamphetamines, and opiates  -Positive hepatitis C antibody this admission  -History of discoid lupus, on Plaquenil  -History of hypothyroidism status post thyroidectomy  -History of essential hypertension  -History of seizures with the last seizure in 2016  -History of right parietal ischemic CVA thought cardioembolic with prior PFO repair (all of this occurred in 2015)  -History of major depression and anxiety  -History of peptic ulcer disease status post perforation in 2016    I will talk to Dr. Roe in radiology and we will obtain a sample of the fluid on the right side via thoracentesis to see if this is infection versus congestive heart failure fluid.  If the pleural fluid seems to be due to a bacterial infection, and the patient may need consultation with a cardiothoracic surgeon for possible VATS procedure.  I will discuss with cardiology today about possible NILESH.  However, using the [facility] clinical criteria, she has one major criteria (persistently positive blood cultures) and two minor criteria (predisposition due to history of IV drug use and vascular phenomenon of suspected septic pulmonary infarct).  I will discuss this with cardiology as well today.  Her CT scan of the head did not show any obvious intracranial issues; however, the CT was done without IV contrast due to her acute kidney injury on admission.   Therefore, I will obtain an head MRI to look for any possible abscesses that resulted from septic emboli from the suspected endocarditis.  I discontinued the Plaquenil yesterday we will continue to monitor her markers; so far, she has been afebrile and the CRP is decreasing.  Please note that her white blood cell count has remained stable despite receiving Decadron daily; suspect that the elevated white blood cell count is due to the Decadron.  We will continue monitoring BUN/creatinine due to the acute kidney injury; she appears to be at baseline at this time.  Blood pressures are still elevated; the home hydrochlorothiazide has been held due to the acute kidney injury.  Therefore, I will give scheduled hydralazine orally.  She did receive one dose of IV hydralazine last night for elevated blood pressures.  We will continue to monitor her blood pressures closely.  Please note that the glucose levels are controlled despite the Decadron and we will continue to monitor these sporadically.  We'll repeat her blood work in the morning.    VTE Prophylaxis:   Mechanical Order History:     None      Pharmalogical Order History:      Ordered     Dose Route Frequency Stop    10/25/21 1614  enoxaparin (LOVENOX) syringe 40 mg         40 mg SC Every 24 Hours --    10/25/21 0616  heparin (porcine) 5000 UNIT/ML injection 5,000 Units  Status:  Discontinued         5,000 Units SC Every 12 Hours Scheduled 10/25/21 1614              The patient is high risk due to the following diagnoses/reasons: Persistent MRSA bacteremia in a patient with known polysubstance use     Disposition: Undetermined at this time.    Alonzo Hastings MD  [facility]  11/02/21  08:09 EDT

## 2021-11-02 NOTE — PLAN OF CARE
Goal Outcome Evaluation:  Patient started to experience shortness of breath and decreased O2 sat down in the 50's-60's. Patient placed on 15L bubble high flow and 15 L non-rebreather and O2 sat was at 93-95%. MD made aware of situation. Patient transferred to CCU. Patient Mother Kate notified

## 2021-11-02 NOTE — NURSING NOTE
Attempted to call patient's mother Kate to obtain consent for thoracentesis. Call went straight to voicemail. Left a message to call back.

## 2021-11-02 NOTE — CASE MANAGEMENT/SOCIAL WORK
Discharge Planning Assessment  [facility]     Patient Name: Karely Villarreal  MRN: [id number]  Today's Date: 11/2/2021    Admit Date: 10/24/2021       Discharge Plan     Row Name 11/02/21 0919       Plan    Plan Pt admitted on 10/24/21.  Pt remains in Covid isolation.  Pt was residing at [address] in [place].  Pt's Mother Kate [phone] stated that pt had been living at [facility] and was evicted from apartment then went to [address].  Pt's Mother stated pt has no POA or Guardian at this time.  SS provided housing list to pt's Mother to assist with low income housing.  Pt's Mother stated pt may need assisted living and that pt had been having mobility issues prior to admit to [facility].  SS noted substance issues.  SS will continue to follow and assist with discharge planning.           Mayelin Null, BSW
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
## 2021-11-03 ENCOUNTER — APPOINTMENT (OUTPATIENT)
Dept: MRI IMAGING | Facility: HOSPITAL | Age: 56
End: 2021-11-03

## 2021-11-03 ENCOUNTER — APPOINTMENT (OUTPATIENT)
Dept: GENERAL RADIOLOGY | Facility: HOSPITAL | Age: 56
End: 2021-11-03

## 2021-11-03 ENCOUNTER — TELEPHONE (OUTPATIENT)
Dept: GENERAL RADIOLOGY | Facility: HOSPITAL | Age: 56
End: 2021-11-03

## 2021-11-03 ENCOUNTER — APPOINTMENT (OUTPATIENT)
Dept: CARDIOLOGY | Facility: HOSPITAL | Age: 56
End: 2021-11-03

## 2021-11-03 LAB
ALBUMIN SERPL-MCNC: 2.57 G/DL (ref 3.5–5.2)
ALBUMIN/GLOB SERPL: 0.7 G/DL
ALP SERPL-CCNC: 163 U/L (ref 39–117)
ALT SERPL W P-5'-P-CCNC: 8 U/L (ref 1–33)
ANION GAP SERPL CALCULATED.3IONS-SCNC: 9.5 MMOL/L (ref 5–15)
AST SERPL-CCNC: 13 U/L (ref 1–32)
BACTERIA SPEC AEROBE CULT: ABNORMAL
BACTERIA SPEC AEROBE CULT: ABNORMAL
BASOPHILS # BLD AUTO: 0.03 10*3/MM3 (ref 0–0.2)
BASOPHILS NFR BLD AUTO: 0.1 % (ref 0–1.5)
BH CV ECHO MEAS - BSA(HAYCOCK): 2.1 M^2
BH CV ECHO MEAS - BSA: 2 M^2
BH CV ECHO MEAS - BZI_BMI: 31.6 KILOGRAMS/M^2
BH CV ECHO MEAS - BZI_METRIC_HEIGHT: 167.6 CM
BH CV ECHO MEAS - BZI_METRIC_WEIGHT: 88.9 KG
BH CV ECHO MEAS - PA ACC TIME: 0.13 SEC
BH CV ECHO MEAS - PA PR(ACCEL): 21.9 MMHG
BH CV ECHO MEAS - RAP SYSTOLE: 10 MMHG
BH CV ECHO MEAS - RVSP: 30.8 MMHG
BH CV ECHO MEAS - TR MAX VEL: 228 CM/SEC
BILIRUB SERPL-MCNC: 0.7 MG/DL (ref 0–1.2)
BUN SERPL-MCNC: 37 MG/DL (ref 6–20)
BUN/CREAT SERPL: 35.9 (ref 7–25)
CALCIUM SPEC-SCNC: 8.6 MG/DL (ref 8.6–10.5)
CHLORIDE SERPL-SCNC: 105 MMOL/L (ref 98–107)
CO2 SERPL-SCNC: 25.5 MMOL/L (ref 22–29)
CREAT SERPL-MCNC: 1.03 MG/DL (ref 0.57–1)
CRP SERPL-MCNC: 4.93 MG/DL (ref 0–0.5)
DEPRECATED RDW RBC AUTO: 48.5 FL (ref 37–54)
EOSINOPHIL # BLD AUTO: 0.06 10*3/MM3 (ref 0–0.4)
EOSINOPHIL NFR BLD AUTO: 0.3 % (ref 0.3–6.2)
ERYTHROCYTE [DISTWIDTH] IN BLOOD BY AUTOMATED COUNT: 17.9 % (ref 12.3–15.4)
FERRITIN SERPL-MCNC: 765.3 NG/ML (ref 13–150)
GFR SERPL CREATININE-BSD FRML MDRD: 56 ML/MIN/1.73
GLOBULIN UR ELPH-MCNC: 3.8 GM/DL
GLUCOSE SERPL-MCNC: 106 MG/DL (ref 65–99)
GRAM STN SPEC: ABNORMAL
GRAM STN SPEC: ABNORMAL
HCT VFR BLD AUTO: 23.2 % (ref 34–46.6)
HGB BLD-MCNC: 7.1 G/DL (ref 12–15.9)
IMM GRANULOCYTES # BLD AUTO: 0.38 10*3/MM3 (ref 0–0.05)
IMM GRANULOCYTES NFR BLD AUTO: 1.9 % (ref 0–0.5)
ISOLATED FROM: ABNORMAL
ISOLATED FROM: ABNORMAL
LDH SERPL-CCNC: 314 U/L (ref 135–214)
LYMPHOCYTES # BLD AUTO: 1.1 10*3/MM3 (ref 0.7–3.1)
LYMPHOCYTES NFR BLD AUTO: 5.4 % (ref 19.6–45.3)
MAGNESIUM SERPL-MCNC: 2.2 MG/DL (ref 1.6–2.6)
MAXIMAL PREDICTED HEART RATE: 165 BPM
MCH RBC QN AUTO: 25.8 PG (ref 26.6–33)
MCHC RBC AUTO-ENTMCNC: 30.6 G/DL (ref 31.5–35.7)
MCV RBC AUTO: 84.4 FL (ref 79–97)
MONOCYTES # BLD AUTO: 0.52 10*3/MM3 (ref 0.1–0.9)
MONOCYTES NFR BLD AUTO: 2.5 % (ref 5–12)
NEUTROPHILS NFR BLD AUTO: 18.35 10*3/MM3 (ref 1.7–7)
NEUTROPHILS NFR BLD AUTO: 89.8 % (ref 42.7–76)
NRBC BLD AUTO-RTO: 0 /100 WBC (ref 0–0.2)
PHOSPHATE SERPL-MCNC: 4.6 MG/DL (ref 2.5–4.5)
PLATELET # BLD AUTO: 382 10*3/MM3 (ref 140–450)
PMV BLD AUTO: 9.1 FL (ref 6–12)
POTASSIUM SERPL-SCNC: 4.1 MMOL/L (ref 3.5–5.2)
PROCALCITONIN SERPL-MCNC: 0.35 NG/ML (ref 0–0.25)
PROT SERPL-MCNC: 6.4 G/DL (ref 6–8.5)
RBC # BLD AUTO: 2.75 10*6/MM3 (ref 3.77–5.28)
SODIUM SERPL-SCNC: 140 MMOL/L (ref 136–145)
STRESS TARGET HR: 140 BPM
WBC # BLD AUTO: 20.44 10*3/MM3 (ref 3.4–10.8)

## 2021-11-03 PROCEDURE — 94799 UNLISTED PULMONARY SVC/PX: CPT

## 2021-11-03 PROCEDURE — 0 GADOBENATE DIMEGLUMINE 529 MG/ML SOLUTION: Performed by: INTERNAL MEDICINE

## 2021-11-03 PROCEDURE — 87040 BLOOD CULTURE FOR BACTERIA: CPT | Performed by: INTERNAL MEDICINE

## 2021-11-03 PROCEDURE — 87147 CULTURE TYPE IMMUNOLOGIC: CPT | Performed by: INTERNAL MEDICINE

## 2021-11-03 PROCEDURE — 25010000002 CEFTAROLINE FOSAMIL PER 10 MG: Performed by: INTERNAL MEDICINE

## 2021-11-03 PROCEDURE — 99233 SBSQ HOSP IP/OBS HIGH 50: CPT | Performed by: INTERNAL MEDICINE

## 2021-11-03 PROCEDURE — 85025 COMPLETE CBC W/AUTO DIFF WBC: CPT | Performed by: INTERNAL MEDICINE

## 2021-11-03 PROCEDURE — 25010000002 DEXAMETHASONE PER 1 MG: Performed by: INTERNAL MEDICINE

## 2021-11-03 PROCEDURE — 80053 COMPREHEN METABOLIC PANEL: CPT | Performed by: INTERNAL MEDICINE

## 2021-11-03 PROCEDURE — 25010000002 LORAZEPAM PER 2 MG: Performed by: INTERNAL MEDICINE

## 2021-11-03 PROCEDURE — 99232 SBSQ HOSP IP/OBS MODERATE 35: CPT | Performed by: SPECIALIST

## 2021-11-03 PROCEDURE — 82728 ASSAY OF FERRITIN: CPT | Performed by: INTERNAL MEDICINE

## 2021-11-03 PROCEDURE — 83615 LACTATE (LD) (LDH) ENZYME: CPT | Performed by: INTERNAL MEDICINE

## 2021-11-03 PROCEDURE — 93306 TTE W/DOPPLER COMPLETE: CPT

## 2021-11-03 PROCEDURE — 72157 MRI CHEST SPINE W/O & W/DYE: CPT | Performed by: RADIOLOGY

## 2021-11-03 PROCEDURE — 86140 C-REACTIVE PROTEIN: CPT | Performed by: INTERNAL MEDICINE

## 2021-11-03 PROCEDURE — 83735 ASSAY OF MAGNESIUM: CPT | Performed by: INTERNAL MEDICINE

## 2021-11-03 PROCEDURE — 70553 MRI BRAIN STEM W/O & W/DYE: CPT

## 2021-11-03 PROCEDURE — 84145 PROCALCITONIN (PCT): CPT | Performed by: INTERNAL MEDICINE

## 2021-11-03 PROCEDURE — 71045 X-RAY EXAM CHEST 1 VIEW: CPT

## 2021-11-03 PROCEDURE — A9577 INJ MULTIHANCE: HCPCS | Performed by: INTERNAL MEDICINE

## 2021-11-03 PROCEDURE — 93306 TTE W/DOPPLER COMPLETE: CPT | Performed by: SPECIALIST

## 2021-11-03 PROCEDURE — 72157 MRI CHEST SPINE W/O & W/DYE: CPT

## 2021-11-03 PROCEDURE — 25010000002 ENOXAPARIN PER 10 MG: Performed by: INTERNAL MEDICINE

## 2021-11-03 PROCEDURE — 94761 N-INVAS EAR/PLS OXIMETRY MLT: CPT

## 2021-11-03 PROCEDURE — 25010000002 DAPTOMYCIN PER 1 MG: Performed by: INTERNAL MEDICINE

## 2021-11-03 PROCEDURE — 84100 ASSAY OF PHOSPHORUS: CPT | Performed by: INTERNAL MEDICINE

## 2021-11-03 PROCEDURE — 94760 N-INVAS EAR/PLS OXIMETRY 1: CPT

## 2021-11-03 PROCEDURE — 70553 MRI BRAIN STEM W/O & W/DYE: CPT | Performed by: RADIOLOGY

## 2021-11-03 RX ORDER — LORAZEPAM 2 MG/ML
2 INJECTION INTRAMUSCULAR ONCE
Status: COMPLETED | OUTPATIENT
Start: 2021-11-03 | End: 2021-11-03

## 2021-11-03 RX ORDER — ACETAMINOPHEN 325 MG/1
650 TABLET ORAL EVERY 6 HOURS PRN
Status: DISCONTINUED | OUTPATIENT
Start: 2021-11-03 | End: 2021-11-06 | Stop reason: HOSPADM

## 2021-11-03 RX ORDER — GUAIFENESIN/DEXTROMETHORPHAN 100-10MG/5
10 SYRUP ORAL EVERY 4 HOURS PRN
Status: DISCONTINUED | OUTPATIENT
Start: 2021-11-03 | End: 2021-11-06 | Stop reason: HOSPADM

## 2021-11-03 RX ADMIN — BUPROPION HYDROCHLORIDE 150 MG: 150 TABLET, FILM COATED, EXTENDED RELEASE ORAL at 08:16

## 2021-11-03 RX ADMIN — OXYCODONE HYDROCHLORIDE 15 MG: 15 TABLET ORAL at 04:09

## 2021-11-03 RX ADMIN — BUPROPION HYDROCHLORIDE 150 MG: 150 TABLET, FILM COATED, EXTENDED RELEASE ORAL at 20:02

## 2021-11-03 RX ADMIN — LEVETIRACETAM 500 MG: 500 TABLET, FILM COATED ORAL at 08:16

## 2021-11-03 RX ADMIN — GUAIFENESIN AND DEXTROMETHORPHAN 10 ML: 100; 10 SYRUP ORAL at 06:10

## 2021-11-03 RX ADMIN — CLONAZEPAM 0.75 MG: 0.5 TABLET ORAL at 20:01

## 2021-11-03 RX ADMIN — CEFTAROLINE FOSAMIL 600 MG: 600 POWDER, FOR SOLUTION INTRAVENOUS at 06:10

## 2021-11-03 RX ADMIN — CLONAZEPAM 0.75 MG: 0.5 TABLET ORAL at 16:05

## 2021-11-03 RX ADMIN — LEVOTHYROXINE SODIUM 125 MCG: 125 TABLET ORAL at 08:16

## 2021-11-03 RX ADMIN — CEFTAROLINE FOSAMIL 600 MG: 600 POWDER, FOR SOLUTION INTRAVENOUS at 22:24

## 2021-11-03 RX ADMIN — DAPTOMYCIN 550 MG: 500 INJECTION, POWDER, LYOPHILIZED, FOR SOLUTION INTRAVENOUS at 10:06

## 2021-11-03 RX ADMIN — LEVETIRACETAM 500 MG: 500 TABLET, FILM COATED ORAL at 20:02

## 2021-11-03 RX ADMIN — SODIUM CHLORIDE, PRESERVATIVE FREE 10 ML: 5 INJECTION INTRAVENOUS at 20:03

## 2021-11-03 RX ADMIN — CASTOR OIL AND BALSAM, PERU 1 APPLICATION: 788; 87 OINTMENT TOPICAL at 08:17

## 2021-11-03 RX ADMIN — METOPROLOL TARTRATE 25 MG: 25 TABLET, FILM COATED ORAL at 20:02

## 2021-11-03 RX ADMIN — CEFTAROLINE FOSAMIL 600 MG: 600 POWDER, FOR SOLUTION INTRAVENOUS at 16:05

## 2021-11-03 RX ADMIN — OXYCODONE HYDROCHLORIDE 15 MG: 15 TABLET ORAL at 22:28

## 2021-11-03 RX ADMIN — GADOBENATE DIMEGLUMINE 17 ML: 529 INJECTION, SOLUTION INTRAVENOUS at 13:10

## 2021-11-03 RX ADMIN — METOPROLOL TARTRATE 25 MG: 25 TABLET, FILM COATED ORAL at 08:17

## 2021-11-03 RX ADMIN — LORAZEPAM 2 MG: 2 INJECTION, SOLUTION INTRAMUSCULAR; INTRAVENOUS at 13:04

## 2021-11-03 RX ADMIN — DULOXETINE HYDROCHLORIDE 60 MG: 60 CAPSULE, DELAYED RELEASE ORAL at 06:50

## 2021-11-03 RX ADMIN — ENOXAPARIN SODIUM 40 MG: 40 INJECTION SUBCUTANEOUS at 17:27

## 2021-11-03 RX ADMIN — HYDRALAZINE HYDROCHLORIDE 25 MG: 25 TABLET, FILM COATED ORAL at 16:05

## 2021-11-03 RX ADMIN — DEXAMETHASONE SODIUM PHOSPHATE 6 MG: 4 INJECTION, SOLUTION INTRA-ARTICULAR; INTRALESIONAL; INTRAMUSCULAR; INTRAVENOUS; SOFT TISSUE at 08:18

## 2021-11-03 RX ADMIN — Medication 1 CAPSULE: at 08:16

## 2021-11-03 RX ADMIN — HYDRALAZINE HYDROCHLORIDE 25 MG: 25 TABLET, FILM COATED ORAL at 06:10

## 2021-11-03 RX ADMIN — ACETAMINOPHEN 650 MG: 325 TABLET ORAL at 10:06

## 2021-11-03 RX ADMIN — GUAIFENESIN AND DEXTROMETHORPHAN 10 ML: 100; 10 SYRUP ORAL at 22:29

## 2021-11-03 RX ADMIN — SODIUM CHLORIDE, PRESERVATIVE FREE 3 ML: 5 INJECTION INTRAVENOUS at 20:03

## 2021-11-03 RX ADMIN — CLONAZEPAM 0.75 MG: 0.5 TABLET ORAL at 08:17

## 2021-11-03 RX ADMIN — HYDRALAZINE HYDROCHLORIDE 25 MG: 25 TABLET, FILM COATED ORAL at 22:23

## 2021-11-03 RX ADMIN — Medication 1 TABLET: at 08:16

## 2021-11-03 RX ADMIN — PANTOPRAZOLE SODIUM 40 MG: 40 TABLET, DELAYED RELEASE ORAL at 06:10

## 2021-11-03 NOTE — PROGRESS NOTES
LOS: 9 days     Name: Karely Villarreal  Age/Sex: 56 y.o. female  :  1965        PCP: Tracie Levi APRN  REF: No Known Provider    Principal Problem:    Sepsis (HCC)      Reason for follow-up: Bacteremia    Subjective       Subjective     Karely Villarreal is a 56 year old female with a past medical history significant for SLE, history of drug abuse, history of seizures, essential hypertension, hypothyroidism, major depressive disorder, history of CVA and anxiety disorder. Patient presented to the ED with complaints of shortness of breath and cough. She was found to be COVID-19 positive with COVID pneumonia along with MRSA bacteremia.    Interval History: Patient resting in bed. Still has some shortness of breath and cough. Repeat echocardiogram shows EF of 61-65% with no obvious vegetation.    Vital Signs  Temp:  [97.8 °F (36.6 °C)-99 °F (37.2 °C)] 97.8 °F (36.6 °C)  Heart Rate:  [74-92] 88  Resp:  [-] 14  BP: (129-165)/() 140/82     Vital Signs (last 72 hrs)       10/31 0700   0659  0700   0659  0700   0659  0700   0931   Most Recent      Temp (°F) 96.7 -  97.8    97 -  97.8    97.8 -  99       97.8 (36.6)  0403    Heart Rate 73 -  86    78 -  86    74 -  92    80 -  88     88  0830    Resp 18 -  24    20 -  24    14 -  22       14  0603    /74 -  170/60    156/76 -  180/94    133/77 -  165/101    129/80 -  140/82     140/82  0830    SpO2 (%) 94 -  99    92 -  97    92 -  100    94 -  99     94  0830        Documented weights    10/24/21 2048 10/25/21 0609 10/26/21 0500 10/27/21 0310   Weight: 83.9 kg (185 lb) 88.2 kg (194 lb 6.4 oz) 91.9 kg (202 lb 8 oz) 92.1 kg (203 lb)    10/28/21 0500 10/30/21 0500 10/31/21 0339 21 0359   Weight: 91.4 kg (201 lb 6.4 oz) 90.2 kg (198 lb 14.4 oz) 86.7 kg (191 lb 3.2 oz) 87.3 kg (192 lb 6.4 oz)    21 0358 21 0403   Weight: 86.6 kg (191 lb) 88.9 kg (196 lb)      Body mass index is  31.64 kg/m².    Intake/Output Summary (Last 24 hours) at 11/3/2021 0931  Last data filed at 11/3/2021 0800  Gross per 24 hour   Intake 1441.95 ml   Output 1250 ml   Net 191.95 ml     Objective    Objective       Physical Exam:     General Appearance:    Alert, cooperative, in no acute distress   Head:    Normocephalic, without obvious abnormality, atraumatic           Lungs:     ,respirations regular, even and  unlabored    Heart:    Regular rhythm and normal rate   Chest Wall:    No abnormalities observed       Extremities:   no edema, no cyanosis, no  redness               Neurologic:   Alert and oriented    Physical examination limited secondary to COVID-19  Results review       Results Review:   Results from last 7 days   Lab Units 11/03/21  0047 11/02/21  0121 11/01/21  0459 10/31/21  1932 10/31/21  0422 10/29/21  0326   WBC 10*3/mm3 20.44* 23.86* 22.33*  --  23.43* 22.49*   HEMOGLOBIN g/dL 7.1* 7.2* 7.3* 7.6* 7.7* 9.3*   PLATELETS 10*3/mm3 382 380 296  --  230 129*     Results from last 7 days   Lab Units 11/03/21  0047 11/02/21  0121 11/01/21  0459 10/31/21  0120 10/30/21  2011 10/29/21  2304 10/29/21  0326 10/28/21  0450   SODIUM mmol/L 140 137 142 143  --  142 139  --    POTASSIUM mmol/L 4.1 4.0 3.9 3.8 3.7 3.1* 3.6  --    CHLORIDE mmol/L 105 105 107 108*  --  106 104  --    CO2 mmol/L 25.5 22.9 23.5 24.3  --  22.8 22.2  --    BUN mg/dL 37* 35* 32* 34*  --  35* 40*  --    CREATININE mg/dL 1.03* 0.99 0.97 1.05*  --  0.82 0.94 1.08*   CALCIUM mg/dL 8.6 8.6 8.5* 8.6  --  8.2* 8.4*  --    GLUCOSE mg/dL 106* 107* 111* 147*  --  156* 142*  --    ALT (SGPT) U/L 8 10  --   --   --   --   --  21   AST (SGOT) U/L 13 15  --   --   --   --   --  33*     Results from last 7 days   Lab Units 10/31/21  0120   CK TOTAL U/L 41     Lab Results   Component Value Date    INR 1.15 (H) 11/02/2021    INR 0.99 11/27/2020    INR 0.98 11/27/2020    INR 1.04 05/26/2018    INR 0.93 11/13/2016    INR 0.90 04/10/2016    INR <0.90  03/20/2016     Lab Results   Component Value Date    MG 2.2 11/03/2021    MG 2.1 11/02/2021    MG 2.1 11/01/2021     Lab Results   Component Value Date    TSH 47.200 (H) 10/25/2021    CHLPL 149 03/13/2016    TRIG 34 09/15/2019    HDL 65 (H) 09/15/2019    LDL 94 09/15/2019      Imaging Results (Last 48 Hours)     Procedure Component Value Units Date/Time    US Venous Doppler Upper Extremity Bilateral (duplex) [[id number]] Collected: 11/02/21 1542     Updated: 11/02/21 1545    Narrative:      US VENOUS DOPPLER UPPER EXTREMITY BILATERAL (DUPLEX)-     REASON FOR EXAM:  Bilateral and focalized edema at the medial elbows in  a patient with known polysubstance use and MRSA bacteremia;  A41.9-Sepsis, unspecified organism; U07.1-COVID-19; J12.82-Pneumonia due  to coronavirus disease 2019; N30.00-Acute cystitis without hematuria;  R79.89-Other specified abnormal findings of blood chemistry;  A41.01-Sepsis due to methicillin susceptible Staphylococcus aureus;  R65.20-Severe     Comparison:None     Multiple real-time color Doppler images were obtained. The distal  jugular and subclavian vein on the right were not able to be imaged due  to overlying bandage. The other veins were well demonstrated.. There is  good color doppler signal seen filling the deep veins. They  are  completely compressible by the ultrasound transducer. There was good  spontaneous venous flow and augmentation. There are no echoes seen along  the deep veins to suggest clot.       Impression:      No sonographic findings of DVT in the upper extremities        This report was finalized on 11/2/2021 3:43 PM by Dr. Jaxson Boudreaux II, MD.       XR Chest 1 View [[id number]] Collected: 11/02/21 1315     Updated: 11/02/21 1318    Narrative:      EXAM:    XR Chest, 1 View     EXAM DATE:    11/2/2021 12:53 PM     CLINICAL HISTORY:    trouble breathing; A41.9-Sepsis, unspecified organism; U07.1-COVID-19;  J12.82-Pneumonia due to coronavirus disease 2019; N30.00-Acute  cystitis  without hematuria; R79.89-Other specified abnormal findings of blood  chemistry; A41.01-Sepsis due to methicillin susceptible Staphylococcus  aureus; R65.20-Severe sepsis without septic shock     TECHNIQUE:    Frontal view of the chest.     COMPARISON:    10/28/2021     FINDINGS:    LUNGS:  Patchy bilateral airspace disease is again noted.    PLEURAL SPACE:  No pneumothorax.  Small right pleural effusion is  again noted.    HEART:  Unremarkable.  No cardiomegaly.    MEDIASTINUM:  Unremarkable.    BONES/JOINTS:  Unremarkable.    TUBES, LINES AND DEVICES:  Right central line with tip in SVC.       Impression:      1.  No pneumothorax.  2.  Small right pleural effusion is again noted.  3.  Patchy bilateral airspace disease is again noted.     This report was finalized on 11/2/2021 1:16 PM by Dr. Miguel Roe MD.       US Chest [[id number]] Collected: 11/02/21 1237     Updated: 11/02/21 1241    Narrative:      EXAM:    US Chest Limited,      EXAM DATE:    11/2/2021 10:30 AM     CLINICAL HISTORY:    MRSA bacteremia and COVID-19; need to see if the pleural fluid is due  to a bacterial cause or congestive heart failure; A41.9-Sepsis,  unspecified organism; U07.1-COVID-19; J12.82-Pneumonia due to  coronavirus disease 2019; N30.00-Acute cystitis without hematuria;  R79.89-Other specified abnormal findings of blood chemistry;  A41.01-Sepsis due to methicillin susceptible Staphylococcus aureus;  R65.20-     TECHNIQUE:    Real-time ultrasound of the Chest using the Focussed Assessment with  Sonography in Trauma (FAST) Protocol with image documentation.     COMPARISON:    No relevant prior studies available.     FINDINGS:    PLEURAL SPACE:  Unsuccessful attempts made at withdrawing fluid. 8  Maori catheter of right very complex pleural effusion with internal  septation.       Impression:        Unsuccessful attempts made at withdrawing fluid. 8 Maori catheter of  right very complex pleural effusion with internal  septation.     This report was finalized on 11/2/2021 12:39 PM by Dr. Miguel Roe MD.       CT Head Without Contrast [[id number]] Collected: 11/01/21 2109     Updated: 11/01/21 2111    Narrative:      CT Head WO: 11/1/2021 10:03 PM    HISTORY:   Mental status change    TECHNIQUE:   Axial unenhanced head CT. Radiation dose reduction techniques included automated exposure control or exposure modulation based on body size. Radiation audit for number of CT and nuclear cardiology exams performed in the last year: 0.      COMPARISON:   CT of the head from 5/17/2021    FINDINGS:   No intracranial hemorrhage, mass, or definite acute infarct. Stable appearing chronic infarct is noted in the right parietal region. No hydrocephalus or extra-axial fluid collection. The skull base, calvarium, and extracranial soft tissues are normal.      Impression:      No acute intracranial abnormality.      Signer Name: Kathryn Rowland MD   Signed: 11/1/2021 9:09 PM   Workstation Name: [id number]    [facility]    CT Abdomen Pelvis Without Contrast [[id number]] Collected: 11/01/21 1638     Updated: 11/01/21 1652    Narrative:      EXAM:    CT Abdomen and Pelvis Without Intravenous Contrast     EXAM DATE:    10/25/2021 1:35 PM     CLINICAL HISTORY:    Abdominal pain, acute, nonlocalized     TECHNIQUE:    Axial computed tomography images of the abdomen and pelvis without  intravenous contrast.  Sagittal and coronal reformatted images were  created and reviewed.  This CT exam was performed using one or more of  the following dose reduction techniques:  automated exposure control,  adjustment of the mA and/or kV according to patient size, and/or use of  iterative reconstruction technique.     COMPARISON:    10/07/2016     FINDINGS:    LIMITATIONS:  Limited due to motion artifact.    LUNG BASES:  Unremarkable.  No mass.  No consolidation.    PLEURAL SPACE:  Small bilateral pleural effusions.      ABDOMEN:    LIVER:   Unremarkable.    GALLBLADDER AND BILE DUCTS:  Unremarkable.  No calcified stones.  No  ductal dilation.    PANCREAS:  Unremarkable.  No ductal dilation.    SPLEEN:  Unremarkable.  No splenomegaly.    ADRENALS:  Unremarkable.  No mass.    KIDNEYS AND URETERS:  Unremarkable.  No obstructing stones.  No  hydronephrosis.    STOMACH AND BOWEL:  Abundant stool throughout the colon.  No  obstruction.  No mucosal thickening.      PELVIS:    APPENDIX:  No findings to suggest acute appendicitis.    BLADDER:  Unremarkable.  No stones.    REPRODUCTIVE:  Unremarkable as visualized.      ABDOMEN and PELVIS:    INTRAPERITONEAL SPACE:  Unremarkable.  No free air.  No significant  fluid collection.    BONES/JOINTS:  No acute fracture.  No dislocation.    SOFT TISSUES:  Unremarkable.    VASCULATURE:  Unremarkable.  No abdominal aortic aneurysm.    LYMPH NODES:  Unremarkable.  No enlarged lymph nodes.       Impression:      1.  Abundant stool throughout the colon.  2.  Small bilateral pleural effusions.  3.  Limited due to motion artifact.     This report was finalized on 11/1/2021 4:39 PM by Dr. Miguel Roe MD.           Lab Results   Component Value Date    BNP 19.0 11/21/2018       Echo   Results for orders placed during the hospital encounter of 10/24/21    Adult Transthoracic Echo Complete W/ Cont if Necessary Per Protocol    Interpretation Summary  · Left ventricular wall thickness is consistent with hypertrophy. Sigmoid-shaped ventricular septum is present.  · Left ventricular ejection fraction appears to be 61 - 65%. Left ventricular systolic function is normal.  · Left ventricular diastolic function was normal.     I reviewed the patient's new clinical results.    Telemetry: NSR 70 to 80 bpm     Medication Review:   buPROPion SR, 150 mg, Oral, BID  castor oil-balsam peru, 1 application, Topical, Q12H  ceftaroline, 600 mg, Intravenous, Q8H  clonazePAM, 0.75 mg, Oral, TID  DAPTOmycin, 8 mg/kg (Adjusted), Intravenous,  Q24H  dexamethasone, 6 mg, Intravenous, Daily  DULoxetine, 60 mg, Oral, QAM  enoxaparin, 40 mg, Subcutaneous, Q24H  hydrALAZINE, 25 mg, Oral, Q8H  lactobacillus acidophilus, 1 capsule, Oral, Daily  levETIRAcetam, 500 mg, Oral, Q12H  levothyroxine, 125 mcg, Oral, Daily  metoprolol tartrate, 25 mg, Oral, BID  multivitamin, 1 tablet, Oral, Daily  pantoprazole, 40 mg, Oral, Q AM  sodium chloride, 10 mL, Intravenous, Q12H  sodium chloride, 10 mL, Intravenous, Q12H  sodium chloride, 10 mL, Intravenous, Q12H  sodium chloride, 3 mL, Intravenous, Q12H        hold, 1 each  Pharmacy Consult - Pharmacy to dose,         Assessment      Assessment:  1. COVID-19 pneumonia  2. Sepsis secondary to COVID-19 infection  3. Respiratory failure with hypoxia  4. MRSA bacteremia  5. Pleural effusion  6. History of polysubstance abuse  7. History of systemic lupus erythematosus  8. History of seizure disorder  9. History of CVA  10. Essential hypertension  11. Major depression  12. Hypothyroidism        Plan     Recommendations:  1. Reviewed transthoracic echocardiogram with no vegetations seen continue antibiotics consider NILESH when Covid status improves  2. More stable this morning we will monitor however may need VAT thoracentesis as this could be the source of infection and bacteremia    I discussed the patients findings and my recommendations with patient and family      Electronically signed by MP Mitchell, 11/03/21, 9:31 AM EDT.  Electronically signed by Km Hernandez MD, 11/03/21, 12:05 PM EDT.    Please note that portions of this note were completed with a voice recognition program.

## 2021-11-03 NOTE — PLAN OF CARE
Goal Outcome Evaluation:      Pt. Alert and oriented.  All efforts to provide wound care and repositioning have been refused during the shift.  Pt. Educated. Crowley catheter was inserted due to urinary retention noted after bladder scan.  Pt. Denied feeling the need to void.  She denied the ability to void spontaneously during the shift. She had one episode of SOA.  Oxygen saturation was stable.  Oxygen was titrated from 3L to 5L nasal cannula.  Blankets were removed.  She currently denies and SOA.  VSS on 4L nasal cannula.

## 2021-11-03 NOTE — PROGRESS NOTES
PROGRESS NOTE         Patient Identification:  Name:  Karely Villarreal  Age:  56 y.o.  Sex:  female  :  1965  MRN:  [id number]  Visit Number:  [id number]  Primary Care Provider:  Tracie Levi APRN         LOS: 9 days       ----------------------------------------------------------------------------------------------------------------------  Subjective       Chief Complaints:    Shortness of Breath and Pain        Interval History:      Patient had increasing oxygen requirements yesterday and overnight and was transferred to CCU for further monitoring.  This morning patient is currently on 2 L per nasal cannula with no apparent distress.  WBC improving at 20.44.  CRP improving 4.93.  Blood cultures from 2021 2 out of 2 sets positive for MRSA.  Blood cultures from 2021 2 out of 2 sets positive for MRSA.  Repeat blood cultures from 11/3/2021 currently in process.  ----------------------------------------------------------------------------------------------------------------------      Objective       Current Hospital Meds:  buPROPion SR, 150 mg, Oral, BID  castor oil-balsam peru, 1 application, Topical, Q12H  ceftaroline, 600 mg, Intravenous, Q8H  clonazePAM, 0.75 mg, Oral, TID  DAPTOmycin, 8 mg/kg (Adjusted), Intravenous, Q24H  dexamethasone, 6 mg, Intravenous, Daily  DULoxetine, 60 mg, Oral, QAM  enoxaparin, 40 mg, Subcutaneous, Q24H  hydrALAZINE, 25 mg, Oral, Q8H  lactobacillus acidophilus, 1 capsule, Oral, Daily  levETIRAcetam, 500 mg, Oral, Q12H  levothyroxine, 125 mcg, Oral, Daily  LORazepam, 2 mg, Intravenous, Once  metoprolol tartrate, 25 mg, Oral, BID  multivitamin, 1 tablet, Oral, Daily  pantoprazole, 40 mg, Oral, Q AM  sodium chloride, 10 mL, Intravenous, Q12H  sodium chloride, 10 mL, Intravenous, Q12H  sodium chloride, 10 mL, Intravenous, Q12H  sodium chloride, 3 mL, Intravenous, Q12H      hold, 1 each  Pharmacy Consult - Pharmacy to dose,        ----------------------------------------------------------------------------------------------------------------------    Vital Signs:  Temp:  [97.8 °F (36.6 °C)-99 °F (37.2 °C)] 98.6 °F (37 °C)  Heart Rate:  [70-92] 77  Resp:  [14-21] 14  BP: (129-165)/() 130/83  Mean Arterial Pressure (Non-Invasive) for the past 24 hrs (Last 3 readings):   Noninvasive MAP (mmHg)   11/03/21 0915 103   11/03/21 0900 96   11/03/21 0845 106     SpO2 Percentage    11/03/21 0945 11/03/21 1000 11/03/21 1015   SpO2: 98% 98% 99%     SpO2:  [92 %-100 %] 99 %  on  Flow (L/min):  [2-15] 2;   Device (Oxygen Therapy): nasal cannula    Body mass index is 31.64 kg/m².  Wt Readings from Last 3 Encounters:   11/03/21 88.9 kg (196 lb)   10/23/21 83.9 kg (185 lb)   05/17/21 88.9 kg (196 lb)        Intake/Output Summary (Last 24 hours) at 11/3/2021 1217  Last data filed at 11/3/2021 0800  Gross per 24 hour   Intake 1441.95 ml   Output 1250 ml   Net 191.95 ml     Diet Regular  ----------------------------------------------------------------------------------------------------------------------      Physical Exam:    Deferred due to COVID-19 isolation.  ----------------------------------------------------------------------------------------------------------------------  Results from last 7 days   Lab Units 10/31/21  0120   CK TOTAL U/L 41           Results from last 7 days   Lab Units 11/02/21  1503   PH, ARTERIAL pH units 7.428   PO2 ART mm Hg 129.0*   PCO2, ARTERIAL mm Hg 39.7   HCO3 ART mmol/L 26.2*     Results from last 7 days   Lab Units 11/03/21 0047 11/02/21 0121 11/01/21 0459   CRP mg/dL 4.93* 6.48* 6.70*   WBC 10*3/mm3 20.44* 23.86* 22.33*   HEMOGLOBIN g/dL 7.1* 7.2* 7.3*   HEMATOCRIT % 23.2* 24.5* 22.9*   MCV fL 84.4 86.3 81.2   MCHC g/dL 30.6* 29.4* 31.9   PLATELETS 10*3/mm3 382 380 296   INR   --  1.15*  --      Results from last 7 days   Lab Units 11/03/21  0047 11/02/21  0121 11/01/21  0459 10/31/21  1627 10/31/21  0120  10/29/21  0326 10/28/21  0450   SODIUM mmol/L 140 137 142  --    < >   < >  --    POTASSIUM mmol/L 4.1 4.0 3.9  --    < >   < >  --    MAGNESIUM mg/dL 2.2 2.1 2.1  --    < >   < >  --    CHLORIDE mmol/L 105 105 107  --    < >   < >  --    CO2 mmol/L 25.5 22.9 23.5  --    < >   < >  --    BUN mg/dL 37* 35* 32*  --    < >   < >  --    CREATININE mg/dL 1.03* 0.99 0.97  --    < >   < > 1.08*   EGFR IF NONAFRICN AM mL/min/1.73 56* 58* 60*  --    < >   < > 53*   CALCIUM mg/dL 8.6 8.6 8.5*  --    < >   < >  --    GLUCOSE mg/dL 106* 107* 111*  --    < >   < >  --    ALBUMIN g/dL 2.57* 2.50*  --   --   --   --  2.38*   BILIRUBIN mg/dL 0.7 0.8  --  1.0  --   --  1.4*   ALK PHOS U/L 163* 183*  --   --   --   --  148*   AST (SGOT) U/L 13 15  --   --   --   --  33*   ALT (SGPT) U/L 8 10  --   --   --   --  21    < > = values in this interval not displayed.   Estimated Creatinine Clearance: 69.3 mL/min (A) (by C-G formula based on SCr of 1.03 mg/dL (H)).  No results found for: AMMONIA    No results found for: HGBA1C, POCGLU  Lab Results   Component Value Date    HGBA1C 5.90 (H) 09/15/2019     Lab Results   Component Value Date    TSH 47.200 (H) 10/25/2021    FREET4 0.16 (L) 10/25/2021       Blood Culture   Date Value Ref Range Status   10/25/2021 Staphylococcus aureus, MRSA (C)  Preliminary     Comment:     Infectious disease consultation is highly recommended to rule out distant foci of infection.  Methicillin resistant Staphylococcus aureus, Patient may be an isolation risk.   10/25/2021 Staphylococcus aureus, MRSA (C)  Preliminary     Comment:     Infectious disease consultation is highly recommended to rule out distant foci of infection.  Methicillin resistant Staphylococcus aureus, Patient may be an isolation risk.   10/25/2021 Staphylococcus aureus, MRSA (C)  Preliminary     Comment:     Infectious disease consultation is highly recommended to rule out distant foci of infection.  Methicillin resistant Staphylococcus aureus,  Patient may be an isolation risk.     No results found for: URINECX  No results found for: WOUNDCX  No results found for: STOOLCX  No results found for: RESPCX  Pain Management Panel       Pain Management Panel Latest Ref Rng & Units 10/25/2021 10/25/2021    CREATININE UR mg/dL 97.9 97.9    AMPHETAMINES SCREEN, URINE Negative - Positive(A)    BARBITURATES SCREEN Negative - Negative    BENZODIAZEPINE SCREEN, URINE Negative - Negative    BUPRENORPHINEUR Negative - Positive(A)    COCAINE SCREEN, URINE Negative - Negative    METHADONE SCREEN, URINE Negative - Negative    METHAMPHETAMINEUR Negative - Positive(A)              ----------------------------------------------------------------------------------------------------------------------  Imaging Results (Last 24 Hours)       Procedure Component Value Units Date/Time    MRI Thoracic Spine With & Without Contrast [[id number]] Resulted: 11/03/21 1135     Updated: 11/03/21 1135    MRI Brain With & Without Contrast [[id number]] Resulted: 11/03/21 1134     Updated: 11/03/21 1134    US Venous Doppler Upper Extremity Bilateral (duplex) [[id number]] Collected: 11/02/21 1542     Updated: 11/02/21 1545    Narrative:      US VENOUS DOPPLER UPPER EXTREMITY BILATERAL (DUPLEX)-     REASON FOR EXAM:  Bilateral and focalized edema at the medial elbows in  a patient with known polysubstance use and MRSA bacteremia;  A41.9-Sepsis, unspecified organism; U07.1-COVID-19; J12.82-Pneumonia due  to coronavirus disease 2019; N30.00-Acute cystitis without hematuria;  R79.89-Other specified abnormal findings of blood chemistry;  A41.01-Sepsis due to methicillin susceptible Staphylococcus aureus;  R65.20-Severe     Comparison:None     Multiple real-time color Doppler images were obtained. The distal  jugular and subclavian vein on the right were not able to be imaged due  to overlying bandage. The other veins were well demonstrated.. There is  good color doppler signal seen filling the deep veins.  They  are  completely compressible by the ultrasound transducer. There was good  spontaneous venous flow and augmentation. There are no echoes seen along  the deep veins to suggest clot.       Impression:      No sonographic findings of DVT in the upper extremities        This report was finalized on 11/2/2021 3:43 PM by Dr. Jaxson Boudreaux II, MD.       XR Chest 1 View [[id number]] Collected: 11/02/21 1315     Updated: 11/02/21 1318    Narrative:      EXAM:    XR Chest, 1 View     EXAM DATE:    11/2/2021 12:53 PM     CLINICAL HISTORY:    trouble breathing; A41.9-Sepsis, unspecified organism; U07.1-COVID-19;  J12.82-Pneumonia due to coronavirus disease 2019; N30.00-Acute cystitis  without hematuria; R79.89-Other specified abnormal findings of blood  chemistry; A41.01-Sepsis due to methicillin susceptible Staphylococcus  aureus; R65.20-Severe sepsis without septic shock     TECHNIQUE:    Frontal view of the chest.     COMPARISON:    10/28/2021     FINDINGS:    LUNGS:  Patchy bilateral airspace disease is again noted.    PLEURAL SPACE:  No pneumothorax.  Small right pleural effusion is  again noted.    HEART:  Unremarkable.  No cardiomegaly.    MEDIASTINUM:  Unremarkable.    BONES/JOINTS:  Unremarkable.    TUBES, LINES AND DEVICES:  Right central line with tip in SVC.       Impression:      1.  No pneumothorax.  2.  Small right pleural effusion is again noted.  3.  Patchy bilateral airspace disease is again noted.     This report was finalized on 11/2/2021 1:16 PM by Dr. Miguel Roe MD.       US Chest [[id number]] Collected: 11/02/21 1237     Updated: 11/02/21 1241    Narrative:      EXAM:    US Chest Limited,      EXAM DATE:    11/2/2021 10:30 AM     CLINICAL HISTORY:    MRSA bacteremia and COVID-19; need to see if the pleural fluid is due  to a bacterial cause or congestive heart failure; A41.9-Sepsis,  unspecified organism; U07.1-COVID-19; J12.82-Pneumonia due to  coronavirus disease 2019; N30.00-Acute cystitis  without hematuria;  R79.89-Other specified abnormal findings of blood chemistry;  A41.01-Sepsis due to methicillin susceptible Staphylococcus aureus;  R65.20-     TECHNIQUE:    Real-time ultrasound of the Chest using the Focussed Assessment with  Sonography in Trauma (FAST) Protocol with image documentation.     COMPARISON:    No relevant prior studies available.     FINDINGS:    PLEURAL SPACE:  Unsuccessful attempts made at withdrawing fluid. 8  Somali catheter of right very complex pleural effusion with internal  septation.       Impression:        Unsuccessful attempts made at withdrawing fluid. 8 Somali catheter of  right very complex pleural effusion with internal septation.     This report was finalized on 11/2/2021 12:39 PM by Dr. Miguel Roe MD.               ----------------------------------------------------------------------------------------------------------------------    Assessment/Plan       Assessment/Plan     ASSESSMENT:    1.  Severe sepsis with lactic acid greater than 2 on admission  2.  COVID-19 pneumonia  3.  MRSA bacteremia    PLAN:    Patient had increasing oxygen requirements yesterday and overnight and was transferred to CCU for further monitoring.  This morning patient is currently on 2 L per nasal cannula with no apparent distress.  WBC improving at 20.44.  CRP improving 4.93.  Blood cultures from 11/1/2021 2 out of 2 sets positive for MRSA.  Blood cultures from 11/2/2021 2 out of 2 sets positive for MRSA.  Repeat blood cultures from 11/3/2021 currently in process.    Blood cultures from 10/30/2021 2 out of 2 sets positive for MRSA. Blood culture from 10/29/2021 1 out of 1 set positive for MRSA. Blood cultures from 10/27/2021 2 out of 2 sets positive for MRSA.  3 out of 3 blood cultures on 10/25/2021 are so far showing MRSA.     On 10/28/2021 patient underwent Port-A-Cath removal and placement of central line.    Urinalysis was only mildly positive with 2+ bacteria but 0-2 WBCs.  As  patient is asymptomatic, this does not need treated. CT chest with PE protocol on 10/25/2021 was negative for PE.  Negative for thoracic aortic aneurysm/dissection.  Moderate right pleural effusion and small left pleural effusion with compressive right basilar atelectasis/consolidation and minimal left basilar atelectasis.  Nodular and groundglass infiltrates are scattered throughout both lungs, concerning for multifocal pneumonia.  Follow-up to resolution recommended to exclude any underlying pulmonary masses given the presence of the patient's right-sided Port-A-Cath.  COVID-19 reporting criteria: Intermediate.  Imaging features can be seen with COVID-19 pneumonia, although are nonspecific and can occur with a variety of infectious and noninfectious processes.  Chest x-ray on 10/24/2021 shows bilateral lower lobe infiltrates.  Venous duplex of bilateral lower extremities on 10/25/2021 shows no DVT.  COVID-19 and flu A/B PCR on 10/24/2021 detected COVID-19.  Legionella antigen on 10/25/2021 was negative.  Respiratory panel on 10/25/2021 was negative.  MRSA screen on 10/25/2021 was positive. Strep pneumo antigen on 10/25/2021 was negative.    Patient completed remdesivir.  Continues on Decadron.     Recommend to continue daptomycin 8 mg/kg IV every 24 hours and ceftaroline 600 mg IV every 8 hours. Recommend NILESH.  We will follow closely and adjust antibiotic therapy as appropriate.    Code Status:   Code Status and Medical Interventions:   Ordered at: 10/25/21 0441     Level Of Support Discussed With:    Patient     Code Status (Patient has no pulse and is not breathing):    CPR (Attempt to Resuscitate)     Medical Interventions (Patient has pulse or is breathing):    Full         MP Fuller  11/03/21  12:17 EDT

## 2021-11-03 NOTE — PROGRESS NOTES
[facility] HOSPITALIST PROGRESS NOTE     Patient Identification:  Name:  Karely Villarreal  Age:  56 y.o.  Sex:  female  :  1965  MRN:  [id number]  Visit Number:  [id number]  ROOM: [address]     Primary Care Provider:  Tracie Levi APRN     Date of Admission: 10/24/2021    Length of stay in inpatient status:  9    Subjective     Chief Compliant:    Chief Complaint   Patient presents with   • Shortness of Breath   • Pain     History of Presenting Illness:   In brief, 56-year-old female admitted on 10/24/2021 with shortness of breath after she took antibiotics for a week for an outpatient diagnosis of bronchitis.  She was COVID-19 negative at the time of the bronchitis diagnosis but her symptoms worsened and she was positive for COVID-19 in our emergency department this admission..  Also, the patient was noted to be septic while in the emergency department and possibly have a urinary tract infection.  She received MAB in the emergency department and at first she was not requiring oxygen.  She was started on empiric Rocephin for the UTI.  However, by hospital day #2, the patient was requiring oxygen and thus she was given 5 days of remdesivir; she is also started on Decadron and remains on the steroid.  Eventually, the patient's blood culture started growing MRSA; infectious disease team was consulted and the patient was first started on vancomycin; this was then changed to daptomycin and ceftaroline as she continued to worsen.  The patient did have a port placed prior to admission; this port has been removed by general surgery on 10/28/2021; a right-sided internal jugular central line was placed in its place.  Transthoracic echocardiogram on 10/26/2021 did not show any obvious vegetations.  Cardiology was consulted but NILESH has been delayed due to the patient's positive COVID-19 status.  Repeat ECHO today does not show any large vegetations.  The patient had a desaturation episode yesterday to  the 60% range that we thought was going to require intubation of the patient; thus, the patient was moved to the critical care unit.  The patient was also less responsive during that time.  The patient had prior use of her home pain medications from her purse at the same time that we were giving her equivalents of her home pain medication.  However, her home pain meds were locked in pharmacy so that she would receive only 1 set of pain medications.  To my knowledge, there has been no visitors bringing the patient items.  Due to COVID-19 restrictions, she has had no visitors.  Today, the patient had been weaned down to 2 L/min of nasal cannula oxygen, from 10 L with a nonrebreather.  The patient is alert and oriented today.  She states she cannot lift her arms very much, though we have seen her using the phone for text messages and I have had her point to items above the head.  The patient states that her shortness of air has improved.  She is coughing but not producing sputum.  She does continue to have weakness all over.  She complains of back pain but the pain is different than her chronic pain.  It is currently more localized and is sharper in nature.  She also has a daily headache since becoming ill; her last headache prior to this was 1 year ago.    Objective     Current Hospital Meds:buPROPion SR, 150 mg, Oral, BID  castor oil-balsam peru, 1 application, Topical, Q12H  ceftaroline, 600 mg, Intravenous, Q8H  clonazePAM, 0.75 mg, Oral, TID  DAPTOmycin, 8 mg/kg (Adjusted), Intravenous, Q24H  dexamethasone, 6 mg, Intravenous, Daily  DULoxetine, 60 mg, Oral, QAM  enoxaparin, 40 mg, Subcutaneous, Q24H  hydrALAZINE, 25 mg, Oral, Q8H  lactobacillus acidophilus, 1 capsule, Oral, Daily  levETIRAcetam, 500 mg, Oral, Q12H  levothyroxine, 125 mcg, Oral, Daily  metoprolol tartrate, 25 mg, Oral, BID  multivitamin, 1 tablet, Oral, Daily  pantoprazole, 40 mg, Oral, Q AM  sodium chloride, 10 mL, Intravenous, Q12H  sodium  "chloride, 10 mL, Intravenous, Q12H  sodium chloride, 10 mL, Intravenous, Q12H  sodium chloride, 3 mL, Intravenous, Q12H    hold, 1 each  Pharmacy Consult - Pharmacy to dose,       Current Antimicrobial Therapy:  Anti-Infectives (From admission, onward)    Ordered     Dose/Rate Route Frequency Start Stop    10/30/21 0952  ceftaroline (TEFLARO) 600 mg in sodium chloride 0.9 % 100 mL IVPB-VTB        Ordering Provider: Alonzo Hastings MD    600 mg Intravenous Every 8 Hours 10/30/21 1200 11/13/21 1459    10/30/21 0944  DAPTOmycin (CUBICIN) 550 mg/50 mL 0.9% sodium chloride IVPB        Ordering Provider: Alonzo Hastings MD    8 mg/kg × 71.7 kg (Adjusted)  over 30 Minutes Intravenous Every 24 Hours 10/30/21 1100 12/11/21 1059    10/26/21 1528  remdesivir 100 mg in 270 mL NS        Ordering Provider: Ruma Madden MD   \"Followed by\" Linked Group Details    100 mg  over 60 Minutes Intravenous Every 24 Hours 10/27/21 1600 10/31/21 1559    10/26/21 1528  remdesivir 200 mg in 290 mL NS        Ordering Provider: Lucia Ardon MD   \"Followed by\" Linked Group Details    200 mg  over 60 Minutes Intravenous Every 24 Hours 10/26/21 1600 10/26/21 1852    10/25/21 2126  vancomycin 1750 mg/500 mL 0.9% NS IVPB ([facility])        Ordering Provider: Desmond Teixeira MD    20 mg/kg × 88.2 kg  over 120 Minutes Intravenous Once 10/25/21 2215 10/26/21 0027    10/25/21 0432  cefTRIAXone (ROCEPHIN) 1 g in sodium chloride 0.9 % 100 mL IVPB-VTB        Ordering Provider: Arie Wall MD    1 g  200 mL/hr over 30 Minutes Intravenous Once 10/25/21 0434 10/25/21 0616        Current Diuretic Therapy:  Diuretics (From admission, onward)    Ordered     Dose/Rate Route Frequency Start Stop    10/30/21 1653  furosemide (LASIX) injection 40 mg        Ordering Provider: Lucia Ardon MD    40 mg Intravenous Once 10/30/21 1800 10/30/21 1736    "     ----------------------------------------------------------------------------------------------------------------------  Vital Signs:  Temp:  [97.8 °F (36.6 °C)-98.8 °F (37.1 °C)] 98.5 °F (36.9 °C)  Heart Rate:  [70-89] 81  Resp:  [14-21] 14  BP: (129-160)/(75-97) 134/81  SpO2:  [94 %-100 %] 97 %  on  Flow (L/min):  [2-5] 2;   Device (Oxygen Therapy): nasal cannula  Body mass index is 31.64 kg/m².    Wt Readings from Last 3 Encounters:   11/03/21 88.9 kg (196 lb)   10/23/21 83.9 kg (185 lb)   05/17/21 88.9 kg (196 lb)     Intake & Output (last 3 days)       10/31 0701  11/01 0700 11/01 0701  11/02 0700 11/02 0701  11/03 0700 11/03 0701  11/04 0700    P.O. 0510 666 4577 360    I.V. (mL/kg)        Other   20     IV Piggyback   202 150    Total Intake(mL/kg) 1840 (21.1) 940 (10.9) 1322 (14.9) 510 (5.7)    Urine (mL/kg/hr) 1750 (0.8) 1950 (0.9) 1250 (0.6) 1050 (1.1)    Stool 0 0 0 0    Total Output 1750 1950 1250 1050    Net +90 -1010 +72 -540            Urine Unmeasured Occurrence  1 x 1 x     Stool Unmeasured Occurrence 6 x 1 x          Diet Regular  ----------------------------------------------------------------------------------------------------------------------  Physical Exam; I saw the patient yesterday prior to the desaturation episodes; her exam today compared to my exam yesterday appears to be the same.  Vitals reviewed.   Constitutional:       General: She is in acute distress.      Appearance: She is well-developed. She is obese. She is not toxic-appearing or diaphoretic.      Interventions: Nasal cannula in place.   HENT:      Head: Normocephalic and atraumatic.      Right Ear: External ear normal.      Left Ear: External ear normal.      Nose: Nose normal.   Eyes:      General: No scleral icterus.        Right eye: No discharge.         Left eye: No discharge.      Pupils: Pupils are equal, round, and reactive to light.   Cardiovascular:      Rate and Rhythm: Normal rate and regular rhythm.       Pulses: Normal pulses.      Heart sounds: No murmur heard.  Pulmonary:      Effort: No accessory muscle usage, prolonged expiration or respiratory distress.      Breath sounds: No stridor. No wheezing or rales.   Abdominal:      General: Bowel sounds are normal. There is no distension.      Palpations: Abdomen is soft.   Musculoskeletal:         General: No deformity or signs of injury.   Skin:     Capillary Refill: Capillary refill takes less than 2 seconds.      Coloration: Skin is not jaundiced or pale.      Findings: Bruising present.   Neurological:      Mental Status: She is alert and oriented to person, place, and time. Mental status is at baseline.      Cranial Nerves: No cranial nerve deficit.      Comments: She can follow all commands.   Psychiatric:         Attention and Perception: Attention normal.         Mood and Affect: Affect is blunt.         Speech: Speech normal.         Behavior: Behavior normal. Behavior is cooperative.         Thought Content: Thought content normal.         Cognition and Memory: Cognition normal.   ----------------------------------------------------------------------------------------------------------------------  Tele: Normal sinus rhythm heart 70s to 80s.  I personally reviewed the telemetry strips.      Last echocardiogram:  Results for orders placed during the hospital encounter of 10/24/21    Adult Transthoracic Echo Complete W/ Cont if Necessary Per Protocol    Interpretation Summary  · Left ventricular ejection fraction appears to be 61 - 65%. Left ventricular systolic function is normal.  · Left ventricular diastolic function was normal.  · Estimated right ventricular systolic pressure from tricuspid regurgitation is normal (<35 mmHg).  · No vegetations seen.  · This is a technically difficult study.    I have personally read the ECHO final  report.    ----------------------------------------------------------------------------------------------------------------------  LABS:    CBC and coagulation:  Results from last 7 days   Lab Units 11/03/21  0047 11/02/21  0121 11/01/21  0459 10/31/21  1932 10/31/21  0422 10/31/21  0120 10/29/21  2304 10/29/21  0326   PROCALCITONIN ng/mL 0.35*  --   --   --   --   --   --   --    CRP mg/dL 4.93* 6.48* 6.70*  --   --  7.34* 9.37* 12.81*   WBC 10*3/mm3 20.44* 23.86* 22.33*  --  23.43*  --   --  22.49*   HEMOGLOBIN g/dL 7.1* 7.2* 7.3* 7.6* 7.7*  --   --  9.3*   HEMATOCRIT % 23.2* 24.5* 22.9*  --  25.2*  --   --  29.8*   MCV fL 84.4 86.3 81.2  --  80.3  --   --  79.7   MCHC g/dL 30.6* 29.4* 31.9  --  30.6*  --   --  31.2*   PLATELETS 10*3/mm3 382 380 296  --  230  --   --  129*   INR   --  1.15*  --   --   --   --   --   --    D DIMER QUANT MCGFEU/mL  --  6.85*  --   --   --   --   --   --      Acid/base balance:  Results from last 7 days   Lab Units 11/02/21  1503   PH, ARTERIAL pH units 7.428   PO2 ART mm Hg 129.0*   PCO2, ARTERIAL mm Hg 39.7   HCO3 ART mmol/L 26.2*     Renal and electrolytes:  Results from last 7 days   Lab Units 11/03/21  0047 11/02/21  0121 11/01/21  0459 10/31/21  0120 10/30/21  2011 10/29/21  2304 10/29/21  0326 10/28/21  0450   SODIUM mmol/L 140 137 142 143  --  142 139  --    POTASSIUM mmol/L 4.1 4.0 3.9 3.8 3.7 3.1* 3.6  --    MAGNESIUM mg/dL 2.2 2.1 2.1 2.1  --   --   --   --    CHLORIDE mmol/L 105 105 107 108*  --  106 104  --    CO2 mmol/L 25.5 22.9 23.5 24.3  --  22.8 22.2  --    BUN mg/dL 37* 35* 32* 34*  --  35* 40*  --    CREATININE mg/dL 1.03* 0.99 0.97 1.05*  --  0.82 0.94 1.08*   EGFR IF NONAFRICN AM mL/min/1.73 56* 58* 60* 54*  --  72 62 53*   CALCIUM mg/dL 8.6 8.6 8.5* 8.6  --  8.2* 8.4*  --    PHOSPHORUS mg/dL 4.6* 4.5  --   --   --   --   --   --    GLUCOSE mg/dL 106* 107* 111* 147*  --  156* 142*  --      Estimated Creatinine Clearance: 69.3 mL/min (A) (by C-G formula based on  SCr of 1.03 mg/dL (H)).    Liver and pancreatic function:  Results from last 7 days   Lab Units 11/03/21  0047 11/02/21  0121 10/31/21  1627 10/28/21  0450   ALBUMIN g/dL 2.57* 2.50*  --  2.38*   BILIRUBIN mg/dL 0.7 0.8 1.0 1.4*   ALK PHOS U/L 163* 183*  --  148*   AST (SGOT) U/L 13 15  --  33*   ALT (SGPT) U/L 8 10  --  21     Endocrine function:  Lab Results   Component Value Date    HGBA1C 5.90 (H) 09/15/2019     Glucose levels from the CMP:  Results from last 7 days   Lab Units 11/03/21  0047 11/02/21  0121 11/01/21  0459 10/31/21  0120 10/29/21  2304 10/29/21  0326   GLUCOSE mg/dL 106* 107* 111* 147* 156* 142*     Lab Results   Component Value Date    TSH 47.200 (H) 10/25/2021    FREET4 0.16 (L) 10/25/2021     Cardiac:  Results from last 7 days   Lab Units 10/31/21  0120   CK TOTAL U/L 41       Cultures:  Microbiology Results (last 10 days)     Procedure Component Value - Date/Time    Blood Culture - Blood, Arm, Left [[id number]]  (Abnormal) Collected: 11/02/21 0121    Lab Status: Preliminary result Specimen: Blood from Arm, Left Updated: 11/02/21 2116     Blood Culture Abnormal Stain     Gram Stain Aerobic Bottle Gram positive cocci in clusters    Narrative:      No BCID performed, refer to previous blood culture specimen collected on 11/2/21 at 0121    Blood Culture - Blood, Arm, Left [[id number]]  (Abnormal) Collected: 11/02/21 0121    Lab Status: Preliminary result Specimen: Blood from Arm, Left Updated: 11/02/21 2332     Blood Culture Abnormal Stain     Gram Stain Aerobic Bottle Gram positive cocci in clusters      Anaerobic Bottle Gram positive cocci in clusters    Blood Culture ID, PCR - Blood, Arm, Left [[id number]]  (Abnormal) Collected: 11/02/21 0121    Lab Status: Final result Specimen: Blood from Arm, Left Updated: 11/02/21 2116     BCID, PCR Staph aureus. mecA/C and MREJ (methicillin resistance gene) detected. Identification by BCID2 PCR.    Narrative:      Infectious disease consultation is highly  recommended to rule out distant foci of infection.    Blood Culture - Blood, Hand, Right [[id number]]  (Abnormal) Collected: 11/01/21 1742    Lab Status: Final result Specimen: Blood from Hand, Right Updated: 11/03/21 0709     Blood Culture Staphylococcus aureus, MRSA     Comment: Infectious disease consultation is highly recommended to rule out distant foci of infection.  Methicillin resistant Staphylococcus aureus, Patient may be an isolation risk.        Isolated from Aerobic Bottle     Gram Stain Aerobic Bottle Gram positive cocci in clusters    Narrative:      No BCID performed, refer to previous blood culture specimen collected on 10/29/2021.  Refer to previous blood culture collected on 10/29/21 at 12:08 for MICs.        Blood Culture - Blood, Hand, Left [[id number]]  (Abnormal) Collected: 11/01/21 1742    Lab Status: Final result Specimen: Blood from Hand, Left Updated: 11/03/21 0709     Blood Culture Staphylococcus aureus, MRSA     Comment: Infectious disease consultation is highly recommended to rule out distant foci of infection.  Methicillin resistant Staphylococcus aureus, Patient may be an isolation risk.        Isolated from Aerobic Bottle     Gram Stain Aerobic Bottle Gram positive cocci in clusters    Narrative:      No BCID performed, refer to previous blood culture specimen collected on 10/29/2021.  Refer to previous blood culture collected on 10/29/21 at 12:08 for MICs.    Blood Culture - Blood, Arm, Right [[id number]]  (Abnormal) Collected: 10/30/21 1212    Lab Status: Final result Specimen: Blood from Arm, Right Updated: 11/01/21 1032     Blood Culture Staphylococcus aureus, MRSA     Comment: Infectious disease consultation is highly recommended to rule out distant foci of infection.  Methicillin resistant Staphylococcus aureus, Patient may be an isolation risk.        Isolated from Aerobic and Anaerobic Bottles     Gram Stain Aerobic Bottle Gram positive cocci in pairs and clusters      Anaerobic  Bottle Gram positive cocci in pairs and clusters    Narrative:      No BCID performed, refer to previous blood culture specimen collected on 10- @ 1208 from central line source for MICs    Blood Culture - Blood, Arm, Left [[id number]]  (Abnormal) Collected: 10/30/21 1211    Lab Status: Final result Specimen: Blood from Arm, Left Updated: 11/01/21 1031     Blood Culture Staphylococcus aureus, MRSA     Comment: Infectious disease consultation is highly recommended to rule out distant foci of infection.  Methicillin resistant Staphylococcus aureus, Patient may be an isolation risk.        Isolated from Aerobic and Anaerobic Bottles     Gram Stain Aerobic Bottle Gram positive cocci in pairs and clusters      Anaerobic Bottle Gram positive cocci in pairs and clusters    Narrative:      No BCID performed, refer to previous blood culture specimen collected on 10- @ 1208 from central line source for MICs      Blood Culture - Blood, Blood, Central Line [[id number]]  (Abnormal)  (Susceptibility) Collected: 10/29/21 1208    Lab Status: Final result Specimen: Blood, Central Line Updated: 11/01/21 1011     Blood Culture Staphylococcus aureus, MRSA     Comment: Infectious disease consultation is highly recommended to rule out distant foci of infection.  Methicillin resistant Staphylococcus aureus, Patient may be an isolation risk.        Isolated from Aerobic and Anaerobic Bottles     Gram Stain Aerobic Bottle Gram positive cocci in clusters      Anaerobic Bottle Gram positive cocci in clusters    Susceptibility      Staphylococcus aureus, MRSA     NEDA     Gentamicin Susceptible     Oxacillin Resistant     Rifampin Susceptible     Vancomycin Susceptible                  Linear View               Susceptibility Comments     Staphylococcus aureus, MRSA    This isolate does not demonstrate inducible clindamycin resistance in vitro.               Blood Culture ID, PCR - Blood, Blood, Central Line [[id number]]  (Abnormal)  Collected: 10/29/21 1208    Lab Status: Final result Specimen: Blood, Central Line Updated: 10/30/21 0130     BCID, PCR Staph aureus. mecA/C and MREJ (methicillin resistance gene) detected. Identification by BCID2 PCR.     BOTTLE TYPE Aerobic Bottle    Narrative:      Infectious disease consultation is highly recommended to rule out distant foci of infection.    Blood Culture - Blood, Arm, Right [[id number]]  (Abnormal) Collected: 10/27/21 0559    Lab Status: Final result Specimen: Blood from Arm, Right Updated: 10/28/21 1025     Blood Culture Staphylococcus aureus, MRSA     Comment: Infectious disease consultation is highly recommended to rule out distant foci of infection.  Methicillin resistant Staphylococcus aureus, Patient may be an isolation risk.        Isolated from Aerobic Bottle     Gram Stain Aerobic Bottle Gram positive cocci in clusters    Narrative:      No BCID performed, refer to previous blood culture specimen collected on 10-25-21 at 5:28.  Refer to previous blood culture collected on 10/25/2021 at 0528 for MICs    Blood Culture - Blood, Arm, Left [[id number]]  (Abnormal) Collected: 10/27/21 0500    Lab Status: Final result Specimen: Blood from Arm, Left Updated: 10/28/21 1025     Blood Culture Staphylococcus aureus, MRSA     Comment: Infectious disease consultation is highly recommended to rule out distant foci of infection.  Methicillin resistant Staphylococcus aureus, Patient may be an isolation risk.        Isolated from Aerobic Bottle     Gram Stain Aerobic Bottle Gram positive cocci in clusters    Narrative:      No BCID performed, refer to previous blood culture specimen collected on 10-25-21 at 5:28.  Refer to previous blood culture collected on 10/25/2021 at 0528 for MICs      S. Pneumo Ag Urine or CSF - Urine, Urine, Clean Catch [[id number]]  (Normal) Collected: 10/25/21 1046    Lab Status: Final result Specimen: Urine, Clean Catch Updated: 10/25/21 2331     Strep Pneumo Ag Negative     Respiratory Panel, PCR (WITHOUT COVID) - Swab, Nasopharynx [[id number]]  (Normal) Collected: 10/25/21 0831    Lab Status: Final result Specimen: Swab from Nasopharynx Updated: 10/25/21 1015     ADENOVIRUS, PCR Not Detected     Coronavirus 229E Not Detected     Coronavirus HKU1 Not Detected     Coronavirus NL63 Not Detected     Coronavirus OC43 Not Detected     Human Metapneumovirus Not Detected     Human Rhinovirus/Enterovirus Not Detected     Influenza B PCR Not Detected     Parainfluenza Virus 1 Not Detected     Parainfluenza Virus 2 Not Detected     Parainfluenza Virus 3 Not Detected     Parainfluenza Virus 4 Not Detected     Bordetella pertussis pcr Not Detected     Chlamydophila pneumoniae PCR Not Detected     Mycoplasma pneumo by PCR Not Detected     Influenza A PCR Not Detected     RSV, PCR Not Detected     Bordetella parapertussis PCR Not Detected    Narrative:      The coronavirus on the RVP is NOT COVID-19 and is NOT indicative of infection with COVID-19.    In the setting of a positive respiratory panel with a viral infection PLUS a negative procalcitonin without other underlying concern for bacterial infection, consider observing off antibiotics or discontinuation of antibiotics and continue supportive care. If the respiratory panel is positive for atypical bacterial infection (Bordetella pertussis, Chlamydophila pneumoniae, or Mycoplasma pneumoniae), consider antibiotic de-escalation to target atypical bacterial infection.    MRSA Screen, PCR (Inpatient) - Swab, Nares [[id number]]  (Abnormal) Collected: 10/25/21 0831    Lab Status: Final result Specimen: Swab from Nares Updated: 10/25/21 1043     MRSA PCR Positive     Staph aureus by PCR Positive    Blood Culture - Blood, Arm, Right [[id number]]  (Abnormal) Collected: 10/25/21 0653    Lab Status: Final result Specimen: Blood from Arm, Right Updated: 10/27/21 1122     Blood Culture Staphylococcus aureus, MRSA     Comment: Infectious disease consultation is  highly recommended to rule out distant foci of infection.  Methicillin resistant Staphylococcus aureus, Patient may be an isolation risk.    Refer to previous blood culture collected on 10/25/2021 at 0528 for MICs        Isolated from Aerobic Bottle     Gram Stain Aerobic Bottle Gram positive cocci in clusters      Anaerobic Bottle Gram positive cocci in clusters    Blood Culture - Blood, Arm, Left [[id number]]  (Abnormal) Collected: 10/25/21 0528    Lab Status: Final result Specimen: Blood from Arm, Left Updated: 10/27/21 0728     Blood Culture Staphylococcus aureus, MRSA     Comment: Infectious disease consultation is highly recommended to rule out distant foci of infection.  Methicillin resistant Staphylococcus aureus, Patient may be an isolation risk.    Refer to previous blood culture collected on 10/25/2021 at 0528 for MICs        Isolated from Aerobic and Anaerobic Bottles     Gram Stain Aerobic Bottle Gram positive cocci in clusters      Anaerobic Bottle Gram positive cocci in clusters    Blood Culture - Blood, Arm, Right [[id number]]  (Abnormal)  (Susceptibility) Collected: 10/25/21 0528    Lab Status: Final result Specimen: Blood from Arm, Right Updated: 10/27/21 0728     Blood Culture Staphylococcus aureus, MRSA     Comment: Infectious disease consultation is highly recommended to rule out distant foci of infection.  Methicillin resistant Staphylococcus aureus, Patient may be an isolation risk.        Isolated from Aerobic and Anaerobic Bottles     Gram Stain Aerobic Bottle Gram positive cocci in clusters      Anaerobic Bottle Gram positive cocci in clusters    Susceptibility      Staphylococcus aureus, MRSA     NEDA     Gentamicin Susceptible     Oxacillin Resistant     Rifampin Susceptible     Vancomycin Susceptible                  Linear View               Susceptibility Comments     Staphylococcus aureus, MRSA    This isolate does not demonstrate inducible clindamycin resistance in vitro.                Blood Culture ID, PCR - Blood, Arm, Right [[id number]]  (Abnormal) Collected: 10/25/21 0528    Lab Status: Final result Specimen: Blood from Arm, Right Updated: 10/25/21 2112     BCID, PCR Staph aureus. mecA/C and MREJ (methicillin resistance gene) detected. Identification by BCID2 PCR.     BOTTLE TYPE Aerobic Bottle    Narrative:      Infectious disease consultation is highly recommended to rule out distant foci of infection.    Legionella Antigen, Urine - Urine, Urine, Clean Catch [[id number]]  (Normal) Collected: 10/25/21 0327    Lab Status: Final result Specimen: Urine, Clean Catch Updated: 10/25/21 1038     LEGIONELLA ANTIGEN, URINE Negative    Narrative:      Presumptive negative for L. pneumophilia serogroup 1 antigen, suggesting no recent or current infection.    COVID PRE-OP / PRE-PROCEDURE SCREENING ORDER (NO ISOLATION) - Swab, Nasopharynx [[id number]]  (Abnormal) Collected: 10/24/21 2158    Lab Status: Final result Specimen: Swab from Nasopharynx Updated: 10/24/21 2254    Narrative:      The following orders were created for panel order COVID PRE-OP / PRE-PROCEDURE SCREENING ORDER (NO ISOLATION) - Swab, Nasopharynx.  Procedure                               Abnormality         Status                     ---------                               -----------         ------                     COVID-19 and FLU A/B PCR...[[id number]]  Abnormal            Final result                 Please view results for these tests on the individual orders.    COVID-19 and FLU A/B PCR - Swab, Nasopharynx [[id number]]  (Abnormal) Collected: 10/24/21 2158    Lab Status: Final result Specimen: Swab from Nasopharynx Updated: 10/24/21 2254     COVID19 Detected     Influenza A PCR Not Detected     Influenza B PCR Not Detected    Narrative:      Fact sheet for providers: https://www.fda.gov/media/014005/download    Fact sheet for patients: https://www.fda.gov/media/571315/download    Test performed by PCR.  Influenza A and Influenza B  negative results should be considered presumptive in samples that have a positive SARS-CoV-2 result.    Competitive inhibition studies showed that SARS-CoV-2 virus, when present at concentrations above 3.6E+04 copies/mL, can inhibit the detection and amplification of influenza A and influenza B virus RNA if present at or below 1.8E+02 copies/mL or 4.9E+02 copies/mL, respectively, and may lead to false negative influenza virus results. If co-infection with influenza A or influenza B virus is suspected in samples with a positive SARS-CoV-2 result, the sample should be re-tested with another FDA cleared, approved, or authorized influenza test, if influenza virus detection would change clinical management.          I have personally looked at the labs and they are summarized above.  ----------------------------------------------------------------------------------------------------------------------  Detailed radiology reports for the last 24 hours:    Imaging Results (Last 24 Hours)     Procedure Component Value Units Date/Time    MRI Thoracic Spine With & Without Contrast [[id number]] Collected: 11/03/21 1353     Updated: 11/03/21 1401    Narrative:      EXAM:    MR Thoracic Spine Without and With Intravenous Contrast     EXAM DATE:    11/3/2021 11:35 AM     CLINICAL HISTORY:    persistent MRSA bacteremia with sever sharp mid back pain;  A41.9-Sepsis, unspecified organism; U07.1-COVID-19; J12.82-Pneumonia due  to coronavirus disease 2019; N30.00-Acute cystitis without hematuria;  R79.89-Other specified abnormal findings of blood chemistry;  A41.01-Sepsis due to methicillin susceptible Staphylococcus aureus;  R65.20-Severe sepsis without septic shock     TECHNIQUE:    Magnetic resonance images of the thoracic spine without and with  intravenous contrast in multiple planes.     COMPARISON:    10/25/2021     FINDINGS:    VERTEBRAE:  T9 and T10 vertebral body increased marrow signal  concerning for osteomyelitis.  Additionally there is associated  paraspinal collection measuring 4.4 x 1.8 cm in this region. The  collection extends anteriorly to para-aortic location.  No acute  fracture.    DISCS/SPINAL CANAL/NEURAL FORAMINA:  No acute findings.  No  significant disc disease.  No spinal canal stenosis.    SPINAL CORD:  Unremarkable.  Normal signal.  No abnormal enhancement.        PLEURAL SPACE:  Complex right pleural effusion again noted.       Impression:      1.  Complex right pleural effusion again noted.  2.  T9 and T10 vertebral body increased marrow signal concerning for  osteomyelitis. Additionally there is associated paraspinal collection  measuring 4.4 x 1.8 cm in this region. This likely represents  paravertebral abscess. The collection extends anteriorly to para-aortic  location.     This report was finalized on 11/3/2021 1:59 PM by Dr. Miguel Roe MD.       MRI Brain With & Without Contrast [[id number]] Collected: 11/03/21 1346     Updated: 11/03/21 1355    Narrative:      EXAM:    MR Head Without and With Intravenous Contrast     EXAM DATE:    11/3/2021 11:33 AM     CLINICAL HISTORY:    MRSA bacteremia, suspect endocarditis, confused, has a headache, CT of  head unremarkable; A41.9-Sepsis, unspecified organism; U07.1-COVID-19;  J12.82-Pneumonia due to coronavirus disease 2019; N30.00-Acute cystitis  without hematuria; R79.89-Other specified abnormal findings of blood  chemistry; A41.01-Sepsis due to methicillin susceptible Staphylococcus  aureus; R65.20-Severe sepsis without septic s     TECHNIQUE:    Magnetic resonance images of the head/brain without and with  intravenous contrast in multiple planes.     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  No hemorrhage.    VENTRICLES:  Unremarkable.  No ventriculomegaly.    BONES/JOINTS:  Unremarkable.    SINUSES:  Unremarkable as visualized.  No acute sinusitis.    MASTOID AIR CELLS:   Unremarkable as visualized.  No mastoid effusion.    ORBITS:  Unremarkable as visualized.       Impression:        No acute findings in the head/brain.     This report was finalized on 11/3/2021 1:53 PM by Dr. Miguel Roe MD.           I have personally looked at the radiology images and I have read the available final reports.    Assessment & Plan      -Severe sepsis that was present on admission (heart rate 121, CRP 25.76, white blood cell count 17,200, 17% bands, lactic acid 2.1) due to MRSA bacteremia, COVID-19 bilateral pneumonia, suspected infective endocarditis causing embolic pneumonia, T9 and T10 osteomyelitis with a paraspinal abscess, and urinary tract infection with hematuria  -Loculated right-sided pleural effusion of unknown etiology but suspect due to MRSA pneumonia  -Intermittent acute hypoxic respiratory failure that was present starting 2 days after admission and due to the COVID-19 pneumonia as well as suspected aspiration versus MRSA pneumonia  -Acute kidney injury on top of chronic kidney disease stage II with baseline creatinine 0.6-0.8 and admission creatinine of 1.48, now improved  -Swelling of her bilateral arms at the elbows on 11/1/2021, improved  -History of polysubstance abuse, suspicious behavior of hiding / self medicating klonopin/opoiod in house until purse taken away 10/26; urine drug screen positive for amphetamines, Suboxone, methamphetamines, and opiates  -Positive hepatitis C antibody this admission  -History of discoid lupus, on Plaquenil  -History of hypothyroidism status post thyroidectomy, uncontrolled as evidenced by a TSH level greater than 40 this admission  -History of essential hypertension  -History of seizures with the last seizure in 2016  -History of right parietal ischemic CVA thought cardioembolic with prior PFO repair (all of this occurred in 2015)  -History of major depression and anxiety  -History of peptic ulcer disease status post perforation in 2016    The  patient is doing better as far as her oxygenation and thus she can be moved to the progressive care unit.  After I started writing this note and after I saw the patient, I did order an MRI of her thoracic spine as she described her pain as in the middle of her back.  I also reordered the head MRI stat.  The patient does have evidence of osteomyelitis at T9-T10 as well as a paraspinal abscess.  Her head MRI does not show any abscesses.  I have contacted [facility] and I await a callback from Dr. Lee with the hospitalist service.  Since the patient has the thoracic osteomyelitis and the right-sided loculated pleural effusion, the patient likely will need orthopedic surgery/neurosurgery and cardiothoracic surgery, all of which are not available at our hospital.  If [facility] does not have the services, then we will try to contact the [facility] to see if they have availability of the services.  In the meantime, we will continue with the antibiotics as prescribed by the infectious disease team.  We will repeat her blood work in the morning.  We will continue her on the oxygen and wean it for saturations of 92% or above; she currently is on 2 L/min nasal cannula oxygen.  Please note that her blood pressures are stable and controlled as are her heart rate; thus, we will continue the hydralazine and the metoprolol and we will continue to monitor her vital signs closely.    VTE Prophylaxis:   Mechanical Order History:     None      Pharmalogical Order History:      Ordered     Dose Route Frequency Stop    10/25/21 1614  enoxaparin (LOVENOX) syringe 40 mg         40 mg SC Every 24 Hours --    10/25/21 0616  heparin (porcine) 5000 UNIT/ML injection 5,000 Units  Status:  Discontinued         5,000 Units SC Every 12 Hours Scheduled 10/25/21 1614              The patient is high risk due to the following diagnoses/reasons: Persistent MRSA bacteremia in a patient with known  polysubstance use     Disposition: Undetermined at this time.    Alonzo Hastings MD  [facility] Hospitalist  11/03/21  18:14 EDT    ----------------------------------------------------------------------------------------------------------------------  Update:    I have talked to the hospitalist on-call, Dr. Ross Lee.  He has accepted the patient in transfer but the patient is on a waiting list for a bed at this time.  I did have the nurse Mariam go and talk to the patient again.  The patient has a history of neurogenic bladder but so far the patient has not experienced any paresthesias in her straddle area nor in her legs.  She can move her legs up and down off of the bed.  She has not experienced any encopresis episodes so far.    Alonzo Hastings MD  [facility] Hospitalist  11/03/21  18:41 EDT

## 2021-11-03 NOTE — PROGRESS NOTES
[facility]     Mar/chart reviewed  Follow up AHRF, bacteremia, loculated pleural effusion  She has some shortness of breath and cough, some overall chest discomfort      Vital Sign Min/Max for last 24 hours  Temp  Min: 97.8 °F (36.6 °C)  Max: 99 °F (37.2 °C)   BP  Min: 129/80  Max: 165/101   Pulse  Min: 74  Max: 92   Resp  Min: 14  Max: 21   SpO2  Min: 92 %  Max: 100 %   Flow (L/min)  Min: 2  Max: 15   Weight  Min: 88.9 kg (196 lb)  Max: 88.9 kg (196 lb)   1322/1250  Appears ill, sleepy, arouses to voice, doesn't interact a great deal, looks very weak,   perrl, eomi, normal sclera  mmm, no jvd, trachea midline, neck supple,  chest decreased ae bilaterally, no crackles, no wheezes,   rrr,   soft, nt, nd +bs,  no c/c/ 1+ edema  Skin warm, dry no rashes    Labs: 11/3: reviewed:  Glucose 106  Bun 37  Cr 1.03  Bicarb 25  Wbc 20  hgb 7.1  plts 382    A/P:  1. MRSA bacteremia -- likely infected port and endocarditis highly likely, port has been removed.  Will need longer term iv antibiotics which is going to be an issue given  Her poor vascular access and drug abuse  2. Bilateral R>L pleural effusions - the right side is large enough should be drained, radiology attempted with 8 Malay catheter, likely this will require VATS. Recommend transfer to center with thoracic surgery  3. AHRF -- high risk for intubation given current oxygen requirement and wob, suspect largely secondary to bilateral septic emboli pneumonia, covid may be playing some role as well  4. COVID19 -- she is not a candidate for actemeria or barcintib due to her bacteremia.  Would keep dexamethasone to 6mg a day given bactermia, crp is decreasing.  5. Hypothyroidism -- being replace, the dexamthesone should be sufficient steroid  6. Anemia  7. Hep C  8. Polysubstance abuse  9. Autoimmune disease -- she has a bunch of these diagnosis listed, not sure what if any of them she actually has, looks like she was on plaqunil alone as outpatient  which shouldn't be greatly immune suppressive  10. Vascutlitis -- again listed history of such, but doesn't seem like she was on treatment for this that I can see  12.  Urinary retention    Recommend transfer for VATS  Oxygen requirement stabilized probably suitable for transfer out of unit.

## 2021-11-03 NOTE — PLAN OF CARE
Problem: Adult Inpatient Plan of Care  Goal: Plan of Care Review  Outcome: Ongoing, Progressing  Goal: Patient-Specific Goal (Individualized)  Outcome: Ongoing, Progressing  Goal: Absence of Hospital-Acquired Illness or Injury  Outcome: Ongoing, Progressing  Intervention: Identify and Manage Fall Risk  Recent Flowsheet Documentation  Taken 11/3/2021 1600 by Mariam Love RN  Safety Promotion/Fall Prevention: safety round/check completed  Taken 11/3/2021 1400 by Mariam Love RN  Safety Promotion/Fall Prevention:   room organization consistent   safety round/check completed  Taken 11/3/2021 1200 by Mariam Love RN  Safety Promotion/Fall Prevention: safety round/check completed  Taken 11/3/2021 1000 by Mariam Love RN  Safety Promotion/Fall Prevention: safety round/check completed  Taken 11/3/2021 0800 by Mariam Love RN  Safety Promotion/Fall Prevention: safety round/check completed  Intervention: Prevent Skin Injury  Recent Flowsheet Documentation  Taken 11/3/2021 1600 by Mariam Love RN  Body Position:   patient/family refused   position maintained   supine  Taken 11/3/2021 1400 by Mariam Love RN  Body Position:   patient/family refused   supine  Taken 11/3/2021 1200 by Mariam Love RN  Body Position:   patient/family refused   sitting up in bed  Taken 11/3/2021 1000 by Mariam Love RN  Body Position:   patient/family refused   position maintained   sitting up in bed  Taken 11/3/2021 0800 by Mariam Love RN  Body Position: (pt educated on need to turn q2h to prevent further skin breakdown.  pt verbalized understanding)   patient/family refused   position maintained   sitting up in bed  Skin Protection:   adhesive use limited   incontinence pads utilized   pulse oximeter probe site changed   tubing/devices free from skin contact  Intervention: Prevent Infection  Recent Flowsheet Documentation  Taken 11/3/2021 0800 by Mariam Love RN  Infection Prevention:    environmental surveillance performed   equipment surfaces disinfected   hand hygiene promoted   personal protective equipment utilized   rest/sleep promoted   single patient room provided  Goal: Optimal Comfort and Wellbeing  Outcome: Ongoing, Progressing  Goal: Readiness for Transition of Care  Outcome: Ongoing, Progressing     Problem: Suicide Risk  Goal: Absence of Self-Harm  Outcome: Ongoing, Progressing     Problem: Skin Injury Risk Increased  Goal: Skin Health and Integrity  Outcome: Ongoing, Progressing  Intervention: Optimize Skin Protection  Recent Flowsheet Documentation  Taken 11/3/2021 1600 by Mariam Love RN  Head of Bed (HOB): HOB at 30-45 degrees  Taken 11/3/2021 1400 by Mariam Love RN  Head of Bed (HOB): HOB at 30-45 degrees  Taken 11/3/2021 1200 by Mariam Love RN  Head of Bed (HOB): HOB at 30-45 degrees  Taken 11/3/2021 1000 by Mariam Love RN  Head of Bed (HOB): HOB at 60-90 degrees  Taken 11/3/2021 0800 by Mariam Love RN  Pressure Reduction Techniques: frequent weight shift encouraged  Head of Bed (HOB): HOB at 60-90 degrees  Pressure Reduction Devices:   pressure-redistributing mattress utilized   specialty bed utilized  Skin Protection:   adhesive use limited   incontinence pads utilized   pulse oximeter probe site changed   tubing/devices free from skin contact  Intervention: Promote and Optimize Oral Intake  Recent Flowsheet Documentation  Taken 11/3/2021 0800 by Mariam Love RN  Oral Nutrition Promotion: physical activity promoted     Problem: Fall Injury Risk  Goal: Absence of Fall and Fall-Related Injury  Outcome: Ongoing, Progressing  Intervention: Identify and Manage Contributors to Fall Injury Risk  Recent Flowsheet Documentation  Taken 11/3/2021 0800 by Mariam Love RN  Medication Review/Management: medications reviewed  Intervention: Promote Injury-Free Environment  Recent Flowsheet Documentation  Taken 11/3/2021 1600 by Mariam Love  RN  Safety Promotion/Fall Prevention: safety round/check completed  Taken 11/3/2021 1400 by Mariam Love RN  Safety Promotion/Fall Prevention:   room organization consistent   safety round/check completed  Taken 11/3/2021 1200 by Mariam Love RN  Safety Promotion/Fall Prevention: safety round/check completed  Taken 11/3/2021 1000 by Mariam Love RN  Safety Promotion/Fall Prevention: safety round/check completed  Taken 11/3/2021 0800 by Mariam Love, RN  Safety Promotion/Fall Prevention: safety round/check completed   Goal Outcome Evaluation:

## 2021-11-03 NOTE — NURSING NOTE
Patient has been encouraged to turn during safety rounds.  Assistance with turns would be provided by staff.  She has refused. Request were also made to check the patient's posterior body and provide wound care as ordered.  She has refused all skin assessments and wound care.  Pt. Is alert and oriented x 4. Pt. Has been educated on risk of further skin break down.

## 2021-11-03 NOTE — CASE MANAGEMENT/SOCIAL WORK
Discharge Planning Assessment   [facility]     Patient Name: Karely Villarreal  MRN: [id number]  Today's Date: 11/3/2021    Admit Date: 10/24/2021     Discharge Plan     Row Name 11/03/21 1225       Plan    Plan Pt admitted 10/24/21 and transferred from [address] [place] [address] to CCU yesterday, 11/2. Pt is on 2L NC. SS has provided housing list to pt and pt's mother. Pt's mother states pt may need assisted living due to mobility issues prior to admission. Pt has history of substance abuse. Final discharge plan pending clinical improvement. SS to continue to follow and assist.              ROSE MARY Chahal

## 2021-11-04 LAB
ABO GROUP BLD: NORMAL
ABO GROUP BLD: NORMAL
ALBUMIN SERPL-MCNC: 2.64 G/DL (ref 3.5–5.2)
ALBUMIN/GLOB SERPL: 0.7 G/DL
ALP SERPL-CCNC: 139 U/L (ref 39–117)
ALT SERPL W P-5'-P-CCNC: 7 U/L (ref 1–33)
ANION GAP SERPL CALCULATED.3IONS-SCNC: 9.7 MMOL/L (ref 5–15)
AST SERPL-CCNC: 11 U/L (ref 1–32)
ATMOSPHERIC PRESS: 733 MMHG
BASE EXCESS BLDV CALC-SCNC: 2 MMOL/L (ref 0–2)
BASOPHILS # BLD AUTO: 0.02 10*3/MM3 (ref 0–0.2)
BASOPHILS NFR BLD AUTO: 0.1 % (ref 0–1.5)
BDY SITE: ABNORMAL
BH BB BLOOD EXPIRATION DATE: NORMAL
BH BB BLOOD TYPE BARCODE: 9500
BH BB DISPENSE STATUS: NORMAL
BH BB PRODUCT CODE: NORMAL
BH BB UNIT NUMBER: NORMAL
BILIRUB SERPL-MCNC: 0.6 MG/DL (ref 0–1.2)
BLD GP AB SCN SERPL QL: NEGATIVE
BODY TEMPERATURE: 37 C
BUN SERPL-MCNC: 35 MG/DL (ref 6–20)
BUN/CREAT SERPL: 32.1 (ref 7–25)
CALCIUM SPEC-SCNC: 8.5 MG/DL (ref 8.6–10.5)
CHLORIDE SERPL-SCNC: 107 MMOL/L (ref 98–107)
CO2 BLDA-SCNC: 30.2 MMOL/L (ref 22–33)
CO2 SERPL-SCNC: 25.3 MMOL/L (ref 22–29)
COHGB MFR BLD: 0.3 % (ref 0–5)
CREAT SERPL-MCNC: 1.09 MG/DL (ref 0.57–1)
CROSSMATCH INTERPRETATION: NORMAL
CRP SERPL-MCNC: 4.96 MG/DL (ref 0–0.5)
D DIMER PPP FEU-MCNC: 6.74 MCGFEU/ML (ref 0–0.5)
DEPRECATED RDW RBC AUTO: 48.2 FL (ref 37–54)
EOSINOPHIL # BLD AUTO: 0.04 10*3/MM3 (ref 0–0.4)
EOSINOPHIL NFR BLD AUTO: 0.2 % (ref 0.3–6.2)
ERYTHROCYTE [DISTWIDTH] IN BLOOD BY AUTOMATED COUNT: 18.3 % (ref 12.3–15.4)
FIBRINOGEN PPP-MCNC: 455 MG/DL (ref 173–524)
GAS FLOW AIRWAY: 2 LPM
GFR SERPL CREATININE-BSD FRML MDRD: 52 ML/MIN/1.73
GLOBULIN UR ELPH-MCNC: 3.7 GM/DL
GLUCOSE SERPL-MCNC: 105 MG/DL (ref 65–99)
HCO3 BLDV-SCNC: 28.6 MMOL/L (ref 22–28)
HCT VFR BLD AUTO: 22 % (ref 34–46.6)
HCT VFR BLD AUTO: 25.6 % (ref 34–46.6)
HGB BLD-MCNC: 6.8 G/DL (ref 12–15.9)
HGB BLD-MCNC: 7.8 G/DL (ref 12–15.9)
HGB BLDA-MCNC: 16 G/DL (ref 13.5–17.5)
IMM GRANULOCYTES # BLD AUTO: 0.28 10*3/MM3 (ref 0–0.05)
IMM GRANULOCYTES NFR BLD AUTO: 1.7 % (ref 0–0.5)
INHALED O2 CONCENTRATION: 28 %
LYMPHOCYTES # BLD AUTO: 0.81 10*3/MM3 (ref 0.7–3.1)
LYMPHOCYTES NFR BLD AUTO: 4.9 % (ref 19.6–45.3)
Lab: ABNORMAL
MAGNESIUM SERPL-MCNC: 2.2 MG/DL (ref 1.6–2.6)
MCH RBC QN AUTO: 26.3 PG (ref 26.6–33)
MCHC RBC AUTO-ENTMCNC: 30.9 G/DL (ref 31.5–35.7)
MCV RBC AUTO: 84.9 FL (ref 79–97)
METHGB BLD QL: 0.3 % (ref 0–3)
MODALITY: ABNORMAL
MONOCYTES # BLD AUTO: 0.43 10*3/MM3 (ref 0.1–0.9)
MONOCYTES NFR BLD AUTO: 2.6 % (ref 5–12)
NEUTROPHILS NFR BLD AUTO: 15.02 10*3/MM3 (ref 1.7–7)
NEUTROPHILS NFR BLD AUTO: 90.5 % (ref 42.7–76)
NRBC BLD AUTO-RTO: 0 /100 WBC (ref 0–0.2)
NT-PROBNP SERPL-MCNC: 1730 PG/ML (ref 0–900)
OXYHGB MFR BLDV: 86 % (ref 45–75)
PCO2 BLDV: 50.6 MM HG (ref 41–51)
PH BLDV: 7.36 PH UNITS (ref 7.32–7.42)
PHOSPHATE SERPL-MCNC: 4.3 MG/DL (ref 2.5–4.5)
PLATELET # BLD AUTO: 425 10*3/MM3 (ref 140–450)
PMV BLD AUTO: 9.3 FL (ref 6–12)
PO2 BLDV: 63.2 MM HG (ref 27–53)
POTASSIUM SERPL-SCNC: 4.2 MMOL/L (ref 3.5–5.2)
PROT SERPL-MCNC: 6.3 G/DL (ref 6–8.5)
RBC # BLD AUTO: 2.59 10*6/MM3 (ref 3.77–5.28)
RH BLD: NEGATIVE
RH BLD: NEGATIVE
SAO2 % BLDCOV: 86.5 % (ref 45–75)
SODIUM SERPL-SCNC: 142 MMOL/L (ref 136–145)
T&S EXPIRATION DATE: NORMAL
UNIT  ABO: NORMAL
UNIT  RH: NORMAL
VENTILATOR MODE: ABNORMAL
WBC # BLD AUTO: 16.6 10*3/MM3 (ref 3.4–10.8)

## 2021-11-04 PROCEDURE — 94760 N-INVAS EAR/PLS OXIMETRY 1: CPT

## 2021-11-04 PROCEDURE — 85025 COMPLETE CBC W/AUTO DIFF WBC: CPT | Performed by: INTERNAL MEDICINE

## 2021-11-04 PROCEDURE — 85384 FIBRINOGEN ACTIVITY: CPT | Performed by: INTERNAL MEDICINE

## 2021-11-04 PROCEDURE — 85379 FIBRIN DEGRADATION QUANT: CPT | Performed by: INTERNAL MEDICINE

## 2021-11-04 PROCEDURE — 92610 EVALUATE SWALLOWING FUNCTION: CPT

## 2021-11-04 PROCEDURE — 25010000002 ENOXAPARIN PER 10 MG: Performed by: INTERNAL MEDICINE

## 2021-11-04 PROCEDURE — 86140 C-REACTIVE PROTEIN: CPT | Performed by: INTERNAL MEDICINE

## 2021-11-04 PROCEDURE — 82820 HEMOGLOBIN-OXYGEN AFFINITY: CPT

## 2021-11-04 PROCEDURE — 80053 COMPREHEN METABOLIC PANEL: CPT | Performed by: INTERNAL MEDICINE

## 2021-11-04 PROCEDURE — 94799 UNLISTED PULMONARY SVC/PX: CPT

## 2021-11-04 PROCEDURE — 86850 RBC ANTIBODY SCREEN: CPT | Performed by: NURSE PRACTITIONER

## 2021-11-04 PROCEDURE — 25010000002 MORPHINE PER 10 MG: Performed by: INTERNAL MEDICINE

## 2021-11-04 PROCEDURE — 85014 HEMATOCRIT: CPT | Performed by: INTERNAL MEDICINE

## 2021-11-04 PROCEDURE — 99233 SBSQ HOSP IP/OBS HIGH 50: CPT | Performed by: INTERNAL MEDICINE

## 2021-11-04 PROCEDURE — 86901 BLOOD TYPING SEROLOGIC RH(D): CPT | Performed by: NURSE PRACTITIONER

## 2021-11-04 PROCEDURE — 94761 N-INVAS EAR/PLS OXIMETRY MLT: CPT

## 2021-11-04 PROCEDURE — 25010000002 DEXAMETHASONE PER 1 MG: Performed by: INTERNAL MEDICINE

## 2021-11-04 PROCEDURE — 86900 BLOOD TYPING SEROLOGIC ABO: CPT | Performed by: NURSE PRACTITIONER

## 2021-11-04 PROCEDURE — 86900 BLOOD TYPING SEROLOGIC ABO: CPT

## 2021-11-04 PROCEDURE — 85018 HEMOGLOBIN: CPT | Performed by: INTERNAL MEDICINE

## 2021-11-04 PROCEDURE — 99232 SBSQ HOSP IP/OBS MODERATE 35: CPT | Performed by: INTERNAL MEDICINE

## 2021-11-04 PROCEDURE — 25010000002 FUROSEMIDE PER 20 MG: Performed by: INTERNAL MEDICINE

## 2021-11-04 PROCEDURE — 86923 COMPATIBILITY TEST ELECTRIC: CPT

## 2021-11-04 PROCEDURE — 83880 ASSAY OF NATRIURETIC PEPTIDE: CPT | Performed by: INTERNAL MEDICINE

## 2021-11-04 PROCEDURE — 36430 TRANSFUSION BLD/BLD COMPNT: CPT

## 2021-11-04 PROCEDURE — P9016 RBC LEUKOCYTES REDUCED: HCPCS

## 2021-11-04 PROCEDURE — 84100 ASSAY OF PHOSPHORUS: CPT | Performed by: INTERNAL MEDICINE

## 2021-11-04 PROCEDURE — 83735 ASSAY OF MAGNESIUM: CPT | Performed by: INTERNAL MEDICINE

## 2021-11-04 PROCEDURE — 25010000002 DAPTOMYCIN PER 1 MG: Performed by: INTERNAL MEDICINE

## 2021-11-04 PROCEDURE — 82805 BLOOD GASES W/O2 SATURATION: CPT

## 2021-11-04 PROCEDURE — 25010000002 CEFTAROLINE FOSAMIL PER 10 MG: Performed by: INTERNAL MEDICINE

## 2021-11-04 PROCEDURE — 86901 BLOOD TYPING SEROLOGIC RH(D): CPT

## 2021-11-04 RX ORDER — DIPHENHYDRAMINE HYDROCHLORIDE 50 MG/ML
25 INJECTION INTRAMUSCULAR; INTRAVENOUS ONCE AS NEEDED
Status: DISCONTINUED | OUTPATIENT
Start: 2021-11-04 | End: 2021-11-06 | Stop reason: HOSPADM

## 2021-11-04 RX ORDER — FUROSEMIDE 10 MG/ML
40 INJECTION INTRAMUSCULAR; INTRAVENOUS ONCE
Status: COMPLETED | OUTPATIENT
Start: 2021-11-04 | End: 2021-11-04

## 2021-11-04 RX ORDER — ACETAMINOPHEN 650 MG/1
650 SUPPOSITORY RECTAL ONCE AS NEEDED
Status: DISCONTINUED | OUTPATIENT
Start: 2021-11-04 | End: 2021-11-06 | Stop reason: HOSPADM

## 2021-11-04 RX ADMIN — BUPROPION HYDROCHLORIDE 150 MG: 150 TABLET, FILM COATED, EXTENDED RELEASE ORAL at 21:07

## 2021-11-04 RX ADMIN — SODIUM CHLORIDE, PRESERVATIVE FREE 10 ML: 5 INJECTION INTRAVENOUS at 21:24

## 2021-11-04 RX ADMIN — MORPHINE SULFATE 4 MG: 4 INJECTION, SOLUTION INTRAMUSCULAR; INTRAVENOUS at 16:44

## 2021-11-04 RX ADMIN — FUROSEMIDE 40 MG: 10 INJECTION INTRAMUSCULAR; INTRAVENOUS at 11:15

## 2021-11-04 RX ADMIN — HYDRALAZINE HYDROCHLORIDE 25 MG: 25 TABLET, FILM COATED ORAL at 22:25

## 2021-11-04 RX ADMIN — Medication 1 TABLET: at 08:53

## 2021-11-04 RX ADMIN — ENOXAPARIN SODIUM 40 MG: 40 INJECTION SUBCUTANEOUS at 16:43

## 2021-11-04 RX ADMIN — SODIUM CHLORIDE, PRESERVATIVE FREE 3 ML: 5 INJECTION INTRAVENOUS at 21:24

## 2021-11-04 RX ADMIN — SODIUM CHLORIDE, PRESERVATIVE FREE 10 ML: 5 INJECTION INTRAVENOUS at 09:42

## 2021-11-04 RX ADMIN — LEVOTHYROXINE SODIUM 125 MCG: 125 TABLET ORAL at 08:53

## 2021-11-04 RX ADMIN — CEFTAROLINE FOSAMIL 600 MG: 600 POWDER, FOR SOLUTION INTRAVENOUS at 22:26

## 2021-11-04 RX ADMIN — CEFTAROLINE FOSAMIL 600 MG: 600 POWDER, FOR SOLUTION INTRAVENOUS at 14:59

## 2021-11-04 RX ADMIN — DEXAMETHASONE SODIUM PHOSPHATE 6 MG: 4 INJECTION, SOLUTION INTRA-ARTICULAR; INTRALESIONAL; INTRAMUSCULAR; INTRAVENOUS; SOFT TISSUE at 08:56

## 2021-11-04 RX ADMIN — LEVETIRACETAM 500 MG: 500 TABLET, FILM COATED ORAL at 08:54

## 2021-11-04 RX ADMIN — OXYCODONE HYDROCHLORIDE 15 MG: 15 TABLET ORAL at 21:06

## 2021-11-04 RX ADMIN — CLONAZEPAM 0.75 MG: 0.5 TABLET ORAL at 21:13

## 2021-11-04 RX ADMIN — CEFTAROLINE FOSAMIL 600 MG: 600 POWDER, FOR SOLUTION INTRAVENOUS at 06:35

## 2021-11-04 RX ADMIN — OXYCODONE HYDROCHLORIDE 15 MG: 15 TABLET ORAL at 08:54

## 2021-11-04 RX ADMIN — METRONIDAZOLE 500 MG: 500 INJECTION, SOLUTION INTRAVENOUS at 21:08

## 2021-11-04 RX ADMIN — DAPTOMYCIN 550 MG: 500 INJECTION, POWDER, LYOPHILIZED, FOR SOLUTION INTRAVENOUS at 11:15

## 2021-11-04 RX ADMIN — CLONAZEPAM 0.75 MG: 0.5 TABLET ORAL at 15:05

## 2021-11-04 RX ADMIN — CASTOR OIL AND BALSAM, PERU 1 APPLICATION: 788; 87 OINTMENT TOPICAL at 09:41

## 2021-11-04 RX ADMIN — METRONIDAZOLE 500 MG: 500 INJECTION, SOLUTION INTRAVENOUS at 12:43

## 2021-11-04 RX ADMIN — METOPROLOL TARTRATE 25 MG: 25 TABLET, FILM COATED ORAL at 09:41

## 2021-11-04 RX ADMIN — METOPROLOL TARTRATE 25 MG: 25 TABLET, FILM COATED ORAL at 21:07

## 2021-11-04 RX ADMIN — Medication 1 CAPSULE: at 08:53

## 2021-11-04 RX ADMIN — LEVETIRACETAM 500 MG: 500 TABLET, FILM COATED ORAL at 21:07

## 2021-11-04 RX ADMIN — CLONAZEPAM 0.75 MG: 0.5 TABLET ORAL at 08:53

## 2021-11-04 RX ADMIN — HYDRALAZINE HYDROCHLORIDE 25 MG: 25 TABLET, FILM COATED ORAL at 12:43

## 2021-11-04 RX ADMIN — BUPROPION HYDROCHLORIDE 150 MG: 150 TABLET, FILM COATED, EXTENDED RELEASE ORAL at 08:53

## 2021-11-04 RX ADMIN — MORPHINE SULFATE 4 MG: 4 INJECTION, SOLUTION INTRAMUSCULAR; INTRAVENOUS at 03:47

## 2021-11-04 NOTE — PROGRESS NOTES
[facility] HOSPITALIST PROGRESS NOTE     Patient Identification:  Name:  Karely Villarreal  Age:  56 y.o.  Sex:  female  :  1965  MRN:  [id number]  Visit Number:  [id number]  ROOM: [address]     Primary Care Provider:  Tracie Levi APRN     Date of Admission: 10/24/2021    Length of stay in inpatient status:  10    Subjective     Chief Compliant:    Chief Complaint   Patient presents with   • Shortness of Breath   • Pain     History of Presenting Illness:  In brief, 56-year-old female admitted on 10/24/2021 with shortness of breath after she took antibiotics for a week for an outpatient diagnosis of bronchitis.  She was COVID-19 negative at the time of the bronchitis diagnosis but her symptoms worsened and she was positive for COVID-19 in our emergency department this admission..  Also, the patient was noted to be septic while in the emergency department and possibly have a urinary tract infection.  She received MAB in the emergency department and at first she was not requiring oxygen.  She was started on empiric Rocephin for the UTI.  However, by hospital day #2, the patient was requiring oxygen and thus she was given 5 days of remdesivir; she is also started on Decadron and remains on the steroid.  Eventually, the patient's blood culture started growing MRSA; infectious disease team was consulted and the patient was first started on vancomycin; this was then changed to daptomycin and ceftaroline as she continued to worsen.  The patient did have a port placed prior to admission; this port has been removed by general surgery on 10/28/2021; a right-sided internal jugular central line was placed in its place.  Transthoracic echocardiogram on 10/26/2021 did not show any obvious vegetations.  Cardiology was consulted but NILESH has been delayed due to the patient's positive COVID-19 status.  Repeat ECHO this admission does not show any large vegetations; thus, it is assumed that the patient does  not have a large enough vegetation that could be affecting valve function.  The patient had a desaturation episode 2 days ago to the 60% range and we thought that she was going to require intubation; thus, the patient was moved to the critical care unit.  The patient was also less responsive during that time.  The patient had prior use of her home pain medications from her purse at the same time that we were giving her equivalents of her home pain medication.  However, her home pain meds were locked in pharmacy so that she would receive only 1 set of pain medications.  To my knowledge, there has been no visitors bringing the patient items during this last part of her hospitalization.  The patient improved after she was moved to the critical care unit and the increased oxygen requirement quickly resolved.  We did not give her Narcan during that time.  Since the desaturation episode, the patient has remained on 2 L/min of nasal cannula oxygen; the highest that she was on during the oxygen desaturation was 10 L/min with a nonrebreather.  MRI of her thoracic spine and head were performed yesterday as the patient was having back pain that was worse than her normal amount.  Please note that the MRI showed a T9 and T10 osteomyelitis with a 4.4 cm x 1.8 cm paraspinal abscess at the same location.  Yesterday, the patient did not have any paresthesias nor encopresis.  Today, the patient states that she still does not have any paresthesias other than the bottoms of her feet feel somewhat tingly.  She denies any saddle paresthesia.  She can move her legs effectively.  The patient denies chest pain, she still has shortness of air and still has a cough but her cough is not productive.  She denies nausea, vomiting, and diarrhea.  She does not feel like eating as she has lost her appetite.  Please note that hemoglobin level was 6.8 today and she received one unit of packed RBCs.    Objective     Current Hospital Meds:buPROPion SR,  "150 mg, Oral, BID  castor oil-balsam peru, 1 application, Topical, Q12H  ceftaroline, 600 mg, Intravenous, Q8H  clonazePAM, 0.75 mg, Oral, TID  DAPTOmycin, 8 mg/kg (Adjusted), Intravenous, Q24H  dexamethasone, 6 mg, Intravenous, Daily  DULoxetine, 60 mg, Oral, QAM  enoxaparin, 40 mg, Subcutaneous, Q24H  hydrALAZINE, 25 mg, Oral, Q8H  lactobacillus acidophilus, 1 capsule, Oral, Daily  levETIRAcetam, 500 mg, Oral, Q12H  levothyroxine, 125 mcg, Oral, Daily  metoprolol tartrate, 25 mg, Oral, BID  metroNIDAZOLE, 500 mg, Intravenous, Q8H  multivitamin, 1 tablet, Oral, Daily  pantoprazole, 40 mg, Oral, Q AM  sodium chloride, 10 mL, Intravenous, Q12H  sodium chloride, 10 mL, Intravenous, Q12H  sodium chloride, 10 mL, Intravenous, Q12H  sodium chloride, 3 mL, Intravenous, Q12H    hold, 1 each  Pharmacy Consult - Pharmacy to dose,       Current Antimicrobial Therapy:  Anti-Infectives (From admission, onward)    Ordered     Dose/Rate Route Frequency Start Stop    11/04/21 1058  metroNIDAZOLE (FLAGYL) 500 mg/100mL IVPB        Ordering Provider: Krysta Villafana PA    500 mg  over 30 Minutes Intravenous Every 8 Hours 11/04/21 1200 11/18/21 1159    10/30/21 0952  ceftaroline (TEFLARO) 600 mg in sodium chloride 0.9 % 100 mL IVPB-VTB        Ordering Provider: Alonzo Hastings MD    600 mg Intravenous Every 8 Hours 10/30/21 1200 11/13/21 1459    10/30/21 0944  DAPTOmycin (CUBICIN) 550 mg/50 mL 0.9% sodium chloride IVPB        Ordering Provider: Alonzo Hastings MD    8 mg/kg × 71.7 kg (Adjusted)  over 30 Minutes Intravenous Every 24 Hours 10/30/21 1100 12/11/21 1059    10/26/21 1528  remdesivir 100 mg in 270 mL NS        Ordering Provider: Ruma Madden MD   \"Followed by\" Linked Group Details    100 mg  over 60 Minutes Intravenous Every 24 Hours 10/27/21 1600 10/31/21 1559    10/26/21 1528  remdesivir 200 mg in 290 mL NS        Ordering Provider: Lucia Ardon MD   \"Followed by\" Linked Group Details    200 mg  over 60 " Minutes Intravenous Every 24 Hours 10/26/21 1600 10/26/21 1852    10/25/21 2126  vancomycin 1750 mg/500 mL 0.9% NS IVPB ([facility])        Ordering Provider: Desmond Teixeira MD    20 mg/kg × 88.2 kg  over 120 Minutes Intravenous Once 10/25/21 2215 10/26/21 0027    10/25/21 0432  cefTRIAXone (ROCEPHIN) 1 g in sodium chloride 0.9 % 100 mL IVPB-VTB        Ordering Provider: Arie Wall MD    1 g  200 mL/hr over 30 Minutes Intravenous Once 10/25/21 0434 10/25/21 0616        Current Diuretic Therapy:  Diuretics (From admission, onward)    Ordered     Dose/Rate Route Frequency Start Stop    11/04/21 0921  furosemide (LASIX) injection 40 mg        Ordering Provider: Aj Lee MD    40 mg Intravenous Once 11/04/21 1015 11/04/21 1115    10/30/21 1653  furosemide (LASIX) injection 40 mg        Ordering Provider: Lucia Ardon MD    40 mg Intravenous Once 10/30/21 1800 10/30/21 1736        ----------------------------------------------------------------------------------------------------------------------  Vital Signs:  Temp:  [97.4 °F (36.3 °C)-98.6 °F (37 °C)] 98.1 °F (36.7 °C)  Heart Rate:  [72-86] 79  Resp:  [12-17] 12  BP: (114-158)/() 133/74  SpO2:  [94 %-100 %] 98 %  on  Flow (L/min):  [2] 2;   Device (Oxygen Therapy): nasal cannula  Body mass index is 31.72 kg/m².    Wt Readings from Last 3 Encounters:   11/04/21 89.1 kg (196 lb 8 oz)   10/23/21 83.9 kg (185 lb)   05/17/21 88.9 kg (196 lb)     Intake & Output (last 3 days)       11/01 0701 11/02 0700 11/02 0701 11/03 0700 11/03 0701 11/04 0700 11/04 0701 11/05 0700    P.O. 940 1100 600 480    Blood   300     Other  20 20     IV Piggyback  202 250.9 396.1    Total Intake(mL/kg) 940 (10.9) 1322 (14.9) 1170.9 (13.1) 876.1 (9.8)    Urine (mL/kg/hr) 1950 (0.9) 1250 (0.6) 2200 (1) 2500 (2.3)    Stool 0 0 0     Total Output 1950 1250 2200 2500    Net -1010 +72 -1029.1 -1623.9            Urine Unmeasured Occurrence 1 x 1 x      Stool Unmeasured  Occurrence 1 x           Diet Regular  ----------------------------------------------------------------------------------------------------------------------  Physical Exam; the exam is the same as yesterday.  Vitals reviewed.   Constitutional:       General: She is in acute distress.      Appearance: She is well-developed. She is obese. She is not toxic-appearing or diaphoretic.      Interventions: Nasal cannula in place.   HENT:      Head: Normocephalic and atraumatic.      Right Ear: External ear normal.      Left Ear: External ear normal.      Nose: Nose normal.   Eyes:      General: No scleral icterus.        Right eye: No discharge.         Left eye: No discharge.      Pupils: Pupils are equal, round, and reactive to light.   Cardiovascular:      Rate and Rhythm: Normal rate and regular rhythm.      Pulses: Normal pulses.      Heart sounds: No murmur heard.  Pulmonary:      Effort: No accessory muscle usage, prolonged expiration or respiratory distress.      Breath sounds: No stridor. No wheezing or rales.   Abdominal:      General: Bowel sounds are normal. There is no distension.      Palpations: Abdomen is soft.   Musculoskeletal:         General: No deformity or signs of injury.   Skin:     Capillary Refill: Capillary refill takes less than 2 seconds.      Coloration: Skin is not jaundiced or pale.      Findings: Bruising present.   Neurological:      Mental Status: She is alert and oriented to person, place, and time. Mental status is at baseline.      Cranial Nerves: No cranial nerve deficit.      Comments: She can follow all commands.   Psychiatric:         Attention and Perception: Attention normal.         Mood and Affect: Affect is blunt.         Speech: Speech normal.         Behavior: Behavior normal. Behavior is cooperative.         Thought Content: Thought content normal.         Cognition and Memory:  Cognition normal.   ----------------------------------------------------------------------------------------------------------------------  Tele:  NS with heart rates in 80's; I have personally reviewed/looked at the telemetry strips.  ----------------------------------------------------------------------------------------------------------------------  LABS:  COVID LABS:  Results From Last 14 Days   Lab Units 11/04/21  0024 11/03/21  0047 11/02/21  0121 11/01/21  0459 10/31/21  0120 10/26/21  1257 10/25/21  1026 10/25/21  0653 10/25/21  0528 10/24/21  2210 10/24/21  2210   PROBNP pg/mL 1,730.0*  --   --   --   --   --   --   --   --   --  2,743.0*   CK TOTAL U/L  --   --   --   --  41  --   --   --   --   --   --    CRP mg/dL 4.96* 4.93* 6.48*   < > 7.34*   < > 23.56*  --   --    < > 25.76*   D DIMER QUANT MCGFEU/mL 6.74*  --  6.85*  --   --   --   --   --   --   --  14.78*   FERRITIN ng/mL  --  765.30*  --   --   --   --   --   --   --   --  588.30*   LACTATE mmol/L  --   --   --   --   --   --   --  2.0 2.1*  --   --    LDH U/L  --  314*  --   --   --   --   --   --   --   --  242*   PROCALCITONIN ng/mL  --  0.35*  --   --   --   --  3.00*  --   --   --   --    PROTIME Seconds  --   --  15.2*  --   --   --   --   --   --   --   --    INR   --   --  1.15*  --   --   --   --   --   --   --   --    TROPONIN T ng/mL  --   --   --   --   --   --   --   --   --   --  <0.010    < > = values in this interval not displayed.       CBC and coagulation:  Results from last 7 days   Lab Units 11/04/21  0919 11/04/21  0024 11/03/21  0047 11/02/21  0121 11/01/21  0459 10/31/21  1932 10/31/21  0422 10/31/21  0120 10/29/21  2304 10/29/21  0326 10/29/21  0326   PROCALCITONIN ng/mL  --   --  0.35*  --   --   --   --   --   --   --   --    CRP mg/dL  --  4.96* 4.93* 6.48* 6.70*  --   --  7.34* 9.37*  --  12.81*   WBC 10*3/mm3  --  16.60* 20.44* 23.86* 22.33*  --  23.43*  --   --   --  22.49*   HEMOGLOBIN g/dL 7.8* 6.8* 7.1* 7.2* 7.3*  7.6* 7.7*  --   --    < > 9.3*   HEMATOCRIT % 25.6* 22.0* 23.2* 24.5* 22.9*  --  25.2*  --   --   --  29.8*   MCV fL  --  84.9 84.4 86.3 81.2  --  80.3  --   --   --  79.7   MCHC g/dL  --  30.9* 30.6* 29.4* 31.9  --  30.6*  --   --   --  31.2*   PLATELETS 10*3/mm3  --  425 382 380 296  --  230  --   --   --  129*   INR   --   --   --  1.15*  --   --   --   --   --   --   --    D DIMER QUANT MCGFEU/mL  --  6.74*  --  6.85*  --   --   --   --   --   --   --     < > = values in this interval not displayed.     Acid/base balance:  Results from last 7 days   Lab Units 11/02/21  1503   PH, ARTERIAL pH units 7.428   PO2 ART mm Hg 129.0*   PCO2, ARTERIAL mm Hg 39.7   HCO3 ART mmol/L 26.2*     Renal and electrolytes:  Results from last 7 days   Lab Units 11/04/21  0024 11/03/21  0047 11/02/21  0121 11/01/21  0459 10/31/21  0120 10/30/21  2011 10/29/21  2304 10/29/21  0326 10/29/21  0326   SODIUM mmol/L 142 140 137 142 143  --  142  --  139   POTASSIUM mmol/L 4.2 4.1 4.0 3.9 3.8 3.7 3.1*   < > 3.6   MAGNESIUM mg/dL 2.2 2.2 2.1 2.1 2.1  --   --   --   --    CHLORIDE mmol/L 107 105 105 107 108*  --  106  --  104   CO2 mmol/L 25.3 25.5 22.9 23.5 24.3  --  22.8  --  22.2   BUN mg/dL 35* 37* 35* 32* 34*  --  35*  --  40*   CREATININE mg/dL 1.09* 1.03* 0.99 0.97 1.05*  --  0.82  --  0.94   EGFR IF NONAFRICN AM mL/min/1.73 52* 56* 58* 60* 54*  --  72  --  62   CALCIUM mg/dL 8.5* 8.6 8.6 8.5* 8.6  --  8.2*  --  8.4*   PHOSPHORUS mg/dL 4.3 4.6* 4.5  --   --   --   --   --   --    GLUCOSE mg/dL 105* 106* 107* 111* 147*  --  156*  --  142*    < > = values in this interval not displayed.     Estimated Creatinine Clearance: 65.5 mL/min (A) (by C-G formula based on SCr of 1.09 mg/dL (H)).    Liver and pancreatic function:  Results from last 7 days   Lab Units 11/04/21  0024 11/03/21  0047 11/02/21  0121 10/31/21  1627   ALBUMIN g/dL 2.64* 2.57* 2.50*  --    BILIRUBIN mg/dL 0.6 0.7 0.8 1.0   ALK PHOS U/L 139* 163* 183*  --    AST (SGOT) U/L  11 13 15  --    ALT (SGPT) U/L 7 8 10  --      Endocrine function:  Lab Results   Component Value Date    HGBA1C 5.90 (H) 09/15/2019     Glucose levels from the CMP:  Results from last 7 days   Lab Units 11/04/21  0024 11/03/21  0047 11/02/21  0121 11/01/21  0459 10/31/21  0120 10/29/21  2304 10/29/21  0326   GLUCOSE mg/dL 105* 106* 107* 111* 147* 156* 142*     Lab Results   Component Value Date    TSH 47.200 (H) 10/25/2021    FREET4 0.16 (L) 10/25/2021     Cardiac:  Results from last 7 days   Lab Units 11/04/21  0024 10/31/21  0120   CK TOTAL U/L  --  41   PROBNP pg/mL 1,730.0*  --        Cultures:  Microbiology Results (last 10 days)     Procedure Component Value - Date/Time    Blood Culture - Blood, Arm, Left [[id number]]  (Abnormal) Collected: 11/03/21 0737    Lab Status: Preliminary result Specimen: Blood from Arm, Left Updated: 11/04/21 0102     Blood Culture Abnormal Stain     Gram Stain Aerobic Bottle Gram positive cocci in clusters    Narrative:      Refer to previous BCID 11-2-21: Staph aureus. mecA/C and MREJ (methicillin resistance gene) detected.     Blood Culture - Blood, Hand, Right [[id number]]  (Abnormal) Collected: 11/03/21 0737    Lab Status: Preliminary result Specimen: Blood from Hand, Right Updated: 11/04/21 0030     Blood Culture Abnormal Stain     Gram Stain Aerobic Bottle Gram positive cocci in clusters    Narrative:      Refer to previous BCID 11-2-21: Staph aureus. mecA/C and MREJ (methicillin resistance gene) detected.     Blood Culture - Blood, Arm, Left [[id number]]  (Abnormal) Collected: 11/02/21 0121    Lab Status: Preliminary result Specimen: Blood from Arm, Left Updated: 11/04/21 1050     Blood Culture Staphylococcus aureus, MRSA     Comment: Infectious disease consultation is highly recommended to rule out distant foci of infection.  Methicillin resistant Staphylococcus aureus, Patient may be an isolation risk.        Isolated from Aerobic Bottle     Gram Stain Aerobic Bottle Gram  positive cocci in clusters    Narrative:      No BCID performed, refer to previous blood culture specimen collected on 11/2/21 at 0121    Blood Culture - Blood, Arm, Left [[id number]]  (Abnormal) Collected: 11/02/21 0121    Lab Status: Preliminary result Specimen: Blood from Arm, Left Updated: 11/04/21 1049     Blood Culture Staphylococcus aureus, MRSA     Comment: Infectious disease consultation is highly recommended to rule out distant foci of infection.  Methicillin resistant Staphylococcus aureus, Patient may be an isolation risk.        Isolated from Aerobic and Anaerobic Bottles     Gram Stain Aerobic Bottle Gram positive cocci in clusters      Anaerobic Bottle Gram positive cocci in clusters    Blood Culture ID, PCR - Blood, Arm, Left [[id number]]  (Abnormal) Collected: 11/02/21 0121    Lab Status: Final result Specimen: Blood from Arm, Left Updated: 11/02/21 2116     BCID, PCR Staph aureus. mecA/C and MREJ (methicillin resistance gene) detected. Identification by BCID2 PCR.    Narrative:      Infectious disease consultation is highly recommended to rule out distant foci of infection.    Blood Culture - Blood, Hand, Right [[id number]]  (Abnormal) Collected: 11/01/21 1742    Lab Status: Final result Specimen: Blood from Hand, Right Updated: 11/03/21 0709     Blood Culture Staphylococcus aureus, MRSA     Comment: Infectious disease consultation is highly recommended to rule out distant foci of infection.  Methicillin resistant Staphylococcus aureus, Patient may be an isolation risk.        Isolated from Aerobic Bottle     Gram Stain Aerobic Bottle Gram positive cocci in clusters    Narrative:      No BCID performed, refer to previous blood culture specimen collected on 10/29/2021.  Refer to previous blood culture collected on 10/29/21 at 12:08 for MICs.        Blood Culture - Blood, Hand, Left [[id number]]  (Abnormal) Collected: 11/01/21 1742    Lab Status: Final result Specimen: Blood from Hand, Left Updated:  11/03/21 0709     Blood Culture Staphylococcus aureus, MRSA     Comment: Infectious disease consultation is highly recommended to rule out distant foci of infection.  Methicillin resistant Staphylococcus aureus, Patient may be an isolation risk.        Isolated from Aerobic Bottle     Gram Stain Aerobic Bottle Gram positive cocci in clusters    Narrative:      No BCID performed, refer to previous blood culture specimen collected on 10/29/2021.  Refer to previous blood culture collected on 10/29/21 at 12:08 for MICs.    Blood Culture - Blood, Arm, Right [[id number]]  (Abnormal) Collected: 10/30/21 1212    Lab Status: Final result Specimen: Blood from Arm, Right Updated: 11/01/21 1032     Blood Culture Staphylococcus aureus, MRSA     Comment: Infectious disease consultation is highly recommended to rule out distant foci of infection.  Methicillin resistant Staphylococcus aureus, Patient may be an isolation risk.        Isolated from Aerobic and Anaerobic Bottles     Gram Stain Aerobic Bottle Gram positive cocci in pairs and clusters      Anaerobic Bottle Gram positive cocci in pairs and clusters    Narrative:      No BCID performed, refer to previous blood culture specimen collected on 10- @ 1208 from central line source for MICs    Blood Culture - Blood, Arm, Left [[id number]]  (Abnormal) Collected: 10/30/21 1211    Lab Status: Final result Specimen: Blood from Arm, Left Updated: 11/01/21 1031     Blood Culture Staphylococcus aureus, MRSA     Comment: Infectious disease consultation is highly recommended to rule out distant foci of infection.  Methicillin resistant Staphylococcus aureus, Patient may be an isolation risk.        Isolated from Aerobic and Anaerobic Bottles     Gram Stain Aerobic Bottle Gram positive cocci in pairs and clusters      Anaerobic Bottle Gram positive cocci in pairs and clusters    Narrative:      No BCID performed, refer to previous blood culture specimen collected on 10- @ 1208  from central line source for MICs      Blood Culture - Blood, Blood, Central Line [[id number]]  (Abnormal)  (Susceptibility) Collected: 10/29/21 1208    Lab Status: Final result Specimen: Blood, Central Line Updated: 11/01/21 1011     Blood Culture Staphylococcus aureus, MRSA     Comment: Infectious disease consultation is highly recommended to rule out distant foci of infection.  Methicillin resistant Staphylococcus aureus, Patient may be an isolation risk.        Isolated from Aerobic and Anaerobic Bottles     Gram Stain Aerobic Bottle Gram positive cocci in clusters      Anaerobic Bottle Gram positive cocci in clusters    Susceptibility      Staphylococcus aureus, MRSA     NEDA     Gentamicin Susceptible     Oxacillin Resistant     Rifampin Susceptible     Vancomycin Susceptible                  Linear View               Susceptibility Comments     Staphylococcus aureus, MRSA    This isolate does not demonstrate inducible clindamycin resistance in vitro.               Blood Culture ID, PCR - Blood, Blood, Central Line [[id number]]  (Abnormal) Collected: 10/29/21 1208    Lab Status: Final result Specimen: Blood, Central Line Updated: 10/30/21 0130     BCID, PCR Staph aureus. mecA/C and MREJ (methicillin resistance gene) detected. Identification by BCID2 PCR.     BOTTLE TYPE Aerobic Bottle    Narrative:      Infectious disease consultation is highly recommended to rule out distant foci of infection.    Blood Culture - Blood, Arm, Right [[id number]]  (Abnormal) Collected: 10/27/21 0559    Lab Status: Final result Specimen: Blood from Arm, Right Updated: 10/28/21 1025     Blood Culture Staphylococcus aureus, MRSA     Comment: Infectious disease consultation is highly recommended to rule out distant foci of infection.  Methicillin resistant Staphylococcus aureus, Patient may be an isolation risk.        Isolated from Aerobic Bottle     Gram Stain Aerobic Bottle Gram positive cocci in clusters    Narrative:      No BCID  performed, refer to previous blood culture specimen collected on 10-25-21 at 5:28.  Refer to previous blood culture collected on 10/25/2021 at 0528 for MICs    Blood Culture - Blood, Arm, Left [[id number]]  (Abnormal) Collected: 10/27/21 0500    Lab Status: Final result Specimen: Blood from Arm, Left Updated: 10/28/21 1025     Blood Culture Staphylococcus aureus, MRSA     Comment: Infectious disease consultation is highly recommended to rule out distant foci of infection.  Methicillin resistant Staphylococcus aureus, Patient may be an isolation risk.        Isolated from Aerobic Bottle     Gram Stain Aerobic Bottle Gram positive cocci in clusters    Narrative:      No BCID performed, refer to previous blood culture specimen collected on 10-25-21 at 5:28.  Refer to previous blood culture collected on 10/25/2021 at 0528 for MICs            I have personally looked at the labs and they are summarized above.  ----------------------------------------------------------------------------------------------------------------------  Detailed radiology reports for the last 24 hours:    Imaging Results (Last 24 Hours)     Procedure Component Value Units Date/Time    XR Chest 1 View [[id number]] Collected: 11/04/21 0024     Updated: 11/04/21 0026    Narrative:      CR Chest 1 Vw    INDICATION:   Covid positive. Sepsis.     COMPARISON:    11/2/2021    FINDINGS:  Single portable AP view(s) of the chest.    Right IJ line tip is near the cavoatrial junction. Heart remains enlarged. Left-sided loop recorder is present. There is a tiny left pleural effusion. A moderate right pleural effusion is again seen. There are continued infiltrates in both lungs, right  worse than left. No pneumothorax. Left upper lobe infiltrates appear slightly worsened.      Impression:      Slight worsening of left upper lobe infiltrates, otherwise relatively stable appearance of the chest.    Signer Name: Ankur Emerson MD   Signed: 11/4/2021 12:24 AM    Workstation Name: [facility]    [facility]        I have personally looked at the radiology images and I have read the available final report.    Assessment & Plan      -Severe sepsis that was present on admission (heart rate 121, CRP 25.76, white blood cell count 17,200, 17% bands, lactic acid 2.1) due to MRSA bacteremia, COVID-19 bilateral pneumonia, suspected infective endocarditis causing embolic pneumonia, T9 and T10 osteomyelitis with a paraspinal abscess, and urinary tract infection with hematuria  -Loculated right-sided pleural effusion of unknown etiology but suspect due to MRSA pneumonia  -Intermittent acute hypoxic respiratory failure that was present starting 2 days after admission and due to the COVID-19 pneumonia as well as suspected aspiration versus MRSA pneumonia  -Acute kidney injury on top of chronic kidney disease stage II with baseline creatinine 0.6-0.8 and admission creatinine of 1.48, now improved  -Swelling of her bilateral arms at the elbows on 11/1/2021, improved  -History of polysubstance abuse, suspicious behavior of hiding / self medicating klonopin/opoiod in house until purse taken away 10/26; urine drug screen positive for amphetamines, Suboxone, methamphetamines, and opiates  -Positive hepatitis C antibody this admission  -History of discoid lupus, on Plaquenil  -History of hypothyroidism status post thyroidectomy, uncontrolled as evidenced by a TSH level greater than 40 this admission  -History of essential hypertension  -History of seizures with the last seizure in 2016  -History of right parietal ischemic CVA thought cardioembolic with prior PFO repair (all of this occurred in 2015)  -History of major depression and anxiety  -History of peptic ulcer disease status post perforation in 2016    The patient is on a bed wait list for [facility] to receive an opinion from cardiothoracic surgery for the loculated pleural effusion and from the spine  surgeons for the paraspinal abscess. The patient is doing well on the 2 L/min of nasal cannula oxygen and we will continue to monitor her saturations closely. ID has recommended continuing with ceftaroline and daptomycin and the patient will need 2 sets of blood cultures every day while they are positive. Since the patient has done well overnight, we will now move her to the medical surgical floor when a bed is a available. We will repeat her blood work in the morning with cultures. Please note that the accepting doctor is Dr. Ross Lee with the hospitalist service at [facility].    VTE Prophylaxis:   Mechanical Order History:     None      Pharmalogical Order History:      Ordered     Dose Route Frequency Stop    10/25/21 1614  enoxaparin (LOVENOX) syringe 40 mg         40 mg SC Every 24 Hours --    10/25/21 0616  heparin (porcine) 5000 UNIT/ML injection 5,000 Units  Status:  Discontinued         5,000 Units SC Every 12 Hours Scheduled 10/25/21 1614                The patient is high risk due to the following diagnoses/reasons: Persistent MRSA bacteremia in a patient with known polysubstance use     Disposition: Undetermined at this time.    Alonzo Hastings MD  [facility] Hospitalist  11/04/21  19:00 EDT

## 2021-11-04 NOTE — PLAN OF CARE
Problem: Adult Inpatient Plan of Care  Goal: Plan of Care Review  Outcome: Ongoing, Not Progressing  Goal: Patient-Specific Goal (Individualized)  Outcome: Ongoing, Not Progressing  Goal: Absence of Hospital-Acquired Illness or Injury  Outcome: Ongoing, Not Progressing  Intervention: Identify and Manage Fall Risk  Recent Flowsheet Documentation  Taken 11/4/2021 1600 by Rosenbalm, Krysta, RN  Safety Promotion/Fall Prevention: safety round/check completed  Taken 11/4/2021 1400 by Rosenbalm, Krysta, RN  Safety Promotion/Fall Prevention: safety round/check completed  Taken 11/4/2021 1200 by Rosenbalm, Krysta, RN  Safety Promotion/Fall Prevention: safety round/check completed  Taken 11/4/2021 1000 by Rosenbalm, Krysta, RN  Safety Promotion/Fall Prevention: safety round/check completed  Taken 11/4/2021 0800 by Krysta Quiroz RN  Safety Promotion/Fall Prevention: safety round/check completed  Intervention: Prevent Skin Injury  Recent Flowsheet Documentation  Taken 11/4/2021 1600 by Krysta Quiroz RN  Body Position:   turned   weight shift assistance provided   side-lying, left  Taken 11/4/2021 1400 by Krysta Quiroz RN  Body Position:   neutral body alignment   neutral head position   sitting up in bed   patient/family refused  Taken 11/4/2021 1200 by Krysta Quiroz RN  Body Position:   neutral body alignment   neutral head position   patient/family refused   sitting up in bed  Taken 11/4/2021 1000 by Krysta Quiroz RN  Body Position:   neutral body alignment   neutral head position   patient/family refused   sitting up in bed  Taken 11/4/2021 0800 by Krysta Quiroz RN  Body Position:   neutral body alignment   neutral head position   sitting up in bed   patient/family refused  Skin Protection: adhesive use limited  Intervention: Prevent and Manage VTE (venous thromboembolism) Risk  Recent Flowsheet Documentation  Taken 11/4/2021 0800 by Krysta Quiroz RN  VTE  Prevention/Management: (lovenox)   bleeding risk factor(s) identified   other (see comments)  Intervention: Prevent Infection  Recent Flowsheet Documentation  Taken 11/4/2021 1600 by Krysta Quiroz RN  Infection Prevention:   hand hygiene promoted   rest/sleep promoted  Taken 11/4/2021 1400 by Krysta Quiroz RN  Infection Prevention:   hand hygiene promoted   rest/sleep promoted   single patient room provided  Taken 11/4/2021 1000 by Krysta Quiroz RN  Infection Prevention:   hand hygiene promoted   single patient room provided  Taken 11/4/2021 0800 by Krysta Quiroz RN  Infection Prevention:   hand hygiene promoted   environmental surveillance performed  Goal: Optimal Comfort and Wellbeing  Outcome: Ongoing, Not Progressing  Intervention: Provide Person-Centered Care  Recent Flowsheet Documentation  Taken 11/4/2021 1400 by Krysta Quiroz RN  Trust Relationship/Rapport:   care explained   reassurance provided  Taken 11/4/2021 0800 by Krysta Quiroz RN  Trust Relationship/Rapport:   care explained   choices provided   reassurance provided  Goal: Readiness for Transition of Care  Outcome: Ongoing, Not Progressing     Problem: Suicide Risk  Goal: Absence of Self-Harm  Outcome: Ongoing, Not Progressing  Intervention: Assess Risk to Self and Maintain Safety  Recent Flowsheet Documentation  Taken 11/4/2021 1400 by Krysta Quiroz RN  Self-Harm Prevention: environmental self-harm risks assessed  Taken 11/4/2021 0800 by Krysta Quiroz RN  Behavior Management: behavioral plan reviewed  Self-Harm Prevention: environmental self-harm risks assessed  Intervention: Promote Psychosocial Wellbeing  Recent Flowsheet Documentation  Taken 11/4/2021 1400 by Krysta Quiroz RN  Family/Support System Care: self-care encouraged  Taken 11/4/2021 0800 by Krysta Quiroz RN  Supportive Measures: active listening utilized  Family/Support System Care:   self-care encouraged   support  provided     Problem: Skin Injury Risk Increased  Goal: Skin Health and Integrity  Outcome: Ongoing, Not Progressing  Intervention: Optimize Skin Protection  Recent Flowsheet Documentation  Taken 11/4/2021 1600 by Krysta Quiroz RN  Head of Bed (HOB): HOB at 45 degrees  Taken 11/4/2021 1400 by Krysta Quiroz RN  Head of Bed (HOB): HOB at 45 degrees  Taken 11/4/2021 1200 by Krysta Quiroz RN  Head of Bed (HOB): HOB at 45 degrees  Taken 11/4/2021 1000 by Krysta Quiroz RN  Head of Bed (HOB): HOB at 45 degrees  Taken 11/4/2021 0800 by Krysta Quiroz RN  Pressure Reduction Techniques: weight shift assistance provided  Head of Bed (HOB): HOB at 30-45 degrees  Pressure Reduction Devices: pressure-redistributing mattress utilized  Skin Protection: adhesive use limited  Intervention: Promote and Optimize Oral Intake  Recent Flowsheet Documentation  Taken 11/4/2021 1400 by Krysta Quiroz RN  Oral Nutrition Promotion: rest periods promoted  Taken 11/4/2021 0800 by Krysta Quiroz RN  Oral Nutrition Promotion: rest periods promoted     Problem: Fall Injury Risk  Goal: Absence of Fall and Fall-Related Injury  Outcome: Ongoing, Not Progressing  Intervention: Identify and Manage Contributors to Fall Injury Risk  Recent Flowsheet Documentation  Taken 11/4/2021 1600 by Krysta Quiroz RN  Medication Review/Management: medications reviewed  Taken 11/4/2021 1400 by Krysta Quiroz RN  Medication Review/Management: medications reviewed  Self-Care Promotion: independence encouraged  Taken 11/4/2021 1200 by Krysta Quiroz RN  Medication Review/Management: medications reviewed  Taken 11/4/2021 1000 by Krysta Quiroz RN  Medication Review/Management: medications reviewed  Taken 11/4/2021 0800 by Krysta Quiroz RN  Medication Review/Management: medications reviewed  Intervention: Promote Injury-Free Environment  Recent Flowsheet Documentation  Taken 11/4/2021 1600 by  Krysta Quiroz, RN  Safety Promotion/Fall Prevention: safety round/check completed  Taken 11/4/2021 1400 by Rosenbalm, Krysta, RN  Safety Promotion/Fall Prevention: safety round/check completed  Taken 11/4/2021 1200 by Rosenbalm, Krysta, RN  Safety Promotion/Fall Prevention: safety round/check completed  Taken 11/4/2021 1000 by Rosenbalm, Krysta, RN  Safety Promotion/Fall Prevention: safety round/check completed  Taken 11/4/2021 0800 by Krysta uQiroz RN  Safety Promotion/Fall Prevention: safety round/check completed   Goal Outcome Evaluation:

## 2021-11-04 NOTE — PROGRESS NOTES
THC Physician - Brief Progress Note  PERMANENT  11/03/2021 23:32    [facility] [place] - CCU - 10 - [place], [place] ([facility])    MELYSSA SAUNDERS    Date of Service 11/03/2021 23:32    HPI/Events of Note Increased dyspnea with wheezes - no chest pain and O2 needs unchanged 2LPM - CXR ordered and BNP added to am labs re: need for diuresis      Interventions Intermediate-Respiratory distress - evaluation and management  Minor-Communication with other healthcare providers and/or family        Electronically Signed by: Abhishek Bah) on 11/03/2021 23:33

## 2021-11-04 NOTE — PROGRESS NOTES
[facility]    Mar/chart reviewed  Follow up AHRF, bacteremia, loculated pleural effusion  She has some shortness of breath and cough, some overall chest discomfort    Vital Sign Min/Max for last 24 hours  Temp  Min: 97.8 °F (36.6 °C)  Max: 98.8 °F (37.1 °C)   BP  Min: 127/74  Max: 157/81   Pulse  Min: 70  Max: 86   Resp  Min: 12  Max: 17   SpO2  Min: 95 %  Max: 100 %   Flow (L/min)  Min: 2  Max: 2   Weight  Min: 89.1 kg (196 lb 8 oz)  Max: 89.1 kg (196 lb 8 oz)   1170/2200  Appears ill, sleepy, arouses to voice, doesn't interact a great deal, looks very weak,   perrl, eomi, normal sclera  mmm, no jvd, trachea midline, neck supple,  chest decreased ae bilaterally, no crackles, no wheezes,   rrr,   soft, nt, nd +bs,  no c/c/ 1+ edema  Skin warm, dry no rashes    Labs: 11/4: reviewed:  7.36/50/63  Glucose 105  Bun 35  Cr 1.09  Bicarb 25  Wbc 16  hgb 6.8  plts 425    CXR: 11/4: bilateral infiltrates, effusions, possibly some pulm vasc congestion    MRI back: t9-10 possible osteo; 4.4 X 1.8cm paraspinal abscess    A/P:  1. MRSA bacteremia -- likely infected port and endocarditis highly likely, port has been removed.  Will need longer term iv antibiotics which is going to be an issue given  Her poor vascular access and drug abuse  2. Bilateral R>L pleural effusions - the right side is large enough should be drained, radiology attempted with 8 Czech catheter, likely this will require VATS. Recommend transfer to center with thoracic surgery  3. AHRF --suspect largely secondary to bilateral septic emboli pneumonia, covid may be playing some role as well  4. COVID19 -- she is not a candidate for actemeria or barcintib due to her bacteremia.  Would keep dexamethasone to 6mg a day given bactermia, crp is decreasing.  5. Hypothyroidism -- being replace, the dexamthesone should be sufficient steroid  6. Anemia --transfusion ordered.  7. Hep C  8. Polysubstance abuse  9. Autoimmune disease -- she has a bunch of  these diagnosis listed, not sure what if any of them she actually has, looks like she was on plaqunil alone as outpatient which shouldn't be greatly immune suppressive  10. Vascutlitis -- again listed history of such, but doesn't seem like she was on treatment for this that I can see  12.  Urinary retention  13. Possible t9-10 oesto and 4.4cm paraspinal abscess    Will give does of jose  Looks as though she needs both CTS and Neurosurgical intervention

## 2021-11-04 NOTE — THERAPY EVALUATION
Acute Care - Speech Language Pathology   Swallow Initial Evaluation  [facility]   CLINICAL DYSPHAGIA ASSESSMENT     Patient Name: Karely Villarreal  : 1965  MRN: [id number]  Today's Date: 2021  Onset of Illness/Injury or Date of Surgery: 10/24/21     Referring Physician: Reva      Admit Date: 10/24/2021    Visit Dx:     ICD-10-CM ICD-9-CM   1. Sepsis, due to unspecified organism, unspecified whether acute organ dysfunction present ([facility])  A41.9 038.9     995.91   2. Pneumonia due to COVID-19 virus  U07.1 480.8    J12.82 079.89   3. Acute cystitis without hematuria  N30.00 595.0   4. Elevated d-dimer  R79.89 790.92   5. Sepsis due to methicillin susceptible Staphylococcus aureus (MSSA) with acute organ dysfunction without septic shock, unspecified type ([facility])  A41.01 038.11    R65.20 995.92     Patient Active Problem List   Diagnosis   • Depression with suicidal ideation   • Systemic lupus erythematosus (HCC)   • Hypertension   • Hypothyroidism (acquired)   • KEREN (generalized anxiety disorder)   • Abscess of axilla, right   • MDD (major depressive disorder)   • Sepsis (HCC)     Past Medical History:   Diagnosis Date   • Anxiety    • Brain tumor (HCC)    • Chronic headache    • Depression    • Diastolic CHF, chronic (HCC)    • DVT (deep venous thrombosis) ([facility])     left leg   • Encephalitis 2016    treated at the [facility] [place], operated by [facility]   • Epilepsy ([facility])    • Gastric ulcer with perforation ([facility]) 2016    Microperforation and air in the biliary tree   • Gastritis    • Heart disease    • Henoch-Schonlein purpura (HCC)    • History of transfusion    • Hypertension    • Hypothyroidism (acquired)     Removed due to groiter   • Kidney disease    • Lower GI bleeding    • Lupus (HCC)    • Memory disorder    • Migraine    • Mixed connective tissue disease (HCC)    • MRSA cellulitis    • NSTEMI (non-ST elevated myocardial infarction) (HCC)    • Panic disorder    • Patent foramen ovale    • Pneumonia    • PTSD  (post-traumatic stress disorder)     trauma from 911   • PVC (premature ventricular contraction)    • RA (rheumatoid arthritis) (HCC)    • Renal disorder    • Rhabdomyolysis    • Seizures (HCC)     when had encephalitis   • Sjogren's syndrome (HCC)    • Stroke (HCC) 09/2015    x 1   • Systemic lupus erythematosus (HCC)     Discoid and systemic   • Temporal arteritis (HCC)    • Thyroid disease    • TIA (transient ischemic attack)     x 3   • Ulcer, stomach peptic, chronic      Past Surgical History:   Procedure Laterality Date   • APPENDECTOMY     • CARDIAC CATHETERIZATION  2016    PFO repair and had a loop monitor placed at the [facility]   • CARDIAC SURGERY     • CENTRAL VENOUS LINE INSERTION N/A 10/28/2021    Procedure: Placement of central line;  Surgeon: Ruma Madden MD;  Location: [facility];  Service: General;  Laterality: N/A;   •  SECTION      x 2   • ENDOSCOPY     • FACIAL RECONSTRUCTION SURGERY      clean out MRSA    • INCISION AND DRAINAGE ABSCESS Right 2019    Procedure: INCISION AND DRAINAGE ABSCESS RIGHT AXILLA;  Surgeon: Zak Martin MD;  Location: [facility];  Service: General   • KNEE ARTHROSCOPY Left    • LUMBAR FUSION     • PORTACATH PLACEMENT Right 2016   • THYROID SURGERY      Removed due to a goiter   • VENOUS ACCESS DEVICE (PORT) REMOVAL N/A 10/28/2021    Procedure: Removal of Dcspvr-m-Cxsi.;  Surgeon: Ruma Madden MD;  Location: [facility];  Service: General;  Laterality: N/A;     Karely Villarreal  is seen at bedside this am on CCU to assess safety/efficacy of swallowing fnx, determine safest/least restrictive diet tolerance.     Ms. Villarreal is admitted to [facility] w/ sepsis, bilateral covid-19 PNA, acute UTI, MRSA bacteremia, suspected infective endocarditis causing embolic PNA, T9-T10 osteomyelitis w/ a paraspinal abscess. She is referred today 2/2 difficulty w/ po meds overnight. D/w RN on unit this pm w/ report this was an isolated event. Ms. Villarreal denies  "overt s/s aspiration or dysphagia w/ po intake. She does report poor motivation of po in general at this time.     Social History     Socioeconomic History   • Marital status:    Tobacco Use   • Smoking status: Never Smoker   • Smokeless tobacco: Never Used   Substance and Sexual Activity   • Alcohol use: No   • Drug use: No   • Sexual activity: Yes     Partners: Male      CR Chest 1 Vw     INDICATION:   Covid positive. Sepsis.      COMPARISON:    11/2/2021     FINDINGS:  Single portable AP view(s) of the chest.     Right IJ line tip is near the cavoatrial junction. Heart remains enlarged. Left-sided loop recorder is present. There is a tiny left pleural effusion. A moderate right pleural effusion is again seen. There are continued infiltrates in both lungs, right  worse than left. No pneumothorax. Left upper lobe infiltrates appear slightly worsened.     IMPRESSION:  Slight worsening of left upper lobe infiltrates, otherwise relatively stable appearance of the chest.     Signer Name: Ankur Emerson MD   Signed: 11/4/2021 12:24 AM   Workstation Name: [email or web address]    [facility]    Diet Orders (active) (From admission, onward)     Start     Ordered    10/28/21 1654  Diet Regular  Diet Effective Now         10/28/21 1653    10/26/21 1800  Dietary Nutrition Supplements Boost Plus (Ensure Enlive, Ensure Plus)  Daily With Breakfast, Lunch & Dinner       10/26/21 1339    10/26/21 1200  Dietary Nutrition Supplements Boost Plus (Ensure Enlive, Ensure Plus)  Daily With Lunch & Dinner       10/26/21 1019              She is observed on 2L O2 via NC w/o complications.     Pt is positioned upright in bed at at max of 25 degree hob elevation. She refuses further elevation 2/2 discomfort. She also remains w/ head/neck turned to R 2/2 \"neck pain.\" She accepts multiple po presentations of ice chips, solid cracker, puree and thin liquids (10 ounces) via spoon, cup and straw. She does not self feed " during this assessment.     Facial/oral structures are symmetrical upon observation. Lingual protrusion reveals no deviation. Oral mucosa are moist, pink, and clean. Secretions are clear, thin, and well controlled. OROM/PRISCILA is wfl to imitate oral postures. Gag is not assessed. Volitional cough is intact w/ adequate  intensity, clear in quality, non-productive. Voice is adequate in intensity, clear in quality w/ intelligible speech. She is oriented, follows directives and participates in simple conversational exchange.     Upon po presentations, adequate bolus anticipation and acceptance w/ good labial seal for bolus clearance via spoon bowl, cup rim stability and suction via straw. Bolus formation, manipulation and control are wfl w/ rotary mastication pattern. A-p transit is timely w/o oral residue. No overt s/s aspiration evidenced before the swallow.     Pharyngeal swallow is timely w/ adequate hyolaryngeal elevation per palpation. No overt s/s aspiration evidenced across this assessment. No silent aspiration suspected as pt is w/o changes in vocal quality, respirations or secretions post po presentations. Pt denies odynophagia.    Impression: Per this assessment, Ms. Villarreal presents w/ wfl oropharyngeal swallow w/o s/s aspiration. No s/s indicative of silent aspiration. No odynophagia reported. Recommend continuing current least restrictive po diet as tolerated.     SLP Recommendation and Plan  1. Regular consistency, thin liquids.    2. Meds whole in puree/thins.   3. Upright and centered for all po intake.  4. AIDA precautions.  5. Oral care protocol.  SLP to sign off at this time.     D/w pt results and recommendations w/ verbal agreement.    D/w RN results and recommendations w/ verbal agreement.    Thank you for allowing me to participate in the care of your patient-  Padma Yoo M.A., CCC-SLP    EDUCATION  The patient has been educated in the following areas:   Dysphagia (Swallowing Impairment).     Time  Calculation:     Therapy Charges for Today     Code Description Service Date Service Provider Modifiers Qty    [id number] HC ST EVAL ORAL PHARYNG SWALLOW 6 11/4/2021 Padma Yoo MA,CCC-SLP GN 1        Patient was not wearing a face mask during this therapy encounter. Therapist used appropriate personal protective equipment including gown, eye protection, mask and gloves.  Mask used was N95/duckbill. Appropriate PPE was worn during the entire therapy session. The patient coughed during this evaluation. Therapist was within 6 feet for 15 minutes or more during the evaluation. Hand hygiene was completed before and after therapy session. Patient is in enhanced droplet precautions.     Padma Yoo MA,CCC-SLP  11/4/2021

## 2021-11-04 NOTE — PLAN OF CARE
"Goal Outcome Evaluation:    Blood transfusion ordered.  Consent is in patient file.  Pt. Requested to wear non-rebreather mask due to feeling SOA.  Face was flushed.  She has stated she \"cannot take the pain anymore\".  She asked to be intubated.  Explained to her that was something that would have to be discussed with her attending provider and that there were factors that decision was based on.  VSS.  Will continue to monitor.      Progress: declining     "

## 2021-11-04 NOTE — PROGRESS NOTES
PROGRESS NOTE         Patient Identification:  Name:  Karely Villarreal  Age:  56 y.o.  Sex:  female  :  1965  MRN:  [id number]  Visit Number:  [id number]  Primary Care Provider:  Tracie Levi APRN         LOS: 10 days       ----------------------------------------------------------------------------------------------------------------------  Subjective       Chief Complaints:    Shortness of Breath and Pain        Interval History:      The patient's oxygen requirements are stable at 2 L nasal cannula.  Nurse reports that patient had an MRI yesterday which showed concern for osteomyelitis of the spine with concurrent abscess.  Patient is currently on the waiting list for [facility] transfer.  Afebrile, no diarrhea.  CRP is stable at 4.96.  WBC is improved at 16.60.  Chest x-ray on 2021 shows slight worsening of the left upper lobe infiltrates, otherwise relatively stable appearance of the chest.  MRI thoracic spine with and without contrast on 11/3/2021 shows complex right pleural effusion again noted.  T9 and T10 vertebral body increased marrow signal concerning for osteomyelitis.  Additionally there is associated paraspinal collection measuring 4.4 x 1.8 cm in this region.  This likely represents paravertebral abscess.  The collection extends anteriorly to para-aortic location.  MRI of the brain on 11/3/2021 showed no acute findings.  Blood cultures on 11/3/2021 remain positive.  ----------------------------------------------------------------------------------------------------------------------      Objective       Current Hospital Meds:  buPROPion SR, 150 mg, Oral, BID  castor oil-balsam peru, 1 application, Topical, Q12H  ceftaroline, 600 mg, Intravenous, Q8H  clonazePAM, 0.75 mg, Oral, TID  DAPTOmycin, 8 mg/kg (Adjusted), Intravenous, Q24H  dexamethasone, 6 mg, Intravenous, Daily  DULoxetine, 60 mg, Oral, QAM  enoxaparin, 40 mg, Subcutaneous, Q24H  furosemide, 40 mg,  Intravenous, Once  hydrALAZINE, 25 mg, Oral, Q8H  lactobacillus acidophilus, 1 capsule, Oral, Daily  levETIRAcetam, 500 mg, Oral, Q12H  levothyroxine, 125 mcg, Oral, Daily  metoprolol tartrate, 25 mg, Oral, BID  multivitamin, 1 tablet, Oral, Daily  pantoprazole, 40 mg, Oral, Q AM  sodium chloride, 10 mL, Intravenous, Q12H  sodium chloride, 10 mL, Intravenous, Q12H  sodium chloride, 10 mL, Intravenous, Q12H  sodium chloride, 3 mL, Intravenous, Q12H      hold, 1 each  Pharmacy Consult - Pharmacy to dose,       ----------------------------------------------------------------------------------------------------------------------    Vital Signs:  Temp:  [97.8 °F (36.6 °C)-98.8 °F (37.1 °C)] 97.9 °F (36.6 °C)  Heart Rate:  [72-86] 81  Resp:  [12-17] 12  BP: (127-157)/() 155/89  Mean Arterial Pressure (Non-Invasive) for the past 24 hrs (Last 3 readings):   Noninvasive MAP (mmHg)   11/04/21 0653 108   11/04/21 0500 110   11/04/21 0455 113     SpO2 Percentage    11/04/21 0653 11/04/21 0659 11/04/21 0700   SpO2: 100% 100% 100%     SpO2:  [95 %-100 %] 100 %  on  Flow (L/min):  [2] 2;   Device (Oxygen Therapy): nasal cannula    Body mass index is 31.72 kg/m².  Wt Readings from Last 3 Encounters:   11/04/21 89.1 kg (196 lb 8 oz)   10/23/21 83.9 kg (185 lb)   05/17/21 88.9 kg (196 lb)        Intake/Output Summary (Last 24 hours) at 11/4/2021 1001  Last data filed at 11/4/2021 0700  Gross per 24 hour   Intake 1050.9 ml   Output 2200 ml   Net -1149.1 ml     Diet Regular  ----------------------------------------------------------------------------------------------------------------------      Physical Exam:    Deferred due to COVID-19 isolation.  ----------------------------------------------------------------------------------------------------------------------  Results from last 7 days   Lab Units 10/31/21  0120   CK TOTAL U/L 41     Results from last 7 days   Lab Units 11/04/21  0024   PROBNP pg/mL 1,730.0*       Results  from last 7 days   Lab Units 11/02/21  1503   PH, ARTERIAL pH units 7.428   PO2 ART mm Hg 129.0*   PCO2, ARTERIAL mm Hg 39.7   HCO3 ART mmol/L 26.2*     Results from last 7 days   Lab Units 11/04/21  0919 11/04/21  0024 11/03/21 0047 11/02/21  0121 11/02/21  0121   CRP mg/dL  --  4.96* 4.93*  --  6.48*   WBC 10*3/mm3  --  16.60* 20.44*  --  23.86*   HEMOGLOBIN g/dL 7.8* 6.8* 7.1*   < > 7.2*   HEMATOCRIT % 25.6* 22.0* 23.2*   < > 24.5*   MCV fL  --  84.9 84.4  --  86.3   MCHC g/dL  --  30.9* 30.6*  --  29.4*   PLATELETS 10*3/mm3  --  425 382  --  380   INR   --   --   --   --  1.15*    < > = values in this interval not displayed.     Results from last 7 days   Lab Units 11/04/21  0024 11/03/21 0047 11/02/21 0121   SODIUM mmol/L 142 140 137   POTASSIUM mmol/L 4.2 4.1 4.0   MAGNESIUM mg/dL 2.2 2.2 2.1   CHLORIDE mmol/L 107 105 105   CO2 mmol/L 25.3 25.5 22.9   BUN mg/dL 35* 37* 35*   CREATININE mg/dL 1.09* 1.03* 0.99   EGFR IF NONAFRICN AM mL/min/1.73 52* 56* 58*   CALCIUM mg/dL 8.5* 8.6 8.6   GLUCOSE mg/dL 105* 106* 107*   ALBUMIN g/dL 2.64* 2.57* 2.50*   BILIRUBIN mg/dL 0.6 0.7 0.8   ALK PHOS U/L 139* 163* 183*   AST (SGOT) U/L 11 13 15   ALT (SGPT) U/L 7 8 10   Estimated Creatinine Clearance: 65.5 mL/min (A) (by C-G formula based on SCr of 1.09 mg/dL (H)).  No results found for: AMMONIA    No results found for: HGBA1C, POCGLU  Lab Results   Component Value Date    HGBA1C 5.90 (H) 09/15/2019     Lab Results   Component Value Date    TSH 47.200 (H) 10/25/2021    FREET4 0.16 (L) 10/25/2021       Blood Culture   Date Value Ref Range Status   10/25/2021 Staphylococcus aureus, MRSA (C)  Preliminary     Comment:     Infectious disease consultation is highly recommended to rule out distant foci of infection.  Methicillin resistant Staphylococcus aureus, Patient may be an isolation risk.   10/25/2021 Staphylococcus aureus, MRSA (C)  Preliminary     Comment:     Infectious disease consultation is highly recommended to rule  out distant foci of infection.  Methicillin resistant Staphylococcus aureus, Patient may be an isolation risk.   10/25/2021 Staphylococcus aureus, MRSA (C)  Preliminary     Comment:     Infectious disease consultation is highly recommended to rule out distant foci of infection.  Methicillin resistant Staphylococcus aureus, Patient may be an isolation risk.     No results found for: URINECX  No results found for: WOUNDCX  No results found for: STOOLCX  No results found for: RESPCX  Pain Management Panel       Pain Management Panel Latest Ref Rng & Units 10/25/2021 10/25/2021    CREATININE UR mg/dL 97.9 97.9    AMPHETAMINES SCREEN, URINE Negative - Positive(A)    BARBITURATES SCREEN Negative - Negative    BENZODIAZEPINE SCREEN, URINE Negative - Negative    BUPRENORPHINEUR Negative - Positive(A)    COCAINE SCREEN, URINE Negative - Negative    METHADONE SCREEN, URINE Negative - Negative    METHAMPHETAMINEUR Negative - Positive(A)              ----------------------------------------------------------------------------------------------------------------------  Imaging Results (Last 24 Hours)       Procedure Component Value Units Date/Time    XR Chest 1 View [[id number]] Collected: 11/04/21 0024     Updated: 11/04/21 0026    Narrative:      CR Chest 1 Vw    INDICATION:   Covid positive. Sepsis.     COMPARISON:    11/2/2021    FINDINGS:  Single portable AP view(s) of the chest.    Right IJ line tip is near the cavoatrial junction. Heart remains enlarged. Left-sided loop recorder is present. There is a tiny left pleural effusion. A moderate right pleural effusion is again seen. There are continued infiltrates in both lungs, right  worse than left. No pneumothorax. Left upper lobe infiltrates appear slightly worsened.      Impression:      Slight worsening of left upper lobe infiltrates, otherwise relatively stable appearance of the chest.    Signer Name: Ankur Emerson MD   Signed: 11/4/2021 12:24 AM   Workstation Name:  [id number]    [facility]    MRI Thoracic Spine With & Without Contrast [[id number]] Collected: 11/03/21 1353     Updated: 11/03/21 1401    Narrative:      EXAM:    MR Thoracic Spine Without and With Intravenous Contrast     EXAM DATE:    11/3/2021 11:35 AM     CLINICAL HISTORY:    persistent MRSA bacteremia with sever sharp mid back pain;  A41.9-Sepsis, unspecified organism; U07.1-COVID-19; J12.82-Pneumonia due  to coronavirus disease 2019; N30.00-Acute cystitis without hematuria;  R79.89-Other specified abnormal findings of blood chemistry;  A41.01-Sepsis due to methicillin susceptible Staphylococcus aureus;  R65.20-Severe sepsis without septic shock     TECHNIQUE:    Magnetic resonance images of the thoracic spine without and with  intravenous contrast in multiple planes.     COMPARISON:    10/25/2021     FINDINGS:    VERTEBRAE:  T9 and T10 vertebral body increased marrow signal  concerning for osteomyelitis. Additionally there is associated  paraspinal collection measuring 4.4 x 1.8 cm in this region. The  collection extends anteriorly to para-aortic location.  No acute  fracture.    DISCS/SPINAL CANAL/NEURAL FORAMINA:  No acute findings.  No  significant disc disease.  No spinal canal stenosis.    SPINAL CORD:  Unremarkable.  Normal signal.  No abnormal enhancement.        PLEURAL SPACE:  Complex right pleural effusion again noted.       Impression:      1.  Complex right pleural effusion again noted.  2.  T9 and T10 vertebral body increased marrow signal concerning for  osteomyelitis. Additionally there is associated paraspinal collection  measuring 4.4 x 1.8 cm in this region. This likely represents  paravertebral abscess. The collection extends anteriorly to para-aortic  location.     This report was finalized on 11/3/2021 1:59 PM by Dr. Miguel Roe MD.       MRI Brain With & Without Contrast [[id number]] Collected: 11/03/21 1346     Updated: 11/03/21 1355    Narrative:      EXAM:     MR Head Without and With Intravenous Contrast     EXAM DATE:    11/3/2021 11:33 AM     CLINICAL HISTORY:    MRSA bacteremia, suspect endocarditis, confused, has a headache, CT of  head unremarkable; A41.9-Sepsis, unspecified organism; U07.1-COVID-19;  J12.82-Pneumonia due to coronavirus disease 2019; N30.00-Acute cystitis  without hematuria; R79.89-Other specified abnormal findings of blood  chemistry; A41.01-Sepsis due to methicillin susceptible Staphylococcus  aureus; R65.20-Severe sepsis without septic s     TECHNIQUE:    Magnetic resonance images of the head/brain without and with  intravenous contrast in multiple planes.     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  No hemorrhage.    VENTRICLES:  Unremarkable.  No ventriculomegaly.    BONES/JOINTS:  Unremarkable.    SINUSES:  Unremarkable as visualized.  No acute sinusitis.    MASTOID AIR CELLS:  Unremarkable as visualized.  No mastoid effusion.    ORBITS:  Unremarkable as visualized.       Impression:        No acute findings in the head/brain.     This report was finalized on 11/3/2021 1:53 PM by Dr. Miguel Roe MD.               ----------------------------------------------------------------------------------------------------------------------    Assessment/Plan       Assessment/Plan     ASSESSMENT:    1.  Severe sepsis with lactic acid greater than 2 on admission  2.  COVID-19 pneumonia  3.  MRSA bacteremia  4.  T9-T10 osteomyelitis with paravertebral abscess    PLAN:    The patient's oxygen requirements are stable at 2 L nasal cannula.  Nurse reports that patient had an MRI yesterday which showed concern for osteomyelitis of the spine with concurrent abscess.  Patient is currently on the waiting list for [facility] transfer.  Afebrile, no diarrhea.  CRP is stable at 4.96.  WBC is improved at 16.60.  Chest x-ray on 11/4/2021 shows slight worsening of the left upper  lobe infiltrates, otherwise relatively stable appearance of the chest.  MRI thoracic spine with and without contrast on 11/3/2021 shows complex right pleural effusion again noted.  T9 and T10 vertebral body increased marrow signal concerning for osteomyelitis.  Additionally there is associated paraspinal collection measuring 4.4 x 1.8 cm in this region.  This likely represents paravertebral abscess.  The collection extends anteriorly to para-aortic location.  MRI of the brain on 11/3/2021 showed no acute findings.  Blood cultures on 11/3/2021 remain positive.    3 out of 3 blood cultures on 10/25/2021 finalized as MRSA and all blood cultures since have remain positive.    On 10/28/2021 patient underwent Port-A-Cath removal and placement of central line.    Urinalysis was only mildly positive with 2+ bacteria but 0-2 WBCs.  As patient is asymptomatic, this does not need treated. CT chest with PE protocol on 10/25/2021 was negative for PE.  Negative for thoracic aortic aneurysm/dissection.  Moderate right pleural effusion and small left pleural effusion with compressive right basilar atelectasis/consolidation and minimal left basilar atelectasis.  Nodular and groundglass infiltrates are scattered throughout both lungs, concerning for multifocal pneumonia.  Follow-up to resolution recommended to exclude any underlying pulmonary masses given the presence of the patient's right-sided Port-A-Cath.  COVID-19 reporting criteria: Intermediate.  Imaging features can be seen with COVID-19 pneumonia, although are nonspecific and can occur with a variety of infectious and noninfectious processes. Venous duplex of bilateral lower extremities on 10/25/2021 shows no DVT.  COVID-19 and flu A/B PCR on 10/24/2021 detected COVID-19.  Legionella antigen on 10/25/2021 was negative.  Respiratory panel on 10/25/2021 was negative.  MRSA screen on 10/25/2021 was positive. Strep pneumo antigen on 10/25/2021 was negative.    Patient completed  remdesivir.  Continues on Decadron.     Recommend to continue daptomycin 8 mg/kg IV every 24 hours and ceftaroline 600 mg IV every 8 hours. For paravertebral abscess, Flagyl 500 mg IV every 8 hours was added.  We agree with primary team's decision to make arrangement for transfer for neurosurgical and CT surgery evaluation due to need for drainage/aspiration of paraspinal abscess and loculated pleural effusion.  Continue to highly recommend [facility].    Code Status:   Code Status and Medical Interventions:   Ordered at: 10/25/21 0441     Level Of Support Discussed With:    Patient     Code Status (Patient has no pulse and is not breathing):    CPR (Attempt to Resuscitate)     Medical Interventions (Patient has pulse or is breathing):    Full         VICTOR HUGO Moffett  11/04/21  10:01 EDT

## 2021-11-04 NOTE — PROGRESS NOTES
Nurse Practitioner - Brief Progress Note  PERMANENT  11/04/2021 01:55    [facility] [place]    MELYSSA SAUNDERS    Date of Service 11/04/2021 01:55    HPI/Events of Note Hgb 6.8 (7.1): no active bleed, needs type and screen    Orders placed for 1U PRBC. Follow post transfusion HH per facility protocol.      Interventions Intermediate-Bleeding - evaluation and treatment with blood products        Electronically Signed by: Alicia Goss) on 11/04/2021 01:57

## 2021-11-05 LAB
ALBUMIN SERPL-MCNC: 2.54 G/DL (ref 3.5–5.2)
ALBUMIN/GLOB SERPL: 0.7 G/DL
ALP SERPL-CCNC: 135 U/L (ref 39–117)
ALT SERPL W P-5'-P-CCNC: 5 U/L (ref 1–33)
ANION GAP SERPL CALCULATED.3IONS-SCNC: 9.5 MMOL/L (ref 5–15)
AST SERPL-CCNC: 14 U/L (ref 1–32)
BACTERIA SPEC AEROBE CULT: ABNORMAL
BASOPHILS # BLD AUTO: 0.01 10*3/MM3 (ref 0–0.2)
BASOPHILS NFR BLD AUTO: 0.1 % (ref 0–1.5)
BILIRUB SERPL-MCNC: 0.6 MG/DL (ref 0–1.2)
BUN SERPL-MCNC: 35 MG/DL (ref 6–20)
BUN/CREAT SERPL: 29.7 (ref 7–25)
CALCIUM SPEC-SCNC: 8.6 MG/DL (ref 8.6–10.5)
CHLORIDE SERPL-SCNC: 105 MMOL/L (ref 98–107)
CO2 SERPL-SCNC: 25.5 MMOL/L (ref 22–29)
CREAT SERPL-MCNC: 1.18 MG/DL (ref 0.57–1)
CRP SERPL-MCNC: 7.22 MG/DL (ref 0–0.5)
DEPRECATED RDW RBC AUTO: 49.9 FL (ref 37–54)
EOSINOPHIL # BLD AUTO: 0.04 10*3/MM3 (ref 0–0.4)
EOSINOPHIL NFR BLD AUTO: 0.3 % (ref 0.3–6.2)
ERYTHROCYTE [DISTWIDTH] IN BLOOD BY AUTOMATED COUNT: 18 % (ref 12.3–15.4)
GFR SERPL CREATININE-BSD FRML MDRD: 48 ML/MIN/1.73
GLOBULIN UR ELPH-MCNC: 3.8 GM/DL
GLUCOSE BLDC GLUCOMTR-MCNC: 93 MG/DL (ref 70–130)
GLUCOSE SERPL-MCNC: 105 MG/DL (ref 65–99)
GRAM STN SPEC: ABNORMAL
HCT VFR BLD AUTO: 25.2 % (ref 34–46.6)
HGB BLD-MCNC: 7.8 G/DL (ref 12–15.9)
IMM GRANULOCYTES # BLD AUTO: 0.14 10*3/MM3 (ref 0–0.05)
IMM GRANULOCYTES NFR BLD AUTO: 0.9 % (ref 0–0.5)
ISOLATED FROM: ABNORMAL
LYMPHOCYTES # BLD AUTO: 0.68 10*3/MM3 (ref 0.7–3.1)
LYMPHOCYTES NFR BLD AUTO: 4.5 % (ref 19.6–45.3)
MAGNESIUM SERPL-MCNC: 2.2 MG/DL (ref 1.6–2.6)
MCH RBC QN AUTO: 26.4 PG (ref 26.6–33)
MCHC RBC AUTO-ENTMCNC: 31 G/DL (ref 31.5–35.7)
MCV RBC AUTO: 85.4 FL (ref 79–97)
MONOCYTES # BLD AUTO: 0.42 10*3/MM3 (ref 0.1–0.9)
MONOCYTES NFR BLD AUTO: 2.8 % (ref 5–12)
NEUTROPHILS NFR BLD AUTO: 13.81 10*3/MM3 (ref 1.7–7)
NEUTROPHILS NFR BLD AUTO: 91.4 % (ref 42.7–76)
NRBC BLD AUTO-RTO: 0 /100 WBC (ref 0–0.2)
PHOSPHATE SERPL-MCNC: 4.2 MG/DL (ref 2.5–4.5)
PLATELET # BLD AUTO: 364 10*3/MM3 (ref 140–450)
PMV BLD AUTO: 9 FL (ref 6–12)
POTASSIUM SERPL-SCNC: 3.9 MMOL/L (ref 3.5–5.2)
PROT SERPL-MCNC: 6.3 G/DL (ref 6–8.5)
RBC # BLD AUTO: 2.95 10*6/MM3 (ref 3.77–5.28)
SODIUM SERPL-SCNC: 140 MMOL/L (ref 136–145)
WBC # BLD AUTO: 15.1 10*3/MM3 (ref 3.4–10.8)

## 2021-11-05 PROCEDURE — 25010000002 ENOXAPARIN PER 10 MG: Performed by: INTERNAL MEDICINE

## 2021-11-05 PROCEDURE — 80053 COMPREHEN METABOLIC PANEL: CPT | Performed by: INTERNAL MEDICINE

## 2021-11-05 PROCEDURE — 87186 SC STD MICRODIL/AGAR DIL: CPT | Performed by: INTERNAL MEDICINE

## 2021-11-05 PROCEDURE — 87150 DNA/RNA AMPLIFIED PROBE: CPT | Performed by: INTERNAL MEDICINE

## 2021-11-05 PROCEDURE — 25010000002 DEXAMETHASONE PER 1 MG: Performed by: INTERNAL MEDICINE

## 2021-11-05 PROCEDURE — 99232 SBSQ HOSP IP/OBS MODERATE 35: CPT | Performed by: INTERNAL MEDICINE

## 2021-11-05 PROCEDURE — 84100 ASSAY OF PHOSPHORUS: CPT | Performed by: INTERNAL MEDICINE

## 2021-11-05 PROCEDURE — 87147 CULTURE TYPE IMMUNOLOGIC: CPT | Performed by: INTERNAL MEDICINE

## 2021-11-05 PROCEDURE — 25010000002 DAPTOMYCIN PER 1 MG: Performed by: INTERNAL MEDICINE

## 2021-11-05 PROCEDURE — 87040 BLOOD CULTURE FOR BACTERIA: CPT | Performed by: INTERNAL MEDICINE

## 2021-11-05 PROCEDURE — 86140 C-REACTIVE PROTEIN: CPT | Performed by: INTERNAL MEDICINE

## 2021-11-05 PROCEDURE — 83735 ASSAY OF MAGNESIUM: CPT | Performed by: INTERNAL MEDICINE

## 2021-11-05 PROCEDURE — 82962 GLUCOSE BLOOD TEST: CPT

## 2021-11-05 PROCEDURE — 25010000002 CEFTAROLINE FOSAMIL PER 10 MG: Performed by: INTERNAL MEDICINE

## 2021-11-05 PROCEDURE — 85025 COMPLETE CBC W/AUTO DIFF WBC: CPT | Performed by: INTERNAL MEDICINE

## 2021-11-05 RX ORDER — SODIUM CHLORIDE 9 MG/ML
75 INJECTION, SOLUTION INTRAVENOUS CONTINUOUS
Status: DISCONTINUED | OUTPATIENT
Start: 2021-11-05 | End: 2021-11-06 | Stop reason: HOSPADM

## 2021-11-05 RX ADMIN — ENOXAPARIN SODIUM 40 MG: 40 INJECTION SUBCUTANEOUS at 17:02

## 2021-11-05 RX ADMIN — METRONIDAZOLE 500 MG: 500 INJECTION, SOLUTION INTRAVENOUS at 11:04

## 2021-11-05 RX ADMIN — METRONIDAZOLE 500 MG: 500 INJECTION, SOLUTION INTRAVENOUS at 03:34

## 2021-11-05 RX ADMIN — SODIUM CHLORIDE, PRESERVATIVE FREE 10 ML: 5 INJECTION INTRAVENOUS at 20:57

## 2021-11-05 RX ADMIN — LEVETIRACETAM 500 MG: 500 TABLET, FILM COATED ORAL at 09:02

## 2021-11-05 RX ADMIN — PANTOPRAZOLE SODIUM 40 MG: 40 TABLET, DELAYED RELEASE ORAL at 06:36

## 2021-11-05 RX ADMIN — CASTOR OIL AND BALSAM, PERU 1 APPLICATION: 788; 87 OINTMENT TOPICAL at 20:56

## 2021-11-05 RX ADMIN — CEFTAROLINE FOSAMIL 600 MG: 600 POWDER, FOR SOLUTION INTRAVENOUS at 14:39

## 2021-11-05 RX ADMIN — DAPTOMYCIN 550 MG: 500 INJECTION, POWDER, LYOPHILIZED, FOR SOLUTION INTRAVENOUS at 11:04

## 2021-11-05 RX ADMIN — OXYCODONE HYDROCHLORIDE 15 MG: 15 TABLET ORAL at 09:02

## 2021-11-05 RX ADMIN — METOPROLOL TARTRATE 25 MG: 25 TABLET, FILM COATED ORAL at 20:56

## 2021-11-05 RX ADMIN — SODIUM CHLORIDE, PRESERVATIVE FREE 10 ML: 5 INJECTION INTRAVENOUS at 09:02

## 2021-11-05 RX ADMIN — DEXAMETHASONE SODIUM PHOSPHATE 6 MG: 4 INJECTION, SOLUTION INTRA-ARTICULAR; INTRALESIONAL; INTRAMUSCULAR; INTRAVENOUS; SOFT TISSUE at 09:02

## 2021-11-05 RX ADMIN — DULOXETINE HYDROCHLORIDE 60 MG: 60 CAPSULE, DELAYED RELEASE ORAL at 06:36

## 2021-11-05 RX ADMIN — HYDRALAZINE HYDROCHLORIDE 25 MG: 25 TABLET, FILM COATED ORAL at 14:40

## 2021-11-05 RX ADMIN — HYDRALAZINE HYDROCHLORIDE 25 MG: 25 TABLET, FILM COATED ORAL at 21:03

## 2021-11-05 RX ADMIN — CLONAZEPAM 0.75 MG: 0.5 TABLET ORAL at 17:02

## 2021-11-05 RX ADMIN — HYDRALAZINE HYDROCHLORIDE 25 MG: 25 TABLET, FILM COATED ORAL at 06:36

## 2021-11-05 RX ADMIN — METOPROLOL TARTRATE 25 MG: 25 TABLET, FILM COATED ORAL at 09:01

## 2021-11-05 RX ADMIN — LEVOTHYROXINE SODIUM 125 MCG: 125 TABLET ORAL at 09:01

## 2021-11-05 RX ADMIN — CASTOR OIL AND BALSAM, PERU 1 APPLICATION: 788; 87 OINTMENT TOPICAL at 09:09

## 2021-11-05 RX ADMIN — SODIUM CHLORIDE, PRESERVATIVE FREE 3 ML: 5 INJECTION INTRAVENOUS at 20:57

## 2021-11-05 RX ADMIN — BUPROPION HYDROCHLORIDE 150 MG: 150 TABLET, FILM COATED, EXTENDED RELEASE ORAL at 20:56

## 2021-11-05 RX ADMIN — SODIUM CHLORIDE 75 ML/HR: 9 INJECTION, SOLUTION INTRAVENOUS at 23:08

## 2021-11-05 RX ADMIN — OXYCODONE HYDROCHLORIDE 15 MG: 15 TABLET ORAL at 17:02

## 2021-11-05 RX ADMIN — METRONIDAZOLE 500 MG: 500 INJECTION, SOLUTION INTRAVENOUS at 20:56

## 2021-11-05 RX ADMIN — BUPROPION HYDROCHLORIDE 150 MG: 150 TABLET, FILM COATED, EXTENDED RELEASE ORAL at 09:02

## 2021-11-05 RX ADMIN — Medication 1 CAPSULE: at 09:02

## 2021-11-05 RX ADMIN — CEFTAROLINE FOSAMIL 600 MG: 600 POWDER, FOR SOLUTION INTRAVENOUS at 23:08

## 2021-11-05 RX ADMIN — LEVETIRACETAM 500 MG: 500 TABLET, FILM COATED ORAL at 20:56

## 2021-11-05 RX ADMIN — Medication 1 TABLET: at 09:02

## 2021-11-05 RX ADMIN — CEFTAROLINE FOSAMIL 600 MG: 600 POWDER, FOR SOLUTION INTRAVENOUS at 06:34

## 2021-11-05 RX ADMIN — CLONAZEPAM 0.75 MG: 0.5 TABLET ORAL at 20:56

## 2021-11-05 RX ADMIN — CLONAZEPAM 0.75 MG: 0.5 TABLET ORAL at 09:02

## 2021-11-05 NOTE — PROGRESS NOTES
Pulmonary status is stable, not patient pending bed availability for transfer  No further recommendations at this time  Will see prn while here.

## 2021-11-05 NOTE — PROGRESS NOTES
[facility] HOSPITALIST PROGRESS NOTE     Patient Identification:  Name:  Karely Villarreal  Age:  56 y.o.  Sex:  female  :  1965  MRN:  [id number]  Visit Number:  [id number]  ROOM: [address]     Primary Care Provider:  Tracie Levi APRN     Date of Admission: 10/24/2021    Length of stay in inpatient status:  11    Subjective     Chief Compliant:    Chief Complaint   Patient presents with   • Shortness of Breath   • Pain     History of Presenting Illness:  In brief, 56-year-old female admitted on 10/24/2021 with shortness of breath after she took antibiotics for a week for an outpatient diagnosis of bronchitis.  She was COVID-19 negative at the time of the bronchitis diagnosis but her symptoms worsened and she was positive for COVID-19 in our emergency department this admission..  Also, the patient was noted to be septic while in the emergency department and possibly have a urinary tract infection.  She received MAB in the emergency department and at first she was not requiring oxygen.  She was started on empiric Rocephin for the UTI.  However, by hospital day #2, the patient was requiring oxygen and thus she was given 5 days of remdesivir; she is also started on Decadron and remains on the steroid.  Eventually, the patient's blood culture started growing MRSA; infectious disease team was consulted and the patient was first started on vancomycin; this was then changed to daptomycin and ceftaroline as she continued to worsen.  The patient did have a port placed prior to admission; this port has been removed by general surgery on 10/28/2021; a right-sided internal jugular central line was placed in its place.  Transthoracic echocardiogram on 10/26/2021 did not show any obvious vegetations.  Cardiology was consulted but NILESH has been delayed due to the patient's positive COVID-19 status.  Repeat ECHO this admission does not show any large vegetations; thus, it is assumed that the patient does  not have a large enough vegetation that could be affecting valve function.  The patient had a desaturation episode 3 days ago to the 60% range and we thought that she was going to require intubation; thus, the patient was moved to the critical care unit.  The patient was also less responsive during that time.  The patient had prior use of her home pain medications from her purse at the same time that we were giving her equivalents of her home pain medication.  However, her home pain meds were locked in pharmacy so that she would receive only 1 set of pain medications.  To my knowledge, there has been no visitors bringing the patient items during this last part of her hospitalization.  The patient improved after she was moved to the critical care unit and the increased oxygen requirement quickly resolved.  We did not give her Narcan during that time.  Since the desaturation episode, the patient has remained on 2 L/min of nasal cannula oxygen; the highest that she was on during the oxygen desaturation was 10 L/min with a nonrebreather.  MRI of her thoracic spine and head were performed 11/3/2021 as the patient was having back pain that was worse than her normal amount.  Please note that the MRI showed a T9 and T10 osteomyelitis with a 4.4 cm x 1.8 cm paraspinal abscess at the same location.     Today, the patient states that she is actually feeling better.  She has more of an appetite and has ate more today than any of the other days that she has been here.  She denies chest pain and denies trouble breathing but she is still having severe back pain and has difficulty getting comfortable sometimes.  She denies any paresthesias in her legs and denies any paresthesias and her straddle area.  She does still have a headache but she denies any unilateral weakness or other neurological symptoms.  She denies any encopresis.  She currently has a Crowley catheter in place.    Objective     Current Hospital Meds:buPROPion SR, 150  "mg, Oral, BID  castor oil-balsam peru, 1 application, Topical, Q12H  ceftaroline, 600 mg, Intravenous, Q8H  clonazePAM, 0.75 mg, Oral, TID  DAPTOmycin, 8 mg/kg (Adjusted), Intravenous, Q24H  dexamethasone, 6 mg, Intravenous, Daily  DULoxetine, 60 mg, Oral, QAM  enoxaparin, 40 mg, Subcutaneous, Q24H  hydrALAZINE, 25 mg, Oral, Q8H  lactobacillus acidophilus, 1 capsule, Oral, Daily  levETIRAcetam, 500 mg, Oral, Q12H  levothyroxine, 125 mcg, Oral, Daily  metoprolol tartrate, 25 mg, Oral, BID  metroNIDAZOLE, 500 mg, Intravenous, Q8H  multivitamin, 1 tablet, Oral, Daily  pantoprazole, 40 mg, Oral, Q AM  sodium chloride, 10 mL, Intravenous, Q12H  sodium chloride, 10 mL, Intravenous, Q12H  sodium chloride, 10 mL, Intravenous, Q12H  sodium chloride, 3 mL, Intravenous, Q12H    hold, 1 each      Current Antimicrobial Therapy:  Anti-Infectives (From admission, onward)    Ordered     Dose/Rate Route Frequency Start Stop    11/04/21 1058  metroNIDAZOLE (FLAGYL) 500 mg/100mL IVPB        Ordering Provider: Krysta Villafana PA    500 mg  over 30 Minutes Intravenous Every 8 Hours 11/04/21 1200 11/18/21 1159    10/30/21 0952  ceftaroline (TEFLARO) 600 mg in sodium chloride 0.9 % 100 mL IVPB-VTB        Ordering Provider: Alonzo Hastings MD    600 mg Intravenous Every 8 Hours 10/30/21 1200 11/13/21 1459    10/30/21 0944  DAPTOmycin (CUBICIN) 550 mg/50 mL 0.9% sodium chloride IVPB        Ordering Provider: Alonzo Hastings MD    8 mg/kg × 71.7 kg (Adjusted)  over 30 Minutes Intravenous Every 24 Hours 10/30/21 1100 12/11/21 1059    10/26/21 1528  remdesivir 100 mg in 270 mL NS        Ordering Provider: Ruma Madden MD   \"Followed by\" Linked Group Details    100 mg  over 60 Minutes Intravenous Every 24 Hours 10/27/21 1600 10/31/21 1559    10/26/21 1528  remdesivir 200 mg in 290 mL NS        Ordering Provider: Lucia Ardon MD   \"Followed by\" Linked Group Details    200 mg  over 60 Minutes Intravenous Every 24 Hours 10/26/21 " 1600 10/26/21 1852    10/25/21 2126  vancomycin 1750 mg/500 mL 0.9% NS IVPB ([facility])        Ordering Provider: Desmond Teixeira MD    20 mg/kg × 88.2 kg  over 120 Minutes Intravenous Once 10/25/21 2215 10/26/21 0027    10/25/21 0432  cefTRIAXone (ROCEPHIN) 1 g in sodium chloride 0.9 % 100 mL IVPB-VTB        Ordering Provider: Arie Wall MD    1 g  200 mL/hr over 30 Minutes Intravenous Once 10/25/21 0434 10/25/21 0616        Current Diuretic Therapy:  Diuretics (From admission, onward)    Ordered     Dose/Rate Route Frequency Start Stop    11/04/21 0921  furosemide (LASIX) injection 40 mg        Ordering Provider: Aj Lee MD    40 mg Intravenous Once 11/04/21 1015 11/04/21 1115    10/30/21 1653  furosemide (LASIX) injection 40 mg        Ordering Provider: Lucia Ardon MD    40 mg Intravenous Once 10/30/21 1800 10/30/21 1736        ----------------------------------------------------------------------------------------------------------------------  Vital Signs:  Temp:  [97.8 °F (36.6 °C)-98.8 °F (37.1 °C)] 97.8 °F (36.6 °C)  Heart Rate:  [72-87] 80  Resp:  [14-21] 20  BP: (123-161)/(68-95) 137/77  SpO2:  [93 %-100 %] 97 %  on  Flow (L/min):  [2] 2;   Device (Oxygen Therapy): nasal cannula  Body mass index is 29.05 kg/m².    Wt Readings from Last 3 Encounters:   11/05/21 81.6 kg (180 lb)   10/23/21 83.9 kg (185 lb)   05/17/21 88.9 kg (196 lb)     Intake & Output (last 3 days)       11/03 0701 11/04 0700 11/04 0701 11/05 0700 11/05 0701 11/06 0700    P.O. 600 480 720    I.V. (mL/kg)  0 (0) 471.7 (5.8)    Blood 300      Other 20 236     IV Piggyback 250.9 796.1     Total Intake(mL/kg) 1170.9 (13.1) 1512.1 (18.5) 1191.7 (14.6)    Urine (mL/kg/hr) 2200 (1) 3500 (1.8) 800 (0.8)    Stool 0      Total Output 2200 3500 800    Net -1029.1 -1987.9 +391.7               Diet  Regular  ----------------------------------------------------------------------------------------------------------------------  Physical Exam  Vitals reviewed.   Constitutional:       Appearance: Normal appearance. She is well-developed. She is ill-appearing.      Interventions: Nasal cannula in place.      Comments: She is starting to look less acutely ill.   Cardiovascular:      Rate and Rhythm: Normal rate and regular rhythm.      Pulses: Normal pulses.      Heart sounds: No murmur heard.      Pulmonary:      Effort: Pulmonary effort is normal. No respiratory distress.      Breath sounds: No wheezing or rales.   Genitourinary:     Comments: Yellow urine in the Crowley collection bag.  Musculoskeletal:         General: No swelling, deformity or signs of injury.   Skin:     Capillary Refill: Capillary refill takes less than 2 seconds.      Coloration: Skin is not jaundiced or pale.      Findings: Bruising present.      Comments: Due to lab draws and IVs.   Neurological:      Mental Status: She is alert and oriented to person, place, and time. Mental status is at baseline.      Cranial Nerves: No cranial nerve deficit.      Comments: She can move her feet.   Psychiatric:         Mood and Affect: Mood normal.         Behavior: Behavior normal. Behavior is cooperative.       ----------------------------------------------------------------------------------------------------------------------  Tele: Normal sinus rhythm heart 70s to 80s.  I personally reviewed the telemetry strips.  ----------------------------------------------------------------------------------------------------------------------  LABS:    CBC and coagulation:  Results from last 7 days   Lab Units 11/05/21  0517 11/04/21  0919 11/04/21  0024 11/03/21  0047 11/02/21  0121 11/01/21  0459 10/31/21  1932 10/31/21  0422 10/31/21  0422 10/31/21  0120 10/29/21  2304   PROCALCITONIN ng/mL  --   --   --  0.35*  --   --   --   --   --   --   --    CRP mg/dL  7.22*  --  4.96* 4.93* 6.48* 6.70*  --   --   --  7.34* 9.37*   WBC 10*3/mm3 15.10*  --  16.60* 20.44* 23.86* 22.33*  --   --  23.43*  --   --    HEMOGLOBIN g/dL 7.8* 7.8* 6.8* 7.1* 7.2* 7.3* 7.6*   < > 7.7*  --   --    HEMATOCRIT % 25.2* 25.6* 22.0* 23.2* 24.5* 22.9*  --   --  25.2*  --   --    MCV fL 85.4  --  84.9 84.4 86.3 81.2  --   --  80.3  --   --    MCHC g/dL 31.0*  --  30.9* 30.6* 29.4* 31.9  --   --  30.6*  --   --    PLATELETS 10*3/mm3 364  --  425 382 380 296  --   --  230  --   --    INR   --   --   --   --  1.15*  --   --   --   --   --   --    D DIMER QUANT MCGFEU/mL  --   --  6.74*  --  6.85*  --   --   --   --   --   --     < > = values in this interval not displayed.     Acid/base balance:  Results from last 7 days   Lab Units 11/02/21  1503   PH, ARTERIAL pH units 7.428   PO2 ART mm Hg 129.0*   PCO2, ARTERIAL mm Hg 39.7   HCO3 ART mmol/L 26.2*     Renal and electrolytes:  Results from last 7 days   Lab Units 11/05/21  0517 11/04/21  0024 11/03/21  0047 11/02/21  0121 11/01/21  0459 10/31/21  0120 10/30/21  2011 10/29/21  2304 10/29/21  2304   SODIUM mmol/L 140 142 140 137 142 143  --   --  142   POTASSIUM mmol/L 3.9 4.2 4.1 4.0 3.9 3.8 3.7   < > 3.1*   MAGNESIUM mg/dL 2.2 2.2 2.2 2.1 2.1 2.1  --   --   --    CHLORIDE mmol/L 105 107 105 105 107 108*  --   --  106   CO2 mmol/L 25.5 25.3 25.5 22.9 23.5 24.3  --   --  22.8   BUN mg/dL 35* 35* 37* 35* 32* 34*  --   --  35*   CREATININE mg/dL 1.18* 1.09* 1.03* 0.99 0.97 1.05*  --   --  0.82   EGFR IF NONAFRICN AM mL/min/1.73 48* 52* 56* 58* 60* 54*  --   --  72   CALCIUM mg/dL 8.6 8.5* 8.6 8.6 8.5* 8.6  --   --  8.2*   PHOSPHORUS mg/dL 4.2 4.3 4.6* 4.5  --   --   --   --   --    GLUCOSE mg/dL 105* 105* 106* 107* 111* 147*  --   --  156*    < > = values in this interval not displayed.     Estimated Creatinine Clearance: 58 mL/min (A) (by C-G formula based on SCr of 1.18 mg/dL (H)).    Liver and pancreatic function:  Results from last 7 days   Lab Units  11/05/21  0517 11/04/21  0024 11/03/21  0047 11/02/21  0121 10/31/21  1627   ALBUMIN g/dL 2.54* 2.64* 2.57* 2.50*  --    BILIRUBIN mg/dL 0.6 0.6 0.7 0.8 1.0   ALK PHOS U/L 135* 139* 163* 183*  --    AST (SGOT) U/L 14 11 13 15  --    ALT (SGPT) U/L 5 7 8 10  --      Endocrine function:  Lab Results   Component Value Date    HGBA1C 5.90 (H) 09/15/2019     Point of care bedside glucose levels:  Results from last 7 days   Lab Units 11/05/21  0625   GLUCOSE mg/dL 93     Glucose levels from the [facility]:  Results from last 7 days   Lab Units 11/05/21  0517 11/04/21  0024 11/03/21  0047 11/02/21  0121 11/01/21  0459 10/31/21  0120 10/29/21  2304   GLUCOSE mg/dL 105* 105* 106* 107* 111* 147* 156*     Lab Results   Component Value Date    TSH 47.200 (H) 10/25/2021    FREET4 0.16 (L) 10/25/2021     Cardiac:  Results from last 7 days   Lab Units 11/04/21  0024 10/31/21  0120   CK TOTAL U/L  --  41   PROBNP pg/mL 1,730.0*  --        Cultures:  Lab Results   Component Value Date    COLORU Dark Yellow (A) 10/25/2021    CLARITYU Cloudy (A) 10/25/2021    PHUR 7.0 10/25/2021    PROTEINUR 126.0 10/25/2021    GLUCOSEU Negative 10/25/2021    KETONESU Negative 10/25/2021    BLOODU Moderate (2+) (A) 10/25/2021    NITRITEU Positive (A) 10/25/2021    LEUKOCYTESUR Small (1+) (A) 10/25/2021    BILIRUBINUR Moderate (2+) (A) 10/25/2021    UROBILINOGEN 1.0 E.U./dL 10/25/2021    RBCUA 3-5 (A) 10/25/2021    WBCUA 3-5 (A) 10/25/2021    BACTERIA 2+ (A) 10/25/2021     Microbiology Results (last 10 days)     Procedure Component Value - Date/Time    Blood Culture - Blood, Arm, Left [[id number]]  (Abnormal) Collected: 11/03/21 0737    Lab Status: Final result Specimen: Blood from Arm, Left Updated: 11/05/21 0720     Blood Culture Staphylococcus aureus, MRSA     Comment: Infectious disease consultation is highly recommended to rule out distant foci of infection.  Methicillin resistant Staphylococcus aureus, Patient may be an isolation risk.        Isolated  from Aerobic Bottle     Gram Stain Aerobic Bottle Gram positive cocci in clusters    Narrative:      Refer to previous BCID 11-2-21: Staph aureus. mecA/C and MREJ (methicillin resistance gene) detected.  Refer to previous blood culture collected on 11/2 for NEDA's       Blood Culture - Blood, Hand, Right [[id number]]  (Abnormal) Collected: 11/03/21 0737    Lab Status: Final result Specimen: Blood from Hand, Right Updated: 11/05/21 0718     Blood Culture Staphylococcus aureus, MRSA     Comment: Infectious disease consultation is highly recommended to rule out distant foci of infection.  Methicillin resistant Staphylococcus aureus, Patient may be an isolation risk.        Isolated from Aerobic Bottle     Gram Stain Aerobic Bottle Gram positive cocci in clusters    Narrative:      Refer to previous BCID 11-2-21: Staph aureus. mecA/C and MREJ (methicillin resistance gene) detected.  Refer to previous blood culture collected on 11/2 for NEDA's       Blood Culture - Blood, Arm, Left [[id number]]  (Abnormal) Collected: 11/02/21 0121    Lab Status: Final result Specimen: Blood from Arm, Left Updated: 11/05/21 0716     Blood Culture Staphylococcus aureus, MRSA     Comment: Infectious disease consultation is highly recommended to rule out distant foci of infection.  Methicillin resistant Staphylococcus aureus, Patient may be an isolation risk.        Isolated from Aerobic Bottle     Gram Stain Aerobic Bottle Gram positive cocci in clusters    Narrative:      No BCID performed, refer to previous blood culture specimen collected on 11/2/21 at 0121  Refer to previous blood culture collected on 11/2 for NEDA's      Blood Culture - Blood, Arm, Left [[id number]]  (Abnormal)  (Susceptibility) Collected: 11/02/21 0121    Lab Status: Final result Specimen: Blood from Arm, Left Updated: 11/05/21 0716     Blood Culture Staphylococcus aureus, MRSA     Comment: Infectious disease consultation is highly recommended to rule out distant foci of  infection.  Methicillin resistant Staphylococcus aureus, Patient may be an isolation risk.        Isolated from Aerobic and Anaerobic Bottles     Gram Stain Aerobic Bottle Gram positive cocci in clusters      Anaerobic Bottle Gram positive cocci in clusters    Susceptibility      Staphylococcus aureus, MRSA     NEDA     Gentamicin Susceptible     Oxacillin Resistant     Rifampin Susceptible     Vancomycin Susceptible                     Susceptibility Comments     Staphylococcus aureus, MRSA    This isolate does not demonstrate inducible clindamycin resistance in vitro.               Blood Culture ID, PCR - Blood, Arm, Left [[id number]]  (Abnormal) Collected: 11/02/21 0121    Lab Status: Final result Specimen: Blood from Arm, Left Updated: 11/02/21 2116     BCID, PCR Staph aureus. mecA/C and MREJ (methicillin resistance gene) detected. Identification by BCID2 PCR.    Narrative:      Infectious disease consultation is highly recommended to rule out distant foci of infection.    Blood Culture - Blood, Hand, Right [[id number]]  (Abnormal) Collected: 11/01/21 1742    Lab Status: Final result Specimen: Blood from Hand, Right Updated: 11/03/21 0709     Blood Culture Staphylococcus aureus, MRSA     Comment: Infectious disease consultation is highly recommended to rule out distant foci of infection.  Methicillin resistant Staphylococcus aureus, Patient may be an isolation risk.        Isolated from Aerobic Bottle     Gram Stain Aerobic Bottle Gram positive cocci in clusters    Narrative:      No BCID performed, refer to previous blood culture specimen collected on 10/29/2021.  Refer to previous blood culture collected on 10/29/21 at 12:08 for MICs.        Blood Culture - Blood, Hand, Left [[id number]]  (Abnormal) Collected: 11/01/21 1742    Lab Status: Final result Specimen: Blood from Hand, Left Updated: 11/03/21 0709     Blood Culture Staphylococcus aureus, MRSA     Comment: Infectious disease consultation is highly  recommended to rule out distant foci of infection.  Methicillin resistant Staphylococcus aureus, Patient may be an isolation risk.        Isolated from Aerobic Bottle     Gram Stain Aerobic Bottle Gram positive cocci in clusters    Narrative:      No BCID performed, refer to previous blood culture specimen collected on 10/29/2021.  Refer to previous blood culture collected on 10/29/21 at 12:08 for MICs.    Blood Culture - Blood, Arm, Right [[id number]]  (Abnormal) Collected: 10/30/21 1212    Lab Status: Final result Specimen: Blood from Arm, Right Updated: 11/01/21 1032     Blood Culture Staphylococcus aureus, MRSA     Comment: Infectious disease consultation is highly recommended to rule out distant foci of infection.  Methicillin resistant Staphylococcus aureus, Patient may be an isolation risk.        Isolated from Aerobic and Anaerobic Bottles     Gram Stain Aerobic Bottle Gram positive cocci in pairs and clusters      Anaerobic Bottle Gram positive cocci in pairs and clusters    Narrative:      No BCID performed, refer to previous blood culture specimen collected on 10- @ 1208 from central line source for MICs    Blood Culture - Blood, Arm, Left [[id number]]  (Abnormal) Collected: 10/30/21 1211    Lab Status: Final result Specimen: Blood from Arm, Left Updated: 11/01/21 1031     Blood Culture Staphylococcus aureus, MRSA     Comment: Infectious disease consultation is highly recommended to rule out distant foci of infection.  Methicillin resistant Staphylococcus aureus, Patient may be an isolation risk.        Isolated from Aerobic and Anaerobic Bottles     Gram Stain Aerobic Bottle Gram positive cocci in pairs and clusters      Anaerobic Bottle Gram positive cocci in pairs and clusters    Narrative:      No BCID performed, refer to previous blood culture specimen collected on 10- @ 1208 from central line source for MICs      Blood Culture - Blood, Blood, Central Line [[id number]]  (Abnormal)   (Susceptibility) Collected: 10/29/21 1208    Lab Status: Final result Specimen: Blood, Central Line Updated: 11/01/21 1011     Blood Culture Staphylococcus aureus, MRSA     Comment: Infectious disease consultation is highly recommended to rule out distant foci of infection.  Methicillin resistant Staphylococcus aureus, Patient may be an isolation risk.        Isolated from Aerobic and Anaerobic Bottles     Gram Stain Aerobic Bottle Gram positive cocci in clusters      Anaerobic Bottle Gram positive cocci in clusters    Susceptibility      Staphylococcus aureus, MRSA     NEDA     Gentamicin Susceptible     Oxacillin Resistant     Rifampin Susceptible     Vancomycin Susceptible                     Susceptibility Comments     Staphylococcus aureus, MRSA    This isolate does not demonstrate inducible clindamycin resistance in vitro.               Blood Culture ID, PCR - Blood, Blood, Central Line [[id number]]  (Abnormal) Collected: 10/29/21 1208    Lab Status: Final result Specimen: Blood, Central Line Updated: 10/30/21 0130     BCID, PCR Staph aureus. mecA/C and MREJ (methicillin resistance gene) detected. Identification by BCID2 PCR.     BOTTLE TYPE Aerobic Bottle    Narrative:      Infectious disease consultation is highly recommended to rule out distant foci of infection.    Blood Culture - Blood, Arm, Right [[id number]]  (Abnormal) Collected: 10/27/21 0559    Lab Status: Final result Specimen: Blood from Arm, Right Updated: 10/28/21 1025     Blood Culture Staphylococcus aureus, MRSA     Comment: Infectious disease consultation is highly recommended to rule out distant foci of infection.  Methicillin resistant Staphylococcus aureus, Patient may be an isolation risk.        Isolated from Aerobic Bottle     Gram Stain Aerobic Bottle Gram positive cocci in clusters    Narrative:      No BCID performed, refer to previous blood culture specimen collected on 10-25-21 at 5:28.  Refer to previous blood culture collected on  10/25/2021 at 0528 for MICs    Blood Culture - Blood, Arm, Left [[id number]]  (Abnormal) Collected: 10/27/21 0500    Lab Status: Final result Specimen: Blood from Arm, Left Updated: 10/28/21 1025     Blood Culture Staphylococcus aureus, MRSA     Comment: Infectious disease consultation is highly recommended to rule out distant foci of infection.  Methicillin resistant Staphylococcus aureus, Patient may be an isolation risk.        Isolated from Aerobic Bottle     Gram Stain Aerobic Bottle Gram positive cocci in clusters    Narrative:      No BCID performed, refer to previous blood culture specimen collected on 10-25-21 at 5:28.  Refer to previous blood culture collected on 10/25/2021 at 0528 for MICs            I have personally looked at the labs and they are summarized above.    Assessment & Plan      -Severe sepsis that was present on admission (heart rate 121, CRP 25.76, white blood cell count 17,200, 17% bands, lactic acid 2.1) due to MRSA bacteremia, COVID-19 bilateral pneumonia, suspected infective endocarditis causing embolic pneumonia, T9 and T10 osteomyelitis with a paraspinal abscess, and urinary tract infection with hematuria  -Loculated right-sided pleural effusion of unknown etiology but suspect due to MRSA pneumonia  -Intermittent acute hypoxic respiratory failure that was present starting 2 days after admission and due to the COVID-19 pneumonia as well as suspected aspiration versus MRSA pneumonia  -Acute kidney injury on top of chronic kidney disease stage II with baseline creatinine 0.6-0.8 and admission creatinine of 1.48, now improved  -Swelling of her bilateral arms at the elbows on 11/1/2021, improved  -History of polysubstance abuse, suspicious behavior of hiding /self medicating klonopin/opoiod in house until purse taken away 10/26; urine drug screen positive for amphetamines, Suboxone, methamphetamines, and opiates  -Positive hepatitis C antibody this admission  -History of discoid lupus, on  Plaquenil  -History of hypothyroidism status post thyroidectomy, uncontrolled as evidenced by a TSH level greater than 40 this admission  -History of essential hypertension  -History of seizures with the last seizure in 2016  -History of right parietal ischemic CVA thought cardioembolic with prior PFO repair (all of this occurred in 2015)  -History of major depression and anxiety  -History of peptic ulcer disease status post perforation in 2016    The patient still awaits a bed at [facility] for neurosurgical and cardiothoracic surgery opinions.  She is doing well; will continue with the antibiotics as suggested by the infectious disease team.  Will start IV fluids as her creatinine is starting to creep up.  I also repeated blood cultures today as her set from November 3, 2021 was positive; if the current blood cultures come back positive again then we will repeat them until she has a negative blood culture.  We will continue with the Decadron, not only for treatment of the COVID-19 but also to prevent any swelling where the paraspinal abscess is located.  We will repeat her blood work in the morning.    VTE Prophylaxis:   Mechanical Order History:     None      Pharmalogical Order History:      Ordered     Dose Route Frequency Stop    10/25/21 1614  enoxaparin (LOVENOX) syringe 40 mg         40 mg SC Every 24 Hours --    10/25/21 0616  heparin (porcine) 5000 UNIT/ML injection 5,000 Units  Status:  Discontinued         5,000 Units SC Every 12 Hours Scheduled 10/25/21 1614              The patient is high risk due to the following diagnoses/reasons: Persistent MRSA bacteremia in a patient with known polysubstance use     Disposition: Undetermined at this time.    Alonzo Hastings MD  [facility]  11/05/21  19:40 EDT

## 2021-11-05 NOTE — PROGRESS NOTES
Antimicrobial Length of Therapy:    Day 7 of 42 daptomycin  Day 7 of 14 ceftaroline  Day 2 of 14 metronidazole    Thank you.  Lindsey Woodward, Pharm.D.  11/5/2021  14:57 EDT

## 2021-11-05 NOTE — PLAN OF CARE
Problem: Adult Inpatient Plan of Care  Goal: Plan of Care Review  Outcome: Ongoing, Progressing  Goal: Patient-Specific Goal (Individualized)  Outcome: Ongoing, Progressing  Goal: Absence of Hospital-Acquired Illness or Injury  Outcome: Ongoing, Progressing  Goal: Optimal Comfort and Wellbeing  Outcome: Ongoing, Progressing  Goal: Readiness for Transition of Care  Outcome: Ongoing, Progressing     Problem: Suicide Risk  Goal: Absence of Self-Harm  Outcome: Ongoing, Progressing     Problem: Skin Injury Risk Increased  Goal: Skin Health and Integrity  Outcome: Ongoing, Progressing     Problem: Fall Injury Risk  Goal: Absence of Fall and Fall-Related Injury  Outcome: Ongoing, Progressing   Goal Outcome Evaluation:

## 2021-11-05 NOTE — PLAN OF CARE
Problem: Adult Inpatient Plan of Care  Goal: Plan of Care Review  Outcome: Ongoing, Not Progressing  Flowsheets (Taken 11/5/2021 1450)  Outcome Summary: Patient resting in bed. VSS. A&Ox4. Waiting on bed at [facility] for cardiothoracic surgeon. No needs at this time. Will continue to monitor the patient.  Goal: Patient-Specific Goal (Individualized)  Outcome: Ongoing, Not Progressing  Goal: Absence of Hospital-Acquired Illness or Injury  Outcome: Ongoing, Not Progressing  Intervention: Identify and Manage Fall Risk  Recent Flowsheet Documentation  Taken 11/5/2021 1446 by Jeny Kohli RN  Safety Promotion/Fall Prevention: safety round/check completed  Taken 11/5/2021 1300 by Jeny Kohli RN  Safety Promotion/Fall Prevention: safety round/check completed  Taken 11/5/2021 1100 by Jeny Kohli RN  Safety Promotion/Fall Prevention: safety round/check completed  Taken 11/5/2021 0900 by Jeny Kohli RN  Safety Promotion/Fall Prevention: safety round/check completed  Taken 11/5/2021 0845 by Jeny Kohli RN  Safety Promotion/Fall Prevention: safety round/check completed  Taken 11/5/2021 0700 by Jeny Kohli RN  Safety Promotion/Fall Prevention: safety round/check completed  Intervention: Prevent Skin Injury  Recent Flowsheet Documentation  Taken 11/5/2021 1446 by Jeny Kohli RN  Skin Protection: adhesive use limited  Taken 11/5/2021 0845 by Jeny Kohli RN  Skin Protection: adhesive use limited  Intervention: Prevent and Manage VTE (venous thromboembolism) Risk  Recent Flowsheet Documentation  Taken 11/5/2021 0845 by Jeny Kohli RN  VTE Prevention/Management: (See Mar) other (see comments)  Intervention: Prevent Infection  Recent Flowsheet Documentation  Taken 11/5/2021 1446 by Jeny Kohli RN  Infection Prevention:   single patient room provided   rest/sleep promoted  Taken 11/5/2021 1300 by Jeny Kohli RN  Infection Prevention: single patient room provided  Taken 11/5/2021 1100 by  Plains, Jeny J, RN  Infection Prevention: single patient room provided  Taken 11/5/2021 0900 by Jeny Kohli RN  Infection Prevention: single patient room provided  Taken 11/5/2021 0845 by Jeny Kohli RN  Infection Prevention: single patient room provided  Taken 11/5/2021 0700 by Jeny Kohli RN  Infection Prevention: rest/sleep promoted  Goal: Optimal Comfort and Wellbeing  Outcome: Ongoing, Not Progressing  Intervention: Provide Person-Centered Care  Recent Flowsheet Documentation  Taken 11/5/2021 1446 by Jeny Kohli RN  Trust Relationship/Rapport:   care explained   choices provided   emotional support provided   empathic listening provided   questions answered   questions encouraged   reassurance provided   thoughts/feelings acknowledged  Taken 11/5/2021 0845 by Jeny Kohli RN  Trust Relationship/Rapport:   care explained   choices provided  Goal: Readiness for Transition of Care  Outcome: Ongoing, Not Progressing     Problem: Suicide Risk  Goal: Absence of Self-Harm  Outcome: Ongoing, Not Progressing  Intervention: Assess Risk to Self and Maintain Safety  Recent Flowsheet Documentation  Taken 11/5/2021 1446 by Jeny Kohli RN  Behavior Management: behavioral plan reviewed  Intervention: Promote Psychosocial Wellbeing  Recent Flowsheet Documentation  Taken 11/5/2021 1446 by Jeny Kohli RN  Supportive Measures: active listening utilized  Family/Support System Care: self-care encouraged  Taken 11/5/2021 0845 by Jeny Kohli RN  Supportive Measures: active listening utilized  Family/Support System Care: self-care encouraged     Problem: Skin Injury Risk Increased  Goal: Skin Health and Integrity  Outcome: Ongoing, Not Progressing  Intervention: Optimize Skin Protection  Recent Flowsheet Documentation  Taken 11/5/2021 1446 by Jeny Kohli RN  Pressure Reduction Techniques: weight shift assistance provided  Pressure Reduction Devices: pressure-redistributing mattress utilized  Skin Protection:  adhesive use limited  Taken 11/5/2021 0845 by Jeny Kohli RN  Pressure Reduction Techniques: weight shift assistance provided  Pressure Reduction Devices: pressure-redistributing mattress utilized  Skin Protection: adhesive use limited  Intervention: Promote and Optimize Oral Intake  Recent Flowsheet Documentation  Taken 11/5/2021 0845 by Jeny Kohli RN  Oral Nutrition Promotion: rest periods promoted     Problem: Fall Injury Risk  Goal: Absence of Fall and Fall-Related Injury  Outcome: Ongoing, Not Progressing  Intervention: Identify and Manage Contributors to Fall Injury Risk  Recent Flowsheet Documentation  Taken 11/5/2021 1446 by Jeny Kohli RN  Medication Review/Management: medications reviewed  Self-Care Promotion:   independence encouraged   BADL personal objects within reach   BADL personal routines maintained   safe use of adaptive equipment encouraged  Taken 11/5/2021 1300 by Jeny Kohli RN  Medication Review/Management: medications reviewed  Taken 11/5/2021 1100 by Jeny Kohli RN  Medication Review/Management: medications reviewed  Taken 11/5/2021 0900 by Jeny Kohli RN  Medication Review/Management: medications reviewed  Taken 11/5/2021 0845 by Jeny Kohli RN  Medication Review/Management: medications reviewed  Self-Care Promotion:   independence encouraged   BADL personal objects within reach   BADL personal routines maintained   safe use of adaptive equipment encouraged  Taken 11/5/2021 0700 by Jeny Kohli RN  Medication Review/Management: medications reviewed  Intervention: Promote Injury-Free Environment  Recent Flowsheet Documentation  Taken 11/5/2021 1446 by Jeny Kohli RN  Safety Promotion/Fall Prevention: safety round/check completed  Taken 11/5/2021 1300 by Jeny Kohli RN  Safety Promotion/Fall Prevention: safety round/check completed  Taken 11/5/2021 1100 by Jeny Kohli RN  Safety Promotion/Fall Prevention: safety round/check completed  Taken 11/5/2021 0900 by  Jeny Kohli, RN  Safety Promotion/Fall Prevention: safety round/check completed  Taken 11/5/2021 0845 by Jeny Kohli, RN  Safety Promotion/Fall Prevention: safety round/check completed  Taken 11/5/2021 0700 by Jeny Kohli, RN  Safety Promotion/Fall Prevention: safety round/check completed   Goal Outcome Evaluation:              Outcome Summary: Patient resting in bed. VSS. A&Ox4. Waiting on bed at [facility] for cardiothoracic surgeon. No needs at this time. Will continue to monitor the patient.

## 2021-11-05 NOTE — PROGRESS NOTES
PROGRESS NOTE         Patient Identification:  Name:  Karely Villarreal  Age:  56 y.o.  Sex:  female  :  1965  MRN:  [id number]  Visit Number:  [id number]  Primary Care Provider:  Tracie Levi APRN         LOS: 11 days       ----------------------------------------------------------------------------------------------------------------------  Subjective       Chief Complaints:    Shortness of Breath and Pain        Interval History:      The patient's oxygen requirements are stable at 2 L nasal cannula.  Nurse reports no issues overnight.  Patient is complaining of mid back pain as well as a headache. Patient is currently on the waiting list for transfer to [facility].  Afebrile, no diarrhea.  CRP is stable at 4.96.  WBC is improved at 16.60.  Blood cultures on 11/3/2021 remain positive for MRSA.  Blood cultures on 2021 are in process.  ----------------------------------------------------------------------------------------------------------------------      Objective       Current Hospital Meds:  buPROPion SR, 150 mg, Oral, BID  castor oil-balsam peru, 1 application, Topical, Q12H  ceftaroline, 600 mg, Intravenous, Q8H  clonazePAM, 0.75 mg, Oral, TID  DAPTOmycin, 8 mg/kg (Adjusted), Intravenous, Q24H  dexamethasone, 6 mg, Intravenous, Daily  DULoxetine, 60 mg, Oral, QAM  enoxaparin, 40 mg, Subcutaneous, Q24H  hydrALAZINE, 25 mg, Oral, Q8H  lactobacillus acidophilus, 1 capsule, Oral, Daily  levETIRAcetam, 500 mg, Oral, Q12H  levothyroxine, 125 mcg, Oral, Daily  metoprolol tartrate, 25 mg, Oral, BID  metroNIDAZOLE, 500 mg, Intravenous, Q8H  multivitamin, 1 tablet, Oral, Daily  pantoprazole, 40 mg, Oral, Q AM  sodium chloride, 10 mL, Intravenous, Q12H  sodium chloride, 10 mL, Intravenous, Q12H  sodium chloride, 10 mL, Intravenous, Q12H  sodium chloride, 3 mL, Intravenous, Q12H      hold, 1 each  Pharmacy Consult - Pharmacy to dose,        ----------------------------------------------------------------------------------------------------------------------    Vital Signs:  Temp:  [97.8 °F (36.6 °C)-98.8 °F (37.1 °C)] 98.8 °F (37.1 °C)  Heart Rate:  [73-84] 73  Resp:  [14-21] 18  BP: (114-161)/() 141/82  Mean Arterial Pressure (Non-Invasive) for the past 24 hrs (Last 3 readings):   Noninvasive MAP (mmHg)   11/05/21 0700 95   11/05/21 0600 104   11/05/21 0500 103     SpO2 Percentage    11/05/21 0500 11/05/21 0600 11/05/21 0700   SpO2: 96% 96% 96%     SpO2:  [93 %-100 %] 96 %  on  Flow (L/min):  [2] 2;   Device (Oxygen Therapy): nasal cannula    Body mass index is 29.05 kg/m².  Wt Readings from Last 3 Encounters:   11/05/21 81.6 kg (180 lb)   10/23/21 83.9 kg (185 lb)   05/17/21 88.9 kg (196 lb)        Intake/Output Summary (Last 24 hours) at 11/5/2021 0940  Last data filed at 11/5/2021 0904  Gross per 24 hour   Intake 1673.46 ml   Output 3500 ml   Net -1826.54 ml     Diet Regular  ----------------------------------------------------------------------------------------------------------------------      Physical Exam:    Deferred due to COVID-19 isolation.  ----------------------------------------------------------------------------------------------------------------------  Results from last 7 days   Lab Units 10/31/21  0120   CK TOTAL U/L 41     Results from last 7 days   Lab Units 11/04/21  0024   PROBNP pg/mL 1,730.0*       Results from last 7 days   Lab Units 11/02/21  1503   PH, ARTERIAL pH units 7.428   PO2 ART mm Hg 129.0*   PCO2, ARTERIAL mm Hg 39.7   HCO3 ART mmol/L 26.2*     Results from last 7 days   Lab Units 11/05/21  0517 11/04/21  0919 11/04/21  0024 11/03/21  0047 11/03/21  0047 11/02/21  0121 11/02/21  0121   CRP mg/dL 7.22*  --  4.96*  --  4.93*   < > 6.48*   WBC 10*3/mm3 15.10*  --  16.60*  --  20.44*   < > 23.86*   HEMOGLOBIN g/dL 7.8* 7.8* 6.8*   < > 7.1*   < > 7.2*   HEMATOCRIT % 25.2* 25.6* 22.0*   < > 23.2*   < >  24.5*   MCV fL 85.4  --  84.9  --  84.4   < > 86.3   MCHC g/dL 31.0*  --  30.9*  --  30.6*   < > 29.4*   PLATELETS 10*3/mm3 364  --  425  --  382   < > 380   INR   --   --   --   --   --   --  1.15*    < > = values in this interval not displayed.     Results from last 7 days   Lab Units 11/05/21  0517 11/04/21  0024 11/03/21  0047   SODIUM mmol/L 140 142 140   POTASSIUM mmol/L 3.9 4.2 4.1   MAGNESIUM mg/dL 2.2 2.2 2.2   CHLORIDE mmol/L 105 107 105   CO2 mmol/L 25.5 25.3 25.5   BUN mg/dL 35* 35* 37*   CREATININE mg/dL 1.18* 1.09* 1.03*   EGFR IF NONAFRICN AM mL/min/1.73 48* 52* 56*   CALCIUM mg/dL 8.6 8.5* 8.6   GLUCOSE mg/dL 105* 105* 106*   ALBUMIN g/dL 2.54* 2.64* 2.57*   BILIRUBIN mg/dL 0.6 0.6 0.7   ALK PHOS U/L 135* 139* 163*   AST (SGOT) U/L 14 11 13   ALT (SGPT) U/L 5 7 8   Estimated Creatinine Clearance: 58 mL/min (A) (by C-G formula based on SCr of 1.18 mg/dL (H)).  No results found for: AMMONIA    Glucose   Date/Time Value Ref Range Status   11/05/2021 0625 93 70 - 130 mg/dL Final     Comment:     Meter: [id number] : [id number] Abran Griggs     Lab Results   Component Value Date    HGBA1C 5.90 (H) 09/15/2019     Lab Results   Component Value Date    TSH 47.200 (H) 10/25/2021    FREET4 0.16 (L) 10/25/2021       Blood Culture   Date Value Ref Range Status   10/25/2021 Staphylococcus aureus, MRSA (C)  Preliminary     Comment:     Infectious disease consultation is highly recommended to rule out distant foci of infection.  Methicillin resistant Staphylococcus aureus, Patient may be an isolation risk.   10/25/2021 Staphylococcus aureus, MRSA (C)  Preliminary     Comment:     Infectious disease consultation is highly recommended to rule out distant foci of infection.  Methicillin resistant Staphylococcus aureus, Patient may be an isolation risk.   10/25/2021 Staphylococcus aureus, MRSA (C)  Preliminary     Comment:     Infectious disease consultation is highly recommended to rule out distant foci of  infection.  Methicillin resistant Staphylococcus aureus, Patient may be an isolation risk.     No results found for: URINECX  No results found for: WOUNDCX  No results found for: STOOLCX  No results found for: RESPCX  Pain Management Panel       Pain Management Panel Latest Ref Rng & Units 10/25/2021 10/25/2021    CREATININE UR mg/dL 97.9 97.9    AMPHETAMINES SCREEN, URINE Negative - Positive(A)    BARBITURATES SCREEN Negative - Negative    BENZODIAZEPINE SCREEN, URINE Negative - Negative    BUPRENORPHINEUR Negative - Positive(A)    COCAINE SCREEN, URINE Negative - Negative    METHADONE SCREEN, URINE Negative - Negative    METHAMPHETAMINEUR Negative - Positive(A)              ----------------------------------------------------------------------------------------------------------------------  Imaging Results (Last 24 Hours)       ** No results found for the last 24 hours. **            ----------------------------------------------------------------------------------------------------------------------    Assessment/Plan       Assessment/Plan     ASSESSMENT:    1.  Severe sepsis with lactic acid greater than 2 on admission  2.  COVID-19 pneumonia  3.  MRSA bacteremia  4.  T9-T10 osteomyelitis with paravertebral abscess    PLAN:    The patient's oxygen requirements are stable at 2 L nasal cannula.  Nurse reports no issues overnight.  Patient is complaining of mid back pain as well as a headache. Patient is currently on the waiting list for transfer to [facility].  Afebrile, no diarrhea.  CRP is stable at 4.96.  WBC is improved at 16.60.  Blood cultures on 11/3/2021 remain positive for MRSA.  Blood cultures on 11/5/2021 are in process.    Chest x-ray on 11/4/2021 shows slight worsening of the left upper lobe infiltrates, otherwise relatively stable appearance of the chest.      3 out of 3 blood cultures on 10/25/2021 finalized as MRSA and all blood cultures since have remained positive.    On 10/28/2021 patient  underwent Port-A-Cath removal and placement of central line.    Urinalysis was only mildly positive with 2+ bacteria but 0-2 WBCs.  As patient is asymptomatic, this does not need treated. CT chest with PE protocol on 10/25/2021 was negative for PE.  Negative for thoracic aortic aneurysm/dissection.  Moderate right pleural effusion and small left pleural effusion with compressive right basilar atelectasis/consolidation and minimal left basilar atelectasis.  Nodular and groundglass infiltrates are scattered throughout both lungs, concerning for multifocal pneumonia.  Follow-up to resolution recommended to exclude any underlying pulmonary masses given the presence of the patient's right-sided Port-A-Cath.  COVID-19 reporting criteria: Intermediate.  Imaging features can be seen with COVID-19 pneumonia, although are nonspecific and can occur with a variety of infectious and noninfectious processes. Venous duplex of bilateral lower extremities on 10/25/2021 shows no DVT.  COVID-19 and flu A/B PCR on 10/24/2021 detected COVID-19.  Legionella antigen on 10/25/2021 was negative.  Respiratory panel on 10/25/2021 was negative.  MRSA screen on 10/25/2021 was positive. Strep pneumo antigen on 10/25/2021 was negative. MRI thoracic spine with and without contrast on 11/3/2021 shows complex right pleural effusion again noted.  T9 and T10 vertebral body increased marrow signal concerning for osteomyelitis.  Additionally there is associated paraspinal collection measuring 4.4 x 1.8 cm in this region.  This likely represents paravertebral abscess.  The collection extends anteriorly to para-aortic location.  MRI of the brain on 11/3/2021 showed no acute findings.      Patient completed remdesivir.  Continues on Decadron.     Recommend to continue daptomycin 8 mg/kg IV every 24 hours and ceftaroline 600 mg IV every 8 hours. For paravertebral abscess, Flagyl 500 mg IV every 8 hours was added.  We agree with primary team's decision to  make arrangement for transfer for neurosurgical and CT surgery evaluation due to need for drainage/aspiration of paraspinal abscess and loculated pleural effusion.  Continue to highly recommend NILESH.    Recommend thoracentesis or drainage of the loculated pleural effusions, especially in the setting of worsening CRP. Blood cultures could be repeated x2 sets every other day, especially as source control has not yet been achieved. We will continue to follow closely and adjust antibiotic therapy as appropriate.    Code Status:   Code Status and Medical Interventions:   Ordered at: 10/25/21 0441     Level Of Support Discussed With:    Patient     Code Status (Patient has no pulse and is not breathing):    CPR (Attempt to Resuscitate)     Medical Interventions (Patient has pulse or is breathing):    Full     VICTOR HUGO Moffett  11/05/21  09:40 EDT

## 2021-11-05 NOTE — CASE MANAGEMENT/SOCIAL WORK
Discharge Planning Assessment  LUIS Robb     Patient Name: Karely Villarreal  MRN: [id number]  Today's Date: 11/5/2021    Admit Date: 10/24/2021     Discharge Plan     Row Name 11/05/21 1554       Plan    Plan Pt was admitted on 10/24/21. Pt is currently awaiting a bed at [facility]. SS will continue to follow and assist with discharge planning.    Patient/Family in Agreement with Plan yes                      KRISHAN Smith

## 2021-11-05 NOTE — NURSING NOTE
Attempted to call Grisel Villarreal, patient's mother/emergency contact to notify her of patient's transfer to PCU room 207. No answer.

## 2021-11-06 ENCOUNTER — HOSPITAL ENCOUNTER (INPATIENT)
Facility: HOSPITAL | Age: 56
LOS: 34 days | Discharge: NURSING, REHAB OR LONG-TERM CARE | End: 2021-12-11
Attending: INTERNAL MEDICINE | Admitting: FAMILY MEDICINE

## 2021-11-06 ENCOUNTER — APPOINTMENT (OUTPATIENT)
Dept: GENERAL RADIOLOGY | Facility: HOSPITAL | Age: 56
End: 2021-11-06

## 2021-11-06 VITALS
TEMPERATURE: 97.7 F | OXYGEN SATURATION: 97 % | SYSTOLIC BLOOD PRESSURE: 141 MMHG | WEIGHT: 178.8 LBS | BODY MASS INDEX: 28.73 KG/M2 | RESPIRATION RATE: 19 BRPM | HEIGHT: 66 IN | DIASTOLIC BLOOD PRESSURE: 83 MMHG | HEART RATE: 90 BPM

## 2021-11-06 DIAGNOSIS — G82.50 QUADRIPARESIS (HCC): ICD-10-CM

## 2021-11-06 DIAGNOSIS — M86.38 CHRONIC MULTIFOCAL OSTEOMYELITIS, OTHER SITE (HCC): Primary | ICD-10-CM

## 2021-11-06 PROBLEM — D89.832 CYTOKINE RELEASE SYNDROME, GRADE 2: Status: ACTIVE | Noted: 2021-11-06

## 2021-11-06 PROBLEM — F19.10 POLYSUBSTANCE ABUSE (HCC): Status: ACTIVE | Noted: 2021-11-06

## 2021-11-06 PROBLEM — M46.20 PARASPINAL ABSCESS (HCC): Status: ACTIVE | Noted: 2021-11-06

## 2021-11-06 PROBLEM — J90 PLEURAL EFFUSION, RIGHT: Status: ACTIVE | Noted: 2021-11-06

## 2021-11-06 PROBLEM — J12.82 PNEUMONIA DUE TO COVID-19 VIRUS: Status: ACTIVE | Noted: 2021-11-06

## 2021-11-06 PROBLEM — U07.1 PNEUMONIA DUE TO COVID-19 VIRUS: Status: ACTIVE | Noted: 2021-11-06

## 2021-11-06 PROBLEM — M46.24 OSTEOMYELITIS OF THORACIC VERTEBRA (HCC): Status: ACTIVE | Noted: 2021-11-06

## 2021-11-06 PROBLEM — N39.0 ACUTE UTI (URINARY TRACT INFECTION): Status: ACTIVE | Noted: 2021-11-06

## 2021-11-06 LAB
ALBUMIN SERPL-MCNC: 2.52 G/DL (ref 3.5–5.2)
ALBUMIN/GLOB SERPL: 0.7 G/DL
ALP SERPL-CCNC: 113 U/L (ref 39–117)
ALT SERPL W P-5'-P-CCNC: 6 U/L (ref 1–33)
ANION GAP SERPL CALCULATED.3IONS-SCNC: 7.4 MMOL/L (ref 5–15)
AST SERPL-CCNC: 11 U/L (ref 1–32)
BACTERIA BLD CULT: ABNORMAL
BASOPHILS # BLD AUTO: 0.01 10*3/MM3 (ref 0–0.2)
BASOPHILS NFR BLD AUTO: 0.1 % (ref 0–1.5)
BILIRUB SERPL-MCNC: 0.5 MG/DL (ref 0–1.2)
BOTTLE TYPE: ABNORMAL
BUN SERPL-MCNC: 36 MG/DL (ref 6–20)
BUN/CREAT SERPL: 34.6 (ref 7–25)
CALCIUM SPEC-SCNC: 8.8 MG/DL (ref 8.6–10.5)
CHLORIDE SERPL-SCNC: 110 MMOL/L (ref 98–107)
CO2 SERPL-SCNC: 24.6 MMOL/L (ref 22–29)
CREAT SERPL-MCNC: 1.04 MG/DL (ref 0.57–1)
CRP SERPL-MCNC: 6.57 MG/DL (ref 0–0.5)
D DIMER PPP FEU-MCNC: 8.56 MCGFEU/ML (ref 0–0.5)
DEPRECATED RDW RBC AUTO: 55.2 FL (ref 37–54)
EOSINOPHIL # BLD AUTO: 0.05 10*3/MM3 (ref 0–0.4)
EOSINOPHIL NFR BLD AUTO: 0.4 % (ref 0.3–6.2)
ERYTHROCYTE [DISTWIDTH] IN BLOOD BY AUTOMATED COUNT: 18.2 % (ref 12.3–15.4)
FIBRINOGEN PPP-MCNC: 462 MG/DL (ref 173–524)
GFR SERPL CREATININE-BSD FRML MDRD: 55 ML/MIN/1.73
GLOBULIN UR ELPH-MCNC: 3.6 GM/DL
GLUCOSE SERPL-MCNC: 124 MG/DL (ref 65–99)
HCT VFR BLD AUTO: 26.3 % (ref 34–46.6)
HGB BLD-MCNC: 8 G/DL (ref 12–15.9)
IMM GRANULOCYTES # BLD AUTO: 0.12 10*3/MM3 (ref 0–0.05)
IMM GRANULOCYTES NFR BLD AUTO: 0.9 % (ref 0–0.5)
LYMPHOCYTES # BLD AUTO: 0.44 10*3/MM3 (ref 0.7–3.1)
LYMPHOCYTES NFR BLD AUTO: 3.4 % (ref 19.6–45.3)
MAGNESIUM SERPL-MCNC: 2.2 MG/DL (ref 1.6–2.6)
MCH RBC QN AUTO: 26.6 PG (ref 26.6–33)
MCHC RBC AUTO-ENTMCNC: 30.4 G/DL (ref 31.5–35.7)
MCV RBC AUTO: 87.4 FL (ref 79–97)
MONOCYTES # BLD AUTO: 0.26 10*3/MM3 (ref 0.1–0.9)
MONOCYTES NFR BLD AUTO: 2 % (ref 5–12)
NEUTROPHILS NFR BLD AUTO: 11.99 10*3/MM3 (ref 1.7–7)
NEUTROPHILS NFR BLD AUTO: 93.2 % (ref 42.7–76)
NRBC BLD AUTO-RTO: 0 /100 WBC (ref 0–0.2)
PHOSPHATE SERPL-MCNC: 4.6 MG/DL (ref 2.5–4.5)
PLATELET # BLD AUTO: 333 10*3/MM3 (ref 140–450)
PMV BLD AUTO: 8.9 FL (ref 6–12)
POTASSIUM SERPL-SCNC: 4.1 MMOL/L (ref 3.5–5.2)
PROT SERPL-MCNC: 6.1 G/DL (ref 6–8.5)
RBC # BLD AUTO: 3.01 10*6/MM3 (ref 3.77–5.28)
SODIUM SERPL-SCNC: 142 MMOL/L (ref 136–145)
WBC # BLD AUTO: 12.87 10*3/MM3 (ref 3.4–10.8)

## 2021-11-06 PROCEDURE — 85379 FIBRIN DEGRADATION QUANT: CPT | Performed by: INTERNAL MEDICINE

## 2021-11-06 PROCEDURE — 87186 SC STD MICRODIL/AGAR DIL: CPT | Performed by: PHYSICIAN ASSISTANT

## 2021-11-06 PROCEDURE — 80053 COMPREHEN METABOLIC PANEL: CPT | Performed by: INTERNAL MEDICINE

## 2021-11-06 PROCEDURE — 25010000002 GENTAMICIN PER 80 MG: Performed by: INTERNAL MEDICINE

## 2021-11-06 PROCEDURE — 86140 C-REACTIVE PROTEIN: CPT | Performed by: INTERNAL MEDICINE

## 2021-11-06 PROCEDURE — 84100 ASSAY OF PHOSPHORUS: CPT | Performed by: INTERNAL MEDICINE

## 2021-11-06 PROCEDURE — 85025 COMPLETE CBC W/AUTO DIFF WBC: CPT | Performed by: INTERNAL MEDICINE

## 2021-11-06 PROCEDURE — 25010000002 ENOXAPARIN PER 10 MG: Performed by: INTERNAL MEDICINE

## 2021-11-06 PROCEDURE — 87070 CULTURE OTHR SPECIMN AEROBIC: CPT | Performed by: PHYSICIAN ASSISTANT

## 2021-11-06 PROCEDURE — 25010000002 DAPTOMYCIN PER 1 MG: Performed by: INTERNAL MEDICINE

## 2021-11-06 PROCEDURE — 25010000002 DEXAMETHASONE PER 1 MG: Performed by: INTERNAL MEDICINE

## 2021-11-06 PROCEDURE — 85384 FIBRINOGEN ACTIVITY: CPT | Performed by: INTERNAL MEDICINE

## 2021-11-06 PROCEDURE — 25010000002 CEFTAROLINE FOSAMIL PER 10 MG: Performed by: INTERNAL MEDICINE

## 2021-11-06 PROCEDURE — 87147 CULTURE TYPE IMMUNOLOGIC: CPT | Performed by: PHYSICIAN ASSISTANT

## 2021-11-06 PROCEDURE — 83735 ASSAY OF MAGNESIUM: CPT | Performed by: INTERNAL MEDICINE

## 2021-11-06 PROCEDURE — G0378 HOSPITAL OBSERVATION PER HR: HCPCS

## 2021-11-06 PROCEDURE — 25010000002 HYDROMORPHONE 1 MG/ML SOLUTION: Performed by: INTERNAL MEDICINE

## 2021-11-06 PROCEDURE — 99223 1ST HOSP IP/OBS HIGH 75: CPT | Performed by: INTERNAL MEDICINE

## 2021-11-06 PROCEDURE — 71045 X-RAY EXAM CHEST 1 VIEW: CPT

## 2021-11-06 PROCEDURE — 99239 HOSP IP/OBS DSCHRG MGMT >30: CPT | Performed by: INTERNAL MEDICINE

## 2021-11-06 PROCEDURE — 87205 SMEAR GRAM STAIN: CPT | Performed by: PHYSICIAN ASSISTANT

## 2021-11-06 RX ORDER — METRONIDAZOLE 250 MG/1
500 TABLET ORAL EVERY 8 HOURS SCHEDULED
Status: DISCONTINUED | OUTPATIENT
Start: 2021-11-06 | End: 2021-11-06 | Stop reason: HOSPADM

## 2021-11-06 RX ORDER — HYDRALAZINE HYDROCHLORIDE 25 MG/1
25 TABLET, FILM COATED ORAL EVERY 8 HOURS SCHEDULED
Status: DISCONTINUED | OUTPATIENT
Start: 2021-11-06 | End: 2021-11-08

## 2021-11-06 RX ORDER — LEVOTHYROXINE SODIUM 0.12 MG/1
125 TABLET ORAL DAILY
Status: ON HOLD
Start: 2021-11-07 | End: 2022-09-22

## 2021-11-06 RX ORDER — ACETAMINOPHEN 325 MG/1
650 TABLET ORAL EVERY 6 HOURS PRN
Status: DISCONTINUED | OUTPATIENT
Start: 2021-11-06 | End: 2021-11-15

## 2021-11-06 RX ORDER — CLONAZEPAM 0.5 MG/1
0.75 TABLET ORAL 3 TIMES DAILY
Start: 2021-11-06 | End: 2021-12-11 | Stop reason: HOSPADM

## 2021-11-06 RX ORDER — HYDRALAZINE HYDROCHLORIDE 25 MG/1
25 TABLET, FILM COATED ORAL EVERY 8 HOURS SCHEDULED
Start: 2021-11-06 | End: 2021-12-11 | Stop reason: HOSPADM

## 2021-11-06 RX ORDER — ACETAMINOPHEN 325 MG/1
650 TABLET ORAL EVERY 6 HOURS PRN
Start: 2021-11-06 | End: 2021-12-11 | Stop reason: HOSPADM

## 2021-11-06 RX ORDER — POTASSIUM CHLORIDE 1.5 G/1.77G
40 POWDER, FOR SOLUTION ORAL AS NEEDED
Start: 2021-11-06 | End: 2021-12-11 | Stop reason: HOSPADM

## 2021-11-06 RX ORDER — DIPHENHYDRAMINE HYDROCHLORIDE 50 MG/ML
25 INJECTION INTRAMUSCULAR; INTRAVENOUS ONCE AS NEEDED
Start: 2021-11-06 | End: 2021-12-11 | Stop reason: HOSPADM

## 2021-11-06 RX ORDER — BUPROPION HYDROCHLORIDE 150 MG/1
150 TABLET, EXTENDED RELEASE ORAL 2 TIMES DAILY
Status: DISCONTINUED | OUTPATIENT
Start: 2021-11-07 | End: 2021-12-11 | Stop reason: HOSPADM

## 2021-11-06 RX ORDER — PANTOPRAZOLE SODIUM 40 MG/1
40 TABLET, DELAYED RELEASE ORAL
Status: DISCONTINUED | OUTPATIENT
Start: 2021-11-07 | End: 2021-11-22

## 2021-11-06 RX ORDER — CASTOR OIL AND BALSAM, PERU 788; 87 MG/G; MG/G
1 OINTMENT TOPICAL EVERY 12 HOURS SCHEDULED
Status: DISCONTINUED | OUTPATIENT
Start: 2021-11-06 | End: 2021-12-11 | Stop reason: HOSPADM

## 2021-11-06 RX ORDER — MAGNESIUM SULFATE HEPTAHYDRATE 40 MG/ML
2 INJECTION, SOLUTION INTRAVENOUS AS NEEDED
Start: 2021-11-06 | End: 2021-12-11 | Stop reason: HOSPADM

## 2021-11-06 RX ORDER — POTASSIUM CHLORIDE 7.45 MG/ML
10 INJECTION INTRAVENOUS
Start: 2021-11-06 | End: 2021-12-11 | Stop reason: HOSPADM

## 2021-11-06 RX ORDER — HYDRALAZINE HYDROCHLORIDE 20 MG/ML
10 INJECTION INTRAMUSCULAR; INTRAVENOUS EVERY 6 HOURS PRN
Start: 2021-11-06 | End: 2021-12-11 | Stop reason: HOSPADM

## 2021-11-06 RX ORDER — SODIUM CHLORIDE 9 MG/ML
75 INJECTION, SOLUTION INTRAVENOUS CONTINUOUS
Status: DISCONTINUED | OUTPATIENT
Start: 2021-11-06 | End: 2021-11-08

## 2021-11-06 RX ORDER — DIPHENOXYLATE HYDROCHLORIDE AND ATROPINE SULFATE 2.5; .025 MG/1; MG/1
1 TABLET ORAL DAILY
Status: DISCONTINUED | OUTPATIENT
Start: 2021-11-07 | End: 2021-11-22

## 2021-11-06 RX ORDER — GUAIFENESIN/DEXTROMETHORPHAN 100-10MG/5
10 SYRUP ORAL EVERY 4 HOURS PRN
Start: 2021-11-06 | End: 2021-12-11 | Stop reason: HOSPADM

## 2021-11-06 RX ORDER — OXYCODONE HYDROCHLORIDE 15 MG/1
15 TABLET ORAL EVERY 6 HOURS
Status: DISCONTINUED | OUTPATIENT
Start: 2021-11-06 | End: 2021-11-08

## 2021-11-06 RX ORDER — METRONIDAZOLE 500 MG/1
500 TABLET ORAL EVERY 8 HOURS SCHEDULED
Status: DISCONTINUED | OUTPATIENT
Start: 2021-11-06 | End: 2021-11-07

## 2021-11-06 RX ORDER — OXYCODONE HYDROCHLORIDE 15 MG/1
15 TABLET ORAL EVERY 6 HOURS
Start: 2021-11-06 | End: 2021-12-11 | Stop reason: HOSPADM

## 2021-11-06 RX ORDER — LEVETIRACETAM 500 MG/1
500 TABLET ORAL EVERY 12 HOURS SCHEDULED
Status: DISCONTINUED | OUTPATIENT
Start: 2021-11-07 | End: 2021-11-22

## 2021-11-06 RX ORDER — L.ACID,PARA/B.BIFIDUM/S.THERM 8B CELL
1 CAPSULE ORAL DAILY
Status: ON HOLD
Start: 2021-11-07 | End: 2022-09-22

## 2021-11-06 RX ORDER — ONDANSETRON 4 MG/1
4 TABLET, FILM COATED ORAL EVERY 6 HOURS PRN
Status: DISCONTINUED | OUTPATIENT
Start: 2021-11-06 | End: 2021-11-22

## 2021-11-06 RX ORDER — CLONAZEPAM 0.5 MG/1
0.75 TABLET ORAL 3 TIMES DAILY
Status: DISCONTINUED | OUTPATIENT
Start: 2021-11-06 | End: 2021-11-08

## 2021-11-06 RX ORDER — L.ACID,PARA/B.BIFIDUM/S.THERM 8B CELL
1 CAPSULE ORAL DAILY
Status: DISCONTINUED | OUTPATIENT
Start: 2021-11-07 | End: 2021-11-22

## 2021-11-06 RX ORDER — LEVETIRACETAM 500 MG/1
500 TABLET ORAL EVERY 12 HOURS SCHEDULED
Status: ON HOLD
Start: 2021-11-06 | End: 2022-09-22

## 2021-11-06 RX ORDER — MAGNESIUM SULFATE 1 G/100ML
1 INJECTION INTRAVENOUS AS NEEDED
Start: 2021-11-06 | End: 2021-12-11 | Stop reason: HOSPADM

## 2021-11-06 RX ORDER — NALOXONE HCL 0.4 MG/ML
0.4 VIAL (ML) INJECTION
Status: DISCONTINUED | OUTPATIENT
Start: 2021-11-06 | End: 2021-12-11 | Stop reason: HOSPADM

## 2021-11-06 RX ORDER — LEVOTHYROXINE SODIUM 0.12 MG/1
125 TABLET ORAL
Status: DISCONTINUED | OUTPATIENT
Start: 2021-11-07 | End: 2021-11-22

## 2021-11-06 RX ORDER — OXYCODONE HYDROCHLORIDE 15 MG/1
15 TABLET ORAL EVERY 6 HOURS
Status: DISCONTINUED | OUTPATIENT
Start: 2021-11-06 | End: 2021-11-06 | Stop reason: HOSPADM

## 2021-11-06 RX ORDER — SODIUM CHLORIDE 0.9 % (FLUSH) 0.9 %
10 SYRINGE (ML) INJECTION EVERY 12 HOURS SCHEDULED
Status: DISCONTINUED | OUTPATIENT
Start: 2021-11-06 | End: 2021-11-15

## 2021-11-06 RX ORDER — EPINEPHRINE 1 MG/ML
0.3 INJECTION, SOLUTION INTRAMUSCULAR; SUBCUTANEOUS ONCE AS NEEDED
Start: 2021-11-06 | End: 2021-12-11 | Stop reason: HOSPADM

## 2021-11-06 RX ORDER — DEXAMETHASONE SODIUM PHOSPHATE 4 MG/ML
6 INJECTION, SOLUTION INTRA-ARTICULAR; INTRALESIONAL; INTRAMUSCULAR; INTRAVENOUS; SOFT TISSUE DAILY
Status: DISCONTINUED | OUTPATIENT
Start: 2021-11-07 | End: 2021-11-06

## 2021-11-06 RX ORDER — ONDANSETRON 2 MG/ML
4 INJECTION INTRAMUSCULAR; INTRAVENOUS EVERY 6 HOURS PRN
Status: DISCONTINUED | OUTPATIENT
Start: 2021-11-06 | End: 2021-11-22

## 2021-11-06 RX ORDER — DULOXETIN HYDROCHLORIDE 60 MG/1
60 CAPSULE, DELAYED RELEASE ORAL EVERY MORNING
Status: DISCONTINUED | OUTPATIENT
Start: 2021-11-07 | End: 2021-11-08

## 2021-11-06 RX ORDER — CASTOR OIL AND BALSAM, PERU 788; 87 MG/G; MG/G
1 OINTMENT TOPICAL EVERY 12 HOURS SCHEDULED
Status: ON HOLD
Start: 2021-11-06 | End: 2022-09-22

## 2021-11-06 RX ORDER — SODIUM CHLORIDE 9 MG/ML
75 INJECTION, SOLUTION INTRAVENOUS CONTINUOUS
Start: 2021-11-06 | End: 2021-12-11 | Stop reason: HOSPADM

## 2021-11-06 RX ORDER — TRAMADOL HYDROCHLORIDE 50 MG/1
100 TABLET ORAL ONCE
Status: COMPLETED | OUTPATIENT
Start: 2021-11-06 | End: 2021-11-06

## 2021-11-06 RX ORDER — SODIUM CHLORIDE 0.9 % (FLUSH) 0.9 %
10 SYRINGE (ML) INJECTION AS NEEDED
Status: DISCONTINUED | OUTPATIENT
Start: 2021-11-06 | End: 2021-11-15

## 2021-11-06 RX ORDER — METHYLPREDNISOLONE SODIUM SUCCINATE 125 MG/2ML
125 INJECTION, POWDER, LYOPHILIZED, FOR SOLUTION INTRAMUSCULAR; INTRAVENOUS ONCE AS NEEDED
Start: 2021-11-06 | End: 2021-12-11 | Stop reason: HOSPADM

## 2021-11-06 RX ORDER — POTASSIUM CHLORIDE 20 MEQ/1
40 TABLET, EXTENDED RELEASE ORAL AS NEEDED
Start: 2021-11-06 | End: 2021-12-11 | Stop reason: HOSPADM

## 2021-11-06 RX ORDER — DEXAMETHASONE SODIUM PHOSPHATE 4 MG/ML
6 INJECTION, SOLUTION INTRA-ARTICULAR; INTRALESIONAL; INTRAMUSCULAR; INTRAVENOUS; SOFT TISSUE DAILY
Start: 2021-11-07 | End: 2021-12-11 | Stop reason: HOSPADM

## 2021-11-06 RX ORDER — CALCIUM CARBONATE 200(500)MG
2 TABLET,CHEWABLE ORAL 3 TIMES DAILY PRN
Start: 2021-11-06 | End: 2021-12-11 | Stop reason: HOSPADM

## 2021-11-06 RX ORDER — DIPHENOXYLATE HYDROCHLORIDE AND ATROPINE SULFATE 2.5; .025 MG/1; MG/1
1 TABLET ORAL DAILY
Qty: 30 TABLET | Status: ON HOLD
Start: 2021-11-07 | End: 2022-09-22

## 2021-11-06 RX ORDER — HYDRALAZINE HYDROCHLORIDE 20 MG/ML
10 INJECTION INTRAMUSCULAR; INTRAVENOUS EVERY 6 HOURS PRN
Status: DISCONTINUED | OUTPATIENT
Start: 2021-11-06 | End: 2021-12-11 | Stop reason: HOSPADM

## 2021-11-06 RX ADMIN — SODIUM CHLORIDE, PRESERVATIVE FREE 10 ML: 5 INJECTION INTRAVENOUS at 08:16

## 2021-11-06 RX ADMIN — DAPTOMYCIN 550 MG: 500 INJECTION, POWDER, LYOPHILIZED, FOR SOLUTION INTRAVENOUS at 12:15

## 2021-11-06 RX ADMIN — LEVETIRACETAM 500 MG: 500 TABLET, FILM COATED ORAL at 08:14

## 2021-11-06 RX ADMIN — CASTOR OIL AND BALSAM, PERU 1 APPLICATION: 788; 87 OINTMENT TOPICAL at 08:15

## 2021-11-06 RX ADMIN — HYDROMORPHONE HYDROCHLORIDE 1 MG: 1 INJECTION, SOLUTION INTRAMUSCULAR; INTRAVENOUS; SUBCUTANEOUS at 08:24

## 2021-11-06 RX ADMIN — HYDRALAZINE HYDROCHLORIDE 25 MG: 25 TABLET, FILM COATED ORAL at 15:05

## 2021-11-06 RX ADMIN — LEVOTHYROXINE SODIUM 125 MCG: 125 TABLET ORAL at 08:14

## 2021-11-06 RX ADMIN — CEFTAROLINE FOSAMIL 600 MG: 600 POWDER, FOR SOLUTION INTRAVENOUS at 15:03

## 2021-11-06 RX ADMIN — OXYCODONE HYDROCHLORIDE 15 MG: 15 TABLET ORAL at 16:46

## 2021-11-06 RX ADMIN — OXYCODONE HYDROCHLORIDE 15 MG: 15 TABLET ORAL at 01:04

## 2021-11-06 RX ADMIN — CLONAZEPAM 0.75 MG: 0.5 TABLET ORAL at 08:14

## 2021-11-06 RX ADMIN — METOPROLOL TARTRATE 25 MG: 25 TABLET, FILM COATED ORAL at 08:14

## 2021-11-06 RX ADMIN — BUPROPION HYDROCHLORIDE 150 MG: 150 TABLET, FILM COATED, EXTENDED RELEASE ORAL at 08:14

## 2021-11-06 RX ADMIN — TRAMADOL HYDROCHLORIDE 100 MG: 50 TABLET, COATED ORAL at 08:14

## 2021-11-06 RX ADMIN — CEFTAROLINE FOSAMIL 600 MG: 600 POWDER, FOR SOLUTION INTRAVENOUS at 06:01

## 2021-11-06 RX ADMIN — HYDRALAZINE HYDROCHLORIDE 25 MG: 25 TABLET, FILM COATED ORAL at 06:03

## 2021-11-06 RX ADMIN — ENOXAPARIN SODIUM 40 MG: 40 INJECTION SUBCUTANEOUS at 16:46

## 2021-11-06 RX ADMIN — GENTAMICIN SULFATE 70 MG: 40 INJECTION, SOLUTION INTRAMUSCULAR; INTRAVENOUS at 15:03

## 2021-11-06 RX ADMIN — DEXAMETHASONE SODIUM PHOSPHATE 6 MG: 4 INJECTION, SOLUTION INTRA-ARTICULAR; INTRALESIONAL; INTRAMUSCULAR; INTRAVENOUS; SOFT TISSUE at 08:15

## 2021-11-06 RX ADMIN — METRONIDAZOLE 500 MG: 250 TABLET ORAL at 15:05

## 2021-11-06 RX ADMIN — OXYCODONE HYDROCHLORIDE 15 MG: 15 TABLET ORAL at 12:15

## 2021-11-06 RX ADMIN — Medication 1 TABLET: at 08:14

## 2021-11-06 RX ADMIN — SODIUM CHLORIDE, PRESERVATIVE FREE 10 ML: 5 INJECTION INTRAVENOUS at 08:15

## 2021-11-06 RX ADMIN — METRONIDAZOLE 500 MG: 500 INJECTION, SOLUTION INTRAVENOUS at 03:49

## 2021-11-06 RX ADMIN — DULOXETINE HYDROCHLORIDE 60 MG: 60 CAPSULE, DELAYED RELEASE ORAL at 06:03

## 2021-11-06 RX ADMIN — PANTOPRAZOLE SODIUM 40 MG: 40 TABLET, DELAYED RELEASE ORAL at 06:03

## 2021-11-06 RX ADMIN — CLONAZEPAM 0.75 MG: 0.5 TABLET ORAL at 16:46

## 2021-11-06 RX ADMIN — Medication 1 CAPSULE: at 08:14

## 2021-11-06 NOTE — PROGRESS NOTES
Patient initiated on gentamicin for synergy with persistent MRSA bacteremia. Previously had 2 days gentamicin this admission. Will start gentamicin at 1 mg/kg q8 hrs and follow.    Thank you,    Olga Shannon, PharmD

## 2021-11-06 NOTE — NURSING NOTE
Patient belongings sent to [facility]. Belongings included: purse, cell phone, cell phone , pajama pants, socks x2, jeans and 2 shirts.

## 2021-11-06 NOTE — NURSING NOTE
"Called [facility] dispatch for transport who stated \"will get in touch with the captain and send them your way.\"  "

## 2021-11-06 NOTE — PROGRESS NOTES
PROGRESS NOTE         Patient Identification:  Name:  Karely Villarreal  Age:  56 y.o.  Sex:  female  :  1965  MRN:  [id number]  Visit Number:  [id number]  Primary Care Provider:  Tracie Levi APRN         LOS: 12 days       ----------------------------------------------------------------------------------------------------------------------  Subjective       Chief Complaints:    Shortness of Breath and Pain        Interval History:      The patient's oxygen requirements are stable at 2 L nasal cannula.  Nurse reports no issues overnight.  Patient continues to have mid back pain and a headache.  Currently waiting on a bed at [facility].  Afebrile, no diarrhea.  No new labs yet this morning.  Blood cultures on 2021 remain positive for MRSA.  ----------------------------------------------------------------------------------------------------------------------      Objective       Current Hospital Meds:  buPROPion SR, 150 mg, Oral, BID  castor oil-balsam peru, 1 application, Topical, Q12H  ceftaroline, 600 mg, Intravenous, Q8H  clonazePAM, 0.75 mg, Oral, TID  DAPTOmycin, 8 mg/kg (Adjusted), Intravenous, Q24H  dexamethasone, 6 mg, Intravenous, Daily  DULoxetine, 60 mg, Oral, QAM  enoxaparin, 40 mg, Subcutaneous, Q24H  hydrALAZINE, 25 mg, Oral, Q8H  lactobacillus acidophilus, 1 capsule, Oral, Daily  levETIRAcetam, 500 mg, Oral, Q12H  levothyroxine, 125 mcg, Oral, Daily  metoprolol tartrate, 25 mg, Oral, BID  metroNIDAZOLE, 500 mg, Intravenous, Q8H  multivitamin, 1 tablet, Oral, Daily  pantoprazole, 40 mg, Oral, Q AM  sodium chloride, 10 mL, Intravenous, Q12H  sodium chloride, 10 mL, Intravenous, Q12H  sodium chloride, 10 mL, Intravenous, Q12H  sodium chloride, 3 mL, Intravenous, Q12H      hold, 1 each  sodium chloride, 75 mL/hr, Last Rate: 75 mL/hr (21  2308)      ----------------------------------------------------------------------------------------------------------------------    Vital Signs:  Temp:  [96.5 °F (35.8 °C)-98.4 °F (36.9 °C)] 96.5 °F (35.8 °C)  Heart Rate:  [68-83] 76  Resp:  [12-20] 18  BP: (123-159)/(73-90) 159/82  Mean Arterial Pressure (Non-Invasive) for the past 24 hrs (Last 3 readings):   Noninvasive MAP (mmHg)   11/06/21 0500 108   11/06/21 0400 108   11/06/21 0300 106     SpO2 Percentage    11/06/21 0300 11/06/21 0400 11/06/21 0500   SpO2: 97% 97% 97%     SpO2:  [96 %-99 %] 97 %  on  Flow (L/min):  [2] 2;   Device (Oxygen Therapy): nasal cannula    Body mass index is 28.86 kg/m².  Wt Readings from Last 3 Encounters:   11/06/21 81.1 kg (178 lb 12.8 oz)   10/23/21 83.9 kg (185 lb)   05/17/21 88.9 kg (196 lb)        Intake/Output Summary (Last 24 hours) at 11/6/2021 0941  Last data filed at 11/6/2021 0900  Gross per 24 hour   Intake 2126.53 ml   Output 1050 ml   Net 1076.53 ml     Diet Regular  ----------------------------------------------------------------------------------------------------------------------      Physical Exam:    Deferred due to COVID-19 isolation.  ----------------------------------------------------------------------------------------------------------------------  Results from last 7 days   Lab Units 10/31/21  0120   CK TOTAL U/L 41     Results from last 7 days   Lab Units 11/04/21  0024   PROBNP pg/mL 1,730.0*       Results from last 7 days   Lab Units 11/02/21  1503   PH, ARTERIAL pH units 7.428   PO2 ART mm Hg 129.0*   PCO2, ARTERIAL mm Hg 39.7   HCO3 ART mmol/L 26.2*     Results from last 7 days   Lab Units 11/05/21  0517 11/04/21  0919 11/04/21  0024 11/03/21  0047 11/03/21  0047 11/02/21  0121 11/02/21  0121   CRP mg/dL 7.22*  --  4.96*  --  4.93*   < > 6.48*   WBC 10*3/mm3 15.10*  --  16.60*  --  20.44*   < > 23.86*   HEMOGLOBIN g/dL 7.8* 7.8* 6.8*   < > 7.1*   < > 7.2*   HEMATOCRIT % 25.2* 25.6* 22.0*   < > 23.2*    < > 24.5*   MCV fL 85.4  --  84.9  --  84.4   < > 86.3   MCHC g/dL 31.0*  --  30.9*  --  30.6*   < > 29.4*   PLATELETS 10*3/mm3 364  --  425  --  382   < > 380   INR   --   --   --   --   --   --  1.15*    < > = values in this interval not displayed.     Results from last 7 days   Lab Units 11/05/21  0517 11/04/21  0024 11/03/21  0047   SODIUM mmol/L 140 142 140   POTASSIUM mmol/L 3.9 4.2 4.1   MAGNESIUM mg/dL 2.2 2.2 2.2   CHLORIDE mmol/L 105 107 105   CO2 mmol/L 25.5 25.3 25.5   BUN mg/dL 35* 35* 37*   CREATININE mg/dL 1.18* 1.09* 1.03*   EGFR IF NONAFRICN AM mL/min/1.73 48* 52* 56*   CALCIUM mg/dL 8.6 8.5* 8.6   GLUCOSE mg/dL 105* 105* 106*   ALBUMIN g/dL 2.54* 2.64* 2.57*   BILIRUBIN mg/dL 0.6 0.6 0.7   ALK PHOS U/L 135* 139* 163*   AST (SGOT) U/L 14 11 13   ALT (SGPT) U/L 5 7 8   Estimated Creatinine Clearance: 57.8 mL/min (A) (by C-G formula based on SCr of 1.18 mg/dL (H)).  No results found for: AMMONIA    Glucose   Date/Time Value Ref Range Status   11/05/2021 0625 93 70 - 130 mg/dL Final     Comment:     Meter: [id number] : [id number] Abran Griggs     Lab Results   Component Value Date    HGBA1C 5.90 (H) 09/15/2019     Lab Results   Component Value Date    TSH 47.200 (H) 10/25/2021    FREET4 0.16 (L) 10/25/2021       Blood Culture   Date Value Ref Range Status   10/25/2021 Staphylococcus aureus, MRSA (C)  Preliminary     Comment:     Infectious disease consultation is highly recommended to rule out distant foci of infection.  Methicillin resistant Staphylococcus aureus, Patient may be an isolation risk.   10/25/2021 Staphylococcus aureus, MRSA (C)  Preliminary     Comment:     Infectious disease consultation is highly recommended to rule out distant foci of infection.  Methicillin resistant Staphylococcus aureus, Patient may be an isolation risk.   10/25/2021 Staphylococcus aureus, MRSA (C)  Preliminary     Comment:     Infectious disease consultation is highly recommended to rule out distant foci of  infection.  Methicillin resistant Staphylococcus aureus, Patient may be an isolation risk.     No results found for: URINECX  No results found for: WOUNDCX  No results found for: STOOLCX  No results found for: RESPCX  Pain Management Panel       Pain Management Panel Latest Ref Rng & Units 10/25/2021 10/25/2021    CREATININE UR mg/dL 97.9 97.9    AMPHETAMINES SCREEN, URINE Negative - Positive(A)    BARBITURATES SCREEN Negative - Negative    BENZODIAZEPINE SCREEN, URINE Negative - Negative    BUPRENORPHINEUR Negative - Positive(A)    COCAINE SCREEN, URINE Negative - Negative    METHADONE SCREEN, URINE Negative - Negative    METHAMPHETAMINEUR Negative - Positive(A)              ----------------------------------------------------------------------------------------------------------------------  Imaging Results (Last 24 Hours)       ** No results found for the last 24 hours. **            ----------------------------------------------------------------------------------------------------------------------    Assessment/Plan       Assessment/Plan     ASSESSMENT:    1.  Severe sepsis with lactic acid greater than 2 on admission  2.  COVID-19 pneumonia  3.  MRSA bacteremia  4.  T9-T10 osteomyelitis with paravertebral abscess    PLAN:    The patient's oxygen requirements are stable at 2 L nasal cannula.  Nurse reports no issues overnight.  Patient continues to have mid back pain and a headache.  Currently waiting on a bed at [facility].  Afebrile, no diarrhea.  No new labs yet this morning.  Blood cultures on 11/5/2021 remain positive for MRSA.    Chest x-ray on 11/4/2021 shows slight worsening of the left upper lobe infiltrates, otherwise relatively stable appearance of the chest.      3 out of 3 blood cultures on 10/25/2021 finalized as MRSA and all blood cultures since have remained positive.    On 10/28/2021 patient underwent Port-A-Cath removal and placement of central line.    Urinalysis was only mildly  positive with 2+ bacteria but 0-2 WBCs.  As patient is asymptomatic, this does not need treated. CT chest with PE protocol on 10/25/2021 was negative for PE.  Negative for thoracic aortic aneurysm/dissection.  Moderate right pleural effusion and small left pleural effusion with compressive right basilar atelectasis/consolidation and minimal left basilar atelectasis.  Nodular and groundglass infiltrates are scattered throughout both lungs, concerning for multifocal pneumonia.  Follow-up to resolution recommended to exclude any underlying pulmonary masses given the presence of the patient's right-sided Port-A-Cath.  COVID-19 reporting criteria: Intermediate.  Imaging features can be seen with COVID-19 pneumonia, although are nonspecific and can occur with a variety of infectious and noninfectious processes. Venous duplex of bilateral lower extremities on 10/25/2021 shows no DVT.  COVID-19 and flu A/B PCR on 10/24/2021 detected COVID-19.  Legionella antigen on 10/25/2021 was negative.  Respiratory panel on 10/25/2021 was negative.  MRSA screen on 10/25/2021 was positive. Strep pneumo antigen on 10/25/2021 was negative. MRI thoracic spine with and without contrast on 11/3/2021 shows complex right pleural effusion again noted.  T9 and T10 vertebral body increased marrow signal concerning for osteomyelitis.  Additionally there is associated paraspinal collection measuring 4.4 x 1.8 cm in this region.  This likely represents paravertebral abscess.  The collection extends anteriorly to para-aortic location.  MRI of the brain on 11/3/2021 showed no acute findings.      Patient completed remdesivir.  Continues on Decadron.     Recommend to continue daptomycin 8 mg/kg IV every 24 hours and ceftaroline 600 mg IV every 8 hours. For paravertebral abscess, Flagyl 500 mg IV every 8 hours was added.  As the hospital has now ran out of IV Flagyl, p.o. Flagyl would be an appropriate alternative.  Due to persistent MRSA bacteremia and  inability to control the source of infection, gentamicin 1 mg/kg IV every 8 hours was ordered for synergy while awaiting epidural abscess drainage.    We agree with primary team's decision to make arrangement for transfer for neurosurgical and CT surgery evaluation due to need for drainage/aspiration of paraspinal abscess and loculated pleural effusion.  Continue to highly recommend NILESH. Blood cultures could be repeated x2 sets every other day, especially as source control has not yet been achieved.  Recommend CT cervical spine, especially with reported weakness of upper extremities.  We will continue to follow closely.    Code Status:   Code Status and Medical Interventions:   Ordered at: 10/25/21 0441     Level Of Support Discussed With:    Patient     Code Status (Patient has no pulse and is not breathing):    CPR (Attempt to Resuscitate)     Medical Interventions (Patient has pulse or is breathing):    Full     VICTOR HUGO Moffett  11/06/21  09:41 EDT

## 2021-11-06 NOTE — PLAN OF CARE
Goal Outcome Evaluation:           Progress: no change     Patient resting in bed. VSS. A&Ox4. Waiting on bed at [facility] for cardiothoracic surgeon. No needs at this time. Will continue to monitor the patient

## 2021-11-06 NOTE — DISCHARGE SUMMARY
[facility]    Patient Identification:  Name:  Karely Villarreal  Age:  56 y.o.  Sex:  female  :  1965  MRN:  [id number]  Visit Number:  [id number]    Date of Admission: 10/24/2021  Date of Discharge:  2021 to [facility]    PCP: Tracie Levi, MP    DISCHARGE DIAGNOSIS  -Severe sepsis that was present on admission (heart rate 121, CRP 25.76, white blood cell count 17,200, 17% bands, lactic acid 2.1) due to MRSA bacteremia, COVID-19 bilateral pneumonia, suspected infective endocarditis causing embolic pneumonia, T9 and T10 osteomyelitis with a paraspinal abscess, and urinary tract infection with hematuria  -Loculated right-sided pleural effusion of unknown etiology but suspect due to MRSA pneumonia  -Intermittent acute hypoxic respiratory failure that was present starting 2 days after admission and due to the COVID-19 pneumonia as well as suspected aspiration versus MRSA pneumonia  -Acute kidney injury on top of chronic kidney disease stage II with baseline creatinine 0.6-0.8 and admission creatinine of 1.48, now improved  -Swelling of her bilateral arms at the elbows on 2021, improved  -History of polysubstance abuse, suspicious behavior of hiding /self medicating klonopin/opoiod in house until purse taken away 10/26; urine drug screen positive for amphetamines, Suboxone, methamphetamines, and opiates  -Positive hepatitis C antibody this admission  -History of discoid lupus, on Plaquenil  -History of hypothyroidism status post thyroidectomy, uncontrolled as evidenced by a TSH level greater than 40 this admission  -History of essential hypertension  -History of seizures with the last seizure in 2016  -History of right parietal ischemic CVA thought cardioembolic with prior PFO repair (all of this occurred in )  -History of major depression and anxiety  -History of peptic ulcer disease status post perforation in 2016    CONSULTS     Francisco with infectious disease  Dr. Madden with general surgery  Dr. Hernandez with cardiology  Dr. GUS Lee with pulmonology    PROCEDURES PERFORMED  10/28/2021: Removal of right infected [id number] that was present prior to admission on 5/26/2018  10/28/2021: Placement of right internal jugular vein triple-lumen central line; inserted to 15 cm and then sutured in place  11/2/2021: Unsuccessful attempt at right sided thoracentesis via ultrasound guidance  11/3/2021: Insertion of 16 Turkish silicone urethral catheter    HOSPITAL COURSE  Patient is a 56 y.o. female presented to [facility] complaining of shortness of air after she took antibiotics for a week for an outpatient diagnosis of bronchitis.  She was COVID-19 negative at the time of the bronchitis diagnosis but her symptoms worsened and she was positive for COVID-19 in our emergency department this admission..  Also, the patient was noted to be septic while in the emergency department and possibly have a urinary tract infection.  She received MAB in the emergency department as she had normal oxygen saturations at that time.  She was started on empiric Rocephin for the UTI.  She was then admitted to the medical surgical floor for further evaluation and treatment.  Please see the admitting history and physical for further details.  Initial blood cultures started growing MRSA; infectious disease team was consulted to help with the bacteremia and vancomycin was started.  Then, by hospital day #2, the patient was requiring oxygen and thus she was given 5 days of remdesivir and started on Decadron.  The antibiotics were then changed to daptomycin and ceftaroline as she continued to worsen and the blood cultures continued to grow MRSA.  The patient did have a port placed years prior to admission for poor venous access; this port was removed by general surgery on 10/28/2021 and a right-sided internal jugular central line was placed in its place.   Transthoracic echocardiogram on 10/26/2021 did not show any obvious vegetations.  Cardiology was consulted but NILESH was delayed by anes due to the patient's positive COVID-19 status.  Repeat transthroacic ECHO 8 days later did not show any large vegetations; thus, it is assumed that the patient does not have a large enough vegetation that could be affecting valve function.  The patient had a desaturation episode 4 days ago to the 60% range and we thought that she was going to require intubation; thus, the patient was moved to the critical care unit.  The patient was also less responsive during that time.  The patient had prior use of her home pain medications from her purse at the same time that we were giving her equivalents of her home pain medications during this hospitalization.  However, her home pain meds were locked in pharmacy so that she would receive only 1 set of pain medications.  To my knowledge, there has been no visitors bringing the patient items during this last part of her hospitalization.  The patient improved after she was moved to the critical care unit without having to be given Narcan and the increased oxygen requirement quickly resolved.  Since the desaturation episode, the patient has remained on 2 L/min of nasal cannula oxygen; the highest that she was on during the oxygen desaturation episode was 10 L/min with a nonrebreather.  There was a right sided pleural effusion noted on her chest imaging; we attempted ultrasound-guided thoracentesis but the fluid was loculated and no fluid was able to be aspirated from the chest.  Therefore, we assumed that the patient had an exudative pleural effusion, suspected most likely due to MRSA.  MRI of her thoracic spine and head were performed 2 days go as the patient was having back pain that was worse than her normal amount as well as headaches; the thoracic MRI showed a T9 and T10 osteomyelitis with a 4.4 cm x 1.8 cm paraspinal abscess at the same  location in the brain MRI did not show any abscesses.  As our facility does not have cardiothoracic nor neurosurgical services, [facility] was contacted and the patient was accepted by hospitalist Dr. Ross Lee.  Patient will be transferred upon bed availability.    On the day of transfer, the patient was still on 2 L/min of nasal cannula oxygen.  She still had the headache and the severe back pain.  She was not requiring any vasopressors and her vital signs were stable.  Her blood cultures continue to be positive, with the latest that being obtained on November 5, 2021.  She was tolerating her diet though she was not eating large amounts.  She is able to perform some of her activities of daily living; she stated that she had limited mobility due to the pain.  She denied any paresthesias in her legs or straddle area.  She also denied encopresis and enuresis.      VITAL SIGNS:  Temp:  [96.5 °F (35.8 °C)-98.4 °F (36.9 °C)] 96.5 °F (35.8 °C)  Heart Rate:  [68-80] 77  Resp:  [12-20] 18  BP: (123-159)/(73-93) 130/93  SpO2:  [96 %-99 %] 96 %  on  Flow (L/min):  [2] 2;   Device (Oxygen Therapy): nasal cannula    Body mass index is 28.86 kg/m².  Wt Readings from Last 3 Encounters:   11/06/21 81.1 kg (178 lb 12.8 oz)   10/23/21 83.9 kg (185 lb)   05/17/21 88.9 kg (196 lb)       PHYSICAL EXAM:  Vitals reviewed.   Constitutional:       General: She is in acute distress.      Appearance: She is well-developed. She is obese. She is not toxic-appearing or diaphoretic.      Interventions: Nasal cannula in place.   HENT:      Head: Normocephalic and atraumatic.      Right Ear: External ear normal.      Left Ear: External ear normal.      Nose: Nose normal.   Eyes:      General: No scleral icterus.        Right eye: No discharge.         Left eye: No discharge.      Pupils: Pupils are equal, round, and reactive to light.   Cardiovascular:      Rate and Rhythm: Normal rate and regular rhythm.      Pulses: Normal  pulses.      Heart sounds: No murmur heard.  Pulmonary:      Effort: No accessory muscle usage, prolonged expiration or respiratory distress.      Breath sounds: No stridor. No wheezing or rales.   Abdominal:      General: Bowel sounds are normal. There is no distension.      Palpations: Abdomen is soft.   Musculoskeletal:         General: No deformity or signs of injury.   Skin:     Capillary Refill: Capillary refill takes less than 2 seconds.      Coloration: Skin is not jaundiced or pale.      Findings: Bruising present.   Neurological:      Mental Status: She is alert and oriented to person, place, and time. Mental status is at baseline.      Cranial Nerves: No cranial nerve deficit.      Comments: She can follow all commands.  She can move her feet but she does not move her leg at the     hip due to pain.  Psychiatric:         Attention and Perception: Attention normal.         Mood and Affect: Affect is blunt.         Speech: Speech normal.         Behavior: Behavior normal. Behavior is cooperative.         Thought Content: Thought content normal.         Cognition and Memory: Cognition normal.      DISCHARGE DISPOSITION   Serious but not acutely worsening      Diet Instructions     Diet: Regular; Thin      Discharge Diet: Regular    Fluid Consistency: Thin        Activity Instructions     Other Activity Instructions      Activity Instructions: bed rest for now        No future appointments.     Follow-up Information     Tracie Levi APRN .    Specialty: Family Medicine  Contact information:  [address] [place] [id number] [phone]                 TEST  RESULTS PENDING AT DISCHARGE  Pending Labs     Order Current Status    Blood Culture - Blood, Arm, Left Preliminary result    Blood Culture - Blood, Arm, Left Preliminary result           CODE STATUS  Code Status and Medical Interventions:   Ordered at: 10/25/21 0444     Level Of Support Discussed With:    Patient     Code Status  (Patient has no pulse and is not breathing):    CPR (Attempt to Resuscitate)     Medical Interventions (Patient has pulse or is breathing):    Full       Alonzo Hastings MD  11/06/21  10:42 EDT    Please note that this discharge summary required more than 30 minutes to complete.    Please send a copy of this dictation to the following providers:  Tracie Levi APRN

## 2021-11-06 NOTE — NURSING NOTE
Spoke with [facility] admissions. States they would have a bed available soon. We will receive a phone call when the room is ready and they are ready for report.

## 2021-11-06 NOTE — PLAN OF CARE
Problem: Adult Inpatient Plan of Care  Goal: Plan of Care Review  Outcome: Ongoing, Not Progressing  Flowsheets (Taken 11/6/2021 1556)  Outcome Summary: Patient resting in bed. VSS. Waiting for transportation to [facility].  Goal: Patient-Specific Goal (Individualized)  Outcome: Ongoing, Not Progressing  Goal: Absence of Hospital-Acquired Illness or Injury  Outcome: Ongoing, Not Progressing  Intervention: Identify and Manage Fall Risk  Recent Flowsheet Documentation  Taken 11/6/2021 1500 by Jeny Kohli RN  Safety Promotion/Fall Prevention: safety round/check completed  Taken 11/6/2021 1300 by Jeny Kohli RN  Safety Promotion/Fall Prevention: safety round/check completed  Taken 11/6/2021 1100 by Jeny Kohli RN  Safety Promotion/Fall Prevention: safety round/check completed  Taken 11/6/2021 0900 by Jeny Kohli RN  Safety Promotion/Fall Prevention: safety round/check completed  Taken 11/6/2021 0804 by Jeny Kohli RN  Safety Promotion/Fall Prevention: safety round/check completed  Taken 11/6/2021 0700 by Jeny Kohli RN  Safety Promotion/Fall Prevention: safety round/check completed  Intervention: Prevent Skin Injury  Recent Flowsheet Documentation  Taken 11/6/2021 0804 by Jeny Kohli RN  Skin Protection: adhesive use limited  Intervention: Prevent Infection  Recent Flowsheet Documentation  Taken 11/6/2021 1500 by Jeny Kohli RN  Infection Prevention: rest/sleep promoted  Taken 11/6/2021 1300 by Jeny Kohli RN  Infection Prevention: rest/sleep promoted  Taken 11/6/2021 1100 by Jeny Kohli RN  Infection Prevention: rest/sleep promoted  Taken 11/6/2021 0900 by Jeny Kohli RN  Infection Prevention: rest/sleep promoted  Taken 11/6/2021 0804 by Jeny Kohli RN  Infection Prevention: rest/sleep promoted  Taken 11/6/2021 0700 by Jeny Kohli RN  Infection Prevention: rest/sleep promoted  Goal: Optimal Comfort and Wellbeing  Outcome: Ongoing, Not Progressing  Intervention:  Provide Person-Centered Care  Recent Flowsheet Documentation  Taken 11/6/2021 0804 by Jeny Kohli RN  Trust Relationship/Rapport:   care explained   choices provided   emotional support provided   empathic listening provided   questions answered   questions encouraged   reassurance provided   thoughts/feelings acknowledged  Goal: Readiness for Transition of Care  Outcome: Ongoing, Not Progressing     Problem: Suicide Risk  Goal: Absence of Self-Harm  Outcome: Ongoing, Not Progressing  Intervention: Promote Psychosocial Wellbeing  Recent Flowsheet Documentation  Taken 11/6/2021 0804 by Jeny Kohli RN  Family/Support System Care: self-care encouraged     Problem: Skin Injury Risk Increased  Goal: Skin Health and Integrity  Outcome: Ongoing, Not Progressing  Intervention: Optimize Skin Protection  Recent Flowsheet Documentation  Taken 11/6/2021 0804 by Jeny Kohli RN  Pressure Reduction Techniques: weight shift assistance provided  Pressure Reduction Devices: pressure-redistributing mattress utilized  Skin Protection: adhesive use limited  Intervention: Promote and Optimize Oral Intake  Recent Flowsheet Documentation  Taken 11/6/2021 0804 by Jeny Kohli RN  Oral Nutrition Promotion: rest periods promoted     Problem: Fall Injury Risk  Goal: Absence of Fall and Fall-Related Injury  Outcome: Ongoing, Not Progressing  Intervention: Identify and Manage Contributors to Fall Injury Risk  Recent Flowsheet Documentation  Taken 11/6/2021 1500 by Jeny Kohli RN  Medication Review/Management: medications reviewed  Taken 11/6/2021 1300 by Jeny Kohli RN  Medication Review/Management: medications reviewed  Taken 11/6/2021 1100 by Jeny Kohli RN  Medication Review/Management: medications reviewed  Taken 11/6/2021 0900 by Jeny Kohli RN  Medication Review/Management: medications reviewed  Taken 11/6/2021 0804 by Jeny Kohli RN  Medication Review/Management: medications reviewed  Self-Care Promotion:    independence encouraged   BADL personal objects within reach   BADL personal routines maintained   safe use of adaptive equipment encouraged  Taken 11/6/2021 0700 by Jeny Kohli RN  Medication Review/Management: medications reviewed  Intervention: Promote Injury-Free Environment  Recent Flowsheet Documentation  Taken 11/6/2021 1500 by Jeny Kohli RN  Safety Promotion/Fall Prevention: safety round/check completed  Taken 11/6/2021 1300 by Jeny Kohli RN  Safety Promotion/Fall Prevention: safety round/check completed  Taken 11/6/2021 1100 by Jeny Kohli RN  Safety Promotion/Fall Prevention: safety round/check completed  Taken 11/6/2021 0900 by Jeny Kohli RN  Safety Promotion/Fall Prevention: safety round/check completed  Taken 11/6/2021 0804 by Jeny Kohli RN  Safety Promotion/Fall Prevention: safety round/check completed  Taken 11/6/2021 0700 by Jeny Kohli RN  Safety Promotion/Fall Prevention: safety round/check completed   Goal Outcome Evaluation:              Outcome Summary: Patient resting in bed. VSS. Waiting for transportation to [facility].

## 2021-11-07 ENCOUNTER — APPOINTMENT (OUTPATIENT)
Dept: CARDIOLOGY | Facility: HOSPITAL | Age: 56
End: 2021-11-07

## 2021-11-07 PROBLEM — M86.9 OSTEOMYELITIS (HCC): Status: ACTIVE | Noted: 2021-11-07

## 2021-11-07 LAB
ANION GAP SERPL CALCULATED.3IONS-SCNC: 11 MMOL/L (ref 5–15)
BASOPHILS # BLD AUTO: 0.01 10*3/MM3 (ref 0–0.2)
BASOPHILS NFR BLD AUTO: 0.1 % (ref 0–1.5)
BH CV UPPER VENOUS LEFT AXILLARY AUGMENT: NORMAL
BH CV UPPER VENOUS LEFT AXILLARY COMPRESS: NORMAL
BH CV UPPER VENOUS LEFT AXILLARY PHASIC: NORMAL
BH CV UPPER VENOUS LEFT AXILLARY SPONT: NORMAL
BH CV UPPER VENOUS LEFT BRACHIAL COMPRESS: NORMAL
BH CV UPPER VENOUS LEFT CEPHALIC FOREARM COMPRESS: NORMAL
BH CV UPPER VENOUS LEFT CEPHALIC UPPER COMPRESS: NORMAL
BH CV UPPER VENOUS LEFT INTERNAL JUGULAR AUGMENT: NORMAL
BH CV UPPER VENOUS LEFT INTERNAL JUGULAR COMPRESS: NORMAL
BH CV UPPER VENOUS LEFT INTERNAL JUGULAR PHASIC: NORMAL
BH CV UPPER VENOUS LEFT INTERNAL JUGULAR SPONT: NORMAL
BH CV UPPER VENOUS LEFT RADIAL COMPRESS: NORMAL
BH CV UPPER VENOUS LEFT SUBCLAVIAN AUGMENT: NORMAL
BH CV UPPER VENOUS LEFT SUBCLAVIAN COMPRESS: NORMAL
BH CV UPPER VENOUS LEFT SUBCLAVIAN PHASIC: NORMAL
BH CV UPPER VENOUS LEFT SUBCLAVIAN SPONT: NORMAL
BH CV UPPER VENOUS LEFT ULNAR COMPRESS: NORMAL
BUN SERPL-MCNC: 34 MG/DL (ref 6–20)
BUN/CREAT SERPL: 32.4 (ref 7–25)
CALCIUM SPEC-SCNC: 8.6 MG/DL (ref 8.6–10.5)
CHLORIDE SERPL-SCNC: 112 MMOL/L (ref 98–107)
CK SERPL-CCNC: 37 U/L (ref 20–180)
CO2 SERPL-SCNC: 21 MMOL/L (ref 22–29)
CREAT SERPL-MCNC: 1.05 MG/DL (ref 0.57–1)
DEPRECATED RDW RBC AUTO: 54.5 FL (ref 37–54)
EOSINOPHIL # BLD AUTO: 0.06 10*3/MM3 (ref 0–0.4)
EOSINOPHIL NFR BLD AUTO: 0.6 % (ref 0.3–6.2)
ERYTHROCYTE [DISTWIDTH] IN BLOOD BY AUTOMATED COUNT: 18 % (ref 12.3–15.4)
GFR SERPL CREATININE-BSD FRML MDRD: 54 ML/MIN/1.73
GLUCOSE SERPL-MCNC: 94 MG/DL (ref 65–99)
HCT VFR BLD AUTO: 25.8 % (ref 34–46.6)
HGB BLD-MCNC: 7.9 G/DL (ref 12–15.9)
IMM GRANULOCYTES # BLD AUTO: 0.07 10*3/MM3 (ref 0–0.05)
IMM GRANULOCYTES NFR BLD AUTO: 0.7 % (ref 0–0.5)
LYMPHOCYTES # BLD AUTO: 0.64 10*3/MM3 (ref 0.7–3.1)
LYMPHOCYTES NFR BLD AUTO: 6 % (ref 19.6–45.3)
MAXIMAL PREDICTED HEART RATE: 165 BPM
MCH RBC QN AUTO: 26.4 PG (ref 26.6–33)
MCHC RBC AUTO-ENTMCNC: 30.6 G/DL (ref 31.5–35.7)
MCV RBC AUTO: 86.3 FL (ref 79–97)
MONOCYTES # BLD AUTO: 0.42 10*3/MM3 (ref 0.1–0.9)
MONOCYTES NFR BLD AUTO: 4 % (ref 5–12)
NEUTROPHILS NFR BLD AUTO: 88.6 % (ref 42.7–76)
NEUTROPHILS NFR BLD AUTO: 9.39 10*3/MM3 (ref 1.7–7)
NRBC BLD AUTO-RTO: 0 /100 WBC (ref 0–0.2)
PLATELET # BLD AUTO: 341 10*3/MM3 (ref 140–450)
PMV BLD AUTO: 8.9 FL (ref 6–12)
POTASSIUM SERPL-SCNC: 4.1 MMOL/L (ref 3.5–5.2)
RBC # BLD AUTO: 2.99 10*6/MM3 (ref 3.77–5.28)
SODIUM SERPL-SCNC: 144 MMOL/L (ref 136–145)
STRESS TARGET HR: 140 BPM
WBC # BLD AUTO: 10.59 10*3/MM3 (ref 3.4–10.8)

## 2021-11-07 PROCEDURE — 80048 BASIC METABOLIC PNL TOTAL CA: CPT | Performed by: PHYSICIAN ASSISTANT

## 2021-11-07 PROCEDURE — 85025 COMPLETE CBC W/AUTO DIFF WBC: CPT | Performed by: PHYSICIAN ASSISTANT

## 2021-11-07 PROCEDURE — 82550 ASSAY OF CK (CPK): CPT | Performed by: PHYSICIAN ASSISTANT

## 2021-11-07 PROCEDURE — 25010000002 CEFTAROLINE FOSAMIL PER 10 MG: Performed by: INTERNAL MEDICINE

## 2021-11-07 PROCEDURE — 93971 EXTREMITY STUDY: CPT

## 2021-11-07 PROCEDURE — 25010000002 ENOXAPARIN PER 10 MG: Performed by: PHYSICIAN ASSISTANT

## 2021-11-07 PROCEDURE — 25010000002 DAPTOMYCIN PER 1 MG: Performed by: PHYSICIAN ASSISTANT

## 2021-11-07 PROCEDURE — 25010000002 CEFTAROLINE FOSAMIL PER 10 MG: Performed by: PHYSICIAN ASSISTANT

## 2021-11-07 PROCEDURE — 87040 BLOOD CULTURE FOR BACTERIA: CPT | Performed by: PHYSICIAN ASSISTANT

## 2021-11-07 PROCEDURE — 99233 SBSQ HOSP IP/OBS HIGH 50: CPT | Performed by: INTERNAL MEDICINE

## 2021-11-07 PROCEDURE — 25010000002 GENTAMICIN PER 80 MG: Performed by: PHYSICIAN ASSISTANT

## 2021-11-07 PROCEDURE — 99221 1ST HOSP IP/OBS SF/LOW 40: CPT | Performed by: PHYSICIAN ASSISTANT

## 2021-11-07 PROCEDURE — 25010000002 DAPTOMYCIN PER 1 MG: Performed by: INTERNAL MEDICINE

## 2021-11-07 RX ADMIN — HYDRALAZINE HYDROCHLORIDE 25 MG: 25 TABLET, FILM COATED ORAL at 00:11

## 2021-11-07 RX ADMIN — CLONAZEPAM 0.75 MG: 0.5 TABLET ORAL at 20:41

## 2021-11-07 RX ADMIN — CEFTAROLINE FOSAMIL 600 MG: 600 POWDER, FOR SOLUTION INTRAVENOUS at 00:10

## 2021-11-07 RX ADMIN — HYDRALAZINE HYDROCHLORIDE 25 MG: 25 TABLET, FILM COATED ORAL at 20:42

## 2021-11-07 RX ADMIN — MULTIVITAMIN TABLET 1 TABLET: TABLET at 08:51

## 2021-11-07 RX ADMIN — SODIUM CHLORIDE 75 ML/HR: 9 INJECTION, SOLUTION INTRAVENOUS at 00:10

## 2021-11-07 RX ADMIN — SODIUM CHLORIDE, PRESERVATIVE FREE 10 ML: 5 INJECTION INTRAVENOUS at 00:30

## 2021-11-07 RX ADMIN — OXYCODONE HYDROCHLORIDE 15 MG: 15 TABLET ORAL at 11:13

## 2021-11-07 RX ADMIN — HYDRALAZINE HYDROCHLORIDE 25 MG: 25 TABLET, FILM COATED ORAL at 13:20

## 2021-11-07 RX ADMIN — DULOXETINE HYDROCHLORIDE 60 MG: 60 CAPSULE, DELAYED RELEASE ORAL at 08:51

## 2021-11-07 RX ADMIN — Medication 1 CAPSULE: at 08:51

## 2021-11-07 RX ADMIN — LEVETIRACETAM 500 MG: 500 TABLET, FILM COATED ORAL at 08:51

## 2021-11-07 RX ADMIN — CLONAZEPAM 0.75 MG: 0.5 TABLET ORAL at 08:51

## 2021-11-07 RX ADMIN — CASTOR OIL AND BALSAM, PERU 1 APPLICATION: 788; 87 OINTMENT TOPICAL at 20:42

## 2021-11-07 RX ADMIN — OXYCODONE HYDROCHLORIDE 15 MG: 15 TABLET ORAL at 00:11

## 2021-11-07 RX ADMIN — CLONAZEPAM 0.75 MG: 0.5 TABLET ORAL at 00:11

## 2021-11-07 RX ADMIN — GENTAMICIN SULFATE 70 MG: 40 INJECTION, SOLUTION INTRAMUSCULAR; INTRAVENOUS at 08:52

## 2021-11-07 RX ADMIN — LEVETIRACETAM 500 MG: 500 TABLET, FILM COATED ORAL at 20:42

## 2021-11-07 RX ADMIN — METOPROLOL TARTRATE 25 MG: 25 TABLET, FILM COATED ORAL at 20:42

## 2021-11-07 RX ADMIN — SODIUM CHLORIDE 75 ML/HR: 9 INJECTION, SOLUTION INTRAVENOUS at 11:27

## 2021-11-07 RX ADMIN — BUPROPION HYDROCHLORIDE 150 MG: 150 TABLET, EXTENDED RELEASE ORAL at 08:51

## 2021-11-07 RX ADMIN — SODIUM CHLORIDE, PRESERVATIVE FREE 10 ML: 5 INJECTION INTRAVENOUS at 08:52

## 2021-11-07 RX ADMIN — SODIUM CHLORIDE, PRESERVATIVE FREE 10 ML: 5 INJECTION INTRAVENOUS at 20:42

## 2021-11-07 RX ADMIN — HYDRALAZINE HYDROCHLORIDE 25 MG: 25 TABLET, FILM COATED ORAL at 05:43

## 2021-11-07 RX ADMIN — CEFTAROLINE FOSAMIL 600 MG: 600 POWDER, FOR SOLUTION INTRAVENOUS at 16:10

## 2021-11-07 RX ADMIN — METRONIDAZOLE 500 MG: 500 TABLET ORAL at 00:11

## 2021-11-07 RX ADMIN — CEFTAROLINE FOSAMIL 600 MG: 600 POWDER, FOR SOLUTION INTRAVENOUS at 08:52

## 2021-11-07 RX ADMIN — LEVOTHYROXINE SODIUM 125 MCG: 125 TABLET ORAL at 05:43

## 2021-11-07 RX ADMIN — METOPROLOL TARTRATE 25 MG: 25 TABLET, FILM COATED ORAL at 08:51

## 2021-11-07 RX ADMIN — PANTOPRAZOLE SODIUM 40 MG: 40 TABLET, DELAYED RELEASE ORAL at 05:43

## 2021-11-07 RX ADMIN — METRONIDAZOLE 500 MG: 500 TABLET ORAL at 05:43

## 2021-11-07 RX ADMIN — ENOXAPARIN SODIUM 40 MG: 40 INJECTION SUBCUTANEOUS at 16:11

## 2021-11-07 RX ADMIN — OXYCODONE HYDROCHLORIDE 15 MG: 15 TABLET ORAL at 05:43

## 2021-11-07 RX ADMIN — CLONAZEPAM 0.75 MG: 0.5 TABLET ORAL at 16:11

## 2021-11-07 RX ADMIN — BUPROPION HYDROCHLORIDE 150 MG: 150 TABLET, EXTENDED RELEASE ORAL at 20:42

## 2021-11-07 RX ADMIN — GENTAMICIN SULFATE 70 MG: 40 INJECTION, SOLUTION INTRAMUSCULAR; INTRAVENOUS at 00:09

## 2021-11-07 RX ADMIN — OXYCODONE HYDROCHLORIDE 15 MG: 15 TABLET ORAL at 17:44

## 2021-11-07 RX ADMIN — CASTOR OIL AND BALSAM, PERU 1 APPLICATION: 788; 87 OINTMENT TOPICAL at 11:18

## 2021-11-07 RX ADMIN — DAPTOMYCIN 700 MG: 500 INJECTION, POWDER, LYOPHILIZED, FOR SOLUTION INTRAVENOUS at 11:13

## 2021-11-08 ENCOUNTER — APPOINTMENT (OUTPATIENT)
Dept: GENERAL RADIOLOGY | Facility: HOSPITAL | Age: 56
End: 2021-11-08

## 2021-11-08 LAB
ALBUMIN SERPL-MCNC: 2.3 G/DL (ref 3.5–5.2)
ALBUMIN/GLOB SERPL: 0.5 G/DL
ALP SERPL-CCNC: 113 U/L (ref 39–117)
ALT SERPL W P-5'-P-CCNC: 7 U/L (ref 1–33)
ANION GAP SERPL CALCULATED.3IONS-SCNC: 12 MMOL/L (ref 5–15)
AST SERPL-CCNC: 26 U/L (ref 1–32)
BACTERIA SPEC AEROBE CULT: ABNORMAL
BASOPHILS # BLD AUTO: 0.03 10*3/MM3 (ref 0–0.2)
BASOPHILS NFR BLD AUTO: 0.2 % (ref 0–1.5)
BILIRUB SERPL-MCNC: 0.6 MG/DL (ref 0–1.2)
BUN SERPL-MCNC: 31 MG/DL (ref 6–20)
BUN/CREAT SERPL: 27.9 (ref 7–25)
CALCIUM SPEC-SCNC: 9.2 MG/DL (ref 8.6–10.5)
CHLORIDE SERPL-SCNC: 109 MMOL/L (ref 98–107)
CO2 SERPL-SCNC: 19 MMOL/L (ref 22–29)
CREAT SERPL-MCNC: 1.11 MG/DL (ref 0.57–1)
DEPRECATED RDW RBC AUTO: 54 FL (ref 37–54)
EOSINOPHIL # BLD AUTO: 0.04 10*3/MM3 (ref 0–0.4)
EOSINOPHIL NFR BLD AUTO: 0.3 % (ref 0.3–6.2)
ERYTHROCYTE [DISTWIDTH] IN BLOOD BY AUTOMATED COUNT: 17.7 % (ref 12.3–15.4)
GFR SERPL CREATININE-BSD FRML MDRD: 51 ML/MIN/1.73
GLOBULIN UR ELPH-MCNC: 4.2 GM/DL
GLUCOSE SERPL-MCNC: 119 MG/DL (ref 65–99)
GRAM STN SPEC: ABNORMAL
HCT VFR BLD AUTO: 29.6 % (ref 34–46.6)
HGB BLD-MCNC: 9.1 G/DL (ref 12–15.9)
IMM GRANULOCYTES # BLD AUTO: 0.12 10*3/MM3 (ref 0–0.05)
IMM GRANULOCYTES NFR BLD AUTO: 0.8 % (ref 0–0.5)
ISOLATED FROM: ABNORMAL
LYMPHOCYTES # BLD AUTO: 0.76 10*3/MM3 (ref 0.7–3.1)
LYMPHOCYTES NFR BLD AUTO: 4.8 % (ref 19.6–45.3)
MCH RBC QN AUTO: 26.7 PG (ref 26.6–33)
MCHC RBC AUTO-ENTMCNC: 30.7 G/DL (ref 31.5–35.7)
MCV RBC AUTO: 86.8 FL (ref 79–97)
MONOCYTES # BLD AUTO: 0.54 10*3/MM3 (ref 0.1–0.9)
MONOCYTES NFR BLD AUTO: 3.4 % (ref 5–12)
NEUTROPHILS NFR BLD AUTO: 14.39 10*3/MM3 (ref 1.7–7)
NEUTROPHILS NFR BLD AUTO: 90.5 % (ref 42.7–76)
NRBC BLD AUTO-RTO: 0 /100 WBC (ref 0–0.2)
PLATELET # BLD AUTO: 266 10*3/MM3 (ref 140–450)
PMV BLD AUTO: 8.5 FL (ref 6–12)
POTASSIUM SERPL-SCNC: 4.2 MMOL/L (ref 3.5–5.2)
PROT SERPL-MCNC: 6.5 G/DL (ref 6–8.5)
RBC # BLD AUTO: 3.41 10*6/MM3 (ref 3.77–5.28)
SODIUM SERPL-SCNC: 140 MMOL/L (ref 136–145)
WBC # BLD AUTO: 15.88 10*3/MM3 (ref 3.4–10.8)

## 2021-11-08 PROCEDURE — 99233 SBSQ HOSP IP/OBS HIGH 50: CPT | Performed by: INTERNAL MEDICINE

## 2021-11-08 PROCEDURE — 25010000002 HYDRALAZINE PER 20 MG: Performed by: PHYSICIAN ASSISTANT

## 2021-11-08 PROCEDURE — 80053 COMPREHEN METABOLIC PANEL: CPT | Performed by: INTERNAL MEDICINE

## 2021-11-08 PROCEDURE — 25010000002 ENOXAPARIN PER 10 MG: Performed by: PHYSICIAN ASSISTANT

## 2021-11-08 PROCEDURE — 85025 COMPLETE CBC W/AUTO DIFF WBC: CPT | Performed by: PHYSICIAN ASSISTANT

## 2021-11-08 PROCEDURE — 25010000002 DAPTOMYCIN PER 1 MG: Performed by: PHYSICIAN ASSISTANT

## 2021-11-08 PROCEDURE — 25010000002 CEFTAROLINE FOSAMIL PER 10 MG: Performed by: PHYSICIAN ASSISTANT

## 2021-11-08 PROCEDURE — 99024 POSTOP FOLLOW-UP VISIT: CPT

## 2021-11-08 RX ORDER — DULOXETIN HYDROCHLORIDE 30 MG/1
30 CAPSULE, DELAYED RELEASE ORAL EVERY MORNING
Status: DISCONTINUED | OUTPATIENT
Start: 2021-11-09 | End: 2021-12-11 | Stop reason: HOSPADM

## 2021-11-08 RX ORDER — HYDRALAZINE HYDROCHLORIDE 50 MG/1
50 TABLET, FILM COATED ORAL EVERY 8 HOURS SCHEDULED
Status: DISCONTINUED | OUTPATIENT
Start: 2021-11-08 | End: 2021-11-22

## 2021-11-08 RX ORDER — CLONAZEPAM 0.5 MG/1
0.25 TABLET ORAL NIGHTLY PRN
Status: DISCONTINUED | OUTPATIENT
Start: 2021-11-08 | End: 2021-11-17

## 2021-11-08 RX ADMIN — ENOXAPARIN SODIUM 40 MG: 40 INJECTION SUBCUTANEOUS at 16:34

## 2021-11-08 RX ADMIN — HYDRALAZINE HYDROCHLORIDE 25 MG: 25 TABLET, FILM COATED ORAL at 06:20

## 2021-11-08 RX ADMIN — OXYCODONE HYDROCHLORIDE 15 MG: 15 TABLET ORAL at 00:31

## 2021-11-08 RX ADMIN — LEVOTHYROXINE SODIUM 125 MCG: 125 TABLET ORAL at 06:20

## 2021-11-08 RX ADMIN — LEVETIRACETAM 500 MG: 500 TABLET, FILM COATED ORAL at 20:53

## 2021-11-08 RX ADMIN — CEFTAROLINE FOSAMIL 600 MG: 600 POWDER, FOR SOLUTION INTRAVENOUS at 09:16

## 2021-11-08 RX ADMIN — HYDRALAZINE HYDROCHLORIDE 50 MG: 50 TABLET, FILM COATED ORAL at 14:01

## 2021-11-08 RX ADMIN — DULOXETINE HYDROCHLORIDE 60 MG: 60 CAPSULE, DELAYED RELEASE ORAL at 09:16

## 2021-11-08 RX ADMIN — HYDRALAZINE HYDROCHLORIDE 10 MG: 20 INJECTION INTRAMUSCULAR; INTRAVENOUS at 17:52

## 2021-11-08 RX ADMIN — Medication 1 CAPSULE: at 09:16

## 2021-11-08 RX ADMIN — PANTOPRAZOLE SODIUM 40 MG: 40 TABLET, DELAYED RELEASE ORAL at 06:20

## 2021-11-08 RX ADMIN — BUPROPION HYDROCHLORIDE 150 MG: 150 TABLET, EXTENDED RELEASE ORAL at 09:16

## 2021-11-08 RX ADMIN — OXYCODONE HYDROCHLORIDE 15 MG: 15 TABLET ORAL at 10:45

## 2021-11-08 RX ADMIN — MULTIVITAMIN TABLET 1 TABLET: TABLET at 09:16

## 2021-11-08 RX ADMIN — CLONAZEPAM 0.75 MG: 0.5 TABLET ORAL at 10:16

## 2021-11-08 RX ADMIN — SODIUM CHLORIDE 75 ML/HR: 9 INJECTION, SOLUTION INTRAVENOUS at 03:50

## 2021-11-08 RX ADMIN — CEFTAROLINE FOSAMIL 600 MG: 600 POWDER, FOR SOLUTION INTRAVENOUS at 00:31

## 2021-11-08 RX ADMIN — HYDRALAZINE HYDROCHLORIDE 50 MG: 50 TABLET, FILM COATED ORAL at 20:53

## 2021-11-08 RX ADMIN — SODIUM CHLORIDE, PRESERVATIVE FREE 10 ML: 5 INJECTION INTRAVENOUS at 09:17

## 2021-11-08 RX ADMIN — CEFTAROLINE FOSAMIL 600 MG: 600 POWDER, FOR SOLUTION INTRAVENOUS at 16:34

## 2021-11-08 RX ADMIN — METOPROLOL TARTRATE 25 MG: 25 TABLET, FILM COATED ORAL at 20:53

## 2021-11-08 RX ADMIN — BUPROPION HYDROCHLORIDE 150 MG: 150 TABLET, EXTENDED RELEASE ORAL at 20:53

## 2021-11-08 RX ADMIN — DAPTOMYCIN 700 MG: 500 INJECTION, POWDER, LYOPHILIZED, FOR SOLUTION INTRAVENOUS at 10:45

## 2021-11-08 RX ADMIN — METOPROLOL TARTRATE 25 MG: 25 TABLET, FILM COATED ORAL at 09:16

## 2021-11-08 RX ADMIN — LEVETIRACETAM 500 MG: 500 TABLET, FILM COATED ORAL at 09:16

## 2021-11-08 RX ADMIN — OXYCODONE HYDROCHLORIDE 15 MG: 15 TABLET ORAL at 06:20

## 2021-11-08 RX ADMIN — SODIUM CHLORIDE, PRESERVATIVE FREE 10 ML: 5 INJECTION INTRAVENOUS at 20:53

## 2021-11-08 RX ADMIN — HYDRALAZINE HYDROCHLORIDE 10 MG: 20 INJECTION INTRAMUSCULAR; INTRAVENOUS at 11:03

## 2021-11-09 ENCOUNTER — TELEPHONE (OUTPATIENT)
Dept: PSYCHIATRY | Facility: CLINIC | Age: 56
End: 2021-11-09

## 2021-11-09 ENCOUNTER — APPOINTMENT (OUTPATIENT)
Dept: GENERAL RADIOLOGY | Facility: HOSPITAL | Age: 56
End: 2021-11-09

## 2021-11-09 LAB
ANION GAP SERPL CALCULATED.3IONS-SCNC: 12 MMOL/L (ref 5–15)
BACTERIA SPEC AEROBE CULT: ABNORMAL
BACTERIA SPEC AEROBE CULT: ABNORMAL
BACTERIA UR QL AUTO: ABNORMAL /HPF
BASOPHILS # BLD AUTO: 0.02 10*3/MM3 (ref 0–0.2)
BASOPHILS NFR BLD AUTO: 0.1 % (ref 0–1.5)
BILIRUB UR QL STRIP: NEGATIVE
BUN SERPL-MCNC: 33 MG/DL (ref 6–20)
BUN/CREAT SERPL: 29.5 (ref 7–25)
CALCIUM SPEC-SCNC: 9.3 MG/DL (ref 8.6–10.5)
CHLORIDE SERPL-SCNC: 109 MMOL/L (ref 98–107)
CLARITY UR: ABNORMAL
CO2 SERPL-SCNC: 21 MMOL/L (ref 22–29)
COLOR UR: ABNORMAL
CREAT SERPL-MCNC: 1.12 MG/DL (ref 0.57–1)
DEPRECATED RDW RBC AUTO: 54.3 FL (ref 37–54)
EOSINOPHIL # BLD AUTO: 0.01 10*3/MM3 (ref 0–0.4)
EOSINOPHIL NFR BLD AUTO: 0.1 % (ref 0.3–6.2)
ERYTHROCYTE [DISTWIDTH] IN BLOOD BY AUTOMATED COUNT: 18 % (ref 12.3–15.4)
GFR SERPL CREATININE-BSD FRML MDRD: 51 ML/MIN/1.73
GLUCOSE SERPL-MCNC: 108 MG/DL (ref 65–99)
GLUCOSE UR STRIP-MCNC: NEGATIVE MG/DL
GRAM STN SPEC: ABNORMAL
GRAM STN SPEC: ABNORMAL
HCT VFR BLD AUTO: 27 % (ref 34–46.6)
HGB BLD-MCNC: 8.5 G/DL (ref 12–15.9)
HGB UR QL STRIP.AUTO: ABNORMAL
HYALINE CASTS UR QL AUTO: ABNORMAL /LPF
IMM GRANULOCYTES # BLD AUTO: 0.09 10*3/MM3 (ref 0–0.05)
IMM GRANULOCYTES NFR BLD AUTO: 0.6 % (ref 0–0.5)
KETONES UR QL STRIP: NEGATIVE
LEUKOCYTE ESTERASE UR QL STRIP.AUTO: ABNORMAL
LYMPHOCYTES # BLD AUTO: 0.63 10*3/MM3 (ref 0.7–3.1)
LYMPHOCYTES NFR BLD AUTO: 4.4 % (ref 19.6–45.3)
MCH RBC QN AUTO: 26.5 PG (ref 26.6–33)
MCHC RBC AUTO-ENTMCNC: 31.5 G/DL (ref 31.5–35.7)
MCV RBC AUTO: 84.1 FL (ref 79–97)
MONOCYTES # BLD AUTO: 0.48 10*3/MM3 (ref 0.1–0.9)
MONOCYTES NFR BLD AUTO: 3.3 % (ref 5–12)
NEUTROPHILS NFR BLD AUTO: 13.16 10*3/MM3 (ref 1.7–7)
NEUTROPHILS NFR BLD AUTO: 91.5 % (ref 42.7–76)
NITRITE UR QL STRIP: NEGATIVE
NRBC BLD AUTO-RTO: 0 /100 WBC (ref 0–0.2)
PH UR STRIP.AUTO: <=5 [PH] (ref 5–8)
PLATELET # BLD AUTO: 250 10*3/MM3 (ref 140–450)
PMV BLD AUTO: 8.7 FL (ref 6–12)
POTASSIUM SERPL-SCNC: 3.8 MMOL/L (ref 3.5–5.2)
PROT UR QL STRIP: ABNORMAL
RBC # BLD AUTO: 3.21 10*6/MM3 (ref 3.77–5.28)
RBC # UR: ABNORMAL /HPF
REF LAB TEST METHOD: ABNORMAL
SODIUM SERPL-SCNC: 142 MMOL/L (ref 136–145)
SP GR UR STRIP: 1.01 (ref 1–1.03)
SQUAMOUS #/AREA URNS HPF: ABNORMAL /HPF
UROBILINOGEN UR QL STRIP: ABNORMAL
WBC # BLD AUTO: 14.39 10*3/MM3 (ref 3.4–10.8)
WBC UR QL AUTO: ABNORMAL /HPF
YEAST URNS QL MICRO: ABNORMAL /HPF

## 2021-11-09 PROCEDURE — 71045 X-RAY EXAM CHEST 1 VIEW: CPT

## 2021-11-09 PROCEDURE — 25010000002 ENOXAPARIN PER 10 MG: Performed by: PHYSICIAN ASSISTANT

## 2021-11-09 PROCEDURE — 85025 COMPLETE CBC W/AUTO DIFF WBC: CPT | Performed by: PHYSICIAN ASSISTANT

## 2021-11-09 PROCEDURE — 99233 SBSQ HOSP IP/OBS HIGH 50: CPT | Performed by: INTERNAL MEDICINE

## 2021-11-09 PROCEDURE — 87086 URINE CULTURE/COLONY COUNT: CPT | Performed by: INTERNAL MEDICINE

## 2021-11-09 PROCEDURE — 99024 POSTOP FOLLOW-UP VISIT: CPT | Performed by: PHYSICIAN ASSISTANT

## 2021-11-09 PROCEDURE — 25010000002 HYDRALAZINE PER 20 MG: Performed by: PHYSICIAN ASSISTANT

## 2021-11-09 PROCEDURE — 25010000002 CEFTAROLINE FOSAMIL PER 10 MG: Performed by: PHYSICIAN ASSISTANT

## 2021-11-09 PROCEDURE — 80048 BASIC METABOLIC PNL TOTAL CA: CPT | Performed by: PHYSICIAN ASSISTANT

## 2021-11-09 PROCEDURE — 81001 URINALYSIS AUTO W/SCOPE: CPT | Performed by: INTERNAL MEDICINE

## 2021-11-09 PROCEDURE — 25010000002 DAPTOMYCIN PER 1 MG: Performed by: PHYSICIAN ASSISTANT

## 2021-11-09 RX ORDER — METOPROLOL TARTRATE 50 MG/1
50 TABLET, FILM COATED ORAL 2 TIMES DAILY
Status: DISCONTINUED | OUTPATIENT
Start: 2021-11-09 | End: 2021-11-10

## 2021-11-09 RX ORDER — AMOXICILLIN 250 MG
2 CAPSULE ORAL 2 TIMES DAILY
Status: DISCONTINUED | OUTPATIENT
Start: 2021-11-09 | End: 2021-11-13

## 2021-11-09 RX ORDER — METOPROLOL TARTRATE 5 MG/5ML
5 INJECTION INTRAVENOUS ONCE
Status: COMPLETED | OUTPATIENT
Start: 2021-11-09 | End: 2021-11-09

## 2021-11-09 RX ADMIN — HYDRALAZINE HYDROCHLORIDE 50 MG: 50 TABLET, FILM COATED ORAL at 18:37

## 2021-11-09 RX ADMIN — CASTOR OIL AND BALSAM, PERU 1 APPLICATION: 788; 87 OINTMENT TOPICAL at 20:09

## 2021-11-09 RX ADMIN — METOPROLOL TARTRATE 5 MG: 5 INJECTION INTRAVENOUS at 15:47

## 2021-11-09 RX ADMIN — HYDRALAZINE HYDROCHLORIDE 10 MG: 20 INJECTION INTRAMUSCULAR; INTRAVENOUS at 01:15

## 2021-11-09 RX ADMIN — ENOXAPARIN SODIUM 40 MG: 40 INJECTION SUBCUTANEOUS at 15:47

## 2021-11-09 RX ADMIN — SENNOSIDES AND DOCUSATE SODIUM 2 TABLET: 50; 8.6 TABLET ORAL at 20:02

## 2021-11-09 RX ADMIN — LEVOTHYROXINE SODIUM 125 MCG: 125 TABLET ORAL at 06:07

## 2021-11-09 RX ADMIN — SODIUM CHLORIDE, PRESERVATIVE FREE 10 ML: 5 INJECTION INTRAVENOUS at 09:36

## 2021-11-09 RX ADMIN — METOPROLOL TARTRATE 50 MG: 50 TABLET, FILM COATED ORAL at 18:38

## 2021-11-09 RX ADMIN — CEFTAROLINE FOSAMIL 600 MG: 600 POWDER, FOR SOLUTION INTRAVENOUS at 17:27

## 2021-11-09 RX ADMIN — CEFTAROLINE FOSAMIL 600 MG: 600 POWDER, FOR SOLUTION INTRAVENOUS at 01:15

## 2021-11-09 RX ADMIN — HYDRALAZINE HYDROCHLORIDE 10 MG: 20 INJECTION INTRAMUSCULAR; INTRAVENOUS at 20:02

## 2021-11-09 RX ADMIN — HYDRALAZINE HYDROCHLORIDE 50 MG: 50 TABLET, FILM COATED ORAL at 14:58

## 2021-11-09 RX ADMIN — DAPTOMYCIN 700 MG: 500 INJECTION, POWDER, LYOPHILIZED, FOR SOLUTION INTRAVENOUS at 14:57

## 2021-11-09 RX ADMIN — BUPROPION HYDROCHLORIDE 150 MG: 150 TABLET, EXTENDED RELEASE ORAL at 20:02

## 2021-11-09 RX ADMIN — LEVETIRACETAM 500 MG: 500 TABLET, FILM COATED ORAL at 20:02

## 2021-11-09 RX ADMIN — HYDRALAZINE HYDROCHLORIDE 50 MG: 50 TABLET, FILM COATED ORAL at 06:07

## 2021-11-09 RX ADMIN — PANTOPRAZOLE SODIUM 40 MG: 40 TABLET, DELAYED RELEASE ORAL at 06:07

## 2021-11-09 RX ADMIN — SODIUM CHLORIDE, PRESERVATIVE FREE 10 ML: 5 INJECTION INTRAVENOUS at 20:10

## 2021-11-09 NOTE — TELEPHONE ENCOUNTER
Patient came to [facility] ER on 10/23/21 with bilateral knee pain related to lupus. She was not admitted and tested negative for COVID. On 10/24/21 she went to [facility] ER presenting with the same symptoms and tested positive for COVID. She stated that her pain was so bad that she wanted to kill herself and that she was out of pain medication. She stated that is she did not get more pain medicine she would wrap the cords (IV?) around her neck. She has made comments to the nursing staff such as 'When is this going to be over'. She is refusing some treatment such as chest x-ray.       Patient does not have a sitter at this time.   Patient is verbal and oriented.   Mary is trying to get in contact with the patient and explain the consult to her and make sure that is agreeable to participate. She will call me tomorrow if there are any changes on this.     Requesting advice/opinion on condition and suicidal precautions.     Consult scheduled for 11/10/21 at 2:00pm

## 2021-11-10 ENCOUNTER — TELEPHONE (OUTPATIENT)
Dept: PSYCHIATRY | Facility: CLINIC | Age: 56
End: 2021-11-10

## 2021-11-10 ENCOUNTER — APPOINTMENT (OUTPATIENT)
Dept: ULTRASOUND IMAGING | Facility: HOSPITAL | Age: 56
End: 2021-11-10

## 2021-11-10 LAB — BACTERIA SPEC AEROBE CULT: NO GROWTH

## 2021-11-10 PROCEDURE — 99232 SBSQ HOSP IP/OBS MODERATE 35: CPT | Performed by: INTERNAL MEDICINE

## 2021-11-10 PROCEDURE — C1894 INTRO/SHEATH, NON-LASER: HCPCS

## 2021-11-10 PROCEDURE — 25010000002 CEFTAROLINE FOSAMIL PER 10 MG: Performed by: PHYSICIAN ASSISTANT

## 2021-11-10 PROCEDURE — 02HV33Z INSERTION OF INFUSION DEVICE INTO SUPERIOR VENA CAVA, PERCUTANEOUS APPROACH: ICD-10-PCS | Performed by: INTERNAL MEDICINE

## 2021-11-10 PROCEDURE — 76604 US EXAM CHEST: CPT

## 2021-11-10 PROCEDURE — 25010000002 DAPTOMYCIN PER 1 MG: Performed by: PHYSICIAN ASSISTANT

## 2021-11-10 PROCEDURE — C1751 CATH, INF, PER/CENT/MIDLINE: HCPCS

## 2021-11-10 PROCEDURE — BB4BZZZ ULTRASONOGRAPHY OF PLEURA: ICD-10-PCS | Performed by: RADIOLOGY

## 2021-11-10 RX ORDER — SODIUM CHLORIDE 0.9 % (FLUSH) 0.9 %
10 SYRINGE (ML) INJECTION EVERY 12 HOURS SCHEDULED
Status: DISCONTINUED | OUTPATIENT
Start: 2021-11-10 | End: 2021-11-15

## 2021-11-10 RX ORDER — SODIUM CHLORIDE 0.9 % (FLUSH) 0.9 %
20 SYRINGE (ML) INJECTION AS NEEDED
Status: DISCONTINUED | OUTPATIENT
Start: 2021-11-10 | End: 2021-11-15

## 2021-11-10 RX ORDER — SODIUM CHLORIDE 0.9 % (FLUSH) 0.9 %
10 SYRINGE (ML) INJECTION AS NEEDED
Status: DISCONTINUED | OUTPATIENT
Start: 2021-11-10 | End: 2021-11-15

## 2021-11-10 RX ORDER — OXYCODONE HYDROCHLORIDE 5 MG/1
10 TABLET ORAL 2 TIMES DAILY PRN
Status: DISCONTINUED | OUTPATIENT
Start: 2021-11-10 | End: 2021-11-22

## 2021-11-10 RX ORDER — METOPROLOL TARTRATE 100 MG/1
100 TABLET ORAL 2 TIMES DAILY
Status: DISCONTINUED | OUTPATIENT
Start: 2021-11-10 | End: 2021-11-21

## 2021-11-10 RX ORDER — AMLODIPINE BESYLATE 5 MG/1
5 TABLET ORAL
Status: DISCONTINUED | OUTPATIENT
Start: 2021-11-10 | End: 2021-11-22

## 2021-11-10 RX ADMIN — OXYCODONE 10 MG: 5 TABLET ORAL at 15:17

## 2021-11-10 RX ADMIN — CEFTAROLINE FOSAMIL 600 MG: 600 POWDER, FOR SOLUTION INTRAVENOUS at 09:46

## 2021-11-10 RX ADMIN — LEVETIRACETAM 500 MG: 500 TABLET, FILM COATED ORAL at 09:26

## 2021-11-10 RX ADMIN — BUPROPION HYDROCHLORIDE 150 MG: 150 TABLET, EXTENDED RELEASE ORAL at 09:50

## 2021-11-10 RX ADMIN — CEFTAROLINE FOSAMIL 600 MG: 600 POWDER, FOR SOLUTION INTRAVENOUS at 15:23

## 2021-11-10 RX ADMIN — LEVETIRACETAM 500 MG: 500 TABLET, FILM COATED ORAL at 21:18

## 2021-11-10 RX ADMIN — HYDRALAZINE HYDROCHLORIDE 50 MG: 50 TABLET, FILM COATED ORAL at 15:17

## 2021-11-10 RX ADMIN — HYDRALAZINE HYDROCHLORIDE 50 MG: 50 TABLET, FILM COATED ORAL at 21:19

## 2021-11-10 RX ADMIN — CEFTAROLINE FOSAMIL 600 MG: 600 POWDER, FOR SOLUTION INTRAVENOUS at 00:20

## 2021-11-10 RX ADMIN — ACETAMINOPHEN 650 MG: 325 TABLET, FILM COATED ORAL at 21:18

## 2021-11-10 RX ADMIN — MULTIVITAMIN TABLET 1 TABLET: TABLET at 09:26

## 2021-11-10 RX ADMIN — HYDRALAZINE HYDROCHLORIDE 50 MG: 50 TABLET, FILM COATED ORAL at 06:20

## 2021-11-10 RX ADMIN — SENNOSIDES AND DOCUSATE SODIUM 2 TABLET: 50; 8.6 TABLET ORAL at 09:26

## 2021-11-10 RX ADMIN — DAPTOMYCIN 700 MG: 500 INJECTION, POWDER, LYOPHILIZED, FOR SOLUTION INTRAVENOUS at 11:59

## 2021-11-10 RX ADMIN — BUPROPION HYDROCHLORIDE 150 MG: 150 TABLET, EXTENDED RELEASE ORAL at 21:22

## 2021-11-10 RX ADMIN — CLONAZEPAM 0.25 MG: 0.5 TABLET ORAL at 21:18

## 2021-11-10 RX ADMIN — SENNOSIDES AND DOCUSATE SODIUM 2 TABLET: 50; 8.6 TABLET ORAL at 21:18

## 2021-11-10 RX ADMIN — SODIUM CHLORIDE, PRESERVATIVE FREE 10 ML: 5 INJECTION INTRAVENOUS at 09:27

## 2021-11-10 RX ADMIN — PANTOPRAZOLE SODIUM 40 MG: 40 TABLET, DELAYED RELEASE ORAL at 06:20

## 2021-11-10 RX ADMIN — Medication 1 CAPSULE: at 09:50

## 2021-11-10 RX ADMIN — LEVOTHYROXINE SODIUM 125 MCG: 125 TABLET ORAL at 06:20

## 2021-11-10 RX ADMIN — METOPROLOL TARTRATE 100 MG: 100 TABLET, FILM COATED ORAL at 09:26

## 2021-11-10 RX ADMIN — METOPROLOL TARTRATE 100 MG: 100 TABLET, FILM COATED ORAL at 21:19

## 2021-11-10 RX ADMIN — DULOXETINE HYDROCHLORIDE 30 MG: 30 CAPSULE, DELAYED RELEASE ORAL at 09:26

## 2021-11-10 RX ADMIN — AMLODIPINE BESYLATE 5 MG: 5 TABLET ORAL at 15:23

## 2021-11-10 NOTE — TELEPHONE ENCOUNTER
Mary called and stated that the patient had been sent for a procedure and she would not be back to her room by 2:00pm. I am going to review the schedule for another appointment and call Mary back.

## 2021-11-11 ENCOUNTER — TELEPHONE (OUTPATIENT)
Dept: PSYCHIATRY | Facility: CLINIC | Age: 56
End: 2021-11-11

## 2021-11-11 ENCOUNTER — APPOINTMENT (OUTPATIENT)
Dept: CT IMAGING | Facility: HOSPITAL | Age: 56
End: 2021-11-11

## 2021-11-11 PROCEDURE — 99232 SBSQ HOSP IP/OBS MODERATE 35: CPT | Performed by: INTERNAL MEDICINE

## 2021-11-11 PROCEDURE — 97110 THERAPEUTIC EXERCISES: CPT

## 2021-11-11 PROCEDURE — 25010000002 ENOXAPARIN PER 10 MG: Performed by: INTERNAL MEDICINE

## 2021-11-11 PROCEDURE — 25010000002 CEFTAROLINE FOSAMIL PER 10 MG: Performed by: PHYSICIAN ASSISTANT

## 2021-11-11 PROCEDURE — 25010000002 DAPTOMYCIN PER 1 MG: Performed by: PHYSICIAN ASSISTANT

## 2021-11-11 PROCEDURE — 97162 PT EVAL MOD COMPLEX 30 MIN: CPT

## 2021-11-11 PROCEDURE — 97166 OT EVAL MOD COMPLEX 45 MIN: CPT

## 2021-11-11 RX ADMIN — CEFTAROLINE FOSAMIL 600 MG: 600 POWDER, FOR SOLUTION INTRAVENOUS at 16:00

## 2021-11-11 RX ADMIN — DAPTOMYCIN 700 MG: 500 INJECTION, POWDER, LYOPHILIZED, FOR SOLUTION INTRAVENOUS at 12:02

## 2021-11-11 RX ADMIN — HYDRALAZINE HYDROCHLORIDE 50 MG: 50 TABLET, FILM COATED ORAL at 06:22

## 2021-11-11 RX ADMIN — PANTOPRAZOLE SODIUM 40 MG: 40 TABLET, DELAYED RELEASE ORAL at 06:22

## 2021-11-11 RX ADMIN — SODIUM CHLORIDE, PRESERVATIVE FREE 10 ML: 5 INJECTION INTRAVENOUS at 08:40

## 2021-11-11 RX ADMIN — LEVOTHYROXINE SODIUM 125 MCG: 125 TABLET ORAL at 06:22

## 2021-11-11 RX ADMIN — BUPROPION HYDROCHLORIDE 150 MG: 150 TABLET, EXTENDED RELEASE ORAL at 12:02

## 2021-11-11 RX ADMIN — LEVETIRACETAM 500 MG: 500 TABLET, FILM COATED ORAL at 08:40

## 2021-11-11 RX ADMIN — METOPROLOL TARTRATE 100 MG: 100 TABLET, FILM COATED ORAL at 08:41

## 2021-11-11 RX ADMIN — CASTOR OIL AND BALSAM, PERU 1 APPLICATION: 788; 87 OINTMENT TOPICAL at 21:25

## 2021-11-11 RX ADMIN — ENOXAPARIN SODIUM 40 MG: 40 INJECTION SUBCUTANEOUS at 12:02

## 2021-11-11 RX ADMIN — DULOXETINE HYDROCHLORIDE 30 MG: 30 CAPSULE, DELAYED RELEASE ORAL at 08:40

## 2021-11-11 RX ADMIN — LEVETIRACETAM 500 MG: 500 TABLET, FILM COATED ORAL at 21:27

## 2021-11-11 RX ADMIN — AMLODIPINE BESYLATE 5 MG: 5 TABLET ORAL at 08:40

## 2021-11-11 RX ADMIN — HYDRALAZINE HYDROCHLORIDE 50 MG: 50 TABLET, FILM COATED ORAL at 13:09

## 2021-11-11 RX ADMIN — SENNOSIDES AND DOCUSATE SODIUM 2 TABLET: 50; 8.6 TABLET ORAL at 08:40

## 2021-11-11 RX ADMIN — MULTIVITAMIN TABLET 1 TABLET: TABLET at 08:40

## 2021-11-11 RX ADMIN — SENNOSIDES AND DOCUSATE SODIUM 2 TABLET: 50; 8.6 TABLET ORAL at 21:27

## 2021-11-11 RX ADMIN — Medication 1 CAPSULE: at 08:40

## 2021-11-11 RX ADMIN — CEFTAROLINE FOSAMIL 600 MG: 600 POWDER, FOR SOLUTION INTRAVENOUS at 00:43

## 2021-11-11 RX ADMIN — SODIUM CHLORIDE, PRESERVATIVE FREE 10 ML: 5 INJECTION INTRAVENOUS at 21:27

## 2021-11-11 RX ADMIN — METOPROLOL TARTRATE 100 MG: 100 TABLET, FILM COATED ORAL at 21:26

## 2021-11-11 RX ADMIN — CASTOR OIL AND BALSAM, PERU 1 APPLICATION: 788; 87 OINTMENT TOPICAL at 08:40

## 2021-11-11 RX ADMIN — BUPROPION HYDROCHLORIDE 150 MG: 150 TABLET, EXTENDED RELEASE ORAL at 22:08

## 2021-11-11 RX ADMIN — OXYCODONE 10 MG: 5 TABLET ORAL at 13:09

## 2021-11-11 RX ADMIN — OXYCODONE 10 MG: 5 TABLET ORAL at 06:22

## 2021-11-11 RX ADMIN — HYDRALAZINE HYDROCHLORIDE 50 MG: 50 TABLET, FILM COATED ORAL at 21:27

## 2021-11-11 RX ADMIN — CEFTAROLINE FOSAMIL 600 MG: 600 POWDER, FOR SOLUTION INTRAVENOUS at 08:39

## 2021-11-11 NOTE — TELEPHONE ENCOUNTER
Called Mary this morning to let her know that we are watching the schedule closely to get Ms. Villarreal worked in today.

## 2021-11-12 LAB
ANION GAP SERPL CALCULATED.3IONS-SCNC: 9 MMOL/L (ref 5–15)
BACTERIA SPEC AEROBE CULT: NORMAL
BACTERIA SPEC AEROBE CULT: NORMAL
BUN SERPL-MCNC: 40 MG/DL (ref 6–20)
BUN/CREAT SERPL: 33.9 (ref 7–25)
CALCIUM SPEC-SCNC: 9.2 MG/DL (ref 8.6–10.5)
CHLORIDE SERPL-SCNC: 109 MMOL/L (ref 98–107)
CO2 SERPL-SCNC: 22 MMOL/L (ref 22–29)
CREAT SERPL-MCNC: 1.18 MG/DL (ref 0.57–1)
DEPRECATED RDW RBC AUTO: 55.8 FL (ref 37–54)
ERYTHROCYTE [DISTWIDTH] IN BLOOD BY AUTOMATED COUNT: 17.7 % (ref 12.3–15.4)
GFR SERPL CREATININE-BSD FRML MDRD: 48 ML/MIN/1.73
GLUCOSE SERPL-MCNC: 95 MG/DL (ref 65–99)
HCT VFR BLD AUTO: 27.2 % (ref 34–46.6)
HGB BLD-MCNC: 8 G/DL (ref 12–15.9)
MAGNESIUM SERPL-MCNC: 2.1 MG/DL (ref 1.6–2.6)
MCH RBC QN AUTO: 25.8 PG (ref 26.6–33)
MCHC RBC AUTO-ENTMCNC: 29.4 G/DL (ref 31.5–35.7)
MCV RBC AUTO: 87.7 FL (ref 79–97)
PLATELET # BLD AUTO: 151 10*3/MM3 (ref 140–450)
PMV BLD AUTO: 8.9 FL (ref 6–12)
POTASSIUM SERPL-SCNC: 3.7 MMOL/L (ref 3.5–5.2)
RBC # BLD AUTO: 3.1 10*6/MM3 (ref 3.77–5.28)
SODIUM SERPL-SCNC: 140 MMOL/L (ref 136–145)
WBC # BLD AUTO: 6.44 10*3/MM3 (ref 3.4–10.8)

## 2021-11-12 PROCEDURE — 83735 ASSAY OF MAGNESIUM: CPT | Performed by: INTERNAL MEDICINE

## 2021-11-12 PROCEDURE — 25010000002 DAPTOMYCIN PER 1 MG: Performed by: PHYSICIAN ASSISTANT

## 2021-11-12 PROCEDURE — 25010000002 CEFTAROLINE FOSAMIL PER 10 MG: Performed by: PHYSICIAN ASSISTANT

## 2021-11-12 PROCEDURE — 85027 COMPLETE CBC AUTOMATED: CPT | Performed by: INTERNAL MEDICINE

## 2021-11-12 PROCEDURE — 99232 SBSQ HOSP IP/OBS MODERATE 35: CPT | Performed by: INTERNAL MEDICINE

## 2021-11-12 PROCEDURE — 80048 BASIC METABOLIC PNL TOTAL CA: CPT | Performed by: INTERNAL MEDICINE

## 2021-11-12 PROCEDURE — 25010000002 CEFTAROLINE FOSAMIL PER 10 MG: Performed by: INTERNAL MEDICINE

## 2021-11-12 PROCEDURE — 25010000002 ENOXAPARIN PER 10 MG: Performed by: INTERNAL MEDICINE

## 2021-11-12 RX ORDER — MAGNESIUM CARB/ALUMINUM HYDROX 105-160MG
296 TABLET,CHEWABLE ORAL ONCE
Status: COMPLETED | OUTPATIENT
Start: 2021-11-12 | End: 2021-11-12

## 2021-11-12 RX ORDER — CYCLOBENZAPRINE HCL 10 MG
5 TABLET ORAL 3 TIMES DAILY PRN
Status: DISCONTINUED | OUTPATIENT
Start: 2021-11-12 | End: 2021-11-22

## 2021-11-12 RX ORDER — HYDROXYZINE HYDROCHLORIDE 25 MG/1
50 TABLET, FILM COATED ORAL ONCE
Status: COMPLETED | OUTPATIENT
Start: 2021-11-12 | End: 2021-11-12

## 2021-11-12 RX ADMIN — SODIUM CHLORIDE, PRESERVATIVE FREE 10 ML: 5 INJECTION INTRAVENOUS at 23:42

## 2021-11-12 RX ADMIN — SODIUM CHLORIDE, PRESERVATIVE FREE 10 ML: 5 INJECTION INTRAVENOUS at 23:41

## 2021-11-12 RX ADMIN — LEVOTHYROXINE SODIUM 125 MCG: 125 TABLET ORAL at 06:15

## 2021-11-12 RX ADMIN — SODIUM CHLORIDE, PRESERVATIVE FREE 10 ML: 5 INJECTION INTRAVENOUS at 23:43

## 2021-11-12 RX ADMIN — SENNOSIDES AND DOCUSATE SODIUM 2 TABLET: 50; 8.6 TABLET ORAL at 08:16

## 2021-11-12 RX ADMIN — METOPROLOL TARTRATE 100 MG: 100 TABLET, FILM COATED ORAL at 08:16

## 2021-11-12 RX ADMIN — SODIUM CHLORIDE, PRESERVATIVE FREE 10 ML: 5 INJECTION INTRAVENOUS at 08:20

## 2021-11-12 RX ADMIN — Medication 1 CAPSULE: at 08:16

## 2021-11-12 RX ADMIN — CASTOR OIL AND BALSAM, PERU 1 APPLICATION: 788; 87 OINTMENT TOPICAL at 21:00

## 2021-11-12 RX ADMIN — HYDROXYZINE HYDROCHLORIDE 50 MG: 25 TABLET, FILM COATED ORAL at 09:45

## 2021-11-12 RX ADMIN — SENNOSIDES AND DOCUSATE SODIUM 2 TABLET: 50; 8.6 TABLET ORAL at 20:59

## 2021-11-12 RX ADMIN — CEFTAROLINE FOSAMIL 600 MG: 600 POWDER, FOR SOLUTION INTRAVENOUS at 01:11

## 2021-11-12 RX ADMIN — HYDRALAZINE HYDROCHLORIDE 50 MG: 50 TABLET, FILM COATED ORAL at 20:58

## 2021-11-12 RX ADMIN — CEFTAROLINE FOSAMIL 600 MG: 600 POWDER, FOR SOLUTION INTRAVENOUS at 23:42

## 2021-11-12 RX ADMIN — BUPROPION HYDROCHLORIDE 150 MG: 150 TABLET, EXTENDED RELEASE ORAL at 20:59

## 2021-11-12 RX ADMIN — HYDRALAZINE HYDROCHLORIDE 50 MG: 50 TABLET, FILM COATED ORAL at 06:15

## 2021-11-12 RX ADMIN — OXYCODONE 10 MG: 5 TABLET ORAL at 08:16

## 2021-11-12 RX ADMIN — ENOXAPARIN SODIUM 40 MG: 40 INJECTION SUBCUTANEOUS at 11:40

## 2021-11-12 RX ADMIN — CEFTAROLINE FOSAMIL 600 MG: 600 POWDER, FOR SOLUTION INTRAVENOUS at 15:17

## 2021-11-12 RX ADMIN — BUPROPION HYDROCHLORIDE 150 MG: 150 TABLET, EXTENDED RELEASE ORAL at 08:17

## 2021-11-12 RX ADMIN — LEVETIRACETAM 500 MG: 500 TABLET, FILM COATED ORAL at 08:16

## 2021-11-12 RX ADMIN — HYDRALAZINE HYDROCHLORIDE 50 MG: 50 TABLET, FILM COATED ORAL at 15:16

## 2021-11-12 RX ADMIN — PANTOPRAZOLE SODIUM 40 MG: 40 TABLET, DELAYED RELEASE ORAL at 06:15

## 2021-11-12 RX ADMIN — CEFTAROLINE FOSAMIL 600 MG: 600 POWDER, FOR SOLUTION INTRAVENOUS at 08:17

## 2021-11-12 RX ADMIN — DULOXETINE HYDROCHLORIDE 30 MG: 30 CAPSULE, DELAYED RELEASE ORAL at 08:16

## 2021-11-12 RX ADMIN — CASTOR OIL AND BALSAM, PERU 1 APPLICATION: 788; 87 OINTMENT TOPICAL at 08:18

## 2021-11-12 RX ADMIN — LEVETIRACETAM 500 MG: 500 TABLET, FILM COATED ORAL at 20:59

## 2021-11-12 RX ADMIN — MULTIVITAMIN TABLET 1 TABLET: TABLET at 08:16

## 2021-11-12 RX ADMIN — METOPROLOL TARTRATE 100 MG: 100 TABLET, FILM COATED ORAL at 20:57

## 2021-11-12 RX ADMIN — DAPTOMYCIN 700 MG: 500 INJECTION, POWDER, LYOPHILIZED, FOR SOLUTION INTRAVENOUS at 11:40

## 2021-11-12 RX ADMIN — Medication 296 ML: at 09:45

## 2021-11-12 RX ADMIN — AMLODIPINE BESYLATE 5 MG: 5 TABLET ORAL at 08:17

## 2021-11-13 ENCOUNTER — APPOINTMENT (OUTPATIENT)
Dept: MRI IMAGING | Facility: HOSPITAL | Age: 56
End: 2021-11-13

## 2021-11-13 LAB — CK SERPL-CCNC: 135 U/L (ref 20–180)

## 2021-11-13 PROCEDURE — 25010000002 MORPHINE PER 10 MG: Performed by: INTERNAL MEDICINE

## 2021-11-13 PROCEDURE — 25010000002 CEFTAROLINE FOSAMIL PER 10 MG: Performed by: INTERNAL MEDICINE

## 2021-11-13 PROCEDURE — 25010000002 DAPTOMYCIN PER 1 MG: Performed by: PHYSICIAN ASSISTANT

## 2021-11-13 PROCEDURE — 72141 MRI NECK SPINE W/O DYE: CPT

## 2021-11-13 PROCEDURE — 72157 MRI CHEST SPINE W/O & W/DYE: CPT

## 2021-11-13 PROCEDURE — 82550 ASSAY OF CK (CPK): CPT

## 2021-11-13 PROCEDURE — 99233 SBSQ HOSP IP/OBS HIGH 50: CPT | Performed by: INTERNAL MEDICINE

## 2021-11-13 PROCEDURE — 25010000002 ENOXAPARIN PER 10 MG: Performed by: INTERNAL MEDICINE

## 2021-11-13 RX ORDER — MORPHINE SULFATE 4 MG/ML
4 INJECTION, SOLUTION INTRAMUSCULAR; INTRAVENOUS ONCE AS NEEDED
Status: COMPLETED | OUTPATIENT
Start: 2021-11-13 | End: 2021-11-13

## 2021-11-13 RX ORDER — AMOXICILLIN 250 MG
1 CAPSULE ORAL NIGHTLY
Status: DISCONTINUED | OUTPATIENT
Start: 2021-11-14 | End: 2021-11-22

## 2021-11-13 RX ADMIN — CASTOR OIL AND BALSAM, PERU 1 APPLICATION: 788; 87 OINTMENT TOPICAL at 21:17

## 2021-11-13 RX ADMIN — CEFTAROLINE FOSAMIL 600 MG: 600 POWDER, FOR SOLUTION INTRAVENOUS at 15:41

## 2021-11-13 RX ADMIN — METOPROLOL TARTRATE 100 MG: 100 TABLET, FILM COATED ORAL at 09:29

## 2021-11-13 RX ADMIN — BUPROPION HYDROCHLORIDE 150 MG: 150 TABLET, EXTENDED RELEASE ORAL at 09:29

## 2021-11-13 RX ADMIN — DAPTOMYCIN 700 MG: 500 INJECTION, POWDER, LYOPHILIZED, FOR SOLUTION INTRAVENOUS at 11:51

## 2021-11-13 RX ADMIN — SODIUM CHLORIDE, PRESERVATIVE FREE 10 ML: 5 INJECTION INTRAVENOUS at 09:30

## 2021-11-13 RX ADMIN — AMLODIPINE BESYLATE 5 MG: 5 TABLET ORAL at 09:29

## 2021-11-13 RX ADMIN — LEVETIRACETAM 500 MG: 500 TABLET, FILM COATED ORAL at 09:29

## 2021-11-13 RX ADMIN — CYCLOBENZAPRINE 5 MG: 10 TABLET, FILM COATED ORAL at 21:16

## 2021-11-13 RX ADMIN — MORPHINE SULFATE 4 MG: 4 INJECTION, SOLUTION INTRAMUSCULAR; INTRAVENOUS at 22:50

## 2021-11-13 RX ADMIN — HYDRALAZINE HYDROCHLORIDE 50 MG: 50 TABLET, FILM COATED ORAL at 13:45

## 2021-11-13 RX ADMIN — DULOXETINE HYDROCHLORIDE 30 MG: 30 CAPSULE, DELAYED RELEASE ORAL at 09:29

## 2021-11-13 RX ADMIN — CLONAZEPAM 0.25 MG: 0.5 TABLET ORAL at 21:16

## 2021-11-13 RX ADMIN — HYDRALAZINE HYDROCHLORIDE 50 MG: 50 TABLET, FILM COATED ORAL at 05:30

## 2021-11-13 RX ADMIN — LEVETIRACETAM 500 MG: 500 TABLET, FILM COATED ORAL at 21:16

## 2021-11-13 RX ADMIN — MULTIVITAMIN TABLET 1 TABLET: TABLET at 09:29

## 2021-11-13 RX ADMIN — BUPROPION HYDROCHLORIDE 150 MG: 150 TABLET, EXTENDED RELEASE ORAL at 21:16

## 2021-11-13 RX ADMIN — ENOXAPARIN SODIUM 40 MG: 40 INJECTION SUBCUTANEOUS at 11:51

## 2021-11-13 RX ADMIN — LEVOTHYROXINE SODIUM 125 MCG: 125 TABLET ORAL at 05:30

## 2021-11-13 RX ADMIN — CEFTAROLINE FOSAMIL 600 MG: 600 POWDER, FOR SOLUTION INTRAVENOUS at 09:29

## 2021-11-13 RX ADMIN — CASTOR OIL AND BALSAM, PERU 1 APPLICATION: 788; 87 OINTMENT TOPICAL at 09:29

## 2021-11-13 RX ADMIN — PANTOPRAZOLE SODIUM 40 MG: 40 TABLET, DELAYED RELEASE ORAL at 05:30

## 2021-11-13 RX ADMIN — Medication 1 CAPSULE: at 09:29

## 2021-11-13 RX ADMIN — HYDRALAZINE HYDROCHLORIDE 50 MG: 50 TABLET, FILM COATED ORAL at 21:16

## 2021-11-14 ENCOUNTER — APPOINTMENT (OUTPATIENT)
Dept: GENERAL RADIOLOGY | Facility: HOSPITAL | Age: 56
End: 2021-11-14

## 2021-11-14 ENCOUNTER — ANESTHESIA (OUTPATIENT)
Dept: PERIOP | Facility: HOSPITAL | Age: 56
End: 2021-11-14

## 2021-11-14 ENCOUNTER — APPOINTMENT (OUTPATIENT)
Dept: CT IMAGING | Facility: HOSPITAL | Age: 56
End: 2021-11-14

## 2021-11-14 ENCOUNTER — APPOINTMENT (OUTPATIENT)
Dept: MRI IMAGING | Facility: HOSPITAL | Age: 56
End: 2021-11-14

## 2021-11-14 ENCOUNTER — ANESTHESIA EVENT CONVERTED (OUTPATIENT)
Dept: ANESTHESIOLOGY | Facility: HOSPITAL | Age: 56
End: 2021-11-14

## 2021-11-14 ENCOUNTER — ANESTHESIA EVENT (OUTPATIENT)
Dept: PERIOP | Facility: HOSPITAL | Age: 56
End: 2021-11-14

## 2021-11-14 PROBLEM — R78.81 MRSA BACTEREMIA: Status: ACTIVE | Noted: 2021-11-14

## 2021-11-14 PROBLEM — Z86.73 HISTORY OF CVA (CEREBROVASCULAR ACCIDENT): Status: ACTIVE | Noted: 2021-11-14

## 2021-11-14 PROBLEM — I10 HYPERTENSION: Chronic | Status: ACTIVE | Noted: 2019-02-18

## 2021-11-14 PROBLEM — F41.1 GAD (GENERALIZED ANXIETY DISORDER): Chronic | Status: ACTIVE | Noted: 2019-02-18

## 2021-11-14 PROBLEM — B19.20 HEPATITIS C: Chronic | Status: ACTIVE | Noted: 2021-11-14

## 2021-11-14 PROBLEM — U07.1 COVID-19 VIRUS DETECTED: Status: ACTIVE | Noted: 2021-11-14

## 2021-11-14 PROBLEM — J95.89 ACUTE POSTOPERATIVE RESPIRATORY INSUFFICIENCY: Status: ACTIVE | Noted: 2021-11-14

## 2021-11-14 PROBLEM — B95.62 MRSA BACTEREMIA: Status: ACTIVE | Noted: 2021-11-14

## 2021-11-14 PROBLEM — G82.50 QUADRIPARESIS: Status: ACTIVE | Noted: 2021-11-14

## 2021-11-14 PROBLEM — F32.9 MDD (MAJOR DEPRESSIVE DISORDER): Chronic | Status: ACTIVE | Noted: 2019-09-14

## 2021-11-14 PROBLEM — G95.20 SPINAL CORD COMPRESSION (HCC): Status: ACTIVE | Noted: 2021-11-14

## 2021-11-14 PROBLEM — R56.9 SEIZURES: Status: ACTIVE | Noted: 2021-11-14

## 2021-11-14 PROBLEM — M32.9 SYSTEMIC LUPUS ERYTHEMATOSUS: Chronic | Status: ACTIVE | Noted: 2019-02-18

## 2021-11-14 PROBLEM — R56.9 SEIZURES (HCC): Chronic | Status: ACTIVE | Noted: 2021-11-14

## 2021-11-14 PROBLEM — E03.9 HYPOTHYROIDISM (ACQUIRED): Chronic | Status: ACTIVE | Noted: 2019-02-18

## 2021-11-14 PROBLEM — B19.20 HEPATITIS C: Status: ACTIVE | Noted: 2021-11-14

## 2021-11-14 LAB
ABO GROUP BLD: NORMAL
ARTERIAL PATENCY WRIST A: ABNORMAL
ATMOSPHERIC PRESS: ABNORMAL MM[HG]
BASE EXCESS BLDA CALC-SCNC: -3.1 MMOL/L (ref 0–2)
BASOPHILS # BLD AUTO: 0.01 10*3/MM3 (ref 0–0.2)
BASOPHILS NFR BLD AUTO: 0.2 % (ref 0–1.5)
BDY SITE: ABNORMAL
BLD GP AB SCN SERPL QL: NEGATIVE
BODY TEMPERATURE: 37 C
CO2 BLDA-SCNC: 23.5 MMOL/L (ref 22–33)
COHGB MFR BLD: 1.2 % (ref 0–2)
CRP SERPL-MCNC: 7.39 MG/DL (ref 0–0.5)
DEPRECATED RDW RBC AUTO: 54.1 FL (ref 37–54)
EOSINOPHIL # BLD AUTO: 0.05 10*3/MM3 (ref 0–0.4)
EOSINOPHIL NFR BLD AUTO: 0.9 % (ref 0.3–6.2)
EPAP: 0
ERYTHROCYTE [DISTWIDTH] IN BLOOD BY AUTOMATED COUNT: 17.4 % (ref 12.3–15.4)
ERYTHROCYTE [SEDIMENTATION RATE] IN BLOOD: 62 MM/HR (ref 0–30)
GLUCOSE BLDC GLUCOMTR-MCNC: 116 MG/DL (ref 70–130)
GLUCOSE BLDC GLUCOMTR-MCNC: 120 MG/DL (ref 70–130)
HCO3 BLDA-SCNC: 22.2 MMOL/L (ref 20–26)
HCT VFR BLD AUTO: 24.6 % (ref 34–46.6)
HCT VFR BLD CALC: 23.6 % (ref 38–51)
HGB BLD-MCNC: 7.5 G/DL (ref 12–15.9)
HGB BLDA-MCNC: 7.7 G/DL (ref 14–18)
IMM GRANULOCYTES # BLD AUTO: 0.05 10*3/MM3 (ref 0–0.05)
IMM GRANULOCYTES NFR BLD AUTO: 0.9 % (ref 0–0.5)
INHALED O2 CONCENTRATION: 70 %
IPAP: 0
LYMPHOCYTES # BLD AUTO: 0.32 10*3/MM3 (ref 0.7–3.1)
LYMPHOCYTES NFR BLD AUTO: 5.7 % (ref 19.6–45.3)
MCH RBC QN AUTO: 26.3 PG (ref 26.6–33)
MCHC RBC AUTO-ENTMCNC: 30.5 G/DL (ref 31.5–35.7)
MCV RBC AUTO: 86.3 FL (ref 79–97)
METHGB BLD QL: 0.7 % (ref 0–1.5)
MODALITY: ABNORMAL
MONOCYTES # BLD AUTO: 0.21 10*3/MM3 (ref 0.1–0.9)
MONOCYTES NFR BLD AUTO: 3.7 % (ref 5–12)
NEUTROPHILS NFR BLD AUTO: 4.97 10*3/MM3 (ref 1.7–7)
NEUTROPHILS NFR BLD AUTO: 88.6 % (ref 42.7–76)
NOTE: ABNORMAL
NRBC BLD AUTO-RTO: 0 /100 WBC (ref 0–0.2)
OXYHGB MFR BLDV: 97.9 % (ref 94–99)
PAW @ PEAK INSP FLOW SETTING VENT: 0 CMH2O
PCO2 BLDA: 40.2 MM HG (ref 35–45)
PCO2 TEMP ADJ BLD: 40.2 MM HG (ref 35–45)
PH BLDA: 7.35 PH UNITS (ref 7.35–7.45)
PH, TEMP CORRECTED: 7.35 PH UNITS
PLATELET # BLD AUTO: 125 10*3/MM3 (ref 140–450)
PMV BLD AUTO: 8.8 FL (ref 6–12)
PO2 BLDA: 204 MM HG (ref 83–108)
PO2 TEMP ADJ BLD: 204 MM HG (ref 83–108)
RBC # BLD AUTO: 2.85 10*6/MM3 (ref 3.77–5.28)
RH BLD: NEGATIVE
T&S EXPIRATION DATE: NORMAL
TOTAL RATE: 0 BREATHS/MINUTE
WBC # BLD AUTO: 5.61 10*3/MM3 (ref 3.4–10.8)

## 2021-11-14 PROCEDURE — 25010000002 ONDANSETRON PER 1 MG: Performed by: NURSE ANESTHETIST, CERTIFIED REGISTERED

## 2021-11-14 PROCEDURE — 87070 CULTURE OTHR SPECIMN AEROBIC: CPT | Performed by: NEUROLOGICAL SURGERY

## 2021-11-14 PROCEDURE — 0 GADOBENATE DIMEGLUMINE 529 MG/ML SOLUTION: Performed by: INTERNAL MEDICINE

## 2021-11-14 PROCEDURE — 94002 VENT MGMT INPAT INIT DAY: CPT

## 2021-11-14 PROCEDURE — 87075 CULTR BACTERIA EXCEPT BLOOD: CPT | Performed by: NEUROLOGICAL SURGERY

## 2021-11-14 PROCEDURE — 25010000002 FENTANYL CITRATE (PF) 50 MCG/ML SOLUTION: Performed by: NURSE ANESTHETIST, CERTIFIED REGISTERED

## 2021-11-14 PROCEDURE — 82962 GLUCOSE BLOOD TEST: CPT

## 2021-11-14 PROCEDURE — 86850 RBC ANTIBODY SCREEN: CPT | Performed by: NEUROLOGICAL SURGERY

## 2021-11-14 PROCEDURE — 25010000002 DEXAMETHASONE PER 1 MG: Performed by: RADIOLOGY

## 2021-11-14 PROCEDURE — 25010000002 LORAZEPAM PER 2 MG: Performed by: INTERNAL MEDICINE

## 2021-11-14 PROCEDURE — 82805 BLOOD GASES W/O2 SATURATION: CPT

## 2021-11-14 PROCEDURE — 86923 COMPATIBILITY TEST ELECTRIC: CPT

## 2021-11-14 PROCEDURE — 99233 SBSQ HOSP IP/OBS HIGH 50: CPT | Performed by: INTERNAL MEDICINE

## 2021-11-14 PROCEDURE — 87102 FUNGUS ISOLATION CULTURE: CPT | Performed by: NEUROLOGICAL SURGERY

## 2021-11-14 PROCEDURE — 87186 SC STD MICRODIL/AGAR DIL: CPT | Performed by: NEUROLOGICAL SURGERY

## 2021-11-14 PROCEDURE — 25010000002 PHENYLEPHRINE 10 MG/ML SOLUTION 1 ML VIAL: Performed by: NURSE ANESTHETIST, CERTIFIED REGISTERED

## 2021-11-14 PROCEDURE — 72142 MRI NECK SPINE W/DYE: CPT

## 2021-11-14 PROCEDURE — 94799 UNLISTED PULMONARY SVC/PX: CPT

## 2021-11-14 PROCEDURE — 86901 BLOOD TYPING SEROLOGIC RH(D): CPT | Performed by: NEUROLOGICAL SURGERY

## 2021-11-14 PROCEDURE — 87176 TISSUE HOMOGENIZATION CULTR: CPT | Performed by: NEUROLOGICAL SURGERY

## 2021-11-14 PROCEDURE — A9577 INJ MULTIHANCE: HCPCS | Performed by: INTERNAL MEDICINE

## 2021-11-14 PROCEDURE — C1713 ANCHOR/SCREW BN/BN,TIS/BN: HCPCS | Performed by: NEUROLOGICAL SURGERY

## 2021-11-14 PROCEDURE — 87116 MYCOBACTERIA CULTURE: CPT | Performed by: NEUROLOGICAL SURGERY

## 2021-11-14 PROCEDURE — 25010000002 PROPOFOL 10 MG/ML EMULSION: Performed by: INTERNAL MEDICINE

## 2021-11-14 PROCEDURE — 25010000002 PROPOFOL 10 MG/ML EMULSION: Performed by: NURSE ANESTHETIST, CERTIFIED REGISTERED

## 2021-11-14 PROCEDURE — 87147 CULTURE TYPE IMMUNOLOGIC: CPT | Performed by: NEUROLOGICAL SURGERY

## 2021-11-14 PROCEDURE — 86140 C-REACTIVE PROTEIN: CPT | Performed by: INTERNAL MEDICINE

## 2021-11-14 PROCEDURE — 74018 RADEX ABDOMEN 1 VIEW: CPT

## 2021-11-14 PROCEDURE — 82375 ASSAY CARBOXYHB QUANT: CPT

## 2021-11-14 PROCEDURE — 83050 HGB METHEMOGLOBIN QUAN: CPT

## 2021-11-14 PROCEDURE — 87206 SMEAR FLUORESCENT/ACID STAI: CPT | Performed by: NEUROLOGICAL SURGERY

## 2021-11-14 PROCEDURE — 88304 TISSUE EXAM BY PATHOLOGIST: CPT | Performed by: NEUROLOGICAL SURGERY

## 2021-11-14 PROCEDURE — 86900 BLOOD TYPING SEROLOGIC ABO: CPT | Performed by: NEUROLOGICAL SURGERY

## 2021-11-14 PROCEDURE — 72147 MRI CHEST SPINE W/DYE: CPT

## 2021-11-14 PROCEDURE — 25010000002 CEFTAROLINE FOSAMIL PER 10 MG: Performed by: INTERNAL MEDICINE

## 2021-11-14 PROCEDURE — 25010000002 PROPOFOL 10 MG/ML EMULSION

## 2021-11-14 PROCEDURE — 76000 FLUOROSCOPY <1 HR PHYS/QHP: CPT

## 2021-11-14 PROCEDURE — 25010000002 DEXAMETHASONE PER 1 MG: Performed by: NURSE ANESTHETIST, CERTIFIED REGISTERED

## 2021-11-14 PROCEDURE — 63265 EXCISE INTRASPINL LESION CRV: CPT | Performed by: NEUROLOGICAL SURGERY

## 2021-11-14 PROCEDURE — 85652 RBC SED RATE AUTOMATED: CPT | Performed by: INTERNAL MEDICINE

## 2021-11-14 PROCEDURE — 63265 EXCISE INTRASPINL LESION CRV: CPT | Performed by: PHYSICIAN ASSISTANT

## 2021-11-14 PROCEDURE — 87015 SPECIMEN INFECT AGNT CONCNTJ: CPT | Performed by: NEUROLOGICAL SURGERY

## 2021-11-14 PROCEDURE — 99232 SBSQ HOSP IP/OBS MODERATE 35: CPT | Performed by: NEUROLOGICAL SURGERY

## 2021-11-14 PROCEDURE — 71045 X-RAY EXAM CHEST 1 VIEW: CPT

## 2021-11-14 PROCEDURE — 87205 SMEAR GRAM STAIN: CPT | Performed by: NEUROLOGICAL SURGERY

## 2021-11-14 PROCEDURE — 85025 COMPLETE CBC W/AUTO DIFF WBC: CPT | Performed by: INTERNAL MEDICINE

## 2021-11-14 PROCEDURE — 0 CEFAZOLIN IN DEXTROSE 2-4 GM/100ML-% SOLUTION: Performed by: PHYSICIAN ASSISTANT

## 2021-11-14 PROCEDURE — C1889 IMPLANT/INSERT DEVICE, NOC: HCPCS | Performed by: NEUROLOGICAL SURGERY

## 2021-11-14 PROCEDURE — 00NW0ZZ RELEASE CERVICAL SPINAL CORD, OPEN APPROACH: ICD-10-PCS | Performed by: NEUROLOGICAL SURGERY

## 2021-11-14 DEVICE — FLOSEAL HEMOSTATIC MATRIX, 10ML
Type: IMPLANTABLE DEVICE | Site: SPINE CERVICAL | Status: FUNCTIONAL
Brand: FLOSEAL HEMOSTATIC MATRIX

## 2021-11-14 RX ORDER — PROPOFOL 10 MG/ML
VIAL (ML) INTRAVENOUS CONTINUOUS PRN
Status: DISCONTINUED | OUTPATIENT
Start: 2021-11-14 | End: 2021-11-14 | Stop reason: SURG

## 2021-11-14 RX ORDER — SODIUM CHLORIDE 0.9 % (FLUSH) 0.9 %
10 SYRINGE (ML) INJECTION EVERY 12 HOURS SCHEDULED
Status: DISCONTINUED | OUTPATIENT
Start: 2021-11-14 | End: 2021-11-15

## 2021-11-14 RX ORDER — PROPOFOL 10 MG/ML
VIAL (ML) INTRAVENOUS AS NEEDED
Status: DISCONTINUED | OUTPATIENT
Start: 2021-11-14 | End: 2021-11-14 | Stop reason: SURG

## 2021-11-14 RX ORDER — DROPERIDOL 2.5 MG/ML
0.62 INJECTION, SOLUTION INTRAMUSCULAR; INTRAVENOUS ONCE AS NEEDED
Status: DISCONTINUED | OUTPATIENT
Start: 2021-11-14 | End: 2021-11-14 | Stop reason: HOSPADM

## 2021-11-14 RX ORDER — PROPOFOL 10 MG/ML
VIAL (ML) INTRAVENOUS
Status: COMPLETED
Start: 2021-11-14 | End: 2021-11-14

## 2021-11-14 RX ORDER — LIDOCAINE HYDROCHLORIDE AND EPINEPHRINE 5; 5 MG/ML; UG/ML
INJECTION, SOLUTION INFILTRATION; PERINEURAL AS NEEDED
Status: DISCONTINUED | OUTPATIENT
Start: 2021-11-14 | End: 2021-11-14 | Stop reason: HOSPADM

## 2021-11-14 RX ORDER — FAMOTIDINE 20 MG/1
20 TABLET, FILM COATED ORAL
Status: CANCELLED | OUTPATIENT
Start: 2021-11-14

## 2021-11-14 RX ORDER — DEXAMETHASONE SODIUM PHOSPHATE 4 MG/ML
INJECTION, SOLUTION INTRA-ARTICULAR; INTRALESIONAL; INTRAMUSCULAR; INTRAVENOUS; SOFT TISSUE AS NEEDED
Status: DISCONTINUED | OUTPATIENT
Start: 2021-11-14 | End: 2021-11-14 | Stop reason: SURG

## 2021-11-14 RX ORDER — MORPHINE SULFATE 2 MG/ML
6 INJECTION, SOLUTION INTRAMUSCULAR; INTRAVENOUS ONCE AS NEEDED
Status: DISCONTINUED | OUTPATIENT
Start: 2021-11-14 | End: 2021-11-14

## 2021-11-14 RX ORDER — BUPIVACAINE HCL/0.9 % NACL/PF 0.125 %
PLASTIC BAG, INJECTION (ML) EPIDURAL AS NEEDED
Status: DISCONTINUED | OUTPATIENT
Start: 2021-11-14 | End: 2021-11-14 | Stop reason: SURG

## 2021-11-14 RX ORDER — HYDROMORPHONE HYDROCHLORIDE 1 MG/ML
0.5 INJECTION, SOLUTION INTRAMUSCULAR; INTRAVENOUS; SUBCUTANEOUS ONCE AS NEEDED
Status: DISCONTINUED | OUTPATIENT
Start: 2021-11-14 | End: 2021-11-14

## 2021-11-14 RX ORDER — SODIUM CHLORIDE 0.9 % (FLUSH) 0.9 %
10 SYRINGE (ML) INJECTION AS NEEDED
Status: DISCONTINUED | OUTPATIENT
Start: 2021-11-14 | End: 2021-11-15

## 2021-11-14 RX ORDER — HYDROMORPHONE HYDROCHLORIDE 1 MG/ML
0.5 INJECTION, SOLUTION INTRAMUSCULAR; INTRAVENOUS; SUBCUTANEOUS
Status: DISCONTINUED | OUTPATIENT
Start: 2021-11-14 | End: 2021-11-14 | Stop reason: HOSPADM

## 2021-11-14 RX ORDER — NALOXONE HCL 0.4 MG/ML
0.4 VIAL (ML) INJECTION
Status: DISCONTINUED | OUTPATIENT
Start: 2021-11-14 | End: 2021-12-11 | Stop reason: HOSPADM

## 2021-11-14 RX ORDER — GLYCOPYRROLATE 0.2 MG/ML
INJECTION INTRAMUSCULAR; INTRAVENOUS AS NEEDED
Status: DISCONTINUED | OUTPATIENT
Start: 2021-11-14 | End: 2021-11-14 | Stop reason: SURG

## 2021-11-14 RX ORDER — OXYCODONE HCL 10 MG/1
10 TABLET, FILM COATED, EXTENDED RELEASE ORAL ONCE
Status: DISCONTINUED | OUTPATIENT
Start: 2021-11-14 | End: 2021-11-14 | Stop reason: HOSPADM

## 2021-11-14 RX ORDER — IBUPROFEN 800 MG/1
800 TABLET ORAL ONCE
Status: DISCONTINUED | OUTPATIENT
Start: 2021-11-14 | End: 2021-11-14 | Stop reason: HOSPADM

## 2021-11-14 RX ORDER — ROCURONIUM BROMIDE 10 MG/ML
INJECTION, SOLUTION INTRAVENOUS AS NEEDED
Status: DISCONTINUED | OUTPATIENT
Start: 2021-11-14 | End: 2021-11-14 | Stop reason: SURG

## 2021-11-14 RX ORDER — FAMOTIDINE 10 MG/ML
20 INJECTION, SOLUTION INTRAVENOUS
Status: DISCONTINUED | OUTPATIENT
Start: 2021-11-14 | End: 2021-11-14 | Stop reason: HOSPADM

## 2021-11-14 RX ORDER — EPHEDRINE SULFATE 50 MG/ML
INJECTION, SOLUTION INTRAVENOUS AS NEEDED
Status: DISCONTINUED | OUTPATIENT
Start: 2021-11-14 | End: 2021-11-14 | Stop reason: SURG

## 2021-11-14 RX ORDER — MAGNESIUM HYDROXIDE 1200 MG/15ML
LIQUID ORAL AS NEEDED
Status: DISCONTINUED | OUTPATIENT
Start: 2021-11-14 | End: 2021-11-14 | Stop reason: HOSPADM

## 2021-11-14 RX ORDER — LORAZEPAM 2 MG/ML
1 INJECTION INTRAMUSCULAR ONCE AS NEEDED
Status: COMPLETED | OUTPATIENT
Start: 2021-11-14 | End: 2021-11-14

## 2021-11-14 RX ORDER — FENTANYL CITRATE 50 UG/ML
50 INJECTION, SOLUTION INTRAMUSCULAR; INTRAVENOUS
Status: DISCONTINUED | OUTPATIENT
Start: 2021-11-14 | End: 2021-11-14 | Stop reason: HOSPADM

## 2021-11-14 RX ORDER — DEXAMETHASONE SODIUM PHOSPHATE 4 MG/ML
4 INJECTION, SOLUTION INTRA-ARTICULAR; INTRALESIONAL; INTRAMUSCULAR; INTRAVENOUS; SOFT TISSUE EVERY 6 HOURS
Status: COMPLETED | OUTPATIENT
Start: 2021-11-14 | End: 2021-11-15

## 2021-11-14 RX ORDER — TEMAZEPAM 15 MG/1
15 CAPSULE ORAL NIGHTLY PRN
Status: DISCONTINUED | OUTPATIENT
Start: 2021-11-14 | End: 2021-11-22

## 2021-11-14 RX ORDER — BUPIVACAINE HYDROCHLORIDE 2.5 MG/ML
INJECTION, SOLUTION EPIDURAL; INFILTRATION; INTRACAUDAL AS NEEDED
Status: DISCONTINUED | OUTPATIENT
Start: 2021-11-14 | End: 2021-11-14 | Stop reason: HOSPADM

## 2021-11-14 RX ORDER — ONDANSETRON 2 MG/ML
4 INJECTION INTRAMUSCULAR; INTRAVENOUS ONCE AS NEEDED
Status: DISCONTINUED | OUTPATIENT
Start: 2021-11-14 | End: 2021-11-14 | Stop reason: HOSPADM

## 2021-11-14 RX ORDER — ACETAMINOPHEN 500 MG
1000 TABLET ORAL ONCE
Status: DISCONTINUED | OUTPATIENT
Start: 2021-11-14 | End: 2021-11-14 | Stop reason: HOSPADM

## 2021-11-14 RX ORDER — FENTANYL CITRATE 50 UG/ML
INJECTION, SOLUTION INTRAMUSCULAR; INTRAVENOUS AS NEEDED
Status: DISCONTINUED | OUTPATIENT
Start: 2021-11-14 | End: 2021-11-14 | Stop reason: SURG

## 2021-11-14 RX ORDER — ACETAMINOPHEN 325 MG/1
650 TABLET ORAL EVERY 4 HOURS PRN
Status: DISCONTINUED | OUTPATIENT
Start: 2021-11-14 | End: 2021-11-22

## 2021-11-14 RX ORDER — OXYCODONE HYDROCHLORIDE AND ACETAMINOPHEN 5; 325 MG/1; MG/1
1 TABLET ORAL EVERY 4 HOURS PRN
Status: DISCONTINUED | OUTPATIENT
Start: 2021-11-14 | End: 2021-11-22

## 2021-11-14 RX ORDER — HYDROMORPHONE HCL 110MG/55ML
0.5 PATIENT CONTROLLED ANALGESIA SYRINGE INTRAVENOUS
Status: DISPENSED | OUTPATIENT
Start: 2021-11-14 | End: 2021-11-21

## 2021-11-14 RX ORDER — ONDANSETRON 2 MG/ML
INJECTION INTRAMUSCULAR; INTRAVENOUS AS NEEDED
Status: DISCONTINUED | OUTPATIENT
Start: 2021-11-14 | End: 2021-11-14 | Stop reason: SURG

## 2021-11-14 RX ORDER — SODIUM CHLORIDE, SODIUM LACTATE, POTASSIUM CHLORIDE, CALCIUM CHLORIDE 600; 310; 30; 20 MG/100ML; MG/100ML; MG/100ML; MG/100ML
9 INJECTION, SOLUTION INTRAVENOUS CONTINUOUS
Status: DISCONTINUED | OUTPATIENT
Start: 2021-11-14 | End: 2021-11-15

## 2021-11-14 RX ORDER — LIDOCAINE HYDROCHLORIDE 10 MG/ML
INJECTION, SOLUTION EPIDURAL; INFILTRATION; INTRACAUDAL; PERINEURAL AS NEEDED
Status: DISCONTINUED | OUTPATIENT
Start: 2021-11-14 | End: 2021-11-14 | Stop reason: SURG

## 2021-11-14 RX ORDER — CEFAZOLIN SODIUM 2 G/100ML
2 INJECTION, SOLUTION INTRAVENOUS ONCE
Status: COMPLETED | OUTPATIENT
Start: 2021-11-14 | End: 2021-11-14

## 2021-11-14 RX ADMIN — EPHEDRINE SULFATE 10 MG: 50 INJECTION, SOLUTION INTRAVENOUS at 13:57

## 2021-11-14 RX ADMIN — PROPOFOL 10 MCG/KG/MIN: 10 INJECTION, EMULSION INTRAVENOUS at 16:40

## 2021-11-14 RX ADMIN — Medication 80 MCG: at 13:37

## 2021-11-14 RX ADMIN — SODIUM CHLORIDE, PRESERVATIVE FREE 10 ML: 5 INJECTION INTRAVENOUS at 21:00

## 2021-11-14 RX ADMIN — Medication 160 MCG: at 14:03

## 2021-11-14 RX ADMIN — Medication 80 MCG: at 13:57

## 2021-11-14 RX ADMIN — GLYCOPYRROLATE 0.2 MG: 0.2 INJECTION INTRAMUSCULAR; INTRAVENOUS at 13:53

## 2021-11-14 RX ADMIN — CASTOR OIL AND BALSAM, PERU 1 APPLICATION: 788; 87 OINTMENT TOPICAL at 08:42

## 2021-11-14 RX ADMIN — FENTANYL CITRATE 50 MCG: 50 INJECTION, SOLUTION INTRAMUSCULAR; INTRAVENOUS at 13:32

## 2021-11-14 RX ADMIN — PANTOPRAZOLE SODIUM 40 MG: 40 TABLET, DELAYED RELEASE ORAL at 05:09

## 2021-11-14 RX ADMIN — CEFTAROLINE FOSAMIL 600 MG: 600 POWDER, FOR SOLUTION INTRAVENOUS at 08:33

## 2021-11-14 RX ADMIN — LIDOCAINE HYDROCHLORIDE 50 MG: 10 INJECTION, SOLUTION EPIDURAL; INFILTRATION; INTRACAUDAL; PERINEURAL at 13:32

## 2021-11-14 RX ADMIN — PROPOFOL 20 MCG/KG/MIN: 10 INJECTION, EMULSION INTRAVENOUS at 23:05

## 2021-11-14 RX ADMIN — DEXAMETHASONE SODIUM PHOSPHATE 8 MG: 4 INJECTION, SOLUTION INTRA-ARTICULAR; INTRALESIONAL; INTRAMUSCULAR; INTRAVENOUS; SOFT TISSUE at 14:18

## 2021-11-14 RX ADMIN — SODIUM CHLORIDE, PRESERVATIVE FREE 10 ML: 5 INJECTION INTRAVENOUS at 08:42

## 2021-11-14 RX ADMIN — BUPROPION HYDROCHLORIDE 150 MG: 150 TABLET, EXTENDED RELEASE ORAL at 23:25

## 2021-11-14 RX ADMIN — Medication 160 MCG: at 13:41

## 2021-11-14 RX ADMIN — ROCURONIUM BROMIDE 50 MG: 10 INJECTION INTRAVENOUS at 13:32

## 2021-11-14 RX ADMIN — DOCUSATE SODIUM 50MG AND SENNOSIDES 8.6MG 1 TABLET: 8.6; 5 TABLET, FILM COATED ORAL at 23:25

## 2021-11-14 RX ADMIN — DEXAMETHASONE SODIUM PHOSPHATE 4 MG: 4 INJECTION, SOLUTION INTRA-ARTICULAR; INTRALESIONAL; INTRAMUSCULAR; INTRAVENOUS; SOFT TISSUE at 20:57

## 2021-11-14 RX ADMIN — METOPROLOL TARTRATE 100 MG: 100 TABLET, FILM COATED ORAL at 08:35

## 2021-11-14 RX ADMIN — EPHEDRINE SULFATE 10 MG: 50 INJECTION, SOLUTION INTRAVENOUS at 14:03

## 2021-11-14 RX ADMIN — LEVETIRACETAM 500 MG: 500 TABLET, FILM COATED ORAL at 23:25

## 2021-11-14 RX ADMIN — SODIUM CHLORIDE, POTASSIUM CHLORIDE, SODIUM LACTATE AND CALCIUM CHLORIDE 9 ML/HR: 600; 310; 30; 20 INJECTION, SOLUTION INTRAVENOUS at 12:56

## 2021-11-14 RX ADMIN — LEVETIRACETAM 500 MG: 500 TABLET, FILM COATED ORAL at 08:35

## 2021-11-14 RX ADMIN — ONDANSETRON 4 MG: 2 INJECTION INTRAMUSCULAR; INTRAVENOUS at 15:45

## 2021-11-14 RX ADMIN — SUGAMMADEX 200 MG: 100 INJECTION, SOLUTION INTRAVENOUS at 15:52

## 2021-11-14 RX ADMIN — CEFAZOLIN SODIUM 2 G: 2 INJECTION, SOLUTION INTRAVENOUS at 13:39

## 2021-11-14 RX ADMIN — EPHEDRINE SULFATE 5 MG: 50 INJECTION, SOLUTION INTRAVENOUS at 13:37

## 2021-11-14 RX ADMIN — BUPROPION HYDROCHLORIDE 150 MG: 150 TABLET, EXTENDED RELEASE ORAL at 08:34

## 2021-11-14 RX ADMIN — PHENYLEPHRINE HYDROCHLORIDE 1 MCG/KG/MIN: 10 INJECTION INTRAVENOUS at 13:49

## 2021-11-14 RX ADMIN — HYDRALAZINE HYDROCHLORIDE 50 MG: 50 TABLET, FILM COATED ORAL at 05:10

## 2021-11-14 RX ADMIN — DAPTOMYCIN 700 MG: 500 INJECTION, POWDER, LYOPHILIZED, FOR SOLUTION INTRAVENOUS at 13:45

## 2021-11-14 RX ADMIN — AMLODIPINE BESYLATE 5 MG: 5 TABLET ORAL at 08:35

## 2021-11-14 RX ADMIN — LEVOTHYROXINE SODIUM 125 MCG: 125 TABLET ORAL at 05:10

## 2021-11-14 RX ADMIN — CEFTAROLINE FOSAMIL 600 MG: 600 POWDER, FOR SOLUTION INTRAVENOUS at 17:55

## 2021-11-14 RX ADMIN — HYDRALAZINE HYDROCHLORIDE 50 MG: 50 TABLET, FILM COATED ORAL at 23:25

## 2021-11-14 RX ADMIN — LORAZEPAM 1 MG: 2 INJECTION INTRAMUSCULAR; INTRAVENOUS at 10:45

## 2021-11-14 RX ADMIN — DULOXETINE HYDROCHLORIDE 30 MG: 30 CAPSULE, DELAYED RELEASE ORAL at 08:34

## 2021-11-14 RX ADMIN — Medication 40 MCG/KG/MIN: at 16:11

## 2021-11-14 RX ADMIN — MULTIVITAMIN TABLET 1 TABLET: TABLET at 08:34

## 2021-11-14 RX ADMIN — EPHEDRINE SULFATE 10 MG: 50 INJECTION, SOLUTION INTRAVENOUS at 13:42

## 2021-11-14 RX ADMIN — Medication 1 CAPSULE: at 08:34

## 2021-11-14 RX ADMIN — ROCURONIUM BROMIDE 20 MG: 10 INJECTION INTRAVENOUS at 14:35

## 2021-11-14 RX ADMIN — GADOBENATE DIMEGLUMINE 15 ML: 529 INJECTION, SOLUTION INTRAVENOUS at 00:51

## 2021-11-14 RX ADMIN — CEFTAROLINE FOSAMIL 600 MG: 600 POWDER, FOR SOLUTION INTRAVENOUS at 01:06

## 2021-11-14 RX ADMIN — PROPOFOL 150 MG: 10 INJECTION, EMULSION INTRAVENOUS at 13:32

## 2021-11-14 RX ADMIN — FENTANYL CITRATE 50 MCG: 50 INJECTION, SOLUTION INTRAMUSCULAR; INTRAVENOUS at 16:12

## 2021-11-14 NOTE — ANESTHESIA PREPROCEDURE EVALUATION
Anesthesia Evaluation     Patient summary reviewed and Nursing notes reviewed   no history of anesthetic complications:  NPO Solid Status: Waived due to emergency  NPO Liquid Status: Waived due to emergency           Airway   Mallampati: II  TM distance: >3 FB  Neck ROM: full  No difficulty expected  Dental - normal exam     Pulmonary    (+) pneumonia (h/o covid pneumonia out of precautions) , pleural effusion, decreased breath sounds,   Cardiovascular - normal exam    ECG reviewed    (+) hypertension, past MI , CHF , DVT,     ROS comment: Interpretation Summary 11/3/21    · Left ventricular ejection fraction appears to be 61 - 65%. Left ventricular systolic function is normal.  · Left ventricular diastolic function was normal.  · Estimated right ventricular systolic pressure from tricuspid regurgitation is normal (<35 mmHg).  · No vegetations seen.  · This is a technically difficult study.    H/o PFO per pt    Neuro/Psych  (+) seizures, TIA, CVA, headaches, psychiatric history,     GI/Hepatic/Renal/Endo    (+)  PUD, GI bleeding , renal disease,     Musculoskeletal     Abdominal    Substance History   (+) drug use      Comment: Polysubstance abuse   OB/GYN          Other   arthritis, blood dyscrasia anemia,                   Anesthesia Plan    ASA 3     general   Rapid sequence(In-line cervical stabilization during intubation modified RSI  Lakisha  )  intravenous induction     Anesthetic plan, all risks, benefits, and alternatives have been provided, discussed and informed consent has been obtained with: patient.    Plan discussed with CRNA.

## 2021-11-14 NOTE — ANESTHESIA PROCEDURE NOTES
Arterial Line      Patient reassessed immediately prior to procedure    Patient location during procedure: pre-op   Line placed for hemodynamic monitoring.  Performed By   CRNA: Ok Houston CRNA  Preanesthetic Checklist  Completed: patient identified, IV checked, site marked, risks and benefits discussed, surgical consent, monitors and equipment checked, pre-op evaluation and timeout performed  Arterial Line Prep   Sterile Tech: cap, gloves and sterile barriers  Prep: ChloraPrep  Patient monitoring: blood pressure monitoring, continuous pulse oximetry and EKG  Arterial Line Procedure   Laterality:right  Location:  radial artery  Catheter size: 20 G   Guidance: ultrasound guided and palpation technique  Number of attempts: 1  Successful placement: yes  Post Assessment   Dressing Type: line sutured, occlusive dressing applied, secured with tape and wrist guard applied.   Complications no  Circ/Move/Sens Assessment: normal and unchanged.   Patient Tolerance: patient tolerated the procedure well with no apparent complications

## 2021-11-14 NOTE — ANESTHESIA PROCEDURE NOTES
Airway  Urgency: elective    Date/Time: 11/14/2021 1:33 PM  Airway not difficult    General Information and Staff    Patient location during procedure: OR  CRNA: Ok Houston CRNA    Indications and Patient Condition  Indications for airway management: airway protection    Preoxygenated: yes  MILS maintained throughout  Mask difficulty assessment: 0 - not attempted    Final Airway Details  Final airway type: endotracheal airway      Successful airway: ETT  Cuffed: yes   Successful intubation technique: video laryngoscopy  Facilitating devices/methods: intubating stylet and cricoid pressure  Endotracheal tube insertion site: oral  Blade: Prater  Blade size: 3  ETT size (mm): 7.0  Cormack-Lehane Classification: grade I - full view of glottis  Placement verified by: chest auscultation and capnometry   Measured from: lips  ETT/EBT  to lips (cm): 20  Number of attempts at approach: 1  Assessment: lips, teeth, and gum same as pre-op and atraumatic intubation    Additional Comments  Negative epigastric sounds, Breath sound equal bilaterally with symmetric chest rise and fall

## 2021-11-14 NOTE — ANESTHESIA POSTPROCEDURE EVALUATION
Patient: Karely Villarreal    Procedure Summary     Date: 11/14/21 Room / Location:  [facility] /  [facility]    Anesthesia Start: 1329 Anesthesia Stop:     Procedure: CERVICAL LAMINECTOMY DECOMPRESSION POSTERIOR at indicated levels (Bilateral Spine Cervical) Diagnosis:       Chronic multifocal osteomyelitis, other site (HCC)      Quadriparesis (HCC)      (Chronic multifocal osteomyelitis, other site (HCC) [M86.38])      (Quadriparesis (HCC) [G82.50])    Surgeons: Destin Jama MD Provider: Swati Wallis MD    Anesthesia Type: general ASA Status: 3          Anesthesia Type: general    Vitals  Vitals Value Taken Time   BP     Temp     Pulse     Resp     SpO2 100 % 11/14/21 1626   Vitals shown include unvalidated device data.        Post Anesthesia Care and Evaluation    Patient location during evaluation: ICU  Patient participation: complete - patient cannot participate  Level of consciousness: obtunded/minimal responses  Pain score: 0  Pain management: adequate  Airway patency: patent  Anesthetic complications: No anesthetic complications  PONV Status: none  Cardiovascular status: acceptable and stable  Respiratory status: acceptable, intubated, ventilator and ETT  Hydration status: acceptable  No anesthesia care post op

## 2021-11-15 ENCOUNTER — APPOINTMENT (OUTPATIENT)
Dept: GENERAL RADIOLOGY | Facility: HOSPITAL | Age: 56
End: 2021-11-15

## 2021-11-15 LAB
ABO GROUP BLD: NORMAL
ALBUMIN SERPL-MCNC: 2.4 G/DL (ref 3.5–5.2)
ALBUMIN/GLOB SERPL: 0.7 G/DL
ALP SERPL-CCNC: 118 U/L (ref 39–117)
ALT SERPL W P-5'-P-CCNC: 12 U/L (ref 1–33)
ANION GAP SERPL CALCULATED.3IONS-SCNC: 13 MMOL/L (ref 5–15)
ARTERIAL PATENCY WRIST A: ABNORMAL
AST SERPL-CCNC: 26 U/L (ref 1–32)
ATMOSPHERIC PRESS: ABNORMAL MM[HG]
BASE EXCESS BLDA CALC-SCNC: -0.8 MMOL/L (ref 0–2)
BASOPHILS # BLD AUTO: 0 10*3/MM3 (ref 0–0.2)
BASOPHILS NFR BLD AUTO: 0 % (ref 0–1.5)
BDY SITE: ABNORMAL
BILIRUB SERPL-MCNC: 0.4 MG/DL (ref 0–1.2)
BODY TEMPERATURE: 37 C
BUN SERPL-MCNC: 35 MG/DL (ref 6–20)
BUN/CREAT SERPL: 26.9 (ref 7–25)
CALCIUM SPEC-SCNC: 9.2 MG/DL (ref 8.6–10.5)
CHLORIDE SERPL-SCNC: 106 MMOL/L (ref 98–107)
CO2 BLDA-SCNC: 23.6 MMOL/L (ref 22–33)
CO2 SERPL-SCNC: 19 MMOL/L (ref 22–29)
COHGB MFR BLD: 1.1 % (ref 0–2)
CREAT SERPL-MCNC: 1.3 MG/DL (ref 0.57–1)
DEPRECATED RDW RBC AUTO: 52.1 FL (ref 37–54)
EOSINOPHIL # BLD AUTO: 0 10*3/MM3 (ref 0–0.4)
EOSINOPHIL NFR BLD AUTO: 0 % (ref 0.3–6.2)
EPAP: 0
ERYTHROCYTE [DISTWIDTH] IN BLOOD BY AUTOMATED COUNT: 17.2 % (ref 12.3–15.4)
GFR SERPL CREATININE-BSD FRML MDRD: 43 ML/MIN/1.73
GIE STN SPEC: NORMAL
GLOBULIN UR ELPH-MCNC: 3.4 GM/DL
GLUCOSE BLDC GLUCOMTR-MCNC: 107 MG/DL (ref 70–130)
GLUCOSE BLDC GLUCOMTR-MCNC: 117 MG/DL (ref 70–130)
GLUCOSE BLDC GLUCOMTR-MCNC: 127 MG/DL (ref 70–130)
GLUCOSE BLDC GLUCOMTR-MCNC: 133 MG/DL (ref 70–130)
GLUCOSE SERPL-MCNC: 95 MG/DL (ref 65–99)
HCO3 BLDA-SCNC: 22.7 MMOL/L (ref 20–26)
HCT VFR BLD AUTO: 20.8 % (ref 34–46.6)
HCT VFR BLD AUTO: 21.5 % (ref 34–46.6)
HCT VFR BLD AUTO: 29.9 % (ref 34–46.6)
HCT VFR BLD CALC: 21.3 % (ref 38–51)
HGB BLD-MCNC: 6.5 G/DL (ref 12–15.9)
HGB BLD-MCNC: 6.7 G/DL (ref 12–15.9)
HGB BLD-MCNC: 9.2 G/DL (ref 12–15.9)
HGB BLDA-MCNC: 6.9 G/DL (ref 14–18)
IMM GRANULOCYTES # BLD AUTO: 0.03 10*3/MM3 (ref 0–0.05)
IMM GRANULOCYTES NFR BLD AUTO: 0.5 % (ref 0–0.5)
INHALED O2 CONCENTRATION: 40 %
IPAP: 0
LYMPHOCYTES # BLD AUTO: 0.54 10*3/MM3 (ref 0.7–3.1)
LYMPHOCYTES NFR BLD AUTO: 8.9 % (ref 19.6–45.3)
MAGNESIUM SERPL-MCNC: 1.9 MG/DL (ref 1.6–2.6)
MCH RBC QN AUTO: 26.2 PG (ref 26.6–33)
MCHC RBC AUTO-ENTMCNC: 31.3 G/DL (ref 31.5–35.7)
MCV RBC AUTO: 83.9 FL (ref 79–97)
METHGB BLD QL: 0.9 % (ref 0–1.5)
MODALITY: ABNORMAL
MONOCYTES # BLD AUTO: 0.15 10*3/MM3 (ref 0.1–0.9)
MONOCYTES NFR BLD AUTO: 2.5 % (ref 5–12)
NEUTROPHILS NFR BLD AUTO: 5.36 10*3/MM3 (ref 1.7–7)
NEUTROPHILS NFR BLD AUTO: 88.1 % (ref 42.7–76)
NOTE: ABNORMAL
NRBC BLD AUTO-RTO: 0 /100 WBC (ref 0–0.2)
OXYHGB MFR BLDV: 96.8 % (ref 94–99)
PAW @ PEAK INSP FLOW SETTING VENT: 0 CMH2O
PCO2 BLDA: 31.1 MM HG (ref 35–45)
PCO2 TEMP ADJ BLD: 31.1 MM HG (ref 35–45)
PEEP RESPIRATORY: 5 CM[H2O]
PH BLDA: 7.47 PH UNITS (ref 7.35–7.45)
PH, TEMP CORRECTED: 7.47 PH UNITS
PLATELET # BLD AUTO: 137 10*3/MM3 (ref 140–450)
PMV BLD AUTO: 9.9 FL (ref 6–12)
PO2 BLDA: 107 MM HG (ref 83–108)
PO2 TEMP ADJ BLD: 107 MM HG (ref 83–108)
POTASSIUM SERPL-SCNC: 4.3 MMOL/L (ref 3.5–5.2)
PROT SERPL-MCNC: 5.8 G/DL (ref 6–8.5)
RBC # BLD AUTO: 2.48 10*6/MM3 (ref 3.77–5.28)
RH BLD: NEGATIVE
SODIUM SERPL-SCNC: 138 MMOL/L (ref 136–145)
TOTAL RATE: 0 BREATHS/MINUTE
WBC # BLD AUTO: 6.08 10*3/MM3 (ref 3.4–10.8)

## 2021-11-15 PROCEDURE — 25010000002 CEFTAROLINE FOSAMIL PER 10 MG: Performed by: INTERNAL MEDICINE

## 2021-11-15 PROCEDURE — C1751 CATH, INF, PER/CENT/MIDLINE: HCPCS

## 2021-11-15 PROCEDURE — 71045 X-RAY EXAM CHEST 1 VIEW: CPT

## 2021-11-15 PROCEDURE — 86900 BLOOD TYPING SEROLOGIC ABO: CPT

## 2021-11-15 PROCEDURE — 94003 VENT MGMT INPAT SUBQ DAY: CPT

## 2021-11-15 PROCEDURE — 25010000002 DEXAMETHASONE PER 1 MG: Performed by: RADIOLOGY

## 2021-11-15 PROCEDURE — 83735 ASSAY OF MAGNESIUM: CPT | Performed by: INTERNAL MEDICINE

## 2021-11-15 PROCEDURE — 36430 TRANSFUSION BLD/BLD COMPNT: CPT

## 2021-11-15 PROCEDURE — 99024 POSTOP FOLLOW-UP VISIT: CPT | Performed by: NEUROLOGICAL SURGERY

## 2021-11-15 PROCEDURE — 85018 HEMOGLOBIN: CPT | Performed by: NURSE PRACTITIONER

## 2021-11-15 PROCEDURE — 85014 HEMATOCRIT: CPT | Performed by: NURSE PRACTITIONER

## 2021-11-15 PROCEDURE — 94799 UNLISTED PULMONARY SVC/PX: CPT

## 2021-11-15 PROCEDURE — 25010000002 DAPTOMYCIN PER 1 MG: Performed by: INTERNAL MEDICINE

## 2021-11-15 PROCEDURE — 80053 COMPREHEN METABOLIC PANEL: CPT | Performed by: INTERNAL MEDICINE

## 2021-11-15 PROCEDURE — 99233 SBSQ HOSP IP/OBS HIGH 50: CPT | Performed by: INTERNAL MEDICINE

## 2021-11-15 PROCEDURE — 82962 GLUCOSE BLOOD TEST: CPT

## 2021-11-15 PROCEDURE — 25010000002 PROPOFOL 10 MG/ML EMULSION: Performed by: INTERNAL MEDICINE

## 2021-11-15 PROCEDURE — 0 MAGNESIUM SULFATE 4 GM/100ML SOLUTION: Performed by: NURSE PRACTITIONER

## 2021-11-15 PROCEDURE — 82805 BLOOD GASES W/O2 SATURATION: CPT

## 2021-11-15 PROCEDURE — 86901 BLOOD TYPING SEROLOGIC RH(D): CPT

## 2021-11-15 PROCEDURE — 02HV33Z INSERTION OF INFUSION DEVICE INTO SUPERIOR VENA CAVA, PERCUTANEOUS APPROACH: ICD-10-PCS | Performed by: NURSE PRACTITIONER

## 2021-11-15 PROCEDURE — P9016 RBC LEUKOCYTES REDUCED: HCPCS

## 2021-11-15 PROCEDURE — 83050 HGB METHEMOGLOBIN QUAN: CPT

## 2021-11-15 PROCEDURE — C1894 INTRO/SHEATH, NON-LASER: HCPCS

## 2021-11-15 PROCEDURE — 85025 COMPLETE CBC W/AUTO DIFF WBC: CPT | Performed by: INTERNAL MEDICINE

## 2021-11-15 PROCEDURE — 82375 ASSAY CARBOXYHB QUANT: CPT

## 2021-11-15 RX ORDER — CHLORHEXIDINE GLUCONATE 0.12 MG/ML
15 RINSE ORAL EVERY 12 HOURS SCHEDULED
Status: DISCONTINUED | OUTPATIENT
Start: 2021-11-15 | End: 2021-11-16

## 2021-11-15 RX ORDER — SODIUM CHLORIDE 0.9 % (FLUSH) 0.9 %
10 SYRINGE (ML) INJECTION EVERY 12 HOURS SCHEDULED
Status: DISCONTINUED | OUTPATIENT
Start: 2021-11-15 | End: 2021-12-11 | Stop reason: HOSPADM

## 2021-11-15 RX ORDER — SODIUM CHLORIDE 0.9 % (FLUSH) 0.9 %
10 SYRINGE (ML) INJECTION AS NEEDED
Status: DISCONTINUED | OUTPATIENT
Start: 2021-11-15 | End: 2021-12-11 | Stop reason: HOSPADM

## 2021-11-15 RX ORDER — MAGNESIUM SULFATE HEPTAHYDRATE 40 MG/ML
4 INJECTION, SOLUTION INTRAVENOUS AS NEEDED
Status: DISCONTINUED | OUTPATIENT
Start: 2021-11-15 | End: 2021-12-11 | Stop reason: HOSPADM

## 2021-11-15 RX ORDER — MAGNESIUM SULFATE HEPTAHYDRATE 40 MG/ML
2 INJECTION, SOLUTION INTRAVENOUS AS NEEDED
Status: DISCONTINUED | OUTPATIENT
Start: 2021-11-15 | End: 2021-12-11 | Stop reason: HOSPADM

## 2021-11-15 RX ORDER — POTASSIUM CHLORIDE 750 MG/1
40 CAPSULE, EXTENDED RELEASE ORAL AS NEEDED
Status: DISCONTINUED | OUTPATIENT
Start: 2021-11-15 | End: 2021-11-22

## 2021-11-15 RX ORDER — POTASSIUM CHLORIDE 1.5 G/1.77G
40 POWDER, FOR SOLUTION ORAL AS NEEDED
Status: DISCONTINUED | OUTPATIENT
Start: 2021-11-15 | End: 2021-11-22

## 2021-11-15 RX ORDER — SODIUM CHLORIDE 0.9 % (FLUSH) 0.9 %
20 SYRINGE (ML) INJECTION AS NEEDED
Status: DISCONTINUED | OUTPATIENT
Start: 2021-11-15 | End: 2021-12-11 | Stop reason: HOSPADM

## 2021-11-15 RX ORDER — NYSTATIN 100000 [USP'U]/G
POWDER TOPICAL EVERY 8 HOURS SCHEDULED
Status: DISCONTINUED | OUTPATIENT
Start: 2021-11-15 | End: 2021-12-11 | Stop reason: HOSPADM

## 2021-11-15 RX ADMIN — DULOXETINE HYDROCHLORIDE 30 MG: 30 CAPSULE, DELAYED RELEASE ORAL at 08:06

## 2021-11-15 RX ADMIN — LEVETIRACETAM 500 MG: 500 TABLET, FILM COATED ORAL at 20:25

## 2021-11-15 RX ADMIN — HYDRALAZINE HYDROCHLORIDE 50 MG: 50 TABLET, FILM COATED ORAL at 21:53

## 2021-11-15 RX ADMIN — CASTOR OIL AND BALSAM, PERU 1 APPLICATION: 788; 87 OINTMENT TOPICAL at 20:25

## 2021-11-15 RX ADMIN — DEXAMETHASONE SODIUM PHOSPHATE 4 MG: 4 INJECTION, SOLUTION INTRA-ARTICULAR; INTRALESIONAL; INTRAMUSCULAR; INTRAVENOUS; SOFT TISSUE at 13:50

## 2021-11-15 RX ADMIN — LEVETIRACETAM 500 MG: 500 TABLET, FILM COATED ORAL at 08:07

## 2021-11-15 RX ADMIN — LEVOTHYROXINE SODIUM 125 MCG: 125 TABLET ORAL at 06:35

## 2021-11-15 RX ADMIN — PROPOFOL 20 MCG/KG/MIN: 10 INJECTION, EMULSION INTRAVENOUS at 06:52

## 2021-11-15 RX ADMIN — BUPROPION HYDROCHLORIDE 150 MG: 150 TABLET, EXTENDED RELEASE ORAL at 20:24

## 2021-11-15 RX ADMIN — CEFTAROLINE FOSAMIL 600 MG: 600 POWDER, FOR SOLUTION INTRAVENOUS at 00:20

## 2021-11-15 RX ADMIN — PANTOPRAZOLE SODIUM 40 MG: 40 TABLET, DELAYED RELEASE ORAL at 06:35

## 2021-11-15 RX ADMIN — CEFTAROLINE FOSAMIL 600 MG: 600 POWDER, FOR SOLUTION INTRAVENOUS at 16:57

## 2021-11-15 RX ADMIN — CEFTAROLINE FOSAMIL 600 MG: 600 POWDER, FOR SOLUTION INTRAVENOUS at 07:57

## 2021-11-15 RX ADMIN — NYSTATIN: 100000 POWDER TOPICAL at 03:50

## 2021-11-15 RX ADMIN — CASTOR OIL AND BALSAM, PERU 1 APPLICATION: 788; 87 OINTMENT TOPICAL at 00:20

## 2021-11-15 RX ADMIN — METOPROLOL TARTRATE 100 MG: 100 TABLET, FILM COATED ORAL at 20:25

## 2021-11-15 RX ADMIN — BUPROPION HYDROCHLORIDE 150 MG: 150 TABLET, EXTENDED RELEASE ORAL at 08:07

## 2021-11-15 RX ADMIN — DEXAMETHASONE SODIUM PHOSPHATE 4 MG: 4 INJECTION, SOLUTION INTRA-ARTICULAR; INTRALESIONAL; INTRAMUSCULAR; INTRAVENOUS; SOFT TISSUE at 20:25

## 2021-11-15 RX ADMIN — DEXAMETHASONE SODIUM PHOSPHATE 4 MG: 4 INJECTION, SOLUTION INTRA-ARTICULAR; INTRALESIONAL; INTRAMUSCULAR; INTRAVENOUS; SOFT TISSUE at 02:56

## 2021-11-15 RX ADMIN — MULTIVITAMIN TABLET 1 TABLET: TABLET at 08:07

## 2021-11-15 RX ADMIN — Medication 1 CAPSULE: at 08:07

## 2021-11-15 RX ADMIN — SODIUM CHLORIDE, PRESERVATIVE FREE 10 ML: 5 INJECTION INTRAVENOUS at 20:27

## 2021-11-15 RX ADMIN — CHLORHEXIDINE GLUCONATE 15 ML: 1.2 SOLUTION ORAL at 20:24

## 2021-11-15 RX ADMIN — HYDRALAZINE HYDROCHLORIDE 50 MG: 50 TABLET, FILM COATED ORAL at 13:51

## 2021-11-15 RX ADMIN — AMLODIPINE BESYLATE 5 MG: 5 TABLET ORAL at 08:06

## 2021-11-15 RX ADMIN — NYSTATIN: 100000 POWDER TOPICAL at 21:53

## 2021-11-15 RX ADMIN — MAGNESIUM SULFATE HEPTAHYDRATE 4 G: 40 INJECTION, SOLUTION INTRAVENOUS at 13:50

## 2021-11-15 RX ADMIN — DAPTOMYCIN 700 MG: 500 INJECTION, POWDER, LYOPHILIZED, FOR SOLUTION INTRAVENOUS at 12:21

## 2021-11-15 RX ADMIN — OXYCODONE HYDROCHLORIDE AND ACETAMINOPHEN 1 TABLET: 5; 325 TABLET ORAL at 23:04

## 2021-11-15 RX ADMIN — HYDRALAZINE HYDROCHLORIDE 50 MG: 50 TABLET, FILM COATED ORAL at 06:35

## 2021-11-15 RX ADMIN — CHLORHEXIDINE GLUCONATE 15 ML: 1.2 SOLUTION ORAL at 12:21

## 2021-11-15 RX ADMIN — NYSTATIN: 100000 POWDER TOPICAL at 13:51

## 2021-11-15 RX ADMIN — DEXAMETHASONE SODIUM PHOSPHATE 4 MG: 4 INJECTION, SOLUTION INTRA-ARTICULAR; INTRALESIONAL; INTRAMUSCULAR; INTRAVENOUS; SOFT TISSUE at 08:07

## 2021-11-15 RX ADMIN — METOPROLOL TARTRATE 100 MG: 100 TABLET, FILM COATED ORAL at 08:07

## 2021-11-15 RX ADMIN — DOCUSATE SODIUM 50MG AND SENNOSIDES 8.6MG 1 TABLET: 8.6; 5 TABLET, FILM COATED ORAL at 20:24

## 2021-11-15 RX ADMIN — CASTOR OIL AND BALSAM, PERU 1 APPLICATION: 788; 87 OINTMENT TOPICAL at 08:06

## 2021-11-16 LAB
ALBUMIN SERPL-MCNC: 2.5 G/DL (ref 3.5–5.2)
ALBUMIN/GLOB SERPL: 0.7 G/DL
ALP SERPL-CCNC: 133 U/L (ref 39–117)
ALT SERPL W P-5'-P-CCNC: 11 U/L (ref 1–33)
ANION GAP SERPL CALCULATED.3IONS-SCNC: 13 MMOL/L (ref 5–15)
AST SERPL-CCNC: 26 U/L (ref 1–32)
BASOPHILS # BLD AUTO: 0.01 10*3/MM3 (ref 0–0.2)
BASOPHILS NFR BLD AUTO: 0.1 % (ref 0–1.5)
BH BB BLOOD EXPIRATION DATE: NORMAL
BH BB BLOOD EXPIRATION DATE: NORMAL
BH BB BLOOD TYPE BARCODE: 9500
BH BB BLOOD TYPE BARCODE: 9500
BH BB DISPENSE STATUS: NORMAL
BH BB DISPENSE STATUS: NORMAL
BH BB PRODUCT CODE: NORMAL
BH BB PRODUCT CODE: NORMAL
BH BB UNIT NUMBER: NORMAL
BH BB UNIT NUMBER: NORMAL
BILIRUB SERPL-MCNC: 0.4 MG/DL (ref 0–1.2)
BUN SERPL-MCNC: 39 MG/DL (ref 6–20)
BUN/CREAT SERPL: 29.5 (ref 7–25)
CALCIUM SPEC-SCNC: 9.2 MG/DL (ref 8.6–10.5)
CHLORIDE SERPL-SCNC: 105 MMOL/L (ref 98–107)
CO2 SERPL-SCNC: 19 MMOL/L (ref 22–29)
CREAT SERPL-MCNC: 1.32 MG/DL (ref 0.57–1)
CROSSMATCH INTERPRETATION: NORMAL
CROSSMATCH INTERPRETATION: NORMAL
CYTO UR: NORMAL
DEPRECATED RDW RBC AUTO: 51.7 FL (ref 37–54)
EOSINOPHIL # BLD AUTO: 0 10*3/MM3 (ref 0–0.4)
EOSINOPHIL NFR BLD AUTO: 0 % (ref 0.3–6.2)
ERYTHROCYTE [DISTWIDTH] IN BLOOD BY AUTOMATED COUNT: 16.6 % (ref 12.3–15.4)
GFR SERPL CREATININE-BSD FRML MDRD: 42 ML/MIN/1.73
GLOBULIN UR ELPH-MCNC: 3.8 GM/DL
GLUCOSE BLDC GLUCOMTR-MCNC: 101 MG/DL (ref 70–130)
GLUCOSE BLDC GLUCOMTR-MCNC: 83 MG/DL (ref 70–130)
GLUCOSE BLDC GLUCOMTR-MCNC: 87 MG/DL (ref 70–130)
GLUCOSE BLDC GLUCOMTR-MCNC: 89 MG/DL (ref 70–130)
GLUCOSE BLDC GLUCOMTR-MCNC: 94 MG/DL (ref 70–130)
GLUCOSE SERPL-MCNC: 132 MG/DL (ref 65–99)
HCT VFR BLD AUTO: 28.7 % (ref 34–46.6)
HGB BLD-MCNC: 9 G/DL (ref 12–15.9)
IMM GRANULOCYTES # BLD AUTO: 0.11 10*3/MM3 (ref 0–0.05)
IMM GRANULOCYTES NFR BLD AUTO: 1.3 % (ref 0–0.5)
LAB AP CASE REPORT: NORMAL
LAB AP CLINICAL INFORMATION: NORMAL
LAB AP DIAGNOSIS COMMENT: NORMAL
LYMPHOCYTES # BLD AUTO: 0.57 10*3/MM3 (ref 0.7–3.1)
LYMPHOCYTES NFR BLD AUTO: 6.6 % (ref 19.6–45.3)
MAGNESIUM SERPL-MCNC: 2.7 MG/DL (ref 1.6–2.6)
MCH RBC QN AUTO: 27 PG (ref 26.6–33)
MCHC RBC AUTO-ENTMCNC: 31.4 G/DL (ref 31.5–35.7)
MCV RBC AUTO: 86.2 FL (ref 79–97)
MONOCYTES # BLD AUTO: 0.32 10*3/MM3 (ref 0.1–0.9)
MONOCYTES NFR BLD AUTO: 3.7 % (ref 5–12)
NEUTROPHILS NFR BLD AUTO: 7.57 10*3/MM3 (ref 1.7–7)
NEUTROPHILS NFR BLD AUTO: 88.3 % (ref 42.7–76)
NRBC BLD AUTO-RTO: 0 /100 WBC (ref 0–0.2)
PATH REPORT.FINAL DX SPEC: NORMAL
PATH REPORT.GROSS SPEC: NORMAL
PHOSPHATE SERPL-MCNC: 4.8 MG/DL (ref 2.5–4.5)
PLATELET # BLD AUTO: 166 10*3/MM3 (ref 140–450)
PMV BLD AUTO: 10.1 FL (ref 6–12)
POTASSIUM SERPL-SCNC: 4.4 MMOL/L (ref 3.5–5.2)
PROT SERPL-MCNC: 6.3 G/DL (ref 6–8.5)
RBC # BLD AUTO: 3.33 10*6/MM3 (ref 3.77–5.28)
SODIUM SERPL-SCNC: 137 MMOL/L (ref 136–145)
UNIT  ABO: NORMAL
UNIT  ABO: NORMAL
UNIT  RH: NORMAL
UNIT  RH: NORMAL
WBC # BLD AUTO: 8.58 10*3/MM3 (ref 3.4–10.8)

## 2021-11-16 PROCEDURE — 83735 ASSAY OF MAGNESIUM: CPT | Performed by: INTERNAL MEDICINE

## 2021-11-16 PROCEDURE — 97164 PT RE-EVAL EST PLAN CARE: CPT

## 2021-11-16 PROCEDURE — 97530 THERAPEUTIC ACTIVITIES: CPT

## 2021-11-16 PROCEDURE — 85025 COMPLETE CBC W/AUTO DIFF WBC: CPT | Performed by: INTERNAL MEDICINE

## 2021-11-16 PROCEDURE — 25010000002 CEFTAROLINE FOSAMIL PER 10 MG: Performed by: INTERNAL MEDICINE

## 2021-11-16 PROCEDURE — 82962 GLUCOSE BLOOD TEST: CPT

## 2021-11-16 PROCEDURE — 99232 SBSQ HOSP IP/OBS MODERATE 35: CPT | Performed by: INTERNAL MEDICINE

## 2021-11-16 PROCEDURE — 94799 UNLISTED PULMONARY SVC/PX: CPT

## 2021-11-16 PROCEDURE — 84100 ASSAY OF PHOSPHORUS: CPT | Performed by: INTERNAL MEDICINE

## 2021-11-16 PROCEDURE — 80053 COMPREHEN METABOLIC PANEL: CPT | Performed by: INTERNAL MEDICINE

## 2021-11-16 PROCEDURE — 25010000002 DAPTOMYCIN PER 1 MG: Performed by: INTERNAL MEDICINE

## 2021-11-16 RX ORDER — ALPRAZOLAM 0.25 MG/1
0.25 TABLET ORAL 3 TIMES DAILY PRN
Status: DISCONTINUED | OUTPATIENT
Start: 2021-11-16 | End: 2021-11-17

## 2021-11-16 RX ADMIN — CEFTAROLINE FOSAMIL 600 MG: 600 POWDER, FOR SOLUTION INTRAVENOUS at 00:38

## 2021-11-16 RX ADMIN — DAPTOMYCIN 700 MG: 500 INJECTION, POWDER, LYOPHILIZED, FOR SOLUTION INTRAVENOUS at 12:14

## 2021-11-16 RX ADMIN — Medication 1 CAPSULE: at 08:38

## 2021-11-16 RX ADMIN — ALPRAZOLAM 0.25 MG: 0.25 TABLET ORAL at 14:58

## 2021-11-16 RX ADMIN — CASTOR OIL AND BALSAM, PERU 1 APPLICATION: 788; 87 OINTMENT TOPICAL at 08:38

## 2021-11-16 RX ADMIN — MULTIVITAMIN TABLET 1 TABLET: TABLET at 08:38

## 2021-11-16 RX ADMIN — PANTOPRAZOLE SODIUM 40 MG: 40 TABLET, DELAYED RELEASE ORAL at 05:54

## 2021-11-16 RX ADMIN — ACETAMINOPHEN 650 MG: 325 TABLET, FILM COATED ORAL at 12:21

## 2021-11-16 RX ADMIN — LEVETIRACETAM 500 MG: 500 TABLET, FILM COATED ORAL at 08:38

## 2021-11-16 RX ADMIN — SODIUM CHLORIDE, PRESERVATIVE FREE 10 ML: 5 INJECTION INTRAVENOUS at 08:37

## 2021-11-16 RX ADMIN — METOPROLOL TARTRATE 100 MG: 100 TABLET, FILM COATED ORAL at 08:38

## 2021-11-16 RX ADMIN — DULOXETINE HYDROCHLORIDE 30 MG: 30 CAPSULE, DELAYED RELEASE ORAL at 08:38

## 2021-11-16 RX ADMIN — BUPROPION HYDROCHLORIDE 150 MG: 150 TABLET, EXTENDED RELEASE ORAL at 20:14

## 2021-11-16 RX ADMIN — NYSTATIN: 100000 POWDER TOPICAL at 13:58

## 2021-11-16 RX ADMIN — CYCLOBENZAPRINE 5 MG: 10 TABLET, FILM COATED ORAL at 12:24

## 2021-11-16 RX ADMIN — OXYCODONE 10 MG: 5 TABLET ORAL at 02:31

## 2021-11-16 RX ADMIN — OXYCODONE HYDROCHLORIDE AND ACETAMINOPHEN 1 TABLET: 5; 325 TABLET ORAL at 05:54

## 2021-11-16 RX ADMIN — ALPRAZOLAM 0.25 MG: 0.25 TABLET ORAL at 23:28

## 2021-11-16 RX ADMIN — LEVETIRACETAM 500 MG: 500 TABLET, FILM COATED ORAL at 20:14

## 2021-11-16 RX ADMIN — CEFTAROLINE FOSAMIL 600 MG: 600 POWDER, FOR SOLUTION INTRAVENOUS at 23:54

## 2021-11-16 RX ADMIN — OXYCODONE 10 MG: 5 TABLET ORAL at 13:58

## 2021-11-16 RX ADMIN — CASTOR OIL AND BALSAM, PERU 1 APPLICATION: 788; 87 OINTMENT TOPICAL at 20:14

## 2021-11-16 RX ADMIN — LEVOTHYROXINE SODIUM 125 MCG: 125 TABLET ORAL at 05:54

## 2021-11-16 RX ADMIN — CYCLOBENZAPRINE 5 MG: 10 TABLET, FILM COATED ORAL at 23:49

## 2021-11-16 RX ADMIN — SODIUM CHLORIDE, PRESERVATIVE FREE 10 ML: 5 INJECTION INTRAVENOUS at 20:16

## 2021-11-16 RX ADMIN — AMLODIPINE BESYLATE 5 MG: 5 TABLET ORAL at 08:38

## 2021-11-16 RX ADMIN — CEFTAROLINE FOSAMIL 600 MG: 600 POWDER, FOR SOLUTION INTRAVENOUS at 08:36

## 2021-11-16 RX ADMIN — BUPROPION HYDROCHLORIDE 150 MG: 150 TABLET, EXTENDED RELEASE ORAL at 08:37

## 2021-11-16 RX ADMIN — NYSTATIN: 100000 POWDER TOPICAL at 05:54

## 2021-11-16 RX ADMIN — CLONAZEPAM 0.25 MG: 0.5 TABLET ORAL at 20:14

## 2021-11-16 RX ADMIN — CEFTAROLINE FOSAMIL 600 MG: 600 POWDER, FOR SOLUTION INTRAVENOUS at 16:49

## 2021-11-16 RX ADMIN — NYSTATIN: 100000 POWDER TOPICAL at 22:07

## 2021-11-17 ENCOUNTER — PREP FOR SURGERY (OUTPATIENT)
Dept: OTHER | Facility: HOSPITAL | Age: 56
End: 2021-11-17

## 2021-11-17 ENCOUNTER — APPOINTMENT (OUTPATIENT)
Dept: GENERAL RADIOLOGY | Facility: HOSPITAL | Age: 56
End: 2021-11-17

## 2021-11-17 PROBLEM — E43 SEVERE MALNUTRITION (HCC): Status: ACTIVE | Noted: 2021-11-17

## 2021-11-17 LAB
ALBUMIN SERPL-MCNC: 2.6 G/DL (ref 3.5–5.2)
ALBUMIN/GLOB SERPL: 0.8 G/DL
ALP SERPL-CCNC: 233 U/L (ref 39–117)
ALT SERPL W P-5'-P-CCNC: 13 U/L (ref 1–33)
ANION GAP SERPL CALCULATED.3IONS-SCNC: 10 MMOL/L (ref 5–15)
AST SERPL-CCNC: 29 U/L (ref 1–32)
BACTERIA FLD CULT: NORMAL
BACTERIA SPEC AEROBE CULT: ABNORMAL
BACTERIA SPEC AEROBE CULT: ABNORMAL
BASOPHILS # BLD AUTO: 0.04 10*3/MM3 (ref 0–0.2)
BASOPHILS NFR BLD AUTO: 0.4 % (ref 0–1.5)
BILIRUB SERPL-MCNC: 0.3 MG/DL (ref 0–1.2)
BUN SERPL-MCNC: 39 MG/DL (ref 6–20)
BUN/CREAT SERPL: 30.2 (ref 7–25)
CALCIUM SPEC-SCNC: 9.1 MG/DL (ref 8.6–10.5)
CHLORIDE SERPL-SCNC: 107 MMOL/L (ref 98–107)
CO2 SERPL-SCNC: 22 MMOL/L (ref 22–29)
CREAT SERPL-MCNC: 1.29 MG/DL (ref 0.57–1)
DEPRECATED RDW RBC AUTO: 55.5 FL (ref 37–54)
EOSINOPHIL # BLD AUTO: 0.09 10*3/MM3 (ref 0–0.4)
EOSINOPHIL NFR BLD AUTO: 0.8 % (ref 0.3–6.2)
ERYTHROCYTE [DISTWIDTH] IN BLOOD BY AUTOMATED COUNT: 17.3 % (ref 12.3–15.4)
GFR SERPL CREATININE-BSD FRML MDRD: 43 ML/MIN/1.73
GLOBULIN UR ELPH-MCNC: 3.2 GM/DL
GLUCOSE BLDC GLUCOMTR-MCNC: 102 MG/DL (ref 70–130)
GLUCOSE BLDC GLUCOMTR-MCNC: 141 MG/DL (ref 70–130)
GLUCOSE BLDC GLUCOMTR-MCNC: 76 MG/DL (ref 70–130)
GLUCOSE BLDC GLUCOMTR-MCNC: 77 MG/DL (ref 70–130)
GLUCOSE BLDC GLUCOMTR-MCNC: 78 MG/DL (ref 70–130)
GLUCOSE BLDC GLUCOMTR-MCNC: 81 MG/DL (ref 70–130)
GLUCOSE SERPL-MCNC: 94 MG/DL (ref 65–99)
GRAM STN SPEC: ABNORMAL
GRAM STN SPEC: ABNORMAL
GRAM STN SPEC: NORMAL
HCT VFR BLD AUTO: 31.9 % (ref 34–46.6)
HGB BLD-MCNC: 10 G/DL (ref 12–15.9)
IMM GRANULOCYTES # BLD AUTO: 0.44 10*3/MM3 (ref 0–0.05)
IMM GRANULOCYTES NFR BLD AUTO: 4 % (ref 0–0.5)
ISOLATED FROM: ABNORMAL
LYMPHOCYTES # BLD AUTO: 1.43 10*3/MM3 (ref 0.7–3.1)
LYMPHOCYTES NFR BLD AUTO: 13.1 % (ref 19.6–45.3)
MAGNESIUM SERPL-MCNC: 2.5 MG/DL (ref 1.6–2.6)
MCH RBC QN AUTO: 27.5 PG (ref 26.6–33)
MCHC RBC AUTO-ENTMCNC: 31.3 G/DL (ref 31.5–35.7)
MCV RBC AUTO: 87.9 FL (ref 79–97)
MONOCYTES # BLD AUTO: 0.8 10*3/MM3 (ref 0.1–0.9)
MONOCYTES NFR BLD AUTO: 7.3 % (ref 5–12)
NEUTROPHILS NFR BLD AUTO: 74.4 % (ref 42.7–76)
NEUTROPHILS NFR BLD AUTO: 8.12 10*3/MM3 (ref 1.7–7)
NRBC BLD AUTO-RTO: 0 /100 WBC (ref 0–0.2)
PHOSPHATE SERPL-MCNC: 3.9 MG/DL (ref 2.5–4.5)
PLATELET # BLD AUTO: 155 10*3/MM3 (ref 140–450)
PMV BLD AUTO: 10.8 FL (ref 6–12)
POTASSIUM SERPL-SCNC: 4.7 MMOL/L (ref 3.5–5.2)
PROT SERPL-MCNC: 5.8 G/DL (ref 6–8.5)
RBC # BLD AUTO: 3.63 10*6/MM3 (ref 3.77–5.28)
SODIUM SERPL-SCNC: 139 MMOL/L (ref 136–145)
WBC NRBC COR # BLD: 10.92 10*3/MM3 (ref 3.4–10.8)

## 2021-11-17 PROCEDURE — 25010000002 HYDROMORPHONE PER 4 MG: Performed by: INTERNAL MEDICINE

## 2021-11-17 PROCEDURE — 97530 THERAPEUTIC ACTIVITIES: CPT

## 2021-11-17 PROCEDURE — 97110 THERAPEUTIC EXERCISES: CPT

## 2021-11-17 PROCEDURE — 85025 COMPLETE CBC W/AUTO DIFF WBC: CPT | Performed by: INTERNAL MEDICINE

## 2021-11-17 PROCEDURE — 82962 GLUCOSE BLOOD TEST: CPT

## 2021-11-17 PROCEDURE — 25010000002 CEFTAROLINE FOSAMIL PER 10 MG: Performed by: INTERNAL MEDICINE

## 2021-11-17 PROCEDURE — 74018 RADEX ABDOMEN 1 VIEW: CPT

## 2021-11-17 PROCEDURE — 80053 COMPREHEN METABOLIC PANEL: CPT | Performed by: INTERNAL MEDICINE

## 2021-11-17 PROCEDURE — 25010000002 DAPTOMYCIN PER 1 MG: Performed by: INTERNAL MEDICINE

## 2021-11-17 PROCEDURE — 99024 POSTOP FOLLOW-UP VISIT: CPT | Performed by: PHYSICIAN ASSISTANT

## 2021-11-17 PROCEDURE — 99232 SBSQ HOSP IP/OBS MODERATE 35: CPT | Performed by: INTERNAL MEDICINE

## 2021-11-17 PROCEDURE — 83735 ASSAY OF MAGNESIUM: CPT | Performed by: INTERNAL MEDICINE

## 2021-11-17 PROCEDURE — 84100 ASSAY OF PHOSPHORUS: CPT | Performed by: INTERNAL MEDICINE

## 2021-11-17 RX ORDER — ALPRAZOLAM 0.5 MG/1
0.5 TABLET ORAL 3 TIMES DAILY PRN
Status: DISCONTINUED | OUTPATIENT
Start: 2021-11-17 | End: 2021-11-19

## 2021-11-17 RX ADMIN — OXYCODONE 10 MG: 5 TABLET ORAL at 04:07

## 2021-11-17 RX ADMIN — PANTOPRAZOLE SODIUM 40 MG: 40 TABLET, DELAYED RELEASE ORAL at 06:02

## 2021-11-17 RX ADMIN — SODIUM CHLORIDE, PRESERVATIVE FREE 10 ML: 5 INJECTION INTRAVENOUS at 08:45

## 2021-11-17 RX ADMIN — OXYCODONE 10 MG: 5 TABLET ORAL at 12:04

## 2021-11-17 RX ADMIN — CEFTAROLINE FOSAMIL 600 MG: 600 POWDER, FOR SOLUTION INTRAVENOUS at 23:45

## 2021-11-17 RX ADMIN — LEVETIRACETAM 500 MG: 500 TABLET, FILM COATED ORAL at 08:44

## 2021-11-17 RX ADMIN — OXYCODONE HYDROCHLORIDE AND ACETAMINOPHEN 1 TABLET: 5; 325 TABLET ORAL at 17:27

## 2021-11-17 RX ADMIN — DULOXETINE HYDROCHLORIDE 30 MG: 30 CAPSULE, DELAYED RELEASE ORAL at 08:45

## 2021-11-17 RX ADMIN — SODIUM CHLORIDE, PRESERVATIVE FREE 10 ML: 5 INJECTION INTRAVENOUS at 20:47

## 2021-11-17 RX ADMIN — LEVETIRACETAM 500 MG: 500 TABLET, FILM COATED ORAL at 20:31

## 2021-11-17 RX ADMIN — CEFTAROLINE FOSAMIL 600 MG: 600 POWDER, FOR SOLUTION INTRAVENOUS at 16:50

## 2021-11-17 RX ADMIN — MULTIVITAMIN TABLET 1 TABLET: TABLET at 08:44

## 2021-11-17 RX ADMIN — BUPROPION HYDROCHLORIDE 150 MG: 150 TABLET, EXTENDED RELEASE ORAL at 08:43

## 2021-11-17 RX ADMIN — LEVOTHYROXINE SODIUM 125 MCG: 125 TABLET ORAL at 06:01

## 2021-11-17 RX ADMIN — CASTOR OIL AND BALSAM, PERU 1 APPLICATION: 788; 87 OINTMENT TOPICAL at 20:32

## 2021-11-17 RX ADMIN — TEMAZEPAM 15 MG: 15 CAPSULE ORAL at 20:45

## 2021-11-17 RX ADMIN — DAPTOMYCIN 700 MG: 500 INJECTION, POWDER, LYOPHILIZED, FOR SOLUTION INTRAVENOUS at 14:23

## 2021-11-17 RX ADMIN — CASTOR OIL AND BALSAM, PERU 1 APPLICATION: 788; 87 OINTMENT TOPICAL at 08:44

## 2021-11-17 RX ADMIN — NYSTATIN: 100000 POWDER TOPICAL at 14:23

## 2021-11-17 RX ADMIN — ACETAMINOPHEN 650 MG: 325 TABLET, FILM COATED ORAL at 22:13

## 2021-11-17 RX ADMIN — ALPRAZOLAM 0.5 MG: 0.5 TABLET ORAL at 17:22

## 2021-11-17 RX ADMIN — CEFTAROLINE FOSAMIL 600 MG: 600 POWDER, FOR SOLUTION INTRAVENOUS at 08:42

## 2021-11-17 RX ADMIN — NYSTATIN: 100000 POWDER TOPICAL at 06:02

## 2021-11-17 RX ADMIN — Medication 1 CAPSULE: at 08:43

## 2021-11-17 RX ADMIN — HYDROMORPHONE HYDROCHLORIDE 0.5 MG: 2 INJECTION, SOLUTION INTRAMUSCULAR; INTRAVENOUS; SUBCUTANEOUS at 08:43

## 2021-11-17 RX ADMIN — BUPROPION HYDROCHLORIDE 150 MG: 150 TABLET, EXTENDED RELEASE ORAL at 20:31

## 2021-11-17 RX ADMIN — METOPROLOL TARTRATE 100 MG: 100 TABLET, FILM COATED ORAL at 20:45

## 2021-11-17 RX ADMIN — NYSTATIN: 100000 POWDER TOPICAL at 22:01

## 2021-11-18 ENCOUNTER — APPOINTMENT (OUTPATIENT)
Dept: GENERAL RADIOLOGY | Facility: HOSPITAL | Age: 56
End: 2021-11-18

## 2021-11-18 LAB
ALBUMIN SERPL-MCNC: 2.5 G/DL (ref 3.5–5.2)
ALP SERPL-CCNC: 240 U/L (ref 39–117)
ALT SERPL W P-5'-P-CCNC: 16 U/L (ref 1–33)
ANION GAP SERPL CALCULATED.3IONS-SCNC: 5 MMOL/L (ref 5–15)
AST SERPL-CCNC: 33 U/L (ref 1–32)
BASOPHILS # BLD AUTO: 0.01 10*3/MM3 (ref 0–0.2)
BASOPHILS NFR BLD AUTO: 0.2 % (ref 0–1.5)
BILIRUB SERPL-MCNC: 0.3 MG/DL (ref 0–1.2)
BUN SERPL-MCNC: 35 MG/DL (ref 6–20)
CALCIUM SPEC-SCNC: 9.2 MG/DL (ref 8.6–10.5)
CHLORIDE SERPL-SCNC: 109 MMOL/L (ref 98–107)
CHOLEST SERPL-MCNC: 182 MG/DL (ref 0–200)
CO2 SERPL-SCNC: 25 MMOL/L (ref 22–29)
CREAT SERPL-MCNC: 1.08 MG/DL (ref 0.57–1)
CRP SERPL-MCNC: 8.89 MG/DL (ref 0–0.5)
DEPRECATED RDW RBC AUTO: 58.4 FL (ref 37–54)
EOSINOPHIL # BLD AUTO: 0.09 10*3/MM3 (ref 0–0.4)
EOSINOPHIL NFR BLD AUTO: 1.4 % (ref 0.3–6.2)
ERYTHROCYTE [DISTWIDTH] IN BLOOD BY AUTOMATED COUNT: 17.6 % (ref 12.3–15.4)
GLUCOSE BLDC GLUCOMTR-MCNC: 100 MG/DL (ref 70–130)
GLUCOSE BLDC GLUCOMTR-MCNC: 102 MG/DL (ref 70–130)
GLUCOSE BLDC GLUCOMTR-MCNC: 113 MG/DL (ref 70–130)
GLUCOSE SERPL-MCNC: 101 MG/DL (ref 65–99)
HCT VFR BLD AUTO: 29.9 % (ref 34–46.6)
HGB BLD-MCNC: 8.9 G/DL (ref 12–15.9)
IMM GRANULOCYTES # BLD AUTO: 0.07 10*3/MM3 (ref 0–0.05)
IMM GRANULOCYTES NFR BLD AUTO: 1.1 % (ref 0–0.5)
LYMPHOCYTES # BLD AUTO: 0.46 10*3/MM3 (ref 0.7–3.1)
LYMPHOCYTES NFR BLD AUTO: 7.1 % (ref 19.6–45.3)
MAGNESIUM SERPL-MCNC: 2.2 MG/DL (ref 1.6–2.6)
MCH RBC QN AUTO: 27.1 PG (ref 26.6–33)
MCHC RBC AUTO-ENTMCNC: 29.8 G/DL (ref 31.5–35.7)
MCV RBC AUTO: 90.9 FL (ref 79–97)
MONOCYTES # BLD AUTO: 0.23 10*3/MM3 (ref 0.1–0.9)
MONOCYTES NFR BLD AUTO: 3.5 % (ref 5–12)
NEUTROPHILS NFR BLD AUTO: 5.63 10*3/MM3 (ref 1.7–7)
NEUTROPHILS NFR BLD AUTO: 86.7 % (ref 42.7–76)
NRBC BLD AUTO-RTO: 0 /100 WBC (ref 0–0.2)
PHOSPHATE SERPL-MCNC: 3.6 MG/DL (ref 2.5–4.5)
PLATELET # BLD AUTO: 142 10*3/MM3 (ref 140–450)
PMV BLD AUTO: 10.9 FL (ref 6–12)
POTASSIUM SERPL-SCNC: 5.1 MMOL/L (ref 3.5–5.2)
PREALB SERPL-MCNC: 19.6 MG/DL (ref 20–40)
PROT SERPL-MCNC: 6.2 G/DL (ref 6–8.5)
RBC # BLD AUTO: 3.29 10*6/MM3 (ref 3.77–5.28)
SODIUM SERPL-SCNC: 139 MMOL/L (ref 136–145)
TRIGL SERPL-MCNC: 88 MG/DL (ref 0–150)
WBC NRBC COR # BLD: 6.49 10*3/MM3 (ref 3.4–10.8)

## 2021-11-18 PROCEDURE — 97168 OT RE-EVAL EST PLAN CARE: CPT

## 2021-11-18 PROCEDURE — 83735 ASSAY OF MAGNESIUM: CPT

## 2021-11-18 PROCEDURE — 84134 ASSAY OF PREALBUMIN: CPT

## 2021-11-18 PROCEDURE — 82962 GLUCOSE BLOOD TEST: CPT

## 2021-11-18 PROCEDURE — 25010000002 CEFTAROLINE FOSAMIL PER 10 MG: Performed by: INTERNAL MEDICINE

## 2021-11-18 PROCEDURE — 85025 COMPLETE CBC W/AUTO DIFF WBC: CPT | Performed by: INTERNAL MEDICINE

## 2021-11-18 PROCEDURE — 86140 C-REACTIVE PROTEIN: CPT

## 2021-11-18 PROCEDURE — 25010000002 DAPTOMYCIN PER 1 MG: Performed by: INTERNAL MEDICINE

## 2021-11-18 PROCEDURE — 97530 THERAPEUTIC ACTIVITIES: CPT

## 2021-11-18 PROCEDURE — 99232 SBSQ HOSP IP/OBS MODERATE 35: CPT | Performed by: INTERNAL MEDICINE

## 2021-11-18 PROCEDURE — 74018 RADEX ABDOMEN 1 VIEW: CPT

## 2021-11-18 PROCEDURE — 80053 COMPREHEN METABOLIC PANEL: CPT

## 2021-11-18 PROCEDURE — 84100 ASSAY OF PHOSPHORUS: CPT

## 2021-11-18 PROCEDURE — 84478 ASSAY OF TRIGLYCERIDES: CPT

## 2021-11-18 PROCEDURE — 97535 SELF CARE MNGMENT TRAINING: CPT

## 2021-11-18 PROCEDURE — 82465 ASSAY BLD/SERUM CHOLESTEROL: CPT

## 2021-11-18 RX ADMIN — LEVOTHYROXINE SODIUM 125 MCG: 125 TABLET ORAL at 06:00

## 2021-11-18 RX ADMIN — ALPRAZOLAM 0.5 MG: 0.5 TABLET ORAL at 02:31

## 2021-11-18 RX ADMIN — PANTOPRAZOLE SODIUM 40 MG: 40 TABLET, DELAYED RELEASE ORAL at 06:00

## 2021-11-18 RX ADMIN — Medication 1 CAPSULE: at 08:14

## 2021-11-18 RX ADMIN — CEFTAROLINE FOSAMIL 600 MG: 600 POWDER, FOR SOLUTION INTRAVENOUS at 23:54

## 2021-11-18 RX ADMIN — BUPROPION HYDROCHLORIDE 150 MG: 150 TABLET, EXTENDED RELEASE ORAL at 20:06

## 2021-11-18 RX ADMIN — DAPTOMYCIN 700 MG: 500 INJECTION, POWDER, LYOPHILIZED, FOR SOLUTION INTRAVENOUS at 11:50

## 2021-11-18 RX ADMIN — LEVETIRACETAM 500 MG: 500 TABLET, FILM COATED ORAL at 20:05

## 2021-11-18 RX ADMIN — NYSTATIN: 100000 POWDER TOPICAL at 06:40

## 2021-11-18 RX ADMIN — OXYCODONE HYDROCHLORIDE AND ACETAMINOPHEN 1 TABLET: 5; 325 TABLET ORAL at 08:14

## 2021-11-18 RX ADMIN — SODIUM CHLORIDE, PRESERVATIVE FREE 10 ML: 5 INJECTION INTRAVENOUS at 08:14

## 2021-11-18 RX ADMIN — LEVETIRACETAM 500 MG: 500 TABLET, FILM COATED ORAL at 08:14

## 2021-11-18 RX ADMIN — OXYCODONE HYDROCHLORIDE AND ACETAMINOPHEN 1 TABLET: 5; 325 TABLET ORAL at 19:06

## 2021-11-18 RX ADMIN — DOCUSATE SODIUM 50MG AND SENNOSIDES 8.6MG 1 TABLET: 8.6; 5 TABLET, FILM COATED ORAL at 20:05

## 2021-11-18 RX ADMIN — CEFTAROLINE FOSAMIL 600 MG: 600 POWDER, FOR SOLUTION INTRAVENOUS at 16:48

## 2021-11-18 RX ADMIN — NYSTATIN: 100000 POWDER TOPICAL at 20:06

## 2021-11-18 RX ADMIN — ALPRAZOLAM 0.5 MG: 0.5 TABLET ORAL at 16:48

## 2021-11-18 RX ADMIN — METOPROLOL TARTRATE 100 MG: 100 TABLET, FILM COATED ORAL at 08:14

## 2021-11-18 RX ADMIN — MULTIVITAMIN TABLET 1 TABLET: TABLET at 08:14

## 2021-11-18 RX ADMIN — HYDRALAZINE HYDROCHLORIDE 50 MG: 50 TABLET, FILM COATED ORAL at 20:05

## 2021-11-18 RX ADMIN — OXYCODONE 10 MG: 5 TABLET ORAL at 02:31

## 2021-11-18 RX ADMIN — CASTOR OIL AND BALSAM, PERU 1 APPLICATION: 788; 87 OINTMENT TOPICAL at 08:14

## 2021-11-18 RX ADMIN — CEFTAROLINE FOSAMIL 600 MG: 600 POWDER, FOR SOLUTION INTRAVENOUS at 08:14

## 2021-11-18 RX ADMIN — CASTOR OIL AND BALSAM, PERU 1 APPLICATION: 788; 87 OINTMENT TOPICAL at 20:06

## 2021-11-18 RX ADMIN — BUPROPION HYDROCHLORIDE 150 MG: 150 TABLET, EXTENDED RELEASE ORAL at 08:14

## 2021-11-18 RX ADMIN — AMLODIPINE BESYLATE 5 MG: 5 TABLET ORAL at 08:14

## 2021-11-18 RX ADMIN — NYSTATIN: 100000 POWDER TOPICAL at 16:33

## 2021-11-18 RX ADMIN — METOPROLOL TARTRATE 100 MG: 100 TABLET, FILM COATED ORAL at 20:06

## 2021-11-18 RX ADMIN — DULOXETINE HYDROCHLORIDE 30 MG: 30 CAPSULE, DELAYED RELEASE ORAL at 08:14

## 2021-11-18 RX ADMIN — ACETAMINOPHEN 650 MG: 325 TABLET, FILM COATED ORAL at 10:21

## 2021-11-19 LAB
ANION GAP SERPL CALCULATED.3IONS-SCNC: 8 MMOL/L (ref 5–15)
BACTERIA SPEC ANAEROBE CULT: NORMAL
BACTERIA SPEC ANAEROBE CULT: NORMAL
BASOPHILS # BLD AUTO: 0.02 10*3/MM3 (ref 0–0.2)
BASOPHILS NFR BLD AUTO: 0.1 % (ref 0–1.5)
BUN SERPL-MCNC: 28 MG/DL (ref 6–20)
BUN/CREAT SERPL: 32.6 (ref 7–25)
CALCIUM SPEC-SCNC: 9.5 MG/DL (ref 8.6–10.5)
CHLORIDE SERPL-SCNC: 108 MMOL/L (ref 98–107)
CO2 SERPL-SCNC: 23 MMOL/L (ref 22–29)
CREAT SERPL-MCNC: 0.86 MG/DL (ref 0.57–1)
DEPRECATED RDW RBC AUTO: 60 FL (ref 37–54)
EOSINOPHIL # BLD AUTO: 0.11 10*3/MM3 (ref 0–0.4)
EOSINOPHIL NFR BLD AUTO: 0.8 % (ref 0.3–6.2)
ERYTHROCYTE [DISTWIDTH] IN BLOOD BY AUTOMATED COUNT: 17.5 % (ref 12.3–15.4)
GFR SERPL CREATININE-BSD FRML MDRD: 69 ML/MIN/1.73
GLUCOSE BLDC GLUCOMTR-MCNC: 139 MG/DL (ref 70–130)
GLUCOSE BLDC GLUCOMTR-MCNC: 141 MG/DL (ref 70–130)
GLUCOSE BLDC GLUCOMTR-MCNC: 175 MG/DL (ref 70–130)
GLUCOSE BLDC GLUCOMTR-MCNC: 98 MG/DL (ref 70–130)
GLUCOSE SERPL-MCNC: 149 MG/DL (ref 65–99)
HCT VFR BLD AUTO: 34.6 % (ref 34–46.6)
HGB BLD-MCNC: 10.1 G/DL (ref 12–15.9)
IMM GRANULOCYTES # BLD AUTO: 0.15 10*3/MM3 (ref 0–0.05)
IMM GRANULOCYTES NFR BLD AUTO: 1 % (ref 0–0.5)
LYMPHOCYTES # BLD AUTO: 0.67 10*3/MM3 (ref 0.7–3.1)
LYMPHOCYTES NFR BLD AUTO: 4.6 % (ref 19.6–45.3)
MCH RBC QN AUTO: 27.4 PG (ref 26.6–33)
MCHC RBC AUTO-ENTMCNC: 29.2 G/DL (ref 31.5–35.7)
MCV RBC AUTO: 94 FL (ref 79–97)
MONOCYTES # BLD AUTO: 0.35 10*3/MM3 (ref 0.1–0.9)
MONOCYTES NFR BLD AUTO: 2.4 % (ref 5–12)
NEUTROPHILS NFR BLD AUTO: 13.13 10*3/MM3 (ref 1.7–7)
NEUTROPHILS NFR BLD AUTO: 91.1 % (ref 42.7–76)
NRBC BLD AUTO-RTO: 0 /100 WBC (ref 0–0.2)
PLATELET # BLD AUTO: 190 10*3/MM3 (ref 140–450)
PMV BLD AUTO: 10.4 FL (ref 6–12)
POTASSIUM SERPL-SCNC: 4.7 MMOL/L (ref 3.5–5.2)
RBC # BLD AUTO: 3.68 10*6/MM3 (ref 3.77–5.28)
SODIUM SERPL-SCNC: 139 MMOL/L (ref 136–145)
WBC NRBC COR # BLD: 14.43 10*3/MM3 (ref 3.4–10.8)

## 2021-11-19 PROCEDURE — 25010000002 DAPTOMYCIN PER 1 MG: Performed by: INTERNAL MEDICINE

## 2021-11-19 PROCEDURE — 82962 GLUCOSE BLOOD TEST: CPT

## 2021-11-19 PROCEDURE — 97530 THERAPEUTIC ACTIVITIES: CPT

## 2021-11-19 PROCEDURE — 99024 POSTOP FOLLOW-UP VISIT: CPT | Performed by: PHYSICIAN ASSISTANT

## 2021-11-19 PROCEDURE — 97110 THERAPEUTIC EXERCISES: CPT

## 2021-11-19 PROCEDURE — 85025 COMPLETE CBC W/AUTO DIFF WBC: CPT | Performed by: INTERNAL MEDICINE

## 2021-11-19 PROCEDURE — 25010000002 CEFTAROLINE FOSAMIL PER 10 MG: Performed by: INTERNAL MEDICINE

## 2021-11-19 PROCEDURE — 80048 BASIC METABOLIC PNL TOTAL CA: CPT | Performed by: INTERNAL MEDICINE

## 2021-11-19 PROCEDURE — 99232 SBSQ HOSP IP/OBS MODERATE 35: CPT | Performed by: INTERNAL MEDICINE

## 2021-11-19 PROCEDURE — 25010000002 FUROSEMIDE PER 20 MG: Performed by: INTERNAL MEDICINE

## 2021-11-19 RX ORDER — FUROSEMIDE 10 MG/ML
40 INJECTION INTRAMUSCULAR; INTRAVENOUS EVERY 4 HOURS
Status: COMPLETED | OUTPATIENT
Start: 2021-11-19 | End: 2021-11-19

## 2021-11-19 RX ORDER — ALPRAZOLAM 1 MG/1
1 TABLET ORAL 2 TIMES DAILY
Status: DISCONTINUED | OUTPATIENT
Start: 2021-11-19 | End: 2021-11-19

## 2021-11-19 RX ORDER — DIAZEPAM 5 MG/1
5 TABLET ORAL EVERY 8 HOURS
Status: DISCONTINUED | OUTPATIENT
Start: 2021-11-19 | End: 2021-11-22

## 2021-11-19 RX ADMIN — DOCUSATE SODIUM 50MG AND SENNOSIDES 8.6MG 1 TABLET: 8.6; 5 TABLET, FILM COATED ORAL at 19:36

## 2021-11-19 RX ADMIN — AMLODIPINE BESYLATE 5 MG: 5 TABLET ORAL at 08:22

## 2021-11-19 RX ADMIN — NYSTATIN: 100000 POWDER TOPICAL at 19:37

## 2021-11-19 RX ADMIN — ALPRAZOLAM 0.5 MG: 0.5 TABLET ORAL at 08:22

## 2021-11-19 RX ADMIN — FUROSEMIDE 40 MG: 40 INJECTION, SOLUTION INTRAMUSCULAR; INTRAVENOUS at 14:55

## 2021-11-19 RX ADMIN — OXYCODONE 10 MG: 5 TABLET ORAL at 08:22

## 2021-11-19 RX ADMIN — NYSTATIN: 100000 POWDER TOPICAL at 05:53

## 2021-11-19 RX ADMIN — METOPROLOL TARTRATE 100 MG: 100 TABLET, FILM COATED ORAL at 19:36

## 2021-11-19 RX ADMIN — BUPROPION HYDROCHLORIDE 150 MG: 150 TABLET, EXTENDED RELEASE ORAL at 08:20

## 2021-11-19 RX ADMIN — LEVETIRACETAM 500 MG: 500 TABLET, FILM COATED ORAL at 08:21

## 2021-11-19 RX ADMIN — DAPTOMYCIN 700 MG: 500 INJECTION, POWDER, LYOPHILIZED, FOR SOLUTION INTRAVENOUS at 12:17

## 2021-11-19 RX ADMIN — Medication 1 CAPSULE: at 08:22

## 2021-11-19 RX ADMIN — CEFTAROLINE FOSAMIL 600 MG: 600 POWDER, FOR SOLUTION INTRAVENOUS at 08:18

## 2021-11-19 RX ADMIN — BUPROPION HYDROCHLORIDE 150 MG: 150 TABLET, EXTENDED RELEASE ORAL at 19:36

## 2021-11-19 RX ADMIN — HYDRALAZINE HYDROCHLORIDE 50 MG: 50 TABLET, FILM COATED ORAL at 19:36

## 2021-11-19 RX ADMIN — DIAZEPAM 5 MG: 5 TABLET ORAL at 23:54

## 2021-11-19 RX ADMIN — FUROSEMIDE 40 MG: 40 INJECTION, SOLUTION INTRAMUSCULAR; INTRAVENOUS at 17:38

## 2021-11-19 RX ADMIN — DULOXETINE HYDROCHLORIDE 30 MG: 30 CAPSULE, DELAYED RELEASE ORAL at 08:21

## 2021-11-19 RX ADMIN — LEVETIRACETAM 500 MG: 500 TABLET, FILM COATED ORAL at 19:37

## 2021-11-19 RX ADMIN — HYDRALAZINE HYDROCHLORIDE 50 MG: 50 TABLET, FILM COATED ORAL at 14:55

## 2021-11-19 RX ADMIN — DIAZEPAM 5 MG: 5 TABLET ORAL at 10:58

## 2021-11-19 RX ADMIN — HYDRALAZINE HYDROCHLORIDE 50 MG: 50 TABLET, FILM COATED ORAL at 05:52

## 2021-11-19 RX ADMIN — CEFTAROLINE FOSAMIL 600 MG: 600 POWDER, FOR SOLUTION INTRAVENOUS at 23:55

## 2021-11-19 RX ADMIN — LEVOTHYROXINE SODIUM 125 MCG: 125 TABLET ORAL at 05:52

## 2021-11-19 RX ADMIN — DIAZEPAM 5 MG: 5 TABLET ORAL at 17:38

## 2021-11-19 RX ADMIN — PANTOPRAZOLE SODIUM 40 MG: 40 TABLET, DELAYED RELEASE ORAL at 05:52

## 2021-11-19 RX ADMIN — NYSTATIN: 100000 POWDER TOPICAL at 14:00

## 2021-11-19 RX ADMIN — CASTOR OIL AND BALSAM, PERU 1 APPLICATION: 788; 87 OINTMENT TOPICAL at 08:23

## 2021-11-19 RX ADMIN — SODIUM CHLORIDE, PRESERVATIVE FREE 10 ML: 5 INJECTION INTRAVENOUS at 10:58

## 2021-11-19 RX ADMIN — FUROSEMIDE 40 MG: 40 INJECTION, SOLUTION INTRAMUSCULAR; INTRAVENOUS at 10:58

## 2021-11-19 RX ADMIN — METOPROLOL TARTRATE 100 MG: 100 TABLET, FILM COATED ORAL at 08:22

## 2021-11-19 RX ADMIN — CASTOR OIL AND BALSAM, PERU 1 APPLICATION: 788; 87 OINTMENT TOPICAL at 19:37

## 2021-11-19 RX ADMIN — CEFTAROLINE FOSAMIL 600 MG: 600 POWDER, FOR SOLUTION INTRAVENOUS at 15:42

## 2021-11-19 RX ADMIN — MULTIVITAMIN TABLET 1 TABLET: TABLET at 10:58

## 2021-11-20 ENCOUNTER — APPOINTMENT (OUTPATIENT)
Dept: GENERAL RADIOLOGY | Facility: HOSPITAL | Age: 56
End: 2021-11-20

## 2021-11-20 ENCOUNTER — APPOINTMENT (OUTPATIENT)
Dept: CT IMAGING | Facility: HOSPITAL | Age: 56
End: 2021-11-20

## 2021-11-20 LAB
ALBUMIN SERPL-MCNC: 2.5 G/DL (ref 3.5–5.2)
ALBUMIN/GLOB SERPL: 0.6 G/DL
ALP SERPL-CCNC: 209 U/L (ref 39–117)
ALT SERPL W P-5'-P-CCNC: 14 U/L (ref 1–33)
ANION GAP SERPL CALCULATED.3IONS-SCNC: 8 MMOL/L (ref 5–15)
ARTERIAL PATENCY WRIST A: ABNORMAL
AST SERPL-CCNC: 20 U/L (ref 1–32)
ATMOSPHERIC PRESS: ABNORMAL MM[HG]
BACTERIA UR QL AUTO: ABNORMAL /HPF
BASE EXCESS BLDA CALC-SCNC: 4.8 MMOL/L (ref 0–2)
BASOPHILS # BLD AUTO: 0.03 10*3/MM3 (ref 0–0.2)
BASOPHILS NFR BLD AUTO: 0.2 % (ref 0–1.5)
BDY SITE: ABNORMAL
BILIRUB SERPL-MCNC: 0.3 MG/DL (ref 0–1.2)
BILIRUB UR QL STRIP: NEGATIVE
BODY TEMPERATURE: 37 C
BUN SERPL-MCNC: 36 MG/DL (ref 6–20)
BUN/CREAT SERPL: 40 (ref 7–25)
CALCIUM SPEC-SCNC: 9.3 MG/DL (ref 8.6–10.5)
CHLORIDE SERPL-SCNC: 106 MMOL/L (ref 98–107)
CLARITY UR: CLEAR
CO2 BLDA-SCNC: 33.4 MMOL/L (ref 22–33)
CO2 SERPL-SCNC: 27 MMOL/L (ref 22–29)
COHGB MFR BLD: 1.3 % (ref 0–2)
COLOR UR: YELLOW
CREAT SERPL-MCNC: 0.9 MG/DL (ref 0.57–1)
CRP SERPL-MCNC: 6.8 MG/DL (ref 0–0.5)
DEPRECATED RDW RBC AUTO: 57.1 FL (ref 37–54)
EOSINOPHIL # BLD AUTO: 0.06 10*3/MM3 (ref 0–0.4)
EOSINOPHIL NFR BLD AUTO: 0.4 % (ref 0.3–6.2)
ERYTHROCYTE [DISTWIDTH] IN BLOOD BY AUTOMATED COUNT: 17.3 % (ref 12.3–15.4)
GFR SERPL CREATININE-BSD FRML MDRD: 65 ML/MIN/1.73
GLOBULIN UR ELPH-MCNC: 3.9 GM/DL
GLUCOSE BLDC GLUCOMTR-MCNC: 102 MG/DL (ref 70–130)
GLUCOSE BLDC GLUCOMTR-MCNC: 112 MG/DL (ref 70–130)
GLUCOSE BLDC GLUCOMTR-MCNC: 116 MG/DL (ref 70–130)
GLUCOSE BLDC GLUCOMTR-MCNC: 125 MG/DL (ref 70–130)
GLUCOSE BLDC GLUCOMTR-MCNC: 141 MG/DL (ref 70–130)
GLUCOSE BLDC GLUCOMTR-MCNC: 143 MG/DL (ref 70–130)
GLUCOSE SERPL-MCNC: 142 MG/DL (ref 65–99)
GLUCOSE UR STRIP-MCNC: NEGATIVE MG/DL
HCO3 BLDA-SCNC: 31.6 MMOL/L (ref 20–26)
HCT VFR BLD AUTO: 31.7 % (ref 34–46.6)
HCT VFR BLD CALC: 30.2 % (ref 38–51)
HGB BLD-MCNC: 9.5 G/DL (ref 12–15.9)
HGB BLDA-MCNC: 9.9 G/DL (ref 14–18)
HGB UR QL STRIP.AUTO: ABNORMAL
HYALINE CASTS UR QL AUTO: ABNORMAL /LPF
IMM GRANULOCYTES # BLD AUTO: 0.13 10*3/MM3 (ref 0–0.05)
IMM GRANULOCYTES NFR BLD AUTO: 0.9 % (ref 0–0.5)
INHALED O2 CONCENTRATION: 44 %
KETONES UR QL STRIP: NEGATIVE
LEUKOCYTE ESTERASE UR QL STRIP.AUTO: ABNORMAL
LYMPHOCYTES # BLD AUTO: 0.65 10*3/MM3 (ref 0.7–3.1)
LYMPHOCYTES NFR BLD AUTO: 4.3 % (ref 19.6–45.3)
MCH RBC QN AUTO: 27.3 PG (ref 26.6–33)
MCHC RBC AUTO-ENTMCNC: 30 G/DL (ref 31.5–35.7)
MCV RBC AUTO: 91.1 FL (ref 79–97)
METHGB BLD QL: 0.5 % (ref 0–1.5)
MODALITY: ABNORMAL
MONOCYTES # BLD AUTO: 0.3 10*3/MM3 (ref 0.1–0.9)
MONOCYTES NFR BLD AUTO: 2 % (ref 5–12)
NEUTROPHILS NFR BLD AUTO: 14.01 10*3/MM3 (ref 1.7–7)
NEUTROPHILS NFR BLD AUTO: 92.2 % (ref 42.7–76)
NITRITE UR QL STRIP: NEGATIVE
NOTE: ABNORMAL
NRBC BLD AUTO-RTO: 0 /100 WBC (ref 0–0.2)
OXYHGB MFR BLDV: 95.1 % (ref 94–99)
PCO2 BLDA: 58.2 MM HG (ref 35–45)
PCO2 TEMP ADJ BLD: 58.2 MM HG (ref 35–45)
PH BLDA: 7.34 PH UNITS (ref 7.35–7.45)
PH UR STRIP.AUTO: <=5 [PH] (ref 5–8)
PH, TEMP CORRECTED: 7.34 PH UNITS
PLATELET # BLD AUTO: 214 10*3/MM3 (ref 140–450)
PMV BLD AUTO: 9.6 FL (ref 6–12)
PO2 BLDA: 84.8 MM HG (ref 83–108)
PO2 TEMP ADJ BLD: 84.8 MM HG (ref 83–108)
POTASSIUM SERPL-SCNC: 4.3 MMOL/L (ref 3.5–5.2)
PROCALCITONIN SERPL-MCNC: 0.27 NG/ML (ref 0–0.25)
PROT SERPL-MCNC: 6.4 G/DL (ref 6–8.5)
PROT UR QL STRIP: ABNORMAL
RBC # BLD AUTO: 3.48 10*6/MM3 (ref 3.77–5.28)
RBC # UR STRIP: ABNORMAL /HPF
REF LAB TEST METHOD: ABNORMAL
SODIUM SERPL-SCNC: 141 MMOL/L (ref 136–145)
SP GR UR STRIP: 1.01 (ref 1–1.03)
SQUAMOUS #/AREA URNS HPF: ABNORMAL /HPF
UROBILINOGEN UR QL STRIP: ABNORMAL
VENTILATOR MODE: ABNORMAL
WBC # UR STRIP: ABNORMAL /HPF
WBC NRBC COR # BLD: 15.18 10*3/MM3 (ref 3.4–10.8)
YEAST URNS QL MICRO: ABNORMAL /HPF

## 2021-11-20 PROCEDURE — 94799 UNLISTED PULMONARY SVC/PX: CPT

## 2021-11-20 PROCEDURE — 74018 RADEX ABDOMEN 1 VIEW: CPT

## 2021-11-20 PROCEDURE — 25010000002 MEROPENEM PER 100 MG: Performed by: PHYSICIAN ASSISTANT

## 2021-11-20 PROCEDURE — 86140 C-REACTIVE PROTEIN: CPT | Performed by: INTERNAL MEDICINE

## 2021-11-20 PROCEDURE — 36600 WITHDRAWAL OF ARTERIAL BLOOD: CPT

## 2021-11-20 PROCEDURE — 99232 SBSQ HOSP IP/OBS MODERATE 35: CPT | Performed by: INTERNAL MEDICINE

## 2021-11-20 PROCEDURE — 25010000002 VANCOMYCIN PER 500 MG

## 2021-11-20 PROCEDURE — 25010000002 FUROSEMIDE PER 20 MG: Performed by: INTERNAL MEDICINE

## 2021-11-20 PROCEDURE — 71275 CT ANGIOGRAPHY CHEST: CPT

## 2021-11-20 PROCEDURE — 25010000002 VANCOMYCIN 10 G RECONSTITUTED SOLUTION

## 2021-11-20 PROCEDURE — 82805 BLOOD GASES W/O2 SATURATION: CPT

## 2021-11-20 PROCEDURE — 25010000002 HYDROMORPHONE PER 4 MG: Performed by: INTERNAL MEDICINE

## 2021-11-20 PROCEDURE — 93010 ELECTROCARDIOGRAM REPORT: CPT | Performed by: INTERNAL MEDICINE

## 2021-11-20 PROCEDURE — 80053 COMPREHEN METABOLIC PANEL: CPT | Performed by: INTERNAL MEDICINE

## 2021-11-20 PROCEDURE — 82375 ASSAY CARBOXYHB QUANT: CPT

## 2021-11-20 PROCEDURE — 25010000002 CEFTAROLINE FOSAMIL PER 10 MG: Performed by: INTERNAL MEDICINE

## 2021-11-20 PROCEDURE — 83050 HGB METHEMOGLOBIN QUAN: CPT

## 2021-11-20 PROCEDURE — 99291 CRITICAL CARE FIRST HOUR: CPT | Performed by: INTERNAL MEDICINE

## 2021-11-20 PROCEDURE — 0 IOPAMIDOL PER 1 ML: Performed by: INTERNAL MEDICINE

## 2021-11-20 PROCEDURE — 84145 PROCALCITONIN (PCT): CPT | Performed by: INTERNAL MEDICINE

## 2021-11-20 PROCEDURE — 82962 GLUCOSE BLOOD TEST: CPT

## 2021-11-20 PROCEDURE — 25010000002 DAPTOMYCIN PER 1 MG: Performed by: INTERNAL MEDICINE

## 2021-11-20 PROCEDURE — 85025 COMPLETE CBC W/AUTO DIFF WBC: CPT | Performed by: INTERNAL MEDICINE

## 2021-11-20 PROCEDURE — 81001 URINALYSIS AUTO W/SCOPE: CPT | Performed by: PHYSICIAN ASSISTANT

## 2021-11-20 PROCEDURE — 87040 BLOOD CULTURE FOR BACTERIA: CPT | Performed by: PHYSICIAN ASSISTANT

## 2021-11-20 PROCEDURE — 87086 URINE CULTURE/COLONY COUNT: CPT | Performed by: PHYSICIAN ASSISTANT

## 2021-11-20 PROCEDURE — 94660 CPAP INITIATION&MGMT: CPT

## 2021-11-20 PROCEDURE — 93005 ELECTROCARDIOGRAM TRACING: CPT | Performed by: INTERNAL MEDICINE

## 2021-11-20 RX ORDER — VANCOMYCIN HYDROCHLORIDE 1 G/200ML
1000 INJECTION, SOLUTION INTRAVENOUS EVERY 12 HOURS
Status: DISCONTINUED | OUTPATIENT
Start: 2021-11-20 | End: 2021-11-22

## 2021-11-20 RX ORDER — FUROSEMIDE 10 MG/ML
40 INJECTION INTRAMUSCULAR; INTRAVENOUS EVERY 4 HOURS
Status: DISCONTINUED | OUTPATIENT
Start: 2021-11-20 | End: 2021-11-20

## 2021-11-20 RX ORDER — FUROSEMIDE 10 MG/ML
80 INJECTION INTRAMUSCULAR; INTRAVENOUS ONCE
Status: COMPLETED | OUTPATIENT
Start: 2021-11-20 | End: 2021-11-20

## 2021-11-20 RX ADMIN — FUROSEMIDE 80 MG: 10 INJECTION, SOLUTION INTRAMUSCULAR; INTRAVENOUS at 10:21

## 2021-11-20 RX ADMIN — METOPROLOL TARTRATE 100 MG: 100 TABLET, FILM COATED ORAL at 08:44

## 2021-11-20 RX ADMIN — DOXYCYCLINE 100 MG: 100 INJECTION, POWDER, LYOPHILIZED, FOR SOLUTION INTRAVENOUS at 16:30

## 2021-11-20 RX ADMIN — NYSTATIN: 100000 POWDER TOPICAL at 21:34

## 2021-11-20 RX ADMIN — DIAZEPAM 5 MG: 5 TABLET ORAL at 08:44

## 2021-11-20 RX ADMIN — HYDRALAZINE HYDROCHLORIDE 50 MG: 50 TABLET, FILM COATED ORAL at 21:26

## 2021-11-20 RX ADMIN — MULTIVITAMIN TABLET 1 TABLET: TABLET at 08:44

## 2021-11-20 RX ADMIN — MEROPENEM 1 G: 1 INJECTION, POWDER, FOR SOLUTION INTRAVENOUS at 21:26

## 2021-11-20 RX ADMIN — SODIUM CHLORIDE, PRESERVATIVE FREE 10 ML: 5 INJECTION INTRAVENOUS at 08:47

## 2021-11-20 RX ADMIN — BUPROPION HYDROCHLORIDE 150 MG: 150 TABLET, EXTENDED RELEASE ORAL at 21:26

## 2021-11-20 RX ADMIN — HYDROMORPHONE HYDROCHLORIDE 0.5 MG: 2 INJECTION, SOLUTION INTRAMUSCULAR; INTRAVENOUS; SUBCUTANEOUS at 23:38

## 2021-11-20 RX ADMIN — NYSTATIN 1 APPLICATION: 100000 POWDER TOPICAL at 13:06

## 2021-11-20 RX ADMIN — HYDRALAZINE HYDROCHLORIDE 50 MG: 50 TABLET, FILM COATED ORAL at 04:30

## 2021-11-20 RX ADMIN — OXYCODONE 10 MG: 5 TABLET ORAL at 13:15

## 2021-11-20 RX ADMIN — LEVOTHYROXINE SODIUM 125 MCG: 125 TABLET ORAL at 04:30

## 2021-11-20 RX ADMIN — SODIUM CHLORIDE, PRESERVATIVE FREE 10 ML: 5 INJECTION INTRAVENOUS at 21:34

## 2021-11-20 RX ADMIN — DIAZEPAM 5 MG: 5 TABLET ORAL at 17:43

## 2021-11-20 RX ADMIN — PANTOPRAZOLE SODIUM 40 MG: 40 TABLET, DELAYED RELEASE ORAL at 04:29

## 2021-11-20 RX ADMIN — Medication 1 CAPSULE: at 08:44

## 2021-11-20 RX ADMIN — NYSTATIN: 100000 POWDER TOPICAL at 04:30

## 2021-11-20 RX ADMIN — BUPROPION HYDROCHLORIDE 150 MG: 150 TABLET, EXTENDED RELEASE ORAL at 08:44

## 2021-11-20 RX ADMIN — HYDRALAZINE HYDROCHLORIDE 50 MG: 50 TABLET, FILM COATED ORAL at 13:05

## 2021-11-20 RX ADMIN — VANCOMYCIN HYDROCHLORIDE 1000 MG: 1 INJECTION, SOLUTION INTRAVENOUS at 21:26

## 2021-11-20 RX ADMIN — AMLODIPINE BESYLATE 5 MG: 5 TABLET ORAL at 08:44

## 2021-11-20 RX ADMIN — TEMAZEPAM 15 MG: 15 CAPSULE ORAL at 21:26

## 2021-11-20 RX ADMIN — LEVETIRACETAM 500 MG: 500 TABLET, FILM COATED ORAL at 21:26

## 2021-11-20 RX ADMIN — DAPTOMYCIN 700 MG: 500 INJECTION, POWDER, LYOPHILIZED, FOR SOLUTION INTRAVENOUS at 13:04

## 2021-11-20 RX ADMIN — CEFTAROLINE FOSAMIL 600 MG: 600 POWDER, FOR SOLUTION INTRAVENOUS at 08:20

## 2021-11-20 RX ADMIN — VANCOMYCIN HYDROCHLORIDE 1750 MG: 10 INJECTION, POWDER, LYOPHILIZED, FOR SOLUTION INTRAVENOUS at 16:30

## 2021-11-20 RX ADMIN — CASTOR OIL AND BALSAM, PERU 1 APPLICATION: 788; 87 OINTMENT TOPICAL at 08:45

## 2021-11-20 RX ADMIN — IOPAMIDOL 50 ML: 755 INJECTION, SOLUTION INTRAVENOUS at 09:51

## 2021-11-20 RX ADMIN — CASTOR OIL AND BALSAM, PERU 1 APPLICATION: 788; 87 OINTMENT TOPICAL at 21:34

## 2021-11-20 RX ADMIN — LEVETIRACETAM 500 MG: 500 TABLET, FILM COATED ORAL at 08:44

## 2021-11-20 RX ADMIN — DULOXETINE HYDROCHLORIDE 30 MG: 30 CAPSULE, DELAYED RELEASE ORAL at 08:44

## 2021-11-20 RX ADMIN — METOPROLOL TARTRATE 100 MG: 100 TABLET, FILM COATED ORAL at 23:34

## 2021-11-20 RX ADMIN — MEROPENEM 1 G: 1 INJECTION, POWDER, FOR SOLUTION INTRAVENOUS at 16:30

## 2021-11-21 LAB
ALBUMIN SERPL-MCNC: 2.4 G/DL (ref 3.5–5.2)
ALBUMIN/GLOB SERPL: 0.6 G/DL
ALP SERPL-CCNC: 178 U/L (ref 39–117)
ALT SERPL W P-5'-P-CCNC: 14 U/L (ref 1–33)
ANION GAP SERPL CALCULATED.3IONS-SCNC: 7 MMOL/L (ref 5–15)
AST SERPL-CCNC: 22 U/L (ref 1–32)
BACTERIA SPEC AEROBE CULT: NO GROWTH
BASOPHILS # BLD AUTO: 0.03 10*3/MM3 (ref 0–0.2)
BASOPHILS NFR BLD AUTO: 0.3 % (ref 0–1.5)
BILIRUB SERPL-MCNC: 0.2 MG/DL (ref 0–1.2)
BUN SERPL-MCNC: 40 MG/DL (ref 6–20)
BUN/CREAT SERPL: 51.9 (ref 7–25)
CALCIUM SPEC-SCNC: 9 MG/DL (ref 8.6–10.5)
CHLORIDE SERPL-SCNC: 105 MMOL/L (ref 98–107)
CO2 SERPL-SCNC: 29 MMOL/L (ref 22–29)
CREAT SERPL-MCNC: 0.77 MG/DL (ref 0.57–1)
CRP SERPL-MCNC: 3.3 MG/DL (ref 0–0.5)
CRP SERPL-MCNC: 4.08 MG/DL (ref 0–0.5)
DEPRECATED RDW RBC AUTO: 58.2 FL (ref 37–54)
EOSINOPHIL # BLD AUTO: 0.12 10*3/MM3 (ref 0–0.4)
EOSINOPHIL NFR BLD AUTO: 1 % (ref 0.3–6.2)
ERYTHROCYTE [DISTWIDTH] IN BLOOD BY AUTOMATED COUNT: 17.6 % (ref 12.3–15.4)
GFR SERPL CREATININE-BSD FRML MDRD: 78 ML/MIN/1.73
GLOBULIN UR ELPH-MCNC: 3.8 GM/DL
GLUCOSE BLDC GLUCOMTR-MCNC: 113 MG/DL (ref 70–130)
GLUCOSE BLDC GLUCOMTR-MCNC: 115 MG/DL (ref 70–130)
GLUCOSE BLDC GLUCOMTR-MCNC: 118 MG/DL (ref 70–130)
GLUCOSE BLDC GLUCOMTR-MCNC: 151 MG/DL (ref 70–130)
GLUCOSE SERPL-MCNC: 104 MG/DL (ref 65–99)
HCT VFR BLD AUTO: 28.9 % (ref 34–46.6)
HGB BLD-MCNC: 8.6 G/DL (ref 12–15.9)
IMM GRANULOCYTES # BLD AUTO: 0.1 10*3/MM3 (ref 0–0.05)
IMM GRANULOCYTES NFR BLD AUTO: 0.9 % (ref 0–0.5)
LYMPHOCYTES # BLD AUTO: 0.8 10*3/MM3 (ref 0.7–3.1)
LYMPHOCYTES NFR BLD AUTO: 7 % (ref 19.6–45.3)
MAGNESIUM SERPL-MCNC: 1.7 MG/DL (ref 1.6–2.6)
MCH RBC QN AUTO: 26.8 PG (ref 26.6–33)
MCHC RBC AUTO-ENTMCNC: 29.8 G/DL (ref 31.5–35.7)
MCV RBC AUTO: 90 FL (ref 79–97)
MONOCYTES # BLD AUTO: 0.41 10*3/MM3 (ref 0.1–0.9)
MONOCYTES NFR BLD AUTO: 3.6 % (ref 5–12)
NEUTROPHILS NFR BLD AUTO: 10.04 10*3/MM3 (ref 1.7–7)
NEUTROPHILS NFR BLD AUTO: 87.2 % (ref 42.7–76)
NRBC BLD AUTO-RTO: 0 /100 WBC (ref 0–0.2)
PHOSPHATE SERPL-MCNC: 1.8 MG/DL (ref 2.5–4.5)
PHOSPHATE SERPL-MCNC: 2.1 MG/DL (ref 2.5–4.5)
PHOSPHATE SERPL-MCNC: 2.2 MG/DL (ref 2.5–4.5)
PLATELET # BLD AUTO: 225 10*3/MM3 (ref 140–450)
PMV BLD AUTO: 9.8 FL (ref 6–12)
POTASSIUM SERPL-SCNC: 3.8 MMOL/L (ref 3.5–5.2)
PROT SERPL-MCNC: 6.2 G/DL (ref 6–8.5)
RBC # BLD AUTO: 3.21 10*6/MM3 (ref 3.77–5.28)
SODIUM SERPL-SCNC: 141 MMOL/L (ref 136–145)
WBC NRBC COR # BLD: 11.5 10*3/MM3 (ref 3.4–10.8)

## 2021-11-21 PROCEDURE — 0 MAGNESIUM SULFATE 4 GM/100ML SOLUTION: Performed by: INTERNAL MEDICINE

## 2021-11-21 PROCEDURE — 85025 COMPLETE CBC W/AUTO DIFF WBC: CPT | Performed by: INTERNAL MEDICINE

## 2021-11-21 PROCEDURE — 86140 C-REACTIVE PROTEIN: CPT | Performed by: INTERNAL MEDICINE

## 2021-11-21 PROCEDURE — 25010000002 MEROPENEM PER 100 MG: Performed by: PHYSICIAN ASSISTANT

## 2021-11-21 PROCEDURE — 25010000002 VANCOMYCIN PER 500 MG

## 2021-11-21 PROCEDURE — 84100 ASSAY OF PHOSPHORUS: CPT | Performed by: INTERNAL MEDICINE

## 2021-11-21 PROCEDURE — 83735 ASSAY OF MAGNESIUM: CPT | Performed by: INTERNAL MEDICINE

## 2021-11-21 PROCEDURE — 82962 GLUCOSE BLOOD TEST: CPT

## 2021-11-21 PROCEDURE — 80053 COMPREHEN METABOLIC PANEL: CPT | Performed by: INTERNAL MEDICINE

## 2021-11-21 PROCEDURE — 25010000002 FUROSEMIDE PER 20 MG: Performed by: INTERNAL MEDICINE

## 2021-11-21 PROCEDURE — 99232 SBSQ HOSP IP/OBS MODERATE 35: CPT | Performed by: INTERNAL MEDICINE

## 2021-11-21 RX ORDER — FUROSEMIDE 10 MG/ML
40 INJECTION INTRAMUSCULAR; INTRAVENOUS ONCE
Status: COMPLETED | OUTPATIENT
Start: 2021-11-21 | End: 2021-11-21

## 2021-11-21 RX ORDER — METOPROLOL TARTRATE 50 MG/1
50 TABLET, FILM COATED ORAL 2 TIMES DAILY
Status: DISCONTINUED | OUTPATIENT
Start: 2021-11-21 | End: 2021-11-22

## 2021-11-21 RX ADMIN — NYSTATIN: 100000 POWDER TOPICAL at 21:03

## 2021-11-21 RX ADMIN — LEVETIRACETAM 500 MG: 500 TABLET, FILM COATED ORAL at 10:36

## 2021-11-21 RX ADMIN — METOPROLOL TARTRATE 50 MG: 50 TABLET, FILM COATED ORAL at 20:28

## 2021-11-21 RX ADMIN — POTASSIUM & SODIUM PHOSPHATES POWDER PACK 280-160-250 MG 2 PACKET: 280-160-250 PACK at 13:09

## 2021-11-21 RX ADMIN — MEROPENEM 1 G: 1 INJECTION, POWDER, FOR SOLUTION INTRAVENOUS at 06:24

## 2021-11-21 RX ADMIN — CASTOR OIL AND BALSAM, PERU 1 APPLICATION: 788; 87 OINTMENT TOPICAL at 20:29

## 2021-11-21 RX ADMIN — POTASSIUM & SODIUM PHOSPHATES POWDER PACK 280-160-250 MG 2 PACKET: 280-160-250 PACK at 06:23

## 2021-11-21 RX ADMIN — VANCOMYCIN HYDROCHLORIDE 1000 MG: 1 INJECTION, SOLUTION INTRAVENOUS at 10:23

## 2021-11-21 RX ADMIN — HYDRALAZINE HYDROCHLORIDE 50 MG: 50 TABLET, FILM COATED ORAL at 13:09

## 2021-11-21 RX ADMIN — MAGNESIUM SULFATE HEPTAHYDRATE 4 G: 40 INJECTION, SOLUTION INTRAVENOUS at 06:23

## 2021-11-21 RX ADMIN — SODIUM CHLORIDE, PRESERVATIVE FREE 10 ML: 5 INJECTION INTRAVENOUS at 10:37

## 2021-11-21 RX ADMIN — LEVOTHYROXINE SODIUM 125 MCG: 125 TABLET ORAL at 06:24

## 2021-11-21 RX ADMIN — DOXYCYCLINE 100 MG: 100 INJECTION, POWDER, LYOPHILIZED, FOR SOLUTION INTRAVENOUS at 21:00

## 2021-11-21 RX ADMIN — DOXYCYCLINE 100 MG: 100 INJECTION, POWDER, LYOPHILIZED, FOR SOLUTION INTRAVENOUS at 10:21

## 2021-11-21 RX ADMIN — BUPROPION HYDROCHLORIDE 150 MG: 150 TABLET, EXTENDED RELEASE ORAL at 20:28

## 2021-11-21 RX ADMIN — PANTOPRAZOLE SODIUM 40 MG: 40 TABLET, DELAYED RELEASE ORAL at 06:24

## 2021-11-21 RX ADMIN — NYSTATIN: 100000 POWDER TOPICAL at 06:25

## 2021-11-21 RX ADMIN — MULTIVITAMIN TABLET 1 TABLET: TABLET at 10:36

## 2021-11-21 RX ADMIN — HYDRALAZINE HYDROCHLORIDE 50 MG: 50 TABLET, FILM COATED ORAL at 22:48

## 2021-11-21 RX ADMIN — CASTOR OIL AND BALSAM, PERU 1 APPLICATION: 788; 87 OINTMENT TOPICAL at 10:37

## 2021-11-21 RX ADMIN — MEROPENEM 1 G: 1 INJECTION, POWDER, FOR SOLUTION INTRAVENOUS at 13:09

## 2021-11-21 RX ADMIN — VANCOMYCIN HYDROCHLORIDE 1000 MG: 1 INJECTION, SOLUTION INTRAVENOUS at 20:28

## 2021-11-21 RX ADMIN — OXYCODONE 10 MG: 5 TABLET ORAL at 06:24

## 2021-11-21 RX ADMIN — AMLODIPINE BESYLATE 5 MG: 5 TABLET ORAL at 10:35

## 2021-11-21 RX ADMIN — HYDRALAZINE HYDROCHLORIDE 50 MG: 50 TABLET, FILM COATED ORAL at 06:24

## 2021-11-21 RX ADMIN — DIAZEPAM 5 MG: 5 TABLET ORAL at 10:36

## 2021-11-21 RX ADMIN — Medication 1 CAPSULE: at 10:36

## 2021-11-21 RX ADMIN — NYSTATIN: 100000 POWDER TOPICAL at 14:00

## 2021-11-21 RX ADMIN — DULOXETINE HYDROCHLORIDE 30 MG: 30 CAPSULE, DELAYED RELEASE ORAL at 10:36

## 2021-11-21 RX ADMIN — FUROSEMIDE 40 MG: 40 INJECTION, SOLUTION INTRAMUSCULAR; INTRAVENOUS at 10:24

## 2021-11-21 RX ADMIN — BUPROPION HYDROCHLORIDE 150 MG: 150 TABLET, EXTENDED RELEASE ORAL at 10:36

## 2021-11-21 RX ADMIN — LEVETIRACETAM 500 MG: 500 TABLET, FILM COATED ORAL at 20:28

## 2021-11-21 RX ADMIN — DIAZEPAM 5 MG: 5 TABLET ORAL at 01:30

## 2021-11-21 RX ADMIN — MEROPENEM 1 G: 1 INJECTION, POWDER, FOR SOLUTION INTRAVENOUS at 21:00

## 2021-11-21 RX ADMIN — SODIUM CHLORIDE, PRESERVATIVE FREE 10 ML: 5 INJECTION INTRAVENOUS at 20:28

## 2021-11-22 LAB
ALBUMIN SERPL-MCNC: 2.4 G/DL (ref 3.5–5.2)
ALP SERPL-CCNC: 167 U/L (ref 39–117)
ALT SERPL W P-5'-P-CCNC: 13 U/L (ref 1–33)
ANION GAP SERPL CALCULATED.3IONS-SCNC: 8 MMOL/L (ref 5–15)
AST SERPL-CCNC: 26 U/L (ref 1–32)
BILIRUB SERPL-MCNC: 0.3 MG/DL (ref 0–1.2)
BUN SERPL-MCNC: 43 MG/DL (ref 6–20)
CALCIUM SPEC-SCNC: 9.1 MG/DL (ref 8.6–10.5)
CHLORIDE SERPL-SCNC: 103 MMOL/L (ref 98–107)
CHOLEST SERPL-MCNC: 158 MG/DL (ref 0–200)
CK SERPL-CCNC: 63 U/L (ref 20–180)
CO2 SERPL-SCNC: 30 MMOL/L (ref 22–29)
CREAT SERPL-MCNC: 0.63 MG/DL (ref 0.57–1)
DEPRECATED RDW RBC AUTO: 58.5 FL (ref 37–54)
ERYTHROCYTE [DISTWIDTH] IN BLOOD BY AUTOMATED COUNT: 17.8 % (ref 12.3–15.4)
GLUCOSE BLDC GLUCOMTR-MCNC: 110 MG/DL (ref 70–130)
GLUCOSE BLDC GLUCOMTR-MCNC: 114 MG/DL (ref 70–130)
GLUCOSE BLDC GLUCOMTR-MCNC: 115 MG/DL (ref 70–130)
GLUCOSE BLDC GLUCOMTR-MCNC: 119 MG/DL (ref 70–130)
GLUCOSE BLDC GLUCOMTR-MCNC: 183 MG/DL (ref 70–130)
GLUCOSE SERPL-MCNC: 123 MG/DL (ref 65–99)
HCT VFR BLD AUTO: 28.6 % (ref 34–46.6)
HGB BLD-MCNC: 8.6 G/DL (ref 12–15.9)
MAGNESIUM SERPL-MCNC: 2.1 MG/DL (ref 1.6–2.6)
MAGNESIUM SERPL-MCNC: 2.1 MG/DL (ref 1.6–2.6)
MCH RBC QN AUTO: 27 PG (ref 26.6–33)
MCHC RBC AUTO-ENTMCNC: 30.1 G/DL (ref 31.5–35.7)
MCV RBC AUTO: 89.9 FL (ref 79–97)
PHOSPHATE SERPL-MCNC: 2 MG/DL (ref 2.5–4.5)
PHOSPHATE SERPL-MCNC: 2 MG/DL (ref 2.5–4.5)
PLATELET # BLD AUTO: 232 10*3/MM3 (ref 140–450)
PMV BLD AUTO: 9.5 FL (ref 6–12)
POTASSIUM SERPL-SCNC: 3.8 MMOL/L (ref 3.5–5.2)
PREALB SERPL-MCNC: 18.8 MG/DL (ref 20–40)
PROT SERPL-MCNC: 6.1 G/DL (ref 6–8.5)
QT INTERVAL: 384 MS
QTC INTERVAL: 428 MS
RBC # BLD AUTO: 3.18 10*6/MM3 (ref 3.77–5.28)
SODIUM SERPL-SCNC: 141 MMOL/L (ref 136–145)
TRIGL SERPL-MCNC: 86 MG/DL (ref 0–150)
VANCOMYCIN TROUGH SERPL-MCNC: 30.4 MCG/ML (ref 5–20)
WBC NRBC COR # BLD: 11 10*3/MM3 (ref 3.4–10.8)

## 2021-11-22 PROCEDURE — 82550 ASSAY OF CK (CPK): CPT

## 2021-11-22 PROCEDURE — 94799 UNLISTED PULMONARY SVC/PX: CPT

## 2021-11-22 PROCEDURE — 80202 ASSAY OF VANCOMYCIN: CPT

## 2021-11-22 PROCEDURE — 25010000002 MEROPENEM PER 100 MG: Performed by: PHYSICIAN ASSISTANT

## 2021-11-22 PROCEDURE — 80053 COMPREHEN METABOLIC PANEL: CPT | Performed by: INTERNAL MEDICINE

## 2021-11-22 PROCEDURE — 97110 THERAPEUTIC EXERCISES: CPT

## 2021-11-22 PROCEDURE — 94660 CPAP INITIATION&MGMT: CPT

## 2021-11-22 PROCEDURE — 97530 THERAPEUTIC ACTIVITIES: CPT

## 2021-11-22 PROCEDURE — 84478 ASSAY OF TRIGLYCERIDES: CPT | Performed by: INTERNAL MEDICINE

## 2021-11-22 PROCEDURE — 85027 COMPLETE CBC AUTOMATED: CPT | Performed by: INTERNAL MEDICINE

## 2021-11-22 PROCEDURE — 99232 SBSQ HOSP IP/OBS MODERATE 35: CPT | Performed by: INTERNAL MEDICINE

## 2021-11-22 PROCEDURE — 25010000002 MEROPENEM PER 100 MG: Performed by: INTERNAL MEDICINE

## 2021-11-22 PROCEDURE — 82962 GLUCOSE BLOOD TEST: CPT

## 2021-11-22 PROCEDURE — 83735 ASSAY OF MAGNESIUM: CPT | Performed by: INTERNAL MEDICINE

## 2021-11-22 PROCEDURE — 82465 ASSAY BLD/SERUM CHOLESTEROL: CPT | Performed by: INTERNAL MEDICINE

## 2021-11-22 PROCEDURE — 25010000002 FUROSEMIDE PER 20 MG: Performed by: INTERNAL MEDICINE

## 2021-11-22 PROCEDURE — 84100 ASSAY OF PHOSPHORUS: CPT | Performed by: INTERNAL MEDICINE

## 2021-11-22 PROCEDURE — 97168 OT RE-EVAL EST PLAN CARE: CPT

## 2021-11-22 PROCEDURE — 97164 PT RE-EVAL EST PLAN CARE: CPT

## 2021-11-22 PROCEDURE — 97535 SELF CARE MNGMENT TRAINING: CPT

## 2021-11-22 PROCEDURE — 84134 ASSAY OF PREALBUMIN: CPT | Performed by: INTERNAL MEDICINE

## 2021-11-22 RX ORDER — PANTOPRAZOLE SODIUM 40 MG/1
40 TABLET, DELAYED RELEASE ORAL
Status: DISCONTINUED | OUTPATIENT
Start: 2021-11-23 | End: 2021-12-11 | Stop reason: HOSPADM

## 2021-11-22 RX ORDER — POTASSIUM CHLORIDE 1.5 G/1.77G
40 POWDER, FOR SOLUTION ORAL AS NEEDED
Status: DISCONTINUED | OUTPATIENT
Start: 2021-11-22 | End: 2021-11-22

## 2021-11-22 RX ORDER — L.ACID,PARA/B.BIFIDUM/S.THERM 8B CELL
1 CAPSULE ORAL DAILY
Status: DISCONTINUED | OUTPATIENT
Start: 2021-11-23 | End: 2021-11-22

## 2021-11-22 RX ORDER — HYDRALAZINE HYDROCHLORIDE 50 MG/1
50 TABLET, FILM COATED ORAL EVERY 8 HOURS SCHEDULED
Status: DISCONTINUED | OUTPATIENT
Start: 2021-11-22 | End: 2021-11-22

## 2021-11-22 RX ORDER — AMOXICILLIN 250 MG
1 CAPSULE ORAL NIGHTLY
Status: DISCONTINUED | OUTPATIENT
Start: 2021-11-22 | End: 2021-11-22

## 2021-11-22 RX ORDER — ONDANSETRON 2 MG/ML
4 INJECTION INTRAMUSCULAR; INTRAVENOUS EVERY 6 HOURS PRN
Status: DISCONTINUED | OUTPATIENT
Start: 2021-11-22 | End: 2021-12-11 | Stop reason: HOSPADM

## 2021-11-22 RX ORDER — DIAZEPAM 5 MG/1
5 TABLET ORAL EVERY 8 HOURS
Status: DISCONTINUED | OUTPATIENT
Start: 2021-11-22 | End: 2021-11-22

## 2021-11-22 RX ORDER — FUROSEMIDE 40 MG/1
40 TABLET ORAL DAILY
Status: DISCONTINUED | OUTPATIENT
Start: 2021-11-23 | End: 2021-11-22

## 2021-11-22 RX ORDER — L.ACID,PARA/B.BIFIDUM/S.THERM 8B CELL
1 CAPSULE ORAL DAILY
Status: DISCONTINUED | OUTPATIENT
Start: 2021-11-23 | End: 2021-12-11 | Stop reason: HOSPADM

## 2021-11-22 RX ORDER — TEMAZEPAM 15 MG/1
15 CAPSULE ORAL NIGHTLY PRN
Status: DISCONTINUED | OUTPATIENT
Start: 2021-11-22 | End: 2021-11-22

## 2021-11-22 RX ORDER — ONDANSETRON 4 MG/1
4 TABLET, FILM COATED ORAL EVERY 6 HOURS PRN
Status: DISCONTINUED | OUTPATIENT
Start: 2021-11-22 | End: 2021-11-22

## 2021-11-22 RX ORDER — AMOXICILLIN 250 MG
1 CAPSULE ORAL NIGHTLY
Status: DISCONTINUED | OUTPATIENT
Start: 2021-11-22 | End: 2021-12-11 | Stop reason: HOSPADM

## 2021-11-22 RX ORDER — ONDANSETRON 4 MG/1
4 TABLET, FILM COATED ORAL EVERY 6 HOURS PRN
Status: DISCONTINUED | OUTPATIENT
Start: 2021-11-22 | End: 2021-12-11 | Stop reason: HOSPADM

## 2021-11-22 RX ORDER — ONDANSETRON 2 MG/ML
4 INJECTION INTRAMUSCULAR; INTRAVENOUS EVERY 6 HOURS PRN
Status: DISCONTINUED | OUTPATIENT
Start: 2021-11-22 | End: 2021-11-22

## 2021-11-22 RX ORDER — OXYCODONE HYDROCHLORIDE AND ACETAMINOPHEN 5; 325 MG/1; MG/1
1 TABLET ORAL EVERY 4 HOURS PRN
Status: DISCONTINUED | OUTPATIENT
Start: 2021-11-22 | End: 2021-12-01

## 2021-11-22 RX ORDER — OXYCODONE HYDROCHLORIDE AND ACETAMINOPHEN 5; 325 MG/1; MG/1
1 TABLET ORAL EVERY 4 HOURS PRN
Status: DISCONTINUED | OUTPATIENT
Start: 2021-11-22 | End: 2021-11-22

## 2021-11-22 RX ORDER — LEVETIRACETAM 500 MG/1
500 TABLET ORAL EVERY 12 HOURS SCHEDULED
Status: DISCONTINUED | OUTPATIENT
Start: 2021-11-22 | End: 2021-12-11 | Stop reason: HOSPADM

## 2021-11-22 RX ORDER — DIPHENOXYLATE HYDROCHLORIDE AND ATROPINE SULFATE 2.5; .025 MG/1; MG/1
1 TABLET ORAL DAILY
Status: DISCONTINUED | OUTPATIENT
Start: 2021-11-23 | End: 2021-12-11 | Stop reason: HOSPADM

## 2021-11-22 RX ORDER — OXYCODONE HYDROCHLORIDE 5 MG/1
10 TABLET ORAL 2 TIMES DAILY PRN
Status: DISPENSED | OUTPATIENT
Start: 2021-11-22 | End: 2021-11-23

## 2021-11-22 RX ORDER — FUROSEMIDE 10 MG/ML
40 INJECTION INTRAMUSCULAR; INTRAVENOUS ONCE
Status: COMPLETED | OUTPATIENT
Start: 2021-11-22 | End: 2021-11-22

## 2021-11-22 RX ORDER — TEMAZEPAM 15 MG/1
15 CAPSULE ORAL NIGHTLY PRN
Status: ACTIVE | OUTPATIENT
Start: 2021-11-22 | End: 2021-11-25

## 2021-11-22 RX ORDER — GUAR GUM
2 PACKET (EA) ORAL 2 TIMES DAILY
Status: DISCONTINUED | OUTPATIENT
Start: 2021-11-22 | End: 2021-11-25

## 2021-11-22 RX ORDER — FUROSEMIDE 40 MG/1
40 TABLET ORAL DAILY
Status: DISCONTINUED | OUTPATIENT
Start: 2021-11-23 | End: 2021-12-09

## 2021-11-22 RX ORDER — CYCLOBENZAPRINE HCL 10 MG
5 TABLET ORAL 3 TIMES DAILY PRN
Status: DISCONTINUED | OUTPATIENT
Start: 2021-11-22 | End: 2021-12-11 | Stop reason: HOSPADM

## 2021-11-22 RX ORDER — CYCLOBENZAPRINE HCL 10 MG
5 TABLET ORAL 3 TIMES DAILY PRN
Status: DISCONTINUED | OUTPATIENT
Start: 2021-11-22 | End: 2021-11-22

## 2021-11-22 RX ORDER — LEVOTHYROXINE SODIUM 0.12 MG/1
125 TABLET ORAL
Status: DISCONTINUED | OUTPATIENT
Start: 2021-11-23 | End: 2021-12-11 | Stop reason: HOSPADM

## 2021-11-22 RX ORDER — ACETAMINOPHEN 325 MG/1
650 TABLET ORAL EVERY 4 HOURS PRN
Status: DISCONTINUED | OUTPATIENT
Start: 2021-11-22 | End: 2021-12-11 | Stop reason: HOSPADM

## 2021-11-22 RX ORDER — POTASSIUM CHLORIDE 1.5 G/1.77G
40 POWDER, FOR SOLUTION ORAL AS NEEDED
Status: DISCONTINUED | OUTPATIENT
Start: 2021-11-22 | End: 2021-12-11 | Stop reason: HOSPADM

## 2021-11-22 RX ORDER — GUAR GUM
2 PACKET (EA) ORAL 2 TIMES DAILY
Status: DISCONTINUED | OUTPATIENT
Start: 2021-11-22 | End: 2021-11-22

## 2021-11-22 RX ORDER — HYDRALAZINE HYDROCHLORIDE 50 MG/1
50 TABLET, FILM COATED ORAL EVERY 8 HOURS SCHEDULED
Status: DISCONTINUED | OUTPATIENT
Start: 2021-11-22 | End: 2021-12-11 | Stop reason: HOSPADM

## 2021-11-22 RX ORDER — LEVOTHYROXINE SODIUM 0.12 MG/1
125 TABLET ORAL
Status: DISCONTINUED | OUTPATIENT
Start: 2021-11-23 | End: 2021-11-22

## 2021-11-22 RX ORDER — AMLODIPINE BESYLATE 5 MG/1
5 TABLET ORAL
Status: DISCONTINUED | OUTPATIENT
Start: 2021-11-23 | End: 2021-12-11 | Stop reason: HOSPADM

## 2021-11-22 RX ORDER — AMLODIPINE BESYLATE 5 MG/1
5 TABLET ORAL
Status: DISCONTINUED | OUTPATIENT
Start: 2021-11-23 | End: 2021-11-22

## 2021-11-22 RX ORDER — DIAZEPAM 5 MG/1
5 TABLET ORAL EVERY 8 HOURS
Status: COMPLETED | OUTPATIENT
Start: 2021-11-22 | End: 2021-11-26

## 2021-11-22 RX ADMIN — DIAZEPAM 5 MG: 5 TABLET ORAL at 17:21

## 2021-11-22 RX ADMIN — LEVETIRACETAM 500 MG: 500 TABLET, FILM COATED ORAL at 21:14

## 2021-11-22 RX ADMIN — MEROPENEM 1 G: 1 INJECTION, POWDER, FOR SOLUTION INTRAVENOUS at 15:27

## 2021-11-22 RX ADMIN — MULTIVITAMIN TABLET 1 TABLET: TABLET at 10:31

## 2021-11-22 RX ADMIN — MEROPENEM 1 G: 1 INJECTION, POWDER, FOR SOLUTION INTRAVENOUS at 22:23

## 2021-11-22 RX ADMIN — MEROPENEM 1 G: 1 INJECTION, POWDER, FOR SOLUTION INTRAVENOUS at 05:38

## 2021-11-22 RX ADMIN — OXYCODONE HYDROCHLORIDE AND ACETAMINOPHEN 1 TABLET: 5; 325 TABLET ORAL at 05:38

## 2021-11-22 RX ADMIN — SODIUM CHLORIDE, PRESERVATIVE FREE 10 ML: 5 INJECTION INTRAVENOUS at 21:14

## 2021-11-22 RX ADMIN — DOXYCYCLINE 100 MG: 100 INJECTION, POWDER, LYOPHILIZED, FOR SOLUTION INTRAVENOUS at 10:31

## 2021-11-22 RX ADMIN — LEVETIRACETAM 500 MG: 500 TABLET, FILM COATED ORAL at 10:32

## 2021-11-22 RX ADMIN — OXYCODONE 10 MG: 5 TABLET ORAL at 19:17

## 2021-11-22 RX ADMIN — NYSTATIN: 100000 POWDER TOPICAL at 21:14

## 2021-11-22 RX ADMIN — Medication 2 PACKET: at 21:15

## 2021-11-22 RX ADMIN — HYDRALAZINE HYDROCHLORIDE 50 MG: 50 TABLET, FILM COATED ORAL at 21:14

## 2021-11-22 RX ADMIN — HYDRALAZINE HYDROCHLORIDE 50 MG: 50 TABLET, FILM COATED ORAL at 15:28

## 2021-11-22 RX ADMIN — Medication 2 PACKET: at 12:30

## 2021-11-22 RX ADMIN — NYSTATIN: 100000 POWDER TOPICAL at 15:28

## 2021-11-22 RX ADMIN — AMLODIPINE BESYLATE 5 MG: 5 TABLET ORAL at 10:32

## 2021-11-22 RX ADMIN — DULOXETINE HYDROCHLORIDE 30 MG: 30 CAPSULE, DELAYED RELEASE ORAL at 10:32

## 2021-11-22 RX ADMIN — DIAZEPAM 5 MG: 5 TABLET ORAL at 10:31

## 2021-11-22 RX ADMIN — FUROSEMIDE 40 MG: 40 INJECTION, SOLUTION INTRAMUSCULAR; INTRAVENOUS at 10:31

## 2021-11-22 RX ADMIN — DOXYCYCLINE 100 MG: 100 INJECTION, POWDER, LYOPHILIZED, FOR SOLUTION INTRAVENOUS at 21:25

## 2021-11-22 RX ADMIN — BUPROPION HYDROCHLORIDE 150 MG: 150 TABLET, EXTENDED RELEASE ORAL at 21:14

## 2021-11-22 RX ADMIN — PANTOPRAZOLE SODIUM 40 MG: 40 TABLET, DELAYED RELEASE ORAL at 05:37

## 2021-11-22 RX ADMIN — HYDRALAZINE HYDROCHLORIDE 50 MG: 50 TABLET, FILM COATED ORAL at 05:38

## 2021-11-22 RX ADMIN — CASTOR OIL AND BALSAM, PERU 1 APPLICATION: 788; 87 OINTMENT TOPICAL at 10:33

## 2021-11-22 RX ADMIN — LEVOTHYROXINE SODIUM 125 MCG: 125 TABLET ORAL at 05:37

## 2021-11-22 RX ADMIN — SODIUM CHLORIDE, PRESERVATIVE FREE 10 ML: 5 INJECTION INTRAVENOUS at 10:34

## 2021-11-22 RX ADMIN — Medication 1 CAPSULE: at 10:31

## 2021-11-22 RX ADMIN — CASTOR OIL AND BALSAM, PERU 1 APPLICATION: 788; 87 OINTMENT TOPICAL at 21:14

## 2021-11-22 RX ADMIN — NYSTATIN: 100000 POWDER TOPICAL at 05:46

## 2021-11-22 RX ADMIN — BUPROPION HYDROCHLORIDE 150 MG: 150 TABLET, EXTENDED RELEASE ORAL at 10:33

## 2021-11-23 LAB
ANION GAP SERPL CALCULATED.3IONS-SCNC: 10 MMOL/L (ref 5–15)
BUN SERPL-MCNC: 44 MG/DL (ref 6–20)
BUN/CREAT SERPL: 84.6 (ref 7–25)
CALCIUM SPEC-SCNC: 9.3 MG/DL (ref 8.6–10.5)
CHLORIDE SERPL-SCNC: 103 MMOL/L (ref 98–107)
CO2 SERPL-SCNC: 29 MMOL/L (ref 22–29)
CREAT SERPL-MCNC: 0.52 MG/DL (ref 0.57–1)
DEPRECATED RDW RBC AUTO: 58.3 FL (ref 37–54)
ERYTHROCYTE [DISTWIDTH] IN BLOOD BY AUTOMATED COUNT: 17.6 % (ref 12.3–15.4)
GFR SERPL CREATININE-BSD FRML MDRD: 122 ML/MIN/1.73
GLUCOSE BLDC GLUCOMTR-MCNC: 106 MG/DL (ref 70–130)
GLUCOSE BLDC GLUCOMTR-MCNC: 111 MG/DL (ref 70–130)
GLUCOSE BLDC GLUCOMTR-MCNC: 124 MG/DL (ref 70–130)
GLUCOSE BLDC GLUCOMTR-MCNC: 131 MG/DL (ref 70–130)
GLUCOSE SERPL-MCNC: 106 MG/DL (ref 65–99)
HCT VFR BLD AUTO: 29.6 % (ref 34–46.6)
HGB BLD-MCNC: 8.8 G/DL (ref 12–15.9)
MAGNESIUM SERPL-MCNC: 1.7 MG/DL (ref 1.6–2.6)
MCH RBC QN AUTO: 27 PG (ref 26.6–33)
MCHC RBC AUTO-ENTMCNC: 29.7 G/DL (ref 31.5–35.7)
MCV RBC AUTO: 90.8 FL (ref 79–97)
PHOSPHATE SERPL-MCNC: 2.2 MG/DL (ref 2.5–4.5)
PLATELET # BLD AUTO: 226 10*3/MM3 (ref 140–450)
PMV BLD AUTO: 9.3 FL (ref 6–12)
POTASSIUM SERPL-SCNC: 3.6 MMOL/L (ref 3.5–5.2)
RBC # BLD AUTO: 3.26 10*6/MM3 (ref 3.77–5.28)
SODIUM SERPL-SCNC: 142 MMOL/L (ref 136–145)
VANCOMYCIN SERPL-MCNC: 15.8 MCG/ML (ref 5–40)
WBC NRBC COR # BLD: 10.68 10*3/MM3 (ref 3.4–10.8)

## 2021-11-23 PROCEDURE — 82962 GLUCOSE BLOOD TEST: CPT

## 2021-11-23 PROCEDURE — 84100 ASSAY OF PHOSPHORUS: CPT | Performed by: INTERNAL MEDICINE

## 2021-11-23 PROCEDURE — 25010000002 FUROSEMIDE PER 20 MG: Performed by: INTERNAL MEDICINE

## 2021-11-23 PROCEDURE — 85027 COMPLETE CBC AUTOMATED: CPT | Performed by: INTERNAL MEDICINE

## 2021-11-23 PROCEDURE — 25010000002 MEROPENEM PER 100 MG: Performed by: INTERNAL MEDICINE

## 2021-11-23 PROCEDURE — 0 MAGNESIUM SULFATE 4 GM/100ML SOLUTION: Performed by: INTERNAL MEDICINE

## 2021-11-23 PROCEDURE — 94640 AIRWAY INHALATION TREATMENT: CPT

## 2021-11-23 PROCEDURE — 80202 ASSAY OF VANCOMYCIN: CPT

## 2021-11-23 PROCEDURE — 80048 BASIC METABOLIC PNL TOTAL CA: CPT | Performed by: INTERNAL MEDICINE

## 2021-11-23 PROCEDURE — 99233 SBSQ HOSP IP/OBS HIGH 50: CPT | Performed by: INTERNAL MEDICINE

## 2021-11-23 PROCEDURE — 25010000002 VANCOMYCIN 10 G RECONSTITUTED SOLUTION

## 2021-11-23 PROCEDURE — 83735 ASSAY OF MAGNESIUM: CPT | Performed by: INTERNAL MEDICINE

## 2021-11-23 RX ORDER — IPRATROPIUM BROMIDE AND ALBUTEROL SULFATE 2.5; .5 MG/3ML; MG/3ML
3 SOLUTION RESPIRATORY (INHALATION)
Status: DISCONTINUED | OUTPATIENT
Start: 2021-11-23 | End: 2021-11-27

## 2021-11-23 RX ORDER — FUROSEMIDE 10 MG/ML
40 INJECTION INTRAMUSCULAR; INTRAVENOUS ONCE
Status: COMPLETED | OUTPATIENT
Start: 2021-11-23 | End: 2021-11-23

## 2021-11-23 RX ADMIN — Medication 2 PACKET: at 18:13

## 2021-11-23 RX ADMIN — FUROSEMIDE 40 MG: 40 INJECTION, SOLUTION INTRAMUSCULAR; INTRAVENOUS at 17:19

## 2021-11-23 RX ADMIN — DIAZEPAM 5 MG: 5 TABLET ORAL at 17:19

## 2021-11-23 RX ADMIN — MEROPENEM 1 G: 1 INJECTION, POWDER, FOR SOLUTION INTRAVENOUS at 05:43

## 2021-11-23 RX ADMIN — VANCOMYCIN HYDROCHLORIDE 1250 MG: 10 INJECTION, POWDER, LYOPHILIZED, FOR SOLUTION INTRAVENOUS at 13:42

## 2021-11-23 RX ADMIN — LEVOTHYROXINE SODIUM 125 MCG: 125 TABLET ORAL at 04:58

## 2021-11-23 RX ADMIN — NYSTATIN: 100000 POWDER TOPICAL at 21:33

## 2021-11-23 RX ADMIN — POTASSIUM CHLORIDE 40 MEQ: 1.5 POWDER, FOR SOLUTION ORAL at 13:47

## 2021-11-23 RX ADMIN — POTASSIUM CHLORIDE 40 MEQ: 1.5 POWDER, FOR SOLUTION ORAL at 18:13

## 2021-11-23 RX ADMIN — MAGNESIUM SULFATE HEPTAHYDRATE 4 G: 40 INJECTION, SOLUTION INTRAVENOUS at 15:51

## 2021-11-23 RX ADMIN — DIAZEPAM 5 MG: 5 TABLET ORAL at 00:12

## 2021-11-23 RX ADMIN — PANTOPRAZOLE SODIUM 40 MG: 40 TABLET, DELAYED RELEASE ORAL at 04:58

## 2021-11-23 RX ADMIN — IPRATROPIUM BROMIDE AND ALBUTEROL SULFATE 3 ML: 2.5; .5 SOLUTION RESPIRATORY (INHALATION) at 20:30

## 2021-11-23 RX ADMIN — Medication 1 CAPSULE: at 09:19

## 2021-11-23 RX ADMIN — CASTOR OIL AND BALSAM, PERU 1 APPLICATION: 788; 87 OINTMENT TOPICAL at 09:19

## 2021-11-23 RX ADMIN — HYDRALAZINE HYDROCHLORIDE 50 MG: 50 TABLET, FILM COATED ORAL at 04:57

## 2021-11-23 RX ADMIN — DULOXETINE HYDROCHLORIDE 30 MG: 30 CAPSULE, DELAYED RELEASE ORAL at 09:19

## 2021-11-23 RX ADMIN — DOXYCYCLINE 100 MG: 100 INJECTION, POWDER, LYOPHILIZED, FOR SOLUTION INTRAVENOUS at 09:24

## 2021-11-23 RX ADMIN — BUPROPION HYDROCHLORIDE 150 MG: 150 TABLET, EXTENDED RELEASE ORAL at 09:19

## 2021-11-23 RX ADMIN — FUROSEMIDE 40 MG: 40 TABLET ORAL at 09:19

## 2021-11-23 RX ADMIN — AMLODIPINE BESYLATE 5 MG: 5 TABLET ORAL at 09:19

## 2021-11-23 RX ADMIN — HYDRALAZINE HYDROCHLORIDE 50 MG: 50 TABLET, FILM COATED ORAL at 21:32

## 2021-11-23 RX ADMIN — LEVETIRACETAM 500 MG: 500 TABLET, FILM COATED ORAL at 21:33

## 2021-11-23 RX ADMIN — SODIUM CHLORIDE, PRESERVATIVE FREE 10 ML: 5 INJECTION INTRAVENOUS at 21:34

## 2021-11-23 RX ADMIN — NYSTATIN: 100000 POWDER TOPICAL at 05:43

## 2021-11-23 RX ADMIN — HYDRALAZINE HYDROCHLORIDE 50 MG: 50 TABLET, FILM COATED ORAL at 13:42

## 2021-11-23 RX ADMIN — MEROPENEM 1 G: 1 INJECTION, POWDER, FOR SOLUTION INTRAVENOUS at 13:42

## 2021-11-23 RX ADMIN — NYSTATIN: 100000 POWDER TOPICAL at 15:51

## 2021-11-23 RX ADMIN — BUPROPION HYDROCHLORIDE 150 MG: 150 TABLET, EXTENDED RELEASE ORAL at 21:33

## 2021-11-23 RX ADMIN — DIAZEPAM 5 MG: 5 TABLET ORAL at 09:19

## 2021-11-23 RX ADMIN — DOXYCYCLINE 100 MG: 100 INJECTION, POWDER, LYOPHILIZED, FOR SOLUTION INTRAVENOUS at 21:51

## 2021-11-23 RX ADMIN — MULTIVITAMIN TABLET 1 TABLET: TABLET at 09:19

## 2021-11-23 RX ADMIN — LEVETIRACETAM 500 MG: 500 TABLET, FILM COATED ORAL at 09:19

## 2021-11-23 RX ADMIN — ACETAMINOPHEN 650 MG: 325 TABLET, FILM COATED ORAL at 09:19

## 2021-11-23 RX ADMIN — CASTOR OIL AND BALSAM, PERU 1 APPLICATION: 788; 87 OINTMENT TOPICAL at 21:33

## 2021-11-23 RX ADMIN — MEROPENEM 1 G: 1 INJECTION, POWDER, FOR SOLUTION INTRAVENOUS at 21:33

## 2021-11-24 ENCOUNTER — APPOINTMENT (OUTPATIENT)
Dept: MRI IMAGING | Facility: HOSPITAL | Age: 56
End: 2021-11-24

## 2021-11-24 LAB
ANION GAP SERPL CALCULATED.3IONS-SCNC: 7 MMOL/L (ref 5–15)
BUN SERPL-MCNC: 44 MG/DL (ref 6–20)
BUN/CREAT SERPL: 93.6 (ref 7–25)
CA-I SERPL ISE-MCNC: 1.36 MMOL/L (ref 1.12–1.32)
CALCIUM SPEC-SCNC: 9.4 MG/DL (ref 8.6–10.5)
CHLORIDE SERPL-SCNC: 103 MMOL/L (ref 98–107)
CO2 SERPL-SCNC: 33 MMOL/L (ref 22–29)
CREAT SERPL-MCNC: 0.47 MG/DL (ref 0.57–1)
DEPRECATED RDW RBC AUTO: 60.1 FL (ref 37–54)
ERYTHROCYTE [DISTWIDTH] IN BLOOD BY AUTOMATED COUNT: 17.8 % (ref 12.3–15.4)
GFR SERPL CREATININE-BSD FRML MDRD: 138 ML/MIN/1.73
GLUCOSE BLDC GLUCOMTR-MCNC: 107 MG/DL (ref 70–130)
GLUCOSE BLDC GLUCOMTR-MCNC: 109 MG/DL (ref 70–130)
GLUCOSE BLDC GLUCOMTR-MCNC: 111 MG/DL (ref 70–130)
GLUCOSE BLDC GLUCOMTR-MCNC: 118 MG/DL (ref 70–130)
GLUCOSE SERPL-MCNC: 90 MG/DL (ref 65–99)
HCT VFR BLD AUTO: 29.3 % (ref 34–46.6)
HGB BLD-MCNC: 8.7 G/DL (ref 12–15.9)
MAGNESIUM SERPL-MCNC: 2.2 MG/DL (ref 1.6–2.6)
MCH RBC QN AUTO: 27.1 PG (ref 26.6–33)
MCHC RBC AUTO-ENTMCNC: 29.7 G/DL (ref 31.5–35.7)
MCV RBC AUTO: 91.3 FL (ref 79–97)
PHOSPHATE SERPL-MCNC: 2.5 MG/DL (ref 2.5–4.5)
PLATELET # BLD AUTO: 222 10*3/MM3 (ref 140–450)
PMV BLD AUTO: 8.9 FL (ref 6–12)
POTASSIUM SERPL-SCNC: 4.6 MMOL/L (ref 3.5–5.2)
RBC # BLD AUTO: 3.21 10*6/MM3 (ref 3.77–5.28)
SODIUM SERPL-SCNC: 143 MMOL/L (ref 136–145)
WBC NRBC COR # BLD: 9.43 10*3/MM3 (ref 3.4–10.8)

## 2021-11-24 PROCEDURE — 84100 ASSAY OF PHOSPHORUS: CPT | Performed by: INTERNAL MEDICINE

## 2021-11-24 PROCEDURE — 94799 UNLISTED PULMONARY SVC/PX: CPT

## 2021-11-24 PROCEDURE — 25010000002 VANCOMYCIN 10 G RECONSTITUTED SOLUTION

## 2021-11-24 PROCEDURE — 99232 SBSQ HOSP IP/OBS MODERATE 35: CPT | Performed by: INTERNAL MEDICINE

## 2021-11-24 PROCEDURE — 97110 THERAPEUTIC EXERCISES: CPT

## 2021-11-24 PROCEDURE — 25010000002 FUROSEMIDE PER 20 MG: Performed by: INTERNAL MEDICINE

## 2021-11-24 PROCEDURE — 85027 COMPLETE CBC AUTOMATED: CPT | Performed by: INTERNAL MEDICINE

## 2021-11-24 PROCEDURE — 82962 GLUCOSE BLOOD TEST: CPT

## 2021-11-24 PROCEDURE — 97530 THERAPEUTIC ACTIVITIES: CPT | Performed by: OCCUPATIONAL THERAPIST

## 2021-11-24 PROCEDURE — 80048 BASIC METABOLIC PNL TOTAL CA: CPT | Performed by: INTERNAL MEDICINE

## 2021-11-24 PROCEDURE — 94760 N-INVAS EAR/PLS OXIMETRY 1: CPT

## 2021-11-24 PROCEDURE — 72141 MRI NECK SPINE W/O DYE: CPT

## 2021-11-24 PROCEDURE — 82330 ASSAY OF CALCIUM: CPT | Performed by: INTERNAL MEDICINE

## 2021-11-24 PROCEDURE — 83735 ASSAY OF MAGNESIUM: CPT | Performed by: INTERNAL MEDICINE

## 2021-11-24 PROCEDURE — 25010000002 MEROPENEM PER 100 MG: Performed by: INTERNAL MEDICINE

## 2021-11-24 RX ORDER — FUROSEMIDE 10 MG/ML
40 INJECTION INTRAMUSCULAR; INTRAVENOUS ONCE
Status: COMPLETED | OUTPATIENT
Start: 2021-11-24 | End: 2021-11-24

## 2021-11-24 RX ADMIN — VANCOMYCIN HYDROCHLORIDE 1250 MG: 10 INJECTION, POWDER, LYOPHILIZED, FOR SOLUTION INTRAVENOUS at 14:05

## 2021-11-24 RX ADMIN — LEVOTHYROXINE SODIUM 125 MCG: 125 TABLET ORAL at 05:32

## 2021-11-24 RX ADMIN — HYDRALAZINE HYDROCHLORIDE 50 MG: 50 TABLET, FILM COATED ORAL at 05:32

## 2021-11-24 RX ADMIN — DIAZEPAM 5 MG: 5 TABLET ORAL at 00:40

## 2021-11-24 RX ADMIN — DOXYCYCLINE 100 MG: 100 INJECTION, POWDER, LYOPHILIZED, FOR SOLUTION INTRAVENOUS at 09:35

## 2021-11-24 RX ADMIN — BUPROPION HYDROCHLORIDE 150 MG: 150 TABLET, EXTENDED RELEASE ORAL at 09:36

## 2021-11-24 RX ADMIN — IPRATROPIUM BROMIDE AND ALBUTEROL SULFATE 3 ML: 2.5; .5 SOLUTION RESPIRATORY (INHALATION) at 19:07

## 2021-11-24 RX ADMIN — DULOXETINE HYDROCHLORIDE 30 MG: 30 CAPSULE, DELAYED RELEASE ORAL at 09:35

## 2021-11-24 RX ADMIN — DIAZEPAM 5 MG: 5 TABLET ORAL at 16:21

## 2021-11-24 RX ADMIN — FUROSEMIDE 40 MG: 40 TABLET ORAL at 09:36

## 2021-11-24 RX ADMIN — MEROPENEM 1 G: 1 INJECTION, POWDER, FOR SOLUTION INTRAVENOUS at 14:05

## 2021-11-24 RX ADMIN — SODIUM CHLORIDE, PRESERVATIVE FREE 10 ML: 5 INJECTION INTRAVENOUS at 09:36

## 2021-11-24 RX ADMIN — HYDRALAZINE HYDROCHLORIDE 50 MG: 50 TABLET, FILM COATED ORAL at 14:05

## 2021-11-24 RX ADMIN — LEVETIRACETAM 500 MG: 500 TABLET, FILM COATED ORAL at 21:27

## 2021-11-24 RX ADMIN — CASTOR OIL AND BALSAM, PERU 1 APPLICATION: 788; 87 OINTMENT TOPICAL at 21:28

## 2021-11-24 RX ADMIN — NYSTATIN: 100000 POWDER TOPICAL at 05:32

## 2021-11-24 RX ADMIN — MULTIVITAMIN TABLET 1 TABLET: TABLET at 09:36

## 2021-11-24 RX ADMIN — HYDRALAZINE HYDROCHLORIDE 50 MG: 50 TABLET, FILM COATED ORAL at 21:28

## 2021-11-24 RX ADMIN — SENNOSIDES AND DOCUSATE SODIUM 1 TABLET: 50; 8.6 TABLET ORAL at 21:27

## 2021-11-24 RX ADMIN — DOXYCYCLINE 100 MG: 100 INJECTION, POWDER, LYOPHILIZED, FOR SOLUTION INTRAVENOUS at 21:38

## 2021-11-24 RX ADMIN — MEROPENEM 1 G: 1 INJECTION, POWDER, FOR SOLUTION INTRAVENOUS at 05:31

## 2021-11-24 RX ADMIN — DIAZEPAM 5 MG: 5 TABLET ORAL at 09:36

## 2021-11-24 RX ADMIN — NYSTATIN: 100000 POWDER TOPICAL at 21:28

## 2021-11-24 RX ADMIN — IPRATROPIUM BROMIDE AND ALBUTEROL SULFATE 3 ML: 2.5; .5 SOLUTION RESPIRATORY (INHALATION) at 12:09

## 2021-11-24 RX ADMIN — LEVETIRACETAM 500 MG: 500 TABLET, FILM COATED ORAL at 09:36

## 2021-11-24 RX ADMIN — AMLODIPINE BESYLATE 5 MG: 5 TABLET ORAL at 09:36

## 2021-11-24 RX ADMIN — CASTOR OIL AND BALSAM, PERU 1 APPLICATION: 788; 87 OINTMENT TOPICAL at 09:35

## 2021-11-24 RX ADMIN — MEROPENEM 1 G: 1 INJECTION, POWDER, FOR SOLUTION INTRAVENOUS at 21:27

## 2021-11-24 RX ADMIN — PANTOPRAZOLE SODIUM 40 MG: 40 TABLET, DELAYED RELEASE ORAL at 05:32

## 2021-11-24 RX ADMIN — Medication 1 CAPSULE: at 09:36

## 2021-11-24 RX ADMIN — FUROSEMIDE 40 MG: 40 INJECTION, SOLUTION INTRAMUSCULAR; INTRAVENOUS at 14:05

## 2021-11-24 RX ADMIN — IPRATROPIUM BROMIDE AND ALBUTEROL SULFATE 3 ML: 2.5; .5 SOLUTION RESPIRATORY (INHALATION) at 16:49

## 2021-11-24 RX ADMIN — NYSTATIN: 100000 POWDER TOPICAL at 14:05

## 2021-11-24 RX ADMIN — BUPROPION HYDROCHLORIDE 150 MG: 150 TABLET, EXTENDED RELEASE ORAL at 21:27

## 2021-11-25 ENCOUNTER — APPOINTMENT (OUTPATIENT)
Dept: GENERAL RADIOLOGY | Facility: HOSPITAL | Age: 56
End: 2021-11-25

## 2021-11-25 ENCOUNTER — APPOINTMENT (OUTPATIENT)
Dept: MRI IMAGING | Facility: HOSPITAL | Age: 56
End: 2021-11-25

## 2021-11-25 LAB
ANION GAP SERPL CALCULATED.3IONS-SCNC: 10 MMOL/L (ref 5–15)
BACTERIA SPEC AEROBE CULT: NORMAL
BUN SERPL-MCNC: 43 MG/DL (ref 6–20)
BUN/CREAT SERPL: 91.5 (ref 7–25)
CALCIUM SPEC-SCNC: 9.3 MG/DL (ref 8.6–10.5)
CHLORIDE SERPL-SCNC: 102 MMOL/L (ref 98–107)
CO2 SERPL-SCNC: 29 MMOL/L (ref 22–29)
CREAT SERPL-MCNC: 0.47 MG/DL (ref 0.57–1)
DEPRECATED RDW RBC AUTO: 58.1 FL (ref 37–54)
ERYTHROCYTE [DISTWIDTH] IN BLOOD BY AUTOMATED COUNT: 18 % (ref 12.3–15.4)
GFR SERPL CREATININE-BSD FRML MDRD: 138 ML/MIN/1.73
GLUCOSE BLDC GLUCOMTR-MCNC: 103 MG/DL (ref 70–130)
GLUCOSE BLDC GLUCOMTR-MCNC: 114 MG/DL (ref 70–130)
GLUCOSE BLDC GLUCOMTR-MCNC: 120 MG/DL (ref 70–130)
GLUCOSE BLDC GLUCOMTR-MCNC: 131 MG/DL (ref 70–130)
GLUCOSE SERPL-MCNC: 110 MG/DL (ref 65–99)
HCT VFR BLD AUTO: 28.1 % (ref 34–46.6)
HGB BLD-MCNC: 8.5 G/DL (ref 12–15.9)
MAGNESIUM SERPL-MCNC: 1.8 MG/DL (ref 1.6–2.6)
MCH RBC QN AUTO: 26.6 PG (ref 26.6–33)
MCHC RBC AUTO-ENTMCNC: 30.2 G/DL (ref 31.5–35.7)
MCV RBC AUTO: 87.8 FL (ref 79–97)
PHOSPHATE SERPL-MCNC: 2.3 MG/DL (ref 2.5–4.5)
PLATELET # BLD AUTO: 241 10*3/MM3 (ref 140–450)
PMV BLD AUTO: 9.3 FL (ref 6–12)
POTASSIUM SERPL-SCNC: 4.5 MMOL/L (ref 3.5–5.2)
RBC # BLD AUTO: 3.2 10*6/MM3 (ref 3.77–5.28)
SODIUM SERPL-SCNC: 141 MMOL/L (ref 136–145)
WBC NRBC COR # BLD: 9.64 10*3/MM3 (ref 3.4–10.8)

## 2021-11-25 PROCEDURE — 25010000002 VANCOMYCIN 10 G RECONSTITUTED SOLUTION

## 2021-11-25 PROCEDURE — 94799 UNLISTED PULMONARY SVC/PX: CPT

## 2021-11-25 PROCEDURE — 82962 GLUCOSE BLOOD TEST: CPT

## 2021-11-25 PROCEDURE — 83735 ASSAY OF MAGNESIUM: CPT | Performed by: INTERNAL MEDICINE

## 2021-11-25 PROCEDURE — 73560 X-RAY EXAM OF KNEE 1 OR 2: CPT

## 2021-11-25 PROCEDURE — 99232 SBSQ HOSP IP/OBS MODERATE 35: CPT | Performed by: INTERNAL MEDICINE

## 2021-11-25 PROCEDURE — 85027 COMPLETE CBC AUTOMATED: CPT | Performed by: INTERNAL MEDICINE

## 2021-11-25 PROCEDURE — 84100 ASSAY OF PHOSPHORUS: CPT | Performed by: INTERNAL MEDICINE

## 2021-11-25 PROCEDURE — 25010000002 LORAZEPAM PER 2 MG: Performed by: INTERNAL MEDICINE

## 2021-11-25 PROCEDURE — 80048 BASIC METABOLIC PNL TOTAL CA: CPT | Performed by: INTERNAL MEDICINE

## 2021-11-25 PROCEDURE — 25010000002 MEROPENEM PER 100 MG: Performed by: INTERNAL MEDICINE

## 2021-11-25 RX ORDER — LORAZEPAM 2 MG/ML
0.5 INJECTION INTRAMUSCULAR EVERY 4 HOURS PRN
Status: DISCONTINUED | OUTPATIENT
Start: 2021-11-25 | End: 2021-11-25

## 2021-11-25 RX ORDER — GUAR GUM
2 PACKET (EA) ORAL 3 TIMES DAILY
Status: DISCONTINUED | OUTPATIENT
Start: 2021-11-25 | End: 2021-12-11 | Stop reason: HOSPADM

## 2021-11-25 RX ADMIN — DULOXETINE HYDROCHLORIDE 30 MG: 30 CAPSULE, DELAYED RELEASE ORAL at 08:24

## 2021-11-25 RX ADMIN — PANTOPRAZOLE SODIUM 40 MG: 40 TABLET, DELAYED RELEASE ORAL at 05:14

## 2021-11-25 RX ADMIN — LEVOTHYROXINE SODIUM 125 MCG: 125 TABLET ORAL at 05:14

## 2021-11-25 RX ADMIN — FUROSEMIDE 40 MG: 40 TABLET ORAL at 08:25

## 2021-11-25 RX ADMIN — DOXYCYCLINE 100 MG: 100 INJECTION, POWDER, LYOPHILIZED, FOR SOLUTION INTRAVENOUS at 20:29

## 2021-11-25 RX ADMIN — MEROPENEM 1 G: 1 INJECTION, POWDER, FOR SOLUTION INTRAVENOUS at 05:14

## 2021-11-25 RX ADMIN — OXYCODONE HYDROCHLORIDE AND ACETAMINOPHEN 1 TABLET: 5; 325 TABLET ORAL at 13:01

## 2021-11-25 RX ADMIN — NYSTATIN 1 APPLICATION: 100000 POWDER TOPICAL at 13:01

## 2021-11-25 RX ADMIN — HYDRALAZINE HYDROCHLORIDE 50 MG: 50 TABLET, FILM COATED ORAL at 13:01

## 2021-11-25 RX ADMIN — CASTOR OIL AND BALSAM, PERU 1 APPLICATION: 788; 87 OINTMENT TOPICAL at 08:25

## 2021-11-25 RX ADMIN — SENNOSIDES AND DOCUSATE SODIUM 1 TABLET: 50; 8.6 TABLET ORAL at 20:30

## 2021-11-25 RX ADMIN — LEVETIRACETAM 500 MG: 500 TABLET, FILM COATED ORAL at 20:30

## 2021-11-25 RX ADMIN — DIAZEPAM 5 MG: 5 TABLET ORAL at 17:05

## 2021-11-25 RX ADMIN — NYSTATIN: 100000 POWDER TOPICAL at 05:14

## 2021-11-25 RX ADMIN — Medication 1 CAPSULE: at 08:24

## 2021-11-25 RX ADMIN — HYDRALAZINE HYDROCHLORIDE 50 MG: 50 TABLET, FILM COATED ORAL at 05:14

## 2021-11-25 RX ADMIN — Medication 2 PACKET: at 08:26

## 2021-11-25 RX ADMIN — BUPROPION HYDROCHLORIDE 150 MG: 150 TABLET, EXTENDED RELEASE ORAL at 08:25

## 2021-11-25 RX ADMIN — BUPROPION HYDROCHLORIDE 150 MG: 150 TABLET, EXTENDED RELEASE ORAL at 20:30

## 2021-11-25 RX ADMIN — MEROPENEM 1 G: 1 INJECTION, POWDER, FOR SOLUTION INTRAVENOUS at 20:30

## 2021-11-25 RX ADMIN — IPRATROPIUM BROMIDE AND ALBUTEROL SULFATE 3 ML: 2.5; .5 SOLUTION RESPIRATORY (INHALATION) at 07:30

## 2021-11-25 RX ADMIN — DIAZEPAM 5 MG: 5 TABLET ORAL at 08:24

## 2021-11-25 RX ADMIN — OXYCODONE HYDROCHLORIDE AND ACETAMINOPHEN 1 TABLET: 5; 325 TABLET ORAL at 05:14

## 2021-11-25 RX ADMIN — LORAZEPAM 0.5 MG: 2 INJECTION INTRAMUSCULAR; INTRAVENOUS at 10:45

## 2021-11-25 RX ADMIN — DIAZEPAM 5 MG: 5 TABLET ORAL at 00:19

## 2021-11-25 RX ADMIN — Medication 2 PACKET: at 17:05

## 2021-11-25 RX ADMIN — VANCOMYCIN HYDROCHLORIDE 1250 MG: 10 INJECTION, POWDER, LYOPHILIZED, FOR SOLUTION INTRAVENOUS at 13:01

## 2021-11-25 RX ADMIN — AMLODIPINE BESYLATE 5 MG: 5 TABLET ORAL at 08:25

## 2021-11-25 RX ADMIN — MULTIVITAMIN TABLET 1 TABLET: TABLET at 08:24

## 2021-11-25 RX ADMIN — DOXYCYCLINE 100 MG: 100 INJECTION, POWDER, LYOPHILIZED, FOR SOLUTION INTRAVENOUS at 09:06

## 2021-11-25 RX ADMIN — MEROPENEM 1 G: 1 INJECTION, POWDER, FOR SOLUTION INTRAVENOUS at 13:01

## 2021-11-25 RX ADMIN — HYDRALAZINE HYDROCHLORIDE 50 MG: 50 TABLET, FILM COATED ORAL at 20:30

## 2021-11-25 RX ADMIN — LEVETIRACETAM 500 MG: 500 TABLET, FILM COATED ORAL at 08:25

## 2021-11-26 ENCOUNTER — APPOINTMENT (OUTPATIENT)
Dept: MRI IMAGING | Facility: HOSPITAL | Age: 56
End: 2021-11-26

## 2021-11-26 LAB
ANION GAP SERPL CALCULATED.3IONS-SCNC: 10 MMOL/L (ref 5–15)
BUN SERPL-MCNC: 35 MG/DL (ref 6–20)
BUN/CREAT SERPL: 81.4 (ref 7–25)
CA-I SERPL ISE-MCNC: 1.39 MMOL/L (ref 1.12–1.32)
CALCIUM SPEC-SCNC: 9.2 MG/DL (ref 8.6–10.5)
CHLORIDE SERPL-SCNC: 100 MMOL/L (ref 98–107)
CO2 SERPL-SCNC: 30 MMOL/L (ref 22–29)
CREAT SERPL-MCNC: 0.43 MG/DL (ref 0.57–1)
DEPRECATED RDW RBC AUTO: 58.3 FL (ref 37–54)
ERYTHROCYTE [DISTWIDTH] IN BLOOD BY AUTOMATED COUNT: 17.7 % (ref 12.3–15.4)
GFR SERPL CREATININE-BSD FRML MDRD: >150 ML/MIN/1.73
GLUCOSE BLDC GLUCOMTR-MCNC: 112 MG/DL (ref 70–130)
GLUCOSE BLDC GLUCOMTR-MCNC: 117 MG/DL (ref 70–130)
GLUCOSE BLDC GLUCOMTR-MCNC: 144 MG/DL (ref 70–130)
GLUCOSE BLDC GLUCOMTR-MCNC: 79 MG/DL (ref 70–130)
GLUCOSE BLDC GLUCOMTR-MCNC: 86 MG/DL (ref 70–130)
GLUCOSE SERPL-MCNC: 123 MG/DL (ref 65–99)
HCT VFR BLD AUTO: 28.6 % (ref 34–46.6)
HGB BLD-MCNC: 8.5 G/DL (ref 12–15.9)
MAGNESIUM SERPL-MCNC: 1.6 MG/DL (ref 1.6–2.6)
MCH RBC QN AUTO: 26.6 PG (ref 26.6–33)
MCHC RBC AUTO-ENTMCNC: 29.7 G/DL (ref 31.5–35.7)
MCV RBC AUTO: 89.4 FL (ref 79–97)
PHOSPHATE SERPL-MCNC: 2.8 MG/DL (ref 2.5–4.5)
PLATELET # BLD AUTO: 201 10*3/MM3 (ref 140–450)
PMV BLD AUTO: 9.3 FL (ref 6–12)
POTASSIUM SERPL-SCNC: 4.2 MMOL/L (ref 3.5–5.2)
RBC # BLD AUTO: 3.2 10*6/MM3 (ref 3.77–5.28)
SODIUM SERPL-SCNC: 140 MMOL/L (ref 136–145)
VANCOMYCIN TROUGH SERPL-MCNC: 14.4 MCG/ML (ref 5–20)
WBC NRBC COR # BLD: 9.79 10*3/MM3 (ref 3.4–10.8)

## 2021-11-26 PROCEDURE — 82330 ASSAY OF CALCIUM: CPT | Performed by: INTERNAL MEDICINE

## 2021-11-26 PROCEDURE — 97530 THERAPEUTIC ACTIVITIES: CPT

## 2021-11-26 PROCEDURE — 72142 MRI NECK SPINE W/DYE: CPT

## 2021-11-26 PROCEDURE — 0 GADOBENATE DIMEGLUMINE 529 MG/ML SOLUTION: Performed by: INTERNAL MEDICINE

## 2021-11-26 PROCEDURE — 99232 SBSQ HOSP IP/OBS MODERATE 35: CPT | Performed by: INTERNAL MEDICINE

## 2021-11-26 PROCEDURE — 83735 ASSAY OF MAGNESIUM: CPT | Performed by: INTERNAL MEDICINE

## 2021-11-26 PROCEDURE — 85027 COMPLETE CBC AUTOMATED: CPT | Performed by: INTERNAL MEDICINE

## 2021-11-26 PROCEDURE — 94799 UNLISTED PULMONARY SVC/PX: CPT

## 2021-11-26 PROCEDURE — 25010000002 MEROPENEM PER 100 MG: Performed by: INTERNAL MEDICINE

## 2021-11-26 PROCEDURE — 25010000002 LORAZEPAM PER 2 MG: Performed by: INTERNAL MEDICINE

## 2021-11-26 PROCEDURE — 72157 MRI CHEST SPINE W/O & W/DYE: CPT

## 2021-11-26 PROCEDURE — 80048 BASIC METABOLIC PNL TOTAL CA: CPT | Performed by: INTERNAL MEDICINE

## 2021-11-26 PROCEDURE — 80202 ASSAY OF VANCOMYCIN: CPT

## 2021-11-26 PROCEDURE — 84100 ASSAY OF PHOSPHORUS: CPT | Performed by: INTERNAL MEDICINE

## 2021-11-26 PROCEDURE — 25010000002 VANCOMYCIN 10 G RECONSTITUTED SOLUTION

## 2021-11-26 PROCEDURE — 97110 THERAPEUTIC EXERCISES: CPT

## 2021-11-26 PROCEDURE — A9577 INJ MULTIHANCE: HCPCS | Performed by: INTERNAL MEDICINE

## 2021-11-26 PROCEDURE — 82962 GLUCOSE BLOOD TEST: CPT

## 2021-11-26 RX ORDER — LORAZEPAM 2 MG/ML
1 INJECTION INTRAMUSCULAR ONCE
Status: COMPLETED | OUTPATIENT
Start: 2021-11-26 | End: 2021-11-26

## 2021-11-26 RX ADMIN — DIAZEPAM 5 MG: 5 TABLET ORAL at 02:49

## 2021-11-26 RX ADMIN — HYDRALAZINE HYDROCHLORIDE 50 MG: 50 TABLET, FILM COATED ORAL at 20:55

## 2021-11-26 RX ADMIN — SENNOSIDES AND DOCUSATE SODIUM 1 TABLET: 50; 8.6 TABLET ORAL at 20:56

## 2021-11-26 RX ADMIN — Medication 2 PACKET: at 17:06

## 2021-11-26 RX ADMIN — IPRATROPIUM BROMIDE AND ALBUTEROL SULFATE 3 ML: 2.5; .5 SOLUTION RESPIRATORY (INHALATION) at 15:51

## 2021-11-26 RX ADMIN — VANCOMYCIN HYDROCHLORIDE 1500 MG: 10 INJECTION, POWDER, LYOPHILIZED, FOR SOLUTION INTRAVENOUS at 17:04

## 2021-11-26 RX ADMIN — AMLODIPINE BESYLATE 5 MG: 5 TABLET ORAL at 09:07

## 2021-11-26 RX ADMIN — MEROPENEM 1 G: 1 INJECTION, POWDER, FOR SOLUTION INTRAVENOUS at 06:29

## 2021-11-26 RX ADMIN — HYDRALAZINE HYDROCHLORIDE 50 MG: 50 TABLET, FILM COATED ORAL at 15:08

## 2021-11-26 RX ADMIN — Medication 1 CAPSULE: at 09:07

## 2021-11-26 RX ADMIN — LEVOTHYROXINE SODIUM 125 MCG: 125 TABLET ORAL at 06:29

## 2021-11-26 RX ADMIN — MULTIVITAMIN TABLET 1 TABLET: TABLET at 09:07

## 2021-11-26 RX ADMIN — CASTOR OIL AND BALSAM, PERU 1 APPLICATION: 788; 87 OINTMENT TOPICAL at 09:07

## 2021-11-26 RX ADMIN — DOXYCYCLINE 100 MG: 100 INJECTION, POWDER, LYOPHILIZED, FOR SOLUTION INTRAVENOUS at 20:55

## 2021-11-26 RX ADMIN — BUPROPION HYDROCHLORIDE 150 MG: 150 TABLET, EXTENDED RELEASE ORAL at 20:55

## 2021-11-26 RX ADMIN — HYDRALAZINE HYDROCHLORIDE 50 MG: 50 TABLET, FILM COATED ORAL at 06:29

## 2021-11-26 RX ADMIN — BUPROPION HYDROCHLORIDE 150 MG: 150 TABLET, EXTENDED RELEASE ORAL at 09:07

## 2021-11-26 RX ADMIN — IPRATROPIUM BROMIDE AND ALBUTEROL SULFATE 3 ML: 2.5; .5 SOLUTION RESPIRATORY (INHALATION) at 09:29

## 2021-11-26 RX ADMIN — MEROPENEM 1 G: 1 INJECTION, POWDER, FOR SOLUTION INTRAVENOUS at 15:08

## 2021-11-26 RX ADMIN — LEVETIRACETAM 500 MG: 500 TABLET, FILM COATED ORAL at 09:07

## 2021-11-26 RX ADMIN — PANTOPRAZOLE SODIUM 40 MG: 40 TABLET, DELAYED RELEASE ORAL at 06:29

## 2021-11-26 RX ADMIN — DULOXETINE HYDROCHLORIDE 30 MG: 30 CAPSULE, DELAYED RELEASE ORAL at 09:07

## 2021-11-26 RX ADMIN — NYSTATIN: 100000 POWDER TOPICAL at 15:08

## 2021-11-26 RX ADMIN — CASTOR OIL AND BALSAM, PERU 1 APPLICATION: 788; 87 OINTMENT TOPICAL at 20:55

## 2021-11-26 RX ADMIN — LEVETIRACETAM 500 MG: 500 TABLET, FILM COATED ORAL at 20:56

## 2021-11-26 RX ADMIN — NYSTATIN: 100000 POWDER TOPICAL at 20:55

## 2021-11-26 RX ADMIN — SODIUM CHLORIDE, PRESERVATIVE FREE 10 ML: 5 INJECTION INTRAVENOUS at 09:06

## 2021-11-26 RX ADMIN — LORAZEPAM 1 MG: 2 INJECTION INTRAMUSCULAR; INTRAVENOUS at 11:30

## 2021-11-26 RX ADMIN — FUROSEMIDE 40 MG: 40 TABLET ORAL at 09:07

## 2021-11-26 RX ADMIN — GADOBENATE DIMEGLUMINE 15 ML: 529 INJECTION, SOLUTION INTRAVENOUS at 14:08

## 2021-11-26 RX ADMIN — MEROPENEM 1 G: 1 INJECTION, POWDER, FOR SOLUTION INTRAVENOUS at 21:46

## 2021-11-26 RX ADMIN — DOXYCYCLINE 100 MG: 100 INJECTION, POWDER, LYOPHILIZED, FOR SOLUTION INTRAVENOUS at 09:07

## 2021-11-27 LAB
ANION GAP SERPL CALCULATED.3IONS-SCNC: 4 MMOL/L (ref 5–15)
BUN SERPL-MCNC: 31 MG/DL (ref 6–20)
BUN/CREAT SERPL: 79.5 (ref 7–25)
CALCIUM SPEC-SCNC: 9.4 MG/DL (ref 8.6–10.5)
CHLORIDE SERPL-SCNC: 102 MMOL/L (ref 98–107)
CO2 SERPL-SCNC: 34 MMOL/L (ref 22–29)
CREAT SERPL-MCNC: 0.39 MG/DL (ref 0.57–1)
DEPRECATED RDW RBC AUTO: 57.3 FL (ref 37–54)
ERYTHROCYTE [DISTWIDTH] IN BLOOD BY AUTOMATED COUNT: 17.6 % (ref 12.3–15.4)
GFR SERPL CREATININE-BSD FRML MDRD: >150 ML/MIN/1.73
GLUCOSE BLDC GLUCOMTR-MCNC: 126 MG/DL (ref 70–130)
GLUCOSE BLDC GLUCOMTR-MCNC: 136 MG/DL (ref 70–130)
GLUCOSE BLDC GLUCOMTR-MCNC: 97 MG/DL (ref 70–130)
GLUCOSE SERPL-MCNC: 87 MG/DL (ref 65–99)
HCT VFR BLD AUTO: 27.4 % (ref 34–46.6)
HGB BLD-MCNC: 8.2 G/DL (ref 12–15.9)
MCH RBC QN AUTO: 26.8 PG (ref 26.6–33)
MCHC RBC AUTO-ENTMCNC: 29.9 G/DL (ref 31.5–35.7)
MCV RBC AUTO: 89.5 FL (ref 79–97)
PLATELET # BLD AUTO: 181 10*3/MM3 (ref 140–450)
PMV BLD AUTO: 8.9 FL (ref 6–12)
POTASSIUM SERPL-SCNC: 4.9 MMOL/L (ref 3.5–5.2)
RBC # BLD AUTO: 3.06 10*6/MM3 (ref 3.77–5.28)
SODIUM SERPL-SCNC: 140 MMOL/L (ref 136–145)
WBC NRBC COR # BLD: 8.11 10*3/MM3 (ref 3.4–10.8)

## 2021-11-27 PROCEDURE — 94799 UNLISTED PULMONARY SVC/PX: CPT

## 2021-11-27 PROCEDURE — 25010000002 MEROPENEM PER 100 MG: Performed by: INTERNAL MEDICINE

## 2021-11-27 PROCEDURE — 25010000002 FUROSEMIDE PER 20 MG: Performed by: INTERNAL MEDICINE

## 2021-11-27 PROCEDURE — 99232 SBSQ HOSP IP/OBS MODERATE 35: CPT | Performed by: INTERNAL MEDICINE

## 2021-11-27 PROCEDURE — 25010000002 VANCOMYCIN 10 G RECONSTITUTED SOLUTION

## 2021-11-27 PROCEDURE — 82962 GLUCOSE BLOOD TEST: CPT

## 2021-11-27 PROCEDURE — 85027 COMPLETE CBC AUTOMATED: CPT | Performed by: INTERNAL MEDICINE

## 2021-11-27 PROCEDURE — 80048 BASIC METABOLIC PNL TOTAL CA: CPT | Performed by: INTERNAL MEDICINE

## 2021-11-27 RX ORDER — IPRATROPIUM BROMIDE AND ALBUTEROL SULFATE 2.5; .5 MG/3ML; MG/3ML
3 SOLUTION RESPIRATORY (INHALATION) EVERY 6 HOURS PRN
Status: DISCONTINUED | OUTPATIENT
Start: 2021-11-27 | End: 2021-12-11 | Stop reason: HOSPADM

## 2021-11-27 RX ORDER — FUROSEMIDE 10 MG/ML
40 INJECTION INTRAMUSCULAR; INTRAVENOUS ONCE
Status: COMPLETED | OUTPATIENT
Start: 2021-11-27 | End: 2021-11-27

## 2021-11-27 RX ADMIN — FUROSEMIDE 40 MG: 40 INJECTION, SOLUTION INTRAMUSCULAR; INTRAVENOUS at 14:28

## 2021-11-27 RX ADMIN — HYDRALAZINE HYDROCHLORIDE 50 MG: 50 TABLET, FILM COATED ORAL at 20:32

## 2021-11-27 RX ADMIN — SODIUM CHLORIDE, PRESERVATIVE FREE 10 ML: 5 INJECTION INTRAVENOUS at 11:30

## 2021-11-27 RX ADMIN — CASTOR OIL AND BALSAM, PERU 1 APPLICATION: 788; 87 OINTMENT TOPICAL at 20:32

## 2021-11-27 RX ADMIN — Medication 2 PACKET: at 20:33

## 2021-11-27 RX ADMIN — NYSTATIN: 100000 POWDER TOPICAL at 20:32

## 2021-11-27 RX ADMIN — HYDRALAZINE HYDROCHLORIDE 50 MG: 50 TABLET, FILM COATED ORAL at 05:03

## 2021-11-27 RX ADMIN — HYDRALAZINE HYDROCHLORIDE 50 MG: 50 TABLET, FILM COATED ORAL at 14:28

## 2021-11-27 RX ADMIN — IPRATROPIUM BROMIDE AND ALBUTEROL SULFATE 3 ML: 2.5; .5 SOLUTION RESPIRATORY (INHALATION) at 16:16

## 2021-11-27 RX ADMIN — DOXYCYCLINE 100 MG: 100 INJECTION, POWDER, LYOPHILIZED, FOR SOLUTION INTRAVENOUS at 09:17

## 2021-11-27 RX ADMIN — Medication 1 CAPSULE: at 09:17

## 2021-11-27 RX ADMIN — OXYCODONE HYDROCHLORIDE AND ACETAMINOPHEN 1 TABLET: 5; 325 TABLET ORAL at 09:29

## 2021-11-27 RX ADMIN — BUPROPION HYDROCHLORIDE 150 MG: 150 TABLET, EXTENDED RELEASE ORAL at 09:17

## 2021-11-27 RX ADMIN — LEVETIRACETAM 500 MG: 500 TABLET, FILM COATED ORAL at 20:32

## 2021-11-27 RX ADMIN — IPRATROPIUM BROMIDE AND ALBUTEROL SULFATE 3 ML: 2.5; .5 SOLUTION RESPIRATORY (INHALATION) at 09:39

## 2021-11-27 RX ADMIN — IPRATROPIUM BROMIDE AND ALBUTEROL SULFATE 3 ML: 2.5; .5 SOLUTION RESPIRATORY (INHALATION) at 23:10

## 2021-11-27 RX ADMIN — MEROPENEM 1 G: 1 INJECTION, POWDER, FOR SOLUTION INTRAVENOUS at 14:28

## 2021-11-27 RX ADMIN — BUPROPION HYDROCHLORIDE 150 MG: 150 TABLET, EXTENDED RELEASE ORAL at 20:32

## 2021-11-27 RX ADMIN — MULTIVITAMIN TABLET 1 TABLET: TABLET at 09:17

## 2021-11-27 RX ADMIN — NYSTATIN: 100000 POWDER TOPICAL at 16:10

## 2021-11-27 RX ADMIN — LEVOTHYROXINE SODIUM 125 MCG: 125 TABLET ORAL at 05:03

## 2021-11-27 RX ADMIN — NYSTATIN: 100000 POWDER TOPICAL at 05:04

## 2021-11-27 RX ADMIN — VANCOMYCIN HYDROCHLORIDE 1500 MG: 10 INJECTION, POWDER, LYOPHILIZED, FOR SOLUTION INTRAVENOUS at 17:25

## 2021-11-27 RX ADMIN — Medication 2 PACKET: at 17:26

## 2021-11-27 RX ADMIN — LEVETIRACETAM 500 MG: 500 TABLET, FILM COATED ORAL at 09:17

## 2021-11-27 RX ADMIN — Medication 2 PACKET: at 09:22

## 2021-11-27 RX ADMIN — CASTOR OIL AND BALSAM, PERU 1 APPLICATION: 788; 87 OINTMENT TOPICAL at 09:16

## 2021-11-27 RX ADMIN — OXYCODONE HYDROCHLORIDE AND ACETAMINOPHEN 1 TABLET: 5; 325 TABLET ORAL at 00:43

## 2021-11-27 RX ADMIN — FUROSEMIDE 40 MG: 40 TABLET ORAL at 09:17

## 2021-11-27 RX ADMIN — MEROPENEM 1 G: 1 INJECTION, POWDER, FOR SOLUTION INTRAVENOUS at 06:15

## 2021-11-27 RX ADMIN — PANTOPRAZOLE SODIUM 40 MG: 40 TABLET, DELAYED RELEASE ORAL at 05:03

## 2021-11-27 RX ADMIN — DULOXETINE HYDROCHLORIDE 30 MG: 30 CAPSULE, DELAYED RELEASE ORAL at 09:17

## 2021-11-27 RX ADMIN — AMLODIPINE BESYLATE 5 MG: 5 TABLET ORAL at 09:17

## 2021-11-28 LAB
ANION GAP SERPL CALCULATED.3IONS-SCNC: 6 MMOL/L (ref 5–15)
BUN SERPL-MCNC: 32 MG/DL (ref 6–20)
BUN/CREAT SERPL: 65.3 (ref 7–25)
CA-I SERPL ISE-MCNC: 1.36 MMOL/L (ref 1.12–1.32)
CALCIUM SPEC-SCNC: 9.3 MG/DL (ref 8.6–10.5)
CHLORIDE SERPL-SCNC: 98 MMOL/L (ref 98–107)
CO2 SERPL-SCNC: 37 MMOL/L (ref 22–29)
CREAT SERPL-MCNC: 0.49 MG/DL (ref 0.57–1)
DEPRECATED RDW RBC AUTO: 58 FL (ref 37–54)
ERYTHROCYTE [DISTWIDTH] IN BLOOD BY AUTOMATED COUNT: 17.3 % (ref 12.3–15.4)
GFR SERPL CREATININE-BSD FRML MDRD: 131 ML/MIN/1.73
GLUCOSE BLDC GLUCOMTR-MCNC: 116 MG/DL (ref 70–130)
GLUCOSE BLDC GLUCOMTR-MCNC: 138 MG/DL (ref 70–130)
GLUCOSE BLDC GLUCOMTR-MCNC: 60 MG/DL (ref 70–130)
GLUCOSE BLDC GLUCOMTR-MCNC: 81 MG/DL (ref 70–130)
GLUCOSE BLDC GLUCOMTR-MCNC: 96 MG/DL (ref 70–130)
GLUCOSE SERPL-MCNC: 111 MG/DL (ref 65–99)
HCT VFR BLD AUTO: 29.1 % (ref 34–46.6)
HGB BLD-MCNC: 8.4 G/DL (ref 12–15.9)
MAGNESIUM SERPL-MCNC: 1.6 MG/DL (ref 1.6–2.6)
MCH RBC QN AUTO: 26.7 PG (ref 26.6–33)
MCHC RBC AUTO-ENTMCNC: 28.9 G/DL (ref 31.5–35.7)
MCV RBC AUTO: 92.4 FL (ref 79–97)
PHOSPHATE SERPL-MCNC: 3.2 MG/DL (ref 2.5–4.5)
PLATELET # BLD AUTO: 143 10*3/MM3 (ref 140–450)
PMV BLD AUTO: 8.5 FL (ref 6–12)
POTASSIUM SERPL-SCNC: 4.8 MMOL/L (ref 3.5–5.2)
RBC # BLD AUTO: 3.15 10*6/MM3 (ref 3.77–5.28)
SODIUM SERPL-SCNC: 141 MMOL/L (ref 136–145)
WBC NRBC COR # BLD: 7.23 10*3/MM3 (ref 3.4–10.8)

## 2021-11-28 PROCEDURE — 94799 UNLISTED PULMONARY SVC/PX: CPT

## 2021-11-28 PROCEDURE — 25010000002 VANCOMYCIN 10 G RECONSTITUTED SOLUTION

## 2021-11-28 PROCEDURE — 82962 GLUCOSE BLOOD TEST: CPT

## 2021-11-28 PROCEDURE — 82330 ASSAY OF CALCIUM: CPT | Performed by: INTERNAL MEDICINE

## 2021-11-28 PROCEDURE — 85027 COMPLETE CBC AUTOMATED: CPT | Performed by: INTERNAL MEDICINE

## 2021-11-28 PROCEDURE — 99231 SBSQ HOSP IP/OBS SF/LOW 25: CPT | Performed by: FAMILY MEDICINE

## 2021-11-28 PROCEDURE — 83735 ASSAY OF MAGNESIUM: CPT | Performed by: INTERNAL MEDICINE

## 2021-11-28 PROCEDURE — 84100 ASSAY OF PHOSPHORUS: CPT | Performed by: INTERNAL MEDICINE

## 2021-11-28 PROCEDURE — 80048 BASIC METABOLIC PNL TOTAL CA: CPT | Performed by: INTERNAL MEDICINE

## 2021-11-28 RX ORDER — LORAZEPAM 1 MG/1
1 TABLET ORAL 2 TIMES DAILY PRN
Status: DISCONTINUED | OUTPATIENT
Start: 2021-11-28 | End: 2021-12-02

## 2021-11-28 RX ORDER — IPRATROPIUM BROMIDE AND ALBUTEROL SULFATE 2.5; .5 MG/3ML; MG/3ML
3 SOLUTION RESPIRATORY (INHALATION)
Status: DISCONTINUED | OUTPATIENT
Start: 2021-11-28 | End: 2021-12-03

## 2021-11-28 RX ADMIN — IPRATROPIUM BROMIDE AND ALBUTEROL SULFATE 3 ML: 2.5; .5 SOLUTION RESPIRATORY (INHALATION) at 16:19

## 2021-11-28 RX ADMIN — FUROSEMIDE 40 MG: 40 TABLET ORAL at 09:11

## 2021-11-28 RX ADMIN — MULTIVITAMIN TABLET 1 TABLET: TABLET at 09:11

## 2021-11-28 RX ADMIN — OXYCODONE HYDROCHLORIDE AND ACETAMINOPHEN 1 TABLET: 5; 325 TABLET ORAL at 23:26

## 2021-11-28 RX ADMIN — SODIUM CHLORIDE, PRESERVATIVE FREE 10 ML: 5 INJECTION INTRAVENOUS at 09:40

## 2021-11-28 RX ADMIN — OXYCODONE HYDROCHLORIDE AND ACETAMINOPHEN 1 TABLET: 5; 325 TABLET ORAL at 09:11

## 2021-11-28 RX ADMIN — VANCOMYCIN HYDROCHLORIDE 1500 MG: 10 INJECTION, POWDER, LYOPHILIZED, FOR SOLUTION INTRAVENOUS at 17:14

## 2021-11-28 RX ADMIN — CASTOR OIL AND BALSAM, PERU 1 APPLICATION: 788; 87 OINTMENT TOPICAL at 09:40

## 2021-11-28 RX ADMIN — PANTOPRAZOLE SODIUM 40 MG: 40 TABLET, DELAYED RELEASE ORAL at 05:18

## 2021-11-28 RX ADMIN — NYSTATIN: 100000 POWDER TOPICAL at 15:13

## 2021-11-28 RX ADMIN — BUPROPION HYDROCHLORIDE 150 MG: 150 TABLET, EXTENDED RELEASE ORAL at 20:33

## 2021-11-28 RX ADMIN — LEVETIRACETAM 500 MG: 500 TABLET, FILM COATED ORAL at 09:11

## 2021-11-28 RX ADMIN — HYDRALAZINE HYDROCHLORIDE 50 MG: 50 TABLET, FILM COATED ORAL at 05:18

## 2021-11-28 RX ADMIN — IPRATROPIUM BROMIDE AND ALBUTEROL SULFATE 3 ML: 2.5; .5 SOLUTION RESPIRATORY (INHALATION) at 21:33

## 2021-11-28 RX ADMIN — BUPROPION HYDROCHLORIDE 150 MG: 150 TABLET, EXTENDED RELEASE ORAL at 09:11

## 2021-11-28 RX ADMIN — NYSTATIN: 100000 POWDER TOPICAL at 20:33

## 2021-11-28 RX ADMIN — Medication 1 CAPSULE: at 09:11

## 2021-11-28 RX ADMIN — DULOXETINE HYDROCHLORIDE 30 MG: 30 CAPSULE, DELAYED RELEASE ORAL at 09:11

## 2021-11-28 RX ADMIN — HYDRALAZINE HYDROCHLORIDE 50 MG: 50 TABLET, FILM COATED ORAL at 20:33

## 2021-11-28 RX ADMIN — OXYCODONE HYDROCHLORIDE AND ACETAMINOPHEN 1 TABLET: 5; 325 TABLET ORAL at 17:13

## 2021-11-28 RX ADMIN — LEVETIRACETAM 500 MG: 500 TABLET, FILM COATED ORAL at 20:33

## 2021-11-28 RX ADMIN — OXYCODONE HYDROCHLORIDE AND ACETAMINOPHEN 1 TABLET: 5; 325 TABLET ORAL at 00:10

## 2021-11-28 RX ADMIN — IPRATROPIUM BROMIDE AND ALBUTEROL SULFATE 3 ML: 2.5; .5 SOLUTION RESPIRATORY (INHALATION) at 05:26

## 2021-11-28 RX ADMIN — OXYCODONE HYDROCHLORIDE AND ACETAMINOPHEN 1 TABLET: 5; 325 TABLET ORAL at 05:18

## 2021-11-28 RX ADMIN — Medication 2 PACKET: at 20:34

## 2021-11-28 RX ADMIN — CASTOR OIL AND BALSAM, PERU 1 APPLICATION: 788; 87 OINTMENT TOPICAL at 20:33

## 2021-11-28 RX ADMIN — AMLODIPINE BESYLATE 5 MG: 5 TABLET ORAL at 09:11

## 2021-11-28 RX ADMIN — HYDRALAZINE HYDROCHLORIDE 50 MG: 50 TABLET, FILM COATED ORAL at 15:13

## 2021-11-28 RX ADMIN — LEVOTHYROXINE SODIUM 125 MCG: 125 TABLET ORAL at 05:18

## 2021-11-28 RX ADMIN — Medication 2 PACKET: at 15:13

## 2021-11-28 RX ADMIN — LORAZEPAM 1 MG: 1 TABLET ORAL at 23:28

## 2021-11-28 RX ADMIN — LORAZEPAM 1 MG: 1 TABLET ORAL at 13:05

## 2021-11-28 RX ADMIN — NYSTATIN: 100000 POWDER TOPICAL at 05:18

## 2021-11-28 RX ADMIN — IPRATROPIUM BROMIDE AND ALBUTEROL SULFATE 3 ML: 2.5; .5 SOLUTION RESPIRATORY (INHALATION) at 10:31

## 2021-11-29 LAB
ALBUMIN SERPL-MCNC: 2.5 G/DL (ref 3.5–5.2)
ALP SERPL-CCNC: 286 U/L (ref 39–117)
ALT SERPL W P-5'-P-CCNC: 44 U/L (ref 1–33)
ANION GAP SERPL CALCULATED.3IONS-SCNC: 6 MMOL/L (ref 5–15)
AST SERPL-CCNC: 45 U/L (ref 1–32)
BILIRUB SERPL-MCNC: 0.3 MG/DL (ref 0–1.2)
BUN SERPL-MCNC: 28 MG/DL (ref 6–20)
CALCIUM SPEC-SCNC: 9.2 MG/DL (ref 8.6–10.5)
CHLORIDE SERPL-SCNC: 97 MMOL/L (ref 98–107)
CHOLEST SERPL-MCNC: 158 MG/DL (ref 0–200)
CO2 SERPL-SCNC: 38 MMOL/L (ref 22–29)
CREAT SERPL-MCNC: 0.5 MG/DL (ref 0.57–1)
CRP SERPL-MCNC: 4.78 MG/DL (ref 0–0.5)
GLUCOSE BLDC GLUCOMTR-MCNC: 114 MG/DL (ref 70–130)
GLUCOSE BLDC GLUCOMTR-MCNC: 142 MG/DL (ref 70–130)
GLUCOSE SERPL-MCNC: 176 MG/DL (ref 65–99)
MAGNESIUM SERPL-MCNC: 2 MG/DL (ref 1.6–2.6)
PHOSPHATE SERPL-MCNC: 3 MG/DL (ref 2.5–4.5)
POTASSIUM SERPL-SCNC: 4.7 MMOL/L (ref 3.5–5.2)
PREALB SERPL-MCNC: 17.8 MG/DL (ref 20–40)
PROT SERPL-MCNC: 6.1 G/DL (ref 6–8.5)
SODIUM SERPL-SCNC: 141 MMOL/L (ref 136–145)
TRIGL SERPL-MCNC: 83 MG/DL (ref 0–150)

## 2021-11-29 PROCEDURE — 86140 C-REACTIVE PROTEIN: CPT | Performed by: INTERNAL MEDICINE

## 2021-11-29 PROCEDURE — 82962 GLUCOSE BLOOD TEST: CPT

## 2021-11-29 PROCEDURE — 97110 THERAPEUTIC EXERCISES: CPT

## 2021-11-29 PROCEDURE — 25010000002 VANCOMYCIN 10 G RECONSTITUTED SOLUTION

## 2021-11-29 PROCEDURE — 97530 THERAPEUTIC ACTIVITIES: CPT | Performed by: OCCUPATIONAL THERAPIST

## 2021-11-29 PROCEDURE — 80053 COMPREHEN METABOLIC PANEL: CPT | Performed by: INTERNAL MEDICINE

## 2021-11-29 PROCEDURE — 84134 ASSAY OF PREALBUMIN: CPT | Performed by: INTERNAL MEDICINE

## 2021-11-29 PROCEDURE — 99231 SBSQ HOSP IP/OBS SF/LOW 25: CPT | Performed by: FAMILY MEDICINE

## 2021-11-29 PROCEDURE — 94799 UNLISTED PULMONARY SVC/PX: CPT

## 2021-11-29 PROCEDURE — 82465 ASSAY BLD/SERUM CHOLESTEROL: CPT | Performed by: INTERNAL MEDICINE

## 2021-11-29 PROCEDURE — 83735 ASSAY OF MAGNESIUM: CPT | Performed by: INTERNAL MEDICINE

## 2021-11-29 PROCEDURE — 84100 ASSAY OF PHOSPHORUS: CPT | Performed by: INTERNAL MEDICINE

## 2021-11-29 PROCEDURE — 84478 ASSAY OF TRIGLYCERIDES: CPT | Performed by: INTERNAL MEDICINE

## 2021-11-29 RX ADMIN — BUPROPION HYDROCHLORIDE 150 MG: 150 TABLET, EXTENDED RELEASE ORAL at 20:21

## 2021-11-29 RX ADMIN — NYSTATIN: 100000 POWDER TOPICAL at 06:11

## 2021-11-29 RX ADMIN — IPRATROPIUM BROMIDE AND ALBUTEROL SULFATE 3 ML: 2.5; .5 SOLUTION RESPIRATORY (INHALATION) at 04:35

## 2021-11-29 RX ADMIN — VANCOMYCIN HYDROCHLORIDE 1500 MG: 10 INJECTION, POWDER, LYOPHILIZED, FOR SOLUTION INTRAVENOUS at 17:55

## 2021-11-29 RX ADMIN — NYSTATIN: 100000 POWDER TOPICAL at 15:32

## 2021-11-29 RX ADMIN — LEVOTHYROXINE SODIUM 125 MCG: 125 TABLET ORAL at 04:46

## 2021-11-29 RX ADMIN — HYDRALAZINE HYDROCHLORIDE 50 MG: 50 TABLET, FILM COATED ORAL at 21:28

## 2021-11-29 RX ADMIN — IPRATROPIUM BROMIDE AND ALBUTEROL SULFATE 3 ML: 2.5; .5 SOLUTION RESPIRATORY (INHALATION) at 13:00

## 2021-11-29 RX ADMIN — IPRATROPIUM BROMIDE AND ALBUTEROL SULFATE 3 ML: 2.5; .5 SOLUTION RESPIRATORY (INHALATION) at 23:08

## 2021-11-29 RX ADMIN — HYDRALAZINE HYDROCHLORIDE 50 MG: 50 TABLET, FILM COATED ORAL at 15:54

## 2021-11-29 RX ADMIN — LEVETIRACETAM 500 MG: 500 TABLET, FILM COATED ORAL at 11:26

## 2021-11-29 RX ADMIN — CASTOR OIL AND BALSAM, PERU 1 APPLICATION: 788; 87 OINTMENT TOPICAL at 21:28

## 2021-11-29 RX ADMIN — LEVETIRACETAM 500 MG: 500 TABLET, FILM COATED ORAL at 20:21

## 2021-11-29 RX ADMIN — NYSTATIN: 100000 POWDER TOPICAL at 22:54

## 2021-11-29 RX ADMIN — BUPROPION HYDROCHLORIDE 150 MG: 150 TABLET, EXTENDED RELEASE ORAL at 11:26

## 2021-11-29 RX ADMIN — Medication 2 PACKET: at 17:00

## 2021-11-29 RX ADMIN — MULTIVITAMIN TABLET 1 TABLET: TABLET at 11:26

## 2021-11-29 RX ADMIN — IPRATROPIUM BROMIDE AND ALBUTEROL SULFATE 3 ML: 2.5; .5 SOLUTION RESPIRATORY (INHALATION) at 16:14

## 2021-11-29 RX ADMIN — PANTOPRAZOLE SODIUM 40 MG: 40 TABLET, DELAYED RELEASE ORAL at 04:46

## 2021-11-29 RX ADMIN — Medication 1 CAPSULE: at 11:26

## 2021-11-29 RX ADMIN — FUROSEMIDE 40 MG: 40 TABLET ORAL at 11:26

## 2021-11-29 RX ADMIN — OXYCODONE HYDROCHLORIDE AND ACETAMINOPHEN 1 TABLET: 5; 325 TABLET ORAL at 17:55

## 2021-11-29 RX ADMIN — CASTOR OIL AND BALSAM, PERU 1 APPLICATION: 788; 87 OINTMENT TOPICAL at 11:27

## 2021-11-29 RX ADMIN — SODIUM CHLORIDE, PRESERVATIVE FREE 10 ML: 5 INJECTION INTRAVENOUS at 11:30

## 2021-11-29 RX ADMIN — DULOXETINE HYDROCHLORIDE 30 MG: 30 CAPSULE, DELAYED RELEASE ORAL at 15:54

## 2021-11-29 RX ADMIN — Medication 2 PACKET: at 20:22

## 2021-11-29 RX ADMIN — OXYCODONE HYDROCHLORIDE AND ACETAMINOPHEN 1 TABLET: 5; 325 TABLET ORAL at 04:45

## 2021-11-29 RX ADMIN — OXYCODONE HYDROCHLORIDE AND ACETAMINOPHEN 1 TABLET: 5; 325 TABLET ORAL at 23:00

## 2021-11-29 RX ADMIN — AMLODIPINE BESYLATE 5 MG: 5 TABLET ORAL at 11:26

## 2021-11-29 RX ADMIN — SODIUM CHLORIDE, PRESERVATIVE FREE 10 ML: 5 INJECTION INTRAVENOUS at 20:23

## 2021-11-29 RX ADMIN — HYDRALAZINE HYDROCHLORIDE 50 MG: 50 TABLET, FILM COATED ORAL at 04:46

## 2021-11-29 RX ADMIN — SENNOSIDES AND DOCUSATE SODIUM 1 TABLET: 50; 8.6 TABLET ORAL at 20:21

## 2021-11-30 LAB
GLUCOSE BLDC GLUCOMTR-MCNC: 82 MG/DL (ref 70–130)
GLUCOSE BLDC GLUCOMTR-MCNC: 96 MG/DL (ref 70–130)
GLUCOSE BLDC GLUCOMTR-MCNC: 99 MG/DL (ref 70–130)
VANCOMYCIN SERPL-MCNC: 24.9 MCG/ML (ref 5–40)

## 2021-11-30 PROCEDURE — 94799 UNLISTED PULMONARY SVC/PX: CPT

## 2021-11-30 PROCEDURE — 97530 THERAPEUTIC ACTIVITIES: CPT

## 2021-11-30 PROCEDURE — 25010000002 VANCOMYCIN 10 G RECONSTITUTED SOLUTION

## 2021-11-30 PROCEDURE — 80202 ASSAY OF VANCOMYCIN: CPT

## 2021-11-30 PROCEDURE — 82962 GLUCOSE BLOOD TEST: CPT

## 2021-11-30 PROCEDURE — 97110 THERAPEUTIC EXERCISES: CPT

## 2021-11-30 PROCEDURE — 99232 SBSQ HOSP IP/OBS MODERATE 35: CPT | Performed by: FAMILY MEDICINE

## 2021-11-30 PROCEDURE — 63710000001 ONDANSETRON PER 8 MG: Performed by: INTERNAL MEDICINE

## 2021-11-30 RX ADMIN — ONDANSETRON HYDROCHLORIDE 4 MG: 4 TABLET, FILM COATED ORAL at 17:39

## 2021-11-30 RX ADMIN — LEVETIRACETAM 500 MG: 500 TABLET, FILM COATED ORAL at 20:22

## 2021-11-30 RX ADMIN — CASTOR OIL AND BALSAM, PERU 1 APPLICATION: 788; 87 OINTMENT TOPICAL at 08:08

## 2021-11-30 RX ADMIN — BUPROPION HYDROCHLORIDE 150 MG: 150 TABLET, EXTENDED RELEASE ORAL at 08:07

## 2021-11-30 RX ADMIN — Medication 1 CAPSULE: at 08:00

## 2021-11-30 RX ADMIN — PANTOPRAZOLE SODIUM 40 MG: 40 TABLET, DELAYED RELEASE ORAL at 05:19

## 2021-11-30 RX ADMIN — AMLODIPINE BESYLATE 5 MG: 5 TABLET ORAL at 08:00

## 2021-11-30 RX ADMIN — NYSTATIN: 100000 POWDER TOPICAL at 22:27

## 2021-11-30 RX ADMIN — Medication 2 PACKET: at 08:08

## 2021-11-30 RX ADMIN — IPRATROPIUM BROMIDE AND ALBUTEROL SULFATE 3 ML: 2.5; .5 SOLUTION RESPIRATORY (INHALATION) at 19:32

## 2021-11-30 RX ADMIN — IPRATROPIUM BROMIDE AND ALBUTEROL SULFATE 3 ML: 2.5; .5 SOLUTION RESPIRATORY (INHALATION) at 12:42

## 2021-11-30 RX ADMIN — DULOXETINE HYDROCHLORIDE 30 MG: 30 CAPSULE, DELAYED RELEASE ORAL at 08:00

## 2021-11-30 RX ADMIN — LEVETIRACETAM 500 MG: 500 TABLET, FILM COATED ORAL at 08:00

## 2021-11-30 RX ADMIN — SODIUM CHLORIDE, PRESERVATIVE FREE 10 ML: 5 INJECTION INTRAVENOUS at 08:01

## 2021-11-30 RX ADMIN — SENNOSIDES AND DOCUSATE SODIUM 1 TABLET: 50; 8.6 TABLET ORAL at 20:22

## 2021-11-30 RX ADMIN — HYDRALAZINE HYDROCHLORIDE 50 MG: 50 TABLET, FILM COATED ORAL at 22:27

## 2021-11-30 RX ADMIN — VANCOMYCIN HYDROCHLORIDE 1500 MG: 10 INJECTION, POWDER, LYOPHILIZED, FOR SOLUTION INTRAVENOUS at 17:38

## 2021-11-30 RX ADMIN — CASTOR OIL AND BALSAM, PERU 1 APPLICATION: 788; 87 OINTMENT TOPICAL at 20:22

## 2021-11-30 RX ADMIN — NYSTATIN: 100000 POWDER TOPICAL at 17:41

## 2021-11-30 RX ADMIN — NYSTATIN: 100000 POWDER TOPICAL at 05:19

## 2021-11-30 RX ADMIN — IPRATROPIUM BROMIDE AND ALBUTEROL SULFATE 3 ML: 2.5; .5 SOLUTION RESPIRATORY (INHALATION) at 06:23

## 2021-11-30 RX ADMIN — HYDRALAZINE HYDROCHLORIDE 50 MG: 50 TABLET, FILM COATED ORAL at 05:19

## 2021-11-30 RX ADMIN — OXYCODONE HYDROCHLORIDE AND ACETAMINOPHEN 1 TABLET: 5; 325 TABLET ORAL at 17:39

## 2021-11-30 RX ADMIN — IPRATROPIUM BROMIDE AND ALBUTEROL SULFATE 3 ML: 2.5; .5 SOLUTION RESPIRATORY (INHALATION) at 09:11

## 2021-11-30 RX ADMIN — OXYCODONE HYDROCHLORIDE AND ACETAMINOPHEN 1 TABLET: 5; 325 TABLET ORAL at 03:17

## 2021-11-30 RX ADMIN — BUPROPION HYDROCHLORIDE 150 MG: 150 TABLET, EXTENDED RELEASE ORAL at 20:22

## 2021-11-30 RX ADMIN — Medication 2 PACKET: at 20:22

## 2021-11-30 RX ADMIN — Medication 2 PACKET: at 17:42

## 2021-11-30 RX ADMIN — HYDRALAZINE HYDROCHLORIDE 50 MG: 50 TABLET, FILM COATED ORAL at 17:39

## 2021-11-30 RX ADMIN — LORAZEPAM 1 MG: 1 TABLET ORAL at 08:00

## 2021-11-30 RX ADMIN — MULTIVITAMIN TABLET 1 TABLET: TABLET at 08:00

## 2021-11-30 RX ADMIN — LEVOTHYROXINE SODIUM 125 MCG: 125 TABLET ORAL at 05:19

## 2021-11-30 RX ADMIN — IPRATROPIUM BROMIDE AND ALBUTEROL SULFATE 3 ML: 2.5; .5 SOLUTION RESPIRATORY (INHALATION) at 16:29

## 2021-11-30 RX ADMIN — FUROSEMIDE 40 MG: 40 TABLET ORAL at 08:01

## 2021-11-30 RX ADMIN — SODIUM CHLORIDE, PRESERVATIVE FREE 30 ML: 5 INJECTION INTRAVENOUS at 20:23

## 2021-12-01 LAB
ANION GAP SERPL CALCULATED.3IONS-SCNC: 5 MMOL/L (ref 5–15)
BUN SERPL-MCNC: 24 MG/DL (ref 6–20)
BUN/CREAT SERPL: 38.1 (ref 7–25)
CALCIUM SPEC-SCNC: 9.4 MG/DL (ref 8.6–10.5)
CHLORIDE SERPL-SCNC: 94 MMOL/L (ref 98–107)
CO2 SERPL-SCNC: 40 MMOL/L (ref 22–29)
CREAT SERPL-MCNC: 0.63 MG/DL (ref 0.57–1)
GFR SERPL CREATININE-BSD FRML MDRD: 98 ML/MIN/1.73
GLUCOSE SERPL-MCNC: 93 MG/DL (ref 65–99)
POTASSIUM SERPL-SCNC: 4.4 MMOL/L (ref 3.5–5.2)
SODIUM SERPL-SCNC: 139 MMOL/L (ref 136–145)

## 2021-12-01 PROCEDURE — 99231 SBSQ HOSP IP/OBS SF/LOW 25: CPT | Performed by: FAMILY MEDICINE

## 2021-12-01 PROCEDURE — 94761 N-INVAS EAR/PLS OXIMETRY MLT: CPT

## 2021-12-01 PROCEDURE — 25010000002 VANCOMYCIN 10 G RECONSTITUTED SOLUTION

## 2021-12-01 PROCEDURE — 94799 UNLISTED PULMONARY SVC/PX: CPT

## 2021-12-01 PROCEDURE — 80048 BASIC METABOLIC PNL TOTAL CA: CPT

## 2021-12-01 RX ORDER — OXYCODONE HYDROCHLORIDE AND ACETAMINOPHEN 5; 325 MG/1; MG/1
1 TABLET ORAL EVERY 6 HOURS PRN
Status: DISCONTINUED | OUTPATIENT
Start: 2021-12-01 | End: 2021-12-11 | Stop reason: HOSPADM

## 2021-12-01 RX ADMIN — BISMUTH SUBSALICYLATE 30 ML: 525 LIQUID ORAL at 18:01

## 2021-12-01 RX ADMIN — PANTOPRAZOLE SODIUM 40 MG: 40 TABLET, DELAYED RELEASE ORAL at 05:38

## 2021-12-01 RX ADMIN — OXYCODONE AND ACETAMINOPHEN 1 TABLET: 5; 325 TABLET ORAL at 18:05

## 2021-12-01 RX ADMIN — OXYCODONE HYDROCHLORIDE AND ACETAMINOPHEN 1 TABLET: 5; 325 TABLET ORAL at 02:31

## 2021-12-01 RX ADMIN — Medication 1 CAPSULE: at 08:57

## 2021-12-01 RX ADMIN — BISMUTH SUBSALICYLATE 30 ML: 525 LIQUID ORAL at 20:31

## 2021-12-01 RX ADMIN — LEVETIRACETAM 500 MG: 500 TABLET, FILM COATED ORAL at 20:30

## 2021-12-01 RX ADMIN — HYDRALAZINE HYDROCHLORIDE 50 MG: 50 TABLET, FILM COATED ORAL at 05:38

## 2021-12-01 RX ADMIN — NYSTATIN: 100000 POWDER TOPICAL at 05:39

## 2021-12-01 RX ADMIN — ACETAMINOPHEN 650 MG: 325 TABLET, FILM COATED ORAL at 22:22

## 2021-12-01 RX ADMIN — CASTOR OIL AND BALSAM, PERU 1 APPLICATION: 788; 87 OINTMENT TOPICAL at 20:30

## 2021-12-01 RX ADMIN — LEVOTHYROXINE SODIUM 125 MCG: 125 TABLET ORAL at 05:38

## 2021-12-01 RX ADMIN — LORAZEPAM 1 MG: 1 TABLET ORAL at 18:05

## 2021-12-01 RX ADMIN — SENNOSIDES AND DOCUSATE SODIUM 1 TABLET: 50; 8.6 TABLET ORAL at 20:30

## 2021-12-01 RX ADMIN — IPRATROPIUM BROMIDE AND ALBUTEROL SULFATE 3 ML: 2.5; .5 SOLUTION RESPIRATORY (INHALATION) at 16:52

## 2021-12-01 RX ADMIN — IPRATROPIUM BROMIDE AND ALBUTEROL SULFATE 3 ML: 2.5; .5 SOLUTION RESPIRATORY (INHALATION) at 22:00

## 2021-12-01 RX ADMIN — CASTOR OIL AND BALSAM, PERU 1 APPLICATION: 788; 87 OINTMENT TOPICAL at 08:58

## 2021-12-01 RX ADMIN — IPRATROPIUM BROMIDE AND ALBUTEROL SULFATE 3 ML: 2.5; .5 SOLUTION RESPIRATORY (INHALATION) at 07:16

## 2021-12-01 RX ADMIN — SODIUM CHLORIDE, PRESERVATIVE FREE 10 ML: 5 INJECTION INTRAVENOUS at 08:57

## 2021-12-01 RX ADMIN — DULOXETINE HYDROCHLORIDE 30 MG: 30 CAPSULE, DELAYED RELEASE ORAL at 08:57

## 2021-12-01 RX ADMIN — Medication 2 PACKET: at 08:59

## 2021-12-01 RX ADMIN — HYDRALAZINE HYDROCHLORIDE 50 MG: 50 TABLET, FILM COATED ORAL at 15:00

## 2021-12-01 RX ADMIN — AMLODIPINE BESYLATE 5 MG: 5 TABLET ORAL at 08:57

## 2021-12-01 RX ADMIN — SODIUM CHLORIDE, PRESERVATIVE FREE 10 ML: 5 INJECTION INTRAVENOUS at 20:31

## 2021-12-01 RX ADMIN — HYDRALAZINE HYDROCHLORIDE 50 MG: 50 TABLET, FILM COATED ORAL at 20:30

## 2021-12-01 RX ADMIN — LORAZEPAM 1 MG: 1 TABLET ORAL at 06:51

## 2021-12-01 RX ADMIN — IPRATROPIUM BROMIDE AND ALBUTEROL SULFATE 3 ML: 2.5; .5 SOLUTION RESPIRATORY (INHALATION) at 12:25

## 2021-12-01 RX ADMIN — BUPROPION HYDROCHLORIDE 150 MG: 150 TABLET, EXTENDED RELEASE ORAL at 20:30

## 2021-12-01 RX ADMIN — NYSTATIN: 100000 POWDER TOPICAL at 20:30

## 2021-12-01 RX ADMIN — OXYCODONE HYDROCHLORIDE AND ACETAMINOPHEN 1 TABLET: 5; 325 TABLET ORAL at 10:11

## 2021-12-01 RX ADMIN — BUPROPION HYDROCHLORIDE 150 MG: 150 TABLET, EXTENDED RELEASE ORAL at 08:57

## 2021-12-01 RX ADMIN — FUROSEMIDE 40 MG: 40 TABLET ORAL at 08:57

## 2021-12-01 RX ADMIN — Medication 2 PACKET: at 18:02

## 2021-12-01 RX ADMIN — MULTIVITAMIN TABLET 1 TABLET: TABLET at 08:57

## 2021-12-01 RX ADMIN — LEVETIRACETAM 500 MG: 500 TABLET, FILM COATED ORAL at 08:57

## 2021-12-01 RX ADMIN — VANCOMYCIN HYDROCHLORIDE 1500 MG: 10 INJECTION, POWDER, LYOPHILIZED, FOR SOLUTION INTRAVENOUS at 18:01

## 2021-12-01 RX ADMIN — CYCLOBENZAPRINE 5 MG: 10 TABLET, FILM COATED ORAL at 22:22

## 2021-12-01 RX ADMIN — NYSTATIN: 100000 POWDER TOPICAL at 15:00

## 2021-12-01 RX ADMIN — Medication 2 PACKET: at 20:31

## 2021-12-01 NOTE — PLAN OF CARE
Goal Outcome Evaluation: A&Ox4 VSS. ST on tele. Cervical dressing C/D/I. Limited weak movement present in all extremities. Pt attempted to feed self with unsteady right arm. Nurse assisted pt with eating 1/2 grilled cheese sandwich. Rectal tube with loose brown stool in place. F/C with blood tinged urine noted. TLPL to right arm flushed and dressing changed. MASD in groin resolving with nystatin powder. Coccyx red and slothing with opening noted in gluteal cleft. Staff encouraging q2 hr turns however pt will allow only slight tilt or refuses repositioning completely.

## 2021-12-01 NOTE — PLAN OF CARE
Goal Outcome Evaluation:  Plan of Care Reviewed With: patient        Progress: improving  Outcome Summary: Alert, oriented x 4. VSS. Tele monitoring DC'd today. Crowley cath and rectal tube in place. Pt. refusing to turn due to pain. Encouraged and turned as often as possible. Will continue to monitor.

## 2021-12-01 NOTE — PROGRESS NOTES
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 abscess is not identified. This appearance is similar to the previous scan on November 14. No evidence of progressive canal narrowing or worsening cord compression. Findings called to the patient's nurse by telephone at the time of this dictation. Signer Name: Ankur Olivas MD  Signed: 11/26/2021 3:52 PM  Work[facility]    XR Knee 1 or 2 View Bilateral    Result Date: 11/25/2021   Large bilateral knee joint effusions. No discrete periostitis or discrete bony erosion. If there is clinical concern for infection, fluid sampling would be recommended.  Moderate tricompartmental osteoarthrosis worst in the patellofemoral compartments bilaterally.  This report was finalized on 11/25/2021 8:07 PM by Karthik Gardner MD.        Impression:  1. Persistent high grade MRSA septicemia secondary to T9-T10 osteomyelitis with paraspinal abscess.  TTE on 10/26 and 11/3 were negative for vegetations.   2. Cervical spine epidural abscess with spinal cord compression-status post  debridement surgery and laminectomy with MRSA from culture  3. T9-T10 vertebral osteomyelitis secondary to MRSA  4. Para-vertebral/paraspinal abscess at level of T9-T10 with extension to para-aorta  5. Loculated pleural effusion likely secondary from above- not enough fluid on 11/10 to do thoracentesis per IR  6. LUE edema, Check Venous Duplex to r/o DVT.  7. Acute right axilla abscess with spontaneous drainage. Cx MRSA  Adjacent right axilla nodule possible abscess.  8. Recent COVID-19 pneumonia.  Treated with Remdesivir and dexamethasone.    9. Acute hypoxic respiratory failure- improving  10. Leukocytosis/neutrophilia, improved  11. Elevated CRP, improved  12. Polysubstance abuse/UDS positive for meth/opiates.  Was self medicating at [facility] with Klonopin and oxycodone and went into respiratory depression.  13. 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 Seizures/on antiepileptic med  15. H/o CVA from suspected cardioembolic event  16. H/o pyoderma gangrenosum  17. Essential hypertension  18. Hypothyroidism  19. Depression/anxiety  20. Medical noncompliance.  Refusing repeat imaging to evaluate pleural effusion.   21. Pyuria-urine culture finalized no growth  22. Bilateral Pleural Effusions  23. Diarrhea  24. Petechial rash in medial upper thighs.  Shearing injuring vs inflammation vs other.  Continue nystatin powder.    25. Probable Bacterial Pneumonia- Improved    PLAN/RECOMMENDATIONS:     1. Follow CBC with differential, BMP, and vancomycin trough at least weekly  2. Continue Vancomycin for continued MRSA coverage.  Appreciate pharmacy's help with dosing and monitoring creatinine.  Would like trough between 15-20.    She will need a very long course of IV antibiotics, likely at least 12 weeks given the extent of her spinal infection and high-grade bacteremia. Anticipate continuing her IV vancomycin at least until 2/7/22 for a 12 week course since her last surgery (was on 11/14/21). She is not a  candidate for outpatient antibiotics with a PICC line. May be a good candidate for LTAC.    She seems to be clinically improving.  Needs to continue to work with physical therapy. I will intermittently check up on her.    Complex case with complex MDM      Jonn Mchugh MD  12/1/2021  18:27 EST

## 2021-12-01 NOTE — PROGRESS NOTES
Clinical Nutrition     Nutrition Assessment  Reason for Visit: EN f/u,  reassessment  Patient Name: Karely Villarreal  YOB: 1965  MRN: [id number]  Date of Encounter: 12/01/21 10:24 EST  Admission date: 11/6/2021     Comments:  Based on today's reassessment, Rec change TF to Isosource 1.5 @ 75ml/h x 14h/d (goal volume=1050ml/d) from 6p-8a and give 40ml/h x 14h/d.This will provide 1575cal, 71g pro/798ml TF fw and 1358ml total fw/d and meets 95% and 87% estimated susi/pro needs respectively. RD will change TF orders accordingly.    Admission Diagnosis    Osteomyelitis (HCC) [M86.9]     Hospital Problem List    Spinal cord compression    SLE    Hypertension    Hypothyroidism    KEREN (generalized anxiety disorder)    MDD (major depressive disorder)    Pleural effusion, right    Osteomyelitis of thoracic vertebra    Paraspinal abscess    Polysubstance abuse    Quadriparesis    COVID-19 virus detected    MRSA bacteremia    Hepatitis C    Seizures on Keppra    History of CVA    Acute postoperative respiratory insufficiency    Severe malnutrition (CMS/HCC)  11/14)   s/p Cervical laminectomy C3-C6, 50% C2 removed debridement of epidural phlegmon and epidural abscess    Applicable nutrition-related information:  (11/17) EN started per RD 2/2 poor PO intake- Peptamen Intense VHP at 75 ml/hr and free water at 30 ml  q 2 hr via NDT  (11/24) EN changed to run for 16hrs (Peptamen VHP @ 95ml/hr)  (11/25) EN changed to Isosource 1.5 x 14hrs (75x14)  (11/30) NGT tube D/belkis.     Diet/Nutrition Related History:   12/1)  NGT removed yesterday.  Noted patient with FMS due to liquid, frequent BM.  Review of I/Os show FMS was placed 11/29?     Patient sleeping at time of visit; RD able to awake patient.  Alert and appropriate; able to answer questions. Reports she did not receive what she wanted for breakfast; we reviewed options for tomorrow.  Reports she is drinking Boost Breeze and  Likes it.  Willing to try Magic cup with lunch and dinner.     11/25) RD spoke with pt in room and pt states she is consuming some boost breeze supplement and wishes to continue to receive. Pt states po intake at meals not much improved yet, after TF adjusted to nocturnal schedule yesterday.     11/24) EN running @ goal rate and being tolerated. Patient laying in bed, very drowsy.  Lunch tray at bedside, unconsumed. Patient reports not interest in eating of the food provided.  Reports poor appetite. She does not feel stopping EN during the day will help with her intake.     Per I/SOs:  + 2BM yesterday (improved since last review)     11/22) Pt tolerating EN. RN reports that pt is having multiple loose bowel movements. Will add fiber to EN order.   Per I&O over the past 24 hrs= 8 bowel movements    Pt resting in bed at time of RD visit. Pt reports poor appetite. States that she ate some eggs for breakfast this AM. Reports that she likes fruit, yogurt, and orange juice. States that she has not tried the Boost Breeze yet, encouraged pt to drink Boost Breeze.     11/17)RN reports pt doing well s/p surgery and extubation, pt does seem weak, plan to get pt to chair. RN stated pt ate muffin and fruit at breakfast, basically forced herself to eat. Says she wants a feeding tube. RD advised of inadequate intake and weight loss since adm as well as malnutrition qualification. MD gave okay to start EN. Will transfer pt to floor.    Pt interviewed (11/16 &17); states she has not been eating much since hospitalization, states she has no appetite. She is tearful and states she is depressed and just cannot eat eat. She is willing to try ONS but does not like Boost Plus or milk or anything like milk.  Pt reports eating this morning but it was difficult, she is still agreeable to feeding, advised pt she can still eat w/ tube.    Anthropometrics     Ht=66in,  Qv=600xs per bed wt on 11/18 (most recent actual wt avail)  BMI: 31  (obese)  Ideal Body Weight (IBW): 130lb; pt is 148% IBW    Labs reviewed   Yes. Noted low P today; rec replace and recheck per protocol.  Results from last 7 days   Lab Units 12/01/21  0512 11/29/21  0955 11/28/21  0723 11/27/21  0943 11/26/21  0939   SODIUM mmol/L 139 141 141   < > 140   POTASSIUM mmol/L 4.4 4.7 4.8   < > 4.2   CHLORIDE mmol/L 94* 97* 98   < > 100   CO2 mmol/L 40.0* 38.0* 37.0*   < > 30.0*   BUN mg/dL 24* 28* 32*   < > 35*   CREATININE mg/dL 0.63 0.50* 0.49*   < > 0.43*   GLUCOSE mg/dL 93 176* 111*   < > 123*   CALCIUM mg/dL 9.4 9.2 9.3   < > 9.2   PHOSPHORUS mg/dL  --  3.0 3.2  --  2.8   MAGNESIUM mg/dL  --  2.0 1.6  --  1.6   CHOLESTEROL mg/dL  --  158  --   --   --    TRIGLYCERIDES mg/dL  --  83  --   --   --     < > = values in this interval not displayed.       Medications reviewed   Yes      Needs Assessment  11/25     Ht=66in  Pp=568op per bed wt 11/18  IBW: 59kg/130lb; pt is 148% IBW    Estimated Energy needs:  ~1650cal/d  25-30cal/kg GVD=8938-1252zia/d    Estimated Protein needs: 74-89g/d based on 1.25-1.5 gm/kg IBW    Current Nutrition Prescription     PO: Diet Regular; Thin  ONS: Boost Breeze 3xday    EN: NGT D/belkis (11/30)     Average PO intake: 12/1) 31% x 8 meals.   11/25)10% @ 3 meals/d avg yesterday  11/24) 0% x 3 meals   11/22) 20% x6 meals    EN delivery:  1434ml during the past day; this is 94% of goal volume.  Nutrition Diagnosis     11/17  Updated 12/1  Problem Inadequate oral intake   Etiology Clinical status   Signs/Symptoms 10% at past 3 meals   Improved (12/1)EN D/Belkis 11/30 11/17   Problem Malnutrition , r/t acute illness, injury - severe degree   Etiology Depression, epidural abscess w/ spinal cord compression &  quadriparesis s/p surgery   Signs/Symptoms Wt loss, inadequate intake, edema on NFPE    ongoing: EN support in place 11/25     Goal:   General: Nutrition to support treatment  PO: Increase intake, Meet estimated needs        Nutrition Intervention     Follow  treatment progress, Care plan reviewed, Interview for preferences, Menu provided, Encourage intake, Supplement provided   2. Will send Magic cup with lunch and dinner daily. Patient did not wish to switch Boost Breeze for Ensure or Boost plus    Boost Breeze TID+ Magic Cup BID = 1330kcals (81%), 45gm PRO (55%)    Monitoring/Evaluation:   Per protocol    Cathleen Zhou RD,   Time Spent: 60 minutes

## 2021-12-01 NOTE — PLAN OF CARE
Goal Outcome Evaluation:           Progress: improving  Outcome Summary: VSS, NG tube removed today and pt was able to eat most of her breakfast and has had a boost supplements and tolerated well.   Rectal tube still in place but output has decreased.  Crowley cath in place and patent.  Specialty bed in use, turn q2h with wedge, and barrier cream applied to buttocks.  Pt. continues to improve slightly with PT but limited by pain to bilat lower ext.  Will continue to monitor and will notify MD CHANCE.  Luciano Benítez RN

## 2021-12-01 NOTE — PROGRESS NOTES
[facility] Medicine Services  PROGRESS NOTE    Patient Name: Karely Villarreal  : 1965  MRN: [id number]    Date of Admission: 2021  Primary Care Physician: Tracie Levi APRN    Subjective   Subjective     CC:  Paravertebral abscess, spinal osteo    HPI:  Eating very well now that her tube feeds have been turned off and Dobbhoff removed.  No events or complaints.  Diarrhea still persists, hopefully will start to see increase stool bulk in the next 24 hours since her tube feeds were DC'd yesterday.    ROS:  Gen- No fevers, chills, HA, uncontrolled pain  CV- No chest pain, palpitations, new edema  Resp- No SOA, cough  GI- No N/V  Skin - No rash    Objective   Objective     Vital Signs:   Temp:  [97.6 °F (36.4 °C)-98.1 °F (36.7 °C)] 97.7 °F (36.5 °C)  Heart Rate:  [] 92  Resp:  [16-20] 16  BP: (117-141)/(69-85) 133/83  Flow (L/min):  [1.5-3] 2     Physical Exam:  Constitutional: No acute distress, overweight body habitus  Respiratory: Good effort, nonlabored respirations on 2L  Cardiovascular: NSR tele  Musculoskeletal: No peripheral edema, normal muscle tone for age      Results Reviewed:  LAB RESULTS:      Lab 21  0955 21  0723 21  0943 21  0939 21  0736   WBC  --  7.23 8.11 9.79 9.64   HEMOGLOBIN  --  8.4* 8.2* 8.5* 8.5*   HEMATOCRIT  --  29.1* 27.4* 28.6* 28.1*   PLATELETS  --  143 181 201 241   MCV  --  92.4 89.5 89.4 87.8   CRP 4.78*  --   --   --   --          Lab 21  0512 21  0955 21  0723 21  0943 21  0939 21  0736 21  0736   SODIUM 139 141 141 140 140   < > 141   POTASSIUM 4.4 4.7 4.8 4.9 4.2   < > 4.5   CHLORIDE 94* 97* 98 102 100   < > 102   CO2 40.0* 38.0* 37.0* 34.0* 30.0*   < > 29.0   ANION GAP 5.0 6.0 6.0 4.0* 10.0   < > 10.0   BUN 24* 28* 32* 31* 35*   < > 43*   CREATININE 0.63 0.50* 0.49* 0.39* 0.43*   < > 0.47*   GLUCOSE 93 176* 111* 87 123*   < > 110*   CALCIUM 9.4 9.2 9.3 9.4 9.2    < > 9.3   IONIZED CALCIUM  --   --  1.36*  --  1.39*  --   --    MAGNESIUM  --  2.0 1.6  --  1.6  --  1.8   PHOSPHORUS  --  3.0 3.2  --  2.8  --  2.3*    < > = values in this interval not displayed.         Lab 11/29/21  0955   TOTAL PROTEIN 6.1   ALBUMIN 2.50*   ALT (SGPT) 44*   AST (SGOT) 45*   BILIRUBIN 0.3   ALK PHOS 286*             Lab 11/29/21  0955   CHOLESTEROL 158   TRIGLYCERIDES 83             Brief Urine Lab Results  (Last result in the past 365 days)      Color   Clarity   Blood   Leuk Est   Nitrite   Protein   CREAT   Urine HCG        11/20/21 1625 Yellow   Clear   Small (1+)   Small (1+)   Negative   30 mg/dL (1+)                 Microbiology Results Abnormal     Procedure Component Value - Date/Time    Fungus Culture - Body Fluid, Spine, Thoracic [[id number]] Collected: 11/14/21 1511    Lab Status: Preliminary result Specimen: Body Fluid from Spine, Thoracic Updated: 11/28/21 1615     Fungus Culture No fungus isolated at 2 weeks    AFB Culture - Body Fluid, Spine, Thoracic [[id number]] Collected: 11/14/21 1511    Lab Status: Preliminary result Specimen: Body Fluid from Spine, Thoracic Updated: 11/28/21 1615     AFB Culture No AFB isolated at 2 weeks     AFB Stain No acid fast bacilli seen on concentrated smear    Fungus Culture - Tissue, Spine, Cervical [[id number]] Collected: 11/14/21 1450    Lab Status: Preliminary result Specimen: Tissue from Spine, Cervical Updated: 11/28/21 1600     Fungus Culture No fungus isolated at 2 weeks    AFB Culture - Tissue, Spine, Cervical [[id number]] Collected: 11/14/21 1450    Lab Status: Preliminary result Specimen: Tissue from Spine, Cervical Updated: 11/28/21 1600     AFB Culture No AFB isolated at 2 weeks     AFB Stain No acid fast bacilli seen on concentrated smear    Blood Culture - Blood, Arm, Left [[id number]]  (Normal) Collected: 11/20/21 1630    Lab Status: Final result Specimen: Blood from Arm, Left Updated: 11/25/21 1700     Blood Culture No growth at 5 days     Narrative:      Aerobic bottle only      Urine Culture - Urine, Urine, Catheter [[id number]]  (Normal) Collected: 11/20/21 1625    Lab Status: Final result Specimen: Urine, Catheter Updated: 11/21/21 1523     Urine Culture No growth    Anaerobic Culture - Body Fluid, Spine, Thoracic [[id number]] Collected: 11/14/21 1511    Lab Status: Final result Specimen: Body Fluid from Spine, Thoracic Updated: 11/19/21 0700     Anaerobic Culture No anaerobes isolated at 5 days    Anaerobic Culture - Tissue, Spine, Cervical [[id number]] Collected: 11/14/21 1450    Lab Status: Final result Specimen: Tissue from Spine, Cervical Updated: 11/19/21 0700     Anaerobic Culture No anaerobes isolated at 5 days    Body Fluid Culture - Body Fluid, Spine, Thoracic [[id number]] Collected: 11/14/21 1511    Lab Status: Final result Specimen: Body Fluid from Spine, Thoracic Updated: 11/17/21 0933     Body Fluid Culture No growth at 3 days     Gram Stain No WBCs or organisms seen    Fungus Smear - Tissue, Spine, Cervical [[id number]] Collected: 11/14/21 1450    Lab Status: Final result Specimen: Tissue from Spine, Cervical Updated: 11/15/21 1552     Fungal Stain No fungal elements seen    Blood Culture - Blood, Arm, Right [[id number]]  (Normal) Collected: 11/07/21 1101    Lab Status: Final result Specimen: Blood from Arm, Right Updated: 11/12/21 1116     Blood Culture No growth at 5 days    Narrative:      AEROBIC BOTTLE ONLY    Blood Culture - Blood, Hand, Left [[id number]]  (Normal) Collected: 11/07/21 1101    Lab Status: Final result Specimen: Blood from Hand, Left Updated: 11/12/21 1115     Blood Culture No growth at 5 days    Urine Culture - Urine, Urine, Catheter [[id number]]  (Normal) Collected: 11/09/21 1621    Lab Status: Final result Specimen: Urine, Catheter Updated: 11/10/21 1829     Urine Culture No growth          No radiology results from the last 24 hrs    Results for orders placed during the hospital encounter of 10/24/21    Adult  Transthoracic Echo Complete W/ Cont if Necessary Per Protocol    Interpretation Summary  · Left ventricular ejection fraction appears to be 61 - 65%. Left ventricular systolic function is normal.  · Left ventricular diastolic function was normal.  · Estimated right ventricular systolic pressure from tricuspid regurgitation is normal (<35 mmHg).  · No vegetations seen.  · This is a technically difficult study.      I have reviewed the medications:  Scheduled Meds:alteplase, 2 mg, Intravenous, Once  amLODIPine, 5 mg, Oral, Q24H  buPROPion SR, 150 mg, Oral, BID  castor oil-balsam peru, 1 application, Topical, Q12H  DULoxetine, 30 mg, Oral, QAM  furosemide, 40 mg, Oral, Daily  hydrALAZINE, 50 mg, Oral, Q8H  ipratropium-albuterol, 3 mL, Nebulization, 4x Daily - RT  lactobacillus acidophilus, 1 capsule, Oral, Daily  levETIRAcetam, 500 mg, Oral, Q12H  levothyroxine, 125 mcg, Oral, Q AM  multivitamin, 1 tablet, Oral, Daily  Nutrisource fiber, 2 packet, Oral, TID  nystatin, , Topical, Q8H  pantoprazole, 40 mg, Oral, Q AM  senna-docusate sodium, 1 tablet, Oral, Nightly  sodium chloride, 10 mL, Intravenous, Q12H  vancomycin, 1,500 mg, Intravenous, Q24H      Continuous Infusions:Pharmacy to dose vancomycin,       PRN Meds:.•  acetaminophen  •  cyclobenzaprine  •  hydrALAZINE  •  ipratropium-albuterol  •  LORazepam  •  magnesium sulfate **OR** magnesium sulfate **OR** magnesium sulfate  •  naloxone  •  [] HYDROmorphone **AND** naloxone  •  ondansetron **OR** ondansetron  •  oxyCODONE-acetaminophen  •  Pharmacy to dose vancomycin  •  potassium & sodium phosphates **OR** potassium & sodium phosphates  •  potassium chloride  •  sodium chloride  •  sodium chloride    Assessment/Plan   Assessment & Plan     Active Hospital Problems    Diagnosis  POA   • **Spinal cord compression [G95.20]  Yes   • Severe malnutrition (CMS/HCC) [E43]  Yes   • Quadriparesis [G82.50]  No   • COVID-19 virus detected [U07.1]  Yes   • MRSA bacteremia  [R78.81, B95.62]  Yes   • Hepatitis C [B19.20]  Yes   • Seizures on Keppra [R56.9]  Yes   • History of CVA [Z86.73]  Not Applicable   • Acute postoperative respiratory insufficiency [J95.89]  No   • Pleural effusion, right [J90]  Yes   • Osteomyelitis of thoracic vertebra [M46.24]  Yes   • 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        Brief Hospital Course to date:  Karely Villarreal is a 56 y.o. female with h/o polysubstance abuse, HCV, HTN, Sz d/o, SLE, Hypothyroidism, CVA, prior COVID infection and homelessness.  Was admitted to [facility] on 10/24/21 for sepsis d/t MRSA and was found to have a paravertebral abscess and was transferred to [facility] on 11/6.  Surgery was deferred initially but she developed worsening UE weakness and MRI showed progressive cord compression so she was taken to the OR emergently for decompression on 11/14.  She was extubated 11/15 but TF's were started d/t poor PO.  She was transferred initially to telemetry on 11/8 but developed worsening hypoxia with AMS and was transferred back to the ICU on bipap on 11/20.  She was transferred back to the hospitalist service on 11/23    MRSA bacteremia  Sepsis  T9/T10 Paravertebral Abscess, T9/T10 Osteomyelitis with spinal cord compression  --s/p decompression, cervical laminectomy and debridement of epidural abscess on 11/14  --on Vancomycin per ID, will need long course at least 12 weeks given the extent of her spinal infection and high grade bacteremia (starting at time of surgery on 11/14)  --repeat MRI C and T spine showed shows 2 fluid collections dorsally (one at C4 and the other more superficial at C6/C7) and T9/T10 still c/w vertebral osteomyelitis.  Re-evaluated by Dr. Jama who  noted severe phlegmon circumferentially around cervical and upper thoracic area but no worsening compression, rec'd continue abx, no surgery needed at this time     Acute Respiratory failure  Right Pleural Effusion  Probable Bacterial PNA  --improved  --tolerating daily PO Lasix well   -- s/p 7 days of merrem and doxy per ID    Malnutrition  --d/c TF's and dobhoff 11/30/21, patient now eating more      Diarrhea  --?if d/t TF's -- hopefully improves over next 24hrs off TF's which were stopped 24 hours ago.  -Continue fiber supplement    Bilateral Knee Pain  --cold compresses.  Bilateral xrays showed large bilateral knee effusions, moderate osteoarthrosis worse in patellofemoral compartments    HX Seizure Disorder  --cont keppra     Recent COVID PNA    Acute Right Axilla abscess  --cx MRSA    Polysubstance abuse  --UDS positive for meth/opiates, self medicating at [facility] with klonopin and oxycodone and went into respiratory depression    SLE/Discoid lupus  --holding plaquenil due to severity of active infection    Hx CVA            DVT prophylaxis:  Mechanical DVT prophylaxis orders are present.       AM-PAC 6 Clicks Score (PT): 6 (11/30/21 2000)    Disposition: I expect the patient to be discharged [facility] looking for disposition options.    CODE STATUS:   Code Status and Medical Interventions:   Ordered at: 11/06/21 9219     Code Status (Patient has no pulse and is not breathing):    CPR (Attempt to Resuscitate)     Medical Interventions (Patient has pulse or is breathing):    Full Support       Kylie Kiser MD  12/01/21

## 2021-12-02 LAB
ANION GAP SERPL CALCULATED.3IONS-SCNC: 8 MMOL/L (ref 5–15)
BUN SERPL-MCNC: 21 MG/DL (ref 6–20)
BUN/CREAT SERPL: 30.9 (ref 7–25)
CALCIUM SPEC-SCNC: 9.7 MG/DL (ref 8.6–10.5)
CHLORIDE SERPL-SCNC: 92 MMOL/L (ref 98–107)
CO2 SERPL-SCNC: 39 MMOL/L (ref 22–29)
CREAT SERPL-MCNC: 0.68 MG/DL (ref 0.57–1)
GFR SERPL CREATININE-BSD FRML MDRD: 90 ML/MIN/1.73
GLUCOSE SERPL-MCNC: 94 MG/DL (ref 65–99)
POTASSIUM SERPL-SCNC: 4.3 MMOL/L (ref 3.5–5.2)
SODIUM SERPL-SCNC: 139 MMOL/L (ref 136–145)

## 2021-12-02 PROCEDURE — 97110 THERAPEUTIC EXERCISES: CPT

## 2021-12-02 PROCEDURE — 25010000002 VANCOMYCIN 10 G RECONSTITUTED SOLUTION

## 2021-12-02 PROCEDURE — 94799 UNLISTED PULMONARY SVC/PX: CPT

## 2021-12-02 PROCEDURE — 99232 SBSQ HOSP IP/OBS MODERATE 35: CPT | Performed by: NURSE PRACTITIONER

## 2021-12-02 PROCEDURE — 94761 N-INVAS EAR/PLS OXIMETRY MLT: CPT

## 2021-12-02 PROCEDURE — 80048 BASIC METABOLIC PNL TOTAL CA: CPT

## 2021-12-02 RX ORDER — LORAZEPAM 1 MG/1
1 TABLET ORAL EVERY 6 HOURS PRN
Status: DISPENSED | OUTPATIENT
Start: 2021-12-02 | End: 2021-12-09

## 2021-12-02 RX ADMIN — OXYCODONE AND ACETAMINOPHEN 1 TABLET: 5; 325 TABLET ORAL at 13:46

## 2021-12-02 RX ADMIN — SODIUM CHLORIDE, PRESERVATIVE FREE 10 ML: 5 INJECTION INTRAVENOUS at 08:16

## 2021-12-02 RX ADMIN — FUROSEMIDE 40 MG: 40 TABLET ORAL at 08:16

## 2021-12-02 RX ADMIN — NYSTATIN: 100000 POWDER TOPICAL at 20:04

## 2021-12-02 RX ADMIN — SENNOSIDES AND DOCUSATE SODIUM 1 TABLET: 50; 8.6 TABLET ORAL at 20:04

## 2021-12-02 RX ADMIN — DULOXETINE HYDROCHLORIDE 30 MG: 30 CAPSULE, DELAYED RELEASE ORAL at 08:16

## 2021-12-02 RX ADMIN — OXYCODONE AND ACETAMINOPHEN 1 TABLET: 5; 325 TABLET ORAL at 01:24

## 2021-12-02 RX ADMIN — HYDRALAZINE HYDROCHLORIDE 50 MG: 50 TABLET, FILM COATED ORAL at 13:46

## 2021-12-02 RX ADMIN — BUPROPION HYDROCHLORIDE 150 MG: 150 TABLET, EXTENDED RELEASE ORAL at 08:16

## 2021-12-02 RX ADMIN — OXYCODONE AND ACETAMINOPHEN 1 TABLET: 5; 325 TABLET ORAL at 08:16

## 2021-12-02 RX ADMIN — NYSTATIN: 100000 POWDER TOPICAL at 05:28

## 2021-12-02 RX ADMIN — LORAZEPAM 1 MG: 1 TABLET ORAL at 01:24

## 2021-12-02 RX ADMIN — CASTOR OIL AND BALSAM, PERU 1 APPLICATION: 788; 87 OINTMENT TOPICAL at 20:04

## 2021-12-02 RX ADMIN — ACETAMINOPHEN 650 MG: 325 TABLET, FILM COATED ORAL at 05:27

## 2021-12-02 RX ADMIN — LEVOTHYROXINE SODIUM 125 MCG: 125 TABLET ORAL at 05:27

## 2021-12-02 RX ADMIN — LEVETIRACETAM 500 MG: 500 TABLET, FILM COATED ORAL at 08:16

## 2021-12-02 RX ADMIN — HYDRALAZINE HYDROCHLORIDE 50 MG: 50 TABLET, FILM COATED ORAL at 20:04

## 2021-12-02 RX ADMIN — AMLODIPINE BESYLATE 5 MG: 5 TABLET ORAL at 08:16

## 2021-12-02 RX ADMIN — LORAZEPAM 1 MG: 1 TABLET ORAL at 13:46

## 2021-12-02 RX ADMIN — OXYCODONE AND ACETAMINOPHEN 1 TABLET: 5; 325 TABLET ORAL at 20:04

## 2021-12-02 RX ADMIN — IPRATROPIUM BROMIDE AND ALBUTEROL SULFATE 3 ML: 2.5; .5 SOLUTION RESPIRATORY (INHALATION) at 08:36

## 2021-12-02 RX ADMIN — PANTOPRAZOLE SODIUM 40 MG: 40 TABLET, DELAYED RELEASE ORAL at 05:27

## 2021-12-02 RX ADMIN — BUPROPION HYDROCHLORIDE 150 MG: 150 TABLET, EXTENDED RELEASE ORAL at 20:04

## 2021-12-02 RX ADMIN — LEVETIRACETAM 500 MG: 500 TABLET, FILM COATED ORAL at 20:04

## 2021-12-02 RX ADMIN — IPRATROPIUM BROMIDE AND ALBUTEROL SULFATE 3 ML: 2.5; .5 SOLUTION RESPIRATORY (INHALATION) at 21:18

## 2021-12-02 RX ADMIN — LORAZEPAM 1 MG: 1 TABLET ORAL at 08:15

## 2021-12-02 RX ADMIN — Medication 2 PACKET: at 20:05

## 2021-12-02 RX ADMIN — LORAZEPAM 1 MG: 1 TABLET ORAL at 20:05

## 2021-12-02 RX ADMIN — MULTIVITAMIN TABLET 1 TABLET: TABLET at 08:16

## 2021-12-02 RX ADMIN — NYSTATIN: 100000 POWDER TOPICAL at 13:46

## 2021-12-02 RX ADMIN — Medication 1 CAPSULE: at 08:16

## 2021-12-02 RX ADMIN — BISMUTH SUBSALICYLATE 30 ML: 525 LIQUID ORAL at 08:15

## 2021-12-02 RX ADMIN — CASTOR OIL AND BALSAM, PERU 1 APPLICATION: 788; 87 OINTMENT TOPICAL at 08:16

## 2021-12-02 RX ADMIN — VANCOMYCIN HYDROCHLORIDE 1500 MG: 10 INJECTION, POWDER, LYOPHILIZED, FOR SOLUTION INTRAVENOUS at 17:14

## 2021-12-02 RX ADMIN — SODIUM CHLORIDE, PRESERVATIVE FREE 10 ML: 5 INJECTION INTRAVENOUS at 20:04

## 2021-12-02 RX ADMIN — HYDRALAZINE HYDROCHLORIDE 50 MG: 50 TABLET, FILM COATED ORAL at 05:26

## 2021-12-02 NOTE — PROGRESS NOTES
[facility] Medicine Services  PROGRESS NOTE    Patient Name: Karely Villarreal  : 1965  MRN: [id number]    Date of Admission: 2021  Primary Care Physician: Tracie Levi APRN    Subjective   Subjective     CC:  Follow-up paravertebral abscess, spinal osteo    HPI:  Patient seen resting in bed no apparent distress. No acute events overnight per nursing. She appears to be drowsy on exam but is able to converse appropriately. Pain currently well controlled. No new complaints at this time.    ROS:  Gen- No fevers, chills, HA  CV- No chest pain, palpitations  Resp- No cough, dyspnea   GI- No N/V/D, abd pain    Objective   Objective     Vital Signs:   Temp:  [97.7 °F (36.5 °C)-97.8 °F (36.6 °C)] 97.7 °F (36.5 °C)  Heart Rate:  [] 90  Resp:  [16-20] 20  BP: (132-134)/(77-84) 132/81  Flow (L/min):  [2] 2     Physical Exam:  Constitutional: No acute distress, drowsy, chronically ill-appearing   HENT: NCAT, mucous membranes moist   Respiratory: Clear to auscultation bilaterally, respiratory effort normal   Cardiovascular: RRR, no murmurs, cap refill brisk   Gastrointestinal: Positive bowel sounds, soft, nontender, nondistended, rectal tube in place  : dobbs in place   Musculoskeletal: No BLE edema   Psychiatric: flat affect, cooperative  Neurologic: Oriented x 3, moves all extremities, speech clear  Skin: warm, dry, no visible rash     Results Reviewed:  LAB RESULTS:      Lab 21  092143 21  0939   WBC  --  7.23 8.11 9.79   HEMOGLOBIN  --  8.4* 8.2* 8.5*   HEMATOCRIT  --  29.1* 27.4* 28.6*   PLATELETS  --  143 181 201   MCV  --  92.4 89.5 89.4   CRP 4.78*  --   --   --          Lab 21  0512 21  0955 21  0723 21  0943 21  0939   SODIUM 139 141 141 140 140   POTASSIUM 4.4 4.7 4.8 4.9 4.2   CHLORIDE 94* 97* 98 102 100   CO2 40.0* 38.0* 37.0* 34.0* 30.0*   ANION GAP 5.0 6.0 6.0 4.0* 10.0   BUN 24* 28* 32* 31* 35*    CREATININE 0.63 0.50* 0.49* 0.39* 0.43*   GLUCOSE 93 176* 111* 87 123*   CALCIUM 9.4 9.2 9.3 9.4 9.2   IONIZED CALCIUM  --   --  1.36*  --  1.39*   MAGNESIUM  --  2.0 1.6  --  1.6   PHOSPHORUS  --  3.0 3.2  --  2.8         Lab 11/29/21  0955   TOTAL PROTEIN 6.1   ALBUMIN 2.50*   ALT (SGPT) 44*   AST (SGOT) 45*   BILIRUBIN 0.3   ALK PHOS 286*             Lab 11/29/21  0955   CHOLESTEROL 158   TRIGLYCERIDES 83             Brief Urine Lab Results  (Last result in the past 365 days)      Color   Clarity   Blood   Leuk Est   Nitrite   Protein   CREAT   Urine HCG        11/20/21 1625 Yellow   Clear   Small (1+)   Small (1+)   Negative   30 mg/dL (1+)                 Microbiology Results Abnormal     Procedure Component Value - Date/Time    Fungus Culture - Body Fluid, Spine, Thoracic [[id number]] Collected: 11/14/21 1511    Lab Status: Preliminary result Specimen: Body Fluid from Spine, Thoracic Updated: 11/28/21 1615     Fungus Culture No fungus isolated at 2 weeks    AFB Culture - Body Fluid, Spine, Thoracic [[id number]] Collected: 11/14/21 1511    Lab Status: Preliminary result Specimen: Body Fluid from Spine, Thoracic Updated: 11/28/21 1615     AFB Culture No AFB isolated at 2 weeks     AFB Stain No acid fast bacilli seen on concentrated smear    Fungus Culture - Tissue, Spine, Cervical [[id number]] Collected: 11/14/21 1450    Lab Status: Preliminary result Specimen: Tissue from Spine, Cervical Updated: 11/28/21 1600     Fungus Culture No fungus isolated at 2 weeks    AFB Culture - Tissue, Spine, Cervical [[id number]] Collected: 11/14/21 1450    Lab Status: Preliminary result Specimen: Tissue from Spine, Cervical Updated: 11/28/21 1600     AFB Culture No AFB isolated at 2 weeks     AFB Stain No acid fast bacilli seen on concentrated smear    Blood Culture - Blood, Arm, Left [[id number]]  (Normal) Collected: 11/20/21 1630    Lab Status: Final result Specimen: Blood from Arm, Left Updated: 11/25/21 1700     Blood Culture  No growth at 5 days    Narrative:      Aerobic bottle only      Urine Culture - Urine, Urine, Catheter [[id number]]  (Normal) Collected: 11/20/21 1625    Lab Status: Final result Specimen: Urine, Catheter Updated: 11/21/21 1523     Urine Culture No growth    Anaerobic Culture - Body Fluid, Spine, Thoracic [[id number]] Collected: 11/14/21 1511    Lab Status: Final result Specimen: Body Fluid from Spine, Thoracic Updated: 11/19/21 0700     Anaerobic Culture No anaerobes isolated at 5 days    Anaerobic Culture - Tissue, Spine, Cervical [[id number]] Collected: 11/14/21 1450    Lab Status: Final result Specimen: Tissue from Spine, Cervical Updated: 11/19/21 0700     Anaerobic Culture No anaerobes isolated at 5 days    Body Fluid Culture - Body Fluid, Spine, Thoracic [[id number]] Collected: 11/14/21 1511    Lab Status: Final result Specimen: Body Fluid from Spine, Thoracic Updated: 11/17/21 0933     Body Fluid Culture No growth at 3 days     Gram Stain No WBCs or organisms seen    Fungus Smear - Tissue, Spine, Cervical [[id number]] Collected: 11/14/21 1450    Lab Status: Final result Specimen: Tissue from Spine, Cervical Updated: 11/15/21 1552     Fungal Stain No fungal elements seen    Blood Culture - Blood, Arm, Right [[id number]]  (Normal) Collected: 11/07/21 1101    Lab Status: Final result Specimen: Blood from Arm, Right Updated: 11/12/21 1116     Blood Culture No growth at 5 days    Narrative:      AEROBIC BOTTLE ONLY    Blood Culture - Blood, Hand, Left [[id number]]  (Normal) Collected: 11/07/21 1101    Lab Status: Final result Specimen: Blood from Hand, Left Updated: 11/12/21 1115     Blood Culture No growth at 5 days    Urine Culture - Urine, Urine, Catheter [[id number]]  (Normal) Collected: 11/09/21 1621    Lab Status: Final result Specimen: Urine, Catheter Updated: 11/10/21 1829     Urine Culture No growth          No radiology results from the last 24 hrs    Results for orders placed during the hospital encounter of  10/24/21    Adult Transthoracic Echo Complete W/ Cont if Necessary Per Protocol    Interpretation Summary  · Left ventricular ejection fraction appears to be 61 - 65%. Left ventricular systolic function is normal.  · Left ventricular diastolic function was normal.  · Estimated right ventricular systolic pressure from tricuspid regurgitation is normal (<35 mmHg).  · No vegetations seen.  · This is a technically difficult study.      I have reviewed the medications:  Scheduled Meds:alteplase, 2 mg, Intravenous, Once  amLODIPine, 5 mg, Oral, Q24H  bismuth subsalicylate, 30 mL, Oral, 4x Daily  buPROPion SR, 150 mg, Oral, BID  castor oil-balsam peru, 1 application, Topical, Q12H  DULoxetine, 30 mg, Oral, QAM  furosemide, 40 mg, Oral, Daily  hydrALAZINE, 50 mg, Oral, Q8H  ipratropium-albuterol, 3 mL, Nebulization, 4x Daily - RT  lactobacillus acidophilus, 1 capsule, Oral, Daily  levETIRAcetam, 500 mg, Oral, Q12H  levothyroxine, 125 mcg, Oral, Q AM  multivitamin, 1 tablet, Oral, Daily  Nutrisource fiber, 2 packet, Oral, TID  nystatin, , Topical, Q8H  pantoprazole, 40 mg, Oral, Q AM  senna-docusate sodium, 1 tablet, Oral, Nightly  sodium chloride, 10 mL, Intravenous, Q12H  vancomycin, 1,500 mg, Intravenous, Q24H      Continuous Infusions:Pharmacy to dose vancomycin,       PRN Meds:.•  acetaminophen  •  cyclobenzaprine  •  hydrALAZINE  •  ipratropium-albuterol  •  LORazepam  •  magnesium sulfate **OR** magnesium sulfate **OR** magnesium sulfate  •  naloxone  •  [] HYDROmorphone **AND** naloxone  •  ondansetron **OR** ondansetron  •  oxyCODONE-acetaminophen  •  Pharmacy to dose vancomycin  •  potassium & sodium phosphates **OR** potassium & sodium phosphates  •  potassium chloride  •  sodium chloride  •  sodium chloride    Assessment/Plan   Assessment & Plan     Active Hospital Problems    Diagnosis  POA   • **Spinal cord compression [G95.20]  Yes   • Severe malnutrition (CMS/HCC) [E43]  Yes   • Quadriparesis [G82.50]   No   • COVID-19 virus detected [U07.1]  Yes   • MRSA bacteremia [R78.81, B95.62]  Yes   • Hepatitis C [B19.20]  Yes   • Seizures on Keppra [R56.9]  Yes   • History of CVA [Z86.73]  Not Applicable   • Acute postoperative respiratory insufficiency [J95.89]  No   • Pleural effusion, right [J90]  Yes   • Osteomyelitis of thoracic vertebra [M46.24]  Yes   • 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        Brief Hospital Course to date:  Karely Villarreal is a 56 y.o. female with h/o polysubstance abuse, HCV, HTN, Sz d/o, SLE, Hypothyroidism, CVA, prior COVID infection and homelessness.  Was admitted to [facility] on 10/24/21 for sepsis d/t MRSA and was found to have a paravertebral abscess and was transferred to [facility] on 11/6.  Surgery was deferred initially but she developed worsening UE weakness and MRI showed progressive cord compression so she was taken to the OR emergently for decompression on 11/14.  She was extubated 11/15 but TF's were started d/t poor PO.  She was transferred initially to telemetry on 11/8 but developed worsening hypoxia with AMS and was transferred back to the ICU on bipap on 11/20.  She was transferred back to the hospitalist service on 11/23     This patient's problems and plans were partially entered by my partner and updated as appropriate by me 12/02/21.    MRSA bacteremia  Sepsis  T9/T10 Paravertebral Abscess, T9/T10 Osteomyelitis with spinal cord compression  --s/p decompression, cervical laminectomy and debridement of epidural abscess on 11/14  --on Vancomycin per ID, will need long course at least 12 weeks given the extent of her spinal infection and high grade bacteremia (starting at time of surgery on 11/14)  --repeat MRI C  and T spine showed shows 2 fluid collections dorsally (one at C4 and the other more superficial at C6/C7) and T9/T10 still c/w vertebral osteomyelitis.  Re-evaluated by Dr. Jama who noted severe phlegmon circumferentially around cervical and upper thoracic area but no worsening compression, rec'd continue abx, no surgery needed at this time   -AAM labs      Acute Respiratory failure  Right Pleural Effusion  Probable Bacterial PNA  --improved  --tolerating daily PO Lasix well   -- s/p 7 days of merrem and doxy per ID     Malnutrition  --d/c TF's and dobhoff 11/30/21, patient now eating more       Diarrhea  --Rectal tube in place  --Diarrhea improved today   --Continue fiber supplement      Bilateral Knee Pain  --cold compresses.  Bilateral xrays showed large bilateral knee effusions, moderate osteoarthrosis worse in patellofemoral compartments     HX Seizure Disorder  --cont keppra      Recent COVID PNA     Acute Right Axilla abscess  --cx MRSA     Polysubstance abuse  --UDS positive for meth/opiates, self medicating at [facility] with klonopin and oxycodone and went into respiratory depression     SLE/Discoid lupus  --holding plaquenil due to severity of active infection     Hx CVA    DVT prophylaxis:  Mechanical DVT prophylaxis orders are present.       AM-PAC 6 Clicks Score (PT): 6 (12/01/21 0800)    Disposition: I expect the patient to be discharged TBD, DALLIN looking for disposition options.    CODE STATUS:   Code Status and Medical Interventions:   Ordered at: 11/06/21 7023     Code Status (Patient has no pulse and is not breathing):    CPR (Attempt to Resuscitate)     Medical Interventions (Patient has pulse or is breathing):    Full Support       Mateo Amor, MP  12/02/21
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
## 2021-12-02 NOTE — CASE MANAGEMENT/SOCIAL WORK
Continued Stay Note  [facility]     Patient Name: Karely Villarreal  MRN: [id number]  Today's Date: 12/2/2021    Admit Date: 11/6/2021     Discharge Plan     Row Name 12/02/21 1612       Plan    Plan Comments Pt has started to eat and not requiring the tube feeding. Pt does currently have rectal tube but per physician, plan is to remove this as pt's diarrhea is improving. Pt also currently on antibiotics but may be able to just continue one for 8 weeks. These have been the main factors that have been determining pt's discharge options. Pt was most appropriate for an LTAC, howver may not qualify at this time. MSW continues to follow and will reassess discharge plan with pt to determine pt goals and discharge disposition goals once rectal tube is removed.               Discharge Codes    No documentation.               Expected Discharge Date and Time     Expected Discharge Date Expected Discharge Time    Dec 7, 2021             ROSE MARY Ma

## 2021-12-02 NOTE — PLAN OF CARE
Problem: Adult Inpatient Plan of Care  Goal: Plan of Care Review  Outcome: Ongoing, Progressing  Flowsheets (Taken 12/2/2021 0439)  Progress: improving  Plan of Care Reviewed With: patient  Goal: Patient-Specific Goal (Individualized)  Outcome: Ongoing, Progressing  Goal: Absence of Hospital-Acquired Illness or Injury  Outcome: Ongoing, Progressing  Intervention: Identify and Manage Fall Risk  Recent Flowsheet Documentation  Taken 12/2/2021 0400 by Cheyanne Palomares RN  Safety Promotion/Fall Prevention:   activity supervised   assistive device/personal items within reach   safety round/check completed  Taken 12/2/2021 0200 by Cheyanne Palomares RN  Safety Promotion/Fall Prevention:   activity supervised   assistive device/personal items within reach   safety round/check completed  Taken 12/2/2021 0000 by Cheyanne Palomares RN  Safety Promotion/Fall Prevention:   activity supervised   assistive device/personal items within reach   safety round/check completed  Taken 12/1/2021 2200 by Cheyanne Palomares RN  Safety Promotion/Fall Prevention:   activity supervised   assistive device/personal items within reach   safety round/check completed  Taken 12/1/2021 2000 by Cheyanne Palomares RN  Safety Promotion/Fall Prevention:   activity supervised   assistive device/personal items within reach   clutter free environment maintained   fall prevention program maintained   lighting adjusted   muscle strengthening facilitated   nonskid shoes/slippers when out of bed   room organization consistent   safety round/check completed  Intervention: Prevent Skin Injury  Recent Flowsheet Documentation  Taken 12/2/2021 0400 by Cheyanne Palomares RN  Body Position: position maintained  Taken 12/2/2021 0200 by Cheyanne Palomares RN  Body Position: position maintained  Taken 12/2/2021 0000 by Cheyanne Palomares RN  Body Position: position maintained  Taken 12/1/2021 2200 by Cheyanne Palomares RN  Body Position:   tilted, left   weight shift assistance  provided  Taken 12/1/2021 2000 by Cheyanne Palomares RN  Body Position: position maintained  Skin Protection: incontinence pads utilized  Intervention: Prevent and Manage VTE (venous thromboembolism) Risk  Recent Flowsheet Documentation  Taken 12/2/2021 0400 by Cheyanne Palomares RN  VTE Prevention/Management:   bilateral   sequential compression devices off   patient refused intervention  Taken 12/2/2021 0200 by Cheyanne Palomares RN  VTE Prevention/Management:   bilateral   sequential compression devices off   patient refused intervention  Taken 12/2/2021 0000 by Cheyanne Palomares RN  VTE Prevention/Management: patient refused intervention  Taken 12/1/2021 2200 by Cheyanne Palomares RN  VTE Prevention/Management: patient refused intervention  Taken 12/1/2021 2000 by Cheyanne Palomares RN  VTE Prevention/Management: patient refused intervention  Intervention: Prevent Infection  Recent Flowsheet Documentation  Taken 12/2/2021 0400 by Cheyanne Palomares RN  Infection Prevention:   hand hygiene promoted   rest/sleep promoted  Taken 12/2/2021 0200 by Cheyanne Palomares RN  Infection Prevention:   hand hygiene promoted   rest/sleep promoted  Taken 12/2/2021 0000 by Cheyanne Palomares RN  Infection Prevention:   hand hygiene promoted   rest/sleep promoted  Taken 12/1/2021 2200 by Cheyanne Palomares RN  Infection Prevention:   hand hygiene promoted   rest/sleep promoted  Taken 12/1/2021 2000 by Cheyanne Palomares RN  Infection Prevention:   hand hygiene promoted   rest/sleep promoted  Goal: Optimal Comfort and Wellbeing  Outcome: Ongoing, Progressing  Intervention: Provide Person-Centered Care  Recent Flowsheet Documentation  Taken 12/2/2021 0400 by Cheyanne Palomares RN  Trust Relationship/Rapport: care explained  Taken 12/2/2021 0200 by Cheyanne Palomares RN  Trust Relationship/Rapport: care explained  Taken 12/2/2021 0000 by Cheyanne Palomares RN  Trust Relationship/Rapport: care explained  Taken 12/1/2021 2200 by Cheyanne Palomares RN  Trust  Relationship/Rapport: care explained  Taken 12/1/2021 2000 by Cheyanne Palomares RN  Trust Relationship/Rapport:   care explained   choices provided   questions answered   questions encouraged   reassurance provided  Goal: Readiness for Transition of Care  Outcome: Ongoing, Progressing     Problem: Hypertension Comorbidity  Goal: Blood Pressure in Desired Range  Outcome: Ongoing, Progressing  Intervention: Maintain Hypertension-Management Strategies  Recent Flowsheet Documentation  Taken 12/1/2021 2000 by Cheyanne Palomares RN  Medication Review/Management: medications reviewed     Problem: Pain Chronic (Persistent) (Comorbidity Management)  Goal: Acceptable Pain Control and Functional Ability  Outcome: Ongoing, Progressing  Intervention: Develop Pain Management Plan  Recent Flowsheet Documentation  Taken 12/1/2021 2200 by Cheyanne Palomares RN  Pain Management Interventions: see MAR  Taken 12/1/2021 2000 by Cheyanne Palomares RN  Pain Management Interventions: quiet environment facilitated  Intervention: Manage Persistent Pain  Recent Flowsheet Documentation  Taken 12/1/2021 2000 by Cheyanne Palomares RN  Bowel Elimination Promotion: adequate fluid intake promoted  Medication Review/Management: medications reviewed  Intervention: Optimize Psychosocial Wellbeing  Recent Flowsheet Documentation  Taken 12/1/2021 2000 by Cheyanne Palomares RN  Diversional Activities: television  Family/Support System Care:   self-care encouraged   support provided     Problem: Fall Injury Risk  Goal: Absence of Fall and Fall-Related Injury  Outcome: Ongoing, Progressing  Intervention: Identify and Manage Contributors to Fall Injury Risk  Recent Flowsheet Documentation  Taken 12/2/2021 0400 by Cheyanne Palomares RN  Self-Care Promotion: independence encouraged  Taken 12/2/2021 0200 by Cheyanne Palomares RN  Self-Care Promotion: independence encouraged  Taken 12/2/2021 0000 by Cheyanne Palomares RN  Self-Care Promotion: independence encouraged  Taken  12/1/2021 2200 by Cheyanne Palomares RN  Self-Care Promotion: independence encouraged  Taken 12/1/2021 2000 by Cheyanne Palomares RN  Medication Review/Management: medications reviewed  Self-Care Promotion: independence encouraged  Intervention: Promote Injury-Free Environment  Recent Flowsheet Documentation  Taken 12/2/2021 0400 by Cheyanne Palomares RN  Safety Promotion/Fall Prevention:   activity supervised   assistive device/personal items within reach   safety round/check completed  Taken 12/2/2021 0200 by Cheyanne Palomares RN  Safety Promotion/Fall Prevention:   activity supervised   assistive device/personal items within reach   safety round/check completed  Taken 12/2/2021 0000 by Cheyanne Palomares RN  Safety Promotion/Fall Prevention:   activity supervised   assistive device/personal items within reach   safety round/check completed  Taken 12/1/2021 2200 by Cheyanne Palomares RN  Safety Promotion/Fall Prevention:   activity supervised   assistive device/personal items within reach   safety round/check completed  Taken 12/1/2021 2000 by Cheyanne Palomares RN  Safety Promotion/Fall Prevention:   activity supervised   assistive device/personal items within reach   clutter free environment maintained   fall prevention program maintained   lighting adjusted   muscle strengthening facilitated   nonskid shoes/slippers when out of bed   room organization consistent   safety round/check completed     Problem: Fluid Imbalance (Pneumonia)  Goal: Fluid Balance  Outcome: Ongoing, Progressing  Intervention: Monitor and Manage Fluid Balance  Recent Flowsheet Documentation  Taken 12/1/2021 2000 by Cheyanne Palomares RN  Fluid/Electrolyte Management: fluids provided     Problem: Infection (Pneumonia)  Goal: Resolution of Infection Signs and Symptoms  Outcome: Ongoing, Progressing  Intervention: Prevent Infection Progression  Recent Flowsheet Documentation  Taken 12/1/2021 2000 by Cheyanne Palomares RN  Infection Management: aseptic  technique maintained     Problem: Respiratory Compromise (Pneumonia)  Goal: Effective Oxygenation and Ventilation  Outcome: Ongoing, Progressing  Intervention: Promote Airway Secretion Clearance  Recent Flowsheet Documentation  Taken 12/2/2021 0400 by Cheyanne Palomares RN  Cough And Deep Breathing: done independently per patient  Taken 12/2/2021 0200 by Cheyanne Palomares RN  Cough And Deep Breathing: done independently per patient  Taken 12/2/2021 0000 by Cheyanne Palomares RN  Cough And Deep Breathing: done independently per patient  Taken 12/1/2021 2200 by Cheyanne Palomares RN  Cough And Deep Breathing: done independently per patient  Taken 12/1/2021 2000 by Cheyanne Palomares RN  Cough And Deep Breathing: done independently per patient  Intervention: Optimize Oxygenation and Ventilation  Recent Flowsheet Documentation  Taken 12/2/2021 0400 by Cheyanne Palomares RN  Head of Bed (HOB): HOB elevated  Taken 12/2/2021 0200 by Cheyanne Palomares RN  Head of Bed (HOB): HOB elevated  Taken 12/2/2021 0000 by Cheyanne Palomares RN  Head of Bed (HOB): HOB elevated  Taken 12/1/2021 2200 by Cheyanne Palomares RN  Head of Bed (HOB): HOB elevated  Taken 12/1/2021 2000 by Cheyanne Palomares RN  Head of Bed (HOB): HOB at 15 degrees     Problem: Social Isolation  Goal: Increased Social Interaction  Outcome: Ongoing, Progressing     Problem: Skin Injury Risk Increased  Goal: Skin Health and Integrity  Outcome: Ongoing, Progressing  Intervention: Optimize Skin Protection  Recent Flowsheet Documentation  Taken 12/2/2021 0400 by Cheyanne Palomares RN  Head of Bed (HOB): HOB elevated  Taken 12/2/2021 0200 by Cheyanne Palomares RN  Head of Bed (HOB): HOB elevated  Taken 12/2/2021 0000 by Cheyanne Palomares RN  Head of Bed (HOB): HOB elevated  Taken 12/1/2021 2200 by Cheyanne Palomares RN  Head of Bed (HOB): HOB elevated  Taken 12/1/2021 2000 by Cheyanne Palomares RN  Pressure Reduction Techniques: frequent weight shift encouraged  Head of Bed (HOB): HOB at 15  degrees  Pressure Reduction Devices:   specialty bed utilized   pressure-redistributing mattress utilized  Skin Protection: incontinence pads utilized     Problem: Bleeding (Spinal Surgery)  Goal: Absence of Bleeding  Outcome: Ongoing, Progressing     Problem: Bowel Elimination Impaired (Spinal Surgery)  Goal: Effective Bowel Elimination  Outcome: Ongoing, Progressing  Intervention: Promote Effective Bowel Elimination  Recent Flowsheet Documentation  Taken 12/1/2021 2000 by Cheyanne Palomares RN  Bowel Elimination Promotion: adequate fluid intake promoted     Problem: Functional Ability Impaired (Spinal Surgery)  Goal: Optimal Functional Ability  Outcome: Ongoing, Progressing  Intervention: Optimize Functional Status  Recent Flowsheet Documentation  Taken 12/2/2021 0400 by Cheyanne Palomares RN  Activity Management: activity adjusted per tolerance  Positioning/Transfer Devices:   pillows   in use  Self-Care Promotion: independence encouraged  Taken 12/2/2021 0200 by Cheyanne Palomares RN  Activity Management: activity adjusted per tolerance  Positioning/Transfer Devices:   pillows   in use  Self-Care Promotion: independence encouraged  Taken 12/2/2021 0000 by Cheyanne Palomares RN  Activity Management: bedrest  Positioning/Transfer Devices:   pillows   in use  Self-Care Promotion: independence encouraged  Taken 12/1/2021 2200 by Cheyanne Palomares RN  Activity Management: bedrest  Positioning/Transfer Devices:   pillows   in use  Self-Care Promotion: independence encouraged  Taken 12/1/2021 2000 by Cheyanne Palomares RN  Activity Management: bedrest  Positioning/Transfer Devices:   pillows   in use  Self-Care Promotion: independence encouraged     Problem: Infection (Spinal Surgery)  Goal: Absence of Infection Signs and Symptoms  Outcome: Ongoing, Progressing  Intervention: Prevent or Manage Infection  Recent Flowsheet Documentation  Taken 12/1/2021 2000 by Cheyanne Palomares RN  Infection Management: aseptic technique  maintained     Problem: Neurologic Impairment (Spinal Surgery)  Goal: Optimal Neurologic Function  Outcome: Ongoing, Progressing  Intervention: Optimize Neurologic Function  Recent Flowsheet Documentation  Taken 12/2/2021 0400 by Cheyanne Palomares RN  Body Position: position maintained  Taken 12/2/2021 0200 by Cheyanne Palomares RN  Body Position: position maintained  Taken 12/2/2021 0000 by Cheyanne Palomares RN  Body Position: position maintained  Taken 12/1/2021 2200 by Cheyanne Palomares RN  Body Position:   tilted, left   weight shift assistance provided  Taken 12/1/2021 2000 by Cheyanne Palomares RN  Body Position: position maintained  Pressure Reduction Devices:   specialty bed utilized   pressure-redistributing mattress utilized  Range of Motion: active ROM (range of motion) encouraged  Intervention: Optimize Eating and Swallowing  Recent Flowsheet Documentation  Taken 12/1/2021 2000 by Cheyanne Palomares RN  Aspiration Precautions: awake/alert before oral intake     Problem: Ongoing Anesthesia Effects (Spinal Surgery)  Goal: Anesthesia/Sedation Recovery  Outcome: Ongoing, Progressing  Intervention: Optimize Anesthesia Recovery  Recent Flowsheet Documentation  Taken 12/2/2021 0400 by Cheyanne Palomares RN  Safety Promotion/Fall Prevention:   activity supervised   assistive device/personal items within reach   safety round/check completed  Taken 12/2/2021 0200 by Cheyanne Palomares RN  Safety Promotion/Fall Prevention:   activity supervised   assistive device/personal items within reach   safety round/check completed  Taken 12/2/2021 0000 by Cheyanne Palomares RN  Safety Promotion/Fall Prevention:   activity supervised   assistive device/personal items within reach   safety round/check completed  Taken 12/1/2021 2200 by Cheyanne Palomares RN  Safety Promotion/Fall Prevention:   activity supervised   assistive device/personal items within reach   safety round/check completed  Taken 12/1/2021 2000 by Cheyanne Palomares RN  Patient  Tolerance (IS): poor  Safety Promotion/Fall Prevention:   activity supervised   assistive device/personal items within reach   clutter free environment maintained   fall prevention program maintained   lighting adjusted   muscle strengthening facilitated   nonskid shoes/slippers when out of bed   room organization consistent   safety round/check completed  Administration (IS): instruction provided, follow-up     Problem: Pain (Spinal Surgery)  Goal: Acceptable Pain Control  Outcome: Ongoing, Progressing  Intervention: Prevent or Manage Pain  Recent Flowsheet Documentation  Taken 12/1/2021 2200 by Cheyanne Palomares RN  Pain Management Interventions: see MAR  Taken 12/1/2021 2000 by Cheyanne Palomares RN  Pain Management Interventions: quiet environment facilitated     Problem: Postoperative Nausea and Vomiting (Spinal Surgery)  Goal: Nausea and Vomiting Relief  Outcome: Ongoing, Progressing     Problem: Postoperative Urinary Retention (Spinal Surgery)  Goal: Effective Urinary Elimination  Outcome: Ongoing, Progressing     Problem: Activity and Energy Impairment (Anxiety Signs/Symptoms)  Goal: Optimized Energy Level (Anxiety Signs/Symptoms)  Outcome: Ongoing, Progressing     Problem: Cognitive Impairment (Anxiety Signs/Symptoms)  Goal: Optimized Cognitive Function (Anxiety Signs/Symptoms)  Outcome: Ongoing, Progressing     Problem: Mood Impairment (Anxiety Signs/Symptoms)  Goal: Improved Mood Symptoms (Anxiety Signs/Symptoms)  Outcome: Ongoing, Progressing     Problem: Sleep Disturbance (Anxiety Signs/Symptoms)  Goal: Improved Sleep (Anxiety Signs/Symptoms)  Outcome: Ongoing, Progressing     Problem: Social, Occupational or Functional Impairment (Anxiety Signs/Symptoms)  Goal: Enhanced Social, Occupational or Functional Skills (Anxiety Signs/Symptoms)  Outcome: Ongoing, Progressing  Intervention: Promote Social, Occupational and Functional Ability  Recent Flowsheet Documentation  Taken 12/2/2021 0400 by Cheyanne Palomares  RN  Trust Relationship/Rapport: care explained  Taken 12/2/2021 0200 by Cheyanne Palomares RN  Trust Relationship/Rapport: care explained  Taken 12/2/2021 0000 by Cheyanne Palomares RN  Trust Relationship/Rapport: care explained  Taken 12/1/2021 2200 by Cheyanne Palomares RN  Trust Relationship/Rapport: care explained  Taken 12/1/2021 2000 by Cheyanne Palomares RN  Trust Relationship/Rapport:   care explained   choices provided   questions answered   questions encouraged   reassurance provided     Problem: Somatic Disturbance (Anxiety Signs/Symptoms)  Goal: Improved Somatic Symptoms (Anxiety Signs/Symptoms)  Outcome: Ongoing, Progressing     Problem: Adult Inpatient Plan of Care  Goal: Plan of Care Review  Outcome: Ongoing, Progressing  Flowsheets (Taken 12/2/2021 0439)  Progress: improving  Plan of Care Reviewed With: patient  Goal: Patient-Specific Goal (Individualized)  Outcome: Ongoing, Progressing  Goal: Absence of Hospital-Acquired Illness or Injury  Outcome: Ongoing, Progressing  Intervention: Identify and Manage Fall Risk  Recent Flowsheet Documentation  Taken 12/2/2021 0400 by Cheyanne Palomares RN  Safety Promotion/Fall Prevention:   activity supervised   assistive device/personal items within reach   safety round/check completed  Taken 12/2/2021 0200 by Cheyanne Palomares RN  Safety Promotion/Fall Prevention:   activity supervised   assistive device/personal items within reach   safety round/check completed  Taken 12/2/2021 0000 by Cheyanne Palomares RN  Safety Promotion/Fall Prevention:   activity supervised   assistive device/personal items within reach   safety round/check completed  Taken 12/1/2021 2200 by Cheynane Palomares RN  Safety Promotion/Fall Prevention:   activity supervised   assistive device/personal items within reach   safety round/check completed  Taken 12/1/2021 2000 by Cehyanne Palomares RN  Safety Promotion/Fall Prevention:   activity supervised   assistive device/personal items within reach   clutter  free environment maintained   fall prevention program maintained   lighting adjusted   muscle strengthening facilitated   nonskid shoes/slippers when out of bed   room organization consistent   safety round/check completed  Intervention: Prevent Skin Injury  Recent Flowsheet Documentation  Taken 12/2/2021 0400 by Cheyanne Palomares RN  Body Position: position maintained  Taken 12/2/2021 0200 by Cheyanne Palomares RN  Body Position: position maintained  Taken 12/2/2021 0000 by Cheyanne Palomares RN  Body Position: position maintained  Taken 12/1/2021 2200 by Cheyanne Palomares RN  Body Position:   tilted, left   weight shift assistance provided  Taken 12/1/2021 2000 by Cheyanne Palomares RN  Body Position: position maintained  Skin Protection: incontinence pads utilized  Intervention: Prevent and Manage VTE (venous thromboembolism) Risk  Recent Flowsheet Documentation  Taken 12/2/2021 0400 by Cheyanne Palomares RN  VTE Prevention/Management:   bilateral   sequential compression devices off   patient refused intervention  Taken 12/2/2021 0200 by Cheyanne Palomares RN  VTE Prevention/Management:   bilateral   sequential compression devices off   patient refused intervention  Taken 12/2/2021 0000 by Cheyanne Palomares RN  VTE Prevention/Management: patient refused intervention  Taken 12/1/2021 2200 by Cheyanne Palomares RN  VTE Prevention/Management: patient refused intervention  Taken 12/1/2021 2000 by Cheyanne Palomares RN  VTE Prevention/Management: patient refused intervention  Intervention: Prevent Infection  Recent Flowsheet Documentation  Taken 12/2/2021 0400 by Cheyanne Palomares RN  Infection Prevention:   hand hygiene promoted   rest/sleep promoted  Taken 12/2/2021 0200 by Cheyanne Palomares RN  Infection Prevention:   hand hygiene promoted   rest/sleep promoted  Taken 12/2/2021 0000 by Cheyanne Palomares RN  Infection Prevention:   hand hygiene promoted   rest/sleep promoted  Taken 12/1/2021 2200 by Cheyanne Palomares RN  Infection  Prevention:   hand hygiene promoted   rest/sleep promoted  Taken 12/1/2021 2000 by Cheyanne Palomares RN  Infection Prevention:   hand hygiene promoted   rest/sleep promoted  Goal: Optimal Comfort and Wellbeing  Outcome: Ongoing, Progressing  Intervention: Provide Person-Centered Care  Recent Flowsheet Documentation  Taken 12/2/2021 0400 by Cheyanne Palomares RN  Trust Relationship/Rapport: care explained  Taken 12/2/2021 0200 by Cheyanne Palomares RN  Trust Relationship/Rapport: care explained  Taken 12/2/2021 0000 by Cheyanne Palomares RN  Trust Relationship/Rapport: care explained  Taken 12/1/2021 2200 by Cheyanne Palomares RN  Trust Relationship/Rapport: care explained  Taken 12/1/2021 2000 by Cheyanne Palomares RN  Trust Relationship/Rapport:   care explained   choices provided   questions answered   questions encouraged   reassurance provided  Goal: Readiness for Transition of Care  Outcome: Ongoing, Progressing     Problem: Hypertension Comorbidity  Goal: Blood Pressure in Desired Range  Outcome: Ongoing, Progressing  Intervention: Maintain Hypertension-Management Strategies  Recent Flowsheet Documentation  Taken 12/1/2021 2000 by Cheyanne Palomares RN  Medication Review/Management: medications reviewed     Problem: Pain Chronic (Persistent) (Comorbidity Management)  Goal: Acceptable Pain Control and Functional Ability  Outcome: Ongoing, Progressing  Intervention: Develop Pain Management Plan  Recent Flowsheet Documentation  Taken 12/1/2021 2200 by Cheyanne Palomares RN  Pain Management Interventions: see MAR  Taken 12/1/2021 2000 by Cheyanne Palomares RN  Pain Management Interventions: quiet environment facilitated  Intervention: Manage Persistent Pain  Recent Flowsheet Documentation  Taken 12/1/2021 2000 by Cheyanne Palomares RN  Bowel Elimination Promotion: adequate fluid intake promoted  Medication Review/Management: medications reviewed  Intervention: Optimize Psychosocial Wellbeing  Recent Flowsheet Documentation  Taken  12/1/2021 2000 by Cheyanne Palomares RN  Diversional Activities: television  Family/Support System Care:   self-care encouraged   support provided     Problem: Fall Injury Risk  Goal: Absence of Fall and Fall-Related Injury  Outcome: Ongoing, Progressing  Intervention: Identify and Manage Contributors to Fall Injury Risk  Recent Flowsheet Documentation  Taken 12/2/2021 0400 by Cheyanne Palomares RN  Self-Care Promotion: independence encouraged  Taken 12/2/2021 0200 by Cheyanne Palomares RN  Self-Care Promotion: independence encouraged  Taken 12/2/2021 0000 by Cheyanne Palomares RN  Self-Care Promotion: independence encouraged  Taken 12/1/2021 2200 by Cheyanne Palomares RN  Self-Care Promotion: independence encouraged  Taken 12/1/2021 2000 by Cheyanne Palomares RN  Medication Review/Management: medications reviewed  Self-Care Promotion: independence encouraged  Intervention: Promote Injury-Free Environment  Recent Flowsheet Documentation  Taken 12/2/2021 0400 by Cheyanne Palomares RN  Safety Promotion/Fall Prevention:   activity supervised   assistive device/personal items within reach   safety round/check completed  Taken 12/2/2021 0200 by Cheyanne Palomares RN  Safety Promotion/Fall Prevention:   activity supervised   assistive device/personal items within reach   safety round/check completed  Taken 12/2/2021 0000 by Cheyanne Palomares RN  Safety Promotion/Fall Prevention:   activity supervised   assistive device/personal items within reach   safety round/check completed  Taken 12/1/2021 2200 by Cheyanne Palomares RN  Safety Promotion/Fall Prevention:   activity supervised   assistive device/personal items within reach   safety round/check completed  Taken 12/1/2021 2000 by Cheyanne Palomares RN  Safety Promotion/Fall Prevention:   activity supervised   assistive device/personal items within reach   clutter free environment maintained   fall prevention program maintained   lighting adjusted   muscle strengthening facilitated   nonskid  shoes/slippers when out of bed   room organization consistent   safety round/check completed     Problem: Fluid Imbalance (Pneumonia)  Goal: Fluid Balance  Outcome: Ongoing, Progressing  Intervention: Monitor and Manage Fluid Balance  Recent Flowsheet Documentation  Taken 12/1/2021 2000 by Cheyanne Palomares RN  Fluid/Electrolyte Management: fluids provided     Problem: Infection (Pneumonia)  Goal: Resolution of Infection Signs and Symptoms  Outcome: Ongoing, Progressing  Intervention: Prevent Infection Progression  Recent Flowsheet Documentation  Taken 12/1/2021 2000 by Cheyanne Palomares RN  Infection Management: aseptic technique maintained     Problem: Respiratory Compromise (Pneumonia)  Goal: Effective Oxygenation and Ventilation  Outcome: Ongoing, Progressing  Intervention: Promote Airway Secretion Clearance  Recent Flowsheet Documentation  Taken 12/2/2021 0400 by Cheyanne Palomares RN  Cough And Deep Breathing: done independently per patient  Taken 12/2/2021 0200 by Cheyanne Palomares RN  Cough And Deep Breathing: done independently per patient  Taken 12/2/2021 0000 by Cheyanne Palomares RN  Cough And Deep Breathing: done independently per patient  Taken 12/1/2021 2200 by Cheyanne Palomares RN  Cough And Deep Breathing: done independently per patient  Taken 12/1/2021 2000 by Cheyanne Palomares RN  Cough And Deep Breathing: done independently per patient  Intervention: Optimize Oxygenation and Ventilation  Recent Flowsheet Documentation  Taken 12/2/2021 0400 by Cheyanne Palomares RN  Head of Bed (HOB): HOB elevated  Taken 12/2/2021 0200 by Cheyanne Palomares RN  Head of Bed (HOB): HOB elevated  Taken 12/2/2021 0000 by Cheyanne Palomares RN  Head of Bed (HOB): HOB elevated  Taken 12/1/2021 2200 by Cheyanne Palomares RN  Head of Bed (HOB): HOB elevated  Taken 12/1/2021 2000 by Cheyanne Palomares RN  Head of Bed (HOB): HOB at 15 degrees     Problem: Social Isolation  Goal: Increased Social Interaction  Outcome: Ongoing, Progressing      Problem: Skin Injury Risk Increased  Goal: Skin Health and Integrity  Outcome: Ongoing, Progressing  Intervention: Optimize Skin Protection  Recent Flowsheet Documentation  Taken 12/2/2021 0400 by Cheyanne Palomares RN  Head of Bed (HOB): HOB elevated  Taken 12/2/2021 0200 by Cheyanne Palomares RN  Head of Bed (HOB): HOB elevated  Taken 12/2/2021 0000 by Cheyanne Palomares RN  Head of Bed (HOB): HOB elevated  Taken 12/1/2021 2200 by Cheyanne Palomares RN  Head of Bed (HOB): HOB elevated  Taken 12/1/2021 2000 by Cheyanne Palomares RN  Pressure Reduction Techniques: frequent weight shift encouraged  Head of Bed (HOB): HOB at 15 degrees  Pressure Reduction Devices:   specialty bed utilized   pressure-redistributing mattress utilized  Skin Protection: incontinence pads utilized     Problem: Bleeding (Spinal Surgery)  Goal: Absence of Bleeding  Outcome: Ongoing, Progressing     Problem: Bowel Elimination Impaired (Spinal Surgery)  Goal: Effective Bowel Elimination  Outcome: Ongoing, Progressing  Intervention: Promote Effective Bowel Elimination  Recent Flowsheet Documentation  Taken 12/1/2021 2000 by Cheyanne Palomares RN  Bowel Elimination Promotion: adequate fluid intake promoted     Problem: Functional Ability Impaired (Spinal Surgery)  Goal: Optimal Functional Ability  Outcome: Ongoing, Progressing  Intervention: Optimize Functional Status  Recent Flowsheet Documentation  Taken 12/2/2021 0400 by Cheyanne Palomares RN  Activity Management: activity adjusted per tolerance  Positioning/Transfer Devices:   pillows   in use  Self-Care Promotion: independence encouraged  Taken 12/2/2021 0200 by Cheyanne Palomares RN  Activity Management: activity adjusted per tolerance  Positioning/Transfer Devices:   pillows   in use  Self-Care Promotion: independence encouraged  Taken 12/2/2021 0000 by Cheyanne Palomares RN  Activity Management: bedrest  Positioning/Transfer Devices:   pillows   in use  Self-Care Promotion: independence  encouraged  Taken 12/1/2021 2200 by Cheyanne Palomares RN  Activity Management: bedrest  Positioning/Transfer Devices:   pillows   in use  Self-Care Promotion: independence encouraged  Taken 12/1/2021 2000 by Cheyanne Palomares RN  Activity Management: bedrest  Positioning/Transfer Devices:   pillows   in use  Self-Care Promotion: independence encouraged     Problem: Infection (Spinal Surgery)  Goal: Absence of Infection Signs and Symptoms  Outcome: Ongoing, Progressing  Intervention: Prevent or Manage Infection  Recent Flowsheet Documentation  Taken 12/1/2021 2000 by Cheyanne Palomares RN  Infection Management: aseptic technique maintained     Problem: Neurologic Impairment (Spinal Surgery)  Goal: Optimal Neurologic Function  Outcome: Ongoing, Progressing  Intervention: Optimize Neurologic Function  Recent Flowsheet Documentation  Taken 12/2/2021 0400 by Cheyanne Palomares RN  Body Position: position maintained  Taken 12/2/2021 0200 by Cheyanne Palomares RN  Body Position: position maintained  Taken 12/2/2021 0000 by Cheyanne Palomares RN  Body Position: position maintained  Taken 12/1/2021 2200 by Cheyanne Palomares RN  Body Position:   tilted, left   weight shift assistance provided  Taken 12/1/2021 2000 by Cheyanne Palomares RN  Body Position: position maintained  Pressure Reduction Devices:   specialty bed utilized   pressure-redistributing mattress utilized  Range of Motion: active ROM (range of motion) encouraged  Intervention: Optimize Eating and Swallowing  Recent Flowsheet Documentation  Taken 12/1/2021 2000 by Cheyanne Palomares RN  Aspiration Precautions: awake/alert before oral intake     Problem: Ongoing Anesthesia Effects (Spinal Surgery)  Goal: Anesthesia/Sedation Recovery  Outcome: Ongoing, Progressing  Intervention: Optimize Anesthesia Recovery  Recent Flowsheet Documentation  Taken 12/2/2021 0400 by Cheyanne Palomares RN  Safety Promotion/Fall Prevention:   activity supervised   assistive device/personal items within  reach   safety round/check completed  Taken 12/2/2021 0200 by Cheyanne Palomares RN  Safety Promotion/Fall Prevention:   activity supervised   assistive device/personal items within reach   safety round/check completed  Taken 12/2/2021 0000 by Cheyanne Palomares RN  Safety Promotion/Fall Prevention:   activity supervised   assistive device/personal items within reach   safety round/check completed  Taken 12/1/2021 2200 by Cheyanne Palomares RN  Safety Promotion/Fall Prevention:   activity supervised   assistive device/personal items within reach   safety round/check completed  Taken 12/1/2021 2000 by Cheyanne Palomares RN  Patient Tolerance (IS): poor  Safety Promotion/Fall Prevention:   activity supervised   assistive device/personal items within reach   clutter free environment maintained   fall prevention program maintained   lighting adjusted   muscle strengthening facilitated   nonskid shoes/slippers when out of bed   room organization consistent   safety round/check completed  Administration (IS): instruction provided, follow-up     Problem: Pain (Spinal Surgery)  Goal: Acceptable Pain Control  Outcome: Ongoing, Progressing  Intervention: Prevent or Manage Pain  Recent Flowsheet Documentation  Taken 12/1/2021 2200 by Cheyanne Palomares RN  Pain Management Interventions: see MAR  Taken 12/1/2021 2000 by Cheyanne Palomares RN  Pain Management Interventions: quiet environment facilitated     Problem: Postoperative Nausea and Vomiting (Spinal Surgery)  Goal: Nausea and Vomiting Relief  Outcome: Ongoing, Progressing     Problem: Postoperative Urinary Retention (Spinal Surgery)  Goal: Effective Urinary Elimination  Outcome: Ongoing, Progressing     Problem: Activity and Energy Impairment (Anxiety Signs/Symptoms)  Goal: Optimized Energy Level (Anxiety Signs/Symptoms)  Outcome: Ongoing, Progressing     Problem: Cognitive Impairment (Anxiety Signs/Symptoms)  Goal: Optimized Cognitive Function (Anxiety Signs/Symptoms)  Outcome:  Ongoing, Progressing     Problem: Mood Impairment (Anxiety Signs/Symptoms)  Goal: Improved Mood Symptoms (Anxiety Signs/Symptoms)  Outcome: Ongoing, Progressing     Problem: Sleep Disturbance (Anxiety Signs/Symptoms)  Goal: Improved Sleep (Anxiety Signs/Symptoms)  Outcome: Ongoing, Progressing     Problem: Social, Occupational or Functional Impairment (Anxiety Signs/Symptoms)  Goal: Enhanced Social, Occupational or Functional Skills (Anxiety Signs/Symptoms)  Outcome: Ongoing, Progressing  Intervention: Promote Social, Occupational and Functional Ability  Recent Flowsheet Documentation  Taken 12/2/2021 0400 by Cheyanne Palomares RN  Trust Relationship/Rapport: care explained  Taken 12/2/2021 0200 by Cheyanne Palomares RN  Trust Relationship/Rapport: care explained  Taken 12/2/2021 0000 by Cheyanne Palomares RN  Trust Relationship/Rapport: care explained  Taken 12/1/2021 2200 by Cheyanne Palomares RN  Trust Relationship/Rapport: care explained  Taken 12/1/2021 2000 by Cheyanne Palomares RN  Trust Relationship/Rapport:   care explained   choices provided   questions answered   questions encouraged   reassurance provided     Problem: Somatic Disturbance (Anxiety Signs/Symptoms)  Goal: Improved Somatic Symptoms (Anxiety Signs/Symptoms)  Outcome: Ongoing, Progressing   Goal Outcome Evaluation:  Plan of Care Reviewed With: patient      Pt currently in bed resting quietly. No complaints of pain or discomfort at this time. Complaints of headaches throughout shift. Pt also voices feeling very anxious during shift. Rectal tube in place and in working order. Crowley care give. CHG done. Barriers provided to coccyx during shift. Turned q2 while in bed. Bilateral upper extremity swelling continues, both elevated. Vitals WNL on 2L NC. Bedfast continues. Complex case on board. No other observations at this time. Will continue to monitor, call bell in reach.  Problem: Adult Inpatient Plan of Care

## 2021-12-02 NOTE — PLAN OF CARE
Goal Outcome Evaluation:  Plan of Care Reviewed With: patient        Progress: declining  Outcome Summary: Pt attempted BLE AROM exercises but declined AA/PROM due to elevated pain. Pt declined all EOB/OOB activity despite max encouragement. Pt educated on importance of mobility, pt verbalized understanding and continued to decline additional intervention this session. Will d/c IPPT services at this time due to inability to activitely participate in skilled intervention. Recommend pt continues activity with nsg as she tolerates. Recommend LTAC at discharge.

## 2021-12-02 NOTE — PLAN OF CARE
Goal Outcome Evaluation:  Plan of Care Reviewed With: patient        Progress: declining  Outcome Summary: Pt tolerated 10 reps of BUE PROM, declined attempting to feed self, declined any OOB activity despite max encouragement. Will d/c pt from IPOT at this time as pt unable to actively participate in skilled OT treatment sessions. Recommend pt continue activity w/ nursing staff as tolerated. Continue to recommend LTAC at discharge.

## 2021-12-03 ENCOUNTER — APPOINTMENT (OUTPATIENT)
Dept: CARDIOLOGY | Facility: HOSPITAL | Age: 56
End: 2021-12-03

## 2021-12-03 LAB
ABO GROUP BLD: NORMAL
ANION GAP SERPL CALCULATED.3IONS-SCNC: 7 MMOL/L (ref 5–15)
BACTERIA UR QL AUTO: ABNORMAL /HPF
BILIRUB UR QL STRIP: NEGATIVE
BLD GP AB SCN SERPL QL: NEGATIVE
BUN SERPL-MCNC: 19 MG/DL (ref 6–20)
BUN/CREAT SERPL: 31.7 (ref 7–25)
CALCIUM SPEC-SCNC: 9.8 MG/DL (ref 8.6–10.5)
CHLORIDE SERPL-SCNC: 94 MMOL/L (ref 98–107)
CLARITY UR: ABNORMAL
CO2 SERPL-SCNC: 39 MMOL/L (ref 22–29)
COLOR UR: ABNORMAL
CREAT SERPL-MCNC: 0.6 MG/DL (ref 0.57–1)
DEPRECATED RDW RBC AUTO: 51.8 FL (ref 37–54)
ERYTHROCYTE [DISTWIDTH] IN BLOOD BY AUTOMATED COUNT: 16.5 % (ref 12.3–15.4)
GFR SERPL CREATININE-BSD FRML MDRD: 104 ML/MIN/1.73
GLUCOSE SERPL-MCNC: 84 MG/DL (ref 65–99)
GLUCOSE UR STRIP-MCNC: NEGATIVE MG/DL
HCT VFR BLD AUTO: 23.5 % (ref 34–46.6)
HCT VFR BLD AUTO: 23.7 % (ref 34–46.6)
HGB BLD-MCNC: 7 G/DL (ref 12–15.9)
HGB BLD-MCNC: 7 G/DL (ref 12–15.9)
HGB UR QL STRIP.AUTO: ABNORMAL
HYALINE CASTS UR QL AUTO: ABNORMAL /LPF
KETONES UR QL STRIP: NEGATIVE
LEUKOCYTE ESTERASE UR QL STRIP.AUTO: ABNORMAL
MCH RBC QN AUTO: 25.8 PG (ref 26.6–33)
MCHC RBC AUTO-ENTMCNC: 29.8 G/DL (ref 31.5–35.7)
MCV RBC AUTO: 86.7 FL (ref 79–97)
NITRITE UR QL STRIP: NEGATIVE
PH UR STRIP.AUTO: 7.5 [PH] (ref 5–8)
PLATELET # BLD AUTO: 181 10*3/MM3 (ref 140–450)
PMV BLD AUTO: 8.6 FL (ref 6–12)
POTASSIUM SERPL-SCNC: 4.1 MMOL/L (ref 3.5–5.2)
PROT UR QL STRIP: ABNORMAL
RBC # BLD AUTO: 2.71 10*6/MM3 (ref 3.77–5.28)
RBC # UR STRIP: ABNORMAL /HPF
REF LAB TEST METHOD: ABNORMAL
RH BLD: NEGATIVE
SODIUM SERPL-SCNC: 140 MMOL/L (ref 136–145)
SP GR UR STRIP: 1.01 (ref 1–1.03)
SQUAMOUS #/AREA URNS HPF: ABNORMAL /HPF
T&S EXPIRATION DATE: NORMAL
UROBILINOGEN UR QL STRIP: ABNORMAL
VANCOMYCIN TROUGH SERPL-MCNC: 24.1 MCG/ML (ref 5–20)
WBC # UR STRIP: ABNORMAL /HPF
WBC NRBC COR # BLD: 5.64 10*3/MM3 (ref 3.4–10.8)
YEAST URNS QL MICRO: ABNORMAL /HPF

## 2021-12-03 PROCEDURE — 86901 BLOOD TYPING SEROLOGIC RH(D): CPT | Performed by: NURSE PRACTITIONER

## 2021-12-03 PROCEDURE — 80048 BASIC METABOLIC PNL TOTAL CA: CPT

## 2021-12-03 PROCEDURE — 85014 HEMATOCRIT: CPT | Performed by: NURSE PRACTITIONER

## 2021-12-03 PROCEDURE — 94799 UNLISTED PULMONARY SVC/PX: CPT

## 2021-12-03 PROCEDURE — 86900 BLOOD TYPING SEROLOGIC ABO: CPT

## 2021-12-03 PROCEDURE — 86900 BLOOD TYPING SEROLOGIC ABO: CPT | Performed by: NURSE PRACTITIONER

## 2021-12-03 PROCEDURE — 86850 RBC ANTIBODY SCREEN: CPT | Performed by: NURSE PRACTITIONER

## 2021-12-03 PROCEDURE — 86923 COMPATIBILITY TEST ELECTRIC: CPT

## 2021-12-03 PROCEDURE — 36430 TRANSFUSION BLD/BLD COMPNT: CPT

## 2021-12-03 PROCEDURE — P9016 RBC LEUKOCYTES REDUCED: HCPCS

## 2021-12-03 PROCEDURE — 87086 URINE CULTURE/COLONY COUNT: CPT | Performed by: INTERNAL MEDICINE

## 2021-12-03 PROCEDURE — 63710000001 ONDANSETRON PER 8 MG: Performed by: INTERNAL MEDICINE

## 2021-12-03 PROCEDURE — 93971 EXTREMITY STUDY: CPT

## 2021-12-03 PROCEDURE — 80202 ASSAY OF VANCOMYCIN: CPT

## 2021-12-03 PROCEDURE — 85027 COMPLETE CBC AUTOMATED: CPT | Performed by: NURSE PRACTITIONER

## 2021-12-03 PROCEDURE — 25010000002 VANCOMYCIN 10 G RECONSTITUTED SOLUTION

## 2021-12-03 PROCEDURE — 85018 HEMOGLOBIN: CPT | Performed by: NURSE PRACTITIONER

## 2021-12-03 PROCEDURE — 81001 URINALYSIS AUTO W/SCOPE: CPT | Performed by: INTERNAL MEDICINE

## 2021-12-03 PROCEDURE — 99232 SBSQ HOSP IP/OBS MODERATE 35: CPT | Performed by: NURSE PRACTITIONER

## 2021-12-03 RX ORDER — SODIUM CHLORIDE 9 MG/ML
100 INJECTION, SOLUTION INTRAVENOUS CONTINUOUS
Status: ACTIVE | OUTPATIENT
Start: 2021-12-03 | End: 2021-12-04

## 2021-12-03 RX ADMIN — LEVETIRACETAM 500 MG: 500 TABLET, FILM COATED ORAL at 09:59

## 2021-12-03 RX ADMIN — IPRATROPIUM BROMIDE AND ALBUTEROL SULFATE 3 ML: 2.5; .5 SOLUTION RESPIRATORY (INHALATION) at 13:26

## 2021-12-03 RX ADMIN — HYDRALAZINE HYDROCHLORIDE 50 MG: 50 TABLET, FILM COATED ORAL at 15:48

## 2021-12-03 RX ADMIN — LEVOTHYROXINE SODIUM 125 MCG: 125 TABLET ORAL at 05:55

## 2021-12-03 RX ADMIN — CASTOR OIL AND BALSAM, PERU 1 APPLICATION: 788; 87 OINTMENT TOPICAL at 10:00

## 2021-12-03 RX ADMIN — CYCLOBENZAPRINE 5 MG: 10 TABLET, FILM COATED ORAL at 19:56

## 2021-12-03 RX ADMIN — NYSTATIN: 100000 POWDER TOPICAL at 21:51

## 2021-12-03 RX ADMIN — LORAZEPAM 1 MG: 1 TABLET ORAL at 09:59

## 2021-12-03 RX ADMIN — LORAZEPAM 1 MG: 1 TABLET ORAL at 02:06

## 2021-12-03 RX ADMIN — LORAZEPAM 1 MG: 1 TABLET ORAL at 15:48

## 2021-12-03 RX ADMIN — BUPROPION HYDROCHLORIDE 150 MG: 150 TABLET, EXTENDED RELEASE ORAL at 09:59

## 2021-12-03 RX ADMIN — LEVETIRACETAM 500 MG: 500 TABLET, FILM COATED ORAL at 19:56

## 2021-12-03 RX ADMIN — NYSTATIN: 100000 POWDER TOPICAL at 15:09

## 2021-12-03 RX ADMIN — OXYCODONE AND ACETAMINOPHEN 1 TABLET: 5; 325 TABLET ORAL at 02:06

## 2021-12-03 RX ADMIN — OXYCODONE AND ACETAMINOPHEN 1 TABLET: 5; 325 TABLET ORAL at 10:04

## 2021-12-03 RX ADMIN — HYDRALAZINE HYDROCHLORIDE 50 MG: 50 TABLET, FILM COATED ORAL at 05:55

## 2021-12-03 RX ADMIN — AMLODIPINE BESYLATE 5 MG: 5 TABLET ORAL at 09:59

## 2021-12-03 RX ADMIN — ONDANSETRON HYDROCHLORIDE 4 MG: 4 TABLET, FILM COATED ORAL at 19:56

## 2021-12-03 RX ADMIN — BUPROPION HYDROCHLORIDE 150 MG: 150 TABLET, EXTENDED RELEASE ORAL at 19:56

## 2021-12-03 RX ADMIN — CYCLOBENZAPRINE 5 MG: 10 TABLET, FILM COATED ORAL at 05:55

## 2021-12-03 RX ADMIN — ACETAMINOPHEN 650 MG: 325 TABLET, FILM COATED ORAL at 05:55

## 2021-12-03 RX ADMIN — MULTIVITAMIN TABLET 1 TABLET: TABLET at 09:59

## 2021-12-03 RX ADMIN — NYSTATIN: 100000 POWDER TOPICAL at 05:56

## 2021-12-03 RX ADMIN — LORAZEPAM 1 MG: 1 TABLET ORAL at 21:53

## 2021-12-03 RX ADMIN — FUROSEMIDE 40 MG: 40 TABLET ORAL at 09:59

## 2021-12-03 RX ADMIN — PANTOPRAZOLE SODIUM 40 MG: 40 TABLET, DELAYED RELEASE ORAL at 05:55

## 2021-12-03 RX ADMIN — HYDRALAZINE HYDROCHLORIDE 50 MG: 50 TABLET, FILM COATED ORAL at 21:51

## 2021-12-03 RX ADMIN — VANCOMYCIN HYDROCHLORIDE 1250 MG: 10 INJECTION, POWDER, LYOPHILIZED, FOR SOLUTION INTRAVENOUS at 21:51

## 2021-12-03 RX ADMIN — DULOXETINE HYDROCHLORIDE 30 MG: 30 CAPSULE, DELAYED RELEASE ORAL at 09:59

## 2021-12-03 RX ADMIN — OXYCODONE AND ACETAMINOPHEN 1 TABLET: 5; 325 TABLET ORAL at 19:55

## 2021-12-03 RX ADMIN — SODIUM CHLORIDE 100 ML/HR: 9 INJECTION, SOLUTION INTRAVENOUS at 13:19

## 2021-12-03 RX ADMIN — Medication 1 CAPSULE: at 09:59

## 2021-12-03 RX ADMIN — CASTOR OIL AND BALSAM, PERU 1 APPLICATION: 788; 87 OINTMENT TOPICAL at 19:57

## 2021-12-03 NOTE — CASE MANAGEMENT/SOCIAL WORK
Continued Stay Note  [facility]     Patient Name: Karely Villarreal  MRN: [id number]  Today's Date: 12/3/2021    Admit Date: 11/6/2021     Discharge Plan     Row Name 12/03/21 1509       Plan    Plan Comments MSW spoke with pt's mother by phone as she called MSW for an update. Pt was discharged by therapy services. Therapy recommending LTAC. Unsure if pt will qualify for LTAC and if unable to work with therapy again, may need long term care facility. MSW called Josy with [facility] to review pt to see if pt would be appropriate for LTAC when ready. MSW continues to follow for discharge needs.               Discharge Codes    No documentation.               Expected Discharge Date and Time     Expected Discharge Date Expected Discharge Time    Dec 7, 2021             ROSE MARY Ma

## 2021-12-03 NOTE — PLAN OF CARE
Problem: Adult Inpatient Plan of Care  Goal: Plan of Care Review  Outcome: Ongoing, Progressing   Goal Outcome Evaluation:  Patient slept majority of the shift. VSS. 2 LNC, patient c/o of x1 episode of SOA, breathing tx given. Hgb 7.0 this am. 1 unit of blood ordered. Currently awaiting type and screen. Blood consent signed. Crowley cath in place, blood tinged urine noted, APRN notified. Refused repositioning at times today but improving with compliance. Venous doppler done to LUE, results pending. PICC line in place, fluids ordered, patient has had very little appetite/intake of fluids today.

## 2021-12-03 NOTE — PROGRESS NOTES
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 10/26 and 11/3 were negative for vegetations.   2. Cervical spine epidural abscess with spinal cord compression-status post debridement surgery and laminectomy with MRSA from culture  3. T9-T10 vertebral osteomyelitis secondary to MRSA  4. Para-vertebral/paraspinal abscess at level of T9-T10 with extension to para-aorta  5. Loculated pleural effusion likely secondary from above- not enough fluid on 11/10 to do thoracentesis per IR  6. LUE edema, Check Venous Duplex to r/o DVT.  7. Acute right axilla abscess with spontaneous drainage. Cx MRSA  Adjacent right axilla nodule possible abscess.  8. Recent COVID-19 pneumonia.  Treated with Remdesivir and dexamethasone.    9. Acute hypoxic respiratory failure- improving  10. Leukocytosis/neutrophilia, improved  11. Elevated CRP, improved  12. Polysubstance abuse/UDS positive for meth/opiates.  Was self medicating at [facility] with Klonopin and oxycodone and went into respiratory depression.  13. SLE/discoid lupus/on  Plaquenil which has been held  14. Seizures/on antiepileptic med  15. H/o CVA from suspected cardioembolic event  16. H/o pyoderma gangrenosum  17. Essential hypertension  18. Hypothyroidism  19. Depression/anxiety  20. Medical noncompliance.  Refusing repeat imaging to evaluate pleural effusion.   21. Pyuria-urine culture finalized no growth  22. Bilateral Pleural Effusions  23. Diarrhea  24. Petechial rash in medial upper thighs.  Shearing injuring vs inflammation vs other.  Continue nystatin powder.    25. Probable Bacterial Pneumonia- Improved  26. Left arm swelling and pain- New. Given immobility of this arm, we need to rule out DVT    PLAN/RECOMMENDATIONS:     1. Follow CBC with differential, BMP, and vancomycin trough at least weekly  2. Continue Vancomycin for continued MRSA coverage.  Appreciate pharmacy's help with dosing and monitoring creatinine.  Would like trough between 15-20.  3. Left upper ext venous ultrasound to rule out DVT    She will need a very long course of IV antibiotics, likely at least 12 weeks given the extent of her spinal infection and high-grade bacteremia. Anticipate continuing her IV vancomycin at least until 2/7/22 for a 12 week course since her last surgery (was on 11/14/21). She is not a candidate for outpatient antibiotics with a PICC line. May be a good candidate for LTAC.    She seems to be clinically improving.  Needs to continue to work with physical therapy. I will intermittently check up on her.    Complex case with complex MDM      Jonn Mchugh MD  12/3/2021  18:56 EST

## 2021-12-03 NOTE — PROGRESS NOTES
[facility] Medicine Services  PROGRESS NOTE    Patient Name: Karely Villarreal  : 1965  MRN: [id number]    Date of Admission: 2021  Primary Care Physician: Tracie Levi APRN    Subjective   Subjective     CC:  Follow-up paravertebral abscess, spinal osteomyelitis    HPI:  Patient seen resting in bed in no apparent distress.  No acute events overnight per nursing.  She is more alert today but continues to feel very weak.  Reports that she is hungry and looking forward to breakfast.  Diarrhea has resolved.  Rectal tube has been removed.  Pain is currently well controlled.  She denies any additional complaints at this time.    ROS:  Gen- No fevers, chills  CV- No chest pain, palpitations  Resp- No cough, dyspnea  GI- No N/V/D, abd pain     Objective   Objective     Vital Signs:   Temp:  [98.4 °F (36.9 °C)-98.5 °F (36.9 °C)] 98.5 °F (36.9 °C)  Heart Rate:  [] 100  Resp:  [16-20] 16  BP: (132-149)/(81-89) 145/84  Flow (L/min):  [2] 2     Physical Exam:  Constitutional: No acute distress, awake, alert, chronically ill-appearing  HENT: NCAT, mucous membranes moist  Respiratory: Diminished in bases, respiratory effort normal on 2 L  Cardiovascular: RRR, no murmurs, cap refill brisk   Gastrointestinal: Positive bowel sounds, soft, nontender, nondistended, rectal tube removed  : dobbs in place   Musculoskeletal: 1+ BLE edema, 1+ LUE edema    Psychiatric: flat affect, cooperative  Neurologic: Oriented x 3, moves all extremities, speech clear  Skin: warm, dry, no visible rash     Results Reviewed:  LAB RESULTS:      Lab 21  0955 21  0723 21  0943 21  0939   WBC  --  7.23 8.11 9.79   HEMOGLOBIN  --  8.4* 8.2* 8.5*   HEMATOCRIT  --  29.1* 27.4* 28.6*   PLATELETS  --  143 181 201   MCV  --  92.4 89.5 89.4   CRP 4.78*  --   --   --          Lab 21  0953 21  0512 21  0955 21  0723 21  0943 21  0939 21  0939   SODIUM  139 139 141 141 140   < > 140   POTASSIUM 4.3 4.4 4.7 4.8 4.9   < > 4.2   CHLORIDE 92* 94* 97* 98 102   < > 100   CO2 39.0* 40.0* 38.0* 37.0* 34.0*   < > 30.0*   ANION GAP 8.0 5.0 6.0 6.0 4.0*   < > 10.0   BUN 21* 24* 28* 32* 31*   < > 35*   CREATININE 0.68 0.63 0.50* 0.49* 0.39*   < > 0.43*   GLUCOSE 94 93 176* 111* 87   < > 123*   CALCIUM 9.7 9.4 9.2 9.3 9.4   < > 9.2   IONIZED CALCIUM  --   --   --  1.36*  --   --  1.39*   MAGNESIUM  --   --  2.0 1.6  --   --  1.6   PHOSPHORUS  --   --  3.0 3.2  --   --  2.8    < > = values in this interval not displayed.         Lab 11/29/21  0955   TOTAL PROTEIN 6.1   ALBUMIN 2.50*   ALT (SGPT) 44*   AST (SGOT) 45*   BILIRUBIN 0.3   ALK PHOS 286*             Lab 11/29/21  0955   CHOLESTEROL 158   TRIGLYCERIDES 83             Brief Urine Lab Results  (Last result in the past 365 days)      Color   Clarity   Blood   Leuk Est   Nitrite   Protein   CREAT   Urine HCG        11/20/21 1625 Yellow   Clear   Small (1+)   Small (1+)   Negative   30 mg/dL (1+)                 Microbiology Results Abnormal     Procedure Component Value - Date/Time    Fungus Culture - Body Fluid, Spine, Thoracic [[id number]] Collected: 11/14/21 1511    Lab Status: Preliminary result Specimen: Body Fluid from Spine, Thoracic Updated: 11/28/21 1615     Fungus Culture No fungus isolated at 2 weeks    AFB Culture - Body Fluid, Spine, Thoracic [[id number]] Collected: 11/14/21 1511    Lab Status: Preliminary result Specimen: Body Fluid from Spine, Thoracic Updated: 11/28/21 1615     AFB Culture No AFB isolated at 2 weeks     AFB Stain No acid fast bacilli seen on concentrated smear    Fungus Culture - Tissue, Spine, Cervical [[id number]] Collected: 11/14/21 1450    Lab Status: Preliminary result Specimen: Tissue from Spine, Cervical Updated: 11/28/21 1600     Fungus Culture No fungus isolated at 2 weeks    AFB Culture - Tissue, Spine, Cervical [[id number]] Collected: 11/14/21 1450    Lab Status: Preliminary result  Specimen: Tissue from Spine, Cervical Updated: 11/28/21 1600     AFB Culture No AFB isolated at 2 weeks     AFB Stain No acid fast bacilli seen on concentrated smear    Blood Culture - Blood, Arm, Left [[id number]]  (Normal) Collected: 11/20/21 1630    Lab Status: Final result Specimen: Blood from Arm, Left Updated: 11/25/21 1700     Blood Culture No growth at 5 days    Narrative:      Aerobic bottle only      Urine Culture - Urine, Urine, Catheter [[id number]]  (Normal) Collected: 11/20/21 1625    Lab Status: Final result Specimen: Urine, Catheter Updated: 11/21/21 1523     Urine Culture No growth    Anaerobic Culture - Body Fluid, Spine, Thoracic [[id number]] Collected: 11/14/21 1511    Lab Status: Final result Specimen: Body Fluid from Spine, Thoracic Updated: 11/19/21 0700     Anaerobic Culture No anaerobes isolated at 5 days    Anaerobic Culture - Tissue, Spine, Cervical [[id number]] Collected: 11/14/21 1450    Lab Status: Final result Specimen: Tissue from Spine, Cervical Updated: 11/19/21 0700     Anaerobic Culture No anaerobes isolated at 5 days    Body Fluid Culture - Body Fluid, Spine, Thoracic [[id number]] Collected: 11/14/21 1511    Lab Status: Final result Specimen: Body Fluid from Spine, Thoracic Updated: 11/17/21 0933     Body Fluid Culture No growth at 3 days     Gram Stain No WBCs or organisms seen    Fungus Smear - Tissue, Spine, Cervical [[id number]] Collected: 11/14/21 1450    Lab Status: Final result Specimen: Tissue from Spine, Cervical Updated: 11/15/21 1552     Fungal Stain No fungal elements seen    Blood Culture - Blood, Arm, Right [[id number]]  (Normal) Collected: 11/07/21 1101    Lab Status: Final result Specimen: Blood from Arm, Right Updated: 11/12/21 1116     Blood Culture No growth at 5 days    Narrative:      AEROBIC BOTTLE ONLY    Blood Culture - Blood, Hand, Left [[id number]]  (Normal) Collected: 11/07/21 1101    Lab Status: Final result Specimen: Blood from Hand, Left Updated: 11/12/21  1115     Blood Culture No growth at 5 days    Urine Culture - Urine, Urine, Catheter [[id number]]  (Normal) Collected: 21 1621    Lab Status: Final result Specimen: Urine, Catheter Updated: 11/10/21 1829     Urine Culture No growth          No radiology results from the last 24 hrs    Results for orders placed during the hospital encounter of 10/24/21    Adult Transthoracic Echo Complete W/ Cont if Necessary Per Protocol    Interpretation Summary  · Left ventricular ejection fraction appears to be 61 - 65%. Left ventricular systolic function is normal.  · Left ventricular diastolic function was normal.  · Estimated right ventricular systolic pressure from tricuspid regurgitation is normal (<35 mmHg).  · No vegetations seen.  · This is a technically difficult study.      I have reviewed the medications:  Scheduled Meds:!Vancomycin Level Draw Needed, , Does not apply, Once  alteplase, 2 mg, Intravenous, Once  amLODIPine, 5 mg, Oral, Q24H  buPROPion SR, 150 mg, Oral, BID  castor oil-balsam peru, 1 application, Topical, Q12H  DULoxetine, 30 mg, Oral, QAM  furosemide, 40 mg, Oral, Daily  hydrALAZINE, 50 mg, Oral, Q8H  ipratropium-albuterol, 3 mL, Nebulization, 4x Daily - RT  lactobacillus acidophilus, 1 capsule, Oral, Daily  levETIRAcetam, 500 mg, Oral, Q12H  levothyroxine, 125 mcg, Oral, Q AM  multivitamin, 1 tablet, Oral, Daily  Nutrisource fiber, 2 packet, Oral, TID  nystatin, , Topical, Q8H  pantoprazole, 40 mg, Oral, Q AM  senna-docusate sodium, 1 tablet, Oral, Nightly  sodium chloride, 10 mL, Intravenous, Q12H  vancomycin, 1,500 mg, Intravenous, Q24H      Continuous Infusions:Pharmacy to dose vancomycin,       PRN Meds:.acetaminophen  •  cyclobenzaprine  •  hydrALAZINE  •  ipratropium-albuterol  •  LORazepam  •  magnesium sulfate **OR** magnesium sulfate **OR** magnesium sulfate  •  naloxone  •  [] HYDROmorphone **AND** naloxone  •  ondansetron **OR** ondansetron  •  oxyCODONE-acetaminophen  •  Pharmacy  to dose vancomycin  •  potassium & sodium phosphates **OR** potassium & sodium phosphates  •  potassium chloride  •  sodium chloride  •  sodium chloride    Assessment/Plan   Assessment & Plan     Active Hospital Problems    Diagnosis  POA   • **Spinal cord compression [G95.20]  Yes   • Severe malnutrition (CMS/HCC) [E43]  Yes   • Quadriparesis [G82.50]  No   • COVID-19 virus detected [U07.1]  Yes   • MRSA bacteremia [R78.81, B95.62]  Yes   • Hepatitis C [B19.20]  Yes   • Seizures on Keppra [R56.9]  Yes   • History of CVA [Z86.73]  Not Applicable   • Acute postoperative respiratory insufficiency [J95.89]  No   • Pleural effusion, right [J90]  Yes   • Osteomyelitis of thoracic vertebra [M46.24]  Yes   • 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        Brief Hospital Course to date:  Karely Villarreal is a 56 y.o. female h/o polysubstance abuse, HCV, HTN, Sz d/o, SLE, Hypothyroidism, CVA, prior COVID infection and homelessness.  Was admitted to [facility] on 10/24/21 for sepsis d/t MRSA and was found to have a paravertebral abscess and was transferred to [facility] on 11/6.  Surgery was deferred initially but she developed worsening UE weakness and MRI showed progressive cord compression so she was taken to the OR emergently for decompression on 11/14.  She was extubated 11/15 but TF's were started d/t poor PO.  She was transferred initially to telemetry on 11/8 but developed worsening hypoxia with AMS and was transferred back to the ICU on bipap on 11/20.  She was transferred back to the hospitalist service on 11/23     This patient's problems and plans were partially entered by my partner and updated as appropriate by me 12/03/21.     MRSA  bacteremia  Sepsis  T9/T10 Paravertebral Abscess, T9/T10 Osteomyelitis with spinal cord compression  --s/p decompression, cervical laminectomy and debridement of epidural abscess on 11/14  --on Vancomycin per ID, will need long course at least 12 weeks given the extent of her spinal infection and high grade bacteremia (starting at time of surgery on 11/14)  --repeat MRI C and T spine showed shows 2 fluid collections dorsally (one at C4 and the other more superficial at C6/C7) and T9/T10 still c/w vertebral osteomyelitis.  Re-evaluated by Dr. Jama who noted severe phlegmon circumferentially around cervical and upper thoracic area but no worsening compression, rec'd continue abx, no surgery needed at this time   -AM labs      Anemia  -Hgb 7.0 this AM  -Check fecal occult  -Type and screen  -Transfuse 1 unit PRBC for hgb <7  -H&H every 8 hours  -check iron panel, ferritin, b12, folate, reticulocyte count  -CBC in AM     Acute Respiratory failure  Right Pleural Effusion  Probable Bacterial PNA  --improved  --tolerating daily PO Lasix well   -- s/p 7 days of merrem and doxy per ID     Malnutrition  --d/c TF's and dobhoff 11/30/21,   --Increased appetite, patient now eating more       Diarrhea  --Rectal tube d/c'd   --Diarrhea improving  --Continue fiber supplement      Bilateral Knee Pain  --cold compresses.  Bilateral xrays showed large bilateral knee effusions, moderate osteoarthrosis worse in patellofemoral compartments    LUE Edema  -LUE venous duplex on 11/7 negative for evidence of acute DVT  -Elevate extremity      HX Seizure Disorder  --cont keppra      Recent COVID PNA     Acute Right Axilla abscess  --cx MRSA     Polysubstance abuse  --UDS positive for meth/opiates, self medicating at [facility] with klonopin and oxycodone and went into respiratory depression     SLE/Discoid lupus  --holding plaquenil due to severity of active infection     Hx CVA    DVT prophylaxis:  Mechanical DVT prophylaxis orders are present.      AM-PAC 6 Clicks Score (PT): 6 (12/02/21 4906)    Disposition: I expect the patient to be discharged TBD, DALLIN looking for placement options.    CODE STATUS:   Code Status and Medical Interventions:   Ordered at: 11/06/21 8371     Code Status (Patient has no pulse and is not breathing):    CPR (Attempt to Resuscitate)     Medical Interventions (Patient has pulse or is breathing):    Full Support       MP Grimes  12/03/21

## 2021-12-03 NOTE — PLAN OF CARE
Problem: Adult Inpatient Plan of Care  Goal: Plan of Care Review  Outcome: Ongoing, Progressing  Flowsheets (Taken 12/3/2021 0437)  Progress: improving  Plan of Care Reviewed With: patient  Goal: Patient-Specific Goal (Individualized)  Outcome: Ongoing, Progressing  Goal: Absence of Hospital-Acquired Illness or Injury  Outcome: Ongoing, Progressing  Intervention: Identify and Manage Fall Risk  Recent Flowsheet Documentation  Taken 12/3/2021 0400 by Cheyanne Palomares RN  Safety Promotion/Fall Prevention:   activity supervised   assistive device/personal items within reach   safety round/check completed  Taken 12/3/2021 0200 by Cheyanne Palomares RN  Safety Promotion/Fall Prevention:   activity supervised   assistive device/personal items within reach   safety round/check completed  Taken 12/3/2021 0000 by Cheyanne Palomares RN  Safety Promotion/Fall Prevention:   activity supervised   assistive device/personal items within reach   safety round/check completed  Taken 12/2/2021 2200 by Cheyanne Palomares RN  Safety Promotion/Fall Prevention:   activity supervised   assistive device/personal items within reach   safety round/check completed  Taken 12/2/2021 2000 by Cheyanne Palomares RN  Safety Promotion/Fall Prevention:   activity supervised   assistive device/personal items within reach   clutter free environment maintained   fall prevention program maintained   lighting adjusted   muscle strengthening facilitated   nonskid shoes/slippers when out of bed   safety round/check completed   room organization consistent  Intervention: Prevent Skin Injury  Recent Flowsheet Documentation  Taken 12/3/2021 0400 by Cheyanne Palomares RN  Body Position: position maintained  Taken 12/3/2021 0200 by Cheyanne Palomares RN  Body Position: position maintained  Taken 12/3/2021 0000 by Cheyanne Palomares RN  Body Position: position maintained  Taken 12/2/2021 2200 by Cheyanne Palomares RN  Body Position: position maintained  Taken 12/2/2021 2000 by  Cheyanne Palomares RN  Body Position: supine  Skin Protection:   incontinence pads utilized   adhesive use limited  Intervention: Prevent and Manage VTE (venous thromboembolism) Risk  Recent Flowsheet Documentation  Taken 12/3/2021 0400 by Cheyanne Palomares RN  VTE Prevention/Management: patient refused intervention  Taken 12/3/2021 0200 by Cheyanne Palomares RN  VTE Prevention/Management: patient refused intervention  Taken 12/3/2021 0000 by Cheyanne Palomares RN  VTE Prevention/Management: patient refused intervention  Taken 12/2/2021 2200 by Cheyanne Palomares RN  VTE Prevention/Management: patient refused intervention  Taken 12/2/2021 2000 by Cheyanne Palomares RN  VTE Prevention/Management: patient refused intervention  Intervention: Prevent Infection  Recent Flowsheet Documentation  Taken 12/3/2021 0400 by Cheyanne Palomares RN  Infection Prevention:   hand hygiene promoted   rest/sleep promoted  Taken 12/3/2021 0200 by Cheyanne Palomares RN  Infection Prevention:   hand hygiene promoted   rest/sleep promoted  Taken 12/3/2021 0000 by Cheyanne Palomares RN  Infection Prevention:   hand hygiene promoted   rest/sleep promoted  Taken 12/2/2021 2200 by Cheyanne Palomares RN  Infection Prevention:   hand hygiene promoted   rest/sleep promoted  Taken 12/2/2021 2000 by Cheyanne Palomares RN  Infection Prevention:   hand hygiene promoted   rest/sleep promoted  Goal: Optimal Comfort and Wellbeing  Outcome: Ongoing, Progressing  Intervention: Provide Person-Centered Care  Recent Flowsheet Documentation  Taken 12/3/2021 0400 by Cheyanne Palomares RN  Trust Relationship/Rapport: care explained  Taken 12/3/2021 0200 by Cheyanne Palomares RN  Trust Relationship/Rapport: care explained  Taken 12/3/2021 0000 by Cheyanne Palomares RN  Trust Relationship/Rapport: care explained  Taken 12/2/2021 2000 by Cheyanne Palomares RN  Trust Relationship/Rapport:   care explained   choices provided   questions answered   questions encouraged   reassurance  provided  Goal: Readiness for Transition of Care  Outcome: Ongoing, Progressing     Problem: Hypertension Comorbidity  Goal: Blood Pressure in Desired Range  Outcome: Ongoing, Progressing  Intervention: Maintain Hypertension-Management Strategies  Recent Flowsheet Documentation  Taken 12/2/2021 2000 by Cheyanne Palomares RN  Medication Review/Management: medications reviewed     Problem: Pain Chronic (Persistent) (Comorbidity Management)  Goal: Acceptable Pain Control and Functional Ability  Outcome: Ongoing, Progressing  Intervention: Develop Pain Management Plan  Recent Flowsheet Documentation  Taken 12/2/2021 2000 by Cheyanne Palomares RN  Pain Management Interventions: see MAR  Intervention: Manage Persistent Pain  Recent Flowsheet Documentation  Taken 12/2/2021 2000 by Cheyanne Palomarse RN  Medication Review/Management: medications reviewed  Intervention: Optimize Psychosocial Wellbeing  Recent Flowsheet Documentation  Taken 12/2/2021 2000 by Cheyanne Palomares RN  Diversional Activities: television  Family/Support System Care:   self-care encouraged   support provided     Problem: Fall Injury Risk  Goal: Absence of Fall and Fall-Related Injury  Outcome: Ongoing, Progressing  Intervention: Identify and Manage Contributors to Fall Injury Risk  Recent Flowsheet Documentation  Taken 12/3/2021 0400 by Cheyanne Palomares RN  Self-Care Promotion: independence encouraged  Taken 12/3/2021 0200 by Cheyanne Palomares RN  Self-Care Promotion: independence encouraged  Taken 12/3/2021 0000 by Cheyanne Palomares RN  Self-Care Promotion: independence encouraged  Taken 12/2/2021 2200 by Cheyanne Palomares RN  Self-Care Promotion: independence encouraged  Taken 12/2/2021 2000 by Cheyanne Palomares RN  Medication Review/Management: medications reviewed  Self-Care Promotion: independence encouraged  Intervention: Promote Injury-Free Environment  Recent Flowsheet Documentation  Taken 12/3/2021 0400 by Cheyanne Palomares RN  Safety Promotion/Fall  Prevention:   activity supervised   assistive device/personal items within reach   safety round/check completed  Taken 12/3/2021 0200 by Cheyanne Palomares RN  Safety Promotion/Fall Prevention:   activity supervised   assistive device/personal items within reach   safety round/check completed  Taken 12/3/2021 0000 by Cheyanne Palomares RN  Safety Promotion/Fall Prevention:   activity supervised   assistive device/personal items within reach   safety round/check completed  Taken 12/2/2021 2200 by Cheyanne Palomares RN  Safety Promotion/Fall Prevention:   activity supervised   assistive device/personal items within reach   safety round/check completed  Taken 12/2/2021 2000 by Cheyanne Palomares RN  Safety Promotion/Fall Prevention:   activity supervised   assistive device/personal items within reach   clutter free environment maintained   fall prevention program maintained   lighting adjusted   muscle strengthening facilitated   nonskid shoes/slippers when out of bed   safety round/check completed   room organization consistent     Problem: Fluid Imbalance (Pneumonia)  Goal: Fluid Balance  Outcome: Ongoing, Progressing     Problem: Infection (Pneumonia)  Goal: Resolution of Infection Signs and Symptoms  Outcome: Ongoing, Progressing  Intervention: Prevent Infection Progression  Recent Flowsheet Documentation  Taken 12/2/2021 2000 by Cheyanne Palomares RN  Infection Management: aseptic technique maintained     Problem: Respiratory Compromise (Pneumonia)  Goal: Effective Oxygenation and Ventilation  Outcome: Ongoing, Progressing  Intervention: Promote Airway Secretion Clearance  Recent Flowsheet Documentation  Taken 12/3/2021 0400 by Cheyanne Palomares RN  Cough And Deep Breathing: done independently per patient  Taken 12/3/2021 0200 by Cheyanne Palomares RN  Cough And Deep Breathing: done independently per patient  Taken 12/3/2021 0000 by Cheyanne Palomares RN  Cough And Deep Breathing: done independently per patient  Taken 12/2/2021  2200 by Cheyanne Palomares RN  Cough And Deep Breathing: done independently per patient  Taken 12/2/2021 2000 by Cheyanne Palomares RN  Cough And Deep Breathing: done independently per patient  Intervention: Optimize Oxygenation and Ventilation  Recent Flowsheet Documentation  Taken 12/3/2021 0400 by Cheyanne Palomares RN  Head of Bed (HOB): HOB elevated  Taken 12/3/2021 0200 by Cheyanne Palomares RN  Head of Bed (HOB): HOB elevated  Taken 12/3/2021 0000 by Cheyanne Palomares RN  Head of Bed (HOB): HOB elevated  Taken 12/2/2021 2200 by Cheyanne Palomares RN  Head of Bed (HOB): HOB elevated  Taken 12/2/2021 2000 by Cheyanne Palomares RN  Head of Bed (HOB): HOB elevated     Problem: Social Isolation  Goal: Increased Social Interaction  Outcome: Ongoing, Progressing     Problem: Skin Injury Risk Increased  Goal: Skin Health and Integrity  Outcome: Ongoing, Progressing  Intervention: Optimize Skin Protection  Recent Flowsheet Documentation  Taken 12/3/2021 0400 by Cheyanne Palomares RN  Head of Bed (HOB): HOB elevated  Taken 12/3/2021 0200 by Cheyanne Palomares RN  Head of Bed (HOB): HOB elevated  Taken 12/3/2021 0000 by Cheyanne Palomares RN  Head of Bed (HOB): HOB elevated  Taken 12/2/2021 2200 by Cheyanne Palomares RN  Head of Bed (HOB): HOB elevated  Taken 12/2/2021 2000 by Cheyanne Palomares RN  Pressure Reduction Techniques: weight shift assistance provided  Head of Bed (HOB): HOB elevated  Pressure Reduction Devices: specialty bed utilized  Skin Protection:   incontinence pads utilized   adhesive use limited     Problem: Bleeding (Spinal Surgery)  Goal: Absence of Bleeding  Outcome: Ongoing, Progressing  Intervention: Monitor and Manage Bleeding  Recent Flowsheet Documentation  Taken 12/2/2021 2000 by Cheyanne Palomares RN  Bleeding Management: dressing monitored     Problem: Bowel Elimination Impaired (Spinal Surgery)  Goal: Effective Bowel Elimination  Outcome: Ongoing, Progressing     Problem: Functional Ability Impaired (Spinal  Surgery)  Goal: Optimal Functional Ability  Outcome: Ongoing, Progressing  Intervention: Optimize Functional Status  Recent Flowsheet Documentation  Taken 12/3/2021 0400 by Cheyanne Palomares RN  Activity Management: activity adjusted per tolerance  Positioning/Transfer Devices:   pillows   in use  Self-Care Promotion: independence encouraged  Taken 12/3/2021 0200 by Cheyanne Palomares RN  Activity Management: activity adjusted per tolerance  Positioning/Transfer Devices:   pillows   in use  Self-Care Promotion: independence encouraged  Taken 12/3/2021 0000 by Cheyanne Palomares RN  Activity Management: activity adjusted per tolerance  Positioning/Transfer Devices:   pillows   in use  Self-Care Promotion: independence encouraged  Taken 12/2/2021 2200 by Cheyanne Palomares RN  Activity Management: activity adjusted per tolerance  Positioning/Transfer Devices:   pillows   in use  Self-Care Promotion: independence encouraged  Taken 12/2/2021 2000 by Cheyanne Palomares RN  Activity Management: activity adjusted per tolerance  Positioning/Transfer Devices:   pillows   in use  Self-Care Promotion: independence encouraged     Problem: Infection (Spinal Surgery)  Goal: Absence of Infection Signs and Symptoms  Outcome: Ongoing, Progressing  Intervention: Prevent or Manage Infection  Recent Flowsheet Documentation  Taken 12/2/2021 2000 by Cheyanne Palomares RN  Infection Management: aseptic technique maintained     Problem: Neurologic Impairment (Spinal Surgery)  Goal: Optimal Neurologic Function  Outcome: Ongoing, Progressing  Intervention: Optimize Neurologic Function  Recent Flowsheet Documentation  Taken 12/3/2021 0400 by Cheyanne Palomares RN  Body Position: position maintained  Taken 12/3/2021 0200 by Cheyanne Palomares RN  Body Position: position maintained  Taken 12/3/2021 0000 by Cheyanne Palomares RN  Body Position: position maintained  Taken 12/2/2021 2200 by Cheyanne Palomares RN  Body Position: position maintained  Taken 12/2/2021  2000 by Cheyanne Palomares RN  Body Position: supine  Pressure Reduction Devices: specialty bed utilized  Range of Motion: active ROM (range of motion) encouraged  Intervention: Optimize Eating and Swallowing  Recent Flowsheet Documentation  Taken 12/2/2021 2000 by Cheyanne Palomares RN  Aspiration Precautions: awake/alert before oral intake     Problem: Ongoing Anesthesia Effects (Spinal Surgery)  Goal: Anesthesia/Sedation Recovery  Outcome: Ongoing, Progressing  Intervention: Optimize Anesthesia Recovery  Recent Flowsheet Documentation  Taken 12/3/2021 0400 by Cheyanne Palomares RN  Safety Promotion/Fall Prevention:   activity supervised   assistive device/personal items within reach   safety round/check completed  Taken 12/3/2021 0200 by Cheyanne Palomares RN  Safety Promotion/Fall Prevention:   activity supervised   assistive device/personal items within reach   safety round/check completed  Taken 12/3/2021 0000 by Cheyanne Palomares RN  Safety Promotion/Fall Prevention:   activity supervised   assistive device/personal items within reach   safety round/check completed  Taken 12/2/2021 2200 by Cheyanne Palomares RN  Safety Promotion/Fall Prevention:   activity supervised   assistive device/personal items within reach   safety round/check completed  Taken 12/2/2021 2000 by Cheyanne Palomares RN  Patient Tolerance (IS): fair  Safety Promotion/Fall Prevention:   activity supervised   assistive device/personal items within reach   clutter free environment maintained   fall prevention program maintained   lighting adjusted   muscle strengthening facilitated   nonskid shoes/slippers when out of bed   safety round/check completed   room organization consistent  Administration (IS): instruction provided, follow-up     Problem: Pain (Spinal Surgery)  Goal: Acceptable Pain Control  Outcome: Ongoing, Progressing  Intervention: Prevent or Manage Pain  Recent Flowsheet Documentation  Taken 12/2/2021 2000 by Cheyanne Palomares RN  Pain  Management Interventions: see MAR     Problem: Postoperative Nausea and Vomiting (Spinal Surgery)  Goal: Nausea and Vomiting Relief  Outcome: Ongoing, Progressing     Problem: Postoperative Urinary Retention (Spinal Surgery)  Goal: Effective Urinary Elimination  Outcome: Ongoing, Progressing     Problem: Activity and Energy Impairment (Anxiety Signs/Symptoms)  Goal: Optimized Energy Level (Anxiety Signs/Symptoms)  Outcome: Ongoing, Progressing     Problem: Cognitive Impairment (Anxiety Signs/Symptoms)  Goal: Optimized Cognitive Function (Anxiety Signs/Symptoms)  Outcome: Ongoing, Progressing     Problem: Mood Impairment (Anxiety Signs/Symptoms)  Goal: Improved Mood Symptoms (Anxiety Signs/Symptoms)  Outcome: Ongoing, Progressing     Problem: Sleep Disturbance (Anxiety Signs/Symptoms)  Goal: Improved Sleep (Anxiety Signs/Symptoms)  Outcome: Ongoing, Progressing     Problem: Social, Occupational or Functional Impairment (Anxiety Signs/Symptoms)  Goal: Enhanced Social, Occupational or Functional Skills (Anxiety Signs/Symptoms)  Outcome: Ongoing, Progressing  Intervention: Promote Social, Occupational and Functional Ability  Recent Flowsheet Documentation  Taken 12/3/2021 0400 by Cheyanne Palomares RN  Trust Relationship/Rapport: care explained  Taken 12/3/2021 0200 by Cheyanne Palomares RN  Trust Relationship/Rapport: care explained  Taken 12/3/2021 0000 by Cheyanne Palomares RN  Trust Relationship/Rapport: care explained  Taken 12/2/2021 2000 by Cheyanne Palomares RN  Trust Relationship/Rapport:   care explained   choices provided   questions answered   questions encouraged   reassurance provided     Problem: Somatic Disturbance (Anxiety Signs/Symptoms)  Goal: Improved Somatic Symptoms (Anxiety Signs/Symptoms)  Outcome: Ongoing, Progressing   Goal Outcome Evaluation:  Plan of Care Reviewed With: patient      Pt currently in specialty bed resting quietly. Complaints of pain overall throughout shift. Pt also had frequent  complaints of anxiousness. Rectal tube removed yesterday, dobbs remains. Dobbs irrigated during shift to improve output. Vaginal discharge noted this shift. Edema improved throughout. Sacrum area with barrier cream applied. Pt turned q2hr in the bed. Vitals WNL on 2LNC. SKUDS refused. No other observations at this time. Will continue to monitor, call bell in reach.  Progress: improving

## 2021-12-04 LAB
ALBUMIN SERPL-MCNC: 2.5 G/DL (ref 3.5–5.2)
ANION GAP SERPL CALCULATED.3IONS-SCNC: 8 MMOL/L (ref 5–15)
BACTERIA SPEC AEROBE CULT: NORMAL
BASOPHILS # BLD AUTO: 0.05 10*3/MM3 (ref 0–0.2)
BASOPHILS NFR BLD AUTO: 0.7 % (ref 0–1.5)
BH BB BLOOD EXPIRATION DATE: NORMAL
BH BB BLOOD TYPE BARCODE: 9500
BH BB DISPENSE STATUS: NORMAL
BH BB PRODUCT CODE: NORMAL
BH BB UNIT NUMBER: NORMAL
BH CV UPPER VENOUS LEFT AXILLARY AUGMENT: NORMAL
BH CV UPPER VENOUS LEFT AXILLARY COMPRESS: NORMAL
BH CV UPPER VENOUS LEFT AXILLARY PHASIC: NORMAL
BH CV UPPER VENOUS LEFT AXILLARY SPONT: NORMAL
BH CV UPPER VENOUS LEFT BASILIC FOREARM COMPRESS: NORMAL
BH CV UPPER VENOUS LEFT BASILIC UPPER COMPRESS: NORMAL
BH CV UPPER VENOUS LEFT BRACHIAL AUGMENT: NORMAL
BH CV UPPER VENOUS LEFT BRACHIAL COMPRESS: NORMAL
BH CV UPPER VENOUS LEFT BRACHIAL PHASIC: NORMAL
BH CV UPPER VENOUS LEFT BRACHIAL SPONT: NORMAL
BH CV UPPER VENOUS LEFT CEPHALIC FOREARM COMPRESS: NORMAL
BH CV UPPER VENOUS LEFT CEPHALIC UPPER COMPRESS: NORMAL
BH CV UPPER VENOUS LEFT INTERNAL JUGULAR AUGMENT: NORMAL
BH CV UPPER VENOUS LEFT INTERNAL JUGULAR COMPRESS: NORMAL
BH CV UPPER VENOUS LEFT INTERNAL JUGULAR PHASIC: NORMAL
BH CV UPPER VENOUS LEFT INTERNAL JUGULAR SPONT: NORMAL
BH CV UPPER VENOUS LEFT RADIAL COMPRESS: NORMAL
BH CV UPPER VENOUS LEFT SUBCLAVIAN AUGMENT: NORMAL
BH CV UPPER VENOUS LEFT SUBCLAVIAN COMPRESS: NORMAL
BH CV UPPER VENOUS LEFT SUBCLAVIAN PHASIC: NORMAL
BH CV UPPER VENOUS LEFT SUBCLAVIAN SPONT: NORMAL
BH CV UPPER VENOUS LEFT ULNAR COMPRESS: NORMAL
BH CV UPPER VENOUS RIGHT SUBCLAVIAN AUGMENT: NORMAL
BH CV UPPER VENOUS RIGHT SUBCLAVIAN COMPRESS: NORMAL
BH CV UPPER VENOUS RIGHT SUBCLAVIAN PHASIC: NORMAL
BH CV UPPER VENOUS RIGHT SUBCLAVIAN SPONT: NORMAL
BUN SERPL-MCNC: 20 MG/DL (ref 6–20)
BUN/CREAT SERPL: 25.3 (ref 7–25)
CALCIUM SPEC-SCNC: 9.8 MG/DL (ref 8.6–10.5)
CHLORIDE SERPL-SCNC: 95 MMOL/L (ref 98–107)
CO2 SERPL-SCNC: 38 MMOL/L (ref 22–29)
CREAT SERPL-MCNC: 0.79 MG/DL (ref 0.57–1)
CROSSMATCH INTERPRETATION: NORMAL
DEPRECATED RDW RBC AUTO: 53.1 FL (ref 37–54)
EOSINOPHIL # BLD AUTO: 0.06 10*3/MM3 (ref 0–0.4)
EOSINOPHIL NFR BLD AUTO: 0.8 % (ref 0.3–6.2)
ERYTHROCYTE [DISTWIDTH] IN BLOOD BY AUTOMATED COUNT: 16.2 % (ref 12.3–15.4)
FERRITIN SERPL-MCNC: 526.9 NG/ML (ref 13–150)
FOLATE SERPL-MCNC: 15 NG/ML (ref 4.78–24.2)
GFR SERPL CREATININE-BSD FRML MDRD: 76 ML/MIN/1.73
GLUCOSE SERPL-MCNC: 118 MG/DL (ref 65–99)
HCT VFR BLD AUTO: 27.2 % (ref 34–46.6)
HCT VFR BLD AUTO: 27.4 % (ref 34–46.6)
HGB BLD-MCNC: 8.3 G/DL (ref 12–15.9)
HGB BLD-MCNC: 8.3 G/DL (ref 12–15.9)
IMM GRANULOCYTES # BLD AUTO: 0.04 10*3/MM3 (ref 0–0.05)
IMM GRANULOCYTES NFR BLD AUTO: 0.6 % (ref 0–0.5)
IRON 24H UR-MRATE: 19 MCG/DL (ref 37–145)
IRON SATN MFR SERPL: 7 % (ref 20–50)
LYMPHOCYTES # BLD AUTO: 0.74 10*3/MM3 (ref 0.7–3.1)
LYMPHOCYTES NFR BLD AUTO: 10.3 % (ref 19.6–45.3)
MAXIMAL PREDICTED HEART RATE: 165 BPM
MCH RBC QN AUTO: 27.4 PG (ref 26.6–33)
MCHC RBC AUTO-ENTMCNC: 30.3 G/DL (ref 31.5–35.7)
MCV RBC AUTO: 90.4 FL (ref 79–97)
MONOCYTES # BLD AUTO: 0.42 10*3/MM3 (ref 0.1–0.9)
MONOCYTES NFR BLD AUTO: 5.9 % (ref 5–12)
NEUTROPHILS NFR BLD AUTO: 5.84 10*3/MM3 (ref 1.7–7)
NEUTROPHILS NFR BLD AUTO: 81.7 % (ref 42.7–76)
NRBC BLD AUTO-RTO: 0 /100 WBC (ref 0–0.2)
PHOSPHATE SERPL-MCNC: 4.2 MG/DL (ref 2.5–4.5)
PLATELET # BLD AUTO: 191 10*3/MM3 (ref 140–450)
PMV BLD AUTO: 8.5 FL (ref 6–12)
POTASSIUM SERPL-SCNC: 3.8 MMOL/L (ref 3.5–5.2)
RBC # BLD AUTO: 3.03 10*6/MM3 (ref 3.77–5.28)
RETICS # AUTO: 0.07 10*6/MM3 (ref 0.02–0.13)
RETICS/RBC NFR AUTO: 2.29 % (ref 0.7–1.9)
SODIUM SERPL-SCNC: 141 MMOL/L (ref 136–145)
STRESS TARGET HR: 140 BPM
TIBC SERPL-MCNC: 259 MCG/DL (ref 298–536)
TRANSFERRIN SERPL-MCNC: 174 MG/DL (ref 200–360)
UNIT  ABO: NORMAL
UNIT  RH: NORMAL
VANCOMYCIN TROUGH SERPL-MCNC: 23.3 MCG/ML (ref 5–20)
VIT B12 BLD-MCNC: 1322 PG/ML (ref 211–946)
WBC NRBC COR # BLD: 7.15 10*3/MM3 (ref 3.4–10.8)

## 2021-12-04 PROCEDURE — 99232 SBSQ HOSP IP/OBS MODERATE 35: CPT | Performed by: NURSE PRACTITIONER

## 2021-12-04 PROCEDURE — 25010000002 NA FERRIC GLUC CPLX PER 12.5 MG: Performed by: NURSE PRACTITIONER

## 2021-12-04 PROCEDURE — 82746 ASSAY OF FOLIC ACID SERUM: CPT | Performed by: NURSE PRACTITIONER

## 2021-12-04 PROCEDURE — 80069 RENAL FUNCTION PANEL: CPT | Performed by: INTERNAL MEDICINE

## 2021-12-04 PROCEDURE — 80202 ASSAY OF VANCOMYCIN: CPT

## 2021-12-04 PROCEDURE — 83540 ASSAY OF IRON: CPT | Performed by: NURSE PRACTITIONER

## 2021-12-04 PROCEDURE — 85025 COMPLETE CBC W/AUTO DIFF WBC: CPT | Performed by: INTERNAL MEDICINE

## 2021-12-04 PROCEDURE — 82607 VITAMIN B-12: CPT | Performed by: NURSE PRACTITIONER

## 2021-12-04 PROCEDURE — 85045 AUTOMATED RETICULOCYTE COUNT: CPT | Performed by: NURSE PRACTITIONER

## 2021-12-04 PROCEDURE — 85014 HEMATOCRIT: CPT | Performed by: NURSE PRACTITIONER

## 2021-12-04 PROCEDURE — 85018 HEMOGLOBIN: CPT | Performed by: NURSE PRACTITIONER

## 2021-12-04 PROCEDURE — 82728 ASSAY OF FERRITIN: CPT | Performed by: NURSE PRACTITIONER

## 2021-12-04 PROCEDURE — 84466 ASSAY OF TRANSFERRIN: CPT | Performed by: NURSE PRACTITIONER

## 2021-12-04 RX ORDER — VANCOMYCIN HYDROCHLORIDE 1 G/200ML
1000 INJECTION, SOLUTION INTRAVENOUS EVERY 24 HOURS
Status: DISCONTINUED | OUTPATIENT
Start: 2021-12-05 | End: 2021-12-06

## 2021-12-04 RX ORDER — CHOLECALCIFEROL (VITAMIN D3) 125 MCG
5 CAPSULE ORAL NIGHTLY
Status: DISCONTINUED | OUTPATIENT
Start: 2021-12-04 | End: 2021-12-11 | Stop reason: HOSPADM

## 2021-12-04 RX ADMIN — NYSTATIN 1 APPLICATION: 100000 POWDER TOPICAL at 13:59

## 2021-12-04 RX ADMIN — Medication 1 CAPSULE: at 09:27

## 2021-12-04 RX ADMIN — Medication 5 MG: at 20:01

## 2021-12-04 RX ADMIN — OXYCODONE AND ACETAMINOPHEN 1 TABLET: 5; 325 TABLET ORAL at 03:14

## 2021-12-04 RX ADMIN — Medication 2 PACKET: at 20:02

## 2021-12-04 RX ADMIN — MULTIVITAMIN TABLET 1 TABLET: TABLET at 09:27

## 2021-12-04 RX ADMIN — NYSTATIN: 100000 POWDER TOPICAL at 05:22

## 2021-12-04 RX ADMIN — LORAZEPAM 1 MG: 1 TABLET ORAL at 20:01

## 2021-12-04 RX ADMIN — HYDRALAZINE HYDROCHLORIDE 50 MG: 50 TABLET, FILM COATED ORAL at 13:59

## 2021-12-04 RX ADMIN — FUROSEMIDE 40 MG: 40 TABLET ORAL at 09:27

## 2021-12-04 RX ADMIN — SENNOSIDES AND DOCUSATE SODIUM 1 TABLET: 50; 8.6 TABLET ORAL at 20:01

## 2021-12-04 RX ADMIN — OXYCODONE AND ACETAMINOPHEN 1 TABLET: 5; 325 TABLET ORAL at 14:37

## 2021-12-04 RX ADMIN — SODIUM CHLORIDE, PRESERVATIVE FREE 10 ML: 5 INJECTION INTRAVENOUS at 20:01

## 2021-12-04 RX ADMIN — NYSTATIN: 100000 POWDER TOPICAL at 20:02

## 2021-12-04 RX ADMIN — SODIUM CHLORIDE 125 MG: 9 INJECTION, SOLUTION INTRAVENOUS at 14:37

## 2021-12-04 RX ADMIN — Medication 2 PACKET: at 17:43

## 2021-12-04 RX ADMIN — AMLODIPINE BESYLATE 5 MG: 5 TABLET ORAL at 09:27

## 2021-12-04 RX ADMIN — PANTOPRAZOLE SODIUM 40 MG: 40 TABLET, DELAYED RELEASE ORAL at 05:22

## 2021-12-04 RX ADMIN — DULOXETINE HYDROCHLORIDE 30 MG: 30 CAPSULE, DELAYED RELEASE ORAL at 09:27

## 2021-12-04 RX ADMIN — HYDRALAZINE HYDROCHLORIDE 50 MG: 50 TABLET, FILM COATED ORAL at 05:22

## 2021-12-04 RX ADMIN — LEVETIRACETAM 500 MG: 500 TABLET, FILM COATED ORAL at 20:01

## 2021-12-04 RX ADMIN — LEVETIRACETAM 500 MG: 500 TABLET, FILM COATED ORAL at 09:27

## 2021-12-04 RX ADMIN — SODIUM CHLORIDE, PRESERVATIVE FREE 10 ML: 5 INJECTION INTRAVENOUS at 11:25

## 2021-12-04 RX ADMIN — LEVOTHYROXINE SODIUM 125 MCG: 125 TABLET ORAL at 05:22

## 2021-12-04 RX ADMIN — LORAZEPAM 1 MG: 1 TABLET ORAL at 05:22

## 2021-12-04 RX ADMIN — BUPROPION HYDROCHLORIDE 150 MG: 150 TABLET, EXTENDED RELEASE ORAL at 09:27

## 2021-12-04 RX ADMIN — CASTOR OIL AND BALSAM, PERU 1 APPLICATION: 788; 87 OINTMENT TOPICAL at 20:02

## 2021-12-04 RX ADMIN — BUPROPION HYDROCHLORIDE 150 MG: 150 TABLET, EXTENDED RELEASE ORAL at 20:01

## 2021-12-04 RX ADMIN — CYCLOBENZAPRINE 5 MG: 10 TABLET, FILM COATED ORAL at 17:53

## 2021-12-04 RX ADMIN — CASTOR OIL AND BALSAM, PERU 1 APPLICATION: 788; 87 OINTMENT TOPICAL at 11:25

## 2021-12-04 RX ADMIN — HYDRALAZINE HYDROCHLORIDE 50 MG: 50 TABLET, FILM COATED ORAL at 20:01

## 2021-12-04 NOTE — PLAN OF CARE
Problem: Adult Inpatient Plan of Care  Goal: Plan of Care Review  Outcome: Ongoing, Progressing  Flowsheets (Taken 12/4/2021 0625)  Progress: no change  Plan of Care Reviewed With: patient  Goal: Patient-Specific Goal (Individualized)  Outcome: Ongoing, Progressing  Goal: Absence of Hospital-Acquired Illness or Injury  Outcome: Ongoing, Progressing  Intervention: Identify and Manage Fall Risk  Recent Flowsheet Documentation  Taken 12/4/2021 0400 by Cheyanne Palomares RN  Safety Promotion/Fall Prevention:   activity supervised   assistive device/personal items within reach   safety round/check completed  Taken 12/4/2021 0200 by Cheyanne Palomares RN  Safety Promotion/Fall Prevention:   activity supervised   assistive device/personal items within reach   safety round/check completed  Taken 12/4/2021 0000 by Cheyanne Palomares RN  Safety Promotion/Fall Prevention:   activity supervised   assistive device/personal items within reach   safety round/check completed  Intervention: Prevent Skin Injury  Recent Flowsheet Documentation  Taken 12/4/2021 0400 by Cheyanne Palomares RN  Body Position: position maintained  Taken 12/4/2021 0200 by Cheyanne Palomares RN  Body Position: position maintained  Taken 12/4/2021 0000 by Cheyanne Palomares RN  Body Position: position maintained  Intervention: Prevent and Manage VTE (venous thromboembolism) Risk  Recent Flowsheet Documentation  Taken 12/4/2021 0400 by Cheyanne Palomares RN  VTE Prevention/Management: patient refused intervention  Taken 12/4/2021 0200 by Cheyanne Palomares RN  VTE Prevention/Management: patient refused intervention  Taken 12/4/2021 0000 by Cheyanne Palomares RN  VTE Prevention/Management:   bilateral   dorsiflexion/plantar flexion performed  Intervention: Prevent Infection  Recent Flowsheet Documentation  Taken 12/4/2021 0400 by Cheyanne Palomares RN  Infection Prevention:   hand hygiene promoted   rest/sleep promoted  Taken 12/4/2021 0200 by Cheyanne Palomares RN  Infection  Prevention:   hand hygiene promoted   rest/sleep promoted  Taken 12/4/2021 0000 by Cheyanne Palomares RN  Infection Prevention:   hand hygiene promoted   rest/sleep promoted  Goal: Optimal Comfort and Wellbeing  Outcome: Ongoing, Progressing  Intervention: Provide Person-Centered Care  Recent Flowsheet Documentation  Taken 12/4/2021 0400 by Cheyanne Palomares RN  Trust Relationship/Rapport: care explained  Taken 12/4/2021 0200 by Cheyanne Palomares RN  Trust Relationship/Rapport: care explained  Goal: Readiness for Transition of Care  Outcome: Ongoing, Progressing     Problem: Hypertension Comorbidity  Goal: Blood Pressure in Desired Range  Outcome: Ongoing, Progressing     Problem: Pain Chronic (Persistent) (Comorbidity Management)  Goal: Acceptable Pain Control and Functional Ability  Outcome: Ongoing, Progressing  Intervention: Develop Pain Management Plan  Recent Flowsheet Documentation  Taken 12/4/2021 0200 by Cheyanne Palomares RN  Pain Management Interventions: see MAR  Taken 12/4/2021 0000 by Cheyanne Palomares RN  Pain Management Interventions: quiet environment facilitated  Intervention: Optimize Psychosocial Wellbeing  Recent Flowsheet Documentation  Taken 12/4/2021 0000 by Cheyanne Palomares RN  Diversional Activities: television  Family/Support System Care:   self-care encouraged   support provided     Problem: Fall Injury Risk  Goal: Absence of Fall and Fall-Related Injury  Outcome: Ongoing, Progressing  Intervention: Identify and Manage Contributors to Fall Injury Risk  Recent Flowsheet Documentation  Taken 12/4/2021 0400 by Cheyanne Palomares RN  Self-Care Promotion: independence encouraged  Taken 12/4/2021 0200 by Cheyanne Palomares RN  Self-Care Promotion: independence encouraged  Taken 12/4/2021 0000 by Cheyanne Palomares RN  Self-Care Promotion: independence encouraged  Intervention: Promote Injury-Free Environment  Recent Flowsheet Documentation  Taken 12/4/2021 0400 by Cheyanne Palomares RN  Safety Promotion/Fall  Prevention:   activity supervised   assistive device/personal items within reach   safety round/check completed  Taken 12/4/2021 0200 by Cheyanne Palomares RN  Safety Promotion/Fall Prevention:   activity supervised   assistive device/personal items within reach   safety round/check completed  Taken 12/4/2021 0000 by Cheyanne Palomares RN  Safety Promotion/Fall Prevention:   activity supervised   assistive device/personal items within reach   safety round/check completed     Problem: Fluid Imbalance (Pneumonia)  Goal: Fluid Balance  Outcome: Ongoing, Progressing     Problem: Infection (Pneumonia)  Goal: Resolution of Infection Signs and Symptoms  Outcome: Ongoing, Progressing     Problem: Respiratory Compromise (Pneumonia)  Goal: Effective Oxygenation and Ventilation  Outcome: Ongoing, Progressing  Intervention: Promote Airway Secretion Clearance  Recent Flowsheet Documentation  Taken 12/4/2021 0400 by Cheyanne Palomares RN  Cough And Deep Breathing: done independently per patient  Taken 12/4/2021 0200 by Cheyanne Palomares RN  Cough And Deep Breathing: done independently per patient  Taken 12/4/2021 0000 by Cheyanne Palomares RN  Cough And Deep Breathing: done independently per patient  Intervention: Optimize Oxygenation and Ventilation  Recent Flowsheet Documentation  Taken 12/4/2021 0400 by Cheyanne Palomares RN  Head of Bed (HOB): HOB elevated  Taken 12/4/2021 0200 by Cheyanne Palomares RN  Head of Bed (HOB): HOB elevated  Taken 12/4/2021 0000 by Cheyanne Palomares RN  Head of Bed (HOB): HOB elevated     Problem: Social Isolation  Goal: Increased Social Interaction  Outcome: Ongoing, Progressing     Problem: Skin Injury Risk Increased  Goal: Skin Health and Integrity  Outcome: Ongoing, Progressing  Intervention: Optimize Skin Protection  Recent Flowsheet Documentation  Taken 12/4/2021 0400 by Cheyanne Palomares RN  Head of Bed (HOB): HOB elevated  Taken 12/4/2021 0200 by Cheyanne Palomares RN  Head of Bed (HOB): HOB elevated  Taken  12/4/2021 0000 by Cheyanne Palomares RN  Head of Bed (HOB): HOB elevated     Problem: Bleeding (Spinal Surgery)  Goal: Absence of Bleeding  Outcome: Ongoing, Progressing     Problem: Bowel Elimination Impaired (Spinal Surgery)  Goal: Effective Bowel Elimination  Outcome: Ongoing, Progressing     Problem: Functional Ability Impaired (Spinal Surgery)  Goal: Optimal Functional Ability  Outcome: Ongoing, Progressing  Intervention: Optimize Functional Status  Recent Flowsheet Documentation  Taken 12/4/2021 0400 by Cheyanne Palomares RN  Activity Management: bedrest  Positioning/Transfer Devices:   pillows   in use  Self-Care Promotion: independence encouraged  Taken 12/4/2021 0200 by Cheyanne Palomares RN  Activity Management: bedrest  Positioning/Transfer Devices:   pillows   in use  Self-Care Promotion: independence encouraged  Taken 12/4/2021 0000 by Cheyanne Palomares RN  Activity Management: bedrest  Positioning/Transfer Devices:   pillows   in use  Self-Care Promotion: independence encouraged     Problem: Infection (Spinal Surgery)  Goal: Absence of Infection Signs and Symptoms  Outcome: Ongoing, Progressing     Problem: Neurologic Impairment (Spinal Surgery)  Goal: Optimal Neurologic Function  Outcome: Ongoing, Progressing  Intervention: Optimize Neurologic Function  Recent Flowsheet Documentation  Taken 12/4/2021 0400 by Cheyanne Palomares RN  Body Position: position maintained  Taken 12/4/2021 0200 by Cheyanne Palomares RN  Body Position: position maintained  Taken 12/4/2021 0000 by Cheyanne Palomares RN  Body Position: position maintained     Problem: Ongoing Anesthesia Effects (Spinal Surgery)  Goal: Anesthesia/Sedation Recovery  Outcome: Ongoing, Progressing  Intervention: Optimize Anesthesia Recovery  Recent Flowsheet Documentation  Taken 12/4/2021 0400 by Cheyanne Palomares RN  Safety Promotion/Fall Prevention:   activity supervised   assistive device/personal items within reach   safety round/check completed  Taken  12/4/2021 0200 by Cheyanne Palomares RN  Safety Promotion/Fall Prevention:   activity supervised   assistive device/personal items within reach   safety round/check completed  Taken 12/4/2021 0000 by Cheyanne Palomares RN  Patient Tolerance (IS): fair  Safety Promotion/Fall Prevention:   activity supervised   assistive device/personal items within reach   safety round/check completed  Administration (IS): proper technique demonstrated     Problem: Pain (Spinal Surgery)  Goal: Acceptable Pain Control  Outcome: Ongoing, Progressing  Intervention: Prevent or Manage Pain  Recent Flowsheet Documentation  Taken 12/4/2021 0200 by Cheyanne Palomares RN  Pain Management Interventions: see MAR  Taken 12/4/2021 0000 by Cheyanne Palomares RN  Pain Management Interventions: quiet environment facilitated     Problem: Postoperative Nausea and Vomiting (Spinal Surgery)  Goal: Nausea and Vomiting Relief  Outcome: Ongoing, Progressing     Problem: Postoperative Urinary Retention (Spinal Surgery)  Goal: Effective Urinary Elimination  Outcome: Ongoing, Progressing     Problem: Activity and Energy Impairment (Anxiety Signs/Symptoms)  Goal: Optimized Energy Level (Anxiety Signs/Symptoms)  Outcome: Ongoing, Progressing     Problem: Cognitive Impairment (Anxiety Signs/Symptoms)  Goal: Optimized Cognitive Function (Anxiety Signs/Symptoms)  Outcome: Ongoing, Progressing     Problem: Mood Impairment (Anxiety Signs/Symptoms)  Goal: Improved Mood Symptoms (Anxiety Signs/Symptoms)  Outcome: Ongoing, Progressing     Problem: Sleep Disturbance (Anxiety Signs/Symptoms)  Goal: Improved Sleep (Anxiety Signs/Symptoms)  Outcome: Ongoing, Progressing     Problem: Social, Occupational or Functional Impairment (Anxiety Signs/Symptoms)  Goal: Enhanced Social, Occupational or Functional Skills (Anxiety Signs/Symptoms)  Outcome: Ongoing, Progressing  Intervention: Promote Social, Occupational and Functional Ability  Recent Flowsheet Documentation  Taken 12/4/2021  0400 by Cheyanne Palomares RN  Trust Relationship/Rapport: care explained  Taken 12/4/2021 0200 by Cheyanne Palomares RN  Trust Relationship/Rapport: care explained     Problem: Somatic Disturbance (Anxiety Signs/Symptoms)  Goal: Improved Somatic Symptoms (Anxiety Signs/Symptoms)  Outcome: Ongoing, Progressing   Goal Outcome Evaluation:  Plan of Care Reviewed With: patient      Pt currently in bed resting quietly. Complaints of pain generalized throughout the entire shift. Urine in dobbs bag red tinged, no need to irrigate this shift. Turned q2 throughout the night. Vitals WNL on 2LNC. Pt frequently seeking out staff. Edema continues. No other observations at this time. Will continue to monitor, call bell in reach.  Progress: no change

## 2021-12-04 NOTE — NURSING NOTE
"WO follow-up:     Coccyx and sacrum     Patients sacrum area presents with worsening MASD and several small open friction areas. Wound areas are currently blanching. However distal wound edges are purple and non-blanching.     Possible developing DTPI vs an opening DTPI?    -Continue with Venelex ointment BID, barrier cream to periwound areas.     Her bed frame is malfunctioning and she states that the mattress is \"uncomfortable.\"     I will order a Dolphin mattress to replace her IAN.     See orders for care needs.     [facility] will continue to follow at this time.   Will reassess on Monday.     Thanks           "

## 2021-12-04 NOTE — PROGRESS NOTES
Pharmacy Consult-Vancomycin Dosing  Karely Villarreal is a  56 y.o. female receiving vancomycin therapy.     Indication: MRSA bacteremia, osteomyelitis, epidural abscess   Consulting Provider: VICTOR HUGO Garcia Consult: Yes    Goal AUC: 400 - 600 mg/L*hr    Current Antimicrobial Therapy  Anti-Infectives (From admission, onward)      Ordered     Dose/Rate Route Frequency Start Stop    12/03/21 1718  vancomycin 1250 mg/250 mL 0.9% NS IVPB ([facility])        Ordering Provider: Jaxson Tillman IV, PharmD    1,250 mg  over 90 Minutes Intravenous Every 24 Hours 12/03/21 2100 12/10/21 2059    11/20/21 1540  meropenem (MERREM) 1 g/100 mL 0.9% NS (mbp)        Ordering Provider: Jonn Mchugh MD    1 g  over 3 Hours Intravenous Every 8 Hours 11/20/21 2200 11/27/21 1728    11/20/21 1551  vancomycin 1750 mg/500 mL 0.9% NS IVPB ([facility])        Ordering Provider: Jeny Beltran, PharmD    1,750 mg  over 120 Minutes Intravenous Once 11/20/21 1645 11/20/21 1830    11/20/21 1540  meropenem (MERREM) 1 g/100 mL 0.9% NS (mbp)        Ordering Provider: Jay Turcios PA    1 g  over 30 Minutes Intravenous Once 11/20/21 1630 11/20/21 1700    11/20/21 1540  doxycycline (VIBRAMYCIN) 100 mg/100 mL 0.9% NS MBP        Ordering Provider: Jonn Mchugh MD    100 mg  over 60 Minutes Intravenous Every 12 Hours Scheduled 11/20/21 1600 11/27/21 1017    11/20/21 1540  Pharmacy to dose vancomycin        Ordering Provider: Jonn Mchugh MD     Does not apply Continuous PRN 11/20/21 1537 01/03/22 1536    11/14/21 1247  ceFAZolin in dextrose (ANCEF) IVPB solution 2 g        Ordering Provider: Diego Alvarenga PA-C    2 g  over 30 Minutes Intravenous Once 11/14/21 1345 11/14/21 1339          Allergies  Allergies as of 11/06/2021 - Reviewed 10/28/2021   Allergen Reaction Noted    Compazine [prochlorperazine edisylate] Dystonia 06/18/2016    Imitrex [sumatriptan] Other (See Comments) 06/18/2016    Nsaids GI Bleeding 06/18/2016    Reglan  "[metoclopramide] Dystonia 06/18/2016    Solu-medrol [methylprednisolone] Dystonia 12/24/2016    Zyprexa [olanzapine] Swelling 06/18/2016    Prednisone Anxiety 07/18/2019     Labs  Results from last 7 days   Lab Units 12/03/21  0926 12/02/21  0953 12/01/21  0512   BUN mg/dL 19 21* 24*   CREATININE mg/dL 0.60 0.68 0.63     Results from last 7 days   Lab Units 12/03/21  0926 11/28/21  0723 11/27/21  0943   WBC 10*3/mm3 5.64 7.23 8.11     Evaluation of Dosing  Is Patient on Dialysis or Renal Replacement: No    Ht - 167.6 cm (66\")  Wt - 87.1 kg (192 lb)    Estimated Creatinine Clearance: 117.7 mL/min (by C-G formula based on SCr of 0.6 mg/dL).    Intake & Output (last 3 days)         12/01 0701 12/02 0700 12/02 0701 12/03 0700 12/03 0701 12/04 0700    P.O. 711 360     IV Piggyback       Total Intake(mL/kg) 711 (8.2) 360 (4.1)     Urine (mL/kg/hr) 2250 (1.1) 1400 (0.7) 1075 (0.9)    Stool       Total Output 2250 1400 1075    Net -1539 -1040 -1075                 Microbiology and Radiology  Microbiology Results (last 10 days)       ** No results found for the last 240 hours. **          Reported Vancomycin Levels  Results from last 7 days   Lab Units 11/30/21  0801   VANCOMYCIN RM mcg/mL 24.90       Results from last 7 days   Lab Units 12/03/21  1622   VANCOMYCIN TR mcg/mL 24.10*       [facility] AUC Calculation:    Current AUC: 567 mg/L*hr    Predicted Steady State AUC on Current Dose: >600 mg/L*hr  _________________________________    Predicted Steady State AUC on New Dose: 486 mg/L*hr    Assessment/Plan:  Pharmacy to dose vancomycin for MRSA bacteremia, osteomyelitis, and epidural abscess.  Patient on maintenance dose of vancomycin 1500 mg (~17 mg/kg) IV Q24H.  Supratherapeutic level this afternoon, drawn appropriately in relation to previous and next doses. Correlation with therapeutic AUC offset by increased risk of toxicity with trough level >20 mcg/mL.  Decrease to 1250 mg IV qHS, delay timing to allow some " clear-off but ensure continued coverage for high-risk indication.  Repeat vancomycin level tomorrow night to ensure adequate clearance.     Thank you for this consult.  Jaxson Tillman IV, PharmD, BCPS  12/3/2021  21:03 EST

## 2021-12-04 NOTE — PLAN OF CARE
Goal Outcome Evaluation:  Plan of Care Reviewed With: patient           Outcome Summary: patient transitioned to [facility] specialty bed from IAN bed; refused heel boots throughout shift but agreeable to elevated heels on pillows; intermittently refuses turning, educated patient on importance of turning to prevent skin breakdown.  VSS.  Dobbs catheter d/c, external cather in place - voided 200mL post dobbs removal. Encouraged patient to cough and deep breathe.

## 2021-12-04 NOTE — PROGRESS NOTES
[facility] Medicine Services  PROGRESS NOTE    Patient Name: Karely Villarreal  : 1965  MRN: [id number]    Date of Admission: 2021  Primary Care Physician: Tracie Levi APRN    Subjective   Subjective     CC:  Follow-up perivertebral abscess, spinal osteomyelitis    HPI:  Patient seen resting in bed.  She is ill-appearing.  Reports that she did not sleep well overnight.  Continues to have significant lower extremity pain.  She refused to perform range of motion exercises.  PT/OT has recently signed off due to inability to participate.  She received 1 unit PRBC yesterday evening due to anemia.     ROS:  Gen- No fevers, chills  CV- No chest pain, palpitations  Resp- No cough, dyspnea  GI- No N/V/D, abd pain  MSK- + back and BLE pain     Objective   Objective     Vital Signs:   Temp:  [98 °F (36.7 °C)-99.1 °F (37.3 °C)] 99.1 °F (37.3 °C)  Heart Rate:  [] 102  Resp:  [18] 18  BP: (131-155)/(80-91) 131/80  Flow (L/min):  [2-3] 2     Physical Exam:  Constitutional: No acute distress, awake, alert, chronically ill-appearing  HENT: NCAT, mucous membranes moist  Respiratory: Diminished in bases, respiratory effort normal on 2 L nasal cannula  Cardiovascular: RRR, no murmurs, cap refill brisk   Gastrointestinal: Positive bowel sounds, soft, nontender, nondistended  : dobbs d/c'd   Musculoskeletal: 1+ BLE edema, decreased BLE ROM secondary to pain, 2+ LUE edema  Psychiatric: Flat affect, cooperative  Neurologic: Oriented x 3, moves all extremities, speech clear  Skin: warm, dry, no visible rash     Results Reviewed:  LAB RESULTS:      Lab 21  0002 21  1622 21  0926 21  0955 21  0723 21  0943   WBC  --   --  5.64  --  7.23 8.11   HEMOGLOBIN 8.3* 7.0* 7.0*  --  8.4* 8.2*   HEMATOCRIT 27.2* 23.7* 23.5*  --  29.1* 27.4*   PLATELETS  --   --  181  --  143 181   MCV  --   --  86.7  --  92.4 89.5   CRP  --   --   --  4.78*  --   --          Lab  12/03/21  0926 12/02/21  0953 12/01/21  0512 11/29/21  0955 11/28/21  0723   SODIUM 140 139 139 141 141   POTASSIUM 4.1 4.3 4.4 4.7 4.8   CHLORIDE 94* 92* 94* 97* 98   CO2 39.0* 39.0* 40.0* 38.0* 37.0*   ANION GAP 7.0 8.0 5.0 6.0 6.0   BUN 19 21* 24* 28* 32*   CREATININE 0.60 0.68 0.63 0.50* 0.49*   GLUCOSE 84 94 93 176* 111*   CALCIUM 9.8 9.7 9.4 9.2 9.3   IONIZED CALCIUM  --   --   --   --  1.36*   MAGNESIUM  --   --   --  2.0 1.6   PHOSPHORUS  --   --   --  3.0 3.2         Lab 11/29/21  0955   TOTAL PROTEIN 6.1   ALBUMIN 2.50*   ALT (SGPT) 44*   AST (SGOT) 45*   BILIRUBIN 0.3   ALK PHOS 286*             Lab 11/29/21  0955   CHOLESTEROL 158   TRIGLYCERIDES 83         Lab 12/03/21  1622   ABO TYPING O   RH TYPING Negative   ANTIBODY SCREEN Negative         Brief Urine Lab Results  (Last result in the past 365 days)      Color   Clarity   Blood   Leuk Est   Nitrite   Protein   CREAT   Urine HCG        12/03/21 1509 Red   Turbid   Large (3+)   Large (3+)   Negative   >=300 mg/dL (3+)                 Microbiology Results Abnormal     Procedure Component Value - Date/Time    Fungus Culture - Body Fluid, Spine, Thoracic [[id number]] Collected: 11/14/21 1511    Lab Status: Preliminary result Specimen: Body Fluid from Spine, Thoracic Updated: 11/28/21 1615     Fungus Culture No fungus isolated at 2 weeks    AFB Culture - Body Fluid, Spine, Thoracic [[id number]] Collected: 11/14/21 1511    Lab Status: Preliminary result Specimen: Body Fluid from Spine, Thoracic Updated: 11/28/21 1615     AFB Culture No AFB isolated at 2 weeks     AFB Stain No acid fast bacilli seen on concentrated smear    Fungus Culture - Tissue, Spine, Cervical [[id number]] Collected: 11/14/21 1450    Lab Status: Preliminary result Specimen: Tissue from Spine, Cervical Updated: 11/28/21 1600     Fungus Culture No fungus isolated at 2 weeks    AFB Culture - Tissue, Spine, Cervical [[id number]] Collected: 11/14/21 1450    Lab Status: Preliminary result  Specimen: Tissue from Spine, Cervical Updated: 11/28/21 1600     AFB Culture No AFB isolated at 2 weeks     AFB Stain No acid fast bacilli seen on concentrated smear    Blood Culture - Blood, Arm, Left [[id number]]  (Normal) Collected: 11/20/21 1630    Lab Status: Final result Specimen: Blood from Arm, Left Updated: 11/25/21 1700     Blood Culture No growth at 5 days    Narrative:      Aerobic bottle only      Urine Culture - Urine, Urine, Catheter [[id number]]  (Normal) Collected: 11/20/21 1625    Lab Status: Final result Specimen: Urine, Catheter Updated: 11/21/21 1523     Urine Culture No growth    Anaerobic Culture - Body Fluid, Spine, Thoracic [[id number]] Collected: 11/14/21 1511    Lab Status: Final result Specimen: Body Fluid from Spine, Thoracic Updated: 11/19/21 0700     Anaerobic Culture No anaerobes isolated at 5 days    Anaerobic Culture - Tissue, Spine, Cervical [[id number]] Collected: 11/14/21 1450    Lab Status: Final result Specimen: Tissue from Spine, Cervical Updated: 11/19/21 0700     Anaerobic Culture No anaerobes isolated at 5 days    Body Fluid Culture - Body Fluid, Spine, Thoracic [[id number]] Collected: 11/14/21 1511    Lab Status: Final result Specimen: Body Fluid from Spine, Thoracic Updated: 11/17/21 0933     Body Fluid Culture No growth at 3 days     Gram Stain No WBCs or organisms seen    Fungus Smear - Tissue, Spine, Cervical [[id number]] Collected: 11/14/21 1450    Lab Status: Final result Specimen: Tissue from Spine, Cervical Updated: 11/15/21 1552     Fungal Stain No fungal elements seen    Blood Culture - Blood, Arm, Right [[id number]]  (Normal) Collected: 11/07/21 1101    Lab Status: Final result Specimen: Blood from Arm, Right Updated: 11/12/21 1116     Blood Culture No growth at 5 days    Narrative:      AEROBIC BOTTLE ONLY    Blood Culture - Blood, Hand, Left [[id number]]  (Normal) Collected: 11/07/21 1101    Lab Status: Final result Specimen: Blood from Hand, Left Updated: 11/12/21  1115     Blood Culture No growth at 5 days    Urine Culture - Urine, Urine, Catheter [[id number]]  (Normal) Collected: 21 1621    Lab Status: Final result Specimen: Urine, Catheter Updated: 11/10/21 1829     Urine Culture No growth          No radiology results from the last 24 hrs    Results for orders placed during the hospital encounter of 10/24/21    Adult Transthoracic Echo Complete W/ Cont if Necessary Per Protocol    Interpretation Summary  · Left ventricular ejection fraction appears to be 61 - 65%. Left ventricular systolic function is normal.  · Left ventricular diastolic function was normal.  · Estimated right ventricular systolic pressure from tricuspid regurgitation is normal (<35 mmHg).  · No vegetations seen.  · This is a technically difficult study.      I have reviewed the medications:  Scheduled Meds:!Vancomycin Level Draw Needed, , Does not apply, Once  alteplase, 2 mg, Intravenous, Once  amLODIPine, 5 mg, Oral, Q24H  buPROPion SR, 150 mg, Oral, BID  castor oil-balsam peru, 1 application, Topical, Q12H  DULoxetine, 30 mg, Oral, QAM  furosemide, 40 mg, Oral, Daily  hydrALAZINE, 50 mg, Oral, Q8H  lactobacillus acidophilus, 1 capsule, Oral, Daily  levETIRAcetam, 500 mg, Oral, Q12H  levothyroxine, 125 mcg, Oral, Q AM  multivitamin, 1 tablet, Oral, Daily  Nutrisource fiber, 2 packet, Oral, TID  nystatin, , Topical, Q8H  pantoprazole, 40 mg, Oral, Q AM  senna-docusate sodium, 1 tablet, Oral, Nightly  sodium chloride, 10 mL, Intravenous, Q12H  vancomycin, 1,250 mg, Intravenous, Q24H      Continuous Infusions:Pharmacy to dose vancomycin,       PRN Meds:.acetaminophen  •  cyclobenzaprine  •  hydrALAZINE  •  ipratropium-albuterol  •  LORazepam  •  magnesium sulfate **OR** magnesium sulfate **OR** magnesium sulfate  •  naloxone  •  [] HYDROmorphone **AND** naloxone  •  ondansetron **OR** ondansetron  •  oxyCODONE-acetaminophen  •  Pharmacy to dose vancomycin  •  potassium & sodium phosphates  **OR** potassium & sodium phosphates  •  potassium chloride  •  sodium chloride  •  sodium chloride    Assessment/Plan   Assessment & Plan     Active Hospital Problems    Diagnosis  POA   • **Spinal cord compression [G95.20]  Yes   • Severe malnutrition (CMS/HCC) [E43]  Yes   • Quadriparesis [G82.50]  No   • COVID-19 virus detected [U07.1]  Yes   • MRSA bacteremia [R78.81, B95.62]  Yes   • Hepatitis C [B19.20]  Yes   • Seizures on Keppra [R56.9]  Yes   • History of CVA [Z86.73]  Not Applicable   • Acute postoperative respiratory insufficiency [J95.89]  No   • Pleural effusion, right [J90]  Yes   • Osteomyelitis of thoracic vertebra [M46.24]  Yes   • 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        Brief Hospital Course to date:  Karely Villarreal is a 56 y.o. female h/o polysubstance abuse, HCV, HTN, Sz d/o, SLE, Hypothyroidism, CVA, prior COVID infection and homelessness.  Was admitted to [facility] on 10/24/21 for sepsis d/t MRSA and was found to have a paravertebral abscess and was transferred to [facility] on 11/6.  Surgery was deferred initially but she developed worsening UE weakness and MRI showed progressive cord compression so she was taken to the OR emergently for decompression on 11/14.  She was extubated 11/15 but TF's were started d/t poor PO.  She was transferred initially to telemetry on 11/8 but developed worsening hypoxia with AMS and was transferred back to the ICU on bipap on 11/20.  She was transferred back to the hospitalist service on 11/23     This patient's problems and plans were partially entered by my partner and updated as appropriate by me 12/04/21.     MRSA bacteremia  Sepsis  T9/T10 Paravertebral Abscess, T9/T10 Osteomyelitis with  spinal cord compression  --s/p decompression, cervical laminectomy and debridement of epidural abscess on 11/14  --on Vancomycin per ID, will need long course at least 12 weeks given the extent of her spinal infection and high grade bacteremia (starting at time of surgery on 11/14)  --repeat MRI C and T spine showed shows 2 fluid collections dorsally (one at C4 and the other more superficial at C6/C7) and T9/T10 still c/w vertebral osteomyelitis.  Re-evaluated by Dr. Jama who noted severe phlegmon circumferentially around cervical and upper thoracic area but no worsening compression, rec'd continue abx, no surgery needed at this time   -AM labs      Anemia  -Hgb 8.3 this AM   -fecal occult pending   -s/p 1 unit PRBC on 12/3  -Iron 19, Iron sat 7   -IV Iron x 3 days, then PO   -CBC in AM      Acute Respiratory failure  Right Pleural Effusion  Probable Bacterial PNA  --improved  --tolerating daily PO Lasix well   -- s/p 7 days of merrem and doxy per ID     Malnutrition  --d/c TF's and dobhoff 11/30/21,   --Increased appetite, patient now eating more       Diarrhea  --Rectal tube d/c'd   --Diarrhea improving  --Continue fiber supplement      Bilateral Knee Pain  --cold compresses.  Bilateral xrays showed large bilateral knee effusions, moderate osteoarthrosis worse in patellofemoral compartments  -PT/OT      LUE Edema  -LUE venous duplex on 11/7 negative for evidence of acute DVT  -Elevate extremity   -Repeat venous duplex pending      HX Seizure Disorder  --cont keppra      Recent COVID PNA     Acute Right Axilla abscess  --cx MRSA     Polysubstance abuse  --UDS positive for meth/opiates, self medicating at [facility] with klonopin and oxycodone and went into respiratory depression     SLE/Discoid lupus  --holding plaquenil due to severity of active infection    Insomnia   -Melatonin PRN      Hx CVA     DVT prophylaxis:  Mechanical DVT prophylaxis orders are present.      AM-PAC 6 Clicks Score (PT): 6 (12/02/21  1625)     Disposition: I expect the patient to be discharged TBD, DALLIN looking for placement options.    CODE STATUS:   Code Status and Medical Interventions:   Ordered at: 11/06/21 4980     Code Status (Patient has no pulse and is not breathing):    CPR (Attempt to Resuscitate)     Medical Interventions (Patient has pulse or is breathing):    Full Support       Mateo Amor, MP  12/04/21

## 2021-12-05 LAB
ALBUMIN SERPL-MCNC: 2.4 G/DL (ref 3.5–5.2)
ALBUMIN/GLOB SERPL: 0.6 G/DL
ALP SERPL-CCNC: 182 U/L (ref 39–117)
ALT SERPL W P-5'-P-CCNC: 23 U/L (ref 1–33)
ANION GAP SERPL CALCULATED.3IONS-SCNC: 7 MMOL/L (ref 5–15)
AST SERPL-CCNC: 25 U/L (ref 1–32)
BILIRUB SERPL-MCNC: 0.3 MG/DL (ref 0–1.2)
BUN SERPL-MCNC: 17 MG/DL (ref 6–20)
BUN/CREAT SERPL: 22.7 (ref 7–25)
CALCIUM SPEC-SCNC: 9.8 MG/DL (ref 8.6–10.5)
CHLORIDE SERPL-SCNC: 94 MMOL/L (ref 98–107)
CK SERPL-CCNC: 15 U/L (ref 20–180)
CO2 SERPL-SCNC: 38 MMOL/L (ref 22–29)
CREAT SERPL-MCNC: 0.75 MG/DL (ref 0.57–1)
DEPRECATED RDW RBC AUTO: 53.7 FL (ref 37–54)
ERYTHROCYTE [DISTWIDTH] IN BLOOD BY AUTOMATED COUNT: 16.5 % (ref 12.3–15.4)
GFR SERPL CREATININE-BSD FRML MDRD: 80 ML/MIN/1.73
GLOBULIN UR ELPH-MCNC: 3.8 GM/DL
GLUCOSE SERPL-MCNC: 83 MG/DL (ref 65–99)
HCT VFR BLD AUTO: 25 % (ref 34–46.6)
HGB BLD-MCNC: 7.6 G/DL (ref 12–15.9)
MCH RBC QN AUTO: 26.9 PG (ref 26.6–33)
MCHC RBC AUTO-ENTMCNC: 30.4 G/DL (ref 31.5–35.7)
MCV RBC AUTO: 88.3 FL (ref 79–97)
PLATELET # BLD AUTO: 182 10*3/MM3 (ref 140–450)
PMV BLD AUTO: 8.3 FL (ref 6–12)
POTASSIUM SERPL-SCNC: 3.4 MMOL/L (ref 3.5–5.2)
PROT SERPL-MCNC: 6.2 G/DL (ref 6–8.5)
RBC # BLD AUTO: 2.83 10*6/MM3 (ref 3.77–5.28)
SODIUM SERPL-SCNC: 139 MMOL/L (ref 136–145)
WBC NRBC COR # BLD: 5.62 10*3/MM3 (ref 3.4–10.8)

## 2021-12-05 PROCEDURE — 99233 SBSQ HOSP IP/OBS HIGH 50: CPT | Performed by: INTERNAL MEDICINE

## 2021-12-05 PROCEDURE — 80053 COMPREHEN METABOLIC PANEL: CPT | Performed by: NURSE PRACTITIONER

## 2021-12-05 PROCEDURE — 85027 COMPLETE CBC AUTOMATED: CPT | Performed by: NURSE PRACTITIONER

## 2021-12-05 PROCEDURE — 94799 UNLISTED PULMONARY SVC/PX: CPT

## 2021-12-05 PROCEDURE — 25010000002 NA FERRIC GLUC CPLX PER 12.5 MG: Performed by: NURSE PRACTITIONER

## 2021-12-05 PROCEDURE — 82550 ASSAY OF CK (CPK): CPT | Performed by: NURSE PRACTITIONER

## 2021-12-05 PROCEDURE — 25010000002 VANCOMYCIN PER 500 MG

## 2021-12-05 RX ADMIN — OXYCODONE AND ACETAMINOPHEN 1 TABLET: 5; 325 TABLET ORAL at 23:15

## 2021-12-05 RX ADMIN — FUROSEMIDE 40 MG: 40 TABLET ORAL at 09:06

## 2021-12-05 RX ADMIN — SENNOSIDES AND DOCUSATE SODIUM 1 TABLET: 50; 8.6 TABLET ORAL at 21:52

## 2021-12-05 RX ADMIN — NYSTATIN: 100000 POWDER TOPICAL at 21:56

## 2021-12-05 RX ADMIN — LORAZEPAM 1 MG: 1 TABLET ORAL at 02:50

## 2021-12-05 RX ADMIN — Medication 2 PACKET: at 21:52

## 2021-12-05 RX ADMIN — CASTOR OIL AND BALSAM, PERU 1 APPLICATION: 788; 87 OINTMENT TOPICAL at 16:55

## 2021-12-05 RX ADMIN — CYCLOBENZAPRINE 5 MG: 10 TABLET, FILM COATED ORAL at 02:50

## 2021-12-05 RX ADMIN — AMLODIPINE BESYLATE 5 MG: 5 TABLET ORAL at 09:06

## 2021-12-05 RX ADMIN — CASTOR OIL AND BALSAM, PERU 1 APPLICATION: 788; 87 OINTMENT TOPICAL at 21:56

## 2021-12-05 RX ADMIN — LEVETIRACETAM 500 MG: 500 TABLET, FILM COATED ORAL at 09:06

## 2021-12-05 RX ADMIN — NYSTATIN: 100000 POWDER TOPICAL at 16:55

## 2021-12-05 RX ADMIN — IPRATROPIUM BROMIDE AND ALBUTEROL SULFATE 3 ML: 2.5; .5 SOLUTION RESPIRATORY (INHALATION) at 23:27

## 2021-12-05 RX ADMIN — LORAZEPAM 1 MG: 1 TABLET ORAL at 09:06

## 2021-12-05 RX ADMIN — DULOXETINE HYDROCHLORIDE 30 MG: 30 CAPSULE, DELAYED RELEASE ORAL at 09:06

## 2021-12-05 RX ADMIN — MULTIVITAMIN TABLET 1 TABLET: TABLET at 09:06

## 2021-12-05 RX ADMIN — PANTOPRAZOLE SODIUM 40 MG: 40 TABLET, DELAYED RELEASE ORAL at 05:52

## 2021-12-05 RX ADMIN — BUPROPION HYDROCHLORIDE 150 MG: 150 TABLET, EXTENDED RELEASE ORAL at 21:52

## 2021-12-05 RX ADMIN — LEVOTHYROXINE SODIUM 125 MCG: 125 TABLET ORAL at 06:17

## 2021-12-05 RX ADMIN — Medication 5 MG: at 21:52

## 2021-12-05 RX ADMIN — OXYCODONE AND ACETAMINOPHEN 1 TABLET: 5; 325 TABLET ORAL at 02:51

## 2021-12-05 RX ADMIN — BUPROPION HYDROCHLORIDE 150 MG: 150 TABLET, EXTENDED RELEASE ORAL at 09:06

## 2021-12-05 RX ADMIN — SODIUM CHLORIDE, PRESERVATIVE FREE 10 ML: 5 INJECTION INTRAVENOUS at 21:52

## 2021-12-05 RX ADMIN — HYDRALAZINE HYDROCHLORIDE 50 MG: 50 TABLET, FILM COATED ORAL at 05:52

## 2021-12-05 RX ADMIN — Medication 1 CAPSULE: at 09:06

## 2021-12-05 RX ADMIN — HYDRALAZINE HYDROCHLORIDE 50 MG: 50 TABLET, FILM COATED ORAL at 21:52

## 2021-12-05 RX ADMIN — OXYCODONE AND ACETAMINOPHEN 1 TABLET: 5; 325 TABLET ORAL at 16:58

## 2021-12-05 RX ADMIN — LEVETIRACETAM 500 MG: 500 TABLET, FILM COATED ORAL at 21:51

## 2021-12-05 RX ADMIN — SODIUM CHLORIDE, PRESERVATIVE FREE 10 ML: 5 INJECTION INTRAVENOUS at 09:09

## 2021-12-05 RX ADMIN — HYDRALAZINE HYDROCHLORIDE 50 MG: 50 TABLET, FILM COATED ORAL at 14:18

## 2021-12-05 RX ADMIN — NYSTATIN: 100000 POWDER TOPICAL at 06:17

## 2021-12-05 RX ADMIN — SODIUM CHLORIDE 125 MG: 9 INJECTION, SOLUTION INTRAVENOUS at 09:05

## 2021-12-05 RX ADMIN — VANCOMYCIN HYDROCHLORIDE 1000 MG: 1 INJECTION, SOLUTION INTRAVENOUS at 09:06

## 2021-12-05 NOTE — CONSULTS
Pharmacy Consult-Vancomycin Dosing  Karely Villarreal is a  56 y.o. female receiving vancomycin therapy.     IIndication: MRSA bacteremia, osteomyelitis, epidural abscess   Consulting Provider: VICTOR HUGO Garcia  ID Consult: Yes    Goal AUC: 400 - 600 mg/L*hr    Current Antimicrobial Therapy  Anti-Infectives (From admission, onward)      Ordered     Dose/Rate Route Frequency Start Stop    12/04/21 2149  vancomycin (VANCOCIN) in iso-osmotic dextrose IVPB 1 g (premix) 200 mL        Ordering Provider: Mayuri Kiser RP    1,000 mg  over 60 Minutes Intravenous Every 24 Hours 12/05/21 0900 12/12/21 0859    11/20/21 1540  meropenem (MERREM) 1 g/100 mL 0.9% NS (mbp)        Ordering Provider: Jonn Mchugh MD    1 g  over 3 Hours Intravenous Every 8 Hours 11/20/21 2200 11/27/21 1728    11/20/21 1551  vancomycin 1750 mg/500 mL 0.9% NS IVPB ([facility])        Ordering Provider: Jeny Beltran, PharmD    1,750 mg  over 120 Minutes Intravenous Once 11/20/21 1645 11/20/21 1830    11/20/21 1540  meropenem (MERREM) 1 g/100 mL 0.9% NS (mbp)        Ordering Provider: Jay Turcios PA    1 g  over 30 Minutes Intravenous Once 11/20/21 1630 11/20/21 1700    11/20/21 1540  doxycycline (VIBRAMYCIN) 100 mg/100 mL 0.9% NS MBP        Ordering Provider: Jonn Mchugh MD    100 mg  over 60 Minutes Intravenous Every 12 Hours Scheduled 11/20/21 1600 11/27/21 1017    11/20/21 1540  Pharmacy to dose vancomycin        Ordering Provider: Jonn Mchugh MD     Does not apply Continuous PRN 11/20/21 1537 01/03/22 1536    11/14/21 1247  ceFAZolin in dextrose (ANCEF) IVPB solution 2 g        Ordering Provider: Diego Alvarenga PA-C    2 g  over 30 Minutes Intravenous Once 11/14/21 1345 11/14/21 1339            Allergies  Allergies as of 11/06/2021 - Reviewed 10/28/2021   Allergen Reaction Noted    Compazine [prochlorperazine edisylate] Dystonia 06/18/2016    Imitrex [sumatriptan] Other (See Comments) 06/18/2016    Nsaids GI Bleeding 06/18/2016     "Reglan [metoclopramide] Dystonia 06/18/2016    Solu-medrol [methylprednisolone] Dystonia 12/24/2016    Zyprexa [olanzapine] Swelling 06/18/2016    Prednisone Anxiety 07/18/2019       Labs    Results from last 7 days   Lab Units 12/04/21  1128 12/03/21  0926 12/02/21  0953   BUN mg/dL 20 19 21*   CREATININE mg/dL 0.79 0.60 0.68       Results from last 7 days   Lab Units 12/04/21  1128 12/03/21  0926 11/28/21  0723   WBC 10*3/mm3 7.15 5.64 7.23       Evaluation of Dosing     Is Patient on Dialysis or Renal Replacement: no    Ht - 167.6 cm (66\")  Wt - 87.1 kg (192 lb)    Estimated Creatinine Clearance: 89.4 mL/min (by C-G formula based on SCr of 0.79 mg/dL).    Intake & Output (last 3 days)         12/02 0701 12/03 0700 12/03 0701 12/04 0700 12/04 0701 12/05 0700    P.O. 360  118    Blood  350     Total Intake(mL/kg) 360 (4.1) 350 (4) 118 (1.4)    Urine (mL/kg/hr) 1400 (0.7) 1625 (0.8) 825 (0.6)    Total Output 1400 1625 825    Net -1040 -1275 -707                   Microbiology and Radiology  Microbiology Results (last 10 days)       Procedure Component Value - Date/Time    Urine Culture - Urine, Urine, Clean Catch [[id number]] Collected: 12/03/21 1509    Lab Status: Final result Specimen: Urine, Clean Catch Updated: 12/04/21 1130     Urine Culture 50,000 CFU/mL Normal Urogenital Jailyn            Reported Vancomycin Levels    Results from last 7 days   Lab Units 11/30/21  0801   VANCOMYCIN RM mcg/mL 24.90             Results from last 7 days   Lab Units 12/04/21 2015 12/03/21  1622   VANCOMYCIN TR mcg/mL 23.30* 24.10*          [facility] AUC Calculation:    Current AUC:   624 mg/L*hr    Predicted Steady State AUC on Current Dose: >600 mg/L*hr  _________________________________    Predicted Steady State AUC on New Dose:   507 mg/L*hr    Assessment/Plan:   Hold vancomycin for 12 hrs.  Reduce dose to 1000 mg q24h.  Pharmacy will continue to follow    Mayuri Kiser RPH  12/4/2021  22:06 EST      "

## 2021-12-05 NOTE — PROGRESS NOTES
[facility] Medicine Services  PROGRESS NOTE    Patient Name: Karely Villarreal  : 1965  MRN: [id number]    Date of Admission: 2021  Primary Care Physician: Tracie Levi APRN    Subjective   Subjective     CC:  Perivertebral abscess, spinal osteo    HPI:  Upset bc purewick overflowed and sitting in her own urine    ROS:  Gen- No fevers, chills  CV- No chest pain, palpitations  Resp- No cough, dyspnea  GI- No N/V/D, abd pain        Objective   Objective     Vital Signs:   Temp:  [97.8 °F (36.6 °C)-97.9 °F (36.6 °C)] 97.9 °F (36.6 °C)  Heart Rate:  [] 102  Resp:  [16] 16  BP: (124-138)/(80-86) 138/80  Flow (L/min):  [2] 2     Physical Exam:  Constitutional: No acute distress, awake, alert, chronically ill appearing  HENT: NCAT, mucous membranes moist  Respiratory: Clear to auscultation bilaterally, respiratory effort normal   Cardiovascular: RRR, no murmurs, rubs, or gallops  Gastrointestinal: Positive bowel sounds, soft, nontender, nondistended  Musculoskeletal: BL LE edema  Psychiatric: Appropriate affect, cooperative  Neurologic: Oriented x 3, weakness of left upper extremity, speech clear  Skin: No rashes      Results Reviewed:  LAB RESULTS:      Lab 21  1128 21  0002 21  1622 21  0926 21  0955   WBC 7.15  --   --  5.64  --    HEMOGLOBIN 8.3* 8.3* 7.0* 7.0*  --    HEMATOCRIT 27.4* 27.2* 23.7* 23.5*  --    PLATELETS 191  --   --  181  --    NEUTROS ABS 5.84  --   --   --   --    IMMATURE GRANS (ABS) 0.04  --   --   --   --    LYMPHS ABS 0.74  --   --   --   --    MONOS ABS 0.42  --   --   --   --    EOS ABS 0.06  --   --   --   --    MCV 90.4  --   --  86.7  --    CRP  --   --   --   --  4.78*         Lab 21  1128 21  0926 21  0953 21  0512 21  0955   SODIUM 141 140 139 139 141   POTASSIUM 3.8 4.1 4.3 4.4 4.7   CHLORIDE 95* 94* 92* 94* 97*   CO2 38.0* 39.0* 39.0* 40.0* 38.0*   ANION GAP 8.0 7.0 8.0 5.0 6.0    BUN 20 19 21* 24* 28*   CREATININE 0.79 0.60 0.68 0.63 0.50*   GLUCOSE 118* 84 94 93 176*   CALCIUM 9.8 9.8 9.7 9.4 9.2   MAGNESIUM  --   --   --   --  2.0   PHOSPHORUS 4.2  --   --   --  3.0         Lab 12/04/21  1128 11/29/21  0955   TOTAL PROTEIN  --  6.1   ALBUMIN 2.50* 2.50*   ALT (SGPT)  --  44*   AST (SGOT)  --  45*   BILIRUBIN  --  0.3   ALK PHOS  --  286*             Lab 11/29/21  0955   CHOLESTEROL 158   TRIGLYCERIDES 83         Lab 12/04/21  1128 12/03/21  1622   IRON 19*  --    IRON SATURATION 7*  --    TIBC 259*  --    TRANSFERRIN 174*  --    FERRITIN 526.90*  --    FOLATE 15.00  --    VITAMIN B 12 1,322*  --    ABO TYPING  --  O   RH TYPING  --  Negative   ANTIBODY SCREEN  --  Negative         Brief Urine Lab Results  (Last result in the past 365 days)      Color   Clarity   Blood   Leuk Est   Nitrite   Protein   CREAT   Urine HCG        12/03/21 1509 Red   Turbid   Large (3+)   Large (3+)   Negative   >=300 mg/dL (3+)                 Microbiology Results Abnormal     Procedure Component Value - Date/Time    Urine Culture - Urine, Urine, Clean Catch [[id number]] Collected: 12/03/21 1509    Lab Status: Final result Specimen: Urine, Clean Catch Updated: 12/04/21 1130     Urine Culture 50,000 CFU/mL Normal Urogenital Jailyn    Fungus Culture - Body Fluid, Spine, Thoracic [[id number]] Collected: 11/14/21 1511    Lab Status: Preliminary result Specimen: Body Fluid from Spine, Thoracic Updated: 11/28/21 1615     Fungus Culture No fungus isolated at 2 weeks    AFB Culture - Body Fluid, Spine, Thoracic [[id number]] Collected: 11/14/21 1511    Lab Status: Preliminary result Specimen: Body Fluid from Spine, Thoracic Updated: 11/28/21 1615     AFB Culture No AFB isolated at 2 weeks     AFB Stain No acid fast bacilli seen on concentrated smear    Fungus Culture - Tissue, Spine, Cervical [[id number]] Collected: 11/14/21 1450    Lab Status: Preliminary result Specimen: Tissue from Spine, Cervical Updated: 11/28/21 1600      Fungus Culture No fungus isolated at 2 weeks    AFB Culture - Tissue, Spine, Cervical [[id number]] Collected: 11/14/21 1450    Lab Status: Preliminary result Specimen: Tissue from Spine, Cervical Updated: 11/28/21 1600     AFB Culture No AFB isolated at 2 weeks     AFB Stain No acid fast bacilli seen on concentrated smear    Blood Culture - Blood, Arm, Left [[id number]]  (Normal) Collected: 11/20/21 1630    Lab Status: Final result Specimen: Blood from Arm, Left Updated: 11/25/21 1700     Blood Culture No growth at 5 days    Narrative:      Aerobic bottle only      Urine Culture - Urine, Urine, Catheter [[id number]]  (Normal) Collected: 11/20/21 1625    Lab Status: Final result Specimen: Urine, Catheter Updated: 11/21/21 1523     Urine Culture No growth    Anaerobic Culture - Body Fluid, Spine, Thoracic [[id number]] Collected: 11/14/21 1511    Lab Status: Final result Specimen: Body Fluid from Spine, Thoracic Updated: 11/19/21 0700     Anaerobic Culture No anaerobes isolated at 5 days    Anaerobic Culture - Tissue, Spine, Cervical [[id number]] Collected: 11/14/21 1450    Lab Status: Final result Specimen: Tissue from Spine, Cervical Updated: 11/19/21 0700     Anaerobic Culture No anaerobes isolated at 5 days    Body Fluid Culture - Body Fluid, Spine, Thoracic [[id number]] Collected: 11/14/21 1511    Lab Status: Final result Specimen: Body Fluid from Spine, Thoracic Updated: 11/17/21 0933     Body Fluid Culture No growth at 3 days     Gram Stain No WBCs or organisms seen    Fungus Smear - Tissue, Spine, Cervical [[id number]] Collected: 11/14/21 1450    Lab Status: Final result Specimen: Tissue from Spine, Cervical Updated: 11/15/21 1552     Fungal Stain No fungal elements seen    Blood Culture - Blood, Arm, Right [[id number]]  (Normal) Collected: 11/07/21 1101    Lab Status: Final result Specimen: Blood from Arm, Right Updated: 11/12/21 1116     Blood Culture No growth at 5 days    Narrative:      AEROBIC BOTTLE ONLY     Blood Culture - Blood, Hand, Left [[id number]]  (Normal) Collected: 11/07/21 1101    Lab Status: Final result Specimen: Blood from Hand, Left Updated: 11/12/21 1115     Blood Culture No growth at 5 days    Urine Culture - Urine, Urine, Catheter [[id number]]  (Normal) Collected: 11/09/21 1621    Lab Status: Final result Specimen: Urine, Catheter Updated: 11/10/21 1829     Urine Culture No growth          Duplex Venous Upper Extremity - Left CAR    Result Date: 12/4/2021  · Normal left upper extremity venous duplex scan.        Results for orders placed during the hospital encounter of 10/24/21    Adult Transthoracic Echo Complete W/ Cont if Necessary Per Protocol    Interpretation Summary  · Left ventricular ejection fraction appears to be 61 - 65%. Left ventricular systolic function is normal.  · Left ventricular diastolic function was normal.  · Estimated right ventricular systolic pressure from tricuspid regurgitation is normal (<35 mmHg).  · No vegetations seen.  · This is a technically difficult study.      I have reviewed the medications:  Scheduled Meds:alteplase, 2 mg, Intravenous, Once  amLODIPine, 5 mg, Oral, Q24H  buPROPion SR, 150 mg, Oral, BID  castor oil-balsam peru, 1 application, Topical, Q12H  DULoxetine, 30 mg, Oral, QAM  ferric gluconate, 125 mg, Intravenous, Daily  furosemide, 40 mg, Oral, Daily  hydrALAZINE, 50 mg, Oral, Q8H  lactobacillus acidophilus, 1 capsule, Oral, Daily  levETIRAcetam, 500 mg, Oral, Q12H  levothyroxine, 125 mcg, Oral, Q AM  melatonin, 5 mg, Oral, Nightly  multivitamin, 1 tablet, Oral, Daily  Nutrisource fiber, 2 packet, Oral, TID  nystatin, , Topical, Q8H  pantoprazole, 40 mg, Oral, Q AM  senna-docusate sodium, 1 tablet, Oral, Nightly  sodium chloride, 10 mL, Intravenous, Q12H  vancomycin, 1,000 mg, Intravenous, Q24H      Continuous Infusions:Pharmacy to dose vancomycin,       PRN Meds:.•  acetaminophen  •  cyclobenzaprine  •  hydrALAZINE  •  ipratropium-albuterol  •   LORazepam  •  magnesium sulfate **OR** magnesium sulfate **OR** magnesium sulfate  •  naloxone  •  [] HYDROmorphone **AND** naloxone  •  ondansetron **OR** ondansetron  •  oxyCODONE-acetaminophen  •  Pharmacy to dose vancomycin  •  potassium & sodium phosphates **OR** potassium & sodium phosphates  •  potassium chloride  •  sodium chloride  •  sodium chloride    Assessment/Plan   Assessment & Plan     Active Hospital Problems    Diagnosis  POA   • **Spinal cord compression [G95.20]  Yes   • Severe malnutrition (CMS/HCC) [E43]  Yes   • Quadriparesis [G82.50]  No   • COVID-19 virus detected [U07.1]  Yes   • MRSA bacteremia [R78.81, B95.62]  Yes   • Hepatitis C [B19.20]  Yes   • Seizures on Keppra [R56.9]  Yes   • History of CVA [Z86.73]  Not Applicable   • Acute postoperative respiratory insufficiency [J95.89]  No   • Pleural effusion, right [J90]  Yes   • Osteomyelitis of thoracic vertebra [M46.24]  Yes   • 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        Brief Hospital Course to date:  Karely Villarreal is a 56 y.o. female with h/o polysubstance abuse, HCV, HTN, Sz d/o, SLE, Hypothyroidism, CVA, prior COVID infection and homelessness.  Was admitted to [facility] on 10/24/21 for sepsis d/t MRSA and was found to have a paravertebral abscess and was transferred to [facility] on .  Surgery was deferred initially but she developed worsening UE weakness and MRI showed progressive cord compression so she was taken to the OR emergently for decompression on .  She was extubated 11/15 but TF's were started d/t poor PO.  She was transferred initially to telemetry on  but developed worsening hypoxia with AMS and was transferred back to the ICU on bipap on  11/20.  She was transferred back to the hospitalist service on 11/23      MRSA bacteremia  Sepsis  T9/T10 Paravertebral Abscess, T9/T10 Osteomyelitis with spinal cord compression  --s/p decompression, cervical laminectomy and debridement of epidural abscess on 11/14  --on Vancomycin per ID, will need long course at least 12 weeks given the extent of her spinal infection and high grade bacteremia (starting at time of surgery on 11/14)  --repeat MRI C and T spine showed shows 2 fluid collections dorsally (one at C4 and the other more superficial at C6/C7) and T9/T10 still c/w vertebral osteomyelitis.  Re-evaluated by Dr. Jama who noted severe phlegmon circumferentially around cervical and upper thoracic area but no worsening compression, rec'd continue abx, no surgery needed at this time   -AM labs      Anemia  -hb overall stable  -fecal occult pending   -s/p 1 unit PRBC on 12/3  -Iron 19, Iron sat 7   -s/p IV iron, continue po iron  -has some hematuria - monitor for now - suspect related to lupus but renal function stable and would not be able to treat in setting of severe infection with immunosuppresive agents  -urine culture with urogenital batool, hold abx     Acute Respiratory failure  Right Pleural Effusion  Probable Bacterial PNA  --improved  --tolerating daily PO Lasix well   -- s/p 7 days of merrem and doxy per ID     Malnutrition  --d/c TF's and dobhoff 11/30/21,   --Increased appetite, patient now eating more       Diarrhea  --s/p rectal tube, improved  --Continue fiber supplement      Bilateral Knee Pain  --Bilateral xrays showed large bilateral knee effusions, moderate osteoarthrosis worse in patellofemoral compartments  -PT/OT      LUE Edema  -LUE venous duplex on 11/7 negative for evidence of acute DVT  -Elevate extremity   -Repeat venous duplex 12/3 negative for DVT    HX Seizure Disorder  --cont keppra      Recent COVID PNA     Acute Right Axilla abscess  --cx MRSA     Polysubstance abuse  --UDS  positive for meth/opiates, self medicating at [facility] with klonopin and oxycodone and went into respiratory depression     SLE/Discoid lupus  --holding plaquenil due to severity of active infection     Insomnia   -Melatonin PRN      Hx CVA     DVT prophylaxis:  Mechanical DVT prophylaxis orders are present.       AM-PAC 6 Clicks Score (PT): 6 (12/04/21 0800)    Disposition: I expect the patient to be discharged TBD, needs long term care    CODE STATUS:   Code Status and Medical Interventions:   Ordered at: 11/06/21 0588     Code Status (Patient has no pulse and is not breathing):    CPR (Attempt to Resuscitate)     Medical Interventions (Patient has pulse or is breathing):    Full Support       Lisa Johnson, DO  12/05/21            d

## 2021-12-05 NOTE — PLAN OF CARE
Problem: Adult Inpatient Plan of Care  Goal: Plan of Care Review  Outcome: Ongoing, Progressing  Flowsheets (Taken 12/5/2021 0437)  Progress: improving  Plan of Care Reviewed With: patient  Goal: Patient-Specific Goal (Individualized)  Outcome: Ongoing, Progressing  Goal: Absence of Hospital-Acquired Illness or Injury  Outcome: Ongoing, Progressing  Intervention: Identify and Manage Fall Risk  Recent Flowsheet Documentation  Taken 12/5/2021 0400 by Cheyanne Palomares RN  Safety Promotion/Fall Prevention:   activity supervised   assistive device/personal items within reach   safety round/check completed  Taken 12/5/2021 0200 by Cheyanne Palomares RN  Safety Promotion/Fall Prevention:   activity supervised   assistive device/personal items within reach   safety round/check completed  Taken 12/5/2021 0000 by Cheyanne Palomares RN  Safety Promotion/Fall Prevention:   activity supervised   assistive device/personal items within reach   safety round/check completed  Taken 12/4/2021 2200 by Cheyanne Palomares RN  Safety Promotion/Fall Prevention:   activity supervised   assistive device/personal items within reach   safety round/check completed  Taken 12/4/2021 2000 by Cheyanne Palomares RN  Safety Promotion/Fall Prevention:   activity supervised   assistive device/personal items within reach   clutter free environment maintained   fall prevention program maintained   lighting adjusted   muscle strengthening facilitated   nonskid shoes/slippers when out of bed   room organization consistent   safety round/check completed  Intervention: Prevent Skin Injury  Recent Flowsheet Documentation  Taken 12/5/2021 0400 by Cheyanne Palomares RN  Body Position:   tilted, right   weight shift assistance provided  Taken 12/5/2021 0200 by Cheyanne Palomares RN  Body Position:   tilted, left   weight shift assistance provided  Taken 12/5/2021 0000 by Cheyanne Palomares RN  Body Position:   tilted, right   weight shift assistance provided  Taken  12/4/2021 2200 by Cheyanne Palomares RN  Body Position: position maintained  Taken 12/4/2021 2100 by Cheyanne Palomares RN  Body Position:   tilted, left   weight shift assistance provided  Taken 12/4/2021 2000 by Cheyanne Palomares RN  Body Position: position maintained  Skin Protection:   adhesive use limited   incontinence pads utilized  Intervention: Prevent and Manage VTE (venous thromboembolism) Risk  Recent Flowsheet Documentation  Taken 12/5/2021 0400 by Cheyanne Palomares RN  VTE Prevention/Management:   bilateral   sequential compression devices on  Taken 12/5/2021 0200 by Cheyanne Palomares RN  VTE Prevention/Management:   bilateral   sequential compression devices off   patient refused intervention  Taken 12/5/2021 0000 by Cheyanne Palomares RN  VTE Prevention/Management:   bilateral   sequential compression devices off   patient refused intervention  Taken 12/4/2021 2200 by Cheyanne Palomares RN  VTE Prevention/Management:   bilateral   sequential compression devices off   patient refused intervention  Taken 12/4/2021 2000 by Cheyanne Palomares RN  VTE Prevention/Management:   bilateral   sequential compression devices off   patient refused intervention  Intervention: Prevent Infection  Recent Flowsheet Documentation  Taken 12/5/2021 0400 by Cheyanne Palomares RN  Infection Prevention:   hand hygiene promoted   rest/sleep promoted  Taken 12/5/2021 0200 by Cheyanne Palomares RN  Infection Prevention:   hand hygiene promoted   rest/sleep promoted  Taken 12/5/2021 0000 by Cheyanne Palomares RN  Infection Prevention:   hand hygiene promoted   rest/sleep promoted  Taken 12/4/2021 2200 by Cheyanne Palomares RN  Infection Prevention:   hand hygiene promoted   rest/sleep promoted  Taken 12/4/2021 2000 by Cheyanne Palomares RN  Infection Prevention:   hand hygiene promoted   rest/sleep promoted  Goal: Optimal Comfort and Wellbeing  Outcome: Ongoing, Progressing  Intervention: Provide Person-Centered Care  Recent Flowsheet  Documentation  Taken 12/5/2021 0400 by Cheyanne Palomares RN  Trust Relationship/Rapport: care explained  Taken 12/5/2021 0200 by Cheyanne Palomares RN  Trust Relationship/Rapport: care explained  Taken 12/5/2021 0000 by Cheyanne Palomares RN  Trust Relationship/Rapport: care explained  Taken 12/4/2021 2200 by Cheyanne Palomares RN  Trust Relationship/Rapport: care explained  Taken 12/4/2021 2000 by Cheyanne Palomares RN  Trust Relationship/Rapport:   care explained   choices provided   questions answered   questions encouraged   reassurance provided  Goal: Readiness for Transition of Care  Outcome: Ongoing, Progressing     Problem: Hypertension Comorbidity  Goal: Blood Pressure in Desired Range  Outcome: Ongoing, Progressing  Intervention: Maintain Hypertension-Management Strategies  Recent Flowsheet Documentation  Taken 12/4/2021 2000 by Cheyanne Palomares RN  Medication Review/Management: medications reviewed     Problem: Pain Chronic (Persistent) (Comorbidity Management)  Goal: Acceptable Pain Control and Functional Ability  Outcome: Ongoing, Progressing  Intervention: Develop Pain Management Plan  Recent Flowsheet Documentation  Taken 12/5/2021 0000 by Cheyanne Palomares RN  Pain Management Interventions: quiet environment facilitated  Taken 12/4/2021 2000 by Cheyanne Palomares RN  Pain Management Interventions:   quiet environment facilitated   see MAR   relaxation techniques promoted   care clustered   breathing exercises  Intervention: Manage Persistent Pain  Recent Flowsheet Documentation  Taken 12/4/2021 2000 by Cheyanne Palomares RN  Medication Review/Management: medications reviewed  Intervention: Optimize Psychosocial Wellbeing  Recent Flowsheet Documentation  Taken 12/4/2021 2000 by Cheyanne Palomares RN  Diversional Activities: television  Family/Support System Care:   self-care encouraged   support provided     Problem: Fall Injury Risk  Goal: Absence of Fall and Fall-Related Injury  Outcome: Ongoing,  Progressing  Intervention: Identify and Manage Contributors to Fall Injury Risk  Recent Flowsheet Documentation  Taken 12/5/2021 0400 by Cheyanne Palomares RN  Self-Care Promotion: independence encouraged  Taken 12/5/2021 0200 by Cheyanne Palomares RN  Self-Care Promotion: independence encouraged  Taken 12/5/2021 0000 by Cheyanne Palomares RN  Self-Care Promotion: independence encouraged  Taken 12/4/2021 2200 by Cheyanne Palomares RN  Self-Care Promotion: independence encouraged  Taken 12/4/2021 2100 by Cheyanne Palomares RN  Self-Care Promotion: independence encouraged  Taken 12/4/2021 2000 by Cheyanne Palomares RN  Medication Review/Management: medications reviewed  Self-Care Promotion: independence encouraged  Intervention: Promote Injury-Free Environment  Recent Flowsheet Documentation  Taken 12/5/2021 0400 by Cheyanne Palomares RN  Safety Promotion/Fall Prevention:   activity supervised   assistive device/personal items within reach   safety round/check completed  Taken 12/5/2021 0200 by Cheyanne Palomares RN  Safety Promotion/Fall Prevention:   activity supervised   assistive device/personal items within reach   safety round/check completed  Taken 12/5/2021 0000 by Cheyanne Palomares RN  Safety Promotion/Fall Prevention:   activity supervised   assistive device/personal items within reach   safety round/check completed  Taken 12/4/2021 2200 by Cheyanne Palomares RN  Safety Promotion/Fall Prevention:   activity supervised   assistive device/personal items within reach   safety round/check completed  Taken 12/4/2021 2000 by Cheyanne Palomares RN  Safety Promotion/Fall Prevention:   activity supervised   assistive device/personal items within reach   clutter free environment maintained   fall prevention program maintained   lighting adjusted   muscle strengthening facilitated   nonskid shoes/slippers when out of bed   room organization consistent   safety round/check completed     Problem: Fluid Imbalance (Pneumonia)  Goal: Fluid  Balance  Outcome: Ongoing, Progressing     Problem: Infection (Pneumonia)  Goal: Resolution of Infection Signs and Symptoms  Outcome: Ongoing, Progressing  Intervention: Prevent Infection Progression  Recent Flowsheet Documentation  Taken 12/4/2021 2000 by Cheyanne Palomares RN  Infection Management: aseptic technique maintained     Problem: Respiratory Compromise (Pneumonia)  Goal: Effective Oxygenation and Ventilation  Outcome: Ongoing, Progressing  Intervention: Promote Airway Secretion Clearance  Recent Flowsheet Documentation  Taken 12/5/2021 0400 by Cheyanne Palomares RN  Cough And Deep Breathing: done independently per patient  Taken 12/5/2021 0200 by Cheyanne Palomares RN  Cough And Deep Breathing: done independently per patient  Taken 12/5/2021 0000 by Cheyanne Palomares RN  Cough And Deep Breathing: done independently per patient  Taken 12/4/2021 2200 by Cheyanne Palomares RN  Cough And Deep Breathing: done independently per patient  Taken 12/4/2021 2000 by Cheyanne Palomares RN  Cough And Deep Breathing: done independently per patient  Intervention: Optimize Oxygenation and Ventilation  Recent Flowsheet Documentation  Taken 12/5/2021 0400 by Cheyanne Palomares RN  Head of Bed (HOB): HOB elevated  Taken 12/5/2021 0200 by Cheyanne Palomares RN  Head of Bed (HOB): HOB elevated  Taken 12/5/2021 0000 by Cheyanne Palomares RN  Head of Bed (HOB): HOB elevated  Taken 12/4/2021 2200 by Cheyanne Palomares RN  Head of Bed (HOB): HOB elevated  Taken 12/4/2021 2100 by Cheyanne Palomares RN  Head of Bed (HOB): HOB elevated  Taken 12/4/2021 2000 by Cheyanne Palomares RN  Head of Bed (HOB): HOB elevated     Problem: Social Isolation  Goal: Increased Social Interaction  Outcome: Ongoing, Progressing     Problem: Skin Injury Risk Increased  Goal: Skin Health and Integrity  Outcome: Ongoing, Progressing  Intervention: Optimize Skin Protection  Recent Flowsheet Documentation  Taken 12/5/2021 0400 by Cheyanne Palomares RN  Head of Bed (HOB): HOB  elevated  Taken 12/5/2021 0200 by Cheyanne Palomares RN  Head of Bed (HOB): HOB elevated  Taken 12/5/2021 0000 by Cheyanne Palomares RN  Head of Bed (HOB): HOB elevated  Taken 12/4/2021 2200 by Cheyanne Palomares RN  Head of Bed (HOB): HOB elevated  Taken 12/4/2021 2100 by Cheyanne Palomares RN  Head of Bed (HOB): HOB elevated  Taken 12/4/2021 2000 by Cheyanne Palomares RN  Pressure Reduction Techniques: weight shift assistance provided  Head of Bed (HOB): HOB elevated  Pressure Reduction Devices: specialty bed utilized  Skin Protection:   adhesive use limited   incontinence pads utilized     Problem: Bleeding (Spinal Surgery)  Goal: Absence of Bleeding  Outcome: Ongoing, Progressing     Problem: Bowel Elimination Impaired (Spinal Surgery)  Goal: Effective Bowel Elimination  Outcome: Ongoing, Progressing     Problem: Functional Ability Impaired (Spinal Surgery)  Goal: Optimal Functional Ability  Outcome: Ongoing, Progressing  Intervention: Optimize Functional Status  Recent Flowsheet Documentation  Taken 12/5/2021 0400 by Cheyanne Palomares RN  Activity Management: bedrest  Positioning/Transfer Devices:   pillows   in use  Self-Care Promotion: independence encouraged  Taken 12/5/2021 0200 by Cheyanne Palomares RN  Activity Management: bedrest  Positioning/Transfer Devices:   pillows   in use  Self-Care Promotion: independence encouraged  Taken 12/5/2021 0000 by Cheyanne Palomares RN  Activity Management: bedrest  Positioning/Transfer Devices:   pillows   in use  Self-Care Promotion: independence encouraged  Taken 12/4/2021 2200 by Cheyanne Palomares RN  Activity Management: bedrest  Positioning/Transfer Devices:   pillows   in use  Self-Care Promotion: independence encouraged  Taken 12/4/2021 2100 by Cheyanne Palomares RN  Positioning/Transfer Devices:   pillows   in use  Self-Care Promotion: independence encouraged  Taken 12/4/2021 2000 by Cheyanne Palomares RN  Activity Management: bedrest  Positioning/Transfer Devices:   pillows   in  use  Self-Care Promotion: independence encouraged     Problem: Infection (Spinal Surgery)  Goal: Absence of Infection Signs and Symptoms  Outcome: Ongoing, Progressing  Intervention: Prevent or Manage Infection  Recent Flowsheet Documentation  Taken 12/4/2021 2000 by Cheyanne Palomares RN  Infection Management: aseptic technique maintained     Problem: Neurologic Impairment (Spinal Surgery)  Goal: Optimal Neurologic Function  Outcome: Ongoing, Progressing  Intervention: Optimize Neurologic Function  Recent Flowsheet Documentation  Taken 12/5/2021 0400 by Cheyanne Palomares RN  Body Position:   tilted, right   weight shift assistance provided  Taken 12/5/2021 0200 by Cheyanne Palomares RN  Body Position:   tilted, left   weight shift assistance provided  Taken 12/5/2021 0000 by Cheyanne Palomares RN  Body Position:   tilted, right   weight shift assistance provided  Range of Motion: active ROM (range of motion) encouraged  Taken 12/4/2021 2200 by Cheyanne Palomares RN  Body Position: position maintained  Taken 12/4/2021 2100 by Cheyanne Palomares RN  Body Position:   tilted, left   weight shift assistance provided  Taken 12/4/2021 2000 by Cheyanne Palomares RN  Body Position: position maintained  Pressure Reduction Devices: specialty bed utilized  Range of Motion: active ROM (range of motion) encouraged  Intervention: Optimize Eating and Swallowing  Recent Flowsheet Documentation  Taken 12/4/2021 2000 by Cheyanne Palomares RN  Aspiration Precautions: awake/alert before oral intake     Problem: Ongoing Anesthesia Effects (Spinal Surgery)  Goal: Anesthesia/Sedation Recovery  Outcome: Ongoing, Progressing  Intervention: Optimize Anesthesia Recovery  Recent Flowsheet Documentation  Taken 12/5/2021 0400 by Cheyanne Palomares RN  Safety Promotion/Fall Prevention:   activity supervised   assistive device/personal items within reach   safety round/check completed  Taken 12/5/2021 0200 by Cheyanne Palomares RN  Safety Promotion/Fall  Prevention:   activity supervised   assistive device/personal items within reach   safety round/check completed  Taken 12/5/2021 0000 by Cheyanne Palomares RN  Safety Promotion/Fall Prevention:   activity supervised   assistive device/personal items within reach   safety round/check completed  Taken 12/4/2021 2200 by Cheyanne Palomares RN  Safety Promotion/Fall Prevention:   activity supervised   assistive device/personal items within reach   safety round/check completed  Taken 12/4/2021 2000 by Cheyanne Palomares RN  Patient Tolerance (IS): poor  Safety Promotion/Fall Prevention:   activity supervised   assistive device/personal items within reach   clutter free environment maintained   fall prevention program maintained   lighting adjusted   muscle strengthening facilitated   nonskid shoes/slippers when out of bed   room organization consistent   safety round/check completed  Administration (IS): proper technique demonstrated     Problem: Pain (Spinal Surgery)  Goal: Acceptable Pain Control  Outcome: Ongoing, Progressing  Intervention: Prevent or Manage Pain  Recent Flowsheet Documentation  Taken 12/5/2021 0000 by Cheyanne Palomares RN  Pain Management Interventions: quiet environment facilitated  Taken 12/4/2021 2000 by Cheyanne Palomares RN  Pain Management Interventions:   quiet environment facilitated   see MAR   relaxation techniques promoted   care clustered   breathing exercises     Problem: Postoperative Nausea and Vomiting (Spinal Surgery)  Goal: Nausea and Vomiting Relief  Outcome: Ongoing, Progressing     Problem: Postoperative Urinary Retention (Spinal Surgery)  Goal: Effective Urinary Elimination  Outcome: Ongoing, Progressing     Problem: Activity and Energy Impairment (Anxiety Signs/Symptoms)  Goal: Optimized Energy Level (Anxiety Signs/Symptoms)  Outcome: Ongoing, Progressing     Problem: Cognitive Impairment (Anxiety Signs/Symptoms)  Goal: Optimized Cognitive Function (Anxiety Signs/Symptoms)  Outcome:  Ongoing, Progressing     Problem: Mood Impairment (Anxiety Signs/Symptoms)  Goal: Improved Mood Symptoms (Anxiety Signs/Symptoms)  Outcome: Ongoing, Progressing     Problem: Sleep Disturbance (Anxiety Signs/Symptoms)  Goal: Improved Sleep (Anxiety Signs/Symptoms)  Outcome: Ongoing, Progressing     Problem: Social, Occupational or Functional Impairment (Anxiety Signs/Symptoms)  Goal: Enhanced Social, Occupational or Functional Skills (Anxiety Signs/Symptoms)  Outcome: Ongoing, Progressing  Intervention: Promote Social, Occupational and Functional Ability  Recent Flowsheet Documentation  Taken 12/5/2021 0400 by Cheyanne Palomares RN  Trust Relationship/Rapport: care explained  Taken 12/5/2021 0200 by Cheyanne Palomares RN  Trust Relationship/Rapport: care explained  Taken 12/5/2021 0000 by Cheyanne Palomares RN  Trust Relationship/Rapport: care explained  Taken 12/4/2021 2200 by Cheyanne Palomares RN  Trust Relationship/Rapport: care explained  Taken 12/4/2021 2000 by Cheyanne Palomares RN  Trust Relationship/Rapport:   care explained   choices provided   questions answered   questions encouraged   reassurance provided     Problem: Somatic Disturbance (Anxiety Signs/Symptoms)  Goal: Improved Somatic Symptoms (Anxiety Signs/Symptoms)  Outcome: Ongoing, Progressing   Goal Outcome Evaluation:  Plan of Care Reviewed With: patient      Pt currently in bed resting quietly. Complaints of generalized pain throughout shift. Vitals WNL on 2LNC. Specialty bed utilized, turned q2. Incontinent of bladder, external catheter used. SKUDS on. Pt complaints of SOB at beginning of shift, PRN Ativan administered and alleviated. Coccyx cleaned and barrier applied several times. Edema to extremities unchanged. No other observations at this time. Will continue to monitor, call bell in reach.  Progress: improving

## 2021-12-06 LAB
ALBUMIN SERPL-MCNC: 2.7 G/DL (ref 3.5–5.2)
ANION GAP SERPL CALCULATED.3IONS-SCNC: 10 MMOL/L (ref 5–15)
BASOPHILS # BLD AUTO: 0.05 10*3/MM3 (ref 0–0.2)
BASOPHILS NFR BLD AUTO: 0.6 % (ref 0–1.5)
BUN SERPL-MCNC: 18 MG/DL (ref 6–20)
BUN/CREAT SERPL: 20.9 (ref 7–25)
CALCIUM SPEC-SCNC: 10.2 MG/DL (ref 8.6–10.5)
CHLORIDE SERPL-SCNC: 92 MMOL/L (ref 98–107)
CO2 SERPL-SCNC: 38 MMOL/L (ref 22–29)
CREAT SERPL-MCNC: 0.86 MG/DL (ref 0.57–1)
DEPRECATED RDW RBC AUTO: 50.5 FL (ref 37–54)
EOSINOPHIL # BLD AUTO: 0.1 10*3/MM3 (ref 0–0.4)
EOSINOPHIL NFR BLD AUTO: 1.3 % (ref 0.3–6.2)
ERYTHROCYTE [DISTWIDTH] IN BLOOD BY AUTOMATED COUNT: 16.4 % (ref 12.3–15.4)
GFR SERPL CREATININE-BSD FRML MDRD: 69 ML/MIN/1.73
GLUCOSE SERPL-MCNC: 112 MG/DL (ref 65–99)
HCT VFR BLD AUTO: 28.7 % (ref 34–46.6)
HGB BLD-MCNC: 9.2 G/DL (ref 12–15.9)
IMM GRANULOCYTES # BLD AUTO: 0.05 10*3/MM3 (ref 0–0.05)
IMM GRANULOCYTES NFR BLD AUTO: 0.6 % (ref 0–0.5)
LYMPHOCYTES # BLD AUTO: 0.99 10*3/MM3 (ref 0.7–3.1)
LYMPHOCYTES NFR BLD AUTO: 12.9 % (ref 19.6–45.3)
MCH RBC QN AUTO: 27.7 PG (ref 26.6–33)
MCHC RBC AUTO-ENTMCNC: 32.1 G/DL (ref 31.5–35.7)
MCV RBC AUTO: 86.4 FL (ref 79–97)
MONOCYTES # BLD AUTO: 0.43 10*3/MM3 (ref 0.1–0.9)
MONOCYTES NFR BLD AUTO: 5.6 % (ref 5–12)
NEUTROPHILS NFR BLD AUTO: 6.08 10*3/MM3 (ref 1.7–7)
NEUTROPHILS NFR BLD AUTO: 79 % (ref 42.7–76)
NRBC BLD AUTO-RTO: 0 /100 WBC (ref 0–0.2)
PHOSPHATE SERPL-MCNC: 3.2 MG/DL (ref 2.5–4.5)
PLATELET # BLD AUTO: 215 10*3/MM3 (ref 140–450)
PMV BLD AUTO: 7.9 FL (ref 6–12)
POTASSIUM SERPL-SCNC: 3.3 MMOL/L (ref 3.5–5.2)
POTASSIUM SERPL-SCNC: 4.6 MMOL/L (ref 3.5–5.2)
RBC # BLD AUTO: 3.32 10*6/MM3 (ref 3.77–5.28)
SODIUM SERPL-SCNC: 140 MMOL/L (ref 136–145)
VANCOMYCIN TROUGH SERPL-MCNC: 20.6 MCG/ML (ref 5–20)
WBC NRBC COR # BLD: 7.7 10*3/MM3 (ref 3.4–10.8)

## 2021-12-06 PROCEDURE — 80069 RENAL FUNCTION PANEL: CPT | Performed by: INTERNAL MEDICINE

## 2021-12-06 PROCEDURE — 85025 COMPLETE CBC W/AUTO DIFF WBC: CPT | Performed by: INTERNAL MEDICINE

## 2021-12-06 PROCEDURE — 80202 ASSAY OF VANCOMYCIN: CPT

## 2021-12-06 PROCEDURE — 25010000002 NA FERRIC GLUC CPLX PER 12.5 MG: Performed by: NURSE PRACTITIONER

## 2021-12-06 PROCEDURE — 84132 ASSAY OF SERUM POTASSIUM: CPT | Performed by: INTERNAL MEDICINE

## 2021-12-06 PROCEDURE — 99233 SBSQ HOSP IP/OBS HIGH 50: CPT | Performed by: INTERNAL MEDICINE

## 2021-12-06 RX ADMIN — HYDRALAZINE HYDROCHLORIDE 50 MG: 50 TABLET, FILM COATED ORAL at 14:12

## 2021-12-06 RX ADMIN — LEVETIRACETAM 500 MG: 500 TABLET, FILM COATED ORAL at 20:34

## 2021-12-06 RX ADMIN — SODIUM CHLORIDE, PRESERVATIVE FREE 10 ML: 5 INJECTION INTRAVENOUS at 20:35

## 2021-12-06 RX ADMIN — PANTOPRAZOLE SODIUM 40 MG: 40 TABLET, DELAYED RELEASE ORAL at 05:47

## 2021-12-06 RX ADMIN — OXYCODONE AND ACETAMINOPHEN 1 TABLET: 5; 325 TABLET ORAL at 20:34

## 2021-12-06 RX ADMIN — BUPROPION HYDROCHLORIDE 150 MG: 150 TABLET, EXTENDED RELEASE ORAL at 08:45

## 2021-12-06 RX ADMIN — CASTOR OIL AND BALSAM, PERU 1 APPLICATION: 788; 87 OINTMENT TOPICAL at 08:48

## 2021-12-06 RX ADMIN — Medication 2 PACKET: at 20:35

## 2021-12-06 RX ADMIN — AMLODIPINE BESYLATE 5 MG: 5 TABLET ORAL at 08:45

## 2021-12-06 RX ADMIN — POTASSIUM CHLORIDE 40 MEQ: 1.5 POWDER, FOR SOLUTION ORAL at 12:51

## 2021-12-06 RX ADMIN — POTASSIUM CHLORIDE 40 MEQ: 1.5 POWDER, FOR SOLUTION ORAL at 08:56

## 2021-12-06 RX ADMIN — Medication 5 MG: at 20:34

## 2021-12-06 RX ADMIN — LORAZEPAM 1 MG: 1 TABLET ORAL at 20:41

## 2021-12-06 RX ADMIN — HYDRALAZINE HYDROCHLORIDE 50 MG: 50 TABLET, FILM COATED ORAL at 05:47

## 2021-12-06 RX ADMIN — Medication 1 CAPSULE: at 08:45

## 2021-12-06 RX ADMIN — NYSTATIN: 100000 POWDER TOPICAL at 21:30

## 2021-12-06 RX ADMIN — SODIUM CHLORIDE, PRESERVATIVE FREE 10 ML: 5 INJECTION INTRAVENOUS at 08:48

## 2021-12-06 RX ADMIN — BUPROPION HYDROCHLORIDE 150 MG: 150 TABLET, EXTENDED RELEASE ORAL at 20:34

## 2021-12-06 RX ADMIN — NYSTATIN: 100000 POWDER TOPICAL at 05:48

## 2021-12-06 RX ADMIN — HYDRALAZINE HYDROCHLORIDE 50 MG: 50 TABLET, FILM COATED ORAL at 20:34

## 2021-12-06 RX ADMIN — SENNOSIDES AND DOCUSATE SODIUM 1 TABLET: 50; 8.6 TABLET ORAL at 20:34

## 2021-12-06 RX ADMIN — FUROSEMIDE 40 MG: 40 TABLET ORAL at 08:45

## 2021-12-06 RX ADMIN — LEVOTHYROXINE SODIUM 125 MCG: 125 TABLET ORAL at 05:47

## 2021-12-06 RX ADMIN — DULOXETINE HYDROCHLORIDE 30 MG: 30 CAPSULE, DELAYED RELEASE ORAL at 08:45

## 2021-12-06 RX ADMIN — LEVETIRACETAM 500 MG: 500 TABLET, FILM COATED ORAL at 08:45

## 2021-12-06 RX ADMIN — SODIUM CHLORIDE 125 MG: 9 INJECTION, SOLUTION INTRAVENOUS at 08:45

## 2021-12-06 RX ADMIN — MULTIVITAMIN TABLET 1 TABLET: TABLET at 08:45

## 2021-12-06 RX ADMIN — LORAZEPAM 1 MG: 1 TABLET ORAL at 08:45

## 2021-12-06 RX ADMIN — OXYCODONE AND ACETAMINOPHEN 1 TABLET: 5; 325 TABLET ORAL at 08:45

## 2021-12-06 RX ADMIN — CASTOR OIL AND BALSAM, PERU 1 APPLICATION: 788; 87 OINTMENT TOPICAL at 20:38

## 2021-12-06 RX ADMIN — CYCLOBENZAPRINE 5 MG: 10 TABLET, FILM COATED ORAL at 12:51

## 2021-12-06 RX ADMIN — NYSTATIN: 100000 POWDER TOPICAL at 14:12

## 2021-12-06 NOTE — PROGRESS NOTES
Pharmacy Consult-Vancomycin Dosing  Karely Villarreal is a  56 y.o. female receiving vancomycin therapy.     IIndication: MRSA bacteremia, osteomyelitis, epidural abscess   Consulting Provider: VICTOR HUGO Garcia  ID Consult: Yes    Goal AUC: 400 - 600 mg/L*hr    Current Antimicrobial Therapy  Anti-Infectives (From admission, onward)      Ordered     Dose/Rate Route Frequency Start Stop    12/06/21 0946  vancomycin in dextrose 5% 150 mL (VANCOCIN) IVPB 750 mg        Ordering Provider: Tashi Dinh, PharmD    750 mg  over 60 Minutes Intravenous Daily With Lunch 12/07/21 1200 12/12/21 1159    11/20/21 1540  meropenem (MERREM) 1 g/100 mL 0.9% NS (mbp)        Ordering Provider: Jonn Mchugh MD    1 g  over 3 Hours Intravenous Every 8 Hours 11/20/21 2200 11/27/21 1728    11/20/21 1551  vancomycin 1750 mg/500 mL 0.9% NS IVPB ([facility])        Ordering Provider: Jeny Beltran, PharmD    1,750 mg  over 120 Minutes Intravenous Once 11/20/21 1645 11/20/21 1830    11/20/21 1540  meropenem (MERREM) 1 g/100 mL 0.9% NS (mbp)        Ordering Provider: Jay Turcios PA    1 g  over 30 Minutes Intravenous Once 11/20/21 1630 11/20/21 1700    11/20/21 1540  doxycycline (VIBRAMYCIN) 100 mg/100 mL 0.9% NS MBP        Ordering Provider: Jonn Mchugh MD    100 mg  over 60 Minutes Intravenous Every 12 Hours Scheduled 11/20/21 1600 11/27/21 1017    11/20/21 1540  Pharmacy to dose vancomycin        Ordering Provider: Jonn Mchugh MD     Does not apply Continuous PRN 11/20/21 1537 01/03/22 1536    11/14/21 1247  ceFAZolin in dextrose (ANCEF) IVPB solution 2 g        Ordering Provider: Diego Alvarenga PA-C    2 g  over 30 Minutes Intravenous Once 11/14/21 1345 11/14/21 1339            Allergies  Allergies as of 11/06/2021 - Reviewed 10/28/2021   Allergen Reaction Noted    Compazine [prochlorperazine edisylate] Dystonia 06/18/2016    Imitrex [sumatriptan] Other (See Comments) 06/18/2016    Nsaids GI Bleeding 06/18/2016    Reglan  "[metoclopramide] Dystonia 06/18/2016    Solu-medrol [methylprednisolone] Dystonia 12/24/2016    Zyprexa [olanzapine] Swelling 06/18/2016    Prednisone Anxiety 07/18/2019       Labs    Results from last 7 days   Lab Units 12/06/21  0840 12/05/21  0825 12/04/21  1128   BUN mg/dL 18 17 20   CREATININE mg/dL 0.86 0.75 0.79       Results from last 7 days   Lab Units 12/05/21  0825 12/04/21  1128 12/03/21  0926   WBC 10*3/mm3 5.62 7.15 5.64       Evaluation of Dosing     Is Patient on Dialysis or Renal Replacement: no    Ht - 167.6 cm (66\")  Wt - 87.1 kg (192 lb)    Estimated Creatinine Clearance: 82.1 mL/min (by C-G formula based on SCr of 0.86 mg/dL).    Intake & Output (last 3 days)         12/03 0701 12/04 0700 12/04 0701 12/05 0700 12/05 0701 12/06 0700 12/06 0701  12/07 0700    P.O.  118 0     Blood 350       Total Intake(mL/kg) 350 (4) 118 (1.4) 0 (0)     Urine (mL/kg/hr) 1625 (0.8) 1175 (0.6) 2150 (1)     Total Output 1625 1175 2150     Net -1275 -1057 -2150                     Microbiology and Radiology  Microbiology Results (last 10 days)       Procedure Component Value - Date/Time    Urine Culture - Urine, Urine, Clean Catch [[id number]] Collected: 12/03/21 1509    Lab Status: Final result Specimen: Urine, Clean Catch Updated: 12/04/21 1130     Urine Culture 50,000 CFU/mL Normal Urogenital Jailyn            Reported Vancomycin Levels    Results from last 7 days   Lab Units 11/30/21  0801   VANCOMYCIN RM mcg/mL 24.90             Results from last 7 days   Lab Units 12/06/21  0840 12/04/21 2015 12/03/21  1622   VANCOMYCIN TR mcg/mL 20.60* 23.30* 24.10*          [facility] AUC Calculation:    Current AUC:   548 mg/L*hr    Predicted Steady State AUC on Current Dose: >618 mg/L*hr  _________________________________    Predicted Steady State AUC on New Dose:   418 mg/L*hr    Assessment/Plan:   Vancomycin trough elevated on 12/6. Will decrease dose to 750mg iv every 24 hours.   Trough scheduled for 1130 on 12/8. "   Renal function near baseline.   Pharmacy will continue to follow.    Tashi Dinh, PharmD  12/6/2021  09:47 EST

## 2021-12-06 NOTE — PLAN OF CARE
Goal Outcome Evaluation:  Plan of Care Reviewed With: patient        Progress: improving  Pt had good night. Pain treated with po meds. VSS. Speciality bed utilized , Q2h turned. Barrier cream applied to coccyx. Call light with in reach.

## 2021-12-06 NOTE — CASE MANAGEMENT/SOCIAL WORK
Continued Stay Note  [facility]     Patient Name: Karely Villarreal  MRN: [id number]  Today's Date: 12/6/2021    Admit Date: 11/6/2021     Discharge Plan     Row Name 12/06/21 1613       Plan    Plan Comments MSW spoke with Josy with [facility]. Pt does meet their LTAC criteria, however unable to offer pt a bed at this time due to length of antibiotic plan. Josy will continue to follow pt. Pt may be more appropriate for long term care. MSW continues to follow for discharge needs.               Discharge Codes    No documentation.               Expected Discharge Date and Time     Expected Discharge Date Expected Discharge Time    Dec 7, 2021             ROSE MARY Ma

## 2021-12-06 NOTE — PROGRESS NOTES
[facility] Medicine Services  PROGRESS NOTE    Patient Name: Karely Villarreal  : 1965  MRN: [id number]    Date of Admission: 2021  Primary Care Physician: Tracie Levi APRN    Subjective   Subjective     CC:  Peravertebral abscess, spinal osteo    HPI:  Complaining of feeling cold, fatigued    ROS:  Gen- No fevers, chills  CV- No chest pain, palpitations  Resp- No cough, dyspnea  GI- No N/V/D, abd pain        Objective   Objective     Vital Signs:   Temp:  [98.4 °F (36.9 °C)-98.5 °F (36.9 °C)] 98.5 °F (36.9 °C)  Heart Rate:  [] 92  Resp:  [16] 16  BP: (148-162)/(86-95) 149/86  Flow (L/min):  [2-3] 3     Physical Exam:  Constitutional: No acute distress, chronically ill-appearing  HENT: NCAT, mucous membranes moist  Respiratory: Clear to auscultation bilaterally, respiratory effort normal   Cardiovascular: RRR, no murmurs, rubs, or gallops  Gastrointestinal: Positive bowel sounds, soft, nontender, nondistended  Musculoskeletal: No bilateral ankle edema  Psychiatric: Appropriate affect, cooperative, chronic left upper extremity weakness  Neurologic: Oriented x 2-3, speech clear  Skin: No rashes      Results Reviewed:  LAB RESULTS:      Lab 21  0825 21  1128 21  0002 21  1622 21  0926 21  0955   WBC 5.62 7.15  --   --  5.64  --    HEMOGLOBIN 7.6* 8.3* 8.3* 7.0* 7.0*  --    HEMATOCRIT 25.0* 27.4* 27.2* 23.7* 23.5*  --    PLATELETS 182 191  --   --  181  --    NEUTROS ABS  --  5.84  --   --   --   --    IMMATURE GRANS (ABS)  --  0.04  --   --   --   --    LYMPHS ABS  --  0.74  --   --   --   --    MONOS ABS  --  0.42  --   --   --   --    EOS ABS  --  0.06  --   --   --   --    MCV 88.3 90.4  --   --  86.7  --    CRP  --   --   --   --   --  4.78*         Lab 21  0825 21  1128 21  0926 21  0953 21  0512 21  0955 21  0955   SODIUM 139 141 140 139 139   < > 141   POTASSIUM 3.4* 3.8 4.1 4.3 4.4   <  > 4.7   CHLORIDE 94* 95* 94* 92* 94*   < > 97*   CO2 38.0* 38.0* 39.0* 39.0* 40.0*   < > 38.0*   ANION GAP 7.0 8.0 7.0 8.0 5.0   < > 6.0   BUN 17 20 19 21* 24*   < > 28*   CREATININE 0.75 0.79 0.60 0.68 0.63   < > 0.50*   GLUCOSE 83 118* 84 94 93   < > 176*   CALCIUM 9.8 9.8 9.8 9.7 9.4   < > 9.2   MAGNESIUM  --   --   --   --   --   --  2.0   PHOSPHORUS  --  4.2  --   --   --   --  3.0    < > = values in this interval not displayed.         Lab 12/05/21  0825 12/04/21  1128 11/29/21  0955   TOTAL PROTEIN 6.2  --  6.1   ALBUMIN 2.40* 2.50* 2.50*   GLOBULIN 3.8  --   --    ALT (SGPT) 23  --  44*   AST (SGOT) 25  --  45*   BILIRUBIN 0.3  --  0.3   ALK PHOS 182*  --  286*             Lab 11/29/21  0955   CHOLESTEROL 158   TRIGLYCERIDES 83         Lab 12/04/21  1128 12/03/21  1622   IRON 19*  --    IRON SATURATION 7*  --    TIBC 259*  --    TRANSFERRIN 174*  --    FERRITIN 526.90*  --    FOLATE 15.00  --    VITAMIN B 12 1,322*  --    ABO TYPING  --  O   RH TYPING  --  Negative   ANTIBODY SCREEN  --  Negative         Brief Urine Lab Results  (Last result in the past 365 days)      Color   Clarity   Blood   Leuk Est   Nitrite   Protein   CREAT   Urine HCG        12/03/21 1509 Red   Turbid   Large (3+)   Large (3+)   Negative   >=300 mg/dL (3+)                 Microbiology Results Abnormal     Procedure Component Value - Date/Time    Fungus Culture - Body Fluid, Spine, Thoracic [[id number]] Collected: 11/14/21 1511    Lab Status: Preliminary result Specimen: Body Fluid from Spine, Thoracic Updated: 12/05/21 1616     Fungus Culture No fungus isolated at 3 weeks    AFB Culture - Body Fluid, Spine, Thoracic [[id number]] Collected: 11/14/21 1511    Lab Status: Preliminary result Specimen: Body Fluid from Spine, Thoracic Updated: 12/05/21 1616     AFB Culture No AFB isolated at 3 weeks     AFB Stain No acid fast bacilli seen on concentrated smear    Fungus Culture - Tissue, Spine, Cervical [[id number]] Collected: 11/14/21 1458     Lab Status: Preliminary result Specimen: Tissue from Spine, Cervical Updated: 12/05/21 1600     Fungus Culture No fungus isolated at 3 weeks    AFB Culture - Tissue, Spine, Cervical [[id number]] Collected: 11/14/21 1450    Lab Status: Preliminary result Specimen: Tissue from Spine, Cervical Updated: 12/05/21 1600     AFB Culture No AFB isolated at 3 weeks     AFB Stain No acid fast bacilli seen on concentrated smear    Urine Culture - Urine, Urine, Clean Catch [[id number]] Collected: 12/03/21 1509    Lab Status: Final result Specimen: Urine, Clean Catch Updated: 12/04/21 1130     Urine Culture 50,000 CFU/mL Normal Urogenital Jailyn    Blood Culture - Blood, Arm, Left [[id number]]  (Normal) Collected: 11/20/21 1630    Lab Status: Final result Specimen: Blood from Arm, Left Updated: 11/25/21 1700     Blood Culture No growth at 5 days    Narrative:      Aerobic bottle only      Urine Culture - Urine, Urine, Catheter [[id number]]  (Normal) Collected: 11/20/21 1625    Lab Status: Final result Specimen: Urine, Catheter Updated: 11/21/21 1523     Urine Culture No growth    Anaerobic Culture - Body Fluid, Spine, Thoracic [[id number]] Collected: 11/14/21 1511    Lab Status: Final result Specimen: Body Fluid from Spine, Thoracic Updated: 11/19/21 0700     Anaerobic Culture No anaerobes isolated at 5 days    Anaerobic Culture - Tissue, Spine, Cervical [[id number]] Collected: 11/14/21 1450    Lab Status: Final result Specimen: Tissue from Spine, Cervical Updated: 11/19/21 0700     Anaerobic Culture No anaerobes isolated at 5 days    Body Fluid Culture - Body Fluid, Spine, Thoracic [[id number]] Collected: 11/14/21 1511    Lab Status: Final result Specimen: Body Fluid from Spine, Thoracic Updated: 11/17/21 0933     Body Fluid Culture No growth at 3 days     Gram Stain No WBCs or organisms seen    Fungus Smear - Tissue, Spine, Cervical [[id number]] Collected: 11/14/21 1450    Lab Status: Final result Specimen: Tissue from Spine, Cervical  Updated: 11/15/21 1552     Fungal Stain No fungal elements seen    Blood Culture - Blood, Arm, Right [[id number]]  (Normal) Collected: 11/07/21 1101    Lab Status: Final result Specimen: Blood from Arm, Right Updated: 11/12/21 1116     Blood Culture No growth at 5 days    Narrative:      AEROBIC BOTTLE ONLY    Blood Culture - Blood, Hand, Left [[id number]]  (Normal) Collected: 11/07/21 1101    Lab Status: Final result Specimen: Blood from Hand, Left Updated: 11/12/21 1115     Blood Culture No growth at 5 days    Urine Culture - Urine, Urine, Catheter [[id number]]  (Normal) Collected: 11/09/21 1621    Lab Status: Final result Specimen: Urine, Catheter Updated: 11/10/21 1829     Urine Culture No growth          No radiology results from the last 24 hrs    Results for orders placed during the hospital encounter of 10/24/21    Adult Transthoracic Echo Complete W/ Cont if Necessary Per Protocol    Interpretation Summary  · Left ventricular ejection fraction appears to be 61 - 65%. Left ventricular systolic function is normal.  · Left ventricular diastolic function was normal.  · Estimated right ventricular systolic pressure from tricuspid regurgitation is normal (<35 mmHg).  · No vegetations seen.  · This is a technically difficult study.      I have reviewed the medications:  Scheduled Meds:Pharmacy Consult, , Does not apply, Once  alteplase, 2 mg, Intravenous, Once  amLODIPine, 5 mg, Oral, Q24H  buPROPion SR, 150 mg, Oral, BID  castor oil-balsam peru, 1 application, Topical, Q12H  DULoxetine, 30 mg, Oral, QAM  ferric gluconate, 125 mg, Intravenous, Daily  furosemide, 40 mg, Oral, Daily  hydrALAZINE, 50 mg, Oral, Q8H  lactobacillus acidophilus, 1 capsule, Oral, Daily  levETIRAcetam, 500 mg, Oral, Q12H  levothyroxine, 125 mcg, Oral, Q AM  melatonin, 5 mg, Oral, Nightly  multivitamin, 1 tablet, Oral, Daily  Nutrisource fiber, 2 packet, Oral, TID  nystatin, , Topical, Q8H  pantoprazole, 40 mg, Oral, Q AM  senna-docusate  sodium, 1 tablet, Oral, Nightly  sodium chloride, 10 mL, Intravenous, Q12H  vancomycin, 1,000 mg, Intravenous, Q24H      Continuous Infusions:Pharmacy to dose vancomycin,       PRN Meds:.acetaminophen  •  cyclobenzaprine  •  hydrALAZINE  •  ipratropium-albuterol  •  LORazepam  •  magnesium sulfate **OR** magnesium sulfate **OR** magnesium sulfate  •  naloxone  •  [] HYDROmorphone **AND** naloxone  •  ondansetron **OR** ondansetron  •  oxyCODONE-acetaminophen  •  Pharmacy to dose vancomycin  •  potassium & sodium phosphates **OR** potassium & sodium phosphates  •  potassium chloride  •  sodium chloride  •  sodium chloride    Assessment/Plan   Assessment & Plan     Active Hospital Problems    Diagnosis  POA   • **Spinal cord compression [G95.20]  Yes   • Severe malnutrition (CMS/HCC) [E43]  Yes   • Quadriparesis [G82.50]  No   • COVID-19 virus detected [U07.1]  Yes   • MRSA bacteremia [R78.81, B95.62]  Yes   • Hepatitis C [B19.20]  Yes   • Seizures on Keppra [R56.9]  Yes   • History of CVA [Z86.73]  Not Applicable   • Acute postoperative respiratory insufficiency [J95.89]  No   • Pleural effusion, right [J90]  Yes   • Osteomyelitis of thoracic vertebra [M46.24]  Yes   • 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        Brief Hospital Course to date:  Karely Villarreal is a 56 y.o. female with h/o polysubstance abuse, HCV, HTN, Sz d/o, SLE, Hypothyroidism, CVA, prior COVID infection and homelessness.    She was admitted to [facility] on 10/24/21 for sepsis d/t MRSA and was found to have a paravertebral abscess and was transferred to [facility] on .  Surgery was deferred initially but she developed worsening UE weakness and MRI showed progressive cord  compression so she was taken to the OR emergently for decompression on 11/14.    She was extubated 11/15 but TF's were started d/t poor PO.  She was transferred initially to telemetry on 11/8 but developed worsening hypoxia with AMS and was transferred back to the ICU on bipap on 11/20.  She was transferred back to the hospitalist service on 11/23      MRSA bacteremia  Sepsis  T9/T10 Paravertebral Abscess, T9/T10 Osteomyelitis with spinal cord compression  --s/p decompression, cervical laminectomy and debridement of epidural abscess on 11/14  --on Vancomycin per ID, will need long course at least 12 weeks given the extent of her spinal infection and high grade bacteremia (starting at time of surgery on 11/14)  --repeat MRI C and T spine showed shows 2 fluid collections dorsally (one at C4 and the other more superficial at C6/C7) and T9/T10 still c/w vertebral osteomyelitis.  Re-evaluated by Dr. Jama who noted severe phlegmon circumferentially around cervical and upper thoracic area but no worsening compression, rec'd continue abx, no surgery needed at this time        Anemia  - continues to drop  - fob negative  -s/p 1 unit PRBC on 12/3  - transfuse as needed, recheck cbc  -Iron 19, Iron sat 7   -s/p IV iron, continue po iron    -has some hematuria - monitor for now - suspect related to lupus v cystitis but renal function stable and would not be able to treat in setting of severe infection with immunosuppresive agents  -doubt urology would do inpatient cysto in setting of her infection  -urine culture with urogenital batool, hold abx     Acute Respiratory failure  Right Pleural Effusion  Probable Bacterial PNA  --improved  --tolerating daily PO Lasix well   -- s/p 7 days of merrem and doxy per ID     Malnutrition  --Status post Dobbhoff with tube feeds     Diarrhea  --s/p rectal tube, improved  --Continue fiber supplement      Bilateral Knee Pain  --Bilateral xrays showed large bilateral knee effusions, moderate  osteoarthrosis worse in patellofemoral compartments  -PT/OT      LUE Edema  -LUE venous duplex on 11/7 negative for evidence of acute DVT  -Elevate extremity   -Repeat venous duplex 12/3 negative for DVT     HX Seizure Disorder  --cont keppra      Recent COVID PNA     Acute Right Axilla abscess  --culture positive for MRSA, continue vanco     Polysubstance abuse  --UDS positive for meth/opiates, self medicating at [facility] with klonopin and oxycodone and went into respiratory depression     SLE/Discoid lupus  --holding plaquenil due to severity of active infection     Insomnia   -Melatonin PRN      Hx CVA     DVT prophylaxis:  Mechanical DVT prophylaxis orders are present.       AM-PAC 6 Clicks Score (PT): 6 (12/04/21 0800)    Disposition: I expect the patient to be discharged TBD, complex case management following    CODE STATUS:   Code Status and Medical Interventions:   Ordered at: 11/06/21 2808     Code Status (Patient has no pulse and is not breathing):    CPR (Attempt to Resuscitate)     Medical Interventions (Patient has pulse or is breathing):    Full Support       Lisa Johnson, DO  12/06/21

## 2021-12-07 LAB
ALBUMIN SERPL-MCNC: 2.5 G/DL (ref 3.5–5.2)
ANION GAP SERPL CALCULATED.3IONS-SCNC: 4 MMOL/L (ref 5–15)
BASOPHILS # BLD AUTO: 0.04 10*3/MM3 (ref 0–0.2)
BASOPHILS NFR BLD AUTO: 0.6 % (ref 0–1.5)
BUN SERPL-MCNC: 16 MG/DL (ref 6–20)
BUN/CREAT SERPL: 16.2 (ref 7–25)
CALCIUM SPEC-SCNC: 9.8 MG/DL (ref 8.6–10.5)
CHLORIDE SERPL-SCNC: 95 MMOL/L (ref 98–107)
CO2 SERPL-SCNC: 39 MMOL/L (ref 22–29)
CREAT SERPL-MCNC: 0.99 MG/DL (ref 0.57–1)
DEPRECATED RDW RBC AUTO: 54.6 FL (ref 37–54)
EOSINOPHIL # BLD AUTO: 0.19 10*3/MM3 (ref 0–0.4)
EOSINOPHIL NFR BLD AUTO: 2.8 % (ref 0.3–6.2)
ERYTHROCYTE [DISTWIDTH] IN BLOOD BY AUTOMATED COUNT: 16.9 % (ref 12.3–15.4)
GFR SERPL CREATININE-BSD FRML MDRD: 58 ML/MIN/1.73
GLUCOSE SERPL-MCNC: 84 MG/DL (ref 65–99)
HCT VFR BLD AUTO: 27.5 % (ref 34–46.6)
HGB BLD-MCNC: 8.1 G/DL (ref 12–15.9)
IMM GRANULOCYTES # BLD AUTO: 0.05 10*3/MM3 (ref 0–0.05)
IMM GRANULOCYTES NFR BLD AUTO: 0.7 % (ref 0–0.5)
LYMPHOCYTES # BLD AUTO: 1.07 10*3/MM3 (ref 0.7–3.1)
LYMPHOCYTES NFR BLD AUTO: 15.9 % (ref 19.6–45.3)
MCH RBC QN AUTO: 26.3 PG (ref 26.6–33)
MCHC RBC AUTO-ENTMCNC: 29.5 G/DL (ref 31.5–35.7)
MCV RBC AUTO: 89.3 FL (ref 79–97)
MONOCYTES # BLD AUTO: 0.51 10*3/MM3 (ref 0.1–0.9)
MONOCYTES NFR BLD AUTO: 7.6 % (ref 5–12)
NEUTROPHILS NFR BLD AUTO: 4.89 10*3/MM3 (ref 1.7–7)
NEUTROPHILS NFR BLD AUTO: 72.4 % (ref 42.7–76)
NRBC BLD AUTO-RTO: 0 /100 WBC (ref 0–0.2)
PHOSPHATE SERPL-MCNC: 3.4 MG/DL (ref 2.5–4.5)
PLATELET # BLD AUTO: 235 10*3/MM3 (ref 140–450)
PMV BLD AUTO: 8.1 FL (ref 6–12)
POTASSIUM SERPL-SCNC: 3.7 MMOL/L (ref 3.5–5.2)
RBC # BLD AUTO: 3.08 10*6/MM3 (ref 3.77–5.28)
SODIUM SERPL-SCNC: 138 MMOL/L (ref 136–145)
WBC NRBC COR # BLD: 6.75 10*3/MM3 (ref 3.4–10.8)

## 2021-12-07 PROCEDURE — 85025 COMPLETE CBC W/AUTO DIFF WBC: CPT | Performed by: INTERNAL MEDICINE

## 2021-12-07 PROCEDURE — 25010000002 VANCOMYCIN PER 500 MG

## 2021-12-07 PROCEDURE — 99232 SBSQ HOSP IP/OBS MODERATE 35: CPT | Performed by: STUDENT IN AN ORGANIZED HEALTH CARE EDUCATION/TRAINING PROGRAM

## 2021-12-07 PROCEDURE — 94799 UNLISTED PULMONARY SVC/PX: CPT

## 2021-12-07 PROCEDURE — 99233 SBSQ HOSP IP/OBS HIGH 50: CPT | Performed by: INTERNAL MEDICINE

## 2021-12-07 PROCEDURE — 80069 RENAL FUNCTION PANEL: CPT | Performed by: INTERNAL MEDICINE

## 2021-12-07 RX ADMIN — FUROSEMIDE 40 MG: 40 TABLET ORAL at 09:21

## 2021-12-07 RX ADMIN — CASTOR OIL AND BALSAM, PERU 1 APPLICATION: 788; 87 OINTMENT TOPICAL at 09:22

## 2021-12-07 RX ADMIN — DULOXETINE HYDROCHLORIDE 30 MG: 30 CAPSULE, DELAYED RELEASE ORAL at 09:21

## 2021-12-07 RX ADMIN — OXYCODONE AND ACETAMINOPHEN 1 TABLET: 5; 325 TABLET ORAL at 20:32

## 2021-12-07 RX ADMIN — SENNOSIDES AND DOCUSATE SODIUM 1 TABLET: 50; 8.6 TABLET ORAL at 20:32

## 2021-12-07 RX ADMIN — NYSTATIN: 100000 POWDER TOPICAL at 15:01

## 2021-12-07 RX ADMIN — CASTOR OIL AND BALSAM, PERU 1 APPLICATION: 788; 87 OINTMENT TOPICAL at 20:32

## 2021-12-07 RX ADMIN — HYDRALAZINE HYDROCHLORIDE 50 MG: 50 TABLET, FILM COATED ORAL at 20:32

## 2021-12-07 RX ADMIN — VANCOMYCIN HYDROCHLORIDE 750 MG: 750 INJECTION, SOLUTION INTRAVENOUS at 12:45

## 2021-12-07 RX ADMIN — LEVETIRACETAM 500 MG: 500 TABLET, FILM COATED ORAL at 20:32

## 2021-12-07 RX ADMIN — NYSTATIN: 100000 POWDER TOPICAL at 06:55

## 2021-12-07 RX ADMIN — Medication 2 PACKET: at 09:00

## 2021-12-07 RX ADMIN — NYSTATIN: 100000 POWDER TOPICAL at 20:32

## 2021-12-07 RX ADMIN — PANTOPRAZOLE SODIUM 40 MG: 40 TABLET, DELAYED RELEASE ORAL at 06:55

## 2021-12-07 RX ADMIN — Medication 2 PACKET: at 20:33

## 2021-12-07 RX ADMIN — Medication 2 PACKET: at 15:04

## 2021-12-07 RX ADMIN — Medication 1 CAPSULE: at 09:20

## 2021-12-07 RX ADMIN — BUPROPION HYDROCHLORIDE 150 MG: 150 TABLET, EXTENDED RELEASE ORAL at 09:20

## 2021-12-07 RX ADMIN — LORAZEPAM 1 MG: 1 TABLET ORAL at 20:32

## 2021-12-07 RX ADMIN — LEVETIRACETAM 500 MG: 500 TABLET, FILM COATED ORAL at 09:21

## 2021-12-07 RX ADMIN — LORAZEPAM 1 MG: 1 TABLET ORAL at 09:33

## 2021-12-07 RX ADMIN — MULTIVITAMIN TABLET 1 TABLET: TABLET at 09:21

## 2021-12-07 RX ADMIN — Medication 5 MG: at 20:32

## 2021-12-07 RX ADMIN — LEVOTHYROXINE SODIUM 125 MCG: 125 TABLET ORAL at 06:55

## 2021-12-07 RX ADMIN — HYDRALAZINE HYDROCHLORIDE 50 MG: 50 TABLET, FILM COATED ORAL at 15:01

## 2021-12-07 RX ADMIN — BUPROPION HYDROCHLORIDE 150 MG: 150 TABLET, EXTENDED RELEASE ORAL at 20:32

## 2021-12-07 RX ADMIN — SODIUM CHLORIDE, PRESERVATIVE FREE 10 ML: 5 INJECTION INTRAVENOUS at 09:20

## 2021-12-07 RX ADMIN — HYDRALAZINE HYDROCHLORIDE 50 MG: 50 TABLET, FILM COATED ORAL at 06:55

## 2021-12-07 RX ADMIN — NYSTATIN 1 APPLICATION: 100000 POWDER TOPICAL at 09:00

## 2021-12-07 RX ADMIN — OXYCODONE AND ACETAMINOPHEN 1 TABLET: 5; 325 TABLET ORAL at 09:33

## 2021-12-07 NOTE — PLAN OF CARE
Goal Outcome Evaluation:  Plan of Care Reviewed With: patient        Progress: improving   Pt had good night. Pain treated with po meds. VSS. Speciality bed utilized , Q2h turned. Barrier cream applied to coccyx. PICC Dressing changed .Call light with in reach.

## 2021-12-07 NOTE — PROGRESS NOTES
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 respiratory failure- improving  10. Leukocytosis/neutrophilia, improved  11. Elevated CRP, improved  12. Polysubstance abuse/UDS positive for meth/opiates.  Was self medicating at [facility] with Klonopin and oxycodone and went into respiratory depression.  13. 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 Seizures/on antiepileptic med  15. H/o CVA from suspected cardioembolic event  16. H/o pyoderma gangrenosum  17. Essential hypertension  18. Hypothyroidism  19. Depression/anxiety  20. Medical noncompliance.  Refusing repeat imaging to evaluate pleural effusion.   21. Pyuria-urine culture finalized no growth  22. Bilateral Pleural Effusions  23. Diarrhea  24. Petechial rash in medial upper thighs.  Shearing injuring vs inflammation vs other.  Continue nystatin powder.    25. Probable Bacterial Pneumonia- Improved  26. Left arm swelling and pain- improved. LUE u/s was negative for DVT    PLAN/RECOMMENDATIONS:     1. Follow CBC with differential, BMP, and vancomycin trough at least weekly  2. Continue  Vancomycin for continued MRSA coverage.  Appreciate pharmacy's help with dosing and monitoring creatinine.  Would like trough between 15-20.  3. Left upper ext venous ultrasound to rule out DVT- was negative    She will need a very long course of IV antibiotics, likely at least 12 weeks given the extent of her spinal infection and high-grade bacteremia. Anticipate continuing her IV vancomycin at least until 2/7/22 for a 12 week course since her last surgery (was on 11/14/21). She is not a candidate for outpatient antibiotics with a PICC line. May be a good candidate for LTAC.    She seems to be clinically improving.  Needs to continue to work with physical therapy. I will intermittently check up on her.    Complex case with complex MDM      Jonn Mchugh MD  12/6/2021  21:16 EST

## 2021-12-07 NOTE — CONSULTS
[facility]   HISTORY AND PHYSICAL    Patient Name: Karely Villarreal  : 1965  MRN: [id number]  Primary Care Physician:  Tracie Levi APRN  Date of admission: 2021    Subjective   Subjective     Chief Complaint: Hematuria    HPI:    Karely Villarreal is a 56 y.o. female with extensive past medical history including lupus, diastolic congestive heart failure, IV drug abuse, DVT, epilepsy, anxiety, prior HSP, admitted since late October with MRSA bacteremia, COVID-19 with pleural effusion requiring chest tube, spinal epidural abscess requiring operative intervention per neurosurgery who developed light hematuria.  She has had some intermittent anemia.  Urology was asked to evaluate onset hematuria in the inpatient setting.    Discussed with patient at bedside, no prior urologic surgery, has not been aware of any hematuria.  Currently urinating into suction wick after catheter removal remotely during admission, reports sensation of complete emptying.  Urine canister is light pink to clear.    Multiple urine cultures no growth in October during this admission, urine culture 12-3- contaminated.  CT scan early November demonstrates normal-appearing kidneys and bladder with no hydronephrosis or nephrolithiasis however the scan was without contrast.    Review of Systems   All systems were reviewed and negative except for: none    Personal History     Past Medical History:   Diagnosis Date   • Anxiety    • Brain tumor (HCC)    • Chronic headache    • Depression    • Diastolic CHF, chronic (HCC)    • DVT (deep venous thrombosis) (HCC)     left leg   • Encephalitis 2016    treated at the [facility]   • Epilepsy (HCC)    • Gastric ulcer with perforation (HCC) 2016    Microperforation and air in the biliary tree   • Gastritis    • Heart disease    • Henoch-Schonlein purpura (HCC)    • History of transfusion    • Hypertension    • Hypothyroidism (acquired)     Removed due to groiter   • Kidney  disease    • Lower GI bleeding    • Lupus (HCC)    • Memory disorder    • Migraine    • Mixed connective tissue disease (HCC)    • MRSA cellulitis    • NSTEMI (non-ST elevated myocardial infarction) ([facility])    • Panic disorder    • Patent foramen ovale    • Pneumonia    • PTSD (post-traumatic stress disorder)     trauma from 911   • PVC (premature ventricular contraction)    • RA (rheumatoid arthritis) ([facility])    • Renal disorder    • Rhabdomyolysis    • Seizures ([facility])     when had encephalitis   • Sjogren's syndrome ([facility])    • Stroke (HCC) 09/2015    x 1   • Systemic lupus erythematosus (HCC)     Discoid and systemic   • Temporal arteritis (HCC)    • Thyroid disease    • TIA (transient ischemic attack)     x 3   • Ulcer, stomach peptic, chronic        Past Surgical History:   Procedure Laterality Date   • APPENDECTOMY     • CARDIAC CATHETERIZATION  2016    PFO repair and had a loop monitor placed at the [facility]   • CARDIAC SURGERY     • CENTRAL VENOUS LINE INSERTION N/A 10/28/2021    Procedure: Placement of central line;  Surgeon: Ruma Madden MD;  Location: [facility];  Service: General;  Laterality: N/A;   • CERVICAL LAMINECTOMY DECOMPRESSION POSTERIOR Bilateral 2021    Procedure: CERVICAL LAMINECTOMY DECOMPRESSION POSTERIOR C3-6;  Surgeon: Destin Jama MD;  Location: [facility] OR;  Service: Neurosurgery;  Laterality: Bilateral;   •  SECTION      x 2   • ENDOSCOPY     • FACIAL RECONSTRUCTION SURGERY      clean out MRSA    • INCISION AND DRAINAGE ABSCESS Right 2019    Procedure: INCISION AND DRAINAGE ABSCESS RIGHT AXILLA;  Surgeon: Zak Martin MD;  Location: [facility] OR;  Service: General   • KNEE ARTHROSCOPY Left    • LUMBAR FUSION     • PORTACATH PLACEMENT Right 2016   • THYROID SURGERY      Removed due to a goiter   • VENOUS ACCESS DEVICE (PORT) REMOVAL N/A 10/28/2021    Procedure: Removal of Dzadaj-n-Vmth.;  Surgeon: Ruma Madden MD;  Location: [facility]  OR;  Service: General;  Laterality: N/A;       Family History: family history includes Arthritis in her father and mother; Diabetes in her father; Heart disease in her father and mother; Hypertension in her father and mother; Osteoarthritis in her mother. Otherwise pertinent FHx was reviewed and not pertinent to current issue.    Social History:  reports that she has never smoked. She has never used smokeless tobacco. She reports that she does not drink alcohol and does not use drugs.    Home Medications:  DULoxetine, EPINEPHrine (Anaphylaxis), acetaminophen, buPROPion SR, calcium carbonate, castor oil-balsam peru, clonazePAM, daptomycin, dexamethasone, diphenhydrAMINE, enoxaparin, guaiFENesin-dextromethorphan, hydrALAZINE, lactobacillus acidophilus, levETIRAcetam, levothyroxine, magnesium sulfate, magnesium sulfate in D5W 1g/100mL (PREMIX), methylPREDNISolone sodium succinate, metoprolol tartrate, multivitamin, oxyCODONE, pantoprazole, potassium chloride, and sodium chloride      Allergies:  Allergies   Allergen Reactions   • Compazine [Prochlorperazine Edisylate] Dystonia   • Imitrex [Sumatriptan] Other (See Comments)     Previous stroke   • Nsaids GI Bleeding   • Reglan [Metoclopramide] Dystonia   • Solu-Medrol [Methylprednisolone] Dystonia   • Zyprexa [Olanzapine] Swelling   • Prednisone Anxiety       Objective   Objective     Vitals:   Temp:  [97.6 °F (36.4 °C)-98.5 °F (36.9 °C)] 97.6 °F (36.4 °C)  Heart Rate:  [] 98  Resp:  [16-18] 16  BP: (124-160)/(84-93) 145/90  Flow (L/min):  [2] 2  Physical Exam    Constitutional: Awake in bed, alert    Eyes: PERRLA, sclerae anicteric, no conjunctival injection   HENT: Normocephalic, atraumatic, mucous membranes moist   Neck: Supple, trachea midline   Respiratory:Equal chest rise, non-labored respirations    Cardiovascular: RRR, palpable radial pulses bilaterally   Gastrointestinal: Soft, nontender, non-distended   Musculoskeletal: No bilateral ankle edema, no  clubbing or cyanosis to extremities   Genitourinary: Normal female external genitalia, voiding into suction wick, urine specimen can very light pink to clear, no clots   Psychiatric: Appropriate affect, cooperative   Neurologic: Oriented x 3, Cranial Nerves grossly intact, speech clear   Skin: No rashes     Result Review    Result Review:  I have personally reviewed the results from the time of this admission to 12/7/2021 17:10 EST and agree with these findings:  [x]  Laboratory  [x]  Microbiology  [x]  Radiology  []  EKG/Telemetry   []  Cardiology/Vascular   []  Pathology  [x]  Old records  []  Other:    Most notable findings include: Very mild light pink hematuria in urine specimen.  Creatinine 0.99 from baseline 0.7-0.8.  Hemoglobin 8.1 from 9.2 yesterday.  Urine culture 12-3-21 contaminated.  Blood cultures 11-5-20 1+ for MRSA.  Evaluated CT abdomen pelvis without contrast performed 11-1-21, no concerning nephrolithiasis or hydronephrosis, mildly distended but normal-appearing bladder otherwise.    Assessment/Plan   Assessment / Plan     Brief Patient Summary:  Karely Villarreal is a 56 y.o. female who has been admitted to [facility] since late October with history of drug abuse, seizures, pyoderma gangrenosum, hepatitis C hypertension, hypothyroidism, major depressive disorder, anxiety, history of prior stroke secondary to right parietal infarct, was found to be Covid positive and had a spinal epidural abscess which required open intervention by neurosurgery, MRSA bacteremia who has had some intermittent hematuria while admitted.  Patient reports she has had a catheter in place recently but this was removed and she is voiding into a suction wick.    Evaluation of the urine within the suction canister demonstrates very faint light pink hematuria with mostly clear urine otherwise.  There is a urine specimen in the toilet bowl which is also clear yellow.    Active Hospital Problems:  Active Hospital Problems     Diagnosis    • **Spinal cord compression    • Severe malnutrition (CMS/HCC)    • Quadriparesis    • COVID-19 virus detected    • MRSA bacteremia    • Hepatitis C    • Seizures on Keppra    • History of CVA    • Acute postoperative respiratory insufficiency    • Pleural effusion, right    • Osteomyelitis of thoracic vertebra    • Paraspinal abscess    • Polysubstance abuse    • MDD (major depressive disorder)    • SLE    • Hypertension    • Hypothyroidism    • KEREN (generalized anxiety disorder)      Plan:   - no acute urologic intervention indicated, hematuria is faint/mild, unlikely cause of anemia  - unclear etiology of hematuria, possibly cystitis vs nephritis vs unclear, CT without obvious abnormalities however this was performed without contrast  - Will need CT urogram with contrast and delayed phase imaging to evaluate upper tracts at some point, can be performed inpatient if stable renal function prior to discharge  - will need outpatient flexible cystoscopy to complete hematuria workup in the coming 3-4 weeks if patient has recovered by this time  - no indication for inpatient cystoscopy unless significant hematuria requiring clot evacuation       DVT prophylaxis:  Mechanical DVT prophylaxis orders are present.    CODE STATUS:    Code Status (Patient has no pulse and is not breathing): CPR (Attempt to Resuscitate)  Medical Interventions (Patient has pulse or is breathing): Full Support    Admission Status:  I believe this patient meets inpatient status.    Electronically signed by Matthew Gardner MD, 12/07/21, 5:10 PM EST.

## 2021-12-07 NOTE — PROGRESS NOTES
[facility] Medicine Services  PROGRESS NOTE    Patient Name: Karely Villarreal  : 1965  MRN: [id number]    Date of Admission: 2021  Primary Care Physician: Tracie Levi APRN    Subjective   Subjective     CC:  Sepsis, abscess    HPI:  Sitting comfortably, states she continues to have anxiety about the future    ROS:  Gen- No fevers, chills  CV- No chest pain, palpitations  Resp- No cough, dyspnea  GI- No N/V/D, abd pain      Objective   Objective     Vital Signs:   Temp:  [97.6 °F (36.4 °C)-98.5 °F (36.9 °C)] 97.6 °F (36.4 °C)  Heart Rate:  [] 98  Resp:  [16-18] 16  BP: (124-160)/(84-93) 145/90  Flow (L/min):  [2] 2     Physical Exam:  Constitutional: No acute distress, chronically ill-appearing, awakens easily  HENT: NCAT, mucous membranes moist  Respiratory: Clear to auscultation bilaterally, respiratory effort normal   Cardiovascular: Tachycardic but regular, no murmurs, rubs, or gallops  Gastrointestinal: Positive bowel sounds, soft, nontender, nondistended  Musculoskeletal: No bilateral ankle edema  Psychiatric: Appropriate affect, cooperative  Neurologic: Oriented x 3, left upper extremity weakness, chronic after surgery secondary to spinal cord compression speech clear  Skin: No rashes      Results Reviewed:  LAB RESULTS:      Lab 21  0658 21  0840 21  0825 21  1128 21  0002 21  1622 21  0926   WBC 6.75 7.70 5.62 7.15  --   --  5.64   HEMOGLOBIN 8.1* 9.2* 7.6* 8.3* 8.3*   < > 7.0*   HEMATOCRIT 27.5* 28.7* 25.0* 27.4* 27.2*   < > 23.5*   PLATELETS 235 215 182 191  --   --  181   NEUTROS ABS 4.89 6.08  --  5.84  --   --   --    IMMATURE GRANS (ABS) 0.05 0.05  --  0.04  --   --   --    LYMPHS ABS 1.07 0.99  --  0.74  --   --   --    MONOS ABS 0.51 0.43  --  0.42  --   --   --    EOS ABS 0.19 0.10  --  0.06  --   --   --    MCV 89.3 86.4 88.3 90.4  --   --  86.7    < > = values in this interval not displayed.         Lab  12/07/21  0658 12/06/21 2014 12/06/21  0840 12/05/21  0825 12/04/21  1128 12/03/21  0926 12/03/21 0926   SODIUM 138  --  140 139 141  --  140   POTASSIUM 3.7 4.6 3.3* 3.4* 3.8   < > 4.1   CHLORIDE 95*  --  92* 94* 95*  --  94*   CO2 39.0*  --  38.0* 38.0* 38.0*  --  39.0*   ANION GAP 4.0*  --  10.0 7.0 8.0  --  7.0   BUN 16  --  18 17 20  --  19   CREATININE 0.99  --  0.86 0.75 0.79  --  0.60   GLUCOSE 84  --  112* 83 118*  --  84   CALCIUM 9.8  --  10.2 9.8 9.8  --  9.8   PHOSPHORUS 3.4  --  3.2  --  4.2  --   --     < > = values in this interval not displayed.         Lab 12/07/21  0658 12/06/21 0840 12/05/21 0825 12/04/21  1128   TOTAL PROTEIN  --   --  6.2  --    ALBUMIN 2.50* 2.70* 2.40* 2.50*   GLOBULIN  --   --  3.8  --    ALT (SGPT)  --   --  23  --    AST (SGOT)  --   --  25  --    BILIRUBIN  --   --  0.3  --    ALK PHOS  --   --  182*  --                  Lab 12/04/21  1128 12/03/21  1622   IRON 19*  --    IRON SATURATION 7*  --    TIBC 259*  --    TRANSFERRIN 174*  --    FERRITIN 526.90*  --    FOLATE 15.00  --    VITAMIN B 12 1,322*  --    ABO TYPING  --  O   RH TYPING  --  Negative   ANTIBODY SCREEN  --  Negative         Brief Urine Lab Results  (Last result in the past 365 days)      Color   Clarity   Blood   Leuk Est   Nitrite   Protein   CREAT   Urine HCG        12/03/21 1509 Red   Turbid   Large (3+)   Large (3+)   Negative   >=300 mg/dL (3+)                 Microbiology Results Abnormal     Procedure Component Value - Date/Time    Fungus Culture - Body Fluid, Spine, Thoracic [[id number]] Collected: 11/14/21 1511    Lab Status: Preliminary result Specimen: Body Fluid from Spine, Thoracic Updated: 12/05/21 1616     Fungus Culture No fungus isolated at 3 weeks    AFB Culture - Body Fluid, Spine, Thoracic [[id number]] Collected: 11/14/21 1511    Lab Status: Preliminary result Specimen: Body Fluid from Spine, Thoracic Updated: 12/05/21 1616     AFB Culture No AFB isolated at 3 weeks     AFB Stain No  acid fast bacilli seen on concentrated smear    Fungus Culture - Tissue, Spine, Cervical [[id number]] Collected: 11/14/21 1450    Lab Status: Preliminary result Specimen: Tissue from Spine, Cervical Updated: 12/05/21 1600     Fungus Culture No fungus isolated at 3 weeks    AFB Culture - Tissue, Spine, Cervical [[id number]] Collected: 11/14/21 1450    Lab Status: Preliminary result Specimen: Tissue from Spine, Cervical Updated: 12/05/21 1600     AFB Culture No AFB isolated at 3 weeks     AFB Stain No acid fast bacilli seen on concentrated smear    Urine Culture - Urine, Urine, Clean Catch [[id number]] Collected: 12/03/21 1509    Lab Status: Final result Specimen: Urine, Clean Catch Updated: 12/04/21 1130     Urine Culture 50,000 CFU/mL Normal Urogenital Jailyn    Blood Culture - Blood, Arm, Left [[id number]]  (Normal) Collected: 11/20/21 1630    Lab Status: Final result Specimen: Blood from Arm, Left Updated: 11/25/21 1700     Blood Culture No growth at 5 days    Narrative:      Aerobic bottle only      Urine Culture - Urine, Urine, Catheter [[id number]]  (Normal) Collected: 11/20/21 1625    Lab Status: Final result Specimen: Urine, Catheter Updated: 11/21/21 1523     Urine Culture No growth    Anaerobic Culture - Body Fluid, Spine, Thoracic [[id number]] Collected: 11/14/21 1511    Lab Status: Final result Specimen: Body Fluid from Spine, Thoracic Updated: 11/19/21 0700     Anaerobic Culture No anaerobes isolated at 5 days    Anaerobic Culture - Tissue, Spine, Cervical [[id number]] Collected: 11/14/21 1450    Lab Status: Final result Specimen: Tissue from Spine, Cervical Updated: 11/19/21 0700     Anaerobic Culture No anaerobes isolated at 5 days    Body Fluid Culture - Body Fluid, Spine, Thoracic [[id number]] Collected: 11/14/21 1511    Lab Status: Final result Specimen: Body Fluid from Spine, Thoracic Updated: 11/17/21 0933     Body Fluid Culture No growth at 3 days     Gram Stain No WBCs or organisms seen    Fungus Smear -  Tissue, Spine, Cervical [[id number]] Collected: 11/14/21 1450    Lab Status: Final result Specimen: Tissue from Spine, Cervical Updated: 11/15/21 1552     Fungal Stain No fungal elements seen    Blood Culture - Blood, Arm, Right [[id number]]  (Normal) Collected: 11/07/21 1101    Lab Status: Final result Specimen: Blood from Arm, Right Updated: 11/12/21 1116     Blood Culture No growth at 5 days    Narrative:      AEROBIC BOTTLE ONLY    Blood Culture - Blood, Hand, Left [[id number]]  (Normal) Collected: 11/07/21 1101    Lab Status: Final result Specimen: Blood from Hand, Left Updated: 11/12/21 1115     Blood Culture No growth at 5 days    Urine Culture - Urine, Urine, Catheter [[id number]]  (Normal) Collected: 11/09/21 1621    Lab Status: Final result Specimen: Urine, Catheter Updated: 11/10/21 1829     Urine Culture No growth          No radiology results from the last 24 hrs    Results for orders placed during the hospital encounter of 10/24/21    Adult Transthoracic Echo Complete W/ Cont if Necessary Per Protocol    Interpretation Summary  · Left ventricular ejection fraction appears to be 61 - 65%. Left ventricular systolic function is normal.  · Left ventricular diastolic function was normal.  · Estimated right ventricular systolic pressure from tricuspid regurgitation is normal (<35 mmHg).  · No vegetations seen.  · This is a technically difficult study.      I have reviewed the medications:  Scheduled Meds:[START ON 12/8/2021] Pharmacy Consult, , Does not apply, Once  alteplase, 2 mg, Intravenous, Once  amLODIPine, 5 mg, Oral, Q24H  buPROPion SR, 150 mg, Oral, BID  castor oil-balsam peru, 1 application, Topical, Q12H  DULoxetine, 30 mg, Oral, QAM  furosemide, 40 mg, Oral, Daily  hydrALAZINE, 50 mg, Oral, Q8H  lactobacillus acidophilus, 1 capsule, Oral, Daily  levETIRAcetam, 500 mg, Oral, Q12H  levothyroxine, 125 mcg, Oral, Q AM  melatonin, 5 mg, Oral, Nightly  multivitamin, 1 tablet, Oral, Daily  Nutrisource  fiber, 2 packet, Oral, TID  nystatin, , Topical, Q8H  pantoprazole, 40 mg, Oral, Q AM  senna-docusate sodium, 1 tablet, Oral, Nightly  sodium chloride, 10 mL, Intravenous, Q12H  vancomycin, 750 mg, Intravenous, Daily With Lunch      Continuous Infusions:Pharmacy to dose vancomycin,       PRN Meds:.acetaminophen  •  cyclobenzaprine  •  hydrALAZINE  •  ipratropium-albuterol  •  LORazepam  •  magnesium sulfate **OR** magnesium sulfate **OR** magnesium sulfate  •  naloxone  •  [] HYDROmorphone **AND** naloxone  •  ondansetron **OR** ondansetron  •  oxyCODONE-acetaminophen  •  Pharmacy to dose vancomycin  •  potassium & sodium phosphates **OR** potassium & sodium phosphates  •  potassium chloride  •  sodium chloride  •  sodium chloride    Assessment/Plan   Assessment & Plan     Active Hospital Problems    Diagnosis  POA   • **Spinal cord compression [G95.20]  Yes   • Severe malnutrition (CMS/HCC) [E43]  Yes   • Quadriparesis [G82.50]  No   • COVID-19 virus detected [U07.1]  Yes   • MRSA bacteremia [R78.81, B95.62]  Yes   • Hepatitis C [B19.20]  Yes   • Seizures on Keppra [R56.9]  Yes   • History of CVA [Z86.73]  Not Applicable   • Acute postoperative respiratory insufficiency [J95.89]  No   • Pleural effusion, right [J90]  Yes   • Osteomyelitis of thoracic vertebra [M46.24]  Yes   • 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        Brief Hospital Course to date:  Karely Villarreal is a 56 y.o. female  with h/o polysubstance abuse, HCV, HTN, Sz d/o, SLE, Hypothyroidism, CVA, prior COVID infection and homelessness.    She was admitted to [facility] [place] on 10/24/21 for sepsis d/t MRSA and was found to have a paravertebral abscess and was transferred to [facility] on  11/6.  Surgery was deferred initially but she developed worsening UE weakness and MRI showed progressive cord compression so she was taken to the OR emergently for decompression on 11/14.    She was extubated 11/15 but TF's were started d/t poor PO.  She was transferred initially to telemetry on 11/8 but developed worsening hypoxia with AMS and was transferred back to the ICU on bipap on 11/20.  She was transferred back to the hospitalist service on 11/23      MRSA bacteremia  Sepsis  T9/T10 Paravertebral Abscess, T9/T10 Osteomyelitis with spinal cord compression  --s/p decompression, cervical laminectomy and debridement of epidural abscess on 11/14  --on Vancomycin per ID, will need long course at least 12 weeks given the extent of her spinal infection and high grade bacteremia (starting at time of surgery on 11/14)  --repeat MRI C and T spine showed shows 2 fluid collections dorsally (one at C4 and the other more superficial at C6/C7) and T9/T10 still c/w vertebral osteomyelitis.  Re-evaluated by Dr. Jama who noted severe phlegmon circumferentially around cervical and upper thoracic area but no worsening compression, rec'd continue abx, no surgery needed at this time         Anemia  - continues to drop  - fob negative  -s/p 1 unit PRBC on 12/3  - transfuse as needed, recheck cbc  -Iron 19, Iron sat 7   -Received dose of IV iron     -has some hematuria - monitor for now - suspect related to lupus v cystitis but renal function mildly increased and would not be able to treat in setting of severe infection with immunosuppresive agents  -Discussed with Dr. Schroeder, will have urology first and can consult nephro if anemia continues to worsen along with hematuria  -doubt urology would do inpatient cysto in setting of her infection but we will see what they say  -urine culture with urogenital batool, hold abx for UTI treatment     Acute Respiratory failure  Right Pleural Effusion  Probable Bacterial  PNA  --improved  --tolerating daily PO Lasix well   -- s/p 7 days of merrem and doxy per ID     Malnutrition  --Status post Dobbhoff with tube feeds     Diarrhea  --s/p rectal tube, improved  --Wound care following for skin breakdown on gluteal tissue  --Continue fiber supplement      Bilateral Knee Pain  --Bilateral xrays showed large bilateral knee effusions, moderate osteoarthrosis worse in patellofemoral compartments  -PT/OT      LUE Edema  -LUE venous duplex on 11/7 negative for evidence of acute DVT  -Elevate extremity   -Repeat venous duplex 12/3 negative for DVT     HX Seizure Disorder  --continue keppra     Recent COVID PNA     Acute Right Axilla abscess  --culture positive for MRSA, continue vanco     Polysubstance abuse  --UDS positive for meth/opiates, self medicating at [facility] with klonopin and oxycodone and went into respiratory depression     SLE/Discoid lupus  --holding plaquenil due to severity of active infection, but suspect possible lupus nephritis v other GN with ongoing infection and off maintenance immunosuppression     Insomnia   -Melatonin PRN      Hx CVA  - PT/OT    DVT prophylaxis:  Mechanical DVT prophylaxis orders are present.       AM-PAC 6 Clicks Score (PT): 6 (12/06/21 7397)    Disposition: I expect the patient to be discharged TBD, looking for placement    CODE STATUS:   Code Status and Medical Interventions:   Ordered at: 11/06/21 6558     Code Status (Patient has no pulse and is not breathing):    CPR (Attempt to Resuscitate)     Medical Interventions (Patient has pulse or is breathing):    Full Support       Lisa Johnson, DO  12/07/21

## 2021-12-07 NOTE — NURSING NOTE
WOC consult for: coccyx and gluteal     Assessment and Care provided:   1. MASD and friction injuries persist, everything is quick to markos except for one area on left gluteal that is 50% blanching measures one by one.  This is surrounded by a purple-brown discoloration of skin.  2. Bilateral heels are intact and blanching     Recommendation(s):  Continue with current plan of care of cleansing every 12 hours with foam soap blue wipes, apply Venelex and Z guard mixture    *Maintain good skin care, keep dry, turn q 2 hr with wedge if possible, keep heels elevated and offloaded with heel boots.    *Apply z-guard to bottom and bony prominences daily and as needed with incontinence episodes.  *Follow C.A.R.E protocol for medical devices (Bipap, dobbs, Ng tube, etc)      All skin interventions in place. Patient is on Dolphin mattress. Head to toe assessment completed. WOC orders placed.  Discussed plan of care with RN. Thank you for consulting [facility].  Will  follow.  Please call with questions or if needs arise.

## 2021-12-07 NOTE — CASE MANAGEMENT/SOCIAL WORK
Continued Stay Note  [facility]     Patient Name: Karely Villarreal  MRN: [id number]  Today's Date: 12/7/2021    Admit Date: 11/6/2021     Discharge Plan     Row Name 12/07/21 1152       Plan    Plan Comments MSW received a call from Jossy with [facility], who reports that [facility] in [place], [place], may be able to offer pt a bed and will review the referral. MSW updated pt about this at bedside and pt agreeable for [facility] in [place] to review the referral.    Row Name 12/07/21 1129       Plan    Plan Comments MSW spoke with pt at bedside. MSW updated pt on [facility] following, however unable to offer a bed at this time due to length of antibiotics. MSW also discussed long term options with pt as pt will likely need more assistance after LTAC and right now, is ubnable to care for herself. Pt's mother is involved and very supportive, however is physically incapable of caring for pt long term. Pt reports she believes she needs long term placement. MSW explained that this will be private pay until long term medicaid can be applied for and approved, unless pt works with therapy again and is appropriate to go for Skilled and then transition to long term. Pt reports she wants to and is willing to work with therapy again and wants to look at rehab to long term placement. PT and OT has been reordered to see pt. Once pt works with therapy services, MSW will be able to send more referrals. Pt reports she is agreeable to facilities in [facility]. Pt reports that she would also like MSW to call her mother and keep her updated. MSW called and spoke with pt's mother and updated her. Pt's mother reports that she is agreeable to any facilities in those areas or anywhere in between [place] and [place], or in between [place] and [place], [place]. Pt's mother reports that she understands it could be difficult to find placement and is open to more facilities as she reports she is retired and would be  able to travel to see pt when needed. MSW will send more referrals once appropriate.               Discharge Codes    No documentation.               Expected Discharge Date and Time     Expected Discharge Date Expected Discharge Time    Dec 14, 2021             ROSE MARY Ma

## 2021-12-07 NOTE — CASE MANAGEMENT/SOCIAL WORK
Continued Stay Note  [facility]     Patient Name: Karely Villarreal  MRN: [id number]  Today's Date: 12/7/2021    Admit Date: 11/6/2021     Discharge Plan     Row Name 12/07/21 1126       Plan    Plan Comments MSW spoke with pt at bedside. [facility]W updated pt on [facility] following, however unable to offer a bed at this time due to length of antibiotics. MSW also discussed long term options with pt as pt will likely need more assistance after LTAC and right now, is ubnable to care for herself. Pt's mother is involved and very supportive, however is physically incapable of caring for pt long term. Pt reports she believes she needs long term placement. MSW explained that this will be private pay until long term medicaid can be applied for and approved, unless pt works with therapy again and is appropriate to go for Skilled and then transition to long term. Pt reports she wants to and is willing to work with therapy again and wants to look at rehab to long term placement. PT and OT has been reordered to see pt. Once pt works with therapy services, MSW will be able to send more referrals. Pt reports she is agreeable to facilities in [facility]. Pt reports that she would also like MSW to call her mother and keep her updated. MSW called and spoke with pt's mother and updated her. Pt's mother reports that she is agreeable to any facilities in those areas or anywhere in between [place] and [place], or in between [place] and [place], [place]. Pt's mother reports that she understands it could be difficult to find placement and is open to more facilities as she reports she is retired and would be able to travel to see pt when needed. MSW will send more referrals once appropriate.               Discharge Codes    No documentation.               Expected Discharge Date and Time     Expected Discharge Date Expected Discharge Time    Dec 14, 2021             ROSE MARY Ma

## 2021-12-07 NOTE — PROGRESS NOTES
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  10. Leukocytosis/neutrophilia, improved  11. Elevated CRP, improved  12. Polysubstance abuse/UDS positive for meth/opiates.  Was self medicating at [facility] with Klonopin and oxycodone and went into respiratory depression.  13. 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 Seizures/on antiepileptic med  15. H/o CVA from suspected cardioembolic event  16. H/o pyoderma gangrenosum  17. Essential hypertension  18. Hypothyroidism  19. Depression/anxiety  20. Medical noncompliance.  Refusing repeat imaging to evaluate pleural effusion.   21. Pyuria-urine culture finalized no growth  22. Bilateral Pleural Effusions  23. Diarrhea  24. Petechial rash in medial upper thighs.  Shearing injuring vs inflammation vs other.  Continue nystatin powder.    25. Probable Bacterial Pneumonia- Improved  26. Left arm swelling and pain- improved. LUE u/s was negative for DVT    PLAN/RECOMMENDATIONS:     1. Follow CBC with differential, BMP, and vancomycin trough at least weekly  2. Continue  Vancomycin for continued MRSA coverage.  Appreciate pharmacy's help with dosing and monitoring creatinine.  Would like trough between 15-20.    She will need a very long course of IV antibiotics, likely at least 12 weeks given the extent of her spinal infection and high-grade bacteremia. Anticipate continuing her IV vancomycin at least until 2/7/22 for a 12 week course since her last surgery (was on 11/14/21). She is not a candidate for outpatient antibiotics with a PICC line. May be a good candidate for LTAC.    She seems to be clinically improving.  Needs to continue to work with physical therapy. I will intermittently check up on her.    Complex case with complex MDM      Jonn Mchugh MD  12/7/2021  15:29 EST

## 2021-12-08 DIAGNOSIS — R31.0 GROSS HEMATURIA: Primary | ICD-10-CM

## 2021-12-08 LAB
ALBUMIN SERPL-MCNC: 2.4 G/DL (ref 3.5–5.2)
ANION GAP SERPL CALCULATED.3IONS-SCNC: 8 MMOL/L (ref 5–15)
BASOPHILS # BLD AUTO: 0.05 10*3/MM3 (ref 0–0.2)
BASOPHILS NFR BLD AUTO: 0.8 % (ref 0–1.5)
BUN SERPL-MCNC: 17 MG/DL (ref 6–20)
BUN/CREAT SERPL: 15.2 (ref 7–25)
CALCIUM SPEC-SCNC: 10 MG/DL (ref 8.6–10.5)
CHLORIDE SERPL-SCNC: 93 MMOL/L (ref 98–107)
CO2 SERPL-SCNC: 37 MMOL/L (ref 22–29)
CREAT SERPL-MCNC: 1.12 MG/DL (ref 0.57–1)
DEPRECATED RDW RBC AUTO: 57.2 FL (ref 37–54)
EOSINOPHIL # BLD AUTO: 0.17 10*3/MM3 (ref 0–0.4)
EOSINOPHIL NFR BLD AUTO: 2.7 % (ref 0.3–6.2)
ERYTHROCYTE [DISTWIDTH] IN BLOOD BY AUTOMATED COUNT: 17.1 % (ref 12.3–15.4)
GFR SERPL CREATININE-BSD FRML MDRD: 51 ML/MIN/1.73
GLUCOSE SERPL-MCNC: 96 MG/DL (ref 65–99)
HCT VFR BLD AUTO: 27 % (ref 34–46.6)
HGB BLD-MCNC: 8 G/DL (ref 12–15.9)
IMM GRANULOCYTES # BLD AUTO: 0.04 10*3/MM3 (ref 0–0.05)
IMM GRANULOCYTES NFR BLD AUTO: 0.6 % (ref 0–0.5)
LYMPHOCYTES # BLD AUTO: 1.17 10*3/MM3 (ref 0.7–3.1)
LYMPHOCYTES NFR BLD AUTO: 18.9 % (ref 19.6–45.3)
MCH RBC QN AUTO: 26.8 PG (ref 26.6–33)
MCHC RBC AUTO-ENTMCNC: 29.6 G/DL (ref 31.5–35.7)
MCV RBC AUTO: 90.6 FL (ref 79–97)
MONOCYTES # BLD AUTO: 0.46 10*3/MM3 (ref 0.1–0.9)
MONOCYTES NFR BLD AUTO: 7.4 % (ref 5–12)
NEUTROPHILS NFR BLD AUTO: 4.3 10*3/MM3 (ref 1.7–7)
NEUTROPHILS NFR BLD AUTO: 69.6 % (ref 42.7–76)
NRBC BLD AUTO-RTO: 0 /100 WBC (ref 0–0.2)
PHOSPHATE SERPL-MCNC: 4 MG/DL (ref 2.5–4.5)
PLATELET # BLD AUTO: 237 10*3/MM3 (ref 140–450)
PMV BLD AUTO: 8.2 FL (ref 6–12)
POTASSIUM SERPL-SCNC: 3.4 MMOL/L (ref 3.5–5.2)
RBC # BLD AUTO: 2.98 10*6/MM3 (ref 3.77–5.28)
SODIUM SERPL-SCNC: 138 MMOL/L (ref 136–145)
VANCOMYCIN TROUGH SERPL-MCNC: 16.9 MCG/ML (ref 5–20)
WBC NRBC COR # BLD: 6.19 10*3/MM3 (ref 3.4–10.8)

## 2021-12-08 PROCEDURE — 80202 ASSAY OF VANCOMYCIN: CPT

## 2021-12-08 PROCEDURE — 97535 SELF CARE MNGMENT TRAINING: CPT

## 2021-12-08 PROCEDURE — 99232 SBSQ HOSP IP/OBS MODERATE 35: CPT | Performed by: STUDENT IN AN ORGANIZED HEALTH CARE EDUCATION/TRAINING PROGRAM

## 2021-12-08 PROCEDURE — 80069 RENAL FUNCTION PANEL: CPT | Performed by: INTERNAL MEDICINE

## 2021-12-08 PROCEDURE — 97164 PT RE-EVAL EST PLAN CARE: CPT

## 2021-12-08 PROCEDURE — 97530 THERAPEUTIC ACTIVITIES: CPT

## 2021-12-08 PROCEDURE — 94799 UNLISTED PULMONARY SVC/PX: CPT

## 2021-12-08 PROCEDURE — 99233 SBSQ HOSP IP/OBS HIGH 50: CPT | Performed by: FAMILY MEDICINE

## 2021-12-08 PROCEDURE — 25010000002 VANCOMYCIN PER 500 MG

## 2021-12-08 PROCEDURE — 97162 PT EVAL MOD COMPLEX 30 MIN: CPT

## 2021-12-08 PROCEDURE — 85025 COMPLETE CBC W/AUTO DIFF WBC: CPT | Performed by: INTERNAL MEDICINE

## 2021-12-08 PROCEDURE — 97168 OT RE-EVAL EST PLAN CARE: CPT

## 2021-12-08 PROCEDURE — 97110 THERAPEUTIC EXERCISES: CPT

## 2021-12-08 RX ADMIN — HYDRALAZINE HYDROCHLORIDE 50 MG: 50 TABLET, FILM COATED ORAL at 20:04

## 2021-12-08 RX ADMIN — AMLODIPINE BESYLATE 5 MG: 5 TABLET ORAL at 09:11

## 2021-12-08 RX ADMIN — CASTOR OIL AND BALSAM, PERU 1 APPLICATION: 788; 87 OINTMENT TOPICAL at 20:05

## 2021-12-08 RX ADMIN — LORAZEPAM 1 MG: 1 TABLET ORAL at 12:37

## 2021-12-08 RX ADMIN — OXYCODONE AND ACETAMINOPHEN 1 TABLET: 5; 325 TABLET ORAL at 20:04

## 2021-12-08 RX ADMIN — FUROSEMIDE 40 MG: 40 TABLET ORAL at 09:11

## 2021-12-08 RX ADMIN — Medication 2 PACKET: at 17:09

## 2021-12-08 RX ADMIN — DULOXETINE HYDROCHLORIDE 30 MG: 30 CAPSULE, DELAYED RELEASE ORAL at 09:11

## 2021-12-08 RX ADMIN — LEVETIRACETAM 500 MG: 500 TABLET, FILM COATED ORAL at 09:11

## 2021-12-08 RX ADMIN — NYSTATIN: 100000 POWDER TOPICAL at 20:05

## 2021-12-08 RX ADMIN — PANTOPRAZOLE SODIUM 40 MG: 40 TABLET, DELAYED RELEASE ORAL at 05:50

## 2021-12-08 RX ADMIN — LORAZEPAM 1 MG: 1 TABLET ORAL at 20:04

## 2021-12-08 RX ADMIN — OXYCODONE AND ACETAMINOPHEN 1 TABLET: 5; 325 TABLET ORAL at 04:14

## 2021-12-08 RX ADMIN — BUPROPION HYDROCHLORIDE 150 MG: 150 TABLET, EXTENDED RELEASE ORAL at 20:04

## 2021-12-08 RX ADMIN — VANCOMYCIN HYDROCHLORIDE 750 MG: 750 INJECTION, SOLUTION INTRAVENOUS at 12:32

## 2021-12-08 RX ADMIN — LEVETIRACETAM 500 MG: 500 TABLET, FILM COATED ORAL at 20:04

## 2021-12-08 RX ADMIN — Medication 5 MG: at 20:04

## 2021-12-08 RX ADMIN — SODIUM CHLORIDE, PRESERVATIVE FREE 10 ML: 5 INJECTION INTRAVENOUS at 09:19

## 2021-12-08 RX ADMIN — Medication 2 PACKET: at 09:14

## 2021-12-08 RX ADMIN — BUPROPION HYDROCHLORIDE 150 MG: 150 TABLET, EXTENDED RELEASE ORAL at 09:11

## 2021-12-08 RX ADMIN — NYSTATIN: 100000 POWDER TOPICAL at 14:46

## 2021-12-08 RX ADMIN — MULTIVITAMIN TABLET 1 TABLET: TABLET at 09:11

## 2021-12-08 RX ADMIN — CASTOR OIL AND BALSAM, PERU 1 APPLICATION: 788; 87 OINTMENT TOPICAL at 09:18

## 2021-12-08 RX ADMIN — Medication 2 PACKET: at 20:05

## 2021-12-08 RX ADMIN — LEVOTHYROXINE SODIUM 125 MCG: 125 TABLET ORAL at 05:50

## 2021-12-08 RX ADMIN — NYSTATIN: 100000 POWDER TOPICAL at 05:50

## 2021-12-08 RX ADMIN — OXYCODONE AND ACETAMINOPHEN 1 TABLET: 5; 325 TABLET ORAL at 12:37

## 2021-12-08 RX ADMIN — Medication: at 10:26

## 2021-12-08 RX ADMIN — Medication 1 CAPSULE: at 09:11

## 2021-12-08 RX ADMIN — SENNOSIDES AND DOCUSATE SODIUM 1 TABLET: 50; 8.6 TABLET ORAL at 20:04

## 2021-12-08 RX ADMIN — HYDRALAZINE HYDROCHLORIDE 50 MG: 50 TABLET, FILM COATED ORAL at 05:50

## 2021-12-08 NOTE — THERAPY RE-EVALUATION
Patient Name: Karely Villarreal  : 1965    MRN: [id number]                              Today's Date: 2021       Admit Date: 2021    Visit Dx:     ICD-10-CM ICD-9-CM   1. Chronic multifocal osteomyelitis, other site (HCC)  M86.38 730.18   2. Quadriparesis (HCC)  G82.50 344.00     Patient Active Problem List   Diagnosis   • Depression with suicidal ideation   • SLE   • Hypertension   • Hypothyroidism   • KEREN (generalized anxiety disorder)   • Abscess of axilla, right   • MDD (major depressive disorder)   • Cytokine release syndrome, grade 2   • Pleural effusion, right   • Osteomyelitis of thoracic vertebra   • Paraspinal abscess   • Polysubstance abuse   • Quadriparesis   • Spinal cord compression   • COVID-19 virus detected   • MRSA bacteremia   • Hepatitis C   • Seizures on Keppra   • History of CVA   • Acute postoperative respiratory insufficiency   • Severe malnutrition (CMS/HCC)     Past Medical History:   Diagnosis Date   • Anxiety    • Brain tumor (HCC)    • Chronic headache    • Depression    • Diastolic CHF, chronic (HCC)    • DVT (deep venous thrombosis) (HCC)     left leg   • Encephalitis 2016    treated at the [facility]   • Epilepsy (HCC)    • Gastric ulcer with perforation (HCC) 2016    Microperforation and air in the biliary tree   • Gastritis    • Heart disease    • Henoch-Schonlein purpura (HCC)    • History of transfusion    • Hypertension    • Hypothyroidism (acquired)     Removed due to groiter   • Kidney disease    • Lower GI bleeding    • Lupus (HCC)    • Memory disorder    • Migraine    • Mixed connective tissue disease (HCC)    • MRSA cellulitis    • NSTEMI (non-ST elevated myocardial infarction) (HCC)    • Panic disorder    • Patent foramen ovale    • Pneumonia    • PTSD (post-traumatic stress disorder)     trauma from 911   • PVC (premature ventricular contraction)    • RA (rheumatoid arthritis) (HCC)    • Renal disorder    • Rhabdomyolysis    • Seizures (HCC)      when had encephalitis   • Sjogren's syndrome (HCC)    • Stroke (HCC) 09/2015    x 1   • Systemic lupus erythematosus (HCC)     Discoid and systemic   • Temporal arteritis (HCC)    • Thyroid disease    • TIA (transient ischemic attack)     x 3   • Ulcer, stomach peptic, chronic      Past Surgical History:   Procedure Laterality Date   • APPENDECTOMY     • CARDIAC CATHETERIZATION  2016    PFO repair and had a loop monitor placed at the [facility]   • CARDIAC SURGERY     • CENTRAL VENOUS LINE INSERTION N/A 10/28/2021    Procedure: Placement of central line;  Surgeon: Ruma Madden MD;  Location: [facility];  Service: General;  Laterality: N/A;   • CERVICAL LAMINECTOMY DECOMPRESSION POSTERIOR Bilateral 2021    Procedure: CERVICAL LAMINECTOMY DECOMPRESSION POSTERIOR C3-6;  Surgeon: Destin Jama MD;  Location: [facility];  Service: Neurosurgery;  Laterality: Bilateral;   •  SECTION      x 2   • ENDOSCOPY     • FACIAL RECONSTRUCTION SURGERY      clean out MRSA    • INCISION AND DRAINAGE ABSCESS Right 2019    Procedure: INCISION AND DRAINAGE ABSCESS RIGHT AXILLA;  Surgeon: Zak Martin MD;  Location: [facility];  Service: General   • KNEE ARTHROSCOPY Left    • LUMBAR FUSION     • PORTACATH PLACEMENT Right 2016   • THYROID SURGERY      Removed due to a goiter   • VENOUS ACCESS DEVICE (PORT) REMOVAL N/A 10/28/2021    Procedure: Removal of [id number].;  Surgeon: Ruma Madden MD;  Location: [facility];  Service: General;  Laterality: N/A;      General Information     Row Name 21 1015          Physical Therapy Time and Intention    Document Type re-evaluation  -HP     Mode of Treatment physical therapy  -     Row Name 21 1015          General Information    Patient Profile Reviewed yes  -HP     Prior Level of Function --  see IE  -HP     Existing Precautions/Restrictions fall; spinal; oxygen therapy device and L/min; seizures  -HP     Barriers to  Rehab medically complex; cognitive status; ineffective coping  -     Row Name 12/08/21 1015          Living Environment    Lives With other (see comments)  see IE  -     Row Name 12/08/21 1015          Home Main Entrance    Number of Stairs, Main Entrance other (see comments)  see IE  -     Stair Railings, Main Entrance other (see comments)  see IE  -     Row Name 12/08/21 1015          Cognition    Orientation Status (Cognition) oriented to; person; disoriented to; time  -     Row Name 12/08/21 1015          Safety Issues, Functional Mobility    Safety Issues Affecting Function (Mobility) insight into deficits/self-awareness; awareness of need for assistance; safety precaution awareness; problem-solving; sequencing abilities; ability to follow commands  -     Impairments Affecting Function (Mobility) cognition; endurance/activity tolerance; grasp; motor control; pain; postural/trunk control; range of motion (ROM); strength; balance; shortness of breath; coordination; motor planning; sensation/sensory awareness  -           User Key  (r) = Recorded By, (t) = Taken By, (c) = Cosigned By    Initials Name Provider Type     Oxana Estrada, PT Physical Therapist               Mobility     Row Name 12/08/21 1017          Bed Mobility    Bed Mobility scooting/bridging; supine-sit; sit-supine  -     Scooting/Bridging [place] (Bed Mobility) dependent (less than 25% patient effort); 2 person assist; verbal cues  -     Supine-Sit [place] (Bed Mobility) dependent (less than 25% patient effort); 2 person assist; verbal cues  -     Sit-Supine [place] (Bed Mobility) dependent (less than 25% patient effort); 2 person assist  -     Assistive Device (Bed Mobility) draw sheet; head of bed elevated  -     Comment (Bed Mobility) Pt dependent for all bed mobility. Pt limited by R knee pain with movement.  -     Row Name 12/08/21 1017          Transfers    Comment (Transfers) not appropriate due  to poor sitting balance and pt requested return to supine  -     Row Name 12/08/21 1017          Gait/Stairs (Locomotion)    Comment (Gait/Stairs) Not appropriate at this time  -           User Key  (r) = Recorded By, (t) = Taken By, (c) = Cosigned By    Initials Name Provider Type     Oxana Estrada PT Physical Therapist               Obj/Interventions     [facility] Name 12/08/21 1019          Range of Motion Comprehensive    General Range of Motion lower extremity range of motion deficits identified  -     Comment, General Range of Motion B heel cord tightness observed, unable to achieve neutral, B knee ROM limited secondary to pain, unable to achieve full extension or flexion to 90 degrees  -     Row Name 12/08/21 1019          Strength Comprehensive (MMT)    General Manual Muscle Testing (MMT) Assessment lower extremity strength deficits identified  -     Comment, General Manual Muscle Testing (MMT) Assessment BLE grossly 2-/5  -[facility] Name 12/08/21 1019          Motor Skills    Motor Skills therapeutic exercise  -     Therapeutic Exercise ankle; knee  -[facility] Name 12/08/21 1019          Knee (Therapeutic Exercise)    Knee (Therapeutic Exercise) isometric exercises  -     Knee Isometrics (Therapeutic Exercise) bilateral; quad sets; 10 repetitions  -[facility] Name 12/08/21 1019          Ankle (Therapeutic Exercise)    Ankle (Therapeutic Exercise) AROM (active range of motion); PROM (passive range of motion)  -     Ankle AROM (Therapeutic Exercise) bilateral; dorsiflexion; plantarflexion; 10 repetitions  -     Ankle PROM (Therapeutic Exercise) bilateral; dorsiflexion; plantarflexion; 5 repetitions  -     Row Name 12/08/21 1019          Balance    Balance Assessment sitting static balance; sitting dynamic balance  -     Static Sitting Balance severe impairment; supported  -     Dynamic Sitting Balance severe impairment; supported  -     Static Standing Balance unable to perform  activity  -HP     Dynamic Standing Balance unable to perform activity  -HP     Balance Interventions occupation based/functional task; sitting; weight shifting activity  -HP     Comment, Balance Pt minimally able to perform A/P weight shifts with assistance. Unable to maintain upright sitting position wihtout back support for > 5 seconds  -           User Key  (r) = Recorded By, (t) = Taken By, (c) = Cosigned By    Initials Name Provider Type     Oxana Estrada, PT Physical Therapist               Goals/Plan     Row Name 12/08/21 1024          Bed Mobility Goal 1 (PT)    Activity/Assistive Device (Bed Mobility Goal 1, PT) sit to supine; supine to sit  -HP     [place] Level/Cues Needed (Bed Mobility Goal 1, PT) maximum assist (25-49% patient effort)  -HP     Time Frame (Bed Mobility Goal 1, PT) long term goal (LTG); 2 weeks  -HP     Progress/Outcomes (Bed Mobility Goal 1, PT) goal ongoing  -     Row Name 12/08/21 1024          Transfer Goal 1 (PT)    Activity/Assistive Device (Transfer Goal 1, PT) bed-to-chair/chair-to-bed  -HP     [place] Level/Cues Needed (Transfer Goal 1, PT) maximum assist (25-49% patient effort); 2 person assist  -HP     Time Frame (Transfer Goal 1, PT) long term goal (LTG); 2 weeks  -HP     Progress/Outcome (Transfer Goal 1, PT) goal ongoing  -           User Key  (r) = Recorded By, (t) = Taken By, (c) = Cosigned By    Initials Name Provider Type     Oxana Estrada, PT Physical Therapist               Clinical Impression     Row Name 12/08/21 1022          Pain    Additional Documentation Pain Scale: FACES Pre/Post-Treatment (Group)  -     Row Name 12/08/21 1022          Pain Scale: Numbers Pre/Post-Treatment    Pre/Posttreatment Pain Comment with movement and light touch  -     Row Name 12/08/21 1022          Pain Scale: FACES Pre/Post-Treatment    Pain: FACES Scale, Pretreatment 0-->no hurt  -     Posttreatment Pain Rating 6-->hurts even more  -     Pain Location -  Side Right  -HP     Pain Location - Orientation lower  -HP     Pain Location extremity  -HP     Pre/Posttreatment Pain Comment with movement and light touch  -HP     Row Name 12/08/21 1022          Plan of Care Review    Plan of Care Reviewed With patient  -HP     Progress improving  -HP     Outcome Summary PT re-eval complete. Pt is complexe and limited by pain. Pt was Dep with bed mobility and required Max A to sit EOB. Pt tolerated PROM with complaints of pain. IPPT warranted at this time as pt was agreeable to therapy and able to participate.  -HP     Row Name 12/08/21 1022          Therapy Assessment/Plan (PT)    Rehab Potential (PT) fair, will monitor progress closely  -HP     Criteria for Skilled Interventions Met (PT) yes; skilled treatment is necessary; meets criteria  -HP     Predicted Duration of Therapy Intervention (PT) two weeks  -HP     Row Name 12/08/21 1022          Vital Signs    Pre Systolic BP Rehab --  VSS; RN cleared for therapy  -HP     Post Systolic BP Rehab 129  -HP     Post Treatment Diastolic BP 81  -HP     Pretreatment Heart Rate (beats/min) 93  -HP     Posttreatment Heart Rate (beats/min) 97  -HP     Pre SpO2 (%) 97  -HP     O2 Delivery Pre Treatment supplemental O2  -HP     Post SpO2 (%) 97  -HP     O2 Delivery Post Treatment supplemental O2  -HP     Pre Patient Position Supine  -HP     Intra Patient Position Sitting  -HP     Post Patient Position Supine  -HP     Row Name 12/08/21 1022          Positioning and Restraints    Pre-Treatment Position in bed  -HP     Post Treatment Position bed  -HP     In Bed notified nsg; supine; fowlers; call light within reach; encouraged to call for assist; exit alarm on; side rails up x2  -HP           User Key  (r) = Recorded By, (t) = Taken By, (c) = Cosigned By    Initials Name Provider Type    HP Oxana Estrada, MARYAM Physical Therapist               Outcome Measures     Row Name 12/08/21 1025          How much help from another person do you  currently need...    Turning from your back to your side while in flat bed without using bedrails? 1  -HP     Moving from lying on back to sitting on the side of a flat bed without bedrails? 1  -HP     Moving to and from a bed to a chair (including a wheelchair)? 1  -HP     Standing up from a chair using your arms (e.g., wheelchair, bedside chair)? 1  -HP     Climbing 3-5 steps with a railing? 1  -HP     To walk in hospital room? 1  -HP     AM-PAC 6 Clicks Score (PT) 6  -HP     Row Name 12/08/21 1025 12/08/21 1024       Functional Assessment    Outcome Measure Options AM-PAC 6 Clicks Basic Mobility (PT)  -HP AM-PAC 6 Clicks Daily Activity (OT)  -          User Key  (r) = Recorded By, (t) = Taken By, (c) = Cosigned By    Initials Name Provider Type    Oxana Rivero, OT Occupational Therapist    HP Oxana Estrada, PT Physical Therapist                             Physical Therapy Education                 Title: PT OT SLP Therapies (In Progress)     Topic: Physical Therapy (In Progress)     Point: Mobility training (In Progress)     Learning Progress Summary           Patient Acceptance, E,D, VU,NR by  at 12/8/2021 1026   Family Acceptance, E, NR by  at 11/15/2021 0513      Show all documentation for this point (12)                 Point: Home exercise program (In Progress)     Learning Progress Summary           Patient Acceptance, E,D, VU,NR by  at 12/8/2021 1026   Family Acceptance, E, NR by  at 11/15/2021 0513      Show all documentation for this point (12)                 Point: Body mechanics (In Progress)     Learning Progress Summary           Patient Acceptance, E,D, VU,NR by  at 12/8/2021 1026   Family Acceptance, E, NR by  at 11/15/2021 0513      Show all documentation for this point (12)                 Point: Precautions (In Progress)     Learning Progress Summary           Patient Acceptance, E,D, VU,NR by  at 12/8/2021 1026   Family Acceptance, E, NR by  at 11/15/2021 0513      Show  all documentation for this point (12)                             User Key     Initials Effective Dates Name Provider Type Discipline     06/16/21 -  Cary Grant RN Registered Nurse Nurse     06/01/21 -  Oxana Estrada PT Physical Therapist PT              PT Recommendation and Plan  Planned Therapy Interventions (PT): balance training, bed mobility training, gait training, home exercise program, patient/family education, postural re-education, strengthening, stretching, ROM (range of motion), transfer training, wheelchair management/propulsion training  Plan of Care Reviewed With: patient  Progress: improving  Outcome Summary: PT re-eval complete. Pt is complexe and limited by pain. Pt was Dep with bed mobility and required Max A to sit EOB. Pt tolerated PROM with complaints of pain. IPPT warranted at this time as pt was agreeable to therapy and able to participate.     Time Calculation:    PT Charges     Row Name 12/08/21 0831             Time Calculation    Start Time 0831  -HP      PT Received On 12/08/21  -HP      PT Goal Re-Cert Due Date 12/18/21  -HP              Timed Charges    40332 - PT Therapeutic Exercise Minutes 10  -HP      63655 - PT Therapeutic Activity Minutes 15  -HP              Untimed Charges    PT Eval/Re-eval Minutes 20  -HP              Total Minutes    Timed Charges Total Minutes 25  -HP      Untimed Charges Total Minutes 20  -HP       Total Minutes 45  -HP            User Key  (r) = Recorded By, (t) = Taken By, (c) = Cosigned By    Initials Name Provider Type     Oxana Estrada PT Physical Therapist              Therapy Charges for Today     Code Description Service Date Service Provider Modifiers Qty    26382345107 HC PT THER PROC EA 15 MIN 12/8/2021 Oxana Estrada, PT GP 1    99869936645 HC PT RE-EVAL ESTABLISHED PLAN 2 12/8/2021 Oxana Estrada, PT GP 1    49325763191 HC PT THERAPEUTIC ACT EA 15 MIN 12/8/2021 Oxana Estrada, PT GP 1          PT G-Codes  Outcome Measure  Options: AM-PAC 6 Clicks Basic Mobility (PT)  AM-PAC 6 Clicks Score (PT): 6  AM-PAC 6 Clicks Score (OT): 6    Oxana Estrada, PT  12/8/2021

## 2021-12-08 NOTE — PROGRESS NOTES
Pharmacy Consult-Vancomycin Dosing  Karely Villarreal is a  56 y.o. female receiving vancomycin therapy.     IIndication: MRSA bacteremia, osteomyelitis, epidural abscess   Consulting Provider: VICTOR HUGO Garcia  ID Consult: Yes    Goal AUC: 400 - 600 mg/L*hr    Current Antimicrobial Therapy  Anti-Infectives (From admission, onward)      Ordered     Dose/Rate Route Frequency Start Stop    12/06/21 0946  vancomycin in dextrose 5% 150 mL (VANCOCIN) IVPB 750 mg        Ordering Provider: Tashi Dinh, PharmD    750 mg  over 60 Minutes Intravenous Daily With Lunch 12/07/21 1200 12/12/21 1159    11/20/21 1540  meropenem (MERREM) 1 g/100 mL 0.9% NS (mbp)        Ordering Provider: Jonn Mchugh MD    1 g  over 3 Hours Intravenous Every 8 Hours 11/20/21 2200 11/27/21 1728    11/20/21 1551  vancomycin 1750 mg/500 mL 0.9% NS IVPB ([facility])        Ordering Provider: Jeny Beltran, PharmD    1,750 mg  over 120 Minutes Intravenous Once 11/20/21 1645 11/20/21 1830    11/20/21 1540  meropenem (MERREM) 1 g/100 mL 0.9% NS (mbp)        Ordering Provider: Jay Turcios PA    1 g  over 30 Minutes Intravenous Once 11/20/21 1630 11/20/21 1700    11/20/21 1540  doxycycline (VIBRAMYCIN) 100 mg/100 mL 0.9% NS MBP        Ordering Provider: Jonn Mchugh MD    100 mg  over 60 Minutes Intravenous Every 12 Hours Scheduled 11/20/21 1600 11/27/21 1017    11/20/21 1540  Pharmacy to dose vancomycin        Ordering Provider: Jonn Mchugh MD     Does not apply Continuous PRN 11/20/21 1537 01/03/22 1536    11/14/21 1247  ceFAZolin in dextrose (ANCEF) IVPB solution 2 g        Ordering Provider: Diego Alvarenga PA-C    2 g  over 30 Minutes Intravenous Once 11/14/21 1345 11/14/21 1339            Allergies  Allergies as of 11/06/2021 - Reviewed 10/28/2021   Allergen Reaction Noted    Compazine [prochlorperazine edisylate] Dystonia 06/18/2016    Imitrex [sumatriptan] Other (See Comments) 06/18/2016    Nsaids GI Bleeding 06/18/2016    Reglan  "[metoclopramide] Dystonia 06/18/2016    Solu-medrol [methylprednisolone] Dystonia 12/24/2016    Zyprexa [olanzapine] Swelling 06/18/2016    Prednisone Anxiety 07/18/2019       Labs    Results from last 7 days   Lab Units 12/08/21  0915 12/07/21  0658 12/06/21  0840   BUN mg/dL 17 16 18   CREATININE mg/dL 1.12* 0.99 0.86       Results from last 7 days   Lab Units 12/08/21  0915 12/07/21  0658 12/06/21  0840   WBC 10*3/mm3 6.19 6.75 7.70       Evaluation of Dosing     Is Patient on Dialysis or Renal Replacement: no    Ht - 167.6 cm (66\")  Wt - 87.1 kg (192 lb)    Estimated Creatinine Clearance: 63.1 mL/min (A) (by C-G formula based on SCr of 1.12 mg/dL (H)).    Intake & Output (last 3 days)         12/05 0701 12/06 0700 12/06 0701 12/07 0700 12/07 0701 12/08 0700 12/08 0701 12/09 0700    P.O. 0 480 360     Total Intake(mL/kg) 0 (0) 480 (5.5) 360 (4.1)     Urine (mL/kg/hr) 2150 (1) 900 (0.4) 750 (0.4)     Total Output 2150 900 750     Net -2150 -420 -390             Urine Unmeasured Occurrence  1 x 1 x             Microbiology and Radiology  Microbiology Results (last 10 days)       Procedure Component Value - Date/Time    Urine Culture - Urine, Urine, Clean Catch [[id number]] Collected: 12/03/21 1509    Lab Status: Final result Specimen: Urine, Clean Catch Updated: 12/04/21 1130     Urine Culture 50,000 CFU/mL Normal Urogenital Jailyn            Reported Vancomycin Levels                  Results from last 7 days   Lab Units 12/08/21 0915 12/06/21  0840 12/04/21 2015 12/03/21  1622   VANCOMYCIN TR mcg/mL 16.90 20.60* 23.30* 24.10*          [facility] AUC Calculation:    Current AUC:   406 mg/L*hr    Predicted Steady State AUC on Current Dose: 497 mg/L*hr  _________________________________    Predicted Steady State AUC on New Dose:   497 mg/L*hr    Assessment/Plan:   Vancomycin trough in line between 15 to 20. Will continue dose of 750mg iv every 24 hours.   Will follow renal function, will draw new trough in one " week but may draw earlier if warranted.   Renal function near baseline.   Pharmacy will continue to follow.    Tashi Dinh, PharmD  12/8/2021  11:18 EST
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
## 2021-12-08 NOTE — PLAN OF CARE
Goal Outcome Evaluation:           Progress: improving  Outcome Summary: VSS. Pain controlled with PO meds. Unable to tolerate being on her side for extended periods of time. All questions and concerns answered.

## 2021-12-08 NOTE — PLAN OF CARE
Goal Outcome Evaluation:  Plan of Care Reviewed With: patient        Progress: improving  Outcome Summary: OT re-eval complete. Pt is complex and limited by pain. Agreeable to therapy however once sitting EOB immediately requested to return to supine due to pain. Pt dependent for all bed mobility and requires maxA to sit EOB. OT issued scoop plate, large handle utensils, and T handle cup and educated pt on use. Pt completed self feeding with minimal spillage and extra time/effort. Pt is easily fatigued. Pt completed AAROM x10 reps L UE. OT to see pt 3x/week if she continues to participate. Recommend d/c to LTACH.

## 2021-12-08 NOTE — CASE MANAGEMENT/SOCIAL WORK
Continued Stay Note  [facility]     Patient Name: Karely Villarreal  MRN: [id number]  Today's Date: 12/8/2021    Admit Date: 11/6/2021     Discharge Plan     Row Name 12/08/21 5198       Plan    Plan Comments [facility] in [facility] looking into pt's benefits and askign additional information from pt and pt's family in order to try to offer pt a bed. MSW has spoken with pt at bedside and pt's mother by phone about potential bed offer and pt and pt's mother both agreeable.               Discharge Codes    No documentation.               Expected Discharge Date and Time     Expected Discharge Date Expected Discharge Time    Dec 14, 2021             ROSE MARY Ma

## 2021-12-08 NOTE — PROGRESS NOTES
"    [facility]  PROGRESS NOTE    Patient Name: Karely Villarreal  : 1965  MRN: [id number]    Date of Admission: 2021  Primary Care Physician: Tracie Levi APRN    Subjective   Subjective     CC:  F/u Sepsis, abscess    HPI:  Pt complains of diffuse pain. She says she has a pressure ulcer that is painful. Her left upper extrmity is \"too heavy to move\"  She reports that her hematuria is improving     ROS:  Gen- No fevers, chills  CV- No chest pain, palpitations  Resp- No cough, dyspnea  GI- No N/V/D, abd pain      Objective   Objective     Vital Signs:   Temp:  [97.6 °F (36.4 °C)-98.1 °F (36.7 °C)] 98.1 °F (36.7 °C)  Heart Rate:  [] 92  Resp:  [16] 16  BP: (126-145)/(81-90) 129/81  Flow (L/min):  [2] 2     Physical Exam:  Constitutional: No acute distress, awake, alert, chronically ill appearing female resting in be d  HENT: NCAT, mucous membranes moist  Respiratory: Clear to auscultation bilaterally, respiratory effort normal   Cardiovascular: RRR, no murmurs, rubs, or gallops  Gastrointestinal: Positive bowel sounds, soft, nontender, nondistended  Musculoskeletal: +LUE edema   Psychiatric: Appropriate affect, cooperative  Neurologic: Oriented x 3,  speech clear  Skin: No rashes      Results Reviewed:  LAB RESULTS:      Lab 21  0915 21  0658 21  0840 21  0825 21  1128   WBC 6.19 6.75 7.70 5.62 7.15   HEMOGLOBIN 8.0* 8.1* 9.2* 7.6* 8.3*   HEMATOCRIT 27.0* 27.5* 28.7* 25.0* 27.4*   PLATELETS 237 235 215 182 191   NEUTROS ABS 4.30 4.89 6.08  --  5.84   IMMATURE GRANS (ABS) 0.04 0.05 0.05  --  0.04   LYMPHS ABS 1.17 1.07 0.99  --  0.74   MONOS ABS 0.46 0.51 0.43  --  0.42   EOS ABS 0.17 0.19 0.10  --  0.06   MCV 90.6 89.3 86.4 88.3 90.4         Lab 21  0915 21  0658 21  0840 21  0825 21  1128 21  1128   SODIUM 138 138  --  140 139  --  141   POTASSIUM 3.4* 3.7 4.6 3.3* 3.4*   < > " 3.8   CHLORIDE 93* 95*  --  92* 94*  --  95*   CO2 37.0* 39.0*  --  38.0* 38.0*  --  38.0*   ANION GAP 8.0 4.0*  --  10.0 7.0  --  8.0   BUN 17 16  --  18 17  --  20   CREATININE 1.12* 0.99  --  0.86 0.75  --  0.79   GLUCOSE 96 84  --  112* 83  --  118*   CALCIUM 10.0 9.8  --  10.2 9.8  --  9.8   PHOSPHORUS 4.0 3.4  --  3.2  --   --  4.2    < > = values in this interval not displayed.         Lab 12/08/21  0915 12/07/21  0658 12/06/21  0840 12/05/21  0825 12/04/21  1128   TOTAL PROTEIN  --   --   --  6.2  --    ALBUMIN 2.40* 2.50* 2.70* 2.40* 2.50*   GLOBULIN  --   --   --  3.8  --    ALT (SGPT)  --   --   --  23  --    AST (SGOT)  --   --   --  25  --    BILIRUBIN  --   --   --  0.3  --    ALK PHOS  --   --   --  182*  --                  Lab 12/04/21  1128 12/03/21  1622   IRON 19*  --    IRON SATURATION 7*  --    TIBC 259*  --    TRANSFERRIN 174*  --    FERRITIN 526.90*  --    FOLATE 15.00  --    VITAMIN B 12 1,322*  --    ABO TYPING  --  O   RH TYPING  --  Negative   ANTIBODY SCREEN  --  Negative         Brief Urine Lab Results  (Last result in the past 365 days)      Color   Clarity   Blood   Leuk Est   Nitrite   Protein   CREAT   Urine HCG        12/03/21 1509 Red   Turbid   Large (3+)   Large (3+)   Negative   >=300 mg/dL (3+)                 Microbiology Results Abnormal     Procedure Component Value - Date/Time    Fungus Culture - Body Fluid, Spine, Thoracic [[id number]] Collected: 11/14/21 1511    Lab Status: Preliminary result Specimen: Body Fluid from Spine, Thoracic Updated: 12/05/21 1616     Fungus Culture No fungus isolated at 3 weeks    AFB Culture - Body Fluid, Spine, Thoracic [[id number]] Collected: 11/14/21 1511    Lab Status: Preliminary result Specimen: Body Fluid from Spine, Thoracic Updated: 12/05/21 1616     AFB Culture No AFB isolated at 3 weeks     AFB Stain No acid fast bacilli seen on concentrated smear    Fungus Culture - Tissue, Spine, Cervical [[id number]] Collected: 11/14/21 1450    Lab  Status: Preliminary result Specimen: Tissue from Spine, Cervical Updated: 12/05/21 1600     Fungus Culture No fungus isolated at 3 weeks    AFB Culture - Tissue, Spine, Cervical [[id number]] Collected: 11/14/21 1450    Lab Status: Preliminary result Specimen: Tissue from Spine, Cervical Updated: 12/05/21 1600     AFB Culture No AFB isolated at 3 weeks     AFB Stain No acid fast bacilli seen on concentrated smear    Urine Culture - Urine, Urine, Clean Catch [[id number]] Collected: 12/03/21 1509    Lab Status: Final result Specimen: Urine, Clean Catch Updated: 12/04/21 1130     Urine Culture 50,000 CFU/mL Normal Urogenital Jailyn    Blood Culture - Blood, Arm, Left [[id number]]  (Normal) Collected: 11/20/21 1630    Lab Status: Final result Specimen: Blood from Arm, Left Updated: 11/25/21 1700     Blood Culture No growth at 5 days    Narrative:      Aerobic bottle only      Urine Culture - Urine, Urine, Catheter [[id number]]  (Normal) Collected: 11/20/21 1625    Lab Status: Final result Specimen: Urine, Catheter Updated: 11/21/21 1523     Urine Culture No growth    Anaerobic Culture - Body Fluid, Spine, Thoracic [[id number]] Collected: 11/14/21 1511    Lab Status: Final result Specimen: Body Fluid from Spine, Thoracic Updated: 11/19/21 0700     Anaerobic Culture No anaerobes isolated at 5 days    Anaerobic Culture - Tissue, Spine, Cervical [[id number]] Collected: 11/14/21 1450    Lab Status: Final result Specimen: Tissue from Spine, Cervical Updated: 11/19/21 0700     Anaerobic Culture No anaerobes isolated at 5 days    Body Fluid Culture - Body Fluid, Spine, Thoracic [[id number]] Collected: 11/14/21 1511    Lab Status: Final result Specimen: Body Fluid from Spine, Thoracic Updated: 11/17/21 0933     Body Fluid Culture No growth at 3 days     Gram Stain No WBCs or organisms seen    Fungus Smear - Tissue, Spine, Cervical [[id number]] Collected: 11/14/21 1450    Lab Status: Final result Specimen: Tissue from Spine, Cervical  Updated: 11/15/21 1552     Fungal Stain No fungal elements seen    Blood Culture - Blood, Arm, Right [[id number]]  (Normal) Collected: 11/07/21 1101    Lab Status: Final result Specimen: Blood from Arm, Right Updated: 11/12/21 1116     Blood Culture No growth at 5 days    Narrative:      AEROBIC BOTTLE ONLY    Blood Culture - Blood, Hand, Left [[id number]]  (Normal) Collected: 11/07/21 1101    Lab Status: Final result Specimen: Blood from Hand, Left Updated: 11/12/21 1115     Blood Culture No growth at 5 days    Urine Culture - Urine, Urine, Catheter [[id number]]  (Normal) Collected: 11/09/21 1621    Lab Status: Final result Specimen: Urine, Catheter Updated: 11/10/21 1829     Urine Culture No growth          No radiology results from the last 24 hrs    Results for orders placed during the hospital encounter of 10/24/21    Adult Transthoracic Echo Complete W/ Cont if Necessary Per Protocol    Interpretation Summary  · Left ventricular ejection fraction appears to be 61 - 65%. Left ventricular systolic function is normal.  · Left ventricular diastolic function was normal.  · Estimated right ventricular systolic pressure from tricuspid regurgitation is normal (<35 mmHg).  · No vegetations seen.  · This is a technically difficult study.      I have reviewed the medications:  Scheduled Meds:[START ON 12/15/2021] Pharmacy Consult, , Does not apply, Once  alteplase, 2 mg, Intravenous, Once  amLODIPine, 5 mg, Oral, Q24H  buPROPion SR, 150 mg, Oral, BID  castor oil-balsam peru, 1 application, Topical, Q12H  DULoxetine, 30 mg, Oral, QAM  furosemide, 40 mg, Oral, Daily  hydrALAZINE, 50 mg, Oral, Q8H  lactobacillus acidophilus, 1 capsule, Oral, Daily  levETIRAcetam, 500 mg, Oral, Q12H  levothyroxine, 125 mcg, Oral, Q AM  melatonin, 5 mg, Oral, Nightly  multivitamin, 1 tablet, Oral, Daily  Nutrisource fiber, 2 packet, Oral, TID  nystatin, , Topical, Q8H  pantoprazole, 40 mg, Oral, Q AM  senna-docusate sodium, 1 tablet, Oral,  Nightly  sodium chloride, 10 mL, Intravenous, Q12H  vancomycin, 750 mg, Intravenous, Daily With Lunch      Continuous Infusions:Pharmacy to dose vancomycin,       PRN Meds:.acetaminophen  •  cyclobenzaprine  •  hydrALAZINE  •  ipratropium-albuterol  •  LORazepam  •  magnesium sulfate **OR** magnesium sulfate **OR** magnesium sulfate  •  naloxone  •  [] HYDROmorphone **AND** naloxone  •  ondansetron **OR** ondansetron  •  oxyCODONE-acetaminophen  •  Pharmacy to dose vancomycin  •  potassium & sodium phosphates **OR** potassium & sodium phosphates  •  potassium chloride  •  sodium chloride  •  sodium chloride    Assessment/Plan   Assessment & Plan     Active Hospital Problems    Diagnosis  POA   • **Spinal cord compression [G95.20]  Yes   • Severe malnutrition (CMS/HCC) [E43]  Yes   • Quadriparesis [G82.50]  No   • COVID-19 virus detected [U07.1]  Yes   • MRSA bacteremia [R78.81, B95.62]  Yes   • Hepatitis C [B19.20]  Yes   • Seizures on Keppra [R56.9]  Yes   • History of CVA [Z86.73]  Not Applicable   • Acute postoperative respiratory insufficiency [J95.89]  No   • Pleural effusion, right [J90]  Yes   • Osteomyelitis of thoracic vertebra [M46.24]  Yes   • 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        Brief Hospital Course to date:  Karely Villarreal is a 56 y.o. female  with h/o polysubstance abuse, HCV, HTN, Sz d/o, SLE, Hypothyroidism, CVA, prior COVID infection and homelessness.    She was admitted to [facility] on 10/24/21 for sepsis d/t MRSA and was found to have a paravertebral abscess and was transferred to [facility] on .  Surgery was deferred initially but she developed worsening UE weakness and MRI showed progressive cord compression so she  was taken to the OR emergently for decompression on 11/14.    She was extubated 11/15 but TF's were started d/t poor PO.  She was transferred initially to telemetry on 11/8 but developed worsening hypoxia with AMS and was transferred back to the ICU on bipap on 11/20.  She was transferred back to the hospitalist service on 11/23  This patient's problems and plans were partially entered by my partner and updated as appropriate by me 12/08/21.          MRSA bacteremia  Sepsis  T9/T10 Paravertebral Abscess, T9/T10 Osteomyelitis with spinal cord compression  --s/p decompression, cervical laminectomy and debridement of epidural abscess on 11/14  --on Vancomycin per ID, will need long course at least 12 weeks given the extent of her spinal infection and high grade bacteremia (starting at time of surgery on 11/14)  --repeat MRI C and T spine showed shows 2 fluid collections dorsally (one at C4 and the other more superficial at C6/C7) and T9/T10 still c/w vertebral osteomyelitis.  Re-evaluated by Dr. Jama who noted severe phlegmon circumferentially around cervical and upper thoracic area but no worsening compression, rec'd continue abx, no surgery needed at this time         Anemia  Hematuria   - continues to drop, down to 8.0 today   - fob negative  -s/p 1 unit PRBC on 12/3  -Iron 19, Iron sat 7   -Received dose of IV iron   - suspect related to lupus v cystitis but renal function mildly increased and would not be able to treat in setting of severe infection with immunosuppresive agents  - urology consulted , follow up with DR Gardner outpatient   - My partner Discussed with Dr. Schroeder, nephrology, hematuria is improving, if hgb cont to trend down will get nephrology consult     -urine culture with urogenital batool, hold abx for UTI treatment     Acute Respiratory failure  Right Pleural Effusion  Probable Bacterial PNA  --improved  --tolerating daily PO Lasix well   -- s/p 7 days of merrem and doxy per  ID     Malnutrition  --Status post Dobbhoff with tube feeds     Diarrhea  --s/p rectal tube, improved  --Wound care following for skin breakdown on gluteal tissue  --Continue fiber supplement      Bilateral Knee Pain  --Bilateral xrays showed large bilateral knee effusions, moderate osteoarthrosis worse in patellofemoral compartments  -PT/OT      LUE Edema  -LUE venous duplex on 11/7 negative for evidence of acute DVT  -Elevate extremity   -Repeat venous duplex 12/3 negative for DVT     HX Seizure Disorder  --continue keppra     Recent COVID PNA     Acute Right Axilla abscess  --culture positive for MRSA, continue vanco     Polysubstance abuse  --UDS positive for meth/opiates, self medicating at [facility] with klonopin and oxycodone and went into respiratory depression     SLE/Discoid lupus  --holding plaquenil due to severity of active infection, but suspect possible lupus nephritis v other GN with ongoing infection and off maintenance immunosuppression     Insomnia   -Melatonin PRN      Hx CVA  - PT/OT    DVT prophylaxis:  Mechanical DVT prophylaxis orders are present.       AM-PAC 6 Clicks Score (PT): 6 (12/08/21 1025)    Disposition: I expect the patient to be discharged TBD, looking for placement    CODE STATUS:   Code Status and Medical Interventions:   Ordered at: 11/06/21 1247     Code Status (Patient has no pulse and is not breathing):    CPR (Attempt to Resuscitate)     Medical Interventions (Patient has pulse or is breathing):    Full Support       Sumi Alvarado, DO  12/08/21

## 2021-12-08 NOTE — CASE MANAGEMENT/SOCIAL WORK
Continued Stay Note  [facility]     Patient Name: Karely Villarreal  MRN: [id number]  Today's Date: 12/8/2021    Admit Date: 11/6/2021     Discharge Plan     Row Name 12/08/21 9625       Plan    Plan Comments MSW also faxed transportation form to see if able to get ambulance to transport pt from [facility] to [facility] in [place].    Row Name 12/08/21 1330       Plan    Plan Comments [facility] looking into pt's benefits and askign additional information from pt and pt's family in order to try to offer pt a bed. MSW has spoken with pt at bedside and pt's mother by phone about potential bed offer and pt and pt's mother both agreeable.               Discharge Codes    No documentation.               Expected Discharge Date and Time     Expected Discharge Date Expected Discharge Time    Dec 14, 2021             ROSE MARY Ma

## 2021-12-08 NOTE — PROGRESS NOTES
[facility]   Urology Progress Note    Patient Name: Karely Villarreal  : 1965  MRN: [id number]  Primary Care Physician:  Tracie Levi APRN  Date of admission: 2021    Subjective   Subjective     Chief Complaint: Gross hematuria     HPI:  Patient Reports no issues, she reports sensing a full bladder and voiding spontaneously into suction wick when needed. Denies fevers or chills.     Review of Systems   All systems were reviewed and negative except for: None    Objective   Objective     Vitals:   Temp:  [97.6 °F (36.4 °C)-98.1 °F (36.7 °C)] 98.1 °F (36.7 °C)  Heart Rate:  [] 92  Resp:  [16] 16  BP: (126-145)/(81-90) 129/81  Flow (L/min):  [2] 2  Physical Exam    Constitutional: Awake in bed, alert   Eyes: PERRLA, sclerae anicteric, no conjunctival injection   HENT: Normocephalic, atraumatic, mucous membranes moist   Neck: Supple, trachea midline   Respiratory: Equal chest rise, non-labored respirations    Cardiovascular: RRR, palpable radial pulses bilaterally   Gastrointestinal: Soft, nontender, non-distended   Genitourinary: Normal female external genitalia, voiding into suction canister faint light pink hematuria noted    Musculoskeletal: No lower extremity edema bilaterally, no clubbing or cyanosis to extremities   Psychiatric: Appropriate affect, cooperative   Neurologic: Oriented x 3,  Cranial Nerves grossly intact, speech clear       Result Review    Result Review:  I have personally reviewed the results from the time of this admission to 2021 10:19 EST and agree with these findings:  [x]  Laboratory  [x]  Microbiology  []  Radiology  []  EKG/Telemetry   []  Cardiology/Vascular   []  Pathology  []  Old records  []  Other:    Most notable findings include: Labs pending, hematuria faint in suction canister.     Assessment/Plan   Assessment / Plan     Brief Patient Summary:  Karely Villarreal is a 56 y.o. female who extensive past medical history including lupus, diastolic congestive  heart failure, IV drug abuse, DVT, epilepsy, anxiety, prior HSP, admitted since late October with MRSA bacteremia, COVID-19 with pleural effusion requiring chest tube, spinal epidural abscess requiring operative intervention per neurosurgery who developed light hematuria.  She has had some intermittent anemia.  Urology was asked to evaluate onset hematuria in the inpatient setting.    Active Hospital Problems:  Active Hospital Problems    Diagnosis    • **Spinal cord compression    • Severe malnutrition (CMS/HCC)    • Quadriparesis    • COVID-19 virus detected    • MRSA bacteremia    • Hepatitis C    • Seizures on Keppra    • History of CVA    • Acute postoperative respiratory insufficiency    • Pleural effusion, right    • Osteomyelitis of thoracic vertebra    • Paraspinal abscess    • Polysubstance abuse    • MDD (major depressive disorder)    • SLE    • Hypertension    • Hypothyroidism    • KEREN (generalized anxiety disorder)        Plan:     - no significant hematuria noted (faint light pink in suction canister)  - will need outpatient CT urogram and flexible cystoscopy in my clinic, will arrange within 4 weeks to allow patient to recover from her more acute issues  - urology signing off, please call with concerns         DVT prophylaxis:  Mechanical DVT prophylaxis orders are present.    CODE STATUS:   Code Status (Patient has no pulse and is not breathing): CPR (Attempt to Resuscitate)  Medical Interventions (Patient has pulse or is breathing): Full Support    Disposition:  I expect patient to discharged TBD per primary.    Electronically signed by Matthew Gardner MD, 12/08/21, 10:19 AM EST.

## 2021-12-08 NOTE — PLAN OF CARE
Goal Outcome Evaluation:  Plan of Care Reviewed With: patient        Progress: improving  Outcome Summary: PT re-eval complete. Pt is complexe and limited by pain. Pt was Dep with bed mobility and required Max A to sit EOB. Pt tolerated PROM with complaints of pain. IPPT warranted at this time as pt was agreeable to therapy and able to participate.

## 2021-12-08 NOTE — ACP (ADVANCE CARE PLANNING)
Patient requested a Living Will consult.  Ms. Villarreal welcomed me in the room.  I provided an ACP brochure and educated her regarding a living will.  Patient not ready to complete living will, but will request a second visit when she is ready to complete form.

## 2021-12-09 LAB
ANION GAP SERPL CALCULATED.3IONS-SCNC: 7 MMOL/L (ref 5–15)
BASOPHILS # BLD AUTO: 0.05 10*3/MM3 (ref 0–0.2)
BASOPHILS NFR BLD AUTO: 0.8 % (ref 0–1.5)
BUN SERPL-MCNC: 19 MG/DL (ref 6–20)
BUN/CREAT SERPL: 16 (ref 7–25)
CALCIUM SPEC-SCNC: 9.6 MG/DL (ref 8.6–10.5)
CHLORIDE SERPL-SCNC: 93 MMOL/L (ref 98–107)
CK SERPL-CCNC: 11 U/L (ref 20–180)
CO2 SERPL-SCNC: 38 MMOL/L (ref 22–29)
CREAT SERPL-MCNC: 1.19 MG/DL (ref 0.57–1)
DEPRECATED RDW RBC AUTO: 55.6 FL (ref 37–54)
EOSINOPHIL # BLD AUTO: 0.14 10*3/MM3 (ref 0–0.4)
EOSINOPHIL NFR BLD AUTO: 2.2 % (ref 0.3–6.2)
ERYTHROCYTE [DISTWIDTH] IN BLOOD BY AUTOMATED COUNT: 17.2 % (ref 12.3–15.4)
GFR SERPL CREATININE-BSD FRML MDRD: 47 ML/MIN/1.73
GLUCOSE SERPL-MCNC: 82 MG/DL (ref 65–99)
HCT VFR BLD AUTO: 24.6 % (ref 34–46.6)
HGB BLD-MCNC: 7.5 G/DL (ref 12–15.9)
IMM GRANULOCYTES # BLD AUTO: 0.05 10*3/MM3 (ref 0–0.05)
IMM GRANULOCYTES NFR BLD AUTO: 0.8 % (ref 0–0.5)
LDH SERPL-CCNC: 173 U/L (ref 135–214)
LYMPHOCYTES # BLD AUTO: 1.37 10*3/MM3 (ref 0.7–3.1)
LYMPHOCYTES NFR BLD AUTO: 21.1 % (ref 19.6–45.3)
MCH RBC QN AUTO: 27 PG (ref 26.6–33)
MCHC RBC AUTO-ENTMCNC: 30.5 G/DL (ref 31.5–35.7)
MCV RBC AUTO: 88.5 FL (ref 79–97)
MONOCYTES # BLD AUTO: 0.54 10*3/MM3 (ref 0.1–0.9)
MONOCYTES NFR BLD AUTO: 8.3 % (ref 5–12)
NEUTROPHILS NFR BLD AUTO: 4.34 10*3/MM3 (ref 1.7–7)
NEUTROPHILS NFR BLD AUTO: 66.8 % (ref 42.7–76)
NRBC BLD AUTO-RTO: 0 /100 WBC (ref 0–0.2)
PHOSPHATE SERPL-MCNC: 4.2 MG/DL (ref 2.5–4.5)
PLATELET # BLD AUTO: 214 10*3/MM3 (ref 140–450)
PMV BLD AUTO: 8.5 FL (ref 6–12)
POTASSIUM SERPL-SCNC: 3.2 MMOL/L (ref 3.5–5.2)
RBC # BLD AUTO: 2.78 10*6/MM3 (ref 3.77–5.28)
SODIUM SERPL-SCNC: 138 MMOL/L (ref 136–145)
URATE SERPL-MCNC: 5.9 MG/DL (ref 2.4–5.7)
WBC NRBC COR # BLD: 6.49 10*3/MM3 (ref 3.4–10.8)

## 2021-12-09 PROCEDURE — 86038 ANTINUCLEAR ANTIBODIES: CPT | Performed by: INTERNAL MEDICINE

## 2021-12-09 PROCEDURE — 86160 COMPLEMENT ANTIGEN: CPT | Performed by: INTERNAL MEDICINE

## 2021-12-09 PROCEDURE — 84100 ASSAY OF PHOSPHORUS: CPT | Performed by: INTERNAL MEDICINE

## 2021-12-09 PROCEDURE — 84550 ASSAY OF BLOOD/URIC ACID: CPT | Performed by: INTERNAL MEDICINE

## 2021-12-09 PROCEDURE — 85025 COMPLETE CBC W/AUTO DIFF WBC: CPT | Performed by: FAMILY MEDICINE

## 2021-12-09 PROCEDURE — 80048 BASIC METABOLIC PNL TOTAL CA: CPT | Performed by: FAMILY MEDICINE

## 2021-12-09 PROCEDURE — 25010000002 ALBUMIN HUMAN 25% PER 50 ML: Performed by: INTERNAL MEDICINE

## 2021-12-09 PROCEDURE — 86225 DNA ANTIBODY NATIVE: CPT | Performed by: INTERNAL MEDICINE

## 2021-12-09 PROCEDURE — 83615 LACTATE (LD) (LDH) ENZYME: CPT | Performed by: INTERNAL MEDICINE

## 2021-12-09 PROCEDURE — 82550 ASSAY OF CK (CPK): CPT | Performed by: INTERNAL MEDICINE

## 2021-12-09 PROCEDURE — P9047 ALBUMIN (HUMAN), 25%, 50ML: HCPCS | Performed by: INTERNAL MEDICINE

## 2021-12-09 PROCEDURE — 99232 SBSQ HOSP IP/OBS MODERATE 35: CPT | Performed by: FAMILY MEDICINE

## 2021-12-09 PROCEDURE — 25010000002 VANCOMYCIN PER 500 MG

## 2021-12-09 PROCEDURE — 94799 UNLISTED PULMONARY SVC/PX: CPT

## 2021-12-09 RX ORDER — ALBUMIN (HUMAN) 12.5 G/50ML
12.5 SOLUTION INTRAVENOUS 2 TIMES DAILY
Status: COMPLETED | OUTPATIENT
Start: 2021-12-09 | End: 2021-12-10

## 2021-12-09 RX ORDER — LORAZEPAM 1 MG/1
1 TABLET ORAL EVERY 6 HOURS PRN
Status: DISCONTINUED | OUTPATIENT
Start: 2021-12-09 | End: 2021-12-11 | Stop reason: HOSPADM

## 2021-12-09 RX ADMIN — PANTOPRAZOLE SODIUM 40 MG: 40 TABLET, DELAYED RELEASE ORAL at 06:43

## 2021-12-09 RX ADMIN — MULTIVITAMIN TABLET 1 TABLET: TABLET at 09:34

## 2021-12-09 RX ADMIN — SODIUM CHLORIDE, PRESERVATIVE FREE 10 ML: 5 INJECTION INTRAVENOUS at 09:54

## 2021-12-09 RX ADMIN — Medication 5 MG: at 20:55

## 2021-12-09 RX ADMIN — NYSTATIN: 100000 POWDER TOPICAL at 20:55

## 2021-12-09 RX ADMIN — CASTOR OIL AND BALSAM, PERU 1 APPLICATION: 788; 87 OINTMENT TOPICAL at 20:57

## 2021-12-09 RX ADMIN — LEVOTHYROXINE SODIUM 125 MCG: 125 TABLET ORAL at 06:43

## 2021-12-09 RX ADMIN — Medication 1 CAPSULE: at 09:33

## 2021-12-09 RX ADMIN — LEVETIRACETAM 500 MG: 500 TABLET, FILM COATED ORAL at 09:33

## 2021-12-09 RX ADMIN — DULOXETINE HYDROCHLORIDE 30 MG: 30 CAPSULE, DELAYED RELEASE ORAL at 09:34

## 2021-12-09 RX ADMIN — HYDRALAZINE HYDROCHLORIDE 50 MG: 50 TABLET, FILM COATED ORAL at 20:55

## 2021-12-09 RX ADMIN — Medication 2 PACKET: at 20:56

## 2021-12-09 RX ADMIN — LEVETIRACETAM 500 MG: 500 TABLET, FILM COATED ORAL at 20:55

## 2021-12-09 RX ADMIN — SENNOSIDES AND DOCUSATE SODIUM 1 TABLET: 50; 8.6 TABLET ORAL at 20:55

## 2021-12-09 RX ADMIN — NYSTATIN: 100000 POWDER TOPICAL at 15:43

## 2021-12-09 RX ADMIN — SODIUM CHLORIDE 250 ML: 9 INJECTION, SOLUTION INTRAVENOUS at 17:19

## 2021-12-09 RX ADMIN — FUROSEMIDE 40 MG: 40 TABLET ORAL at 09:34

## 2021-12-09 RX ADMIN — BUPROPION HYDROCHLORIDE 150 MG: 150 TABLET, EXTENDED RELEASE ORAL at 09:33

## 2021-12-09 RX ADMIN — ALBUMIN HUMAN 12.5 G: 0.25 SOLUTION INTRAVENOUS at 20:55

## 2021-12-09 RX ADMIN — LORAZEPAM 1 MG: 1 TABLET ORAL at 19:34

## 2021-12-09 RX ADMIN — OXYCODONE AND ACETAMINOPHEN 1 TABLET: 5; 325 TABLET ORAL at 19:34

## 2021-12-09 RX ADMIN — Medication 2 PACKET: at 09:39

## 2021-12-09 RX ADMIN — LORAZEPAM 1 MG: 1 TABLET ORAL at 12:51

## 2021-12-09 RX ADMIN — CASTOR OIL AND BALSAM, PERU 1 APPLICATION: 788; 87 OINTMENT TOPICAL at 09:39

## 2021-12-09 RX ADMIN — BUPROPION HYDROCHLORIDE 150 MG: 150 TABLET, EXTENDED RELEASE ORAL at 20:55

## 2021-12-09 RX ADMIN — VANCOMYCIN HYDROCHLORIDE 750 MG: 750 INJECTION, SOLUTION INTRAVENOUS at 12:22

## 2021-12-09 RX ADMIN — OXYCODONE AND ACETAMINOPHEN 1 TABLET: 5; 325 TABLET ORAL at 09:33

## 2021-12-09 RX ADMIN — AMLODIPINE BESYLATE 5 MG: 5 TABLET ORAL at 09:33

## 2021-12-09 RX ADMIN — NYSTATIN: 100000 POWDER TOPICAL at 06:44

## 2021-12-09 RX ADMIN — OXYCODONE AND ACETAMINOPHEN 1 TABLET: 5; 325 TABLET ORAL at 04:07

## 2021-12-09 RX ADMIN — HYDRALAZINE HYDROCHLORIDE 50 MG: 50 TABLET, FILM COATED ORAL at 06:43

## 2021-12-09 NOTE — CASE MANAGEMENT/SOCIAL WORK
Continued Stay Note  [facility]     Patient Name: Karely Villarreal  MRN: [id number]  Today's Date: 12/9/2021    Admit Date: 11/6/2021     Discharge Plan     Row Name 12/09/21 1523       Plan    Plan Comments Pt has been set up with [employer] transportation to take her to [facility], on Saturday at 11:00am, in the case that they are able to officially accept pt and offer a bed. MSW continues to follow.               Discharge Codes    No documentation.               Expected Discharge Date and Time     Expected Discharge Date Expected Discharge Time    Dec 14, 2021             ROSE MARY Ma

## 2021-12-09 NOTE — PROGRESS NOTES
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 osteomyelitis secondary to MRSA  4. Para-vertebral/paraspinal abscess at level of T9-T10 with extension to para-aorta  5. Loculated pleural effusion likely secondary from above- not enough fluid on 11/10 to do thoracentesis per IR  6. LUE edema, Check Venous Duplex to r/o DVT.  7. Acute right axilla abscess with spontaneous drainage. Cx MRSA  Adjacent right axilla nodule possible abscess.  8. Recent COVID-19 pneumonia.  Treated with Remdesivir and dexamethasone.    9. Acute hypoxic respiratory failure- improving  10. Leukocytosis/neutrophilia, improved  11. Elevated CRP, improved  12. Polysubstance abuse/UDS positive for meth/opiates.  Was self medicating at [facility] with Klonopin and oxycodone and went into respiratory depression.  13. 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 Seizures/on antiepileptic med  15. H/o CVA from suspected cardioembolic event  16. H/o pyoderma gangrenosum  17. Essential hypertension  18. Hypothyroidism  19. Depression/anxiety  20. Medical noncompliance.  Refusing repeat imaging to evaluate pleural effusion.   21. Pyuria-urine culture finalized no  growth  22. Bilateral Pleural Effusions  23. Diarrhea  24. Petechial rash in medial upper thighs.  Shearing injuring vs inflammation vs other.  Continue nystatin powder.    25. Probable Bacterial Pneumonia- Improved  26. Left arm swelling and pain- improved. LUE u/s was negative for DVT  27. Acute kidney injury-new    PLAN/RECOMMENDATIONS:     1. Follow CBC with differential, BMP, and vancomycin trough at least weekly.  2. Continue Vancomycin for continued MRSA coverage.  Appreciate pharmacy's help with dosing and monitoring creatinine.  Would like trough between 15-20.    Renal function is slightly worse.  We'll need to closely monitor while she is on IV vancomycin.  If her renal function continues to decline we may need to switch her antibiotics. I will continue her on vancomycin for now.  Her levels look good, her trough yesterday was 16.9 which is in appropriate range.    She will need a very long course of IV antibiotics, likely at least 12 weeks given the extent of her spinal infection and high-grade bacteremia. Anticipate continuing her IV vancomycin at least until 2/7/22 for a 12 week course since her last surgery (was on 11/14/21). She is not a candidate for outpatient antibiotics with a PICC line. May be a good candidate for LTAC.    She seems to be clinically improving.  Needs to continue to work with physical therapy. I will intermittently check up on her.    Complex case with complex MDM      Jonn Mchugh MD  12/9/2021  12:47 EST

## 2021-12-09 NOTE — PROGRESS NOTES
[facility]  PROGRESS NOTE    Patient Name: Karely Villarreal  : 1965  MRN: [id number]    Date of Admission: 2021  Primary Care Physician: Tracie Levi APRN    Subjective   Subjective     CC:  F/u Sepsis, abscess    HPI:  Patient continues to have leg pain. She states that she is in an uncomfortable position.     ROS:  Gen- No fevers, chills  CV- No chest pain, palpitations  Resp- No cough, dyspnea  GI- No N/V/D, abd pain      Objective   Objective     Vital Signs:   Temp:  [97.7 °F (36.5 °C)-98.3 °F (36.8 °C)] 97.7 °F (36.5 °C)  Heart Rate:  [] 98  Resp:  [16-18] 18  BP: (114-145)/(75-90) 126/83  Flow (L/min):  [2] 2     Physical Exam:  Constitutional: No acute distress, awake, alert, chronically ill appearing female resting in be d  HENT: NCAT, mucous membranes moist  Respiratory: Clear to auscultation bilaterally, respiratory effort normal   Cardiovascular: RRR, no murmurs, rubs, or gallops  Gastrointestinal: Positive bowel sounds, soft, nontender, nondistended  Musculoskeletal: +LUE edema   Psychiatric: Appropriate affect, cooperative  Neurologic: Oriented x 3,  speech clear  Skin: No rashes      Results Reviewed:  LAB RESULTS:      Lab 21  0814 21  0915 21  0658 21  0840 21  0825 21  1128 21  1128   WBC 6.49 6.19 6.75 7.70 5.62   < > 7.15   HEMOGLOBIN 7.5* 8.0* 8.1* 9.2* 7.6*   < > 8.3*   HEMATOCRIT 24.6* 27.0* 27.5* 28.7* 25.0*   < > 27.4*   PLATELETS 214 237 235 215 182   < > 191   NEUTROS ABS 4.34 4.30 4.89 6.08  --   --  5.84   IMMATURE GRANS (ABS) 0.05 0.04 0.05 0.05  --   --  0.04   LYMPHS ABS 1.37 1.17 1.07 0.99  --   --  0.74   MONOS ABS 0.54 0.46 0.51 0.43  --   --  0.42   EOS ABS 0.14 0.17 0.19 0.10  --   --  0.06   MCV 88.5 90.6 89.3 86.4 88.3   < > 90.4    < > = values in this interval not displayed.         Lab 21  0814 21  0915 21  0658 21  0840  12/05/21  0825 12/05/21  0825 12/04/21  1128 12/04/21  1128   SODIUM 138 138 138  --  140  --  139   < > 141   POTASSIUM 3.2* 3.4* 3.7 4.6 3.3*   < > 3.4*   < > 3.8   CHLORIDE 93* 93* 95*  --  92*  --  94*   < > 95*   CO2 38.0* 37.0* 39.0*  --  38.0*  --  38.0*   < > 38.0*   ANION GAP 7.0 8.0 4.0*  --  10.0  --  7.0   < > 8.0   BUN 19 17 16  --  18  --  17   < > 20   CREATININE 1.19* 1.12* 0.99  --  0.86  --  0.75   < > 0.79   GLUCOSE 82 96 84  --  112*  --  83   < > 118*   CALCIUM 9.6 10.0 9.8  --  10.2  --  9.8   < > 9.8   PHOSPHORUS  --  4.0 3.4  --  3.2  --   --   --  4.2    < > = values in this interval not displayed.         Lab 12/08/21  0915 12/07/21  0658 12/06/21  0840 12/05/21  0825 12/04/21  1128   TOTAL PROTEIN  --   --   --  6.2  --    ALBUMIN 2.40* 2.50* 2.70* 2.40* 2.50*   GLOBULIN  --   --   --  3.8  --    ALT (SGPT)  --   --   --  23  --    AST (SGOT)  --   --   --  25  --    BILIRUBIN  --   --   --  0.3  --    ALK PHOS  --   --   --  182*  --                  Lab 12/04/21  1128 12/03/21  1622   IRON 19*  --    IRON SATURATION 7*  --    TIBC 259*  --    TRANSFERRIN 174*  --    FERRITIN 526.90*  --    FOLATE 15.00  --    VITAMIN B 12 1,322*  --    ABO TYPING  --  O   RH TYPING  --  Negative   ANTIBODY SCREEN  --  Negative         Brief Urine Lab Results  (Last result in the past 365 days)      Color   Clarity   Blood   Leuk Est   Nitrite   Protein   CREAT   Urine HCG        12/03/21 1509 Red   Turbid   Large (3+)   Large (3+)   Negative   >=300 mg/dL (3+)                 Microbiology Results Abnormal     Procedure Component Value - Date/Time    Fungus Culture - Body Fluid, Spine, Thoracic [[id number]] Collected: 11/14/21 1511    Lab Status: Preliminary result Specimen: Body Fluid from Spine, Thoracic Updated: 12/05/21 1616     Fungus Culture No fungus isolated at 3 weeks    AFB Culture - Body Fluid, Spine, Thoracic [[id number]] Collected: 11/14/21 1511    Lab Status: Preliminary result Specimen: Body  Fluid from Spine, Thoracic Updated: 12/05/21 1616     AFB Culture No AFB isolated at 3 weeks     AFB Stain No acid fast bacilli seen on concentrated smear    Fungus Culture - Tissue, Spine, Cervical [[id number]] Collected: 11/14/21 1450    Lab Status: Preliminary result Specimen: Tissue from Spine, Cervical Updated: 12/05/21 1600     Fungus Culture No fungus isolated at 3 weeks    AFB Culture - Tissue, Spine, Cervical [[id number]] Collected: 11/14/21 1450    Lab Status: Preliminary result Specimen: Tissue from Spine, Cervical Updated: 12/05/21 1600     AFB Culture No AFB isolated at 3 weeks     AFB Stain No acid fast bacilli seen on concentrated smear    Urine Culture - Urine, Urine, Clean Catch [[id number]] Collected: 12/03/21 1509    Lab Status: Final result Specimen: Urine, Clean Catch Updated: 12/04/21 1130     Urine Culture 50,000 CFU/mL Normal Urogenital Jailyn    Blood Culture - Blood, Arm, Left [[id number]]  (Normal) Collected: 11/20/21 1630    Lab Status: Final result Specimen: Blood from Arm, Left Updated: 11/25/21 1700     Blood Culture No growth at 5 days    Narrative:      Aerobic bottle only      Urine Culture - Urine, Urine, Catheter [[id number]]  (Normal) Collected: 11/20/21 1625    Lab Status: Final result Specimen: Urine, Catheter Updated: 11/21/21 1523     Urine Culture No growth    Anaerobic Culture - Body Fluid, Spine, Thoracic [[id number]] Collected: 11/14/21 1511    Lab Status: Final result Specimen: Body Fluid from Spine, Thoracic Updated: 11/19/21 0700     Anaerobic Culture No anaerobes isolated at 5 days    Anaerobic Culture - Tissue, Spine, Cervical [[id number]] Collected: 11/14/21 1450    Lab Status: Final result Specimen: Tissue from Spine, Cervical Updated: 11/19/21 0700     Anaerobic Culture No anaerobes isolated at 5 days    Body Fluid Culture - Body Fluid, Spine, Thoracic [[id number]] Collected: 11/14/21 1511    Lab Status: Final result Specimen: Body Fluid from Spine, Thoracic Updated:  11/17/21 0933     Body Fluid Culture No growth at 3 days     Gram Stain No WBCs or organisms seen    Fungus Smear - Tissue, Spine, Cervical [[id number]] Collected: 11/14/21 1450    Lab Status: Final result Specimen: Tissue from Spine, Cervical Updated: 11/15/21 1552     Fungal Stain No fungal elements seen    Blood Culture - Blood, Arm, Right [[id number]]  (Normal) Collected: 11/07/21 1101    Lab Status: Final result Specimen: Blood from Arm, Right Updated: 11/12/21 1116     Blood Culture No growth at 5 days    Narrative:      AEROBIC BOTTLE ONLY    Blood Culture - Blood, Hand, Left [[id number]]  (Normal) Collected: 11/07/21 1101    Lab Status: Final result Specimen: Blood from Hand, Left Updated: 11/12/21 1115     Blood Culture No growth at 5 days    Urine Culture - Urine, Urine, Catheter [[id number]]  (Normal) Collected: 11/09/21 1621    Lab Status: Final result Specimen: Urine, Catheter Updated: 11/10/21 1829     Urine Culture No growth          No radiology results from the last 24 hrs    Results for orders placed during the hospital encounter of 10/24/21    Adult Transthoracic Echo Complete W/ Cont if Necessary Per Protocol    Interpretation Summary  · Left ventricular ejection fraction appears to be 61 - 65%. Left ventricular systolic function is normal.  · Left ventricular diastolic function was normal.  · Estimated right ventricular systolic pressure from tricuspid regurgitation is normal (<35 mmHg).  · No vegetations seen.  · This is a technically difficult study.      I have reviewed the medications:  Scheduled Meds:[START ON 12/15/2021] Pharmacy Consult, , Does not apply, Once  alteplase, 2 mg, Intravenous, Once  amLODIPine, 5 mg, Oral, Q24H  buPROPion SR, 150 mg, Oral, BID  castor oil-balsam peru, 1 application, Topical, Q12H  DULoxetine, 30 mg, Oral, QAM  furosemide, 40 mg, Oral, Daily  hydrALAZINE, 50 mg, Oral, Q8H  lactobacillus acidophilus, 1 capsule, Oral, Daily  levETIRAcetam, 500 mg, Oral,  Q12H  levothyroxine, 125 mcg, Oral, Q AM  melatonin, 5 mg, Oral, Nightly  multivitamin, 1 tablet, Oral, Daily  Nutrisource fiber, 2 packet, Oral, TID  nystatin, , Topical, Q8H  pantoprazole, 40 mg, Oral, Q AM  senna-docusate sodium, 1 tablet, Oral, Nightly  sodium chloride, 10 mL, Intravenous, Q12H  vancomycin, 750 mg, Intravenous, Daily With Lunch      Continuous Infusions:Pharmacy to dose vancomycin,       PRN Meds:.acetaminophen  •  cyclobenzaprine  •  hydrALAZINE  •  ipratropium-albuterol  •  LORazepam  •  magnesium sulfate **OR** magnesium sulfate **OR** magnesium sulfate  •  naloxone  •  [] HYDROmorphone **AND** naloxone  •  ondansetron **OR** ondansetron  •  oxyCODONE-acetaminophen  •  Pharmacy to dose vancomycin  •  potassium & sodium phosphates **OR** potassium & sodium phosphates  •  potassium chloride  •  sodium chloride  •  sodium chloride    Assessment/Plan   Assessment & Plan     Active Hospital Problems    Diagnosis  POA   • **Spinal cord compression [G95.20]  Yes   • Severe malnutrition (CMS/HCC) [E43]  Yes   • Quadriparesis [G82.50]  No   • COVID-19 virus detected [U07.1]  Yes   • MRSA bacteremia [R78.81, B95.62]  Yes   • Hepatitis C [B19.20]  Yes   • Seizures on Keppra [R56.9]  Yes   • History of CVA [Z86.73]  Not Applicable   • Acute postoperative respiratory insufficiency [J95.89]  No   • Pleural effusion, right [J90]  Yes   • Osteomyelitis of thoracic vertebra [M46.24]  Yes   • 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        Brief Hospital Course to date:  Karely Villarreal is a 56 y.o. female  with h/o polysubstance abuse, HCV, HTN, Sz d/o, SLE, Hypothyroidism, CVA, prior COVID infection and homelessness.    She was  admitted to [facility] on 10/24/21 for sepsis d/t MRSA and was found to have a paravertebral abscess and was transferred to [facility] on 11/6.  Surgery was deferred initially but she developed worsening UE weakness and MRI showed progressive cord compression so she was taken to the OR emergently for decompression on 11/14.    She was extubated 11/15 but TF's were started d/t poor PO.  She was transferred initially to telemetry on 11/8 but developed worsening hypoxia with AMS and was transferred back to the ICU on bipap on 11/20.  She was transferred back to the hospitalist service on 11/23  This patient's problems and plans were partially entered by my partner and updated as appropriate by me 12/09/21.          MRSA bacteremia  Sepsis  T9/T10 Paravertebral Abscess, T9/T10 Osteomyelitis with spinal cord compression  --s/p decompression, cervical laminectomy and debridement of epidural abscess on 11/14  --on Vancomycin per ID, will need long course at least 12 weeks given the extent of her spinal infection and high grade bacteremia (starting at time of surgery on 11/14)  --repeat MRI C and T spine showed shows 2 fluid collections dorsally (one at C4 and the other more superficial at C6/C7) and T9/T10 still c/w vertebral osteomyelitis.  Re-evaluated by Dr. Jama who noted severe phlegmon circumferentially around cervical and upper thoracic area but no worsening compression, rec'd continue abx, no surgery needed at this time         Anemia  Hematuria   - continues to drop, down to 7.5  today   - fob negative  -s/p 1 unit PRBC on 12/3  -Iron 19, Iron sat 7   -Received dose of IV iron   - suspect related to lupus , she had hematuria but it has mostly resolved   - urology consulted , follow up with DR Gardner outpatient   - My partner Discussed with Dr. Schroeder, nephrology, will ask for nephrology to see today     -urine culture with urogenital batool, hold abx for UTI treatment     Acute Respiratory failure  Right Pleural  Effusion  Probable Bacterial PNA  --improved  --tolerating daily PO Lasix well   -- s/p 7 days of merrem and doxy per ID     Malnutrition  --Status post Dobbhoff with tube feeds     Diarrhea  --s/p rectal tube, improved  --Wound care following for skin breakdown on gluteal tissue  --Continue fiber supplement      Bilateral Knee Pain  --Bilateral xrays showed large bilateral knee effusions, moderate osteoarthrosis worse in patellofemoral compartments  -PT/OT      LUE Edema  -LUE venous duplex on 11/7 negative for evidence of acute DVT  -Elevate extremity   -Repeat venous duplex 12/3 negative for DVT     HX Seizure Disorder  --continue keppra     Recent COVID PNA     Acute Right Axilla abscess  --culture positive for MRSA, continue vanc D8 - ID following      Polysubstance abuse  --UDS positive for meth/opiates, self medicating at [facility] with klonopin and oxycodone and went into respiratory depression     SLE/Discoid lupus  --holding plaquenil due to severity of active infection, but suspect possible lupus nephritis v other GN with ongoing infection and off maintenance immunosuppression     Insomnia   -Melatonin PRN      Hx CVA  - PT/OT    DVT prophylaxis:  Mechanical DVT prophylaxis orders are present.       AM-PAC 6 Clicks Score (PT): 6 (12/08/21 1025)    Disposition: I expect the patient to be discharged [facility] on Saturday     CODE STATUS:   Code Status and Medical Interventions:   Ordered at: 11/06/21 0304     Code Status (Patient has no pulse and is not breathing):    CPR (Attempt to Resuscitate)     Medical Interventions (Patient has pulse or is breathing):    Full Support       Sumi Alvarado,   12/09/21

## 2021-12-09 NOTE — CONSULTS
Referring Provider: Lucita Alvarado  Reason for Consultation: DOMO     Subjective     Chief complaint T9-T10 osteomyelitis with paraspinal abscess     History of present illness:  56 y.o. female with a history of polysubstance abuse, discoid Lupus, hypothyroidism s/p thyroidectomy, hypertension, history of seizures, history of CVA secondary to right parietal ischemic lesion thought to be cardioembolic, and major depression disorder who presented with SOA. She was found to loculated pleural effusion with MRSA bacteremia. MRI thoracic spine showed T9-T10 osteomyelitis with paraspinal abscess. She has been on antibiotics since then. She has been on lasix as well for pedal edema. Scr at baseline around 0.8-0.9 range. Scr has been trending up recently to 1.19. She has hx of lupus nephritis as per patient in 2016 diagnosed by biopsy at [facility] and treated by  nephrology with Cellcept for 9 months( data deficient) Nephrology service has been consulted for further evaluation.     History  Past Medical History:   Diagnosis Date   • Anxiety    • Brain tumor (HCC)    • Chronic headache    • Depression    • Diastolic CHF, chronic ([facility])    • DVT (deep venous thrombosis) ([facility])     left leg   • Encephalitis 12/2016    treated at the [facility]   • Epilepsy ([facility])    • Gastric ulcer with perforation ([facility]) 03/2016    Microperforation and air in the biliary tree   • Gastritis    • Heart disease    • Henoch-Schonlein purpura (HCC)    • History of transfusion    • Hypertension    • Hypothyroidism (acquired)     Removed due to groiter   • Kidney disease    • Lower GI bleeding    • Lupus (HCC)    • Memory disorder    • Migraine    • Mixed connective tissue disease (HCC)    • MRSA cellulitis    • NSTEMI (non-ST elevated myocardial infarction) ([facility])    • Panic disorder    • Patent foramen ovale    • Pneumonia    • PTSD (post-traumatic stress disorder)     trauma from 911   • PVC (premature ventricular contraction)    •  RA (rheumatoid arthritis) (HCC)    • Renal disorder    • Rhabdomyolysis    • Seizures (HCC)     when had encephalitis   • Sjogren's syndrome (HCC)    • Stroke (HCC) 09/2015    x 1   • Systemic lupus erythematosus (HCC)     Discoid and systemic   • Temporal arteritis (HCC)    • Thyroid disease    • TIA (transient ischemic attack)     x 3   • Ulcer, stomach peptic, chronic    ,   Past Surgical History:   Procedure Laterality Date   • APPENDECTOMY     • CARDIAC CATHETERIZATION  2016    PFO repair and had a loop monitor placed at the [facility]   • CARDIAC SURGERY     • CENTRAL VENOUS LINE INSERTION N/A 10/28/2021    Procedure: Placement of central line;  Surgeon: Ruma Madden MD;  Location: [facility] OR;  Service: General;  Laterality: N/A;   • CERVICAL LAMINECTOMY DECOMPRESSION POSTERIOR Bilateral 2021    Procedure: CERVICAL LAMINECTOMY DECOMPRESSION POSTERIOR C3-6;  Surgeon: Destin Jama MD;  Location:  [facility] OR;  Service: Neurosurgery;  Laterality: Bilateral;   •  SECTION      x 2   • ENDOSCOPY     • FACIAL RECONSTRUCTION SURGERY      clean out MRSA    • INCISION AND DRAINAGE ABSCESS Right 2019    Procedure: INCISION AND DRAINAGE ABSCESS RIGHT AXILLA;  Surgeon: Zak Martin MD;  Location: [facility] OR;  Service: General   • KNEE ARTHROSCOPY Left    • LUMBAR FUSION     • PORTACATH PLACEMENT Right 2016   • THYROID SURGERY      Removed due to a goiter   • VENOUS ACCESS DEVICE (PORT) REMOVAL N/A 10/28/2021    Procedure: Removal of [id number].;  Surgeon: Ruma Madden MD;  Location:  [facility] OR;  Service: General;  Laterality: N/A;   ,   Family History   Problem Relation Age of Onset   • Hypertension Mother    • Arthritis Mother         RA   • Osteoarthritis Mother    • Heart disease Mother    • Hypertension Father    • Arthritis Father         RA   • Diabetes Father    • Heart disease Father    ,   Social History     Socioeconomic History   • Marital status:     Tobacco Use   • Smoking status: Never Smoker   • Smokeless tobacco: Never Used   Substance and Sexual Activity   • Alcohol use: No   • Drug use: No   • Sexual activity: Yes     Partners: Male     E-cigarette/Vaping     E-cigarette/Vaping Substances     E-cigarette/Vaping Devices       ,   Medications Prior to Admission   Medication Sig Dispense Refill Last Dose   • buPROPion SR (WELLBUTRIN SR) 150 MG 12 hr tablet Take 60 mg by mouth 2 (Two) Times a Day.   2021 at Unknown time   • calcium carbonate (TUMS) 500 MG chewable tablet Chew 2 tablets 3 (Three) Times a Day As Needed for Heartburn.   Past Week at Unknown time   • castor oil-balsam peru (VENELEX) ointment Apply 1 application topically to the appropriate area as directed Every 12 (Twelve) Hours.   2021 at Unknown time   • [] ceftaroline 600 mg in sodium chloride 0.9 % 100 mL IVPB Infuse 600 mg into a venous catheter Every 8 (Eight) Hours for 23 doses. Indications: MRSA bacteremia   2021 at Unknown time   • clonazePAM (KlonoPIN) 0.5 MG tablet Take 1.5 tablets by mouth 3 (Three) Times a Day.   2021 at Unknown time   • daptomycin (CUBICIN) solution IVPB Infuse 50 mL into a venous catheter Daily for 35 doses. Indications: Bacteria in the Blood   2021 at Unknown time   • dexamethasone (DECADRON) 4 MG/ML injection Infuse 1.5 mL into a venous catheter Daily.   2021 at Unknown time   • diphenhydrAMINE (BENADRYL) 50 MG/ML injection Infuse 0.5 mL into a venous catheter 1 (One) Time As Needed (30 minutes prior to transfusion) for up to 1 dose.   2021 at Unknown time   • DULoxetine (CYMBALTA) 60 MG capsule Take 60 mg by mouth Every Morning. Indications: Major Depressive Disorder   2021 at Unknown time   • enoxaparin (LOVENOX) 40 MG/0.4ML solution syringe Inject 0.4 mL under the skin into the appropriate area as directed Daily. Indications: Prevention of Unwanted Clot in Veins   2021 at Unknown time   •  guaiFENesin-dextromethorphan (ROBITUSSIN DM) 100-10 MG/5ML syrup Take 10 mL by mouth Every 4 (Four) Hours As Needed for Cough.   2021 at Unknown time   • hydrALAZINE (APRESOLINE) 25 MG tablet Take 1 tablet by mouth Every 8 (Eight) Hours.   2021 at Unknown time   • lactobacillus acidophilus (RISAQUAD) capsule capsule Take 1 capsule by mouth Daily.   2021 at Unknown time   • levETIRAcetam (KEPPRA) 500 MG tablet Take 1 tablet by mouth Every 12 (Twelve) Hours. Indications: Seizure   2021 at Unknown time   • levothyroxine (SYNTHROID, LEVOTHROID) 125 MCG tablet Take 1 tablet by mouth Daily. Indications: Underactive Thyroid   2021 at Unknown time   • methylPREDNISolone sodium succinate (SOLU-Medrol) 125 MG injection Infuse 2 mL into a venous catheter 1 (One) Time As Needed (for infusion reaction) for up to 1 dose.   2021 at Unknown time   • metoprolol tartrate (LOPRESSOR) 25 MG tablet Take 25 mg by mouth 2 (Two) Times a Day.   2021 at Unknown time   • [] metroNIDAZOLE (FLAGYL) 5-0.79 MG/ML-% IVPB Infuse 100 mL into a venous catheter Every 8 (Eight) Hours for 36 doses. Indications: Bone and/or Joint Infection, Pneumonia 3600 mL 0 2021 at Unknown time   • multivitamin (multivitamin) tablet tablet Take 1 tablet by mouth Daily. 30 tablet  2021 at Unknown time   • oxyCODONE (ROXICODONE) 15 MG immediate release tablet Take 1 tablet by mouth Every 6 (Six) Hours.   2021 at Unknown time   • pantoprazole (PROTONIX) 40 MG EC tablet Take 40 mg by mouth Daily.   2021 at Unknown time   • sodium chloride 0.9 % solution Infuse 75 mL/hr into a venous catheter Continuous.   2021 at Unknown time   • acetaminophen (TYLENOL) 325 MG tablet Take 2 tablets by mouth Every 6 (Six) Hours As Needed for Mild Pain .      • EPINEPHrine, Anaphylaxis, (ADRENALIN) 1 MG/ML injection Inject 0.3 mL into the appropriate muscle as directed by prescriber 1 (One) Time As Needed for Anaphylaxis  (for infusion reaction) for up to 1 dose.   Unknown at Unknown time   • hydrALAZINE (APRESOLINE) 20 MG/ML injection Infuse 0.5 mL into a venous catheter Every 6 (Six) Hours As Needed for High Blood Pressure.   Unknown at Unknown time   • magnesium sulfate 2 GM/50ML infusion Infuse 50 mL into a venous catheter As Needed (for Mg less than or equal 1.5 mg/dL).   Unknown at Unknown time   • magnesium sulfate in D5W 1g/100mL, PREMIX, 1-5 GM/100ML-% IVPB Infuse 100 mL into a venous catheter As Needed (Mg 1.6-1.9 mg/dL.).   Unknown at Unknown time   • potassium chloride (K-DUR,KLOR-CON) 20 MEQ CR tablet Take 2 tablets by mouth As Needed (Potassium Replacement.  See Admin Instructions).   Unknown at Unknown time   • potassium chloride (KLOR-CON) 20 MEQ packet Take 40 mEq by mouth As Needed (potassium replacement, see admin instructions).   Unknown at Unknown time   • potassium chloride 10 MEQ/100ML Infuse 100 mL into a venous catheter Every 1 (One) Hour As Needed (Potassium Replacement - See Admin Instructions).   Unknown at Unknown time   , Scheduled Meds:  [START ON 12/10/2021] Pharmacy Consult, , Does not apply, Once  albumin human, 12.5 g, Intravenous, BID  alteplase, 2 mg, Intravenous, Once  amLODIPine, 5 mg, Oral, Q24H  buPROPion SR, 150 mg, Oral, BID  castor oil-balsam peru, 1 application, Topical, Q12H  DULoxetine, 30 mg, Oral, QAM  hydrALAZINE, 50 mg, Oral, Q8H  lactobacillus acidophilus, 1 capsule, Oral, Daily  levETIRAcetam, 500 mg, Oral, Q12H  levothyroxine, 125 mcg, Oral, Q AM  melatonin, 5 mg, Oral, Nightly  multivitamin, 1 tablet, Oral, Daily  Nutrisource fiber, 2 packet, Oral, TID  nystatin, , Topical, Q8H  pantoprazole, 40 mg, Oral, Q AM  senna-docusate sodium, 1 tablet, Oral, Nightly  sodium chloride, 250 mL, Intravenous, Once  sodium chloride, 10 mL, Intravenous, Q12H  vancomycin, 750 mg, Intravenous, Daily With Lunch    , Continuous Infusions:  Pharmacy to dose vancomycin,     , PRN Meds:   acetaminophen  •  cyclobenzaprine  •  hydrALAZINE  •  ipratropium-albuterol  •  LORazepam  •  magnesium sulfate **OR** magnesium sulfate **OR** magnesium sulfate  •  naloxone  •  [] HYDROmorphone **AND** naloxone  •  ondansetron **OR** ondansetron  •  oxyCODONE-acetaminophen  •  Pharmacy to dose vancomycin  •  potassium & sodium phosphates **OR** potassium & sodium phosphates  •  potassium chloride  •  sodium chloride  •  sodium chloride and Allergies:  Compazine [prochlorperazine edisylate], Imitrex [sumatriptan], Nsaids, Reglan [metoclopramide], Solu-medrol [methylprednisolone], Zyprexa [olanzapine], and Prednisone    Review of Systems  Pertinent items are noted in HPI    Objective     Vital Signs  Temp:  [97.7 °F (36.5 °C)-98.3 °F (36.8 °C)] 97.7 °F (36.5 °C)  Heart Rate:  [] 98  Resp:  [16-18] 18  BP: (126-145)/(81-90) 126/83    I/O this shift:  In: -   Out: 700 [Urine:700]  I/O last 3 completed shifts:  In: 480 [P.O.:480]  Out: 2200 [Urine:2200]    Physical Exam:  General Appearance:    Alert, cooperative, in no acute distress, weak and ill appearing   Head:    Normocephalic, without obvious abnormality, atraumatic   Eyes:            Conjunctivae and sclerae normal, no   icterus, no pallor, corneas clear, PERRLA           Neck:   Supple, trachea midline, no thyromegaly,  no JVD       Lungs:     Coarse breath sound ,respirations regular, even and               unlabored    Heart:    Regular rhythm and normal rate, normal S1 and S2, no       murmur, no gallop, no rub, no click       Abdomen:     Normal bowel sounds,soft, non-tender, non-distended, no guarding, no rebound tenderness       Extremities:   Moves all extremities well, trace edema, no cyanosis, no         redness   Pulses:   Pulses palpable and equal bilaterally           Neurologic:   Cranial nerves 2 - 12 grossly intact, no focal deficit         Results Review:   I reviewed the patient's new clinical results.    WBC WBC   Date Value Ref  Range Status   12/09/2021 6.49 3.40 - 10.80 10*3/mm3 Final   12/08/2021 6.19 3.40 - 10.80 10*3/mm3 Final   12/07/2021 6.75 3.40 - 10.80 10*3/mm3 Final      HGB Hemoglobin   Date Value Ref Range Status   12/09/2021 7.5 (L) 12.0 - 15.9 g/dL Final   12/08/2021 8.0 (L) 12.0 - 15.9 g/dL Final   12/07/2021 8.1 (L) 12.0 - 15.9 g/dL Final      HCT Hematocrit   Date Value Ref Range Status   12/09/2021 24.6 (L) 34.0 - 46.6 % Final   12/08/2021 27.0 (L) 34.0 - 46.6 % Final   12/07/2021 27.5 (L) 34.0 - 46.6 % Final      Platlets No results found for: LABPLAT   MCV MCV   Date Value Ref Range Status   12/09/2021 88.5 79.0 - 97.0 fL Final   12/08/2021 90.6 79.0 - 97.0 fL Final   12/07/2021 89.3 79.0 - 97.0 fL Final          Sodium Sodium   Date Value Ref Range Status   12/09/2021 138 136 - 145 mmol/L Final   12/08/2021 138 136 - 145 mmol/L Final   12/07/2021 138 136 - 145 mmol/L Final      Potassium Potassium   Date Value Ref Range Status   12/09/2021 3.2 (L) 3.5 - 5.2 mmol/L Final   12/08/2021 3.4 (L) 3.5 - 5.2 mmol/L Final   12/07/2021 3.7 3.5 - 5.2 mmol/L Final   12/06/2021 4.6 3.5 - 5.2 mmol/L Final     Comment:     Slight hemolysis detected by analyzer. Results may be affected.      Chloride Chloride   Date Value Ref Range Status   12/09/2021 93 (L) 98 - 107 mmol/L Final   12/08/2021 93 (L) 98 - 107 mmol/L Final   12/07/2021 95 (L) 98 - 107 mmol/L Final      CO2 CO2   Date Value Ref Range Status   12/09/2021 38.0 (H) 22.0 - 29.0 mmol/L Final   12/08/2021 37.0 (H) 22.0 - 29.0 mmol/L Final   12/07/2021 39.0 (H) 22.0 - 29.0 mmol/L Final      BUN BUN   Date Value Ref Range Status   12/09/2021 19 6 - 20 mg/dL Final   12/08/2021 17 6 - 20 mg/dL Final   12/07/2021 16 6 - 20 mg/dL Final      Creatinine Creatinine   Date Value Ref Range Status   12/09/2021 1.19 (H) 0.57 - 1.00 mg/dL Final   12/08/2021 1.12 (H) 0.57 - 1.00 mg/dL Final   12/07/2021 0.99 0.57 - 1.00 mg/dL Final      Calcium Calcium   Date Value Ref Range Status    12/09/2021 9.6 8.6 - 10.5 mg/dL Final   12/08/2021 10.0 8.6 - 10.5 mg/dL Final   12/07/2021 9.8 8.6 - 10.5 mg/dL Final      PO4 No results found for: CAPO4   Albumin Albumin   Date Value Ref Range Status   12/08/2021 2.40 (L) 3.50 - 5.20 g/dL Final   12/07/2021 2.50 (L) 3.50 - 5.20 g/dL Final      Magnesium No results found for: MG   Uric Acid No results found for: URICACID         [START ON 12/10/2021] Pharmacy Consult, , Does not apply, Once  alteplase, 2 mg, Intravenous, Once  amLODIPine, 5 mg, Oral, Q24H  buPROPion SR, 150 mg, Oral, BID  castor oil-balsam peru, 1 application, Topical, Q12H  DULoxetine, 30 mg, Oral, QAM  furosemide, 40 mg, Oral, Daily  hydrALAZINE, 50 mg, Oral, Q8H  lactobacillus acidophilus, 1 capsule, Oral, Daily  levETIRAcetam, 500 mg, Oral, Q12H  levothyroxine, 125 mcg, Oral, Q AM  melatonin, 5 mg, Oral, Nightly  multivitamin, 1 tablet, Oral, Daily  Nutrisource fiber, 2 packet, Oral, TID  nystatin, , Topical, Q8H  pantoprazole, 40 mg, Oral, Q AM  senna-docusate sodium, 1 tablet, Oral, Nightly  sodium chloride, 10 mL, Intravenous, Q12H  vancomycin, 750 mg, Intravenous, Daily With Lunch      Pharmacy to dose vancomycin,         Assessment/Plan       Spinal cord compression    SLE    Hypertension    Hypothyroidism    KEREN (generalized anxiety disorder)    MDD (major depressive disorder)    Pleural effusion, right    Osteomyelitis of thoracic vertebra    Paraspinal abscess    Polysubstance abuse    Quadriparesis    COVID-19 virus detected    MRSA bacteremia    Hepatitis C    Seizures on Keppra    History of CVA    Acute postoperative respiratory insufficiency    Severe malnutrition (CMS/HCC)      1- DOMO - non oliguric - Pre-renal azotemia secondary to sepsis vs ATN vs Infection related GN with prior hx of lupus nephritis.   2- Hx of lupus nephritis -2016- she was treated with Cellcept for 9 months. Per patient biopsy was done in [place], [place].  3- HTN   4- Respiratory distress - recent hx of COVID  PNA   5- Diarrhea   6- MRSA bacteremia   7- T9/T10 Paravertebral abscess/osteomyelitis   8- Contraction alkalosis - diarrhea and diuretics.     Plan:  - Stop Lasix   - albumin infusion   - Monitor I/O   - Repeat UA   - Complement level   - Avoid nephrotoxic agents   - Renal diet    - Adjust meds per renal function   - No emergent need of dialysis.     I discussed the patients findings and my recommendations with patient and nursing staff    Kurt Schroeder MD  12/09/21

## 2021-12-10 LAB
ANION GAP SERPL CALCULATED.3IONS-SCNC: 7 MMOL/L (ref 5–15)
BASOPHILS # BLD AUTO: 0.03 10*3/MM3 (ref 0–0.2)
BASOPHILS NFR BLD AUTO: 0.4 % (ref 0–1.5)
BUN SERPL-MCNC: 25 MG/DL (ref 6–20)
BUN/CREAT SERPL: 17.7 (ref 7–25)
C3 SERPL-MCNC: 122 MG/DL (ref 82–167)
C4 SERPL-MCNC: 52 MG/DL (ref 14–44)
CALCIUM SPEC-SCNC: 9.3 MG/DL (ref 8.6–10.5)
CHLORIDE SERPL-SCNC: 95 MMOL/L (ref 98–107)
CO2 SERPL-SCNC: 39 MMOL/L (ref 22–29)
CREAT SERPL-MCNC: 1.41 MG/DL (ref 0.57–1)
DEPRECATED RDW RBC AUTO: 55.1 FL (ref 37–54)
EOSINOPHIL # BLD AUTO: 0.17 10*3/MM3 (ref 0–0.4)
EOSINOPHIL NFR BLD AUTO: 2.5 % (ref 0.3–6.2)
ERYTHROCYTE [DISTWIDTH] IN BLOOD BY AUTOMATED COUNT: 17 % (ref 12.3–15.4)
GFR SERPL CREATININE-BSD FRML MDRD: 39 ML/MIN/1.73
GLUCOSE SERPL-MCNC: 139 MG/DL (ref 65–99)
HCT VFR BLD AUTO: 24.8 % (ref 34–46.6)
HGB BLD-MCNC: 7.5 G/DL (ref 12–15.9)
IMM GRANULOCYTES # BLD AUTO: 0.04 10*3/MM3 (ref 0–0.05)
IMM GRANULOCYTES NFR BLD AUTO: 0.6 % (ref 0–0.5)
LYMPHOCYTES # BLD AUTO: 1.07 10*3/MM3 (ref 0.7–3.1)
LYMPHOCYTES NFR BLD AUTO: 15.5 % (ref 19.6–45.3)
MCH RBC QN AUTO: 26.8 PG (ref 26.6–33)
MCHC RBC AUTO-ENTMCNC: 30.2 G/DL (ref 31.5–35.7)
MCV RBC AUTO: 88.6 FL (ref 79–97)
MONOCYTES # BLD AUTO: 0.45 10*3/MM3 (ref 0.1–0.9)
MONOCYTES NFR BLD AUTO: 6.5 % (ref 5–12)
NEUTROPHILS NFR BLD AUTO: 5.16 10*3/MM3 (ref 1.7–7)
NEUTROPHILS NFR BLD AUTO: 74.5 % (ref 42.7–76)
NRBC BLD AUTO-RTO: 0 /100 WBC (ref 0–0.2)
PLATELET # BLD AUTO: 206 10*3/MM3 (ref 140–450)
PMV BLD AUTO: 8.2 FL (ref 6–12)
POTASSIUM SERPL-SCNC: 3.4 MMOL/L (ref 3.5–5.2)
RBC # BLD AUTO: 2.8 10*6/MM3 (ref 3.77–5.28)
SODIUM SERPL-SCNC: 141 MMOL/L (ref 136–145)
WBC NRBC COR # BLD: 6.92 10*3/MM3 (ref 3.4–10.8)

## 2021-12-10 PROCEDURE — 99233 SBSQ HOSP IP/OBS HIGH 50: CPT | Performed by: FAMILY MEDICINE

## 2021-12-10 PROCEDURE — 97530 THERAPEUTIC ACTIVITIES: CPT

## 2021-12-10 PROCEDURE — 25010000002 DAPTOMYCIN PER 1 MG: Performed by: INTERNAL MEDICINE

## 2021-12-10 PROCEDURE — 25010000002 ALBUMIN HUMAN 25% PER 50 ML: Performed by: INTERNAL MEDICINE

## 2021-12-10 PROCEDURE — 97110 THERAPEUTIC EXERCISES: CPT

## 2021-12-10 PROCEDURE — 80048 BASIC METABOLIC PNL TOTAL CA: CPT | Performed by: FAMILY MEDICINE

## 2021-12-10 PROCEDURE — 85025 COMPLETE CBC W/AUTO DIFF WBC: CPT | Performed by: FAMILY MEDICINE

## 2021-12-10 PROCEDURE — P9047 ALBUMIN (HUMAN), 25%, 50ML: HCPCS | Performed by: INTERNAL MEDICINE

## 2021-12-10 PROCEDURE — 97535 SELF CARE MNGMENT TRAINING: CPT

## 2021-12-10 RX ADMIN — LORAZEPAM 1 MG: 1 TABLET ORAL at 19:57

## 2021-12-10 RX ADMIN — LORAZEPAM 1 MG: 1 TABLET ORAL at 12:01

## 2021-12-10 RX ADMIN — ALBUMIN HUMAN 12.5 G: 0.25 SOLUTION INTRAVENOUS at 08:29

## 2021-12-10 RX ADMIN — OXYCODONE AND ACETAMINOPHEN 1 TABLET: 5; 325 TABLET ORAL at 21:25

## 2021-12-10 RX ADMIN — LEVETIRACETAM 500 MG: 500 TABLET, FILM COATED ORAL at 19:58

## 2021-12-10 RX ADMIN — MULTIVITAMIN TABLET 1 TABLET: TABLET at 08:30

## 2021-12-10 RX ADMIN — NYSTATIN: 100000 POWDER TOPICAL at 21:26

## 2021-12-10 RX ADMIN — NYSTATIN: 100000 POWDER TOPICAL at 14:52

## 2021-12-10 RX ADMIN — CASTOR OIL AND BALSAM, PERU 1 APPLICATION: 788; 87 OINTMENT TOPICAL at 08:31

## 2021-12-10 RX ADMIN — HYDRALAZINE HYDROCHLORIDE 50 MG: 50 TABLET, FILM COATED ORAL at 21:26

## 2021-12-10 RX ADMIN — OXYCODONE AND ACETAMINOPHEN 1 TABLET: 5; 325 TABLET ORAL at 02:55

## 2021-12-10 RX ADMIN — Medication 2 PACKET: at 08:29

## 2021-12-10 RX ADMIN — SODIUM CHLORIDE, PRESERVATIVE FREE 30 ML: 5 INJECTION INTRAVENOUS at 20:02

## 2021-12-10 RX ADMIN — OXYCODONE AND ACETAMINOPHEN 1 TABLET: 5; 325 TABLET ORAL at 11:14

## 2021-12-10 RX ADMIN — DULOXETINE HYDROCHLORIDE 30 MG: 30 CAPSULE, DELAYED RELEASE ORAL at 08:30

## 2021-12-10 RX ADMIN — Medication 1 CAPSULE: at 08:30

## 2021-12-10 RX ADMIN — Medication 2 PACKET: at 19:56

## 2021-12-10 RX ADMIN — BUPROPION HYDROCHLORIDE 150 MG: 150 TABLET, EXTENDED RELEASE ORAL at 19:58

## 2021-12-10 RX ADMIN — BUPROPION HYDROCHLORIDE 150 MG: 150 TABLET, EXTENDED RELEASE ORAL at 08:30

## 2021-12-10 RX ADMIN — DAPTOMYCIN 550 MG: 500 INJECTION, POWDER, LYOPHILIZED, FOR SOLUTION INTRAVENOUS at 11:51

## 2021-12-10 RX ADMIN — PANTOPRAZOLE SODIUM 40 MG: 40 TABLET, DELAYED RELEASE ORAL at 05:46

## 2021-12-10 RX ADMIN — LEVETIRACETAM 500 MG: 500 TABLET, FILM COATED ORAL at 08:30

## 2021-12-10 RX ADMIN — LORAZEPAM 1 MG: 1 TABLET ORAL at 02:55

## 2021-12-10 RX ADMIN — SODIUM CHLORIDE, PRESERVATIVE FREE 10 ML: 5 INJECTION INTRAVENOUS at 08:30

## 2021-12-10 RX ADMIN — NYSTATIN: 100000 POWDER TOPICAL at 05:46

## 2021-12-10 RX ADMIN — SENNOSIDES AND DOCUSATE SODIUM 1 TABLET: 50; 8.6 TABLET ORAL at 19:59

## 2021-12-10 RX ADMIN — LEVOTHYROXINE SODIUM 125 MCG: 125 TABLET ORAL at 05:46

## 2021-12-10 RX ADMIN — Medication 5 MG: at 20:00

## 2021-12-10 RX ADMIN — AMLODIPINE BESYLATE 5 MG: 5 TABLET ORAL at 08:30

## 2021-12-10 RX ADMIN — HYDRALAZINE HYDROCHLORIDE 50 MG: 50 TABLET, FILM COATED ORAL at 05:46

## 2021-12-10 RX ADMIN — Medication 2 PACKET: at 15:47

## 2021-12-10 RX ADMIN — CASTOR OIL AND BALSAM, PERU 1 APPLICATION: 788; 87 OINTMENT TOPICAL at 20:01

## 2021-12-10 NOTE — PLAN OF CARE
Goal Outcome Evaluation:  Plan of Care Reviewed With: patient        Progress: improving  Outcome Summary: Patient participating in bed exercises. Her R knee continues to bed very painful and guarded with ROM. SHe is moving her L leg more with assistance. Needs to be encouraged to sit with leg dangling to improve knee ROM.

## 2021-12-10 NOTE — THERAPY PROGRESS REPORT/RE-CERT
Patient Name: Karely Villarreal  : 1965    MRN: [id number]                              Today's Date: 12/10/2021       Admit Date: 2021    Visit Dx:     ICD-10-CM ICD-9-CM   1. Chronic multifocal osteomyelitis, other site (HCC)  M86.38 730.18   2. Quadriparesis (HCC)  G82.50 344.00     Patient Active Problem List   Diagnosis   • Depression with suicidal ideation   • SLE   • Hypertension   • Hypothyroidism   • KEREN (generalized anxiety disorder)   • Abscess of axilla, right   • MDD (major depressive disorder)   • Cytokine release syndrome, grade 2   • Pleural effusion, right   • Osteomyelitis of thoracic vertebra   • Paraspinal abscess   • Polysubstance abuse   • Quadriparesis   • Spinal cord compression   • COVID-19 virus detected   • MRSA bacteremia   • Hepatitis C   • Seizures on Keppra   • History of CVA   • Acute postoperative respiratory insufficiency   • Severe malnutrition (CMS/HCC)     Past Medical History:   Diagnosis Date   • Anxiety    • Brain tumor (HCC)    • Chronic headache    • Depression    • Diastolic CHF, chronic (HCC)    • DVT (deep venous thrombosis) (HCC)     left leg   • Encephalitis 2016    treated at the [facility]   • Epilepsy (HCC)    • Gastric ulcer with perforation (HCC) 2016    Microperforation and air in the biliary tree   • Gastritis    • Heart disease    • Henoch-Schonlein purpura (HCC)    • History of transfusion    • Hypertension    • Hypothyroidism (acquired)     Removed due to groiter   • Kidney disease    • Lower GI bleeding    • Lupus (HCC)    • Memory disorder    • Migraine    • Mixed connective tissue disease (HCC)    • MRSA cellulitis    • NSTEMI (non-ST elevated myocardial infarction) (HCC)    • Panic disorder    • Patent foramen ovale    • Pneumonia    • PTSD (post-traumatic stress disorder)     trauma from 911   • PVC (premature ventricular contraction)    • RA (rheumatoid arthritis) (HCC)    • Renal disorder    • Rhabdomyolysis    • Seizures (HCC)      when had encephalitis   • Sjogren's syndrome (HCC)    • Stroke (HCC) 09/2015    x 1   • Systemic lupus erythematosus (HCC)     Discoid and systemic   • Temporal arteritis (HCC)    • Thyroid disease    • TIA (transient ischemic attack)     x 3   • Ulcer, stomach peptic, chronic      Past Surgical History:   Procedure Laterality Date   • APPENDECTOMY     • CARDIAC CATHETERIZATION  2016    PFO repair and had a loop monitor placed at the [facility]   • CARDIAC SURGERY     • CENTRAL VENOUS LINE INSERTION N/A 10/28/2021    Procedure: Placement of central line;  Surgeon: Ruma Madden MD;  Location: [facility];  Service: General;  Laterality: N/A;   • CERVICAL LAMINECTOMY DECOMPRESSION POSTERIOR Bilateral 2021    Procedure: CERVICAL LAMINECTOMY DECOMPRESSION POSTERIOR C3-6;  Surgeon: Destin Jama MD;  Location: [facility] OR;  Service: Neurosurgery;  Laterality: Bilateral;   •  SECTION      x 2   • ENDOSCOPY     • FACIAL RECONSTRUCTION SURGERY      clean out MRSA    • INCISION AND DRAINAGE ABSCESS Right 2019    Procedure: INCISION AND DRAINAGE ABSCESS RIGHT AXILLA;  Surgeon: Zak Martin MD;  Location: [facility];  Service: General   • KNEE ARTHROSCOPY Left    • LUMBAR FUSION     • PORTACATH PLACEMENT Right 2016   • THYROID SURGERY      Removed due to a goiter   • VENOUS ACCESS DEVICE (PORT) REMOVAL N/A 10/28/2021    Procedure: Removal of [id number].;  Surgeon: Ruma Madden MD;  Location: [facility];  Service: General;  Laterality: N/A;      General Information     Row Name 12/10/21 1300          OT Time and Intention    Document Type progress note/recertification  -TB     Mode of Treatment occupational therapy; individual therapy  -TB     Row Name 12/10/21 2840          General Information    Patient Profile Reviewed yes  -TB     Existing Precautions/Restrictions fall; spinal; oxygen therapy device and L/min; seizures  -TB     Barriers to Rehab medically  complex  -     Row Name 12/10/21 1307          Cognition    Orientation Status (Cognition) oriented to; person; place; situation  -     Row Name 12/10/21 1307          Safety Issues, Functional Mobility    Safety Issues Affecting Function (Mobility) insight into deficits/self-awareness; awareness of need for assistance; safety precaution awareness; safety precautions follow-through/compliance; sequencing abilities; problem-solving  -     Impairments Affecting Function (Mobility) pain; strength; endurance/activity tolerance; postural/trunk control; range of motion (ROM); balance; grasp  -     Cognitive Impairments, Mobility Safety/Performance awareness, need for assistance; insight into deficits/self-awareness; problem-solving/reasoning; safety precaution awareness; safety precaution follow-through; sequencing abilities  -           User Key  (r) = Recorded By, (t) = Taken By, (c) = Cosigned By    Initials Name Provider Type     Mary Nixon OT Occupational Therapist                 Mobility/ADL's     Row Name 12/10/21 1308          Bed Mobility    Comment (Bed Mobility) Pt received UI. Repositioned with 2 person assist  -     Row Name 12/10/21 1308          Activities of Daily Living    BADL Assessment/Intervention upper body dressing; lower body dressing; grooming; feeding  -     Row Name 12/10/21 1308          Grooming Assessment/Training    [place] Level (Grooming) maximum assist (25% patient effort); hair care, combing/brushing; minimum assist (75% patient effort); wash face, hands; oral care regimen; verbal cues; dependent (less than 25% patient effort); shave face  -TB     Assistive Devices (Grooming) built up handle items  -TB     Position (Grooming) supported sitting  -TB     Comment (Grooming) Assist to remove unwanted facial hair. Demonstrates improved performance with oral care. Requires increased time all tasks.  -     Row Name 12/10/21 1308          Self-Feeding  Assessment/Training    [place] Level (Feeding) minimum assist (75% patient effort); verbal cues  -TB     Assistive Devices (Feeding) adapted cup  -TB     Position (Self-Feeding) supported sitting  -TB     Row Name 12/10/21 1308          Lower Body Dressing Assessment/Training    [place] Level (Lower Body Dressing) don; socks; dependent (less than 25% patient effort)  -TB     Position (Lower Body Dressing) supported sitting  -TB     Row Name 12/10/21 1308          Toileting Assessment/Training    [place] Level (Toileting) dependent (less than 25% patient effort)  -TB     Comment (Toileting) [place]  -TB     Row Name 12/10/21 1308          Upper Body Dressing Assessment/Training    [place] Level (Upper Body Dressing) don; pajama/robe; moderate assist (50% patient effort); verbal cues  -TB     Position (Upper Body Dressing) supported sitting  -TB     Comment (Upper Body Dressing) Good effort. Requires cues, assist and increased time.  -TB           User Key  (r) = Recorded By, (t) = Taken By, (c) = Cosigned By    Initials Name Provider Type    TB Mary Nixon, OT Occupational Therapist               Obj/Interventions     Row Name 12/10/21 1314          Shoulder (Therapeutic Exercise)    Shoulder (Therapeutic Exercise) AAROM (active assistive range of motion)  -TB     Shoulder AAROM (Therapeutic Exercise) bilateral; flexion; extension; aBduction; aDduction; sitting; 10 repetitions  -TB     Row Name 12/10/21 1314          Elbow/Forearm (Therapeutic Exercise)    Elbow/Forearm (Therapeutic Exercise) AAROM (active assistive range of motion)  -TB     Elbow/Forearm AAROM (Therapeutic Exercise) bilateral; flexion; extension; sitting; 10 repetitions  -TB     Row Name 12/10/21 1314          Wrist (Therapeutic Exercise)    Wrist (Therapeutic Exercise) AAROM (active assistive range of motion)  -TB     Wrist AAROM (Therapeutic Exercise) bilateral; flexion; extension; 10 repetitions  -TB     Row Name  12/10/21 1314          Hand (Therapeutic Exercise)    Hand AROM/AAROM (Therapeutic Exercise) AAROM (active assistive range of motion); finger flexion; finger extension; 10 repetitions  -TB     Row Name 12/10/21 1314          Motor Skills    Motor Skills therapeutic exercise  -     Row Name 12/10/21 1314          Therapeutic Exercise    Therapeutic Exercise hand; wrist; elbow/forearm; shoulder  -TB           User Key  (r) = Recorded By, (t) = Taken By, (c) = Cosigned By    Initials Name Provider Type    TB Mary Nixon OT Occupational Therapist               Goals/Plan     Row Name 12/10/21 1316          Transfer Goal 1 (OT)    Progress/Outcome (Transfer Goal 1, OT) progress slower than expected; continuing progress toward goal  -TB     Row Name 12/10/21 1316          Bathing Goal 1 (OT)    Activity/Device (Bathing Goal 1, OT) upper body bathing  -TB     [place] Level/Cues Needed (Bathing Goal 1, OT) moderate assist (50-74% patient effort); tactile cues required; verbal cues required  -TB     Time Frame (Bathing Goal 1, OT) by discharge  -TB     Progress/Outcomes (Bathing Goal 1, OT) goal revised this date  -     Row Name 12/10/21 1316          Grooming Goal 1 (OT)    Progress/Outcome (Grooming Goal 1, OT) goal partially met; continuing progress toward goal  -TB     [facility] Name 12/10/21 1316          Self-Feeding Goal 1 (OT)    Progress/Outcomes (Self-Feeding Goal 1, OT) continuing progress toward goal  -TB     Row Name 12/10/21 1316          Strength Goal 1 (OT)    Progress/Outcome (Strength Goal 1, OT) goal partially met; continuing progress toward goal  -TB           User Key  (r) = Recorded By, (t) = Taken By, (c) = Cosigned By    Initials Name Provider Type    TB Mary Nixon OT Occupational Therapist               Clinical Impression     Row Name 12/10/21 1318          Pain Assessment    Additional Documentation Pain Scale: FACES Pre/Post-Treatment (Group)  -[facility] Name 12/10/21  1318          Pain Scale: Numbers Pre/Post-Treatment    Pain Intervention(s) Repositioned  -TB     Row Name 12/10/21 1318          Pain Scale: FACES Pre/Post-Treatment    Pain: FACES Scale, Pretreatment 2-->hurts little bit  -TB     Posttreatment Pain Rating 4-->hurts little more  -TB     Pain Location - Side Right  -TB     Pain Location - Orientation lower  -TB     Pain Location extremity  -TB     Pre/Posttreatment Pain Comment Intolerant of touch to RLE  -TB     Row Name 12/10/21 1318          Plan of Care Review    Plan of Care Reviewed With patient  -TB     Outcome Summary OT Progress Note completed. Pt is A&Ox3 and motivated to work with therapy. Pt received OOB/UIC for ADLs and HEP. BUE HEP completed with AAROM and good effort to support self-care. Pt demonstrates improvement in grooming to complete oral care this session with Mod A and to bring cup to mouth with Min A. OT will continue to follow IP.  -TB     Row Name 12/10/21 1318          Therapy Plan Review/Discharge Plan (OT)    Anticipated Discharge Disposition (OT) [facility]  -TB     Row Name 12/10/21 1318          Vital Signs    Pre Systolic BP Rehab --  RN cleared OT  -TB     O2 Delivery Pre Treatment supplemental O2  -TB     O2 Delivery Intra Treatment supplemental O2  -TB     O2 Delivery Post Treatment supplemental O2  -TB     Pre Patient Position Sitting  -TB     Intra Patient Position Sitting  -TB     Post Patient Position Sitting  -TB     Row Name 12/10/21 1318          Positioning and Restraints    Pre-Treatment Position sitting in chair/recliner  -TB     Post Treatment Position chair  -TB     In Chair notified nsg; reclined; call light within reach; encouraged to call for assist; exit alarm on; on mechanical lift sling; waffle cushion; legs elevated; heels elevated  -TB           User Key  (r) = Recorded By, (t) = Taken By, (c) = Cosigned By    Initials Name Provider Type    TB Mary Nixon, OT Occupational  Therapist               Outcome Measures     Row Name 12/10/21 1414          How much help from another is currently needed...    Putting on and taking off regular lower body clothing? 1  -TB     Bathing (including washing, rinsing, and drying) 2  -TB     Toileting (which includes using toilet bed pan or urinal) 1  -TB     Putting on and taking off regular upper body clothing 2  -TB     Taking care of personal grooming (such as brushing teeth) 2  -TB     Eating meals 2  -TB     AM-PAC 6 Clicks Score (OT) 10  -TB     Row Name 12/10/21 1202 12/10/21 0815       How much help from another person do you currently need...    Turning from your back to your side while in flat bed without using bedrails? 2  -SC 1  -SG    Moving from lying on back to sitting on the side of a flat bed without bedrails? 1  -SC 1  -SG    Moving to and from a bed to a chair (including a wheelchair)? 1  -SC 1  -SG    Standing up from a chair using your arms (e.g., wheelchair, bedside chair)? 1  -SC 1  -SG    Climbing 3-5 steps with a railing? 1  -SC 1  -SG    To walk in hospital room? 1  -SC 1  -SG    AM-PAC 6 Clicks Score (PT) 7  -SC 6  -SG    Row Name 12/10/21 1414 12/10/21 1202       Functional Assessment    Outcome Measure Options AM-PAC 6 Clicks Daily Activity (OT)  -TB AM-PAC 6 Clicks Basic Mobility (PT)  -SC          User Key  (r) = Recorded By, (t) = Taken By, (c) = Cosigned By    Initials Name Provider Type    SC Tamir Fontana, PT Physical Therapist    TB Mary Nixon, OT Occupational Therapist    SG Alicia Howard RN Registered Nurse                Occupational Therapy Education                 Title: PT OT SLP Therapies (In Progress)     Topic: Occupational Therapy (In Progress)     Point: ADL training (In Progress)     Description:   Instruct learner(s) on proper safety adaptation and remediation techniques during self care or transfers.   Instruct in proper use of assistive devices.              Learning Progress  Summary           Patient Acceptance, E,D, VU,DU,NR by  at 12/10/2021 1415   Family Acceptance, E, NR by  at 11/15/2021 0513      Show all documentation for this point (8)                 Point: Home exercise program (In Progress)     Description:   Instruct learner(s) on appropriate technique for monitoring, assisting and/or progressing therapeutic exercises/activities.              Learning Progress Summary           Patient Acceptance, E,D, VU,DU,NR by  at 12/10/2021 1415   Family Acceptance, E, NR by  at 11/15/2021 0513      Show all documentation for this point (9)                 Point: Precautions (In Progress)     Description:   Instruct learner(s) on prescribed precautions during self-care and functional transfers.              Learning Progress Summary           Patient Acceptance, E,D, VU,DU,NR by  at 12/10/2021 1415   Family Acceptance, E, NR by  at 11/15/2021 0513      Show all documentation for this point (9)                 Point: Body mechanics (In Progress)     Description:   Instruct learner(s) on proper positioning and spine alignment during self-care, functional mobility activities and/or exercises.              Learning Progress Summary           Patient Acceptance, E,TB,D, VU,DU by  at 12/8/2021 1025   Family Acceptance, E, NR by  at 11/15/2021 0513      Show all documentation for this point (7)                             User Key     Initials Effective Dates Name Provider Type Discipline     06/16/21 -  Mary Nixon, OT Occupational Therapist OT     06/16/21 -  Cary Grant, RN Registered Nurse Nurse     06/16/21 -  Oxana Alcaraz, OT Occupational Therapist OT              OT Recommendation and Plan     Plan of Care Review  Plan of Care Reviewed With: patient  Outcome Summary: OT Progress Note completed. Pt is A&Ox3 and motivated to work with therapy. Pt received OOB/UIC for ADLs and HEP. BUE HEP completed with AAROM and good effort to support self-care. Pt  demonstrates improvement in grooming to complete oral care this session with Mod A and to bring cup to mouth with Min A. OT will continue to follow IP.     Time Calculation:    Time Calculation- OT     Row Name 12/10/21 1113             Time Calculation- OT    OT Start Time 1113  -TB      OT Received On 12/10/21  -TB      OT Goal Re-Cert Due Date 12/20/21  -TB              Timed Charges    09967 - OT Therapeutic Exercise Minutes 13  -TB      44988 - OT Self Care/Mgmt Minutes 25  -TB              Total Minutes    Timed Charges Total Minutes 38  -TB       Total Minutes 38  -TB            User Key  (r) = Recorded By, (t) = Taken By, (c) = Cosigned By    Initials Name Provider Type    TB Mary Nixon OT Occupational Therapist              Therapy Charges for Today     Code Description Service Date Service Provider Modifiers Qty    67388573655 HC OT THER PROC EA 15 MIN 12/10/2021 Mary Nixon OT GO 1    43493726753 HC OT SELF CARE/MGMT/TRAIN EA 15 MIN 12/10/2021 Mary Nixon OT GO 2               Mary Nixon OT  12/10/2021

## 2021-12-10 NOTE — PROGRESS NOTES
"   LOS: 33 days    Patient Care Team:  Tracie Levi APRN as PCP - General (Family Medicine)  Tashi Bateman MD as Consulting Physician (Cardiology)  Valentino, Joseph, MD as Consulting Physician (Otolaryngology)    DOMO     Subjective     Interval History:     No acute events overnight. No new complaints   SOA +     Review of Systems:   No CP,f fever, chills, rigors, rash      Objective     Vital Sign Min/Max for last 24 hours  Temp  Min: 98 °F (36.7 °C)  Max: 98.2 °F (36.8 °C)   BP  Min: 117/85  Max: 139/95   Pulse  Min: 94  Max: 100   Resp  Min: 17  Max: 18   SpO2  Min: 100 %  Max: 100 %   Flow (L/min)  Min: 2  Max: 4   No data recorded     Flowsheet Rows      First Filed Value   Admission Height 167.6 cm (66\") Documented at 11/07/2021 1440   Admission Weight 80.7 kg (178 lb) Documented at 11/07/2021 1440          I/O this shift:  In: 400 [P.O.:400]  Out: -   I/O last 3 completed shifts:  In: -   Out: 2200 [Urine:2200]    Physical Exam:    Gen: Alert, NAD   HENT: NC, AT, EOMI   NECK: Supple, no JVD, Trachea midline   LUNGS: decrease air  bilaterally, non labored respirtation   CVS: S1/S2 audible, RRR, no murmur   Abd: Soft, NT, ND, BS+   Ext: Trace pedal edema, no cyanosis   CNS: Alert, No focal deficit noted grossly  Psy: Cooperative  Skin: Warm, dry and intact      WBC WBC   Date Value Ref Range Status   12/10/2021 6.92 3.40 - 10.80 10*3/mm3 Final   12/09/2021 6.49 3.40 - 10.80 10*3/mm3 Final   12/08/2021 6.19 3.40 - 10.80 10*3/mm3 Final      HGB Hemoglobin   Date Value Ref Range Status   12/10/2021 7.5 (L) 12.0 - 15.9 g/dL Final   12/09/2021 7.5 (L) 12.0 - 15.9 g/dL Final   12/08/2021 8.0 (L) 12.0 - 15.9 g/dL Final      HCT Hematocrit   Date Value Ref Range Status   12/10/2021 24.8 (L) 34.0 - 46.6 % Final   12/09/2021 24.6 (L) 34.0 - 46.6 % Final   12/08/2021 27.0 (L) 34.0 - 46.6 % Final      Platlets No results found for: LABPLAT   MCV MCV   Date Value Ref Range Status   12/10/2021 88.6 79.0 - 97.0 fL " Final   12/09/2021 88.5 79.0 - 97.0 fL Final   12/08/2021 90.6 79.0 - 97.0 fL Final          Sodium Sodium   Date Value Ref Range Status   12/10/2021 141 136 - 145 mmol/L Final   12/09/2021 138 136 - 145 mmol/L Final   12/08/2021 138 136 - 145 mmol/L Final      Potassium Potassium   Date Value Ref Range Status   12/10/2021 3.4 (L) 3.5 - 5.2 mmol/L Final   12/09/2021 3.2 (L) 3.5 - 5.2 mmol/L Final   12/08/2021 3.4 (L) 3.5 - 5.2 mmol/L Final      Chloride Chloride   Date Value Ref Range Status   12/10/2021 95 (L) 98 - 107 mmol/L Final   12/09/2021 93 (L) 98 - 107 mmol/L Final   12/08/2021 93 (L) 98 - 107 mmol/L Final      CO2 CO2   Date Value Ref Range Status   12/10/2021 39.0 (H) 22.0 - 29.0 mmol/L Final   12/09/2021 38.0 (H) 22.0 - 29.0 mmol/L Final   12/08/2021 37.0 (H) 22.0 - 29.0 mmol/L Final      BUN BUN   Date Value Ref Range Status   12/10/2021 25 (H) 6 - 20 mg/dL Final   12/09/2021 19 6 - 20 mg/dL Final   12/08/2021 17 6 - 20 mg/dL Final      Creatinine Creatinine   Date Value Ref Range Status   12/10/2021 1.41 (H) 0.57 - 1.00 mg/dL Final   12/09/2021 1.19 (H) 0.57 - 1.00 mg/dL Final   12/08/2021 1.12 (H) 0.57 - 1.00 mg/dL Final      Calcium Calcium   Date Value Ref Range Status   12/10/2021 9.3 8.6 - 10.5 mg/dL Final   12/09/2021 9.6 8.6 - 10.5 mg/dL Final   12/08/2021 10.0 8.6 - 10.5 mg/dL Final      PO4 No results found for: CAPO4   Albumin Albumin   Date Value Ref Range Status   12/08/2021 2.40 (L) 3.50 - 5.20 g/dL Final      Magnesium No results found for: MG   Uric Acid Uric Acid   Date Value Ref Range Status   12/09/2021 5.9 (H) 2.4 - 5.7 mg/dL Final     Comment:     Falsely depressed results may occur on samples drawn from patients receiving N-Acetylcysteine (NAC) or Metamizole.           Results Review:     I reviewed the patient's new clinical results.    alteplase, 2 mg, Intravenous, Once  amLODIPine, 5 mg, Oral, Q24H  buPROPion SR, 150 mg, Oral, BID  castor oil-balsam peru, 1 application, Topical,  Q12H  DAPTOmycin, 8 mg/kg (Adjusted), Intravenous, Q24H  DULoxetine, 30 mg, Oral, QAM  hydrALAZINE, 50 mg, Oral, Q8H  lactobacillus acidophilus, 1 capsule, Oral, Daily  levETIRAcetam, 500 mg, Oral, Q12H  levothyroxine, 125 mcg, Oral, Q AM  melatonin, 5 mg, Oral, Nightly  multivitamin, 1 tablet, Oral, Daily  Nutrisource fiber, 2 packet, Oral, TID  nystatin, , Topical, Q8H  pantoprazole, 40 mg, Oral, Q AM  senna-docusate sodium, 1 tablet, Oral, Nightly  sodium chloride, 10 mL, Intravenous, Q12H           Medication Review:     Assessment/Plan       Spinal cord compression    SLE    Hypertension    Hypothyroidism    KEREN (generalized anxiety disorder)    MDD (major depressive disorder)    Pleural effusion, right    Osteomyelitis of thoracic vertebra    Paraspinal abscess    Polysubstance abuse    Quadriparesis    COVID-19 virus detected    MRSA bacteremia    Hepatitis C    Seizures on Keppra    History of CVA    Acute postoperative respiratory insufficiency    Severe malnutrition (CMS/HCC)      1- DOMO - non oliguric - Pre-renal azotemia secondary to sepsis vs ATN vs Infection related GN with prior hx of lupus nephritis and vancomycin induced DOMO (23.3). Worsening.   2- Hx of lupus nephritis -2016- she was treated with Cellcept for 9 months. Per patient biopsy was done in [place], [place].  3- HTN   4- Respiratory distress - recent hx of COVID PNA   5- Diarrhea   6- MRSA bacteremia   7- T9/T10 Paravertebral abscess/osteomyelitis   8- Contraction alkalosis - diarrhea and diuretics.      Plan:  - Check labs  - Continue to hold lasix for now  - Monitor I/O   - Repeat UA   - Complement level - normal   - Avoid nephrotoxic agents   - Renal diet    - Adjust meds per renal function   - No emergent need of dialysis.      I discussed the patients findings and my recommendations with patient and nursing staff    Kurt Schroeder MD  12/10/21  15:11 EST
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
## 2021-12-10 NOTE — PLAN OF CARE
Problem: Adult Inpatient Plan of Care  Goal: Plan of Care Review  Recent Flowsheet Documentation  Taken 12/10/2021 1318 by Mary Nixon OT  Plan of Care Reviewed With: patient  Outcome Summary: OT Progress Note completed. Pt is A&Ox3 and motivated to work with therapy. Pt received OOB/UIC for ADLs and HEP. BUE HEP completed with AAROM and good effort to support self-care. Pt demonstrates improvement in grooming to complete oral care this session with Mod A and to bring cup to mouth with Min A. OT will continue to follow IP.

## 2021-12-10 NOTE — PROGRESS NOTES
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 level of T9-T10 with extension to para-aorta  5. Loculated pleural effusion likely secondary from above- not enough fluid on 11/10 to do thoracentesis per IR  6. LUE edema, Check Venous Duplex to r/o DVT.  7. Acute right axilla abscess with spontaneous drainage. Cx MRSA  Adjacent right axilla nodule possible abscess.  8. Recent COVID-19 pneumonia.  Treated with Remdesivir and dexamethasone.    9. Acute hypoxic respiratory failure- improving  10. Leukocytosis/neutrophilia, improved  11. Elevated CRP, improved  12. Polysubstance abuse/UDS positive for meth/opiates.  Was self medicating at [facility] with Klonopin and oxycodone and went into respiratory depression.  13. 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 Seizures/on antiepileptic med  15. H/o CVA from suspected cardioembolic event  16. H/o pyoderma gangrenosum  17. Essential hypertension  18. Hypothyroidism  19. Depression/anxiety  20. Medical noncompliance.  Refusing repeat imaging to evaluate pleural effusion.   21. Pyuria-urine culture finalized no growth  22. Bilateral Pleural Effusions  23. Diarrhea  24. Petechial rash in medial upper thighs.  Shearing injuring vs  inflammation vs other.  Continue nystatin powder.    25. Probable Bacterial Pneumonia- Improved  26. Left arm swelling and pain- improved. LUE u/s was negative for DVT  27. Acute kidney injury-ongoing    PLAN/RECOMMENDATIONS:     1. Follow CBC with differential, BMP, and vancomycin trough at least weekly.  2.  Stop vancomycin  3.  Start daptomycin 8mg/kg IV q24h due to worsening renal failure  4.  Nephrology following. Has h/o lupus nephritis    She will need a very long course of IV antibiotics, likely at least 12 weeks given the extent of her spinal infection and high-grade bacteremia. Anticipate continuing her IV Daptomycin at least until 2/7/22 for a 12 week course since her last surgery (was on 11/14/21). She is not a candidate for outpatient antibiotics with a PICC line. May be a good candidate for LTAC.      Complex case with complex MDM      Jonn Mchugh MD  12/10/2021  17:11 EST

## 2021-12-10 NOTE — CASE MANAGEMENT/SOCIAL WORK
Case Management Discharge Note      Final Note: MSW spoke with Josy with [facility]. Pt is confimred to have a bed at [facility] in [facility] tomorrow. Pt has a caliber transportation arranged to pick her up at [facility] at 11:00am.  Pt has been udpated as well as pt's mother. Pt will need discharge summary faxed to [phone]. Number to call report is [phone].         Selected Continued Care - Admitted Since 11/6/2021     Destination Coordination complete.    Service Provider Selected Services Address Phone Fax Patient Preferred    Count includes the [facility] [address] [facility] [place] [address] [phone] --          Durable Medical Equipment    No services have been selected for the patient.              Dialysis/Infusion    No services have been selected for the patient.              Home Medical Care    No services have been selected for the patient.              Therapy    No services have been selected for the patient.              Community Resources    No services have been selected for the patient.              Community & DME    No services have been selected for the patient.                       Final Discharge Disposition Code: 63 - LTCH

## 2021-12-10 NOTE — PROGRESS NOTES
[facility] Medicine Services  PROGRESS NOTE    Patient Name: Karely Villarreal  : 1965  MRN: [id number]    Date of Admission: 2021  Primary Care Physician: Tracie Levi APRN    Subjective   Subjective     CC:  F/u Sepsis, abscess    HPI:  Patient sitting up in bed. She looks the best I have seen her. Alert and comfortable. Able to move LUE more.     ROS:  Gen- No fevers, chills  CV- No chest pain, palpitations  Resp- No cough, dyspnea  GI- No N/V/D, abd pain      Objective   Objective     Vital Signs:   Temp:  [98.2 °F (36.8 °C)] 98.2 °F (36.8 °C)  Heart Rate:  [94-98] 94  Resp:  [18] 18  BP: (117-139)/(73-95) 124/80  Flow (L/min):  [2-4] 2     Physical Exam:  Constitutional: No acute distress, awake, alert, chronically ill appearing female sitting up in chair   HENT: NCAT, mucous membranes moist  Respiratory: Clear to auscultation bilaterally, respiratory effort normal   Cardiovascular: RRR, no murmurs, rubs, or gallops  Gastrointestinal: Positive bowel sounds, soft, nontender, nondistended  Musculoskeletal: +LUE edema   Psychiatric: Appropriate affect, cooperative  Neurologic: Oriented x 3,  speech clear  Skin: No rashes      Results Reviewed:  LAB RESULTS:      Lab 21  1538 21  0814 21  0915 21  0658 21  0840 21  0825 21  1128 21  1128   WBC  --  6.49 6.19 6.75 7.70 5.62   < > 7.15   HEMOGLOBIN  --  7.5* 8.0* 8.1* 9.2* 7.6*   < > 8.3*   HEMATOCRIT  --  24.6* 27.0* 27.5* 28.7* 25.0*   < > 27.4*   PLATELETS  --  214 237 235 215 182   < > 191   NEUTROS ABS  --  4.34 4.30 4.89 6.08  --   --  5.84   IMMATURE GRANS (ABS)  --  0.05 0.04 0.05 0.05  --   --  0.04   LYMPHS ABS  --  1.37 1.17 1.07 0.99  --   --  0.74   MONOS ABS  --  0.54 0.46 0.51 0.43  --   --  0.42   EOS ABS  --  0.14 0.17 0.19 0.10  --   --  0.06   MCV  --  88.5 90.6 89.3 86.4 88.3   < > 90.4     --   --   --   --   --   --   --     < > = values in this  interval not displayed.         Lab 12/09/21  1538 12/09/21  0814 12/08/21  0915 12/07/21  0658 12/06/21  2014 12/06/21  0840 12/05/21  0825 12/05/21  0825 12/04/21  1128 12/04/21  1128   SODIUM  --  138 138 138  --  140  --  139   < > 141   POTASSIUM  --  3.2* 3.4* 3.7 4.6 3.3*   < > 3.4*   < > 3.8   CHLORIDE  --  93* 93* 95*  --  92*  --  94*   < > 95*   CO2  --  38.0* 37.0* 39.0*  --  38.0*  --  38.0*   < > 38.0*   ANION GAP  --  7.0 8.0 4.0*  --  10.0  --  7.0   < > 8.0   BUN  --  19 17 16  --  18  --  17   < > 20   CREATININE  --  1.19* 1.12* 0.99  --  0.86  --  0.75   < > 0.79   GLUCOSE  --  82 96 84  --  112*  --  83   < > 118*   CALCIUM  --  9.6 10.0 9.8  --  10.2  --  9.8   < > 9.8   PHOSPHORUS 4.2  --  4.0 3.4  --  3.2  --   --   --  4.2    < > = values in this interval not displayed.         Lab 12/08/21  0915 12/07/21  0658 12/06/21  0840 12/05/21  0825 12/04/21  1128   TOTAL PROTEIN  --   --   --  6.2  --    ALBUMIN 2.40* 2.50* 2.70* 2.40* 2.50*   GLOBULIN  --   --   --  3.8  --    ALT (SGPT)  --   --   --  23  --    AST (SGOT)  --   --   --  25  --    BILIRUBIN  --   --   --  0.3  --    ALK PHOS  --   --   --  182*  --                  Lab 12/04/21  1128 12/03/21  1622   IRON 19*  --    IRON SATURATION 7*  --    TIBC 259*  --    TRANSFERRIN 174*  --    FERRITIN 526.90*  --    FOLATE 15.00  --    VITAMIN B 12 1,322*  --    ABO TYPING  --  O   RH TYPING  --  Negative   ANTIBODY SCREEN  --  Negative         Brief Urine Lab Results  (Last result in the past 365 days)      Color   Clarity   Blood   Leuk Est   Nitrite   Protein   CREAT   Urine HCG        12/03/21 1509 Red   Turbid   Large (3+)   Large (3+)   Negative   >=300 mg/dL (3+)                 Microbiology Results Abnormal     Procedure Component Value - Date/Time    Fungus Culture - Body Fluid, Spine, Thoracic [[id number]] Collected: 11/14/21 1511    Lab Status: Preliminary result Specimen: Body Fluid from Spine, Thoracic Updated: 12/05/21 1616      Fungus Culture No fungus isolated at 3 weeks    AFB Culture - Body Fluid, Spine, Thoracic [[id number]] Collected: 11/14/21 1511    Lab Status: Preliminary result Specimen: Body Fluid from Spine, Thoracic Updated: 12/05/21 1616     AFB Culture No AFB isolated at 3 weeks     AFB Stain No acid fast bacilli seen on concentrated smear    Fungus Culture - Tissue, Spine, Cervical [[id number]] Collected: 11/14/21 1450    Lab Status: Preliminary result Specimen: Tissue from Spine, Cervical Updated: 12/05/21 1600     Fungus Culture No fungus isolated at 3 weeks    AFB Culture - Tissue, Spine, Cervical [[id number]] Collected: 11/14/21 1450    Lab Status: Preliminary result Specimen: Tissue from Spine, Cervical Updated: 12/05/21 1600     AFB Culture No AFB isolated at 3 weeks     AFB Stain No acid fast bacilli seen on concentrated smear    Urine Culture - Urine, Urine, Clean Catch [[id number]] Collected: 12/03/21 1509    Lab Status: Final result Specimen: Urine, Clean Catch Updated: 12/04/21 1130     Urine Culture 50,000 CFU/mL Normal Urogenital Jailyn    Blood Culture - Blood, Arm, Left [[id number]]  (Normal) Collected: 11/20/21 1630    Lab Status: Final result Specimen: Blood from Arm, Left Updated: 11/25/21 1700     Blood Culture No growth at 5 days    Narrative:      Aerobic bottle only      Urine Culture - Urine, Urine, Catheter [[id number]]  (Normal) Collected: 11/20/21 1625    Lab Status: Final result Specimen: Urine, Catheter Updated: 11/21/21 1523     Urine Culture No growth    Anaerobic Culture - Body Fluid, Spine, Thoracic [[id number]] Collected: 11/14/21 1511    Lab Status: Final result Specimen: Body Fluid from Spine, Thoracic Updated: 11/19/21 0700     Anaerobic Culture No anaerobes isolated at 5 days    Anaerobic Culture - Tissue, Spine, Cervical [[id number]] Collected: 11/14/21 1450    Lab Status: Final result Specimen: Tissue from Spine, Cervical Updated: 11/19/21 0700     Anaerobic Culture No anaerobes isolated at 5  days    Body Fluid Culture - Body Fluid, Spine, Thoracic [[id number]] Collected: 11/14/21 1511    Lab Status: Final result Specimen: Body Fluid from Spine, Thoracic Updated: 11/17/21 0933     Body Fluid Culture No growth at 3 days     Gram Stain No WBCs or organisms seen    Fungus Smear - Tissue, Spine, Cervical [[id number]] Collected: 11/14/21 1450    Lab Status: Final result Specimen: Tissue from Spine, Cervical Updated: 11/15/21 1552     Fungal Stain No fungal elements seen    Blood Culture - Blood, Arm, Right [[id number]]  (Normal) Collected: 11/07/21 1101    Lab Status: Final result Specimen: Blood from Arm, Right Updated: 11/12/21 1116     Blood Culture No growth at 5 days    Narrative:      AEROBIC BOTTLE ONLY    Blood Culture - Blood, Hand, Left [[id number]]  (Normal) Collected: 11/07/21 1101    Lab Status: Final result Specimen: Blood from Hand, Left Updated: 11/12/21 1115     Blood Culture No growth at 5 days    Urine Culture - Urine, Urine, Catheter [[id number]]  (Normal) Collected: 11/09/21 1621    Lab Status: Final result Specimen: Urine, Catheter Updated: 11/10/21 1829     Urine Culture No growth          No radiology results from the last 24 hrs    Results for orders placed during the hospital encounter of 10/24/21    Adult Transthoracic Echo Complete W/ Cont if Necessary Per Protocol    Interpretation Summary  · Left ventricular ejection fraction appears to be 61 - 65%. Left ventricular systolic function is normal.  · Left ventricular diastolic function was normal.  · Estimated right ventricular systolic pressure from tricuspid regurgitation is normal (<35 mmHg).  · No vegetations seen.  · This is a technically difficult study.      I have reviewed the medications:  Scheduled Meds:alteplase, 2 mg, Intravenous, Once  amLODIPine, 5 mg, Oral, Q24H  buPROPion SR, 150 mg, Oral, BID  castor oil-balsam peru, 1 application, Topical, Q12H  DAPTOmycin, 8 mg/kg (Adjusted), Intravenous, Q24H  DULoxetine, 30 mg, Oral,  QAM  hydrALAZINE, 50 mg, Oral, Q8H  lactobacillus acidophilus, 1 capsule, Oral, Daily  levETIRAcetam, 500 mg, Oral, Q12H  levothyroxine, 125 mcg, Oral, Q AM  melatonin, 5 mg, Oral, Nightly  multivitamin, 1 tablet, Oral, Daily  Nutrisource fiber, 2 packet, Oral, TID  nystatin, , Topical, Q8H  pantoprazole, 40 mg, Oral, Q AM  senna-docusate sodium, 1 tablet, Oral, Nightly  sodium chloride, 10 mL, Intravenous, Q12H      Continuous Infusions:   PRN Meds:.•  acetaminophen  •  cyclobenzaprine  •  hydrALAZINE  •  ipratropium-albuterol  •  LORazepam  •  magnesium sulfate **OR** magnesium sulfate **OR** magnesium sulfate  •  naloxone  •  [] HYDROmorphone **AND** naloxone  •  ondansetron **OR** ondansetron  •  oxyCODONE-acetaminophen  •  potassium & sodium phosphates **OR** potassium & sodium phosphates  •  potassium chloride  •  sodium chloride  •  sodium chloride    Assessment/Plan   Assessment & Plan     Active Hospital Problems    Diagnosis  POA   • **Spinal cord compression [G95.20]  Yes   • Severe malnutrition (CMS/HCC) [E43]  Yes   • Quadriparesis [G82.50]  No   • COVID-19 virus detected [U07.1]  Yes   • MRSA bacteremia [R78.81, B95.62]  Yes   • Hepatitis C [B19.20]  Yes   • Seizures on Keppra [R56.9]  Yes   • History of CVA [Z86.73]  Not Applicable   • Acute postoperative respiratory insufficiency [J95.89]  No   • Pleural effusion, right [J90]  Yes   • Osteomyelitis of thoracic vertebra [M46.24]  Yes   • 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        Brief Hospital Course to date:  Karely Villarreal is a 56 y.o. female  with h/o polysubstance abuse, HCV, HTN, Sz d/o, SLE, Hypothyroidism, CVA, prior COVID infection and homelessness.    She was  admitted to [facility] on 10/24/21 for sepsis d/t MRSA and was found to have a paravertebral abscess and was transferred to [facility] on 11/6.  Surgery was deferred initially but she developed worsening UE weakness and MRI showed progressive cord compression so she was taken to the OR emergently for decompression on 11/14.    She was extubated 11/15 but TF's were started d/t poor PO.  She was transferred initially to telemetry on 11/8 but developed worsening hypoxia with AMS and was transferred back to the ICU on bipap on 11/20.  She was transferred back to the hospitalist service on 11/23  This patient's problems and plans were partially entered by my partner and updated as appropriate by me 12/10/21.          MRSA bacteremia  Sepsis  T9/T10 Paravertebral Abscess, T9/T10 Osteomyelitis with spinal cord compression  --s/p decompression, cervical laminectomy and debridement of epidural abscess on 11/14  --on Vancomycin per ID, will need long course at least 12 weeks given the extent of her spinal infection and high grade bacteremia (starting at time of surgery on 11/14)  --repeat MRI C and T spine showed shows 2 fluid collections dorsally (one at C4 and the other more superficial at C6/C7) and T9/T10 still c/w vertebral osteomyelitis.  Re-evaluated by Dr. Jama who noted severe phlegmon circumferentially around cervical and upper thoracic area but no worsening compression, rec'd continue abx, no surgery needed at this time         Anemia  Hematuria   DOMO  - continues to drop, down to 7.5 today   - fob negative  -s/p 1 unit PRBC on 12/3  -Iron 19, Iron sat 7   -Received dose of IV iron   - suspect related to lupus , she had hematuria but it has mostly resolved   - urology consulted , follow up with DR Gardner outpatient   - nephrology following, lupus nephritis     -urine culture with urogenital batool, hold abx for UTI treatment     Acute Respiratory failure  Right Pleural Effusion  Probable Bacterial  PNA  --improved  --tolerating daily PO Lasix well   -- s/p 7 days of merrem and doxy per ID     Malnutrition  --Status post Dobbhoff with tube feeds     Diarrhea  --s/p rectal tube, improved  --Wound care following for skin breakdown on gluteal tissue  --Continue fiber supplement      Bilateral Knee Pain  --Bilateral xrays showed large bilateral knee effusions, moderate osteoarthrosis worse in patellofemoral compartments  -PT/OT      LUE Edema  -LUE venous duplex on 11/7 negative for evidence of acute DVT  -Elevate extremity   -Repeat venous duplex 12/3 negative for DVT     HX Seizure Disorder  --continue keppra     Recent COVID PNA     Acute Right Axilla abscess  --culture positive for MRSA, continue vanc D8 - ID following    - IV vanc for 12 weeks through 2/7 at least   Polysubstance abuse  --UDS positive for meth/opiates, self medicating at [facility] with klonopin and oxycodone and went into respiratory depression     SLE/Discoid lupus  --holding plaquenil due to severity of active infection, but suspect possible lupus nephritis v other GN with ongoing infection and off maintenance immunosuppression     Insomnia   -Melatonin PRN      Hx CVA  - PT/OT    DVT prophylaxis:  Mechanical DVT prophylaxis orders are present.       AM-PAC 6 Clicks Score (PT): 6 (12/10/21 0815)    Disposition: I expect the patient to be discharged to [facility] tomorrow at 1100    CODE STATUS:   Code Status and Medical Interventions:   Ordered at: 11/06/21 2484     Code Status (Patient has no pulse and is not breathing):    CPR (Attempt to Resuscitate)     Medical Interventions (Patient has pulse or is breathing):    Full Support       Sumi Alvarado, DO  12/10/21

## 2021-12-11 ENCOUNTER — HOSPITAL ENCOUNTER (INPATIENT)
Facility: HOSPITAL | Age: 56
LOS: 3 days | Discharge: NURSING, REHAB OR LONG-TERM CARE | End: 2021-12-16
Attending: EMERGENCY MEDICINE | Admitting: STUDENT IN AN ORGANIZED HEALTH CARE EDUCATION/TRAINING PROGRAM

## 2021-12-11 VITALS
HEIGHT: 66 IN | RESPIRATION RATE: 16 BRPM | SYSTOLIC BLOOD PRESSURE: 132 MMHG | WEIGHT: 192 LBS | HEART RATE: 103 BPM | OXYGEN SATURATION: 100 % | TEMPERATURE: 98.3 F | BODY MASS INDEX: 30.86 KG/M2 | DIASTOLIC BLOOD PRESSURE: 88 MMHG

## 2021-12-11 DIAGNOSIS — R52 UNCONTROLLED PAIN: Primary | ICD-10-CM

## 2021-12-11 DIAGNOSIS — M46.20 VERTEBRAL ABSCESS (HCC): ICD-10-CM

## 2021-12-11 LAB
ANA SER QL: NEGATIVE
DSDNA AB SER-ACNC: <1 IU/ML (ref 0–9)

## 2021-12-11 PROCEDURE — 25010000002 MORPHINE PER 10 MG: Performed by: EMERGENCY MEDICINE

## 2021-12-11 PROCEDURE — 99284 EMERGENCY DEPT VISIT MOD MDM: CPT

## 2021-12-11 PROCEDURE — G0378 HOSPITAL OBSERVATION PER HR: HCPCS

## 2021-12-11 PROCEDURE — 25010000002 ONDANSETRON PER 1 MG: Performed by: EMERGENCY MEDICINE

## 2021-12-11 PROCEDURE — 99239 HOSP IP/OBS DSCHRG MGMT >30: CPT | Performed by: NURSE PRACTITIONER

## 2021-12-11 PROCEDURE — 25010000002 DAPTOMYCIN PER 1 MG: Performed by: FAMILY MEDICINE

## 2021-12-11 PROCEDURE — 25010000002 ENOXAPARIN PER 10 MG: Performed by: FAMILY MEDICINE

## 2021-12-11 PROCEDURE — 99222 1ST HOSP IP/OBS MODERATE 55: CPT | Performed by: FAMILY MEDICINE

## 2021-12-11 RX ORDER — OXYCODONE HYDROCHLORIDE AND ACETAMINOPHEN 5; 325 MG/1; MG/1
1 TABLET ORAL EVERY 6 HOURS PRN
Status: DISCONTINUED | OUTPATIENT
Start: 2021-12-11 | End: 2021-12-16 | Stop reason: HOSPADM

## 2021-12-11 RX ORDER — ONDANSETRON 4 MG/1
4 TABLET, FILM COATED ORAL EVERY 6 HOURS PRN
Status: DISCONTINUED | OUTPATIENT
Start: 2021-12-11 | End: 2021-12-16 | Stop reason: HOSPADM

## 2021-12-11 RX ORDER — ACETAMINOPHEN 650 MG/1
650 SUPPOSITORY RECTAL EVERY 4 HOURS PRN
Status: DISCONTINUED | OUTPATIENT
Start: 2021-12-11 | End: 2021-12-16 | Stop reason: HOSPADM

## 2021-12-11 RX ORDER — DULOXETIN HYDROCHLORIDE 30 MG/1
30 CAPSULE, DELAYED RELEASE ORAL EVERY MORNING
Status: DISCONTINUED | OUTPATIENT
Start: 2021-12-12 | End: 2021-12-16 | Stop reason: HOSPADM

## 2021-12-11 RX ORDER — AMOXICILLIN 250 MG
1 CAPSULE ORAL NIGHTLY
Status: DISCONTINUED | OUTPATIENT
Start: 2021-12-11 | End: 2021-12-12

## 2021-12-11 RX ORDER — SODIUM CHLORIDE 0.9 % (FLUSH) 0.9 %
10 SYRINGE (ML) INJECTION AS NEEDED
Status: DISCONTINUED | OUTPATIENT
Start: 2021-12-11 | End: 2021-12-16 | Stop reason: HOSPADM

## 2021-12-11 RX ORDER — IPRATROPIUM BROMIDE AND ALBUTEROL SULFATE 2.5; .5 MG/3ML; MG/3ML
3 SOLUTION RESPIRATORY (INHALATION) EVERY 6 HOURS PRN
Status: DISCONTINUED | OUTPATIENT
Start: 2021-12-11 | End: 2021-12-16 | Stop reason: HOSPADM

## 2021-12-11 RX ORDER — ACETAMINOPHEN 325 MG/1
650 TABLET ORAL EVERY 4 HOURS PRN
Status: DISCONTINUED | OUTPATIENT
Start: 2021-12-11 | End: 2021-12-11 | Stop reason: SDUPTHER

## 2021-12-11 RX ORDER — DULOXETIN HYDROCHLORIDE 30 MG/1
30 CAPSULE, DELAYED RELEASE ORAL EVERY MORNING
Start: 2021-12-12 | End: 2022-06-07

## 2021-12-11 RX ORDER — AMLODIPINE BESYLATE 5 MG/1
5 TABLET ORAL
Status: DISCONTINUED | OUTPATIENT
Start: 2021-12-12 | End: 2021-12-16 | Stop reason: HOSPADM

## 2021-12-11 RX ORDER — ACETAMINOPHEN 325 MG/1
650 TABLET ORAL EVERY 4 HOURS PRN
Status: DISCONTINUED | OUTPATIENT
Start: 2021-12-11 | End: 2021-12-16 | Stop reason: HOSPADM

## 2021-12-11 RX ORDER — IPRATROPIUM BROMIDE AND ALBUTEROL SULFATE 2.5; .5 MG/3ML; MG/3ML
3 SOLUTION RESPIRATORY (INHALATION) EVERY 6 HOURS PRN
Qty: 360 ML | Status: ON HOLD
Start: 2021-12-11 | End: 2022-09-22

## 2021-12-11 RX ORDER — L.ACID,PARA/B.BIFIDUM/S.THERM 8B CELL
1 CAPSULE ORAL DAILY
Status: DISCONTINUED | OUTPATIENT
Start: 2021-12-12 | End: 2021-12-16 | Stop reason: HOSPADM

## 2021-12-11 RX ORDER — CYCLOBENZAPRINE HCL 5 MG
5 TABLET ORAL 3 TIMES DAILY PRN
Status: ON HOLD
Start: 2021-12-11 | End: 2022-09-22

## 2021-12-11 RX ORDER — GUAR GUM
2 PACKET (EA) ORAL 3 TIMES DAILY
Status: DISCONTINUED | OUTPATIENT
Start: 2021-12-11 | End: 2021-12-16 | Stop reason: HOSPADM

## 2021-12-11 RX ORDER — PANTOPRAZOLE SODIUM 40 MG/1
40 TABLET, DELAYED RELEASE ORAL DAILY
Status: DISCONTINUED | OUTPATIENT
Start: 2021-12-12 | End: 2021-12-16 | Stop reason: HOSPADM

## 2021-12-11 RX ORDER — SODIUM CHLORIDE 0.9 % (FLUSH) 0.9 %
10 SYRINGE (ML) INJECTION EVERY 12 HOURS SCHEDULED
Status: DISCONTINUED | OUTPATIENT
Start: 2021-12-11 | End: 2021-12-16 | Stop reason: HOSPADM

## 2021-12-11 RX ORDER — OXYCODONE HYDROCHLORIDE AND ACETAMINOPHEN 5; 325 MG/1; MG/1
1 TABLET ORAL EVERY 6 HOURS PRN
Status: ON HOLD
Start: 2021-12-11 | End: 2022-09-22

## 2021-12-11 RX ORDER — CASTOR OIL AND BALSAM, PERU 788; 87 MG/G; MG/G
1 OINTMENT TOPICAL EVERY 12 HOURS SCHEDULED
Status: DISCONTINUED | OUTPATIENT
Start: 2021-12-11 | End: 2021-12-16 | Stop reason: HOSPADM

## 2021-12-11 RX ORDER — LEVOTHYROXINE SODIUM 0.12 MG/1
125 TABLET ORAL DAILY
Status: DISCONTINUED | OUTPATIENT
Start: 2021-12-12 | End: 2021-12-16 | Stop reason: HOSPADM

## 2021-12-11 RX ORDER — LORAZEPAM 1 MG/1
1 TABLET ORAL EVERY 6 HOURS PRN
Status: DISCONTINUED | OUTPATIENT
Start: 2021-12-11 | End: 2021-12-16 | Stop reason: HOSPADM

## 2021-12-11 RX ORDER — ONDANSETRON 4 MG/1
4 TABLET, FILM COATED ORAL EVERY 6 HOURS PRN
Status: ON HOLD
Start: 2021-12-11 | End: 2022-09-22

## 2021-12-11 RX ORDER — HYDRALAZINE HYDROCHLORIDE 50 MG/1
50 TABLET, FILM COATED ORAL EVERY 8 HOURS SCHEDULED
Status: DISCONTINUED | OUTPATIENT
Start: 2021-12-11 | End: 2021-12-16 | Stop reason: HOSPADM

## 2021-12-11 RX ORDER — LORAZEPAM 1 MG/1
1 TABLET ORAL EVERY 6 HOURS PRN
Start: 2021-12-11 | End: 2021-12-16

## 2021-12-11 RX ORDER — AMOXICILLIN 250 MG
1 CAPSULE ORAL NIGHTLY
Status: ON HOLD
Start: 2021-12-11 | End: 2022-09-22

## 2021-12-11 RX ORDER — DIPHENOXYLATE HYDROCHLORIDE AND ATROPINE SULFATE 2.5; .025 MG/1; MG/1
1 TABLET ORAL DAILY
Status: DISCONTINUED | OUTPATIENT
Start: 2021-12-12 | End: 2021-12-16 | Stop reason: HOSPADM

## 2021-12-11 RX ORDER — CYCLOBENZAPRINE HCL 10 MG
5 TABLET ORAL 3 TIMES DAILY PRN
Status: DISCONTINUED | OUTPATIENT
Start: 2021-12-11 | End: 2021-12-16 | Stop reason: HOSPADM

## 2021-12-11 RX ORDER — HYDRALAZINE HYDROCHLORIDE 50 MG/1
50 TABLET, FILM COATED ORAL EVERY 8 HOURS SCHEDULED
Status: ON HOLD
Start: 2021-12-11 | End: 2022-09-22

## 2021-12-11 RX ORDER — NYSTATIN 100000 [USP'U]/G
POWDER TOPICAL EVERY 8 HOURS SCHEDULED
Status: ON HOLD
Start: 2021-12-11 | End: 2022-09-22

## 2021-12-11 RX ORDER — ONDANSETRON 2 MG/ML
4 INJECTION INTRAMUSCULAR; INTRAVENOUS
Status: DISCONTINUED | OUTPATIENT
Start: 2021-12-11 | End: 2021-12-16 | Stop reason: HOSPADM

## 2021-12-11 RX ORDER — GUAR GUM
2 PACKET (EA) ORAL 3 TIMES DAILY
Status: ON HOLD
Start: 2021-12-11 | End: 2022-09-22

## 2021-12-11 RX ORDER — CHOLECALCIFEROL (VITAMIN D3) 125 MCG
5 CAPSULE ORAL NIGHTLY
Status: DISCONTINUED | OUTPATIENT
Start: 2021-12-11 | End: 2021-12-16 | Stop reason: HOSPADM

## 2021-12-11 RX ORDER — NYSTATIN 100000 [USP'U]/G
POWDER TOPICAL EVERY 8 HOURS SCHEDULED
Status: DISCONTINUED | OUTPATIENT
Start: 2021-12-11 | End: 2021-12-16 | Stop reason: HOSPADM

## 2021-12-11 RX ORDER — CHOLECALCIFEROL (VITAMIN D3) 125 MCG
5 CAPSULE ORAL NIGHTLY
Status: ON HOLD
Start: 2021-12-11 | End: 2022-09-22

## 2021-12-11 RX ORDER — AMLODIPINE BESYLATE 5 MG/1
5 TABLET ORAL
Status: ON HOLD
Start: 2021-12-12 | End: 2022-09-22

## 2021-12-11 RX ORDER — LEVETIRACETAM 500 MG/1
500 TABLET ORAL EVERY 12 HOURS SCHEDULED
Status: DISCONTINUED | OUTPATIENT
Start: 2021-12-11 | End: 2021-12-16 | Stop reason: HOSPADM

## 2021-12-11 RX ORDER — ACETAMINOPHEN 325 MG/1
650 TABLET ORAL EVERY 4 HOURS PRN
Status: ON HOLD
Start: 2021-12-11 | End: 2022-09-22

## 2021-12-11 RX ORDER — ACETAMINOPHEN 160 MG/5ML
650 SOLUTION ORAL EVERY 4 HOURS PRN
Status: DISCONTINUED | OUTPATIENT
Start: 2021-12-11 | End: 2021-12-16 | Stop reason: HOSPADM

## 2021-12-11 RX ORDER — MORPHINE SULFATE 4 MG/ML
4 INJECTION, SOLUTION INTRAMUSCULAR; INTRAVENOUS
Status: DISCONTINUED | OUTPATIENT
Start: 2021-12-11 | End: 2021-12-16 | Stop reason: HOSPADM

## 2021-12-11 RX ORDER — BUPROPION HYDROCHLORIDE 100 MG/1
100 TABLET, EXTENDED RELEASE ORAL DAILY
Status: DISCONTINUED | OUTPATIENT
Start: 2021-12-12 | End: 2021-12-16 | Stop reason: HOSPADM

## 2021-12-11 RX ADMIN — BUPROPION HYDROCHLORIDE 150 MG: 150 TABLET, EXTENDED RELEASE ORAL at 09:01

## 2021-12-11 RX ADMIN — SODIUM CHLORIDE, PRESERVATIVE FREE 10 ML: 5 INJECTION INTRAVENOUS at 20:59

## 2021-12-11 RX ADMIN — MULTIVITAMIN TABLET 1 TABLET: TABLET at 09:01

## 2021-12-11 RX ADMIN — LEVETIRACETAM 500 MG: 500 TABLET, FILM COATED ORAL at 20:57

## 2021-12-11 RX ADMIN — HYDRALAZINE HYDROCHLORIDE 50 MG: 50 TABLET, FILM COATED ORAL at 15:57

## 2021-12-11 RX ADMIN — NYSTATIN: 100000 POWDER TOPICAL at 15:57

## 2021-12-11 RX ADMIN — LORAZEPAM 1 MG: 1 TABLET ORAL at 10:25

## 2021-12-11 RX ADMIN — CASTOR OIL AND BALSAM, PERU 1 APPLICATION: 788; 87 OINTMENT TOPICAL at 20:56

## 2021-12-11 RX ADMIN — CASTOR OIL AND BALSAM, PERU 1 APPLICATION: 788; 87 OINTMENT TOPICAL at 09:03

## 2021-12-11 RX ADMIN — LEVOTHYROXINE SODIUM 125 MCG: 125 TABLET ORAL at 05:51

## 2021-12-11 RX ADMIN — LEVETIRACETAM 500 MG: 500 TABLET, FILM COATED ORAL at 09:01

## 2021-12-11 RX ADMIN — ONDANSETRON 4 MG: 2 INJECTION INTRAMUSCULAR; INTRAVENOUS at 13:15

## 2021-12-11 RX ADMIN — Medication 2 PACKET: at 15:57

## 2021-12-11 RX ADMIN — DAPTOMYCIN 550 MG: 500 INJECTION, POWDER, LYOPHILIZED, FOR SOLUTION INTRAVENOUS at 18:16

## 2021-12-11 RX ADMIN — DOCUSATE SODIUM 50MG AND SENNOSIDES 8.6MG 1 TABLET: 8.6; 5 TABLET, FILM COATED ORAL at 20:57

## 2021-12-11 RX ADMIN — HYDRALAZINE HYDROCHLORIDE 50 MG: 50 TABLET, FILM COATED ORAL at 21:00

## 2021-12-11 RX ADMIN — AMLODIPINE BESYLATE 5 MG: 5 TABLET ORAL at 09:01

## 2021-12-11 RX ADMIN — DULOXETINE HYDROCHLORIDE 30 MG: 30 CAPSULE, DELAYED RELEASE ORAL at 09:01

## 2021-12-11 RX ADMIN — OXYCODONE HYDROCHLORIDE AND ACETAMINOPHEN 1 TABLET: 5; 325 TABLET ORAL at 22:10

## 2021-12-11 RX ADMIN — ENOXAPARIN SODIUM 40 MG: 40 INJECTION SUBCUTANEOUS at 18:15

## 2021-12-11 RX ADMIN — Medication 2 PACKET: at 20:56

## 2021-12-11 RX ADMIN — OXYCODONE AND ACETAMINOPHEN 1 TABLET: 5; 325 TABLET ORAL at 03:23

## 2021-12-11 RX ADMIN — OXYCODONE AND ACETAMINOPHEN 1 TABLET: 5; 325 TABLET ORAL at 10:25

## 2021-12-11 RX ADMIN — SODIUM CHLORIDE, PRESERVATIVE FREE 10 ML: 5 INJECTION INTRAVENOUS at 09:04

## 2021-12-11 RX ADMIN — NYSTATIN 1 APPLICATION: 100000 POWDER TOPICAL at 20:55

## 2021-12-11 RX ADMIN — LORAZEPAM 1 MG: 1 TABLET ORAL at 20:56

## 2021-12-11 RX ADMIN — LORAZEPAM 1 MG: 1 TABLET ORAL at 03:23

## 2021-12-11 RX ADMIN — Medication 1 CAPSULE: at 09:01

## 2021-12-11 RX ADMIN — Medication 5 MG: at 21:01

## 2021-12-11 RX ADMIN — Medication 2 PACKET: at 09:34

## 2021-12-11 RX ADMIN — NYSTATIN: 100000 POWDER TOPICAL at 05:52

## 2021-12-11 RX ADMIN — HYDRALAZINE HYDROCHLORIDE 50 MG: 50 TABLET, FILM COATED ORAL at 05:51

## 2021-12-11 RX ADMIN — MORPHINE SULFATE 4 MG: 4 INJECTION, SOLUTION INTRAMUSCULAR; INTRAVENOUS at 13:15

## 2021-12-11 RX ADMIN — OXYCODONE HYDROCHLORIDE AND ACETAMINOPHEN 1 TABLET: 5; 325 TABLET ORAL at 15:56

## 2021-12-11 RX ADMIN — PANTOPRAZOLE SODIUM 40 MG: 40 TABLET, DELAYED RELEASE ORAL at 05:52

## 2021-12-11 NOTE — ED NOTES
Patient brought to [facility] last month for multiple spinal issues including infection and cervical laminectomy. Patient DC today to long term care in [place] but unable to tolerate transportation due to severe pain in neck, back, and BLE.     Vidhya Echavarria, RN  12/11/21 8398
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
## 2021-12-11 NOTE — PROGRESS NOTES
"   LOS: 34 days    Patient Care Team:  Tracie Levi APRN as PCP - General (Family Medicine)  Tashi Bateman MD as Consulting Physician (Cardiology)  Valentino, Joseph, MD as Consulting Physician (Otolaryngology)    DOMO     Subjective     Interval History:     No acute events overnight. No new complaints   SOA +     Review of Systems:   No CP,f fever, chills, rigors, rash      Objective     Vital Sign Min/Max for last 24 hours  Temp  Min: 98 °F (36.7 °C)  Max: 98.3 °F (36.8 °C)   BP  Min: 120/75  Max: 132/88   Pulse  Min: 100  Max: 106   Resp  Min: 16  Max: 17   SpO2  Min: 97 %  Max: 100 %   Flow (L/min)  Min: 2  Max: 2   No data recorded     Flowsheet Rows      First Filed Value   Admission Height 167.6 cm (66\") Documented at 11/07/2021 1440   Admission Weight 80.7 kg (178 lb) Documented at 11/07/2021 1440          No intake/output data recorded.  I/O last 3 completed shifts:  In: 1200 [P.O.:1200]  Out: 1650 [Urine:1650]    Physical Exam:    Gen: Alert, NAD   HENT: NC, AT, EOMI   NECK: Supple, no JVD, Trachea midline   LUNGS: decrease air  bilaterally, non labored respirtation   CVS: S1/S2 audible, RRR, no murmur   Abd: Soft, NT, ND, BS+   Ext: Trace pedal edema, no cyanosis   CNS: Alert, No focal deficit noted grossly  Psy: Cooperative  Skin: Warm, dry and intact      WBC WBC   Date Value Ref Range Status   12/10/2021 6.92 3.40 - 10.80 10*3/mm3 Final   12/09/2021 6.49 3.40 - 10.80 10*3/mm3 Final      HGB Hemoglobin   Date Value Ref Range Status   12/10/2021 7.5 (L) 12.0 - 15.9 g/dL Final   12/09/2021 7.5 (L) 12.0 - 15.9 g/dL Final      HCT Hematocrit   Date Value Ref Range Status   12/10/2021 24.8 (L) 34.0 - 46.6 % Final   12/09/2021 24.6 (L) 34.0 - 46.6 % Final      Platlets No results found for: LABPLAT   MCV MCV   Date Value Ref Range Status   12/10/2021 88.6 79.0 - 97.0 fL Final   12/09/2021 88.5 79.0 - 97.0 fL Final          Sodium Sodium   Date Value Ref Range Status   12/10/2021 141 136 - 145 " mmol/L Final   12/09/2021 138 136 - 145 mmol/L Final      Potassium Potassium   Date Value Ref Range Status   12/10/2021 3.4 (L) 3.5 - 5.2 mmol/L Final   12/09/2021 3.2 (L) 3.5 - 5.2 mmol/L Final      Chloride Chloride   Date Value Ref Range Status   12/10/2021 95 (L) 98 - 107 mmol/L Final   12/09/2021 93 (L) 98 - 107 mmol/L Final      CO2 CO2   Date Value Ref Range Status   12/10/2021 39.0 (H) 22.0 - 29.0 mmol/L Final   12/09/2021 38.0 (H) 22.0 - 29.0 mmol/L Final      BUN BUN   Date Value Ref Range Status   12/10/2021 25 (H) 6 - 20 mg/dL Final   12/09/2021 19 6 - 20 mg/dL Final      Creatinine Creatinine   Date Value Ref Range Status   12/10/2021 1.41 (H) 0.57 - 1.00 mg/dL Final   12/09/2021 1.19 (H) 0.57 - 1.00 mg/dL Final      Calcium Calcium   Date Value Ref Range Status   12/10/2021 9.3 8.6 - 10.5 mg/dL Final   12/09/2021 9.6 8.6 - 10.5 mg/dL Final      PO4 No results found for: CAPO4   Albumin No results found for: ALBUMIN   Magnesium No results found for: MG   Uric Acid Uric Acid   Date Value Ref Range Status   12/09/2021 5.9 (H) 2.4 - 5.7 mg/dL Final     Comment:     Falsely depressed results may occur on samples drawn from patients receiving N-Acetylcysteine (NAC) or Metamizole.           Results Review:     I reviewed the patient's new clinical results.    alteplase, 2 mg, Intravenous, Once  amLODIPine, 5 mg, Oral, Q24H  buPROPion SR, 150 mg, Oral, BID  castor oil-balsam peru, 1 application, Topical, Q12H  DAPTOmycin, 8 mg/kg (Adjusted), Intravenous, Q24H  DULoxetine, 30 mg, Oral, QAM  hydrALAZINE, 50 mg, Oral, Q8H  lactobacillus acidophilus, 1 capsule, Oral, Daily  levETIRAcetam, 500 mg, Oral, Q12H  levothyroxine, 125 mcg, Oral, Q AM  melatonin, 5 mg, Oral, Nightly  multivitamin, 1 tablet, Oral, Daily  Nutrisource fiber, 2 packet, Oral, TID  nystatin, , Topical, Q8H  pantoprazole, 40 mg, Oral, Q AM  senna-docusate sodium, 1 tablet, Oral, Nightly  sodium chloride, 10 mL, Intravenous, Q12H            Medication Review:     Assessment/Plan       Spinal cord compression    SLE    Hypertension    Hypothyroidism    KEREN (generalized anxiety disorder)    MDD (major depressive disorder)    Pleural effusion, right    Osteomyelitis of thoracic vertebra    Paraspinal abscess    Polysubstance abuse    Quadriparesis    COVID-19 virus detected    MRSA bacteremia    Hepatitis C    Seizures on Keppra    History of CVA    Acute postoperative respiratory insufficiency    Severe malnutrition (CMS/HCC)      1- DOMO - non oliguric - Pre-renal azotemia secondary to sepsis vs ATN vs Infection related GN with prior hx of lupus nephritis and vancomycin induced DOMO (23.3). Worsening.   2- Hx of lupus nephritis -2016- she was treated with Cellcept for 9 months. Per patient biopsy was done in [place], [place].  3- HTN   4- Respiratory distress - recent hx of COVID PNA   5- Diarrhea   6- MRSA bacteremia   7- T9/T10 Paravertebral abscess/osteomyelitis   8- Contraction alkalosis - diarrhea and diuretics.      Plan:  - Check labs  - Continue to hold lasix for now  - Monitor I/O   - Repeat UA   - Complement level - normal   - Avoid nephrotoxic agents   - Renal diet    - Adjust meds per renal function   - No emergent need of dialysis.      I discussed the patients findings and my recommendations with patient and nursing staff    Kurt Schroeder MD  12/11/21  09:15 EST

## 2021-12-11 NOTE — CASE MANAGEMENT/SOCIAL WORK
Continued Stay Note  [facility]     Patient Name: Karely Villarreal  MRN: [id number]  Today's Date: 12/11/2021    Admit Date: 12/11/2021     Discharge Plan     Row Name 12/11/21 1527       Plan    Plan EDGARDO follow up    Patient/Family in Agreement with Plan yes    Plan Comments SW received call from CM Manager, Heather, regarding pt. Pt was d/c’d from [facility] today 12/11/21 and was being transported via [facility] to [facility] for placement. Pt was sent back to [facility] ED after being in pain during transport, per RN notes. EDGARDO called [facility] [phone], no answer. EDGARDO called Candelaria with [facility] [phone] to see if bed was still available for pt today. Candelaria stated pt would have bed today (Saturday) or tomorrow (Sunday), but could not guarantee a bed on Monday, due to more admissions. EDGARDO understood. EDGARDO advised Candelaria that we would contact facility back once have an update on pts care. Candelaria gave SW phone number of [facility] on call worker, Meagan Aly, to be reached at [phone] for any updates on pt. EDGARDO spoke with Kieran with [facility] and stated they are not available this weekend to transport. EDGARDO spoke with ED Charge RN regarding trying to arrange transportation back to facility, but Charge RN stated pt was refusing. When EDGARDO asked to go speak with pt in ED, Charge RN stated pt has already been admitted to floor. EDGARDO spoke with floor RN, Celi, and physician does not agree to pt being d/c’d back to [facility] this weekend. EDGARDO advised RN again that pt would lose bed at [facility] facility and physician was made aware. EDGARDO let weekend CM Salvador know of plan to make floor CM and floor SW aware on Monday. ROSE MARY Edmonds [phone]    Final Discharge Disposition Code 30 - still a patient               Discharge Codes    No documentation.                     ROSE MARY Collins

## 2021-12-11 NOTE — PLAN OF CARE
Goal Outcome Evaluation:  Plan of Care Reviewed With: patient           Outcome Summary: Pt discharged to [facility] in [facility] today.  PICC intact upon discharge. Pt medicated for pain and anxiety prior to leaving hospital see TERRENCE Shetty sent with patient. Report called to Perla MATTHEW at [facility].

## 2021-12-11 NOTE — PLAN OF CARE
Goal Outcome Evaluation:  Plan of Care Reviewed With: patient A&Ox4. VSS. Positioned for comfort q2 hrs with skin interventions when pt allows. Active ROM with right arm. RUE and BLE very weak. Speciality bed in use.Pt refuses heel boots. Excoriated bottom covered in venalax and z cream. Voiding tea colored urine.

## 2021-12-12 LAB
ABO GROUP BLD: NORMAL
ANION GAP SERPL CALCULATED.3IONS-SCNC: 9 MMOL/L (ref 5–15)
BASOPHILS # BLD AUTO: 0.03 10*3/MM3 (ref 0–0.2)
BASOPHILS NFR BLD AUTO: 0.5 % (ref 0–1.5)
BLD GP AB SCN SERPL QL: NEGATIVE
BUN SERPL-MCNC: 27 MG/DL (ref 6–20)
BUN/CREAT SERPL: 19.6 (ref 7–25)
CALCIUM SPEC-SCNC: 9.8 MG/DL (ref 8.6–10.5)
CHLORIDE SERPL-SCNC: 93 MMOL/L (ref 98–107)
CO2 SERPL-SCNC: 36 MMOL/L (ref 22–29)
CREAT SERPL-MCNC: 1.38 MG/DL (ref 0.57–1)
DEPRECATED RDW RBC AUTO: 56.7 FL (ref 37–54)
EOSINOPHIL # BLD AUTO: 0.16 10*3/MM3 (ref 0–0.4)
EOSINOPHIL NFR BLD AUTO: 2.4 % (ref 0.3–6.2)
ERYTHROCYTE [DISTWIDTH] IN BLOOD BY AUTOMATED COUNT: 16.8 % (ref 12.3–15.4)
GFR SERPL CREATININE-BSD FRML MDRD: 40 ML/MIN/1.73
GLUCOSE SERPL-MCNC: 88 MG/DL (ref 65–99)
HCT VFR BLD AUTO: 23.9 % (ref 34–46.6)
HGB BLD-MCNC: 7 G/DL (ref 12–15.9)
IMM GRANULOCYTES # BLD AUTO: 0.04 10*3/MM3 (ref 0–0.05)
IMM GRANULOCYTES NFR BLD AUTO: 0.6 % (ref 0–0.5)
LYMPHOCYTES # BLD AUTO: 1.35 10*3/MM3 (ref 0.7–3.1)
LYMPHOCYTES NFR BLD AUTO: 20.3 % (ref 19.6–45.3)
MAGNESIUM SERPL-MCNC: 1.8 MG/DL (ref 1.6–2.6)
MCH RBC QN AUTO: 26.7 PG (ref 26.6–33)
MCHC RBC AUTO-ENTMCNC: 29.3 G/DL (ref 31.5–35.7)
MCV RBC AUTO: 91.2 FL (ref 79–97)
MONOCYTES # BLD AUTO: 0.59 10*3/MM3 (ref 0.1–0.9)
MONOCYTES NFR BLD AUTO: 8.9 % (ref 5–12)
NEUTROPHILS NFR BLD AUTO: 4.47 10*3/MM3 (ref 1.7–7)
NEUTROPHILS NFR BLD AUTO: 67.3 % (ref 42.7–76)
NRBC BLD AUTO-RTO: 0 /100 WBC (ref 0–0.2)
PLATELET # BLD AUTO: 246 10*3/MM3 (ref 140–450)
PMV BLD AUTO: 8.3 FL (ref 6–12)
POTASSIUM SERPL-SCNC: 3.7 MMOL/L (ref 3.5–5.2)
RBC # BLD AUTO: 2.62 10*6/MM3 (ref 3.77–5.28)
RH BLD: NEGATIVE
SODIUM SERPL-SCNC: 138 MMOL/L (ref 136–145)
T&S EXPIRATION DATE: NORMAL
WBC NRBC COR # BLD: 6.64 10*3/MM3 (ref 3.4–10.8)

## 2021-12-12 PROCEDURE — 86901 BLOOD TYPING SEROLOGIC RH(D): CPT | Performed by: NURSE PRACTITIONER

## 2021-12-12 PROCEDURE — 86923 COMPATIBILITY TEST ELECTRIC: CPT

## 2021-12-12 PROCEDURE — 85025 COMPLETE CBC W/AUTO DIFF WBC: CPT | Performed by: FAMILY MEDICINE

## 2021-12-12 PROCEDURE — 86900 BLOOD TYPING SEROLOGIC ABO: CPT

## 2021-12-12 PROCEDURE — 80048 BASIC METABOLIC PNL TOTAL CA: CPT | Performed by: FAMILY MEDICINE

## 2021-12-12 PROCEDURE — G0378 HOSPITAL OBSERVATION PER HR: HCPCS

## 2021-12-12 PROCEDURE — P9016 RBC LEUKOCYTES REDUCED: HCPCS

## 2021-12-12 PROCEDURE — 99233 SBSQ HOSP IP/OBS HIGH 50: CPT | Performed by: NURSE PRACTITIONER

## 2021-12-12 PROCEDURE — 36430 TRANSFUSION BLD/BLD COMPNT: CPT

## 2021-12-12 PROCEDURE — 83735 ASSAY OF MAGNESIUM: CPT | Performed by: NURSE PRACTITIONER

## 2021-12-12 PROCEDURE — 25010000002 ENOXAPARIN PER 10 MG: Performed by: FAMILY MEDICINE

## 2021-12-12 PROCEDURE — 86850 RBC ANTIBODY SCREEN: CPT | Performed by: NURSE PRACTITIONER

## 2021-12-12 PROCEDURE — 86900 BLOOD TYPING SEROLOGIC ABO: CPT | Performed by: NURSE PRACTITIONER

## 2021-12-12 RX ORDER — BISACODYL 10 MG
10 SUPPOSITORY, RECTAL RECTAL DAILY
Status: DISCONTINUED | OUTPATIENT
Start: 2021-12-12 | End: 2021-12-14

## 2021-12-12 RX ORDER — POLYETHYLENE GLYCOL 3350 17 G/17G
17 POWDER, FOR SOLUTION ORAL DAILY PRN
Status: DISCONTINUED | OUTPATIENT
Start: 2021-12-12 | End: 2021-12-14

## 2021-12-12 RX ORDER — AMOXICILLIN 250 MG
2 CAPSULE ORAL 2 TIMES DAILY
Status: DISCONTINUED | OUTPATIENT
Start: 2021-12-12 | End: 2021-12-14

## 2021-12-12 RX ORDER — MAGNESIUM CARB/ALUMINUM HYDROX 105-160MG
296 TABLET,CHEWABLE ORAL ONCE
Status: COMPLETED | OUTPATIENT
Start: 2021-12-12 | End: 2021-12-12

## 2021-12-12 RX ORDER — CALCIUM CARBONATE 200(500)MG
2 TABLET,CHEWABLE ORAL 2 TIMES DAILY PRN
Status: DISCONTINUED | OUTPATIENT
Start: 2021-12-12 | End: 2021-12-16 | Stop reason: HOSPADM

## 2021-12-12 RX ORDER — BISACODYL 5 MG/1
5 TABLET, DELAYED RELEASE ORAL DAILY PRN
Status: DISCONTINUED | OUTPATIENT
Start: 2021-12-12 | End: 2021-12-14

## 2021-12-12 RX ADMIN — NYSTATIN 1 APPLICATION: 100000 POWDER TOPICAL at 21:06

## 2021-12-12 RX ADMIN — BISACODYL 10 MG: 10 SUPPOSITORY RECTAL at 12:28

## 2021-12-12 RX ADMIN — LEVETIRACETAM 500 MG: 500 TABLET, FILM COATED ORAL at 09:39

## 2021-12-12 RX ADMIN — Medication 2 PACKET: at 15:43

## 2021-12-12 RX ADMIN — CYCLOBENZAPRINE 5 MG: 10 TABLET, FILM COATED ORAL at 09:40

## 2021-12-12 RX ADMIN — HYDRALAZINE HYDROCHLORIDE 50 MG: 50 TABLET, FILM COATED ORAL at 21:05

## 2021-12-12 RX ADMIN — Medication 2 PACKET: at 09:45

## 2021-12-12 RX ADMIN — CASTOR OIL AND BALSAM, PERU 1 APPLICATION: 788; 87 OINTMENT TOPICAL at 21:06

## 2021-12-12 RX ADMIN — LEVETIRACETAM 500 MG: 500 TABLET, FILM COATED ORAL at 21:05

## 2021-12-12 RX ADMIN — Medication 2 PACKET: at 21:05

## 2021-12-12 RX ADMIN — PANTOPRAZOLE SODIUM 40 MG: 40 TABLET, DELAYED RELEASE ORAL at 05:26

## 2021-12-12 RX ADMIN — ACETAMINOPHEN 650 MG: 325 TABLET, FILM COATED ORAL at 09:40

## 2021-12-12 RX ADMIN — HYDRALAZINE HYDROCHLORIDE 50 MG: 50 TABLET, FILM COATED ORAL at 05:26

## 2021-12-12 RX ADMIN — MULTIVITAMIN TABLET 1 TABLET: TABLET at 09:40

## 2021-12-12 RX ADMIN — DOCUSATE SODIUM 50MG AND SENNOSIDES 8.6MG 2 TABLET: 8.6; 5 TABLET, FILM COATED ORAL at 12:28

## 2021-12-12 RX ADMIN — OXYCODONE HYDROCHLORIDE AND ACETAMINOPHEN 1 TABLET: 5; 325 TABLET ORAL at 18:09

## 2021-12-12 RX ADMIN — LEVOTHYROXINE SODIUM 125 MCG: 125 TABLET ORAL at 05:25

## 2021-12-12 RX ADMIN — HYDRALAZINE HYDROCHLORIDE 50 MG: 50 TABLET, FILM COATED ORAL at 14:49

## 2021-12-12 RX ADMIN — AMLODIPINE BESYLATE 5 MG: 5 TABLET ORAL at 09:40

## 2021-12-12 RX ADMIN — LORAZEPAM 1 MG: 1 TABLET ORAL at 14:49

## 2021-12-12 RX ADMIN — Medication 296 ML: at 12:28

## 2021-12-12 RX ADMIN — Medication 1 CAPSULE: at 09:40

## 2021-12-12 RX ADMIN — NYSTATIN 1 APPLICATION: 100000 POWDER TOPICAL at 05:29

## 2021-12-12 RX ADMIN — LORAZEPAM 1 MG: 1 TABLET ORAL at 05:26

## 2021-12-12 RX ADMIN — NYSTATIN: 100000 POWDER TOPICAL at 14:49

## 2021-12-12 RX ADMIN — SODIUM CHLORIDE, PRESERVATIVE FREE 10 ML: 5 INJECTION INTRAVENOUS at 21:05

## 2021-12-12 RX ADMIN — CASTOR OIL AND BALSAM, PERU 1 APPLICATION: 788; 87 OINTMENT TOPICAL at 09:44

## 2021-12-12 RX ADMIN — ENOXAPARIN SODIUM 40 MG: 40 INJECTION SUBCUTANEOUS at 18:10

## 2021-12-12 RX ADMIN — OXYCODONE HYDROCHLORIDE AND ACETAMINOPHEN 1 TABLET: 5; 325 TABLET ORAL at 12:28

## 2021-12-12 RX ADMIN — BUPROPION HYDROCHLORIDE 100 MG: 100 TABLET, FILM COATED, EXTENDED RELEASE ORAL at 09:40

## 2021-12-12 RX ADMIN — Medication 5 MG: at 21:05

## 2021-12-12 RX ADMIN — DOCUSATE SODIUM 50MG AND SENNOSIDES 8.6MG 2 TABLET: 8.6; 5 TABLET, FILM COATED ORAL at 21:05

## 2021-12-12 RX ADMIN — OXYCODONE HYDROCHLORIDE AND ACETAMINOPHEN 1 TABLET: 5; 325 TABLET ORAL at 05:26

## 2021-12-12 RX ADMIN — DULOXETINE HYDROCHLORIDE 30 MG: 30 CAPSULE, DELAYED RELEASE ORAL at 09:40

## 2021-12-12 RX ADMIN — SODIUM CHLORIDE, PRESERVATIVE FREE 10 ML: 5 INJECTION INTRAVENOUS at 09:47
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
## 2021-12-12 NOTE — PROGRESS NOTES
[facility] Medicine Services  PROGRESS NOTE    Patient Name: Karely Villarreal  : 1965  MRN: [id number]    Date of Admission: 2021  Primary Care Physician: Tracie Levi APRN    Subjective   Subjective     CC:  F/u Sepsis, abscess    HPI:  Patient sitting up in bed in NAD.  Patient states that she is hot 01 her covers off in bed air or cold.  Patient complaining of her feet hurting badly.  No issues at this time.    ROS:  Gen- No fevers, chills  CV- No chest pain, palpitations  Resp- No cough, dyspnea  GI- No N/V/D, abd pain  All other systems have been reviewed and the pertinent positives and negatives are listed above in the HPI and ROS      Objective   Objective     Vital Signs:   Temp:  [98.1 °F (36.7 °C)-98.6 °F (37 °C)] 98.3 °F (36.8 °C)  Heart Rate:  [] 103  Resp:  [16-20] 16  BP: (116-140)/(69-85) 125/76  Flow (L/min):  [2-4] 2     Physical Exam:  Constitutional: Alert, ill-appearing female lying in bed in NAD  Eyes: EOMI, sclerae anicteric, no conjunctival injection  Head: NCAT  ENT: [place], moist mucous membranes   Respiratory: Nonlabored, symmetrical chest expansion, CTAB  Cardiovascular: RRR, , no M/R/G, +DP pulses bilaterally  Gastrointestinal: Soft, NT, ND +BS  Musculoskeletal: ÁLVAREZ; +LE edema bilaterally  Neurologic: Oriented x4, strength symmetric in all extremities, follows all commands, speech clear  Skin: No rashes on exposed skin  Psychiatric: Pleasant and cooperative; normal affect    Results Reviewed:  LAB RESULTS:      Lab 21  0818 12/10/21  1212 21  1538 21  0814 21  0915 21  0658   WBC 6.64 6.92  --  6.49 6.19 6.75   HEMOGLOBIN 7.0* 7.5*  --  7.5* 8.0* 8.1*   HEMATOCRIT 23.9* 24.8*  --  24.6* 27.0* 27.5*   PLATELETS 246 206  --  214 237 235   NEUTROS ABS 4.47 5.16  --  4.34 4.30 4.89   IMMATURE GRANS (ABS) 0.04 0.04  --  0.05 0.04 0.05   LYMPHS ABS 1.35 1.07  --  1.37 1.17 1.07   MONOS ABS 0.59 0.45  --  0.54  0.46 0.51   EOS ABS 0.16 0.17  --  0.14 0.17 0.19   MCV 91.2 88.6  --  88.5 90.6 89.3   LDH  --   --  173  --   --   --          Lab 12/12/21  0818 12/10/21  1212 12/09/21  1538 12/09/21  0814 12/08/21  0915 12/07/21  0658 12/06/21 2014 12/06/21  0840   SODIUM 138 141  --  138 138 138  --  140   POTASSIUM 3.7 3.4*  --  3.2* 3.4* 3.7   < > 3.3*   CHLORIDE 93* 95*  --  93* 93* 95*  --  92*   CO2 36.0* 39.0*  --  38.0* 37.0* 39.0*  --  38.0*   ANION GAP 9.0 7.0  --  7.0 8.0 4.0*  --  10.0   BUN 27* 25*  --  19 17 16  --  18   CREATININE 1.38* 1.41*  --  1.19* 1.12* 0.99  --  0.86   GLUCOSE 88 139*  --  82 96 84  --  112*   CALCIUM 9.8 9.3  --  9.6 10.0 9.8  --  10.2   MAGNESIUM 1.8  --   --   --   --   --   --   --    PHOSPHORUS  --   --  4.2  --  4.0 3.4  --  3.2    < > = values in this interval not displayed.         Lab 12/08/21  0915 12/07/21  0658 12/06/21  0840   ALBUMIN 2.40* 2.50* 2.70*                 Lab 12/12/21  1023   ABO TYPING O   RH TYPING Negative   ANTIBODY SCREEN Negative         Brief Urine Lab Results  (Last result in the past 365 days)      Color   Clarity   Blood   Leuk Est   Nitrite   Protein   CREAT   Urine HCG        12/03/21 1509 Red   Turbid   Large (3+)   Large (3+)   Negative   >=300 mg/dL (3+)                 Microbiology Results Abnormal     None          No radiology results from the last 24 hrs    Results for orders placed during the hospital encounter of 10/24/21    Adult Transthoracic Echo Complete W/ Cont if Necessary Per Protocol    Interpretation Summary  · Left ventricular ejection fraction appears to be 61 - 65%. Left ventricular systolic function is normal.  · Left ventricular diastolic function was normal.  · Estimated right ventricular systolic pressure from tricuspid regurgitation is normal (<35 mmHg).  · No vegetations seen.  · This is a technically difficult study.      I have reviewed the medications:  Scheduled Meds:amLODIPine, 5 mg, Oral, Q24H  senna-docusate sodium, 2  tablet, Oral, BID   And  bisacodyl, 10 mg, Rectal, Daily  buPROPion SR, 100 mg, Oral, Daily  castor oil-balsam peru, 1 application, Topical, Q12H  DAPTOmycin (CUBICIN)  IV, 8 mg/kg (Adjusted), Intravenous, Q24H  DULoxetine, 30 mg, Oral, QAM  enoxaparin, 40 mg, Subcutaneous, Q24H  hydrALAZINE, 50 mg, Oral, Q8H  lactobacillus acidophilus, 1 capsule, Oral, Daily  levETIRAcetam, 500 mg, Oral, Q12H  levothyroxine, 125 mcg, Oral, Daily  melatonin, 5 mg, Oral, Nightly  multivitamin, 1 tablet, Oral, Daily  Nutrisource fiber, 2 packet, Oral, TID  nystatin, , Topical, Q8H  pantoprazole, 40 mg, Oral, Daily  sodium chloride, 10 mL, Intravenous, Q12H      Continuous Infusions:   PRN Meds:.•  acetaminophen **OR** acetaminophen **OR** acetaminophen  •  senna-docusate sodium **AND** polyethylene glycol **AND** bisacodyl **AND** bisacodyl  •  calcium carbonate  •  cyclobenzaprine  •  ipratropium-albuterol  •  LORazepam  •  Morphine  •  ondansetron  •  ondansetron  •  oxyCODONE-acetaminophen  •  sodium chloride    Assessment/Plan   Assessment & Plan     Active Hospital Problems    Diagnosis  POA   • Uncontrolled pain [R52]  Yes   • Severe malnutrition (CMS/HCC) [E43]  Yes   • Spinal cord compression [G95.20]  Yes   • Seizures on Keppra [R56.9]  Yes   • Quadriparesis [G82.50]  Yes   • Polysubstance abuse [F19.10]  Yes   • MDD (major depressive disorder) [F32.9]  Yes   • SLE [M32.9]  Yes   • Hypothyroidism [E03.9]  Yes   • 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        Brief Hospital Course to date:  Karely Villarreal is a 56 y.o. female  with h/o polysubstance abuse, HCV, HTN, Sz d/o, SLE, Hypothyroidism, CVA, prior COVID infection and homelessness.    She was admitted to [facility] on 10/24/21 for sepsis d/t MRSA and was found to have a paravertebral abscess and was transferred to [facility] on 11/6.  Surgery was deferred initially but she  developed worsening UE weakness and MRI showed progressive cord compression so she was taken to the OR emergently for decompression on 11/14.    She was extubated 11/15 but TF's were started d/t poor PO.  She was transferred initially to telemetry on 11/8 but developed worsening hypoxia with AMS and was transferred back to the ICU on bipap on 11/20.  She was transferred back to the hospitalist service on 11/23    These problems are new to me today    This patient's problems and plans were partially entered by my partner and updated as appropriate by me 12/12/21.    MRSA bacteremia  Sepsis  T9/T10 Paravertebral Abscess, T9/T10 Osteomyelitis with spinal cord compression  --s/p decompression, cervical laminectomy and debridement of epidural abscess on 11/14  --on Vancomycin per ID, will need long course at least 12 weeks given the extent of her spinal infection and high grade bacteremia (starting at time of surgery on 11/14)  --repeat MRI C and T spine showed shows 2 fluid collections dorsally (one at C4 and the other more superficial at C6/C7) and T9/T10 still c/w vertebral osteomyelitis.  Re-evaluated by Dr. Jama who noted severe phlegmon circumferentially around cervical and upper thoracic area but no worsening compression, rec'd continue abx, no surgery needed at this time      Anemia  Hematuria   DOMO  --Hgb 7.0  --Continues to drop  - fob negative  --Transfuse 1 unit PRBC today  -s/p 1 unit PRBC on 12/3  -Iron 19, Iron sat 7   -Received dose of IV iron   --Suspect related to lupus , she had hematuria but it has mostly resolved   - urology consulted , follow up with DR Gardner outpatient   - nephrology following, lupus nephritis   -urine culture with urogenital batool, hold abx for UTI treatment     Acute Respiratory failure  Right Pleural Effusion  Probable Bacterial PNA  --improved  --tolerating daily PO Lasix well   -- s/p 7 days of merrem and doxy per ID     Malnutrition  --Status post Dobbhoff with tube  feeds     Diarrhea  --s/p rectal tube, improved  --Wound care following for skin breakdown on gluteal tissue  --Continue fiber supplement      Bilateral Knee Pain  --Bilateral xrays showed large bilateral knee effusions, moderate osteoarthrosis worse in patellofemoral compartments  -PT/OT      LUE Edema  -LUE venous duplex on 11/7 negative for evidence of acute DVT  -Elevate extremity   -Repeat venous duplex 12/3 negative for DVT     HX Seizure Disorder  --continue keppra     Recent COVID PNA     Acute Right Axilla abscess  --culture positive for MRSA, continue vanc D8 - ID following    - IV vanc for 12 weeks through 2/7 at least     Polysubstance abuse  --UDS positive for meth/opiates, self medicating at [facility] with klonopin and oxycodone and went into respiratory depression     SLE/Discoid lupus  --holding plaquenil due to severity of active infection, but suspect possible lupus nephritis v other GN with ongoing infection and off maintenance immunosuppression     Insomnia   -Melatonin PRN      Hx CVA  - PT/OT    DVT prophylaxis:  Medical DVT prophylaxis orders are present.       AM-PAC 6 Clicks Score (PT): 6 (12/11/21 1413)    Disposition: I expect the patient to be discharged to [facility] tomorrow at 1100    CODE STATUS:   Code Status and Medical Interventions:   Ordered at: 12/11/21 4725     Code Status (Patient has no pulse and is not breathing):    CPR (Attempt to Resuscitate)     Medical Interventions (Patient has pulse or is breathing):    Full Support       Heather Theodore, APRN  12/12/21

## 2021-12-13 PROBLEM — N39.0 ACUTE UTI (URINARY TRACT INFECTION): Status: ACTIVE | Noted: 2021-12-13

## 2021-12-13 LAB
ALBUMIN SERPL-MCNC: 2.7 G/DL (ref 3.5–5.2)
ALBUMIN/GLOB SERPL: 0.7 G/DL
ALP SERPL-CCNC: 180 U/L (ref 39–117)
ALT SERPL W P-5'-P-CCNC: 13 U/L (ref 1–33)
ANION GAP SERPL CALCULATED.3IONS-SCNC: 7 MMOL/L (ref 5–15)
AST SERPL-CCNC: 20 U/L (ref 1–32)
BACTERIA UR QL AUTO: ABNORMAL /HPF
BH BB BLOOD EXPIRATION DATE: NORMAL
BH BB BLOOD EXPIRATION DATE: NORMAL
BH BB BLOOD TYPE BARCODE: 9500
BH BB BLOOD TYPE BARCODE: 9500
BH BB DISPENSE STATUS: NORMAL
BH BB DISPENSE STATUS: NORMAL
BH BB PRODUCT CODE: NORMAL
BH BB PRODUCT CODE: NORMAL
BH BB UNIT NUMBER: NORMAL
BH BB UNIT NUMBER: NORMAL
BILIRUB SERPL-MCNC: 0.3 MG/DL (ref 0–1.2)
BILIRUB UR QL STRIP: NEGATIVE
BUN SERPL-MCNC: 30 MG/DL (ref 6–20)
BUN/CREAT SERPL: 21.3 (ref 7–25)
CALCIUM SPEC-SCNC: 9.7 MG/DL (ref 8.6–10.5)
CHLORIDE SERPL-SCNC: 94 MMOL/L (ref 98–107)
CLARITY UR: ABNORMAL
CO2 SERPL-SCNC: 36 MMOL/L (ref 22–29)
COLOR UR: ABNORMAL
CREAT SERPL-MCNC: 1.41 MG/DL (ref 0.57–1)
CROSSMATCH INTERPRETATION: NORMAL
CROSSMATCH INTERPRETATION: NORMAL
DEPRECATED RDW RBC AUTO: 53.2 FL (ref 37–54)
ERYTHROCYTE [DISTWIDTH] IN BLOOD BY AUTOMATED COUNT: 16.6 % (ref 12.3–15.4)
GFR SERPL CREATININE-BSD FRML MDRD: 39 ML/MIN/1.73
GLOBULIN UR ELPH-MCNC: 3.9 GM/DL
GLUCOSE SERPL-MCNC: 109 MG/DL (ref 65–99)
GLUCOSE UR STRIP-MCNC: NEGATIVE MG/DL
HCT VFR BLD AUTO: 28.4 % (ref 34–46.6)
HGB BLD-MCNC: 8.9 G/DL (ref 12–15.9)
HGB UR QL STRIP.AUTO: ABNORMAL
HYALINE CASTS UR QL AUTO: ABNORMAL /LPF
KETONES UR QL STRIP: NEGATIVE
LEUKOCYTE ESTERASE UR QL STRIP.AUTO: ABNORMAL
MAGNESIUM SERPL-MCNC: 1.8 MG/DL (ref 1.6–2.6)
MCH RBC QN AUTO: 27.2 PG (ref 26.6–33)
MCHC RBC AUTO-ENTMCNC: 31.3 G/DL (ref 31.5–35.7)
MCV RBC AUTO: 86.9 FL (ref 79–97)
NITRITE UR QL STRIP: NEGATIVE
PH UR STRIP.AUTO: 7.5 [PH] (ref 5–8)
PLATELET # BLD AUTO: 263 10*3/MM3 (ref 140–450)
PMV BLD AUTO: 8.2 FL (ref 6–12)
POTASSIUM SERPL-SCNC: 3.5 MMOL/L (ref 3.5–5.2)
PROT SERPL-MCNC: 6.6 G/DL (ref 6–8.5)
PROT UR QL STRIP: ABNORMAL
RBC # BLD AUTO: 3.27 10*6/MM3 (ref 3.77–5.28)
RBC # UR STRIP: ABNORMAL /HPF
REF LAB TEST METHOD: ABNORMAL
SODIUM SERPL-SCNC: 137 MMOL/L (ref 136–145)
SP GR UR STRIP: 1.01 (ref 1–1.03)
SQUAMOUS #/AREA URNS HPF: ABNORMAL /HPF
UNIT  ABO: NORMAL
UNIT  ABO: NORMAL
UNIT  RH: NORMAL
UNIT  RH: NORMAL
UROBILINOGEN UR QL STRIP: ABNORMAL
WBC # UR STRIP: ABNORMAL /HPF
WBC NRBC COR # BLD: 8 10*3/MM3 (ref 3.4–10.8)

## 2021-12-13 PROCEDURE — 99231 SBSQ HOSP IP/OBS SF/LOW 25: CPT | Performed by: NURSE PRACTITIONER

## 2021-12-13 PROCEDURE — 85027 COMPLETE CBC AUTOMATED: CPT | Performed by: NURSE PRACTITIONER

## 2021-12-13 PROCEDURE — 97110 THERAPEUTIC EXERCISES: CPT

## 2021-12-13 PROCEDURE — 25010000002 DAPTOMYCIN PER 1 MG: Performed by: FAMILY MEDICINE

## 2021-12-13 PROCEDURE — 97168 OT RE-EVAL EST PLAN CARE: CPT

## 2021-12-13 PROCEDURE — 83735 ASSAY OF MAGNESIUM: CPT | Performed by: NURSE PRACTITIONER

## 2021-12-13 PROCEDURE — 81001 URINALYSIS AUTO W/SCOPE: CPT | Performed by: NURSE PRACTITIONER

## 2021-12-13 PROCEDURE — 25010000002 ONDANSETRON PER 1 MG: Performed by: EMERGENCY MEDICINE

## 2021-12-13 PROCEDURE — 25010000002 CEFTRIAXONE PER 250 MG: Performed by: NURSE PRACTITIONER

## 2021-12-13 PROCEDURE — 80053 COMPREHEN METABOLIC PANEL: CPT | Performed by: NURSE PRACTITIONER

## 2021-12-13 PROCEDURE — 97164 PT RE-EVAL EST PLAN CARE: CPT

## 2021-12-13 PROCEDURE — 63710000001 ONDANSETRON PER 8 MG: Performed by: FAMILY MEDICINE

## 2021-12-13 PROCEDURE — 25010000002 ENOXAPARIN PER 10 MG: Performed by: FAMILY MEDICINE

## 2021-12-13 RX ADMIN — ONDANSETRON HYDROCHLORIDE 4 MG: 4 TABLET, FILM COATED ORAL at 09:58

## 2021-12-13 RX ADMIN — Medication 1 CAPSULE: at 09:09

## 2021-12-13 RX ADMIN — OXYCODONE HYDROCHLORIDE AND ACETAMINOPHEN 1 TABLET: 5; 325 TABLET ORAL at 17:33

## 2021-12-13 RX ADMIN — Medication 5 MG: at 20:22

## 2021-12-13 RX ADMIN — CASTOR OIL AND BALSAM, PERU 1 APPLICATION: 788; 87 OINTMENT TOPICAL at 09:10

## 2021-12-13 RX ADMIN — LORAZEPAM 1 MG: 1 TABLET ORAL at 13:25

## 2021-12-13 RX ADMIN — NYSTATIN: 100000 POWDER TOPICAL at 13:25

## 2021-12-13 RX ADMIN — Medication 2 PACKET: at 17:33

## 2021-12-13 RX ADMIN — HYDRALAZINE HYDROCHLORIDE 50 MG: 50 TABLET, FILM COATED ORAL at 13:25

## 2021-12-13 RX ADMIN — DOCUSATE SODIUM 50MG AND SENNOSIDES 8.6MG 2 TABLET: 8.6; 5 TABLET, FILM COATED ORAL at 20:22

## 2021-12-13 RX ADMIN — ONDANSETRON 4 MG: 2 INJECTION INTRAMUSCULAR; INTRAVENOUS at 01:46

## 2021-12-13 RX ADMIN — LEVETIRACETAM 500 MG: 500 TABLET, FILM COATED ORAL at 20:21

## 2021-12-13 RX ADMIN — ENOXAPARIN SODIUM 40 MG: 40 INJECTION SUBCUTANEOUS at 17:40

## 2021-12-13 RX ADMIN — PANTOPRAZOLE SODIUM 40 MG: 40 TABLET, DELAYED RELEASE ORAL at 05:35

## 2021-12-13 RX ADMIN — LEVETIRACETAM 500 MG: 500 TABLET, FILM COATED ORAL at 09:09

## 2021-12-13 RX ADMIN — LORAZEPAM 1 MG: 1 TABLET ORAL at 05:39

## 2021-12-13 RX ADMIN — BUPROPION HYDROCHLORIDE 100 MG: 100 TABLET, FILM COATED, EXTENDED RELEASE ORAL at 09:09

## 2021-12-13 RX ADMIN — BISACODYL 10 MG: 10 SUPPOSITORY RECTAL at 09:09

## 2021-12-13 RX ADMIN — MULTIVITAMIN TABLET 1 TABLET: TABLET at 09:09

## 2021-12-13 RX ADMIN — DAPTOMYCIN 550 MG: 500 INJECTION, POWDER, LYOPHILIZED, FOR SOLUTION INTRAVENOUS at 17:40

## 2021-12-13 RX ADMIN — LEVOTHYROXINE SODIUM 125 MCG: 125 TABLET ORAL at 05:35

## 2021-12-13 RX ADMIN — LORAZEPAM 1 MG: 1 TABLET ORAL at 20:22

## 2021-12-13 RX ADMIN — AMLODIPINE BESYLATE 5 MG: 5 TABLET ORAL at 09:09

## 2021-12-13 RX ADMIN — NYSTATIN: 100000 POWDER TOPICAL at 05:36

## 2021-12-13 RX ADMIN — DULOXETINE HYDROCHLORIDE 30 MG: 30 CAPSULE, DELAYED RELEASE ORAL at 09:09

## 2021-12-13 RX ADMIN — OXYCODONE HYDROCHLORIDE AND ACETAMINOPHEN 1 TABLET: 5; 325 TABLET ORAL at 09:09

## 2021-12-13 RX ADMIN — Medication 2 PACKET: at 20:21

## 2021-12-13 RX ADMIN — CASTOR OIL AND BALSAM, PERU 1 APPLICATION: 788; 87 OINTMENT TOPICAL at 20:22

## 2021-12-13 RX ADMIN — SODIUM CHLORIDE, PRESERVATIVE FREE 10 ML: 5 INJECTION INTRAVENOUS at 09:09

## 2021-12-13 RX ADMIN — HYDRALAZINE HYDROCHLORIDE 50 MG: 50 TABLET, FILM COATED ORAL at 05:35

## 2021-12-13 RX ADMIN — SODIUM CHLORIDE 1 G: 900 INJECTION INTRAVENOUS at 09:10

## 2021-12-13 RX ADMIN — ONDANSETRON HYDROCHLORIDE 4 MG: 4 TABLET, FILM COATED ORAL at 20:25

## 2021-12-13 RX ADMIN — OXYCODONE HYDROCHLORIDE AND ACETAMINOPHEN 1 TABLET: 5; 325 TABLET ORAL at 01:00

## 2021-12-13 RX ADMIN — DOCUSATE SODIUM 50MG AND SENNOSIDES 8.6MG 2 TABLET: 8.6; 5 TABLET, FILM COATED ORAL at 09:09

## 2021-12-13 RX ADMIN — Medication 2 PACKET: at 09:10

## 2021-12-13 RX ADMIN — SODIUM CHLORIDE, PRESERVATIVE FREE 10 ML: 5 INJECTION INTRAVENOUS at 20:21

## 2021-12-13 RX ADMIN — HYDRALAZINE HYDROCHLORIDE 50 MG: 50 TABLET, FILM COATED ORAL at 20:21

## 2021-12-13 RX ADMIN — NYSTATIN: 100000 POWDER TOPICAL at 20:23

## 2021-12-13 RX ADMIN — POLYETHYLENE GLYCOL 3350 17 G: 17 POWDER, FOR SOLUTION ORAL at 09:09

## 2021-12-13 NOTE — PLAN OF CARE
Problem: Adult Inpatient Plan of Care  Goal: Plan of Care Review  Flowsheets (Taken 12/13/2021 5511)  Progress: no change  Plan of Care Reviewed With: patient  Outcome Summary:   PT re-eval complete. Pt requiring mod A for rolling R/L in bed for sling placement. Attempted mechanical lift transfer, however, lift malfunctioning at this time   not appropriate for ambulatory transfer due to significant LE weakness and pain. Reviewed HEP. Pt to be seen 3x/week, pending improvement with functional mobility. Recommend pt d/c to long term acute care facility when appropriate.   Goal Outcome Evaluation:  Plan of Care Reviewed With: patient        Progress: no change  Outcome Summary: PT re-eval complete. Pt requiring mod A for rolling R/L in bed for sling placement. Attempted mechanical lift transfer, however, lift malfunctioning at this time; not appropriate for ambulatory transfer due to significant LE weakness and pain. Reviewed HEP. Pt to be seen 3x/week, pending improvement with functional mobility. Recommend pt d/c to long term acute care facility when appropriate.

## 2021-12-13 NOTE — NURSING NOTE
Daily Low Terrence <=14    Terrence score: 14    At this time the patient's RN reports no new skin issues or needs. MASD on bottom Interventions in place. Patient is helping turn herself. Continue to provide good general skin care. Apply barrier cream daily/ PRN. Keep patient dry and turn regularly.  Elevate and offload heels. The patient is on a  [id number]  mattress at this time.     Please contact [facility] if new skin issues arise.

## 2021-12-13 NOTE — PROGRESS NOTES
[facility] Medicine Services  PROGRESS NOTE    Patient Name: Karely Villarreal  : 1965  MRN: [id number]    Date of Admission: 2021  Primary Care Physician: Tracie Levi APRN    Subjective   Subjective   CC:  F/u Sepsis, abscess    HPI:  Patient sitting up in bed stating she is very tired today.  Also complaining of her lower extremities and feet hurting.  Ate very little breakfast.    ROS:  Gen- No fevers, chills  CV- No chest pain, palpitations  Resp- No cough, dyspnea  GI- No N/V/D, abd pain  Ext- +LE pain  All other systems have been reviewed and the pertinent positives and negatives are listed above in the HPI and ROS    Objective   Objective   Vital Signs:   Temp:  [97.6 °F (36.4 °C)-98.6 °F (37 °C)] 97.7 °F (36.5 °C)  Heart Rate:  [] 106  Resp:  [16-18] 18  BP: (120-149)/(71-93) 149/93  Flow (L/min):  [2] 2     Physical Exam:  Constitutional: Alert, ill-appearing female lying in bed in NAD  Eyes: EOMI, sclerae anicteric, no conjunctival injection  Head: NCAT  ENT: [place], moist mucous membranes   Respiratory: Nonlabored, symmetrical chest expansion, CTAB, 96% 2L  Cardiovascular: RRR, , no M/R/G, +DP pulses bilaterally  Gastrointestinal: Soft, NT, ND +BS  Musculoskeletal: ÁLVAREZ; +LE edema bilaterally  Neurologic: Oriented x4, strength symmetric in all extremities, follows all commands, speech clear  Skin: No rashes on exposed skin  Psychiatric: Pleasant and cooperative; normal affect    Results Reviewed:  LAB RESULTS:      Lab 21  0535 21  0818 12/10/21  1212 21  1538 21  0814 21  0915 21  0658 21  0658   WBC 8.00 6.64 6.92  --  6.49 6.19   < > 6.75   HEMOGLOBIN 8.9* 7.0* 7.5*  --  7.5* 8.0*   < > 8.1*   HEMATOCRIT 28.4* 23.9* 24.8*  --  24.6* 27.0*   < > 27.5*   PLATELETS 263 246 206  --  214 237   < > 235   NEUTROS ABS  --  4.47 5.16  --  4.34 4.30  --  4.89   IMMATURE GRANS (ABS)  --  0.04 0.04  --  0.05 0.04  --   0.05   LYMPHS ABS  --  1.35 1.07  --  1.37 1.17  --  1.07   MONOS ABS  --  0.59 0.45  --  0.54 0.46  --  0.51   EOS ABS  --  0.16 0.17  --  0.14 0.17  --  0.19   MCV 86.9 91.2 88.6  --  88.5 90.6   < > 89.3   LDH  --   --   --  173  --   --   --   --     < > = values in this interval not displayed.         Lab 12/13/21  0535 12/12/21 0818 12/10/21  1212 12/09/21  1538 12/09/21  0814 12/08/21 0915 12/07/21 0658 12/07/21 0658 12/06/21 2014 12/06/21  0840   SODIUM 137 138 141  --  138 138   < > 138  --  140   POTASSIUM 3.5 3.7 3.4*  --  3.2* 3.4*   < > 3.7   < > 3.3*   CHLORIDE 94* 93* 95*  --  93* 93*   < > 95*  --  92*   CO2 36.0* 36.0* 39.0*  --  38.0* 37.0*   < > 39.0*  --  38.0*   ANION GAP 7.0 9.0 7.0  --  7.0 8.0   < > 4.0*  --  10.0   BUN 30* 27* 25*  --  19 17   < > 16  --  18   CREATININE 1.41* 1.38* 1.41*  --  1.19* 1.12*   < > 0.99  --  0.86   GLUCOSE 109* 88 139*  --  82 96   < > 84  --  112*   CALCIUM 9.7 9.8 9.3  --  9.6 10.0   < > 9.8  --  10.2   MAGNESIUM 1.8 1.8  --   --   --   --   --   --   --   --    PHOSPHORUS  --   --   --  4.2  --  4.0  --  3.4  --  3.2    < > = values in this interval not displayed.         Lab 12/13/21 0535 12/08/21  0915 12/07/21 0658 12/06/21  0840   TOTAL PROTEIN 6.6  --   --   --    ALBUMIN 2.70* 2.40* 2.50* 2.70*   GLOBULIN 3.9  --   --   --    ALT (SGPT) 13  --   --   --    AST (SGOT) 20  --   --   --    BILIRUBIN 0.3  --   --   --    ALK PHOS 180*  --   --   --                  Lab 12/12/21  1023   ABO TYPING O   RH TYPING Negative   ANTIBODY SCREEN Negative         Brief Urine Lab Results  (Last result in the past 365 days)      Color   Clarity   Blood   Leuk Est   Nitrite   Protein   CREAT   Urine HCG        12/13/21 0116 Red   Cloudy   Large (3+)   Large (3+)   Negative   >=300 mg/dL (3+)                 Microbiology Results Abnormal     None          No radiology results from the last 24 hrs    Results for orders placed during the hospital encounter of  10/24/21    Adult Transthoracic Echo Complete W/ Cont if Necessary Per Protocol    Interpretation Summary  · Left ventricular ejection fraction appears to be 61 - 65%. Left ventricular systolic function is normal.  · Left ventricular diastolic function was normal.  · Estimated right ventricular systolic pressure from tricuspid regurgitation is normal (<35 mmHg).  · No vegetations seen.  · This is a technically difficult study.      I have reviewed the medications:  Scheduled Meds:amLODIPine, 5 mg, Oral, Q24H  senna-docusate sodium, 2 tablet, Oral, BID   And  bisacodyl, 10 mg, Rectal, Daily  buPROPion SR, 100 mg, Oral, Daily  castor oil-balsam peru, 1 application, Topical, Q12H  cefTRIAXone, 1 g, Intravenous, Q24H  DAPTOmycin (CUBICIN)  IV, 8 mg/kg (Adjusted), Intravenous, Q24H  DULoxetine, 30 mg, Oral, QAM  enoxaparin, 40 mg, Subcutaneous, Q24H  hydrALAZINE, 50 mg, Oral, Q8H  lactobacillus acidophilus, 1 capsule, Oral, Daily  levETIRAcetam, 500 mg, Oral, Q12H  levothyroxine, 125 mcg, Oral, Daily  melatonin, 5 mg, Oral, Nightly  multivitamin, 1 tablet, Oral, Daily  Nutrisource fiber, 2 packet, Oral, TID  nystatin, , Topical, Q8H  pantoprazole, 40 mg, Oral, Daily  sodium chloride, 10 mL, Intravenous, Q12H      Continuous Infusions:   PRN Meds:.•  acetaminophen **OR** acetaminophen **OR** acetaminophen  •  senna-docusate sodium **AND** polyethylene glycol **AND** bisacodyl **AND** bisacodyl  •  calcium carbonate  •  cyclobenzaprine  •  ipratropium-albuterol  •  LORazepam  •  Morphine  •  ondansetron  •  ondansetron  •  oxyCODONE-acetaminophen  •  sodium chloride    Assessment/Plan   Assessment & Plan     Active Hospital Problems    Diagnosis  POA   • Acute UTI (urinary tract infection) [N39.0]  Yes   • Uncontrolled pain [R52]  Yes   • Severe malnutrition (CMS/HCC) [E43]  Yes   • Spinal cord compression [G95.20]  Yes   • Seizures on Keppra [R56.9]  Yes   • Quadriparesis [G82.50]  Yes   • Polysubstance abuse [F19.10]   Yes   • MDD (major depressive disorder) [F32.9]  Yes   • SLE [M32.9]  Yes   • Hypothyroidism [E03.9]  Yes   • 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        Brief Hospital Course to date:  Karely Villarreal is a 56 y.o. female  with h/o polysubstance abuse, HCV, HTN, Sz d/o, SLE, Hypothyroidism, CVA, prior COVID infection and homelessness.    She was admitted to [facility] on 10/24/21 for sepsis d/t MRSA and was found to have a paravertebral abscess and was transferred to [facility] on 11/6.  Surgery was deferred initially but she developed worsening UE weakness and MRI showed progressive cord compression so she was taken to the OR emergently for decompression on 11/14.    She was extubated 11/15 but TF's were started d/t poor PO.  She was transferred initially to telemetry on 11/8 but developed worsening hypoxia with AMS and was transferred back to the ICU on bipap on 11/20.  She was transferred back to the hospitalist service on 11/23    MRSA bacteremia  Sepsis  T9/T10 Paravertebral Abscess, T9/T10 Osteomyelitis with spinal cord compression  --s/p decompression, cervical laminectomy and debridement of epidural abscess on 11/14  --on Vancomycin per ID, will need long course at least 12 weeks given the extent of her spinal infection and high grade bacteremia (starting at time of surgery on 11/14)  --repeat MRI C and T spine showed shows 2 fluid collections dorsally (one at C4 and the other more superficial at C6/C7) and T9/T10 still c/w vertebral osteomyelitis.  Re-evaluated by Dr. Jama who noted severe phlegmon circumferentially around cervical and upper thoracic area but no worsening compression, rec'd continue abx, no surgery needed at this time      Anemia  Hematuria   DOMO  --Hgb 8.9; Cr 1.41-stable  --Continues to drop  - fob negative  --Transfuse 1 unit PRBC today  -s/p 1 unit PRBC on 12/3  -Iron 19, Iron sat 7   -Received  "dose of IV iron   --Suspect related to lupus , she had hematuria but it has mostly resolved   - urology consulted , follow up with DR Gardner outpatient   - nephrology following, lupus nephritis   -urine culture with urogenital batool, hold abx for UTI treatment  --check labs 12/15    UTI  --Positive upon readmission  --IV Rocephin x5d     Acute Respiratory failure  Right Pleural Effusion  Probable Bacterial PNA  --improved  --tolerating daily PO Lasix well   -- s/p 7 days of merrem and doxy per ID     Malnutrition  --Status post Dobbhoff with tube feeds     Diarrhea  --s/p rectal tube, improved  --Wound care following for skin breakdown on gluteal tissue  --Continue fiber supplement      Bilateral Knee Pain  --Bilateral xrays showed large bilateral knee effusions, moderate osteoarthrosis worse in patellofemoral compartments  -PT/OT      LUE Edema  -LUE venous duplex on 11/7 negative for evidence of acute DVT  -Elevate extremity   -Repeat venous duplex 12/3 negative for DVT     HX Seizure Disorder  --continue keppra     Recent COVID PNA     Acute Right Axilla abscess  --culture positive for MRSA, continue vanc D8 - ID following    - IV vanc for 12 weeks through 2/7 at least     Polysubstance abuse  --UDS positive for meth/opiates, self medicating at [facility] with klonopin and oxycodone and went into respiratory depression     SLE/Discoid lupus  --holding plaquenil due to severity of active infection, but suspect possible lupus nephritis v other GN with ongoing infection and off maintenance immunosuppression     Insomnia   -Melatonin PRN      Hx CVA  - PT/OT    DVT prophylaxis:  Medical DVT prophylaxis orders are present.       AM-PAC 6 Clicks Score (PT): 6 (12/11/21 6833)    Disposition: Pateint was returned to hospital mid transport to [facility] d/t \"pain\"; CM to attempt to get to [facility] again    CODE STATUS:   Code Status and Medical Interventions:   Ordered at: 12/11/21 8556     Code Status (Patient has no pulse and is " not breathing):    CPR (Attempt to Resuscitate)     Medical Interventions (Patient has pulse or is breathing):    Full Support       Heather Theodore, APRN  12/13/21

## 2021-12-13 NOTE — THERAPY RE-EVALUATION
Patient Name: Karely Villarreal  : 1965    MRN: [id number]                              Today's Date: 2021       Admit Date: 2021    Visit Dx:     ICD-10-CM ICD-9-CM   1. Uncontrolled pain  R52 780.96   2. Vertebral abscess (HCC)  M46.20 730.08     Patient Active Problem List   Diagnosis   • Depression with suicidal ideation   • SLE   • Hypertension   • Hypothyroidism   • Severe recurrent major depression without psychotic features (HCC)   • KEREN (generalized anxiety disorder)   • Abscess of axilla, right   • MDD (major depressive disorder)   • Cytokine release syndrome, grade 2   • Pleural effusion, right   • Osteomyelitis of thoracic vertebra   • Paraspinal abscess   • Polysubstance abuse   • Quadriparesis   • Spinal cord compression   • COVID-19 virus detected   • MRSA bacteremia   • Hepatitis C   • Seizures on Keppra   • History of CVA   • Acute postoperative respiratory insufficiency   • Severe malnutrition (CMS/HCC)   • Uncontrolled pain   • Acute UTI (urinary tract infection)     Past Medical History:   Diagnosis Date   • Anxiety    • Brain tumor (HCC)    • Chronic headache    • Depression    • Diastolic CHF, chronic (HCC)    • DVT (deep venous thrombosis) (HCC)     left leg   • Encephalitis 2016    treated at the [facility]   • Epilepsy (HCC)    • Gastric ulcer with perforation (HCC) 2016    Microperforation and air in the biliary tree   • Gastritis    • Heart disease    • Henoch-Schonlein purpura (HCC)    • History of transfusion    • Hypertension    • Hypothyroidism (acquired)     Removed due to groiter   • Kidney disease    • Lower GI bleeding    • Lupus (HCC)    • Memory disorder    • Migraine    • Mixed connective tissue disease (HCC)    • MRSA cellulitis    • Panic disorder    • Patent foramen ovale    • Pneumonia    • PTSD (post-traumatic stress disorder)     trauma from 911   • PVC (premature ventricular contraction)    • RA (rheumatoid arthritis) (HCC)    • Renal  disorder    • Rhabdomyolysis    • Seizures (HCC)     when had encephalitis   • Sjogren's syndrome (HCC)    • Stroke (HCC) 09/2015    x 1   • Systemic lupus erythematosus (HCC)     Discoid and systemic   • Temporal arteritis (HCC)    • Thyroid disease    • TIA (transient ischemic attack)     x 3   • Ulcer, stomach peptic, chronic      Past Surgical History:   Procedure Laterality Date   • APPENDECTOMY     • CARDIAC CATHETERIZATION  2016    PFO repair and had a loop monitor placed at the [facility]   • CARDIAC SURGERY     • CENTRAL VENOUS LINE INSERTION N/A 10/28/2021    Procedure: Placement of central line;  Surgeon: Ruma Madden MD;  Location: [facility] OR;  Service: General;  Laterality: N/A;   • CERVICAL LAMINECTOMY DECOMPRESSION POSTERIOR Bilateral 2021    Procedure: CERVICAL LAMINECTOMY DECOMPRESSION POSTERIOR C3-6;  Surgeon: Destin Jama MD;  Location: [facility] OR;  Service: Neurosurgery;  Laterality: Bilateral;   •  SECTION      x 2   • ENDOSCOPY     • FACIAL RECONSTRUCTION SURGERY      clean out MRSA    • INCISION AND DRAINAGE ABSCESS Right 2019    Procedure: INCISION AND DRAINAGE ABSCESS RIGHT AXILLA;  Surgeon: Zak Martin MD;  Location: [facility] OR;  Service: General   • KNEE ARTHROSCOPY Left    • LUMBAR FUSION     • PORTACATH PLACEMENT Right 2016   • THYROID SURGERY      Removed due to a goiter   • VENOUS ACCESS DEVICE (PORT) REMOVAL N/A 10/28/2021    Procedure: Removal of [id number].;  Surgeon: Ruma Madden MD;  Location: [facility] OR;  Service: General;  Laterality: N/A;      General Information     Row Name 21 1047          OT Time and Intention    Document Type re-evaluation  -HK     Mode of Treatment occupational therapy  -     Row Name 21 1047          General Information    Patient Profile Reviewed yes  -HK     Prior Level of Function dependent:; all household mobility; gait; transfer; bed mobility; max assist:; ADL's  since  last admit in october  -HK     Existing Precautions/Restrictions fall; spinal; oxygen therapy device and L/min; seizures  -HK     Barriers to Rehab medically complex; previous functional deficit  -HK     Row Name 12/13/21 1047          Living Environment    Lives With --  please see IE  -HK     Row Name 12/13/21 1047          Cognition    Orientation Status (Cognition) oriented x 3  -HK     Row Name 12/13/21 1047          Safety Issues, Functional Mobility    Safety Issues Affecting Function (Mobility) safety precaution awareness; safety precautions follow-through/compliance; insight into deficits/self-awareness; at risk behavior observed; friction/shear risk; judgment; problem-solving; sequencing abilities; awareness of need for assistance  -HK     Impairments Affecting Function (Mobility) pain; strength; endurance/activity tolerance; postural/trunk control; range of motion (ROM); balance; grasp; motor control  -HK     Cognitive Impairments, Mobility Safety/Performance attention; awareness, need for assistance; insight into deficits/self-awareness; problem-solving/reasoning; judgment; safety precaution awareness; safety precaution follow-through; sequencing abilities  -HK           User Key  (r) = Recorded By, (t) = Taken By, (c) = Cosigned By    Initials Name Provider Type    HK Oxana Alcaraz, OT Occupational Therapist                 Mobility/ADL's     Row Name 12/13/21 1125          Bed Mobility    Bed Mobility rolling right; rolling left  -HK     Rolling Left [place] (Bed Mobility) verbal cues; moderate assist (50% patient effort)  -HK     Rolling Right [place] (Bed Mobility) verbal cues; moderate assist (50% patient effort)  -HK     Scooting/Bridging [place] (Bed Mobility) dependent (less than 25% patient effort); verbal cues; 2 person assist  -HK     Bed Mobility, Safety Issues decreased use of arms for pushing/pulling; decreased use of legs for bridging/pushing; impaired trunk control for bed  mobility; cognitive deficits limit understanding  -     Assistive Device (Bed Mobility) draw sheet; bed rails  -     Comment (Bed Mobility) Pt rolled L to R for sling placement for safe bed to chair transfer. However once sling placed Lift noted not to be working. Unable to safely transfer pt this date.  -[facility] Name 12/13/21 1125          Transfers    Comment (Transfers) Unable to complete due to lift malfunction.  -[facility] Name 12/13/21 1125          Self-Feeding Assessment/Training    [place] Level (Feeding) prepare tray/open items; maximum assist (25% patient effort); scoop food and bring to mouth; minimum assist (75% patient effort)  -     Position (Self-Feeding) supported sitting  -           User Key  (r) = Recorded By, (t) = Taken By, (c) = Cosigned By    Initials Name Provider Type     Oxana Alcaraz, OT Occupational Therapist               Obj/Interventions     [facility] Name 12/13/21 1126          Sensory Assessment (Somatosensory)    Sensory Assessment (Somatosensory) sensation intact  -[facility] Name 12/13/21 1126          Range of Motion Comprehensive    General Range of Motion upper extremity range of motion deficits identified  -     Comment, General Range of Motion L UE edema inhibits ROM. RUE ROM WFL.  -[facility] Name 12/13/21 1126          Strength Comprehensive (MMT)    General Manual Muscle Testing (MMT) Assessment upper extremity strength deficits identified  -[facility] Name 12/13/21 1126          Upper Extremity (Manual Muscle Testing)    Upper Extremity: Manual Muscle Testing (MMT) right shoulder strength deficit; left shoulder strength deficit  -[facility] Name 12/13/21 1126          Shoulder (Therapeutic Exercise)    Shoulder AAROM (Therapeutic Exercise) left; flexion; extension; 10 repetitions  -[facility] Name 12/13/21 1126          Elbow/Forearm (Therapeutic Exercise)    Elbow/Forearm (Therapeutic Exercise) AAROM (active assistive range of motion)  -      Elbow/Forearm AAROM (Therapeutic Exercise) left; flexion; extension; supination; pronation; 10 repetitions  -     Row Name 12/13/21 1126          Wrist (Therapeutic Exercise)    Wrist (Therapeutic Exercise) AROM (active range of motion)  -HK     Wrist AROM (Therapeutic Exercise) left; flexion; 10 repetitions  -     Row Name 12/13/21 1126          Hand (Therapeutic Exercise)    Hand (Therapeutic Exercise) AROM (active range of motion)  -HK     Hand AROM/AAROM (Therapeutic Exercise) left; AROM (active range of motion); finger flexion; finger extension  noted improvement in finger ROM  -     Row Name 12/13/21 1126          Motor Skills    Motor Skills coordination  -     Coordination fine motor deficit; gross motor deficit; bilateral; upper extremity  -     Row Name 12/13/21 1126          Therapeutic Exercise    Therapeutic Exercise shoulder; elbow/forearm; wrist; hand  -     Row Name 12/13/21 1126          Right Shoulder (Manual Muscle Testing)    Right Shoulder Manual Muscle Testing (MMT) flexion  -HK     MMT: Flexion, Right Shoulder flexion  -HK     MMT, Gross Movement: Right Shoulder Flexion (4-/5) good minus  -HK     Row Name 12/13/21 1126          Left Shoulder (Manual Muscle Testing)    Left Shoulder Manual Muscle Testing (MMT) flexion  -HK     MMT: Flexion, Left Shoulder flexion  -HK     MMT, Gross Movement: Left Shoulder Flexion (3-/5) fair minus  -           User Key  (r) = Recorded By, (t) = Taken By, (c) = Cosigned By    Initials Name Provider Type    HK Oxana Alcraaz, OT Occupational Therapist               Goals/Plan     Row Name 12/13/21 1139          Bed Mobility Goal 1 (OT)    Activity/Assistive Device (Bed Mobility Goal 1, OT) sit to supine/supine to sit; scooting; rolling to right; rolling to left  -HK     [place] Level/Cues Needed (Bed Mobility Goal 1, OT) minimum assist (75% or more patient effort); verbal cues required  -HK     Time Frame (Bed Mobility Goal 1, OT) by discharge;  long term goal (LTG)  -HK     Progress/Outcomes (Bed Mobility Goal 1, OT) goal ongoing  -     Row Name 12/13/21 1139          Self-Feeding Goal 1 (OT)    Activity/Device (Self-Feeding Goal 1, OT) scoop food and bring to mouth  -HK     [place] Level/Cues Needed (Self-Feeding Goal 1, OT) supervision required  -HK     Time Frame (Self-Feeding Goal 1, OT) by discharge; long term goal (LTG)  -HK     Progress/Outcomes (Self-Feeding Goal 1, OT) goal ongoing  -     Row Name 12/13/21 1139          ROM Goal 1 (OT)    ROM Goal 1 (OT) Pt will complete L UE AROM x10 reps each session to improve strength and function of L UE for ADLS and transfers.  -HK     Time Frame (ROM Goal 1, OT) by discharge; long term goal (LTG)  -HK     Progress/Outcome (ROM Goal 1, OT) goal ongoing  -     Row Name 12/13/21 1139          Problem Specific Goal 1 (OT)    Problem Specific Goal 1 (OT) Pt will tolerated unsupported sitting w/ Min A x3 mins.  -HK     Time Frame (Problem Specific Goal 1, OT) long term goal (LTG); 10 days  -HK     Progress/Outcome (Problem Specific Goal 1, OT) goal ongoing  -     Row Name 12/13/21 1139          Therapy Assessment/Plan (OT)    Planned Therapy Interventions (OT) adaptive equipment training; BADL retraining; functional balance retraining; occupation/activity based interventions; transfer/mobility retraining; ROM/therapeutic exercise; patient/caregiver education/training  -HK           User Key  (r) = Recorded By, (t) = Taken By, (c) = Cosigned By    Initials Name Provider Type     Oxana Alcaraz, OT Occupational Therapist               Clinical Impression     Row Name 12/13/21 1128          Pain Scale: Numbers Pre/Post-Treatment    Pretreatment Pain Rating 9/10  -HK     Posttreatment Pain Rating 9/10  -HK     Pain Location - Side Right  -HK     Pain Location - Orientation lower  -HK     Pain Location extremity  -HK     Pain Intervention(s) Ambulation/increased activity; Repositioned  -     Row Name  12/13/21 1128          Plan of Care Review    Plan of Care Reviewed With patient  -HK     Progress no change  -HK     Outcome Summary OT re-eval complete. Requires max encouragement for participation. Pt rolled L to R for sling placement with modAx2. Pt dependent to scoot up in bed. Unable to safely complete transfers due to lift malfunction in room. Pt is dependent for all bathing/dressing. Requires maxA for all meal set up and Brant to self feed. Pt is limited by knee pain. OT will see pt 3x/week for grooming, balance, and feeding. Recommend d/c to LTACH.  -HK     Row Name 12/13/21 1128          Therapy Assessment/Plan (OT)    Patient/Family Therapy Goal Statement (OT) Pt would like to improve and return home.  -HK     Rehab Potential (OT) good, to achieve stated therapy goals  -     Criteria for Skilled Therapeutic Interventions Met (OT) yes; skilled treatment is necessary  -HK     Therapy Frequency (OT) 3 times/wk  -HK     Row Name 12/13/21 1128          Therapy Plan Review/Discharge Plan (OT)    Anticipated Discharge Disposition (OT) long-term acute care facility  -     Row Name 12/13/21 1128          Vital Signs    O2 Delivery Pre Treatment supplemental O2  -HK     O2 Delivery Intra Treatment supplemental O2  -HK     O2 Delivery Post Treatment supplemental O2  -HK     Pre Patient Position Supine  -HK     Intra Patient Position Supine  -HK     Post Patient Position Supine  -HK     Row Name 12/13/21 1128          Positioning and Restraints    Pre-Treatment Position in bed  -HK     Post Treatment Position bed  -HK     In Bed notified nsg; supine; call light within reach; encouraged to call for assist; exit alarm on  -HK           User Key  (r) = Recorded By, (t) = Taken By, (c) = Cosigned By    Initials Name Provider Type    HK Oxana Alcaraz, OT Occupational Therapist               Outcome Measures     Row Name 12/13/21 1142          How much help from another is currently needed...    Putting on and taking  off regular lower body clothing? 1  -HK     Bathing (including washing, rinsing, and drying) 1  -HK     Toileting (which includes using toilet bed pan or urinal) 1  -HK     Putting on and taking off regular upper body clothing 2  -HK     Taking care of personal grooming (such as brushing teeth) 2  -HK     Eating meals 2  -HK     AM-PAC 6 Clicks Score (OT) 9  -HK     Row Name 12/13/21 0910          How much help from another person do you currently need...    Turning from your back to your side while in flat bed without using bedrails? 2  -JULISSA     Moving from lying on back to sitting on the side of a flat bed without bedrails? 1  -JULISSA     Moving to and from a bed to a chair (including a wheelchair)? 1  -JULISSA     Standing up from a chair using your arms (e.g., wheelchair, bedside chair)? 1  -JULISSA     Climbing 3-5 steps with a railing? 1  -JULISSA     To walk in hospital room? 1  -JULISSA     AM-PAC 6 Clicks Score (PT) 7  -JULISSA     Row Name 12/13/21 1142 12/13/21 0910       Functional Assessment    Outcome Measure Options AM-PAC 6 Clicks Daily Activity (OT)  - AM-PAC 6 Clicks Basic Mobility (PT)  -          User Key  (r) = Recorded By, (t) = Taken By, (c) = Cosigned By    Initials Name Provider Type     Oxana Alcaraz, OT Occupational Therapist    JULISSA Crispin Del Toro, PT Physical Therapist                Occupational Therapy Education                 Title: PT OT SLP Therapies (In Progress)     Topic: Occupational Therapy (Done)     Point: ADL training (Done)     Description:   Instruct learner(s) on proper safety adaptation and remediation techniques during self care or transfers.   Instruct in proper use of assistive devices.              Learning Progress Summary           Patient Acceptance, E,TB,D, VU,NR by  at 12/13/2021 1142                   Point: Home exercise program (Done)     Description:   Instruct learner(s) on appropriate technique for monitoring, assisting and/or progressing therapeutic exercises/activities.               Learning Progress Summary           Patient Acceptance, E,TB,D, VU,NR by  at 12/13/2021 1142                   Point: Precautions (Done)     Description:   Instruct learner(s) on prescribed precautions during self-care and functional transfers.              Learning Progress Summary           Patient Acceptance, E,TB,D, VU,NR by  at 12/13/2021 1142                   Point: Body mechanics (Done)     Description:   Instruct learner(s) on proper positioning and spine alignment during self-care, functional mobility activities and/or exercises.              Learning Progress Summary           Patient Acceptance, E,TB,D, VU,NR by  at 12/13/2021 1142                               User Key     Initials Effective Dates Name Provider Type Discipline     06/16/21 -  Oxana Alcaraz, OT Occupational Therapist OT              OT Recommendation and Plan  Planned Therapy Interventions (OT): adaptive equipment training, BADL retraining, functional balance retraining, occupation/activity based interventions, transfer/mobility retraining, ROM/therapeutic exercise, patient/caregiver education/training  Therapy Frequency (OT): 3 times/wk  Plan of Care Review  Plan of Care Reviewed With: patient  Progress: no change  Outcome Summary: OT re-eval complete. Requires max encouragement for participation. Pt rolled L to R for sling placement with modAx2. Pt dependent to scoot up in bed. Unable to safely complete transfers due to lift malfunction in room. Pt is dependent for all bathing/dressing. Requires maxA for all meal set up and Brant to self feed. Pt is limited by knee pain. OT will see pt 3x/week for grooming, balance, and feeding. Recommend d/c to LTACH.     Time Calculation:    Time Calculation- OT     Row Name 12/13/21 0907             Time Calculation- OT    OT Start Time 0907  -      OT Received On 12/13/21  [facility]      OT Goal Re-Cert Due Date 12/23/21  -              Timed Charges    07563 - OT Therapeutic Exercise  Minutes 10  -HK              Untimed Charges    OT Eval/Re-eval Minutes 30  -HK              Total Minutes    Timed Charges Total Minutes 10  -HK      Untimed Charges Total Minutes 30  -HK       Total Minutes 40  -HK            User Key  (r) = Recorded By, (t) = Taken By, (c) = Cosigned By    Initials Name Provider Type     Oxana Alcaraz, PAOLO Occupational Therapist              Therapy Charges for Today     Code Description Service Date Service Provider Modifiers Qty    [id number]  OT THER PROC EA 15 MIN 12/13/2021 Oxana Alcaraz OT GO 1    [id number]  OT RE-EVAL 2 12/13/2021 Oxana Alcaraz OT GO 1               Oxana Alcaraz OT  12/13/2021

## 2021-12-13 NOTE — PLAN OF CARE
Goal Outcome Evaluation:  Plan of Care Reviewed With: patient        Progress: no change    PT HAS SLEPT INTERMITTENTLY. SR/ST ON TELEMETRY. REMAINS ON 2L PER NC. PT URINE TEA COLORED. U/A SENT TO LAB. PT REQUESTED PAIN MED TIME ONE. PT ASSISTED WITH TURNS. MULTIPLE BROKEN AREAS TO COCCYX NOTED.

## 2021-12-13 NOTE — PLAN OF CARE
Goal Outcome Evaluation:  Plan of Care Reviewed With: patient        Progress: no change  Outcome Summary: OT re-eval complete. Requires max encouragement for participation. Pt rolled L to R for sling placement with modAx2. Pt dependent to scoot up in bed. Unable to safely complete transfers due to lift malfunction in room. Pt is dependent for all bathing/dressing. Requires maxA for all meal set up and Brant to self feed. Pt is limited by knee pain. OT will see pt 3x/week for grooming, balance, and feeding. Recommend d/c to LTACH.
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
## 2021-12-14 PROCEDURE — 25010000002 ENOXAPARIN PER 10 MG: Performed by: FAMILY MEDICINE

## 2021-12-14 PROCEDURE — 25010000002 DAPTOMYCIN PER 1 MG: Performed by: FAMILY MEDICINE

## 2021-12-14 PROCEDURE — 25010000002 CEFTRIAXONE PER 250 MG: Performed by: NURSE PRACTITIONER

## 2021-12-14 PROCEDURE — 99232 SBSQ HOSP IP/OBS MODERATE 35: CPT | Performed by: NURSE PRACTITIONER

## 2021-12-14 RX ORDER — POLYETHYLENE GLYCOL 3350 17 G/17G
17 POWDER, FOR SOLUTION ORAL DAILY
Status: DISCONTINUED | OUTPATIENT
Start: 2021-12-14 | End: 2021-12-16 | Stop reason: HOSPADM

## 2021-12-14 RX ORDER — BISACODYL 10 MG
10 SUPPOSITORY, RECTAL RECTAL DAILY
Status: DISCONTINUED | OUTPATIENT
Start: 2021-12-14 | End: 2021-12-16 | Stop reason: HOSPADM

## 2021-12-14 RX ORDER — AMOXICILLIN 250 MG
2 CAPSULE ORAL 2 TIMES DAILY
Status: DISCONTINUED | OUTPATIENT
Start: 2021-12-14 | End: 2021-12-16 | Stop reason: HOSPADM

## 2021-12-14 RX ORDER — BISACODYL 5 MG/1
5 TABLET, DELAYED RELEASE ORAL DAILY PRN
Status: DISCONTINUED | OUTPATIENT
Start: 2021-12-14 | End: 2021-12-16 | Stop reason: HOSPADM

## 2021-12-14 RX ADMIN — CASTOR OIL AND BALSAM, PERU 1 APPLICATION: 788; 87 OINTMENT TOPICAL at 20:59

## 2021-12-14 RX ADMIN — CASTOR OIL AND BALSAM, PERU 1 APPLICATION: 788; 87 OINTMENT TOPICAL at 09:23

## 2021-12-14 RX ADMIN — BISACODYL 10 MG: 10 SUPPOSITORY RECTAL at 09:26

## 2021-12-14 RX ADMIN — SODIUM CHLORIDE 1 G: 900 INJECTION INTRAVENOUS at 09:23

## 2021-12-14 RX ADMIN — LEVETIRACETAM 500 MG: 500 TABLET, FILM COATED ORAL at 09:23

## 2021-12-14 RX ADMIN — MULTIVITAMIN TABLET 1 TABLET: TABLET at 09:23

## 2021-12-14 RX ADMIN — OXYCODONE HYDROCHLORIDE AND ACETAMINOPHEN 1 TABLET: 5; 325 TABLET ORAL at 09:23

## 2021-12-14 RX ADMIN — Medication 1 CAPSULE: at 09:22

## 2021-12-14 RX ADMIN — DOCUSATE SODIUM 50 MG AND SENNOSIDES 8.6 MG 2 TABLET: 8.6; 5 TABLET, FILM COATED ORAL at 20:57

## 2021-12-14 RX ADMIN — LORAZEPAM 1 MG: 1 TABLET ORAL at 20:58

## 2021-12-14 RX ADMIN — Medication 2 PACKET: at 21:02

## 2021-12-14 RX ADMIN — CYCLOBENZAPRINE 5 MG: 10 TABLET, FILM COATED ORAL at 09:22

## 2021-12-14 RX ADMIN — ACETAMINOPHEN 650 MG: 325 TABLET, FILM COATED ORAL at 04:35

## 2021-12-14 RX ADMIN — LORAZEPAM 1 MG: 1 TABLET ORAL at 14:10

## 2021-12-14 RX ADMIN — OXYCODONE HYDROCHLORIDE AND ACETAMINOPHEN 1 TABLET: 5; 325 TABLET ORAL at 00:18

## 2021-12-14 RX ADMIN — DOCUSATE SODIUM 50 MG AND SENNOSIDES 8.6 MG 2 TABLET: 8.6; 5 TABLET, FILM COATED ORAL at 09:22

## 2021-12-14 RX ADMIN — AMLODIPINE BESYLATE 5 MG: 5 TABLET ORAL at 09:22

## 2021-12-14 RX ADMIN — Medication 5 MG: at 20:57

## 2021-12-14 RX ADMIN — HYDRALAZINE HYDROCHLORIDE 50 MG: 50 TABLET, FILM COATED ORAL at 20:58

## 2021-12-14 RX ADMIN — DULOXETINE HYDROCHLORIDE 30 MG: 30 CAPSULE, DELAYED RELEASE ORAL at 09:23

## 2021-12-14 RX ADMIN — HYDRALAZINE HYDROCHLORIDE 50 MG: 50 TABLET, FILM COATED ORAL at 05:17

## 2021-12-14 RX ADMIN — LEVETIRACETAM 500 MG: 500 TABLET, FILM COATED ORAL at 20:57

## 2021-12-14 RX ADMIN — OXYCODONE HYDROCHLORIDE AND ACETAMINOPHEN 1 TABLET: 5; 325 TABLET ORAL at 17:18

## 2021-12-14 RX ADMIN — ENOXAPARIN SODIUM 40 MG: 40 INJECTION SUBCUTANEOUS at 17:18

## 2021-12-14 RX ADMIN — LEVOTHYROXINE SODIUM 125 MCG: 125 TABLET ORAL at 05:18

## 2021-12-14 RX ADMIN — SODIUM CHLORIDE, PRESERVATIVE FREE 10 ML: 5 INJECTION INTRAVENOUS at 09:23

## 2021-12-14 RX ADMIN — HYDRALAZINE HYDROCHLORIDE 50 MG: 50 TABLET, FILM COATED ORAL at 14:10

## 2021-12-14 RX ADMIN — NYSTATIN: 100000 POWDER TOPICAL at 20:59

## 2021-12-14 RX ADMIN — DAPTOMYCIN 550 MG: 500 INJECTION, POWDER, LYOPHILIZED, FOR SOLUTION INTRAVENOUS at 17:17

## 2021-12-14 RX ADMIN — LORAZEPAM 1 MG: 1 TABLET ORAL at 04:35

## 2021-12-14 RX ADMIN — NYSTATIN: 100000 POWDER TOPICAL at 14:11

## 2021-12-14 RX ADMIN — CYCLOBENZAPRINE 5 MG: 10 TABLET, FILM COATED ORAL at 00:18

## 2021-12-14 RX ADMIN — PANTOPRAZOLE SODIUM 40 MG: 40 TABLET, DELAYED RELEASE ORAL at 05:18

## 2021-12-14 RX ADMIN — POLYETHYLENE GLYCOL 3350 17 G: 17 POWDER, FOR SOLUTION ORAL at 09:24

## 2021-12-14 RX ADMIN — BUPROPION HYDROCHLORIDE 100 MG: 100 TABLET, FILM COATED, EXTENDED RELEASE ORAL at 09:26

## 2021-12-14 NOTE — PLAN OF CARE
Problem: Adult Inpatient Plan of Care  Goal: Plan of Care Review  Outcome: Ongoing, Progressing  Flowsheets (Taken 12/14/2021 0537)  Progress: improving  Plan of Care Reviewed With: patient  Goal: Patient-Specific Goal (Individualized)  Outcome: Ongoing, Progressing  Goal: Absence of Hospital-Acquired Illness or Injury  Outcome: Ongoing, Progressing  Intervention: Identify and Manage Fall Risk  Recent Flowsheet Documentation  Taken 12/14/2021 0400 by Cheyanne Palomares RN  Safety Promotion/Fall Prevention:   activity supervised   assistive device/personal items within reach   safety round/check completed  Taken 12/14/2021 0242 by Cheyanne Palomares RN  Safety Promotion/Fall Prevention:   activity supervised   assistive device/personal items within reach   safety round/check completed  Taken 12/14/2021 0024 by Cheyanne Palomares RN  Safety Promotion/Fall Prevention:   activity supervised   assistive device/personal items within reach   safety round/check completed  Taken 12/13/2021 2200 by Cheyanne Palomares RN  Safety Promotion/Fall Prevention:   activity supervised   assistive device/personal items within reach   safety round/check completed  Taken 12/13/2021 2000 by Cheyanne Palomares RN  Safety Promotion/Fall Prevention:   activity supervised   assistive device/personal items within reach   clutter free environment maintained   fall prevention program maintained   lighting adjusted   muscle strengthening facilitated   nonskid shoes/slippers when out of bed   room organization consistent   safety round/check completed  Intervention: Prevent Skin Injury  Recent Flowsheet Documentation  Taken 12/14/2021 0400 by Cheyanne Palomares RN  Body Position: weight shift assistance provided  Taken 12/14/2021 0242 by Cheyanne Palomares RN  Body Position: position maintained  Taken 12/14/2021 0024 by Cheyanne Palomares RN  Body Position: weight shift assistance provided  Taken 12/13/2021 2200 by Cheyanne Palomares RN  Body Position: weight  shift assistance provided  Taken 12/13/2021 2000 by Cheyanne Palomares RN  Body Position: sitting up in bed  Skin Protection:   adhesive use limited   incontinence pads utilized  Intervention: Prevent and Manage VTE (venous thromboembolism) Risk  Recent Flowsheet Documentation  Taken 12/14/2021 0400 by Cheyanne Palomares RN  VTE Prevention/Management: patient refused intervention  Taken 12/14/2021 0242 by Cheyanne Palomares RN  VTE Prevention/Management: patient refused intervention  Taken 12/14/2021 0024 by Cheyanne Palomares RN  VTE Prevention/Management: patient refused intervention  Taken 12/13/2021 2200 by Cheyanne Palomares RN  VTE Prevention/Management: patient refused intervention  Taken 12/13/2021 2000 by Cheyanne Palomares RN  VTE Prevention/Management: patient refused intervention  Intervention: Prevent Infection  Recent Flowsheet Documentation  Taken 12/14/2021 0400 by Cheyanne Palomares RN  Infection Prevention:   hand hygiene promoted   rest/sleep promoted  Taken 12/14/2021 0242 by Cheyanne Palomares RN  Infection Prevention:   hand hygiene promoted   rest/sleep promoted  Taken 12/14/2021 0024 by Cheyanne Palomares RN  Infection Prevention:   hand hygiene promoted   rest/sleep promoted  Taken 12/13/2021 2200 by Cheyanne Palomares RN  Infection Prevention:   hand hygiene promoted   rest/sleep promoted  Taken 12/13/2021 2000 by Cheyanne Palomares RN  Infection Prevention:   hand hygiene promoted   rest/sleep promoted  Goal: Optimal Comfort and Wellbeing  Outcome: Ongoing, Progressing  Intervention: Provide Person-Centered Care  Recent Flowsheet Documentation  Taken 12/14/2021 0400 by Cheyanne Palomares RN  Trust Relationship/Rapport: care explained  Taken 12/14/2021 0242 by Cheyanne Palomares RN  Trust Relationship/Rapport: care explained  Taken 12/14/2021 0024 by Cheyanne Palomares RN  Trust Relationship/Rapport: care explained  Taken 12/13/2021 2200 by Cheyanne Palomares RN  Trust Relationship/Rapport: care explained  Taken  12/13/2021 2000 by Cheyanne Palomares RN  Trust Relationship/Rapport:   care explained   choices provided   questions answered   questions encouraged   reassurance provided  Goal: Readiness for Transition of Care  Outcome: Ongoing, Progressing     Problem: Hypertension Comorbidity  Goal: Blood Pressure in Desired Range  Outcome: Ongoing, Progressing  Intervention: Maintain Hypertension-Management Strategies  Recent Flowsheet Documentation  Taken 12/13/2021 2000 by Cheyanne Palomares RN  Medication Review/Management: medications reviewed     Problem: Pain Chronic (Persistent) (Comorbidity Management)  Goal: Acceptable Pain Control and Functional Ability  Outcome: Ongoing, Progressing  Intervention: Develop Pain Management Plan  Recent Flowsheet Documentation  Taken 12/14/2021 0024 by Cheyanne Palomares RN  Pain Management Interventions: see MAR  Taken 12/13/2021 2200 by Cheyanne Palomares RN  Pain Management Interventions: pillow support provided  Taken 12/13/2021 2000 by Cheyanne Palomares RN  Pain Management Interventions:   quiet environment facilitated   pillow support provided  Intervention: Manage Persistent Pain  Recent Flowsheet Documentation  Taken 12/13/2021 2000 by Cheyanne Palomares RN  Medication Review/Management: medications reviewed  Intervention: Optimize Psychosocial Wellbeing  Recent Flowsheet Documentation  Taken 12/13/2021 2000 by Cheyanne Palomares RN  Supportive Measures: active listening utilized  Diversional Activities: television  Family/Support System Care:   self-care encouraged   support provided     Problem: Skin Injury Risk Increased  Goal: Skin Health and Integrity  Outcome: Ongoing, Progressing  Intervention: Optimize Skin Protection  Recent Flowsheet Documentation  Taken 12/14/2021 0400 by Cheyanne Palomares RN  Head of Bed (HOB): HOB elevated  Taken 12/14/2021 0242 by Cheyanne Palomares RN  Head of Bed (HOB): HOB elevated  Taken 12/14/2021 0024 by Cheyanne Palomares RN  Head of Bed (HOB): HOB  elevated  Taken 12/13/2021 2200 by Cheyanne Palomares RN  Head of Bed (HOB): HOB elevated  Taken 12/13/2021 2000 by Cheyanne Palomares RN  Pressure Reduction Techniques: weight shift assistance provided  Head of Bed (HOB): HOB elevated  Pressure Reduction Devices: pressure-redistributing mattress utilized  Skin Protection:   adhesive use limited   incontinence pads utilized     Problem: Fall Injury Risk  Goal: Absence of Fall and Fall-Related Injury  Outcome: Ongoing, Progressing  Intervention: Identify and Manage Contributors to Fall Injury Risk  Recent Flowsheet Documentation  Taken 12/14/2021 0400 by Cheyanne Palomares RN  Self-Care Promotion: independence encouraged  Taken 12/14/2021 0242 by Cheyanne Palomares RN  Self-Care Promotion: independence encouraged  Taken 12/14/2021 0024 by Cheyanne Palomares RN  Self-Care Promotion: independence encouraged  Taken 12/13/2021 2200 by Cheyanne Palomares RN  Self-Care Promotion: independence encouraged  Taken 12/13/2021 2000 by Cheyanne Palomares RN  Medication Review/Management: medications reviewed  Self-Care Promotion: independence encouraged  Intervention: Promote Injury-Free Environment  Recent Flowsheet Documentation  Taken 12/14/2021 0400 by Cheyanne Palomares RN  Safety Promotion/Fall Prevention:   activity supervised   assistive device/personal items within reach   safety round/check completed  Taken 12/14/2021 0242 by Cheyanne Palomares RN  Safety Promotion/Fall Prevention:   activity supervised   assistive device/personal items within reach   safety round/check completed  Taken 12/14/2021 0024 by Cheyanne Palomares RN  Safety Promotion/Fall Prevention:   activity supervised   assistive device/personal items within reach   safety round/check completed  Taken 12/13/2021 2200 by Cheyanne Palomares RN  Safety Promotion/Fall Prevention:   activity supervised   assistive device/personal items within reach   safety round/check completed  Taken 12/13/2021 2000 by Cheyanne Palomares RN  Safety  Promotion/Fall Prevention:   activity supervised   assistive device/personal items within reach   clutter free environment maintained   fall prevention program maintained   lighting adjusted   muscle strengthening facilitated   nonskid shoes/slippers when out of bed   room organization consistent   safety round/check completed   Goal Outcome Evaluation:  Plan of Care Reviewed With: patient      Pt currently in bed resting quietly. No complaints of pain or discomfort at this time. Earlier complaints of BLE throbbing. Speciatly bed in use. Vitals WNL on 2L NC. Turned q2 while in bed. Neuro checks WNL. No other observations at this time. Will continue to monitor, call bell in reach.  Progress: improving

## 2021-12-14 NOTE — PROGRESS NOTES
[facility] Medicine Services  PROGRESS NOTE    Patient Name: Karely Villarreal  : 1965  MRN: [id number]    Date of Admission: 2021  Primary Care Physician: Tracie Levi APRN    Subjective   Subjective   CC:  F/u Sepsis, abscess    HPI:  Patient sitting up in bed in NAD.  Patient states she is feeling much better today.  Encourage patient to eat.  Patient states she has not had a BM in a couple days.  No adverse events overnight.    ROS:  Gen- No fevers, chills  CV- No chest pain, palpitations  Resp- No cough, dyspnea  GI- No N/V/D, abd pain  All other systems have been reviewed and the pertinent positives and negatives are listed above in the HPI and ROS    Objective   Objective   Vital Signs:   Temp:  [97.9 °F (36.6 °C)-98.5 °F (36.9 °C)] 98.3 °F (36.8 °C)  Heart Rate:  [102-117] 103  Resp:  [18] 18  BP: (126-144)/(74-86) 129/84  Flow (L/min):  [2] 2     Physical Exam:  Constitutional: Alert, ill-appearing female lying in bed in NAD  Eyes: EOMI, sclerae anicteric, no conjunctival injection  Head: NCAT  ENT: [place], moist mucous membranes   Respiratory: Nonlabored, symmetrical chest expansion, CTAB, 98% 2L  Cardiovascular: RRR, HR 99, no M/R/G, +DP pulses bilaterally  Gastrointestinal: Soft, NT, ND +BS  Musculoskeletal: ÁLVAREZ; +LE edema bilaterally  Neurologic: Oriented x4, strength symmetric in all extremities, follows all commands, speech clear  Skin: No rashes on exposed skin  Psychiatric: Pleasant and cooperative; normal affect    Results Reviewed:  LAB RESULTS:      Lab 21  0535 21  0818 12/10/21  1212 21  1538 21  0814 21  0915   WBC 8.00 6.64 6.92  --  6.49 6.19   HEMOGLOBIN 8.9* 7.0* 7.5*  --  7.5* 8.0*   HEMATOCRIT 28.4* 23.9* 24.8*  --  24.6* 27.0*   PLATELETS 263 246 206  --  214 237   NEUTROS ABS  --  4.47 5.16  --  4.34 4.30   IMMATURE GRANS (ABS)  --  0.04 0.04  --  0.05 0.04   LYMPHS ABS  --  1.35 1.07  --  1.37 1.17   MONOS ABS  --   0.59 0.45  --  0.54 0.46   EOS ABS  --  0.16 0.17  --  0.14 0.17   MCV 86.9 91.2 88.6  --  88.5 90.6   LDH  --   --   --  173  --   --          Lab 12/13/21  0535 12/12/21  0818 12/10/21  1212 12/09/21  1538 12/09/21  0814 12/08/21  0915   SODIUM 137 138 141  --  138 138   POTASSIUM 3.5 3.7 3.4*  --  3.2* 3.4*   CHLORIDE 94* 93* 95*  --  93* 93*   CO2 36.0* 36.0* 39.0*  --  38.0* 37.0*   ANION GAP 7.0 9.0 7.0  --  7.0 8.0   BUN 30* 27* 25*  --  19 17   CREATININE 1.41* 1.38* 1.41*  --  1.19* 1.12*   GLUCOSE 109* 88 139*  --  82 96   CALCIUM 9.7 9.8 9.3  --  9.6 10.0   MAGNESIUM 1.8 1.8  --   --   --   --    PHOSPHORUS  --   --   --  4.2  --  4.0         Lab 12/13/21  0535 12/08/21  0915   TOTAL PROTEIN 6.6  --    ALBUMIN 2.70* 2.40*   GLOBULIN 3.9  --    ALT (SGPT) 13  --    AST (SGOT) 20  --    BILIRUBIN 0.3  --    ALK PHOS 180*  --                  Lab 12/12/21  1023   ABO TYPING O   RH TYPING Negative   ANTIBODY SCREEN Negative         Brief Urine Lab Results  (Last result in the past 365 days)      Color   Clarity   Blood   Leuk Est   Nitrite   Protein   CREAT   Urine HCG        12/13/21 0116 Red   Cloudy   Large (3+)   Large (3+)   Negative   >=300 mg/dL (3+)                 Microbiology Results Abnormal     None          No radiology results from the last 24 hrs    Results for orders placed during the hospital encounter of 10/24/21    Adult Transthoracic Echo Complete W/ Cont if Necessary Per Protocol    Interpretation Summary  · Left ventricular ejection fraction appears to be 61 - 65%. Left ventricular systolic function is normal.  · Left ventricular diastolic function was normal.  · Estimated right ventricular systolic pressure from tricuspid regurgitation is normal (<35 mmHg).  · No vegetations seen.  · This is a technically difficult study.      I have reviewed the medications:  Scheduled Meds:amLODIPine, 5 mg, Oral, Q24H  senna-docusate sodium, 2 tablet, Oral, BID   And  bisacodyl, 10 mg, Rectal,  Daily  buPROPion SR, 100 mg, Oral, Daily  castor oil-balsam peru, 1 application, Topical, Q12H  cefTRIAXone, 1 g, Intravenous, Q24H  DAPTOmycin (CUBICIN)  IV, 8 mg/kg (Adjusted), Intravenous, Q24H  DULoxetine, 30 mg, Oral, QAM  enoxaparin, 40 mg, Subcutaneous, Q24H  hydrALAZINE, 50 mg, Oral, Q8H  lactobacillus acidophilus, 1 capsule, Oral, Daily  levETIRAcetam, 500 mg, Oral, Q12H  levothyroxine, 125 mcg, Oral, Daily  melatonin, 5 mg, Oral, Nightly  multivitamin, 1 tablet, Oral, Daily  Nutrisource fiber, 2 packet, Oral, TID  nystatin, , Topical, Q8H  pantoprazole, 40 mg, Oral, Daily  sodium chloride, 10 mL, Intravenous, Q12H      Continuous Infusions:   PRN Meds:.•  acetaminophen **OR** acetaminophen **OR** acetaminophen  •  senna-docusate sodium **AND** polyethylene glycol **AND** bisacodyl **AND** bisacodyl  •  calcium carbonate  •  cyclobenzaprine  •  ipratropium-albuterol  •  LORazepam  •  Morphine  •  ondansetron  •  ondansetron  •  oxyCODONE-acetaminophen  •  sodium chloride    Assessment/Plan   Assessment & Plan     Active Hospital Problems    Diagnosis  POA   • Acute UTI (urinary tract infection) [N39.0]  Yes   • Uncontrolled pain [R52]  Yes   • Severe malnutrition (CMS/HCC) [E43]  Yes   • Spinal cord compression [G95.20]  Yes   • Seizures on Keppra [R56.9]  Yes   • Quadriparesis [G82.50]  Yes   • Polysubstance abuse [F19.10]  Yes   • MDD (major depressive disorder) [F32.9]  Yes   • SLE [M32.9]  Yes   • Hypothyroidism [E03.9]  Yes   • 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        Brief Hospital Course to date:  Karely Villarreal is a 56 y.o. female  with h/o polysubstance abuse, HCV, HTN, Sz d/o, SLE, Hypothyroidism, CVA, prior COVID infection and homelessness.    She was admitted to [facility] on 10/24/21 for sepsis d/t MRSA and was found to have a paravertebral abscess and was transferred to [facility] on 11/6.  Surgery  was deferred initially but she developed worsening UE weakness and MRI showed progressive cord compression so she was taken to the OR emergently for decompression on 11/14.    She was extubated 11/15 but TF's were started d/t poor PO.  She was transferred initially to telemetry on 11/8 but developed worsening hypoxia with AMS and was transferred back to the ICU on bipap on 11/20.  She was transferred back to the hospitalist service on 11/23    MRSA bacteremia  Sepsis  T9/T10 Paravertebral Abscess, T9/T10 Osteomyelitis with spinal cord compression  --s/p decompression, cervical laminectomy and debridement of epidural abscess on 11/14  --on Vancomycin per ID, will need long course at least 12 weeks given the extent of her spinal infection and high grade bacteremia (starting at time of surgery on 11/14)  --repeat MRI C and T spine showed shows 2 fluid collections dorsally (one at C4 and the other more superficial at C6/C7) and T9/T10 still c/w vertebral osteomyelitis.  Re-evaluated by Dr. Jama who noted severe phlegmon circumferentially around cervical and upper thoracic area but no worsening compression, rec'd continue abx, no surgery needed at this time      Anemia  Hematuria   DOMO  --Hgb 8.9; Cr 1.41-stable  --Continues to drop  - fob negative  --Transfuse 1 unit PRBC 12/12  -s/p 1 unit PRBC on 12/3  -Iron 19, Iron sat 7   -Received dose of IV iron   --Suspect related to lupus , she had hematuria but it has mostly resolved   - urology consulted , follow up with DR Gardner outpatient   - nephrology following, lupus nephritis   -urine culture with urogenital batool, hold abx for UTI treatment  --AM labs    UTI  --Positive upon readmission  --IV Rocephin x5d; change to PO for discharge     Acute Respiratory failure  Right Pleural Effusion  Probable Bacterial PNA  --improved  --tolerating daily PO Lasix well   -- s/p 7 days of merrem and doxy per ID     Malnutrition  --Status post Dobbhoff with tube  "feeds     Diarrhea-resolved  --s/p rectal tube, improved  --Wound care following for skin breakdown on gluteal tissue  --Continue fiber supplement   --Last BM 12/12; increased bowel meds     Bilateral Knee Pain  --Bilateral xrays showed large bilateral knee effusions, moderate osteoarthrosis worse in patellofemoral compartments  -PT/OT      LUE Edema  -LUE venous duplex on 11/7 negative for evidence of acute DVT  -Elevate extremity   -Repeat venous duplex 12/3 negative for DVT     HX Seizure Disorder  --continue keppra     Recent COVID PNA     Acute Right Axilla abscess  --culture positive for MRSA, continue vanc D8 - ID following    - IV vanc for 12 weeks through 2/7 at least     Polysubstance abuse  --UDS positive for meth/opiates, self medicating at [facility] with klonopin and oxycodone and went into respiratory depression     SLE/Discoid lupus  --holding plaquenil due to severity of active infection, but suspect possible lupus nephritis v other GN with ongoing infection and off maintenance immunosuppression     Insomnia   -Melatonin PRN      Hx CVA  - PT/OT    DVT prophylaxis:  Medical DVT prophylaxis orders are present.       AM-PAC 6 Clicks Score (PT): 7 (12/13/21 1600)    Disposition: Pateint was returned to hospital mid transport to [facility] d/t \"pain\"; CM to attempt to get to LTAC again and LTAC bed at [facility] in [place] has agreed to take her on Thursday and [facility] will transport on 12/16 @0900; patient is agreement with plan    CODE STATUS:   Code Status and Medical Interventions:   Ordered at: 12/11/21 7681     Code Status (Patient has no pulse and is not breathing):    CPR (Attempt to Resuscitate)     Medical Interventions (Patient has pulse or is breathing):    Full Support       Heather Theodore, APRN  12/14/21              "

## 2021-12-14 NOTE — CASE MANAGEMENT/SOCIAL WORK
Continued Stay Note  [facility]     Patient Name: Karely Villarreal  MRN: [id number]  Today's Date: 12/14/2021    Admit Date: 12/11/2021     Discharge Plan     Row Name 12/14/21 1206       Plan    Plan Comments MSW confirmed with Josy with [facility], that [facility], is hoping to have a bed again for pt on Thursday 12/16. MSW followed up with [facility], Jacklyn, who reports pt is scheduled with [facility] for the transportation at 9:00am Thursday, December 16th.               Discharge Codes    No documentation.               Expected Discharge Date and Time     Expected Discharge Date Expected Discharge Time    Dec 16, 2021             ROSE MARY Ma

## 2021-12-15 LAB
ANION GAP SERPL CALCULATED.3IONS-SCNC: 9 MMOL/L (ref 5–15)
BUN SERPL-MCNC: 31 MG/DL (ref 6–20)
BUN/CREAT SERPL: 18.2 (ref 7–25)
CALCIUM SPEC-SCNC: 10.1 MG/DL (ref 8.6–10.5)
CHLORIDE SERPL-SCNC: 97 MMOL/L (ref 98–107)
CK SERPL-CCNC: 19 U/L (ref 20–180)
CO2 SERPL-SCNC: 35 MMOL/L (ref 22–29)
CREAT SERPL-MCNC: 1.7 MG/DL (ref 0.57–1)
DEPRECATED RDW RBC AUTO: 52.6 FL (ref 37–54)
ERYTHROCYTE [DISTWIDTH] IN BLOOD BY AUTOMATED COUNT: 16.2 % (ref 12.3–15.4)
GFR SERPL CREATININE-BSD FRML MDRD: 31 ML/MIN/1.73
GLUCOSE SERPL-MCNC: 93 MG/DL (ref 65–99)
HCT VFR BLD AUTO: 28.6 % (ref 34–46.6)
HGB BLD-MCNC: 8.8 G/DL (ref 12–15.9)
MCH RBC QN AUTO: 27.1 PG (ref 26.6–33)
MCHC RBC AUTO-ENTMCNC: 30.8 G/DL (ref 31.5–35.7)
MCV RBC AUTO: 88 FL (ref 79–97)
PLATELET # BLD AUTO: 249 10*3/MM3 (ref 140–450)
PMV BLD AUTO: 8.2 FL (ref 6–12)
POTASSIUM SERPL-SCNC: 3.6 MMOL/L (ref 3.5–5.2)
RBC # BLD AUTO: 3.25 10*6/MM3 (ref 3.77–5.28)
SODIUM SERPL-SCNC: 141 MMOL/L (ref 136–145)
WBC NRBC COR # BLD: 7.47 10*3/MM3 (ref 3.4–10.8)

## 2021-12-15 PROCEDURE — 82550 ASSAY OF CK (CPK): CPT

## 2021-12-15 PROCEDURE — 25010000002 DAPTOMYCIN PER 1 MG: Performed by: FAMILY MEDICINE

## 2021-12-15 PROCEDURE — 85027 COMPLETE CBC AUTOMATED: CPT | Performed by: NURSE PRACTITIONER

## 2021-12-15 PROCEDURE — 80048 BASIC METABOLIC PNL TOTAL CA: CPT | Performed by: NURSE PRACTITIONER

## 2021-12-15 PROCEDURE — 63710000001 ONDANSETRON PER 8 MG: Performed by: FAMILY MEDICINE

## 2021-12-15 PROCEDURE — 25010000002 ENOXAPARIN PER 10 MG: Performed by: FAMILY MEDICINE

## 2021-12-15 PROCEDURE — 25010000002 CEFTRIAXONE PER 250 MG: Performed by: NURSE PRACTITIONER

## 2021-12-15 PROCEDURE — 99231 SBSQ HOSP IP/OBS SF/LOW 25: CPT | Performed by: NURSE PRACTITIONER

## 2021-12-15 RX ADMIN — LORAZEPAM 1 MG: 1 TABLET ORAL at 03:22

## 2021-12-15 RX ADMIN — SODIUM CHLORIDE, PRESERVATIVE FREE 10 ML: 5 INJECTION INTRAVENOUS at 08:59

## 2021-12-15 RX ADMIN — ENOXAPARIN SODIUM 40 MG: 40 INJECTION SUBCUTANEOUS at 18:24

## 2021-12-15 RX ADMIN — LORAZEPAM 1 MG: 1 TABLET ORAL at 14:16

## 2021-12-15 RX ADMIN — BUPROPION HYDROCHLORIDE 100 MG: 100 TABLET, FILM COATED, EXTENDED RELEASE ORAL at 08:58

## 2021-12-15 RX ADMIN — MULTIVITAMIN TABLET 1 TABLET: TABLET at 08:58

## 2021-12-15 RX ADMIN — CYCLOBENZAPRINE 5 MG: 10 TABLET, FILM COATED ORAL at 14:16

## 2021-12-15 RX ADMIN — CASTOR OIL AND BALSAM, PERU 1 APPLICATION: 788; 87 OINTMENT TOPICAL at 21:04

## 2021-12-15 RX ADMIN — Medication 2 PACKET: at 21:05

## 2021-12-15 RX ADMIN — DOCUSATE SODIUM 50 MG AND SENNOSIDES 8.6 MG 2 TABLET: 8.6; 5 TABLET, FILM COATED ORAL at 08:58

## 2021-12-15 RX ADMIN — HYDRALAZINE HYDROCHLORIDE 50 MG: 50 TABLET, FILM COATED ORAL at 14:16

## 2021-12-15 RX ADMIN — ONDANSETRON HYDROCHLORIDE 4 MG: 4 TABLET, FILM COATED ORAL at 14:16

## 2021-12-15 RX ADMIN — HYDRALAZINE HYDROCHLORIDE 50 MG: 50 TABLET, FILM COATED ORAL at 05:28

## 2021-12-15 RX ADMIN — Medication 1 CAPSULE: at 08:59

## 2021-12-15 RX ADMIN — PANTOPRAZOLE SODIUM 40 MG: 40 TABLET, DELAYED RELEASE ORAL at 05:28

## 2021-12-15 RX ADMIN — LEVOTHYROXINE SODIUM 125 MCG: 125 TABLET ORAL at 05:28

## 2021-12-15 RX ADMIN — NYSTATIN: 100000 POWDER TOPICAL at 21:05

## 2021-12-15 RX ADMIN — Medication 2 PACKET: at 08:58

## 2021-12-15 RX ADMIN — DAPTOMYCIN 550 MG: 500 INJECTION, POWDER, LYOPHILIZED, FOR SOLUTION INTRAVENOUS at 19:38

## 2021-12-15 RX ADMIN — DULOXETINE HYDROCHLORIDE 30 MG: 30 CAPSULE, DELAYED RELEASE ORAL at 08:59

## 2021-12-15 RX ADMIN — AMLODIPINE BESYLATE 5 MG: 5 TABLET ORAL at 08:59

## 2021-12-15 RX ADMIN — LORAZEPAM 1 MG: 1 TABLET ORAL at 21:04

## 2021-12-15 RX ADMIN — OXYCODONE HYDROCHLORIDE AND ACETAMINOPHEN 1 TABLET: 5; 325 TABLET ORAL at 00:57

## 2021-12-15 RX ADMIN — OXYCODONE HYDROCHLORIDE AND ACETAMINOPHEN 1 TABLET: 5; 325 TABLET ORAL at 08:59

## 2021-12-15 RX ADMIN — LEVETIRACETAM 500 MG: 500 TABLET, FILM COATED ORAL at 21:04

## 2021-12-15 RX ADMIN — DOCUSATE SODIUM 50 MG AND SENNOSIDES 8.6 MG 2 TABLET: 8.6; 5 TABLET, FILM COATED ORAL at 21:04

## 2021-12-15 RX ADMIN — NYSTATIN: 100000 POWDER TOPICAL at 14:18

## 2021-12-15 RX ADMIN — SODIUM CHLORIDE 1 G: 900 INJECTION INTRAVENOUS at 08:59

## 2021-12-15 RX ADMIN — LEVETIRACETAM 500 MG: 500 TABLET, FILM COATED ORAL at 08:58

## 2021-12-15 RX ADMIN — OXYCODONE HYDROCHLORIDE AND ACETAMINOPHEN 1 TABLET: 5; 325 TABLET ORAL at 19:38

## 2021-12-15 RX ADMIN — NYSTATIN: 100000 POWDER TOPICAL at 05:58

## 2021-12-15 RX ADMIN — CASTOR OIL AND BALSAM, PERU 1 APPLICATION: 788; 87 OINTMENT TOPICAL at 08:59

## 2021-12-15 RX ADMIN — Medication 2 PACKET: at 18:25

## 2021-12-15 RX ADMIN — Medication 5 MG: at 21:04

## 2021-12-15 RX ADMIN — HYDRALAZINE HYDROCHLORIDE 50 MG: 50 TABLET, FILM COATED ORAL at 21:04

## 2021-12-15 RX ADMIN — POLYETHYLENE GLYCOL 3350 17 G: 17 POWDER, FOR SOLUTION ORAL at 08:59

## 2021-12-15 NOTE — PROGRESS NOTES
[facility] Medicine Services  PROGRESS NOTE    Patient Name: Karely Villarreal  : 1965  MRN: [id number]    Date of Admission: 2021  Primary Care Physician: Tracie Levi APRN    Subjective   Subjective   CC:  F/u Sepsis, abscess    HPI:  Resting comfortably in bed. Reports requiring an enema due to constipation. Feels much improved. Pain continues but is controlled with meds. She is a bit nervous about transfer tomorrow. Afraid she will have a lot of pain again and have difficulty with transportation.      ROS:  Gen- No fevers, chills  CV- No chest pain, palpitations  Resp- No cough, dyspnea  GI- No N/V/D, abd pain    Objective   Objective   Vital Signs:   Temp:  [96.7 °F (35.9 °C)-97.9 °F (36.6 °C)] 96.9 °F (36.1 °C)  Heart Rate:  [] 100  Resp:  [18] 18  BP: (130-155)/(80-97) 155/97  Flow (L/min):  [2] 2     Physical Exam:  Constitutional: No acute distress, awake, alert, resting in bed  HENT: NCAT, mucous membranes moist  Respiratory: Clear to auscultation bilaterally, respiratory effort normal on 2LNC  Cardiovascular: RRR, no murmurs, rubs, or gallops  Gastrointestinal: Positive bowel sounds, soft, nontender, nondistended  Musculoskeletal: No bilateral ankle edema  Psychiatric: Appropriate affect, cooperative  Neurologic: Oriented x 3, strength symmetric in all extremities, Cranial Nerves grossly intact to confrontation, speech clear  Skin: No rashes noted to exposed skin       Results Reviewed:  LAB RESULTS:      Lab 12/15/21  0543 21  0535 21  0818 12/10/21  1212 21  1538 21  0814   WBC 7.47 8.00 6.64 6.92  --  6.49   HEMOGLOBIN 8.8* 8.9* 7.0* 7.5*  --  7.5*   HEMATOCRIT 28.6* 28.4* 23.9* 24.8*  --  24.6*   PLATELETS 249 263 246 206  --  214   NEUTROS ABS  --   --  4.47 5.16  --  4.34   IMMATURE GRANS (ABS)  --   --  0.04 0.04  --  0.05   LYMPHS ABS  --   --  1.35 1.07  --  1.37   MONOS ABS  --   --  0.59 0.45  --  0.54   EOS ABS  --   --   0.16 0.17  --  0.14   MCV 88.0 86.9 91.2 88.6  --  88.5   LDH  --   --   --   --  173  --          Lab 12/15/21  0544 12/13/21  0535 12/12/21  0818 12/10/21  1212 12/09/21  1538 12/09/21  0814   SODIUM 141 137 138 141  --  138   POTASSIUM 3.6 3.5 3.7 3.4*  --  3.2*   CHLORIDE 97* 94* 93* 95*  --  93*   CO2 35.0* 36.0* 36.0* 39.0*  --  38.0*   ANION GAP 9.0 7.0 9.0 7.0  --  7.0   BUN 31* 30* 27* 25*  --  19   CREATININE 1.70* 1.41* 1.38* 1.41*  --  1.19*   GLUCOSE 93 109* 88 139*  --  82   CALCIUM 10.1 9.7 9.8 9.3  --  9.6   MAGNESIUM  --  1.8 1.8  --   --   --    PHOSPHORUS  --   --   --   --  4.2  --          Lab 12/13/21  0535   TOTAL PROTEIN 6.6   ALBUMIN 2.70*   GLOBULIN 3.9   ALT (SGPT) 13   AST (SGOT) 20   BILIRUBIN 0.3   ALK PHOS 180*                 Lab 12/12/21  1023   ABO TYPING O   RH TYPING Negative   ANTIBODY SCREEN Negative         Brief Urine Lab Results  (Last result in the past 365 days)      Color   Clarity   Blood   Leuk Est   Nitrite   Protein   CREAT   Urine HCG        12/13/21 0116 Red   Cloudy   Large (3+)   Large (3+)   Negative   >=300 mg/dL (3+)                 Microbiology Results Abnormal     None          No radiology results from the last 24 hrs    Results for orders placed during the hospital encounter of 10/24/21    Adult Transthoracic Echo Complete W/ Cont if Necessary Per Protocol    Interpretation Summary  · Left ventricular ejection fraction appears to be 61 - 65%. Left ventricular systolic function is normal.  · Left ventricular diastolic function was normal.  · Estimated right ventricular systolic pressure from tricuspid regurgitation is normal (<35 mmHg).  · No vegetations seen.  · This is a technically difficult study.      I have reviewed the medications:  Scheduled Meds:amLODIPine, 5 mg, Oral, Q24H  polyethylene glycol, 17 g, Oral, Daily   And  senna-docusate sodium, 2 tablet, Oral, BID   And  bisacodyl, 10 mg, Rectal, Daily  buPROPion SR, 100 mg, Oral, Daily  castor  oil-balsam peru, 1 application, Topical, Q12H  cefTRIAXone, 1 g, Intravenous, Q24H  DAPTOmycin (CUBICIN)  IV, 8 mg/kg (Adjusted), Intravenous, Q24H  DULoxetine, 30 mg, Oral, QAM  enoxaparin, 40 mg, Subcutaneous, Q24H  hydrALAZINE, 50 mg, Oral, Q8H  lactobacillus acidophilus, 1 capsule, Oral, Daily  levETIRAcetam, 500 mg, Oral, Q12H  levothyroxine, 125 mcg, Oral, Daily  melatonin, 5 mg, Oral, Nightly  multivitamin, 1 tablet, Oral, Daily  Nutrisource fiber, 2 packet, Oral, TID  nystatin, , Topical, Q8H  pantoprazole, 40 mg, Oral, Daily  sodium chloride, 10 mL, Intravenous, Q12H      Continuous Infusions:   PRN Meds:.•  acetaminophen **OR** acetaminophen **OR** acetaminophen  •  senna-docusate sodium **AND** polyethylene glycol **AND** bisacodyl **AND** bisacodyl  •  calcium carbonate  •  cyclobenzaprine  •  ipratropium-albuterol  •  LORazepam  •  Morphine  •  ondansetron  •  ondansetron  •  oxyCODONE-acetaminophen  •  sodium chloride    Assessment/Plan   Assessment & Plan     Active Hospital Problems    Diagnosis  POA   • Acute UTI (urinary tract infection) [N39.0]  Yes   • Uncontrolled pain [R52]  Yes   • Severe malnutrition (CMS/HCC) [E43]  Yes   • Spinal cord compression [G95.20]  Yes   • Seizures on Keppra [R56.9]  Yes   • Quadriparesis [G82.50]  Yes   • Polysubstance abuse [F19.10]  Yes   • MDD (major depressive disorder) [F32.9]  Yes   • SLE [M32.9]  Yes   • Hypothyroidism [E03.9]  Yes   • 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        Brief Hospital Course to date:  Karely Villarreal is a 56 y.o. female  with h/o polysubstance abuse, HCV, HTN, Sz d/o, SLE, Hypothyroidism, CVA, prior COVID infection and homelessness.    She was admitted to [facility] on 10/24/21 for sepsis d/t MRSA and was found to have a paravertebral abscess and was transferred to [facility] on 11/6.  Surgery was deferred initially but she developed  worsening UE weakness and MRI showed progressive cord compression so she was taken to the OR emergently for decompression on 11/14.    She was extubated 11/15 but TF's were started d/t poor PO.  She was transferred initially to telemetry on 11/8 but developed worsening hypoxia with AMS and was transferred back to the ICU on bipap on 11/20.  She was transferred back to the hospitalist service on 11/23    MRSA bacteremia  Sepsis  T9/T10 Paravertebral Abscess, T9/T10 Osteomyelitis with spinal cord compression  --s/p decompression, cervical laminectomy and debridement of epidural abscess on 11/14  --on Vancomycin per ID, will need long course at least 12 weeks given the extent of her spinal infection and high grade bacteremia (starting at time of surgery on 11/14)  --repeat MRI C and T spine showed shows 2 fluid collections dorsally (one at C4 and the other more superficial at C6/C7) and T9/T10 still c/w vertebral osteomyelitis.  Re-evaluated by Dr. Jama who noted severe phlegmon circumferentially around cervical and upper thoracic area but no worsening compression, rec'd continue abx, no surgery needed at this time      Anemia  Hematuria   DOMO  --Hgb 8.9; Cr 1.41-stable  --Continues to drop  - fob negative  --Transfuse 1 unit PRBC 12/12  -s/p 1 unit PRBC on 12/3  -Iron 19, Iron sat 7   -Received dose of IV iron   --Suspect related to lupus , she had hematuria but it has mostly resolved   - urology consulted , follow up with DR Gardner outpatient   - nephrology following, lupus nephritis   -urine culture with urogenital batool, hold abx for UTI treatment  --AM labs    UTI  --Positive upon readmission  --IV Rocephin x5d; change to PO for discharge     Acute Respiratory failure  Right Pleural Effusion  Probable Bacterial PNA  --improved  --tolerating daily PO Lasix well   -- s/p 7 days of merrem and doxy per ID     Malnutrition  --Status post Dobbhoff with tube feeds     Diarrhea-resolved  --s/p rectal tube,  "improved  --Wound care following for skin breakdown on gluteal tissue  --Continue fiber supplement   --Last BM 12/12; increased bowel meds     Bilateral Knee Pain  --Bilateral xrays showed large bilateral knee effusions, moderate osteoarthrosis worse in patellofemoral compartments  -PT/OT      LUE Edema  -LUE venous duplex on 11/7 negative for evidence of acute DVT  -Elevate extremity   -Repeat venous duplex 12/3 negative for DVT     HX Seizure Disorder  --continue keppra     Recent COVID PNA     Acute Right Axilla abscess  --culture positive for MRSA, continue vanc D8 - ID following    - IV vanc for 12 weeks through 2/7 at least     Polysubstance abuse  --UDS positive for meth/opiates, self medicating at [facility] with klonopin and oxycodone and went into respiratory depression     SLE/Discoid lupus  --holding plaquenil due to severity of active infection, but suspect possible lupus nephritis v other GN with ongoing infection and off maintenance immunosuppression     Insomnia   -Melatonin PRN      Hx CVA  - PT/OT    DVT prophylaxis:  Medical DVT prophylaxis orders are present.       AM-PAC 6 Clicks Score (PT): 7 (12/13/21 1600)    Disposition: Pateint was returned to hospital mid transport to [facility] d/t \"pain\"; CM to attempt to get to LTAC again and LTAC bed at [facility] in [place] has agreed to take her on Thursday and [facility] will transport on 12/16 @0900; patient is agreement with plan    CODE STATUS:   Code Status and Medical Interventions:   Ordered at: 12/11/21 1558     Code Status (Patient has no pulse and is not breathing):    CPR (Attempt to Resuscitate)     Medical Interventions (Patient has pulse or is breathing):    Full Support       Cora Rosas, MP  12/15/21              "

## 2021-12-15 NOTE — PLAN OF CARE
Goal Outcome Evaluation:  Plan of Care Reviewed With: patient        Progress: improving   VSS, on 2L NC. Voiding well. Pain controlled with PO meds. Slept well.

## 2021-12-15 NOTE — NURSING NOTE
Daily Low Terrence <=14   Terrence score: 14  No new concerns noted per staff. Interventions in place and documented per protocol. Apply barrier cream daily/PRN. Keep patient dry and turn regularly. Elevate and offload heels.  Contact [facility] for any concerns.  On [place].

## 2021-12-16 VITALS
HEIGHT: 66 IN | OXYGEN SATURATION: 96 % | TEMPERATURE: 97.8 F | SYSTOLIC BLOOD PRESSURE: 145 MMHG | DIASTOLIC BLOOD PRESSURE: 80 MMHG | RESPIRATION RATE: 16 BRPM | HEART RATE: 103 BPM | BODY MASS INDEX: 29.73 KG/M2 | WEIGHT: 185 LBS

## 2021-12-16 PROCEDURE — 63710000001 ONDANSETRON PER 8 MG: Performed by: FAMILY MEDICINE

## 2021-12-16 PROCEDURE — 25010000002 CEFTRIAXONE PER 250 MG: Performed by: NURSE PRACTITIONER

## 2021-12-16 PROCEDURE — 99239 HOSP IP/OBS DSCHRG MGMT >30: CPT | Performed by: NURSE PRACTITIONER

## 2021-12-16 RX ORDER — OXYCODONE HYDROCHLORIDE AND ACETAMINOPHEN 5; 325 MG/1; MG/1
1 TABLET ORAL ONCE
Status: COMPLETED | OUTPATIENT
Start: 2021-12-16 | End: 2021-12-16

## 2021-12-16 RX ADMIN — LEVETIRACETAM 500 MG: 500 TABLET, FILM COATED ORAL at 08:12

## 2021-12-16 RX ADMIN — ONDANSETRON HYDROCHLORIDE 4 MG: 4 TABLET, FILM COATED ORAL at 03:15

## 2021-12-16 RX ADMIN — BUPROPION HYDROCHLORIDE 100 MG: 100 TABLET, FILM COATED, EXTENDED RELEASE ORAL at 08:13

## 2021-12-16 RX ADMIN — HYDRALAZINE HYDROCHLORIDE 50 MG: 50 TABLET, FILM COATED ORAL at 05:46

## 2021-12-16 RX ADMIN — DULOXETINE HYDROCHLORIDE 30 MG: 30 CAPSULE, DELAYED RELEASE ORAL at 08:13

## 2021-12-16 RX ADMIN — OXYCODONE HYDROCHLORIDE AND ACETAMINOPHEN 1 TABLET: 5; 325 TABLET ORAL at 03:54

## 2021-12-16 RX ADMIN — AMLODIPINE BESYLATE 5 MG: 5 TABLET ORAL at 08:13

## 2021-12-16 RX ADMIN — CASTOR OIL AND BALSAM, PERU 1 APPLICATION: 788; 87 OINTMENT TOPICAL at 08:11

## 2021-12-16 RX ADMIN — LEVOTHYROXINE SODIUM 125 MCG: 125 TABLET ORAL at 05:46

## 2021-12-16 RX ADMIN — Medication 1 CAPSULE: at 08:12

## 2021-12-16 RX ADMIN — NYSTATIN: 100000 POWDER TOPICAL at 05:52

## 2021-12-16 RX ADMIN — Medication 2 PACKET: at 08:14

## 2021-12-16 RX ADMIN — MULTIVITAMIN TABLET 1 TABLET: TABLET at 08:12

## 2021-12-16 RX ADMIN — PANTOPRAZOLE SODIUM 40 MG: 40 TABLET, DELAYED RELEASE ORAL at 05:46

## 2021-12-16 RX ADMIN — LORAZEPAM 1 MG: 1 TABLET ORAL at 08:13

## 2021-12-16 RX ADMIN — OXYCODONE HYDROCHLORIDE AND ACETAMINOPHEN 1 TABLET: 5; 325 TABLET ORAL at 08:11

## 2021-12-16 RX ADMIN — SODIUM CHLORIDE 1 G: 900 INJECTION INTRAVENOUS at 08:12

## 2021-12-16 RX ADMIN — CYCLOBENZAPRINE 5 MG: 10 TABLET, FILM COATED ORAL at 08:13

## 2021-12-16 NOTE — PLAN OF CARE
Goal Outcome Evaluation:      Patient alert and oriented. VSS. Two doses PRN pain medication administered (Oxycodone). Repositioned Q2H.   Soap suds enema administered this morning resulting in extra large bm.  Patient aware and anticipating transfer tomorrow morning.

## 2021-12-16 NOTE — DISCHARGE PLACEMENT REQUEST
"Melyssa Saunders (56 y.o. Female)             Date of Birth Social Security Number Address Home Phone MRN    1965  [address] [id number] [phone] [id number]    Confucianism Marital Status             Judaism        Admission Date Admission Type Admitting Provider Attending Provider Department, Room/Bed    12/11/21 Emergency Va Grissom MD Olariu, Eva, MD [facility] 3H, S370/1    Discharge Date Discharge Disposition Discharge Destination           Skilled Nursing Facility (DC - External)              Attending Provider: Va Grissom MD    Allergies: Compazine [Prochlorperazine Edisylate], Imitrex [Sumatriptan], Nsaids, Reglan [Metoclopramide], Solu-medrol [Methylprednisolone], Zyprexa [Olanzapine], Prednisone    Isolation: None   Infection: MRSA (10/26/21), COVID (History) (11/15/21)   Code Status: CPR   Advance Care Planning Activity    Ht: 167.6 cm (66\")   Wt: 83.9 kg (185 lb)    Admission Cmt: None   Principal Problem: None                Active Insurance as of 12/11/2021     Primary Coverage     Payor Plan Insurance Group Employer/Plan Group    MEDICARE MEDICARE A & B      Payor Plan Address Payor Plan Phone Number Payor Plan Fax Number Effective Dates    [address] [phone]  4/1/2015 - None Entered    [facility] [id number]       Subscriber Name Subscriber Birth Date Member ID       MELYSSA SAUNDERS 1965 [id number]           Secondary Coverage     Payor Plan Insurance Group Employer/Plan Group    [id number]      Payor Plan Address Payor Plan Phone Number Payor Plan Fax Number Effective Dates    [address] 11/1/2020 - None Entered    [place] [address]       Subscriber Name Subscriber Birth Date Member ID       MELYSSA SAUNDERS 1965 [id number]                 Emergency Contacts      (Rel.) Home Phone Work Phone Mobile Phone    Kate Saunders (Mother) -- -- [phone]               Discharge Summary      Cora Rosas, " APRN at 21 0808              [facility] Hospital Medicine Services  DISCHARGE SUMMARY    Patient Name: Karely Villarreal  : 1965  MRN: [id number]    Date of Admission: 2021 12:50 PM  Date of Discharge:  2021  Primary Care Physician: Tracie Levi APRN    Consults     Date and Time Order Name Status Description    2021 10:43 AM Inpatient Nephrology Consult Completed     2021 12:40 PM Inpatient Urology Consult Completed     2021 12:35 AM Inpatient Cardiothoracic Surgery Consult Completed     2021 12:35 AM Inpatient Neurosurgery Consult Completed     2021 10:42 PM Inpatient Infectious Diseases Consult Completed           Hospital Course     Presenting Problem:   Uncontrolled pain [R52]    Active Hospital Problems    Diagnosis  POA   • Acute UTI (urinary tract infection) [N39.0]  Yes   • Uncontrolled pain [R52]  Yes   • Severe malnutrition (CMS/HCC) [E43]  Yes   • Spinal cord compression [G95.20]  Yes   • Seizures on Keppra [R56.9]  Yes   • Quadriparesis [G82.50]  Yes   • Polysubstance abuse [F19.10]  Yes   • MDD (major depressive disorder) [F32.9]  Yes   • SLE [M32.9]  Yes   • Hypothyroidism [E03.9]  Yes   • 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          Hospital Course:  Karely Villarreal is a 56 y.o. female with a history of polysubstance abuse, HCV, hypertension, seizure disorder, SLE, hypothyroidism, CVA, prior Covid infection, and homelessness.  She was admitted to [facility] on 10/24/2021 for sepsis due to MRSA and was found to have a paravertebral abscess.  She was transferred to [facility] on 2021.  Surgery was deferred initially but she developed worsening upper extremity weakness and an MRI showed progressive cord compression so she was taken to the OR emergently for decompression on 2021.  She was extubated  on the 15th.  She required tube feeds due to poor oral intake.  She was transferred initially to telemetry on 11/8/2021 but developed worsening hypoxia with altered mental status and was transferred back to the ICU on BiPAP on the 20th.  She eventually improved and was transferred back to the hospitalist service on the 23rd.  The patient was discharged to [facility] specialty in [facility] on December 11.  She was set up with [facility] van as transport.  During transport, the patient developed uncontrolled pain and was brought back to the emergency department.  She was readmitted to the hospital and her uncontrolled pain was addressed.  Ambulance transportation was scheduled but could not be obtained earlier than today.  She has remained medically stable and is ready for discharge to select specialty in [facility].  She will be transferred there by [facility].  Please see prior discharge summary for other information.      Discharge Follow Up Recommendations for outpatient labs/diagnostics:  Follow-up with PCP once discharged from LTAC infectious disease will follow at LTAC  Consider nephrology at LTAC  Follow-up with Dr. Gardner with urology    Day of Discharge     HPI:   Resting comfortably in bed.  Pain currently controlled.  There was about transfer today.    Review of Systems  Gen- No fevers, chills  CV- No chest pain, palpitations  Resp- No cough, dyspnea  GI- No N/V/D, abd pain        Vital Signs:   Temp:  [96.9 °F (36.1 °C)-98.2 °F (36.8 °C)] 97.8 °F (36.6 °C)  Heart Rate:  [] 103  Resp:  [16-18] 16  BP: (130-148)/(80-93) 145/80     Physical Exam:  Constitutional: No acute distress, awake, alert, resting in bed  HENT: NCAT, mucous membranes moist  Respiratory: Clear to auscultation bilaterally, respiratory effort normal on 2LNC  Cardiovascular: RRR, no murmurs, rubs, or gallops  Gastrointestinal: Positive bowel sounds, soft, nontender, nondistended  Musculoskeletal:  No bilateral ankle edema  Psychiatric: Appropriate affect, cooperative  Neurologic: Oriented x 3, strength symmetric in all extremities, Cranial Nerves grossly intact to confrontation, speech clear  Skin: No rashes noted to exposed skin     Pertinent  and/or Most Recent Results     LAB RESULTS:      Lab 12/15/21  0543 12/13/21  0535 12/12/21  0818 12/10/21  1212 12/09/21  1538   WBC 7.47 8.00 6.64 6.92  --    HEMOGLOBIN 8.8* 8.9* 7.0* 7.5*  --    HEMATOCRIT 28.6* 28.4* 23.9* 24.8*  --    PLATELETS 249 263 246 206  --    NEUTROS ABS  --   --  4.47 5.16  --    IMMATURE GRANS (ABS)  --   --  0.04 0.04  --    LYMPHS ABS  --   --  1.35 1.07  --    MONOS ABS  --   --  0.59 0.45  --    EOS ABS  --   --  0.16 0.17  --    MCV 88.0 86.9 91.2 88.6  --    LDH  --   --   --   --  173         Lab 12/15/21  0544 12/13/21  0535 12/12/21  0818 12/10/21  1212 12/09/21  1538   SODIUM 141 137 138 141  --    POTASSIUM 3.6 3.5 3.7 3.4*  --    CHLORIDE 97* 94* 93* 95*  --    CO2 35.0* 36.0* 36.0* 39.0*  --    ANION GAP 9.0 7.0 9.0 7.0  --    BUN 31* 30* 27* 25*  --    CREATININE 1.70* 1.41* 1.38* 1.41*  --    GLUCOSE 93 109* 88 139*  --    CALCIUM 10.1 9.7 9.8 9.3  --    MAGNESIUM  --  1.8 1.8  --   --    PHOSPHORUS  --   --   --   --  4.2         Lab 12/13/21  0535   TOTAL PROTEIN 6.6   ALBUMIN 2.70*   GLOBULIN 3.9   ALT (SGPT) 13   AST (SGOT) 20   BILIRUBIN 0.3   ALK PHOS 180*                 Lab 12/12/21  1023   ABO TYPING O   RH TYPING Negative   ANTIBODY SCREEN Negative         Brief Urine Lab Results  (Last result in the past 365 days)      Color   Clarity   Blood   Leuk Est   Nitrite   Protein   CREAT   Urine HCG        12/13/21 0116 Red   Cloudy   Large (3+)   Large (3+)   Negative   >=300 mg/dL (3+)               Microbiology Results (last 10 days)     ** No results found for the last 240 hours. **          No radiology results for the last 10 days    Results for orders placed during the hospital encounter of 11/06/21    Duplex  Venous Upper Extremity - Left CAR    Interpretation Summary  · Normal left upper extremity venous duplex scan.      Results for orders placed during the hospital encounter of 11/06/21    Duplex Venous Upper Extremity - Left CAR    Interpretation Summary  · Normal left upper extremity venous duplex scan.      Results for orders placed during the hospital encounter of 10/24/21    Adult Transthoracic Echo Complete W/ Cont if Necessary Per Protocol    Interpretation Summary  · Left ventricular ejection fraction appears to be 61 - 65%. Left ventricular systolic function is normal.  · Left ventricular diastolic function was normal.  · Estimated right ventricular systolic pressure from tricuspid regurgitation is normal (<35 mmHg).  · No vegetations seen.  · This is a technically difficult study.      Plan for Follow-up of Pending Labs/Results:     Discharge Details        Discharge Medications      Continue These Medications      Instructions Start Date   acetaminophen 325 MG tablet  Commonly known as: TYLENOL   650 mg, Oral, Every 4 Hours PRN      amLODIPine 5 MG tablet  Commonly known as: NORVASC   5 mg, Oral, Every 24 Hours Scheduled      buPROPion  MG 12 hr tablet  Commonly known as: WELLBUTRIN SR   60 mg, Oral, 2 Times Daily      castor oil-balsam peru ointment   1 application, Topical, Every 12 Hours Scheduled      cyclobenzaprine 5 MG tablet  Commonly known as: FLEXERIL   5 mg, Oral, 3 Times Daily PRN      DAPTOmycin 550 mg in sodium chloride 0.9 % 50 mL   8 mg/kg (550 mg), Intravenous, Every 24 Hours      DULoxetine 30 MG capsule  Commonly known as: CYMBALTA   30 mg, Oral, Every Morning      hydrALAZINE 50 MG tablet  Commonly known as: APRESOLINE   50 mg, Oral, Every 8 Hours Scheduled      ipratropium-albuterol 0.5-2.5 mg/3 ml nebulizer  Commonly known as: DUO-NEB   3 mL, Nebulization, Every 6 Hours PRN      lactobacillus acidophilus capsule capsule   1 capsule, Oral, Daily      levETIRAcetam 500 MG  tablet  Commonly known as: KEPPRA   500 mg, Oral, Every 12 Hours Scheduled      levothyroxine 125 MCG tablet  Commonly known as: SYNTHROID, LEVOTHROID   125 mcg, Oral, Daily      LORazepam 1 MG tablet  Commonly known as: ATIVAN   1 mg, Oral, Every 6 Hours PRN      melatonin 5 MG tablet tablet   5 mg, Oral, Nightly      multivitamin tablet tablet   1 tablet, Oral, Daily      Nutrisource fiber pack pack   2 packets, Oral, 3 Times Daily      nystatin 959688 UNIT/GM powder  Commonly known as: MYCOSTATIN   Topical, Every 8 Hours Scheduled      ondansetron 4 MG tablet  Commonly known as: ZOFRAN   4 mg, Oral, Every 6 Hours PRN      oxyCODONE-acetaminophen 5-325 MG per tablet  Commonly known as: PERCOCET   1 tablet, Oral, Every 6 Hours PRN      pantoprazole 40 MG EC tablet  Commonly known as: PROTONIX   40 mg, Oral, Daily      sennosides-docusate 8.6-50 MG per tablet  Commonly known as: PERICOLACE   1 tablet, Oral, Nightly             Allergies   Allergen Reactions   • Compazine [Prochlorperazine Edisylate] Dystonia   • Imitrex [Sumatriptan] Other (See Comments)     Previous stroke   • Nsaids GI Bleeding   • Reglan [Metoclopramide] Dystonia   • Solu-Medrol [Methylprednisolone] Dystonia   • Zyprexa [Olanzapine] Swelling   • Prednisone Anxiety         Discharge Disposition: To [facility]      Diet:  Hospital:  Diet Order   Procedures   • Diet Regular       Activity:  Activity Instructions     Activity as Tolerated                 CODE STATUS:    Code Status and Medical Interventions:   Ordered at: 12/11/21 1551     Code Status (Patient has no pulse and is not breathing):    CPR (Attempt to Resuscitate)     Medical Interventions (Patient has pulse or is breathing):    Full Support       No future appointments.    Additional Instructions for the Follow-ups that You Need to Schedule     Discharge Follow-up with PCP   As directed       Currently Documented PCP:    Tracie Levi APRN    PCP Phone Number:     [phone]     Follow Up Details: once discharged from rehab         Discharge Follow-up with Specified Provider: Dr. Gardner, urology once discharged from rehab.   As directed      To: Dr. Gardner, urology once discharged from rehab.         Discharge Follow-up with Specified Provider: ID per their recommendations   As directed      To: ID per their recommendations                     MP Gleason  12/16/21      Time Spent on Discharge:  I spent  35 minutes on this discharge activity which included: face-to-face encounter with the patient, reviewing the data in the system, coordination of the care with the nursing staff as well as consultants, documentation, and entering orders.             Electronically signed by Cora Rosas APRN at 12/16/21 0854

## 2021-12-16 NOTE — DISCHARGE SUMMARY
[facility] Hospital Medicine Services  DISCHARGE SUMMARY    Patient Name: Karely Villarreal  : 1965  MRN: [id number]    Date of Admission: 2021 12:50 PM  Date of Discharge:  2021  Primary Care Physician: Tracie Levi APRN    Consults     Date and Time Order Name Status Description    2021 10:43 AM Inpatient Nephrology Consult Completed     2021 12:40 PM Inpatient Urology Consult Completed     2021 12:35 AM Inpatient Cardiothoracic Surgery Consult Completed     2021 12:35 AM Inpatient Neurosurgery Consult Completed     2021 10:42 PM Inpatient Infectious Diseases Consult Completed           Hospital Course     Presenting Problem:   Uncontrolled pain [R52]    Active Hospital Problems    Diagnosis  POA   • Acute UTI (urinary tract infection) [N39.0]  Yes   • Uncontrolled pain [R52]  Yes   • Severe malnutrition (CMS/HCC) [E43]  Yes   • Spinal cord compression [G95.20]  Yes   • Seizures on Keppra [R56.9]  Yes   • Quadriparesis [G82.50]  Yes   • Polysubstance abuse [F19.10]  Yes   • MDD (major depressive disorder) [F32.9]  Yes   • SLE [M32.9]  Yes   • Hypothyroidism [E03.9]  Yes   • 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          Hospital Course:  Karely Villarreal is a 56 y.o. female with a history of polysubstance abuse, HCV, hypertension, seizure disorder, SLE, hypothyroidism, CVA, prior Covid infection, and homelessness.  She was admitted to [facility] on 10/24/2021 for sepsis due to MRSA and was found to have a paravertebral abscess.  She was transferred to [facility] on 2021.  Surgery was deferred initially but she developed worsening upper extremity weakness and an MRI showed progressive cord compression so she was taken to the OR emergently for decompression on 2021.  She was extubated on the .  She required  tube feeds due to poor oral intake.  She was transferred initially to telemetry on 11/8/2021 but developed worsening hypoxia with altered mental status and was transferred back to the ICU on BiPAP on the 20th.  She eventually improved and was transferred back to the hospitalist service on the 23rd.  The patient was discharged to [facility] in [facility] on December 11.  She was set up with [facility] stretcher van as transport.  During transport, the patient developed uncontrolled pain and was brought back to the emergency department.  She was readmitted to the hospital and her uncontrolled pain was addressed.  Ambulance transportation was scheduled but could not be obtained earlier than today.  She has remained medically stable and is ready for discharge to [facility] specialty in [facility].  She will be transferred there by [facility].  Please see prior discharge summary for other information.      Discharge Follow Up Recommendations for outpatient labs/diagnostics:  Follow-up with PCP once discharged from LTAC infectious disease will follow at LTAC  Consider nephrology at [facility]  Follow-up with Dr. Gardner with urology    Day of Discharge     HPI:   Resting comfortably in bed.  Pain currently controlled.  There was about transfer today.    Review of Systems  Gen- No fevers, chills  CV- No chest pain, palpitations  Resp- No cough, dyspnea  GI- No N/V/D, abd pain        Vital Signs:   Temp:  [96.9 °F (36.1 °C)-98.2 °F (36.8 °C)] 97.8 °F (36.6 °C)  Heart Rate:  [] 103  Resp:  [16-18] 16  BP: (130-148)/(80-93) 145/80     Physical Exam:  Constitutional: No acute distress, awake, alert, resting in bed  HENT: NCAT, mucous membranes moist  Respiratory: Clear to auscultation bilaterally, respiratory effort normal on 2LNC  Cardiovascular: RRR, no murmurs, rubs, or gallops  Gastrointestinal: Positive bowel sounds, soft, nontender, nondistended  Musculoskeletal: No bilateral ankle  edema  Psychiatric: Appropriate affect, cooperative  Neurologic: Oriented x 3, strength symmetric in all extremities, Cranial Nerves grossly intact to confrontation, speech clear  Skin: No rashes noted to exposed skin     Pertinent  and/or Most Recent Results     LAB RESULTS:      Lab 12/15/21  0543 12/13/21  0535 12/12/21  0818 12/10/21  1212 12/09/21  1538   WBC 7.47 8.00 6.64 6.92  --    HEMOGLOBIN 8.8* 8.9* 7.0* 7.5*  --    HEMATOCRIT 28.6* 28.4* 23.9* 24.8*  --    PLATELETS 249 263 246 206  --    NEUTROS ABS  --   --  4.47 5.16  --    IMMATURE GRANS (ABS)  --   --  0.04 0.04  --    LYMPHS ABS  --   --  1.35 1.07  --    MONOS ABS  --   --  0.59 0.45  --    EOS ABS  --   --  0.16 0.17  --    MCV 88.0 86.9 91.2 88.6  --    LDH  --   --   --   --  173         Lab 12/15/21  0544 12/13/21  0535 12/12/21  0818 12/10/21  1212 12/09/21  1538   SODIUM 141 137 138 141  --    POTASSIUM 3.6 3.5 3.7 3.4*  --    CHLORIDE 97* 94* 93* 95*  --    CO2 35.0* 36.0* 36.0* 39.0*  --    ANION GAP 9.0 7.0 9.0 7.0  --    BUN 31* 30* 27* 25*  --    CREATININE 1.70* 1.41* 1.38* 1.41*  --    GLUCOSE 93 109* 88 139*  --    CALCIUM 10.1 9.7 9.8 9.3  --    MAGNESIUM  --  1.8 1.8  --   --    PHOSPHORUS  --   --   --   --  4.2         Lab 12/13/21  0535   TOTAL PROTEIN 6.6   ALBUMIN 2.70*   GLOBULIN 3.9   ALT (SGPT) 13   AST (SGOT) 20   BILIRUBIN 0.3   ALK PHOS 180*                 Lab 12/12/21  1023   ABO TYPING O   RH TYPING Negative   ANTIBODY SCREEN Negative         Brief Urine Lab Results  (Last result in the past 365 days)      Color   Clarity   Blood   Leuk Est   Nitrite   Protein   CREAT   Urine HCG        12/13/21 0116 Red   Cloudy   Large (3+)   Large (3+)   Negative   >=300 mg/dL (3+)               Microbiology Results (last 10 days)     ** No results found for the last 240 hours. **          No radiology results for the last 10 days    Results for orders placed during the hospital encounter of 11/06/21    Duplex Venous Upper  Extremity - Left CAR    Interpretation Summary  · Normal left upper extremity venous duplex scan.      Results for orders placed during the hospital encounter of 11/06/21    Duplex Venous Upper Extremity - Left CAR    Interpretation Summary  · Normal left upper extremity venous duplex scan.      Results for orders placed during the hospital encounter of 10/24/21    Adult Transthoracic Echo Complete W/ Cont if Necessary Per Protocol    Interpretation Summary  · Left ventricular ejection fraction appears to be 61 - 65%. Left ventricular systolic function is normal.  · Left ventricular diastolic function was normal.  · Estimated right ventricular systolic pressure from tricuspid regurgitation is normal (<35 mmHg).  · No vegetations seen.  · This is a technically difficult study.      Plan for Follow-up of Pending Labs/Results:     Discharge Details        Discharge Medications      Continue These Medications      Instructions Start Date   acetaminophen 325 MG tablet  Commonly known as: TYLENOL   650 mg, Oral, Every 4 Hours PRN      amLODIPine 5 MG tablet  Commonly known as: NORVASC   5 mg, Oral, Every 24 Hours Scheduled      buPROPion  MG 12 hr tablet  Commonly known as: WELLBUTRIN SR   60 mg, Oral, 2 Times Daily      castor oil-balsam peru ointment   1 application, Topical, Every 12 Hours Scheduled      cyclobenzaprine 5 MG tablet  Commonly known as: FLEXERIL   5 mg, Oral, 3 Times Daily PRN      DAPTOmycin 550 mg in sodium chloride 0.9 % 50 mL   8 mg/kg (550 mg), Intravenous, Every 24 Hours      DULoxetine 30 MG capsule  Commonly known as: CYMBALTA   30 mg, Oral, Every Morning      hydrALAZINE 50 MG tablet  Commonly known as: APRESOLINE   50 mg, Oral, Every 8 Hours Scheduled      ipratropium-albuterol 0.5-2.5 mg/3 ml nebulizer  Commonly known as: DUO-NEB   3 mL, Nebulization, Every 6 Hours PRN      lactobacillus acidophilus capsule capsule   1 capsule, Oral, Daily      levETIRAcetam 500 MG tablet  Commonly known  as: KEPPRA   500 mg, Oral, Every 12 Hours Scheduled      levothyroxine 125 MCG tablet  Commonly known as: SYNTHROID, LEVOTHROID   125 mcg, Oral, Daily      LORazepam 1 MG tablet  Commonly known as: ATIVAN   1 mg, Oral, Every 6 Hours PRN      melatonin 5 MG tablet tablet   5 mg, Oral, Nightly      multivitamin tablet tablet   1 tablet, Oral, Daily      Nutrisource fiber pack pack   2 packets, Oral, 3 Times Daily      nystatin [id number] UNIT/GM powder  Commonly known as: MYCOSTATIN   Topical, Every 8 Hours Scheduled      ondansetron 4 MG tablet  Commonly known as: ZOFRAN   4 mg, Oral, Every 6 Hours PRN      oxyCODONE-acetaminophen 5-325 MG per tablet  Commonly known as: PERCOCET   1 tablet, Oral, Every 6 Hours PRN      pantoprazole 40 MG EC tablet  Commonly known as: PROTONIX   40 mg, Oral, Daily      sennosides-docusate 8.6-50 MG per tablet  Commonly known as: PERICOLACE   1 tablet, Oral, Nightly             Allergies   Allergen Reactions   • Compazine [Prochlorperazine Edisylate] Dystonia   • Imitrex [Sumatriptan] Other (See Comments)     Previous stroke   • Nsaids GI Bleeding   • Reglan [Metoclopramide] Dystonia   • Solu-Medrol [Methylprednisolone] Dystonia   • Zyprexa [Olanzapine] Swelling   • Prednisone Anxiety         Discharge Disposition: To [facility]      Diet:  Hospital:  Diet Order   Procedures   • Diet Regular       Activity:  Activity Instructions     Activity as Tolerated                 CODE STATUS:    Code Status and Medical Interventions:   Ordered at: 12/11/21 1554     Code Status (Patient has no pulse and is not breathing):    CPR (Attempt to Resuscitate)     Medical Interventions (Patient has pulse or is breathing):    Full Support       No future appointments.    Additional Instructions for the Follow-ups that You Need to Schedule     Discharge Follow-up with PCP   As directed       Currently Documented PCP:    Tracie Levi APRN    PCP Phone Number:    [phone]     Follow  Up Details: once discharged from rehab         Discharge Follow-up with Specified Provider: Dr. Gardner, urology once discharged from rehab.   As directed      To: Dr. Gardner, urology once discharged from rehab.         Discharge Follow-up with Specified Provider: ID per their recommendations   As directed      To: ID per their recommendations                     MP Gleason  12/16/21      Time Spent on Discharge:  I spent  35 minutes on this discharge activity which included: face-to-face encounter with the patient, reviewing the data in the system, coordination of the care with the nursing staff as well as consultants, documentation, and entering orders.

## 2021-12-26 LAB
FUNGUS WND CULT: NORMAL
FUNGUS WND CULT: NORMAL
MYCOBACTERIUM SPEC CULT: NORMAL
MYCOBACTERIUM SPEC CULT: NORMAL
NIGHT BLUE STAIN TISS: NORMAL
NIGHT BLUE STAIN TISS: NORMAL

## 2022-04-08 ENCOUNTER — HOSPITAL ENCOUNTER (EMERGENCY)
Facility: HOSPITAL | Age: 57
Discharge: ANOTHER HOSPITAL | End: 2022-04-09
Attending: STUDENT IN AN ORGANIZED HEALTH CARE EDUCATION/TRAINING PROGRAM | Admitting: STUDENT IN AN ORGANIZED HEALTH CARE EDUCATION/TRAINING PROGRAM

## 2022-04-08 ENCOUNTER — APPOINTMENT (OUTPATIENT)
Dept: CT IMAGING | Facility: HOSPITAL | Age: 57
End: 2022-04-08

## 2022-04-08 ENCOUNTER — APPOINTMENT (OUTPATIENT)
Dept: GENERAL RADIOLOGY | Facility: HOSPITAL | Age: 57
End: 2022-04-08

## 2022-04-08 DIAGNOSIS — J18.9 PNEUMONITIS: ICD-10-CM

## 2022-04-08 DIAGNOSIS — A41.9 SEPSIS, DUE TO UNSPECIFIED ORGANISM, UNSPECIFIED WHETHER ACUTE ORGAN DYSFUNCTION PRESENT: Primary | ICD-10-CM

## 2022-04-08 DIAGNOSIS — I21.4 NSTEMI (NON-ST ELEVATED MYOCARDIAL INFARCTION): ICD-10-CM

## 2022-04-08 DIAGNOSIS — M46.24 OSTEOMYELITIS OF THORACIC SPINE: ICD-10-CM

## 2022-04-08 DIAGNOSIS — E87.6 HYPOKALEMIA: ICD-10-CM

## 2022-04-08 DIAGNOSIS — J96.01 ACUTE RESPIRATORY FAILURE WITH HYPOXIA: ICD-10-CM

## 2022-04-08 DIAGNOSIS — M46.44 DISCITIS OF THORACIC REGION: ICD-10-CM

## 2022-04-08 LAB
A-A DO2: 37.5 MMHG (ref 0–300)
ALBUMIN SERPL-MCNC: 3.9 G/DL (ref 3.5–5.2)
ALBUMIN/GLOB SERPL: 1.1 G/DL
ALP SERPL-CCNC: 190 U/L (ref 39–117)
ALT SERPL W P-5'-P-CCNC: 552 U/L (ref 1–33)
ANION GAP SERPL CALCULATED.3IONS-SCNC: 15.3 MMOL/L (ref 5–15)
APTT PPP: 35.3 SECONDS (ref 26.5–34.5)
ARTERIAL PATENCY WRIST A: ABNORMAL
AST SERPL-CCNC: 454 U/L (ref 1–32)
ATMOSPHERIC PRESS: 722 MMHG
BASE EXCESS BLDA CALC-SCNC: 1.7 MMOL/L (ref 0–2)
BASOPHILS # BLD AUTO: 0.09 10*3/MM3 (ref 0–0.2)
BASOPHILS NFR BLD AUTO: 0.7 % (ref 0–1.5)
BDY SITE: ABNORMAL
BILIRUB SERPL-MCNC: 0.6 MG/DL (ref 0–1.2)
BODY TEMPERATURE: 0 C
BUN SERPL-MCNC: 21 MG/DL (ref 6–20)
BUN/CREAT SERPL: 18.6 (ref 7–25)
CALCIUM SPEC-SCNC: 10.7 MG/DL (ref 8.6–10.5)
CHLORIDE SERPL-SCNC: 90 MMOL/L (ref 98–107)
CO2 BLDA-SCNC: 29.9 MMOL/L (ref 22–33)
CO2 SERPL-SCNC: 23.7 MMOL/L (ref 22–29)
COHGB MFR BLD: 1.4 % (ref 0–5)
CREAT SERPL-MCNC: 1.13 MG/DL (ref 0.57–1)
CRP SERPL-MCNC: 16.16 MG/DL (ref 0–0.5)
D-LACTATE SERPL-SCNC: 1.5 MMOL/L (ref 0.5–2)
DEPRECATED RDW RBC AUTO: 44.1 FL (ref 37–54)
EGFRCR SERPLBLD CKD-EPI 2021: 57.6 ML/MIN/1.73
EOSINOPHIL # BLD AUTO: 0.26 10*3/MM3 (ref 0–0.4)
EOSINOPHIL NFR BLD AUTO: 2 % (ref 0.3–6.2)
ERYTHROCYTE [DISTWIDTH] IN BLOOD BY AUTOMATED COUNT: 14.3 % (ref 12.3–15.4)
FLUAV SUBTYP SPEC NAA+PROBE: NOT DETECTED
FLUBV RNA ISLT QL NAA+PROBE: NOT DETECTED
GLOBULIN UR ELPH-MCNC: 3.6 GM/DL
GLUCOSE SERPL-MCNC: 96 MG/DL (ref 65–99)
HCO3 BLDA-SCNC: 28.3 MMOL/L (ref 20–26)
HCT VFR BLD AUTO: 38.5 % (ref 34–46.6)
HCT VFR BLD CALC: 35.4 % (ref 38–51)
HGB BLD-MCNC: 11.8 G/DL (ref 12–15.9)
HGB BLDA-MCNC: 11.6 G/DL (ref 13.5–17.5)
HOLD SPECIMEN: NORMAL
HOLD SPECIMEN: NORMAL
IMM GRANULOCYTES # BLD AUTO: 0.06 10*3/MM3 (ref 0–0.05)
IMM GRANULOCYTES NFR BLD AUTO: 0.5 % (ref 0–0.5)
INHALED O2 CONCENTRATION: 21 %
INR PPP: 1.07 (ref 0.9–1.1)
LYMPHOCYTES # BLD AUTO: 1.7 10*3/MM3 (ref 0.7–3.1)
LYMPHOCYTES NFR BLD AUTO: 12.8 % (ref 19.6–45.3)
Lab: ABNORMAL
Lab: ABNORMAL
MAGNESIUM SERPL-MCNC: 1.8 MG/DL (ref 1.6–2.6)
MCH RBC QN AUTO: 25.9 PG (ref 26.6–33)
MCHC RBC AUTO-ENTMCNC: 30.6 G/DL (ref 31.5–35.7)
MCV RBC AUTO: 84.6 FL (ref 79–97)
METHGB BLD QL: 0.2 % (ref 0–3)
MODALITY: ABNORMAL
MONOCYTES # BLD AUTO: 0.74 10*3/MM3 (ref 0.1–0.9)
MONOCYTES NFR BLD AUTO: 5.6 % (ref 5–12)
NEUTROPHILS NFR BLD AUTO: 10.41 10*3/MM3 (ref 1.7–7)
NEUTROPHILS NFR BLD AUTO: 78.4 % (ref 42.7–76)
NOTE: ABNORMAL
NOTIFIED BY: ABNORMAL
NOTIFIED WHO: ABNORMAL
NRBC BLD AUTO-RTO: 0 /100 WBC (ref 0–0.2)
NT-PROBNP SERPL-MCNC: 1581 PG/ML (ref 0–900)
OXYHGB MFR BLDV: 75.7 % (ref 94–99)
PCO2 BLDA: 52.5 MM HG (ref 35–45)
PCO2 TEMP ADJ BLD: ABNORMAL MM[HG]
PH BLDA: 7.34 PH UNITS (ref 7.35–7.45)
PH, TEMP CORRECTED: ABNORMAL
PLATELET # BLD AUTO: 271 10*3/MM3 (ref 140–450)
PMV BLD AUTO: 8.5 FL (ref 6–12)
PO2 BLDA: 44.9 MM HG (ref 83–108)
PO2 TEMP ADJ BLD: ABNORMAL MM[HG]
POTASSIUM SERPL-SCNC: 3.3 MMOL/L (ref 3.5–5.2)
PROCALCITONIN SERPL-MCNC: 1.29 NG/ML (ref 0–0.25)
PROT SERPL-MCNC: 7.5 G/DL (ref 6–8.5)
PROTHROMBIN TIME: 14.2 SECONDS (ref 12.1–14.7)
RBC # BLD AUTO: 4.55 10*6/MM3 (ref 3.77–5.28)
SAO2 % BLDCOA: 76.9 % (ref 94–99)
SARS-COV-2 RNA PNL SPEC NAA+PROBE: NOT DETECTED
SODIUM SERPL-SCNC: 129 MMOL/L (ref 136–145)
TROPONIN T SERPL-MCNC: 0.04 NG/ML (ref 0–0.03)
TROPONIN T SERPL-MCNC: 0.05 NG/ML (ref 0–0.03)
VENTILATOR MODE: ABNORMAL
WBC NRBC COR # BLD: 13.26 10*3/MM3 (ref 3.4–10.8)
WHOLE BLOOD HOLD SPECIMEN: NORMAL
WHOLE BLOOD HOLD SPECIMEN: NORMAL

## 2022-04-08 PROCEDURE — 84443 ASSAY THYROID STIM HORMONE: CPT | Performed by: STUDENT IN AN ORGANIZED HEALTH CARE EDUCATION/TRAINING PROGRAM

## 2022-04-08 PROCEDURE — 36600 WITHDRAWAL OF ARTERIAL BLOOD: CPT

## 2022-04-08 PROCEDURE — 99285 EMERGENCY DEPT VISIT HI MDM: CPT

## 2022-04-08 PROCEDURE — 25010000002 AZITHROMYCIN PER 500 MG: Performed by: STUDENT IN AN ORGANIZED HEALTH CARE EDUCATION/TRAINING PROGRAM

## 2022-04-08 PROCEDURE — 86140 C-REACTIVE PROTEIN: CPT | Performed by: STUDENT IN AN ORGANIZED HEALTH CARE EDUCATION/TRAINING PROGRAM

## 2022-04-08 PROCEDURE — 83050 HGB METHEMOGLOBIN QUAN: CPT

## 2022-04-08 PROCEDURE — 82805 BLOOD GASES W/O2 SATURATION: CPT

## 2022-04-08 PROCEDURE — 84439 ASSAY OF FREE THYROXINE: CPT | Performed by: STUDENT IN AN ORGANIZED HEALTH CARE EDUCATION/TRAINING PROGRAM

## 2022-04-08 PROCEDURE — 80053 COMPREHEN METABOLIC PANEL: CPT | Performed by: STUDENT IN AN ORGANIZED HEALTH CARE EDUCATION/TRAINING PROGRAM

## 2022-04-08 PROCEDURE — 84145 PROCALCITONIN (PCT): CPT | Performed by: STUDENT IN AN ORGANIZED HEALTH CARE EDUCATION/TRAINING PROGRAM

## 2022-04-08 PROCEDURE — 83880 ASSAY OF NATRIURETIC PEPTIDE: CPT | Performed by: STUDENT IN AN ORGANIZED HEALTH CARE EDUCATION/TRAINING PROGRAM

## 2022-04-08 PROCEDURE — 94799 UNLISTED PULMONARY SVC/PX: CPT

## 2022-04-08 PROCEDURE — 71045 X-RAY EXAM CHEST 1 VIEW: CPT

## 2022-04-08 PROCEDURE — 93005 ELECTROCARDIOGRAM TRACING: CPT | Performed by: STUDENT IN AN ORGANIZED HEALTH CARE EDUCATION/TRAINING PROGRAM

## 2022-04-08 PROCEDURE — 85730 THROMBOPLASTIN TIME PARTIAL: CPT | Performed by: STUDENT IN AN ORGANIZED HEALTH CARE EDUCATION/TRAINING PROGRAM

## 2022-04-08 PROCEDURE — 87040 BLOOD CULTURE FOR BACTERIA: CPT | Performed by: STUDENT IN AN ORGANIZED HEALTH CARE EDUCATION/TRAINING PROGRAM

## 2022-04-08 PROCEDURE — 83735 ASSAY OF MAGNESIUM: CPT | Performed by: STUDENT IN AN ORGANIZED HEALTH CARE EDUCATION/TRAINING PROGRAM

## 2022-04-08 PROCEDURE — 85025 COMPLETE CBC W/AUTO DIFF WBC: CPT | Performed by: STUDENT IN AN ORGANIZED HEALTH CARE EDUCATION/TRAINING PROGRAM

## 2022-04-08 PROCEDURE — 96368 THER/DIAG CONCURRENT INF: CPT

## 2022-04-08 PROCEDURE — 84484 ASSAY OF TROPONIN QUANT: CPT | Performed by: STUDENT IN AN ORGANIZED HEALTH CARE EDUCATION/TRAINING PROGRAM

## 2022-04-08 PROCEDURE — 87636 SARSCOV2 & INF A&B AMP PRB: CPT | Performed by: STUDENT IN AN ORGANIZED HEALTH CARE EDUCATION/TRAINING PROGRAM

## 2022-04-08 PROCEDURE — 96367 TX/PROPH/DG ADDL SEQ IV INF: CPT

## 2022-04-08 PROCEDURE — 25010000002 VANCOMYCIN 5 G RECONSTITUTED SOLUTION: Performed by: STUDENT IN AN ORGANIZED HEALTH CARE EDUCATION/TRAINING PROGRAM

## 2022-04-08 PROCEDURE — 71275 CT ANGIOGRAPHY CHEST: CPT

## 2022-04-08 PROCEDURE — 0 POTASSIUM CHLORIDE 10 MEQ/100ML SOLUTION: Performed by: STUDENT IN AN ORGANIZED HEALTH CARE EDUCATION/TRAINING PROGRAM

## 2022-04-08 PROCEDURE — 25010000002 PIPERACILLIN SOD-TAZOBACTAM PER 1 G: Performed by: STUDENT IN AN ORGANIZED HEALTH CARE EDUCATION/TRAINING PROGRAM

## 2022-04-08 PROCEDURE — 96365 THER/PROPH/DIAG IV INF INIT: CPT

## 2022-04-08 PROCEDURE — 85610 PROTHROMBIN TIME: CPT | Performed by: STUDENT IN AN ORGANIZED HEALTH CARE EDUCATION/TRAINING PROGRAM

## 2022-04-08 PROCEDURE — 82375 ASSAY CARBOXYHB QUANT: CPT

## 2022-04-08 PROCEDURE — 83605 ASSAY OF LACTIC ACID: CPT | Performed by: STUDENT IN AN ORGANIZED HEALTH CARE EDUCATION/TRAINING PROGRAM

## 2022-04-08 RX ORDER — SODIUM CHLORIDE 0.9 % (FLUSH) 0.9 %
10 SYRINGE (ML) INJECTION AS NEEDED
Status: DISCONTINUED | OUTPATIENT
Start: 2022-04-08 | End: 2022-04-09 | Stop reason: HOSPADM

## 2022-04-08 RX ORDER — ASPIRIN 81 MG/1
324 TABLET, CHEWABLE ORAL ONCE
Status: COMPLETED | OUTPATIENT
Start: 2022-04-08 | End: 2022-04-09

## 2022-04-08 RX ORDER — POTASSIUM CHLORIDE 7.45 MG/ML
10 INJECTION INTRAVENOUS
Status: COMPLETED | OUTPATIENT
Start: 2022-04-08 | End: 2022-04-09

## 2022-04-08 RX ADMIN — SODIUM CHLORIDE 1000 ML: 9 INJECTION, SOLUTION INTRAVENOUS at 20:39

## 2022-04-08 RX ADMIN — IOPAMIDOL 81 ML: 755 INJECTION, SOLUTION INTRAVENOUS at 23:59

## 2022-04-08 RX ADMIN — VANCOMYCIN HYDROCHLORIDE 1500 MG: 5 INJECTION, POWDER, LYOPHILIZED, FOR SOLUTION INTRAVENOUS at 23:29

## 2022-04-08 RX ADMIN — PIPERACILLIN SODIUM AND TAZOBACTAM SODIUM 3.38 G: 3; .375 INJECTION, POWDER, LYOPHILIZED, FOR SOLUTION INTRAVENOUS at 21:17

## 2022-04-08 RX ADMIN — AZITHROMYCIN MONOHYDRATE 500 MG: 500 INJECTION, POWDER, LYOPHILIZED, FOR SOLUTION INTRAVENOUS at 23:46

## 2022-04-08 RX ADMIN — POTASSIUM CHLORIDE 10 MEQ: 7.46 INJECTION, SOLUTION INTRAVENOUS at 23:46

## 2022-04-09 VITALS
HEIGHT: 66 IN | TEMPERATURE: 98.7 F | SYSTOLIC BLOOD PRESSURE: 117 MMHG | WEIGHT: 170 LBS | OXYGEN SATURATION: 97 % | DIASTOLIC BLOOD PRESSURE: 66 MMHG | HEART RATE: 93 BPM | RESPIRATION RATE: 22 BRPM | BODY MASS INDEX: 27.32 KG/M2

## 2022-04-09 LAB
QT INTERVAL: 280 MS
QT INTERVAL: 324 MS
QTC INTERVAL: 399 MS
QTC INTERVAL: 434 MS
T4 FREE SERPL-MCNC: 1.1 NG/DL (ref 0.93–1.7)
TROPONIN T SERPL-MCNC: 0.04 NG/ML (ref 0–0.03)
TSH SERPL DL<=0.05 MIU/L-ACNC: 6.58 UIU/ML (ref 0.27–4.2)

## 2022-04-09 PROCEDURE — 0 IOPAMIDOL PER 1 ML: Performed by: STUDENT IN AN ORGANIZED HEALTH CARE EDUCATION/TRAINING PROGRAM

## 2022-04-09 PROCEDURE — 96366 THER/PROPH/DIAG IV INF ADDON: CPT

## 2022-04-09 PROCEDURE — 36415 COLL VENOUS BLD VENIPUNCTURE: CPT

## 2022-04-09 PROCEDURE — 96365 THER/PROPH/DIAG IV INF INIT: CPT

## 2022-04-09 PROCEDURE — 0 POTASSIUM CHLORIDE 10 MEQ/100ML SOLUTION: Performed by: STUDENT IN AN ORGANIZED HEALTH CARE EDUCATION/TRAINING PROGRAM

## 2022-04-09 PROCEDURE — 93005 ELECTROCARDIOGRAM TRACING: CPT | Performed by: STUDENT IN AN ORGANIZED HEALTH CARE EDUCATION/TRAINING PROGRAM

## 2022-04-09 PROCEDURE — 84484 ASSAY OF TROPONIN QUANT: CPT | Performed by: STUDENT IN AN ORGANIZED HEALTH CARE EDUCATION/TRAINING PROGRAM

## 2022-04-09 PROCEDURE — 25010000002 PIPERACILLIN SOD-TAZOBACTAM PER 1 G: Performed by: STUDENT IN AN ORGANIZED HEALTH CARE EDUCATION/TRAINING PROGRAM

## 2022-04-09 RX ORDER — ACETAMINOPHEN 500 MG
1000 TABLET ORAL ONCE
Status: COMPLETED | OUTPATIENT
Start: 2022-04-09 | End: 2022-04-09

## 2022-04-09 RX ADMIN — ASPIRIN 324 MG: 81 TABLET, CHEWABLE ORAL at 00:28

## 2022-04-09 RX ADMIN — PIPERACILLIN SODIUM AND TAZOBACTAM SODIUM 3.38 G: 3; .375 INJECTION, POWDER, LYOPHILIZED, FOR SOLUTION INTRAVENOUS at 07:34

## 2022-04-09 RX ADMIN — POTASSIUM CHLORIDE 10 MEQ: 7.46 INJECTION, SOLUTION INTRAVENOUS at 01:15

## 2022-04-09 RX ADMIN — ACETAMINOPHEN 1000 MG: 500 TABLET ORAL at 11:24

## 2022-04-09 NOTE — PROGRESS NOTES
Pharmacy was consulted for vancomycin dosing for PNA/sepsis.  Based on pt parameters will initiate vancomycin at 1500mg iv q24hrs to target trough levels of 15-20 mg/L and AUC around 500.  Pharmacy will continue to follow and obtain trough level once steady state is achieved.  Thank you.

## 2022-04-09 NOTE — ED NOTES
Called [facility] for possible transfer, spoke with Candice, she is getting the neurosurgeon Dr. Donahue for Dr. Wall.

## 2022-04-09 NOTE — ED NOTES
Called [facility] for ALS transfer to [facility], awaiting call back from [facility].

## 2022-04-09 NOTE — ED NOTES
Called [facility] for ALS transfer to [facility], awaiting call back from [facility].
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
## 2022-04-09 NOTE — ED NOTES
Per Dr. Alexandru AGUILA, Pt to transport to [facility] Unit [place] made aware.

## 2022-04-13 LAB
BACTERIA SPEC AEROBE CULT: NORMAL
BACTERIA SPEC AEROBE CULT: NORMAL

## 2022-04-14 ENCOUNTER — TELEPHONE (OUTPATIENT)
Dept: FAMILY MEDICINE CLINIC | Facility: CLINIC | Age: 57
End: 2022-04-14

## 2022-04-14 ENCOUNTER — READMISSION MANAGEMENT (OUTPATIENT)
Dept: CALL CENTER | Facility: HOSPITAL | Age: 57
End: 2022-04-14

## 2022-04-14 NOTE — OUTREACH NOTE
Prep Survey    Flowsheet Row Responses   [facility] facility patient discharged from? Non-[facility]   Is LACE score < 7 ? Non-BH Discharge   Emergency Room discharge w/ pulse ox? No   Eligibility [facility]   Date of Discharge 04/14/22   Discharge diagnosis OSTEOMYELITIS   Does the patient have one of the following disease processes/diagnoses(primary or secondary)? Other   Prep survey completed? Yes          DAYTON MOON - Registered Nurse

## 2022-04-14 NOTE — TELEPHONE ENCOUNTER
Caller: LAKEISHA    Relationship to patient: HERMES MURILLO     Best call back number: [phone]    New or established patient?  [] New  [x] Established    Date of discharge: 4-14-22      Facility discharged from: [facility]     Diagnosis/Symptoms: OSTEOMYELITIS    Length of stay (If applicable):  6 DAYS     Specialty Only: Did you see a [employer] [facility] provider?    [] Yes  [x] No  If so, who?

## 2022-04-15 ENCOUNTER — TRANSITIONAL CARE MANAGEMENT TELEPHONE ENCOUNTER (OUTPATIENT)
Dept: CALL CENTER | Facility: HOSPITAL | Age: 57
End: 2022-04-15

## 2022-04-15 NOTE — OUTREACH NOTE
Call Center TCM Note    Flowsheet Row Responses   [facility] patient discharged from? Non-BH   Does the patient have one of the following disease processes/diagnoses(primary or secondary)? Other   TCM attempt successful? No   Unsuccessful attempts Attempt 1  [No verbal release withing timeframe- VM has not been set up ]          Rosa Aldana RN    4/15/2022, 11:13 EDT

## 2022-04-15 NOTE — OUTREACH NOTE
Call Center TCM Note    Flowsheet Row Responses   [facility] patient discharged from? Non-BH   Does the patient have one of the following disease processes/diagnoses(primary or secondary)? Other   TCM attempt successful? No   Unsuccessful attempts Attempt 2          Rosa Aldana RN    4/15/2022, 14:24 EDT

## 2022-04-18 ENCOUNTER — APPOINTMENT (OUTPATIENT)
Dept: CT IMAGING | Facility: HOSPITAL | Age: 57
End: 2022-04-18

## 2022-04-18 ENCOUNTER — HOSPITAL ENCOUNTER (EMERGENCY)
Facility: HOSPITAL | Age: 57
Discharge: LEFT BEFORE DISCHARGE | End: 2022-04-18
Attending: STUDENT IN AN ORGANIZED HEALTH CARE EDUCATION/TRAINING PROGRAM | Admitting: STUDENT IN AN ORGANIZED HEALTH CARE EDUCATION/TRAINING PROGRAM

## 2022-04-18 ENCOUNTER — APPOINTMENT (OUTPATIENT)
Dept: GENERAL RADIOLOGY | Facility: HOSPITAL | Age: 57
End: 2022-04-18

## 2022-04-18 ENCOUNTER — TRANSITIONAL CARE MANAGEMENT TELEPHONE ENCOUNTER (OUTPATIENT)
Dept: CALL CENTER | Facility: HOSPITAL | Age: 57
End: 2022-04-18

## 2022-04-18 VITALS
HEART RATE: 103 BPM | TEMPERATURE: 97.6 F | HEIGHT: 66 IN | SYSTOLIC BLOOD PRESSURE: 143 MMHG | BODY MASS INDEX: 26.84 KG/M2 | RESPIRATION RATE: 15 BRPM | DIASTOLIC BLOOD PRESSURE: 70 MMHG | WEIGHT: 167 LBS | OXYGEN SATURATION: 90 %

## 2022-04-18 DIAGNOSIS — T40.1X1A ACCIDENTAL OVERDOSE OF HEROIN, INITIAL ENCOUNTER: Primary | ICD-10-CM

## 2022-04-18 DIAGNOSIS — R74.8 ELEVATED LIVER ENZYMES: ICD-10-CM

## 2022-04-18 DIAGNOSIS — E87.6 HYPOKALEMIA: ICD-10-CM

## 2022-04-18 LAB
A-A DO2: 24.2 MMHG (ref 0–300)
ALBUMIN SERPL-MCNC: 3.96 G/DL (ref 3.5–5.2)
ALBUMIN/GLOB SERPL: 1 G/DL
ALP SERPL-CCNC: 300 U/L (ref 39–117)
ALT SERPL W P-5'-P-CCNC: 326 U/L (ref 1–33)
AMMONIA BLD-SCNC: 46 UMOL/L (ref 11–51)
AMPHET+METHAMPHET UR QL: NEGATIVE
AMPHETAMINES UR QL: NEGATIVE
ANION GAP SERPL CALCULATED.3IONS-SCNC: 18.2 MMOL/L (ref 5–15)
APAP SERPL-MCNC: <5 MCG/ML (ref 0–30)
ARTERIAL PATENCY WRIST A: ABNORMAL
AST SERPL-CCNC: 260 U/L (ref 1–32)
ATMOSPHERIC PRESS: 726 MMHG
BACTERIA UR QL AUTO: ABNORMAL /HPF
BARBITURATES UR QL SCN: NEGATIVE
BASE EXCESS BLDA CALC-SCNC: 3.3 MMOL/L (ref 0–2)
BASOPHILS # BLD AUTO: 0.09 10*3/MM3 (ref 0–0.2)
BASOPHILS NFR BLD AUTO: 0.6 % (ref 0–1.5)
BDY SITE: ABNORMAL
BENZODIAZ UR QL SCN: NEGATIVE
BILIRUB SERPL-MCNC: 0.4 MG/DL (ref 0–1.2)
BILIRUB UR QL STRIP: NEGATIVE
BODY TEMPERATURE: 0 C
BUN SERPL-MCNC: 22 MG/DL (ref 6–20)
BUN/CREAT SERPL: 19.1 (ref 7–25)
BUPRENORPHINE SERPL-MCNC: NEGATIVE NG/ML
CALCIUM SPEC-SCNC: 11 MG/DL (ref 8.6–10.5)
CANNABINOIDS SERPL QL: NEGATIVE
CHLORIDE SERPL-SCNC: 94 MMOL/L (ref 98–107)
CLARITY UR: CLEAR
CO2 BLDA-SCNC: 28.8 MMOL/L (ref 22–33)
CO2 SERPL-SCNC: 22.8 MMOL/L (ref 22–29)
COCAINE UR QL: NEGATIVE
COHGB MFR BLD: 1 % (ref 0–5)
COLOR UR: YELLOW
CREAT SERPL-MCNC: 1.15 MG/DL (ref 0.57–1)
CRP SERPL-MCNC: 2.3 MG/DL (ref 0–0.5)
D-LACTATE SERPL-SCNC: 1 MMOL/L (ref 0.5–2)
DEPRECATED RDW RBC AUTO: 45.4 FL (ref 37–54)
EGFRCR SERPLBLD CKD-EPI 2021: 56.4 ML/MIN/1.73
EOSINOPHIL # BLD AUTO: 0.16 10*3/MM3 (ref 0–0.4)
EOSINOPHIL NFR BLD AUTO: 1.1 % (ref 0.3–6.2)
ERYTHROCYTE [DISTWIDTH] IN BLOOD BY AUTOMATED COUNT: 15.3 % (ref 12.3–15.4)
ETHANOL BLD-MCNC: <10 MG/DL (ref 0–10)
ETHANOL UR QL: <0.01 %
GLOBULIN UR ELPH-MCNC: 3.8 GM/DL
GLUCOSE BLDC GLUCOMTR-MCNC: 134 MG/DL (ref 70–130)
GLUCOSE SERPL-MCNC: 144 MG/DL (ref 65–99)
GLUCOSE UR STRIP-MCNC: NEGATIVE MG/DL
HAV IGM SERPL QL IA: ABNORMAL
HBV CORE IGM SERPL QL IA: ABNORMAL
HBV SURFACE AG SERPL QL IA: ABNORMAL
HCO3 BLDA-SCNC: 27.6 MMOL/L (ref 20–26)
HCT VFR BLD AUTO: 44.3 % (ref 34–46.6)
HCT VFR BLD CALC: 43.3 % (ref 38–51)
HCV AB SER DONR QL: REACTIVE
HGB BLD-MCNC: 13.9 G/DL (ref 12–15.9)
HGB BLDA-MCNC: 14.1 G/DL (ref 13.5–17.5)
HGB UR QL STRIP.AUTO: ABNORMAL
HOLD SPECIMEN: NORMAL
HOLD SPECIMEN: NORMAL
HYALINE CASTS UR QL AUTO: ABNORMAL /LPF
IMM GRANULOCYTES # BLD AUTO: 0.09 10*3/MM3 (ref 0–0.05)
IMM GRANULOCYTES NFR BLD AUTO: 0.6 % (ref 0–0.5)
INHALED O2 CONCENTRATION: 21 %
KETONES UR QL STRIP: NEGATIVE
LEUKOCYTE ESTERASE UR QL STRIP.AUTO: ABNORMAL
LYMPHOCYTES # BLD AUTO: 1.28 10*3/MM3 (ref 0.7–3.1)
LYMPHOCYTES NFR BLD AUTO: 9 % (ref 19.6–45.3)
Lab: ABNORMAL
MAGNESIUM SERPL-MCNC: 1.9 MG/DL (ref 1.6–2.6)
MCH RBC QN AUTO: 25.8 PG (ref 26.6–33)
MCHC RBC AUTO-ENTMCNC: 31.4 G/DL (ref 31.5–35.7)
MCV RBC AUTO: 82.2 FL (ref 79–97)
METHADONE UR QL SCN: NEGATIVE
METHGB BLD QL: 0 % (ref 0–3)
MODALITY: ABNORMAL
MONOCYTES # BLD AUTO: 0.64 10*3/MM3 (ref 0.1–0.9)
MONOCYTES NFR BLD AUTO: 4.5 % (ref 5–12)
NEUTROPHILS NFR BLD AUTO: 12.04 10*3/MM3 (ref 1.7–7)
NEUTROPHILS NFR BLD AUTO: 84.2 % (ref 42.7–76)
NITRITE UR QL STRIP: NEGATIVE
NOTE: ABNORMAL
NRBC BLD AUTO-RTO: 0 /100 WBC (ref 0–0.2)
OPIATES UR QL: NEGATIVE
OXYCODONE UR QL SCN: POSITIVE
OXYHGB MFR BLDV: 94.9 % (ref 94–99)
PCO2 BLDA: 39.8 MM HG (ref 35–45)
PCO2 TEMP ADJ BLD: ABNORMAL MM[HG]
PCP UR QL SCN: NEGATIVE
PH BLDA: 7.45 PH UNITS (ref 7.35–7.45)
PH UR STRIP.AUTO: <=5 [PH] (ref 5–8)
PH, TEMP CORRECTED: ABNORMAL
PLATELET # BLD AUTO: 341 10*3/MM3 (ref 140–450)
PMV BLD AUTO: 8.4 FL (ref 6–12)
PO2 BLDA: 73.5 MM HG (ref 83–108)
PO2 TEMP ADJ BLD: ABNORMAL MM[HG]
POTASSIUM SERPL-SCNC: 2.4 MMOL/L (ref 3.5–5.2)
PROCALCITONIN SERPL-MCNC: 0.15 NG/ML (ref 0–0.25)
PROPOXYPH UR QL: NEGATIVE
PROT SERPL-MCNC: 7.8 G/DL (ref 6–8.5)
PROT UR QL STRIP: ABNORMAL
RBC # BLD AUTO: 5.39 10*6/MM3 (ref 3.77–5.28)
RBC # UR STRIP: ABNORMAL /HPF
REF LAB TEST METHOD: ABNORMAL
SALICYLATES SERPL-MCNC: <0.3 MG/DL
SAO2 % BLDCOA: 95.9 % (ref 94–99)
SODIUM SERPL-SCNC: 135 MMOL/L (ref 136–145)
SP GR UR STRIP: 1.01 (ref 1–1.03)
SQUAMOUS #/AREA URNS HPF: ABNORMAL /HPF
TRICYCLICS UR QL SCN: NEGATIVE
TROPONIN T SERPL-MCNC: 0.04 NG/ML (ref 0–0.03)
TROPONIN T SERPL-MCNC: 0.06 NG/ML (ref 0–0.03)
UROBILINOGEN UR QL STRIP: ABNORMAL
VENTILATOR MODE: ABNORMAL
WBC # UR STRIP: ABNORMAL /HPF
WBC NRBC COR # BLD: 14.3 10*3/MM3 (ref 3.4–10.8)
WHOLE BLOOD HOLD SPECIMEN: NORMAL
WHOLE BLOOD HOLD SPECIMEN: NORMAL
YEAST URNS QL MICRO: ABNORMAL /HPF

## 2022-04-18 PROCEDURE — 84145 PROCALCITONIN (PCT): CPT | Performed by: PHYSICIAN ASSISTANT

## 2022-04-18 PROCEDURE — 71250 CT THORAX DX C-: CPT | Performed by: RADIOLOGY

## 2022-04-18 PROCEDURE — 80074 ACUTE HEPATITIS PANEL: CPT | Performed by: NURSE PRACTITIONER

## 2022-04-18 PROCEDURE — 82077 ASSAY SPEC XCP UR&BREATH IA: CPT | Performed by: PHYSICIAN ASSISTANT

## 2022-04-18 PROCEDURE — 70450 CT HEAD/BRAIN W/O DYE: CPT

## 2022-04-18 PROCEDURE — 83735 ASSAY OF MAGNESIUM: CPT | Performed by: PHYSICIAN ASSISTANT

## 2022-04-18 PROCEDURE — 86140 C-REACTIVE PROTEIN: CPT | Performed by: PHYSICIAN ASSISTANT

## 2022-04-18 PROCEDURE — 36600 WITHDRAWAL OF ARTERIAL BLOOD: CPT

## 2022-04-18 PROCEDURE — 82375 ASSAY CARBOXYHB QUANT: CPT

## 2022-04-18 PROCEDURE — 83050 HGB METHEMOGLOBIN QUAN: CPT

## 2022-04-18 PROCEDURE — 80053 COMPREHEN METABOLIC PANEL: CPT | Performed by: PHYSICIAN ASSISTANT

## 2022-04-18 PROCEDURE — 83605 ASSAY OF LACTIC ACID: CPT | Performed by: PHYSICIAN ASSISTANT

## 2022-04-18 PROCEDURE — 74176 CT ABD & PELVIS W/O CONTRAST: CPT

## 2022-04-18 PROCEDURE — 36415 COLL VENOUS BLD VENIPUNCTURE: CPT

## 2022-04-18 PROCEDURE — 93005 ELECTROCARDIOGRAM TRACING: CPT | Performed by: PHYSICIAN ASSISTANT

## 2022-04-18 PROCEDURE — 82805 BLOOD GASES W/O2 SATURATION: CPT

## 2022-04-18 PROCEDURE — P9612 CATHETERIZE FOR URINE SPEC: HCPCS

## 2022-04-18 PROCEDURE — 80306 DRUG TEST PRSMV INSTRMNT: CPT | Performed by: PHYSICIAN ASSISTANT

## 2022-04-18 PROCEDURE — 70450 CT HEAD/BRAIN W/O DYE: CPT | Performed by: RADIOLOGY

## 2022-04-18 PROCEDURE — 74176 CT ABD & PELVIS W/O CONTRAST: CPT | Performed by: RADIOLOGY

## 2022-04-18 PROCEDURE — 80179 DRUG ASSAY SALICYLATE: CPT | Performed by: PHYSICIAN ASSISTANT

## 2022-04-18 PROCEDURE — 87040 BLOOD CULTURE FOR BACTERIA: CPT | Performed by: PHYSICIAN ASSISTANT

## 2022-04-18 PROCEDURE — 80143 DRUG ASSAY ACETAMINOPHEN: CPT | Performed by: PHYSICIAN ASSISTANT

## 2022-04-18 PROCEDURE — 71250 CT THORAX DX C-: CPT

## 2022-04-18 PROCEDURE — 82140 ASSAY OF AMMONIA: CPT | Performed by: PHYSICIAN ASSISTANT

## 2022-04-18 PROCEDURE — 99284 EMERGENCY DEPT VISIT MOD MDM: CPT

## 2022-04-18 PROCEDURE — 84484 ASSAY OF TROPONIN QUANT: CPT | Performed by: PHYSICIAN ASSISTANT

## 2022-04-18 PROCEDURE — 71045 X-RAY EXAM CHEST 1 VIEW: CPT | Performed by: RADIOLOGY

## 2022-04-18 PROCEDURE — 71045 X-RAY EXAM CHEST 1 VIEW: CPT

## 2022-04-18 PROCEDURE — 81001 URINALYSIS AUTO W/SCOPE: CPT | Performed by: PHYSICIAN ASSISTANT

## 2022-04-18 PROCEDURE — 85025 COMPLETE CBC W/AUTO DIFF WBC: CPT | Performed by: PHYSICIAN ASSISTANT

## 2022-04-18 PROCEDURE — 93010 ELECTROCARDIOGRAM REPORT: CPT | Performed by: INTERNAL MEDICINE

## 2022-04-18 PROCEDURE — 82962 GLUCOSE BLOOD TEST: CPT

## 2022-04-18 RX ORDER — POTASSIUM CHLORIDE 7.45 MG/ML
10 INJECTION INTRAVENOUS ONCE
Status: DISCONTINUED | OUTPATIENT
Start: 2022-04-18 | End: 2022-04-18

## 2022-04-18 RX ORDER — SODIUM CHLORIDE 0.9 % (FLUSH) 0.9 %
10 SYRINGE (ML) INJECTION AS NEEDED
Status: DISCONTINUED | OUTPATIENT
Start: 2022-04-18 | End: 2022-04-18 | Stop reason: HOSPADM

## 2022-04-18 RX ORDER — POTASSIUM CHLORIDE 20 MEQ/1
20 TABLET, EXTENDED RELEASE ORAL 2 TIMES DAILY
Qty: 6 TABLET | Refills: 0 | Status: ON HOLD | OUTPATIENT
Start: 2022-04-18 | End: 2022-09-22

## 2022-04-18 RX ORDER — POTASSIUM CHLORIDE 20 MEQ/1
40 TABLET, EXTENDED RELEASE ORAL ONCE
Status: COMPLETED | OUTPATIENT
Start: 2022-04-18 | End: 2022-04-18

## 2022-04-18 RX ADMIN — SODIUM CHLORIDE 1000 ML: 9 INJECTION, SOLUTION INTRAVENOUS at 15:43

## 2022-04-18 RX ADMIN — SODIUM CHLORIDE 1000 ML: 9 INJECTION, SOLUTION INTRAVENOUS at 13:50

## 2022-04-18 RX ADMIN — POTASSIUM CHLORIDE 40 MEQ: 20 TABLET, EXTENDED RELEASE ORAL at 15:39

## 2022-04-18 NOTE — OUTREACH NOTE
Call Center TCM Note    Flowsheet Row Responses   [facility] facility patient discharged from? Non-  [[facility]]   Does the patient have one of the following disease processes/diagnoses(primary or secondary)? Other   TCM attempt successful? No  [no verbal release]   Unsuccessful attempts Attempt 3   Comments regarding PCP Hospital PCP FOLLOW UP APPOINTMENT IS 4/18/22@115pm          Allison Hassan RN    4/18/2022, 10:45 EDT

## 2022-04-18 NOTE — DISCHARGE INSTRUCTIONS
Follow-up with your family doctor in the office tomorrow to keep a close eye on your potassium.  Return to the ED at any time.

## 2022-04-18 NOTE — ED PROVIDER NOTES
"Subjective   56-year-old female who presents to the ED via EMS due to a suspected heroin overdose.  EMS advised that she was found unresponsive at the [address] in [place].  It was reported to them that police officers on scene gave rescue breaths and administered a total of 8 mg of Narcan intranasally.  The patient then became responsive.  The patient did report to EMS that she has a history of snorting heroin.  She told me that she last used some heroin at 10 AM today.  She is currently awake and alert and reports that she feels \"groggy.\"  She denies any chest pain or shortness of breath.  She denies any abdominal pain.  She denies any nausea, vomiting or diarrhea.  She denies any other complaints at this time.      History provided by:  Patient and EMS personnel  Drug Overdose  Severity:  Severe  Onset quality:  Sudden  Duration: within the last 2 hours.  Timing:  Constant  Progression:  Improving  Chronicity:  New  Associated symptoms: fatigue and loss of consciousness    Associated symptoms: no abdominal pain, no chest pain, no diarrhea, no fever, no headaches, no nausea, no shortness of breath and no vomiting        Review of Systems   Constitutional: Positive for fatigue. Negative for fever.   HENT: Negative.    Eyes: Negative.    Respiratory: Negative for shortness of breath.    Cardiovascular: Negative for chest pain.   Gastrointestinal: Negative for abdominal pain, diarrhea, nausea and vomiting.   Genitourinary: Negative.    Musculoskeletal: Negative.    Skin: Negative.    Neurological: Positive for loss of consciousness. Negative for headaches.   Psychiatric/Behavioral: Negative for suicidal ideas.   All other systems reviewed and are negative.      Past Medical History:   Diagnosis Date   • Anxiety    • Brain tumor (HCC)    • Chronic headache    • Depression    • Diastolic CHF, chronic (HCC)    • DVT (deep venous thrombosis) (HCC)     left leg   • Encephalitis 12/2016    treated at the " [facility]   • Epilepsy ([facility])    • Gastric ulcer with perforation (HCC) 03/2016    Microperforation and air in the biliary tree   • Gastritis    • Heart disease    • Henoch-Schonlein purpura (HCC)    • History of transfusion    • Hypertension    • Hypothyroidism (acquired)     Removed due to groiter   • Kidney disease    • Lower GI bleeding    • Lupus (HCC)    • Memory disorder    • Migraine    • Mixed connective tissue disease (HCC)    • MRSA cellulitis    • Panic disorder    • Patent foramen ovale    • Pneumonia    • PTSD (post-traumatic stress disorder)     trauma from 911   • PVC (premature ventricular contraction)    • RA (rheumatoid arthritis) (HCC)    • Renal disorder    • Rhabdomyolysis    • Seizures (HCC)     when had encephalitis   • Sjogren's syndrome (HCC)    • Stroke (HCC) 09/2015    x 1   • Systemic lupus erythematosus (HCC)     Discoid and systemic   • Temporal arteritis (HCC)    • Thyroid disease    • TIA (transient ischemic attack)     x 3   • Ulcer, stomach peptic, chronic        Allergies   Allergen Reactions   • Compazine [Prochlorperazine Edisylate] Dystonia   • Imitrex [Sumatriptan] Other (See Comments)     Previous stroke   • Nsaids GI Bleeding   • Reglan [Metoclopramide] Dystonia   • Solu-Medrol [Methylprednisolone] Dystonia   • Zyprexa [Olanzapine] Swelling   • Prednisone Anxiety       Past Surgical History:   Procedure Laterality Date   • APPENDECTOMY     • CARDIAC CATHETERIZATION  08/2016    PFO repair and had a loop monitor placed at the [facility]   • CARDIAC SURGERY     • CENTRAL VENOUS LINE INSERTION N/A 10/28/2021    Procedure: Placement of central line;  Surgeon: Ruma Madden MD;  Location: [facility] OR;  Service: General;  Laterality: N/A;   • CERVICAL LAMINECTOMY DECOMPRESSION POSTERIOR Bilateral 11/14/2021    Procedure: CERVICAL LAMINECTOMY DECOMPRESSION POSTERIOR C3-6;  Surgeon: Destin Jama MD;  Location: [facility] OR;  Service: Neurosurgery;   Laterality: Bilateral;   •  SECTION      x 2   • ENDOSCOPY     • FACIAL RECONSTRUCTION SURGERY      clean out MRSA    • INCISION AND DRAINAGE ABSCESS Right 2019    Procedure: INCISION AND DRAINAGE ABSCESS RIGHT AXILLA;  Surgeon: Zak Martin MD;  Location: [facility] OR;  Service: General   • KNEE ARTHROSCOPY Left    • LUMBAR FUSION     • PORTACATH PLACEMENT Right 2016   • THYROID SURGERY      Removed due to a goiter   • VENOUS ACCESS DEVICE (PORT) REMOVAL N/A 10/28/2021    Procedure: Removal of Eyeqlj-d-Lhja.;  Surgeon: Ruma Madden MD;  Location: [facility] OR;  Service: General;  Laterality: N/A;       Family History   Problem Relation Age of Onset   • Hypertension Mother    • Arthritis Mother         RA   • Osteoarthritis Mother    • Heart disease Mother    • Hypertension Father    • Arthritis Father         RA   • Diabetes Father    • Heart disease Father        Social History     Socioeconomic History   • Marital status:    Tobacco Use   • Smoking status: Never Smoker   • Smokeless tobacco: Never Used   Substance and Sexual Activity   • Alcohol use: No   • Drug use: No   • Sexual activity: Yes     Partners: Male           Objective   Physical Exam  Vitals and nursing note reviewed.   Constitutional:       General: She is awake. She is not in acute distress.     Appearance: She is not diaphoretic.   HENT:      Head: Normocephalic and atraumatic.      Right Ear: External ear normal.      Left Ear: External ear normal.      Nose: Nose normal.      Mouth/Throat:      Mouth: Mucous membranes are moist.      Pharynx: Oropharynx is clear.   Eyes:      Extraocular Movements: Extraocular movements intact.      Conjunctiva/sclera: Conjunctivae normal.      Pupils: Pupils are equal, round, and reactive to light.   Cardiovascular:      Rate and Rhythm: Regular rhythm. Tachycardia present.      Pulses: Normal pulses.      Heart sounds: Normal heart sounds.   Pulmonary:      Effort: Pulmonary  effort is normal.      Breath sounds: Normal breath sounds. No wheezing or rhonchi.   Chest:      Chest wall: No tenderness.   Abdominal:      General: Bowel sounds are normal.      Palpations: Abdomen is soft.      Tenderness: There is no abdominal tenderness.   Musculoskeletal:      Cervical back: Normal range of motion and neck supple.      Comments: Patient is able to move all extremities equally   Skin:     General: Skin is warm and dry.      Capillary Refill: Capillary refill takes less than 2 seconds.   Neurological:      General: No focal deficit present.      Mental Status: She is alert and oriented to person, place, and time.         Procedures           ED Course  ED Course as of 04/18/22 1716   Mon Apr 18, 2022   1300 EKG noted sinus rhythm.  90 bpm.  .  .  QTc 421.  No acute ST elevation. [SF]   1332 XR Chest 1 View  IMPRESSION:    No acute cardiopulmonary findings identified. [MB]   1332 CT Head Without Contrast  IMPRESSION:    No acute findings in the head/brain. [MB]   1633 I reviewed patient's discharge summary from her recent visit to [facility] in [place].  She has chronic osteomyelitis of T9 and T10.  She was seen by infectious disease there who determined there was no current indication for antibiotic therapy and she did not require outpatient IV antibiotics.  She did have elevated transaminases while at [place] and was found to have hepatitis C.  She did undergo further testing with a GI consult due to her history of autoimmune disorders and those labs were pending at the time of her discharge on April 14. [AH]   1713 The patient states that she needs to leave right now because her mother is having a heart attack in [facility].  She states she has a ride that is going to take her there.  Her CT scans have not returned yet.  She will sign out AGAINST MEDICAL ADVICE.  She was advised to follow-up with her family doctor tomorrow to keep a close eye on her potassium.  A  prescription of potassium pills have been provided to her.  She will return to the ED at any time if her symptoms change or worsen. [AH]      ED Course User Index  [AH] Jeny Love PA  [MB] Pippa Sweet APRN  [SF] Lito Horvath DO                                                 MDM  Number of Diagnoses or Management Options     Amount and/or Complexity of Data Reviewed  Clinical lab tests: reviewed  Tests in the radiology section of CPT®: reviewed  Tests in the medicine section of CPT®: reviewed  Decide to obtain previous medical records or to obtain history from someone other than the patient: yes    Patient Progress  Patient progress: improved (Left AMA)      Final diagnoses:   Accidental overdose of heroin, initial encounter (HCC)   Hypokalemia   Elevated liver enzymes       ED Disposition  ED Disposition     ED Disposition   AMA    Condition   --    Comment   --             Tracie Levi APRN  [address] [id number]  [phone]    Schedule an appointment as soon as possible for a visit in 1 day           Medication List      New Prescriptions    potassium chloride 20 MEQ CR tablet  Commonly known as: K-DUR,KLOR-CON  Take 1 tablet by mouth 2 (Two) Times a Day.           Where to Get Your Medications      You can get these medications from any pharmacy    Bring a paper prescription for each of these medications  · potassium chloride 20 MEQ CR tablet          Jeny Love PA  04/18/22 2236

## 2022-04-19 LAB
QT INTERVAL: 330 MS
QTC INTERVAL: 421 MS

## 2022-04-23 LAB
BACTERIA SPEC AEROBE CULT: NORMAL
BACTERIA SPEC AEROBE CULT: NORMAL

## 2022-06-06 DIAGNOSIS — M25.562 LEFT KNEE PAIN, UNSPECIFIED CHRONICITY: Primary | ICD-10-CM

## 2022-06-07 ENCOUNTER — HOSPITAL ENCOUNTER (OUTPATIENT)
Dept: GENERAL RADIOLOGY | Facility: HOSPITAL | Age: 57
Discharge: HOME | End: 2022-06-07
Admitting: PHYSICIAN ASSISTANT

## 2022-06-07 ENCOUNTER — OFFICE VISIT (OUTPATIENT)
Dept: ORTHOPEDIC SURGERY | Facility: CLINIC | Age: 57
End: 2022-06-07

## 2022-06-07 VITALS
HEIGHT: 66 IN | BODY MASS INDEX: 26.84 KG/M2 | SYSTOLIC BLOOD PRESSURE: 135 MMHG | HEART RATE: 100 BPM | DIASTOLIC BLOOD PRESSURE: 90 MMHG | WEIGHT: 167 LBS

## 2022-06-07 DIAGNOSIS — M17.12 PRIMARY OSTEOARTHRITIS OF LEFT KNEE: Primary | ICD-10-CM

## 2022-06-07 DIAGNOSIS — M25.562 LEFT KNEE PAIN, UNSPECIFIED CHRONICITY: ICD-10-CM

## 2022-06-07 PROCEDURE — 20610 DRAIN/INJ JOINT/BURSA W/O US: CPT | Performed by: PHYSICIAN ASSISTANT

## 2022-06-07 PROCEDURE — 73562 X-RAY EXAM OF KNEE 3: CPT | Performed by: RADIOLOGY

## 2022-06-07 PROCEDURE — 73562 X-RAY EXAM OF KNEE 3: CPT

## 2022-06-07 PROCEDURE — 99203 OFFICE O/P NEW LOW 30 MIN: CPT | Performed by: PHYSICIAN ASSISTANT

## 2022-06-07 RX ORDER — CLONAZEPAM 1 MG/1
1 TABLET ORAL EVERY 8 HOURS PRN
COMMUNITY
Start: 2022-04-04 | End: 2022-09-28 | Stop reason: HOSPADM

## 2022-06-07 RX ORDER — DULOXETIN HYDROCHLORIDE 60 MG/1
120 CAPSULE, DELAYED RELEASE ORAL DAILY
COMMUNITY
Start: 2022-04-04

## 2022-06-07 RX ADMIN — METHYLPREDNISOLONE ACETATE 80 MG: 80 INJECTION, SUSPENSION INTRA-ARTICULAR; INTRALESIONAL; INTRAMUSCULAR; SOFT TISSUE at 12:58

## 2022-06-07 RX ADMIN — LIDOCAINE HYDROCHLORIDE 5 ML: 10 INJECTION, SOLUTION EPIDURAL; INFILTRATION; INTRACAUDAL; PERINEURAL at 12:58

## 2022-06-07 NOTE — PROGRESS NOTES
[facility] Orthopaedic Surgery New Patient Visit          Patient: Karely Villarreal  YOB: 1965  Date of Encounter: 06/07/2022  PCP: Zak Soto      Subjective     Chief Complaint   Patient presents with   • Left Knee - Initial Evaluation, Pain           History of Present Illness:     Karely Villarreal is a 56 y.o. female presents today as result of left knee pain several years duration.  The patient states that she has had a previous history of a meniscus tear that was unable to be treated surgically as result of COVID.  The patient reports medial knee pain worse upon range of motion and weightbearing.  The patient is currently doing physical therapy with minimal benefit.  The patient reports dull throbbing aching sensation to the medial aspect of the knee with notable instability upon ambulation.  The patient has undergone no recent steroidal injection.  Patient describes sharp stabbing sensation medially upon prolonged standing or walking.  No notable improvement with conservative treatment thus far.        Patient Active Problem List   Diagnosis   • Depression with suicidal ideation   • SLE   • Hypertension   • Hypothyroidism   • Severe recurrent major depression without psychotic features (HCC)   • KEREN (generalized anxiety disorder)   • Abscess of axilla, right   • MDD (major depressive disorder)   • Cytokine release syndrome, grade 2   • Pleural effusion, right   • Osteomyelitis of thoracic vertebra   • Paraspinal abscess   • Polysubstance abuse   • Quadriparesis   • Spinal cord compression   • COVID-19 virus detected   • MRSA bacteremia   • Hepatitis C   • Seizures on Keppra   • History of CVA   • Acute postoperative respiratory insufficiency   • Severe malnutrition (CMS/HCC)   • Uncontrolled pain   • Acute UTI (urinary tract infection)     Past Medical History:   Diagnosis Date   • Anxiety    • Brain tumor (HCC)    • Chronic headache    • Depression    • Diastolic CHF, chronic (HCC)    • DVT (deep  venous thrombosis) (HCC)     left leg   • Encephalitis 2016    treated at the [facility]   • Epilepsy (HCC)    • Gastric ulcer with perforation (HCC) 2016    Microperforation and air in the biliary tree   • Gastritis    • Heart disease    • Henoch-Schonlein purpura (HCC)    • Hepatitis C    • History of transfusion    • Hypertension    • Hypothyroidism (acquired)     Removed due to groiter   • Kidney disease    • Lower GI bleeding    • Lupus (HCC)    • Memory disorder    • Migraine    • Mixed connective tissue disease (HCC)    • MRSA cellulitis    • Panic disorder    • Patent foramen ovale    • Pneumonia    • PTSD (post-traumatic stress disorder)     trauma from 911   • PVC (premature ventricular contraction)    • RA (rheumatoid arthritis) (HCC)    • Renal disorder    • Rhabdomyolysis    • Seizures (HCC)     when had encephalitis   • Sjogren's syndrome (HCC)    • Stroke (HCC) 09/2015    x 1   • Systemic lupus erythematosus (HCC)     Discoid and systemic   • Temporal arteritis (HCC)    • Thyroid disease    • TIA (transient ischemic attack)     x 3   • Ulcer, stomach peptic, chronic      Past Surgical History:   Procedure Laterality Date   • APPENDECTOMY     • CARDIAC CATHETERIZATION  2016    PFO repair and had a loop monitor placed at the [facility]   • CARDIAC SURGERY     • CENTRAL VENOUS LINE INSERTION N/A 10/28/2021    Procedure: Placement of central line;  Surgeon: Ruma Madden MD;  Location: [facility] OR;  Service: General;  Laterality: N/A;   • CERVICAL LAMINECTOMY DECOMPRESSION POSTERIOR Bilateral 2021    Procedure: CERVICAL LAMINECTOMY DECOMPRESSION POSTERIOR C3-6;  Surgeon: Destin Jama MD;  Location: [facility] OR;  Service: Neurosurgery;  Laterality: Bilateral;   •  SECTION      x 2   • ENDOSCOPY     • FACIAL RECONSTRUCTION SURGERY      clean out MRSA    • INCISION AND DRAINAGE ABSCESS Right 2019    Procedure: INCISION AND DRAINAGE ABSCESS RIGHT  AXILLA;  Surgeon: Zak Martin MD;  Location:  [facility] OR;  Service: General   • KNEE ARTHROSCOPY Left    • LUMBAR FUSION     • PORTACATH PLACEMENT Right 08/2016   • THYROID SURGERY      Removed due to a goiter   • VENOUS ACCESS DEVICE (PORT) REMOVAL N/A 10/28/2021    Procedure: Removal of [id number].;  Surgeon: Ruma Madden MD;  Location:  [facility] OR;  Service: General;  Laterality: N/A;     Social History     Occupational History   • Not on file   Tobacco Use   • Smoking status: Never Smoker   • Smokeless tobacco: Never Used   Vaping Use   • Vaping Use: Never used   Substance and Sexual Activity   • Alcohol use: No   • Drug use: Not Currently   • Sexual activity: Yes     Partners: Male    Karely Villarreal  reports that she has never smoked. She has never used smokeless tobacco.. I have educated her on the risk of diseases from using tobacco products such as cancer, COPD and heart disease.          Social History     Social History Narrative   • Not on file     Family History   Problem Relation Age of Onset   • Hypertension Mother    • Arthritis Mother         RA   • Osteoarthritis Mother    • Heart disease Mother    • Hypertension Father    • Arthritis Father         RA   • Diabetes Father    • Heart disease Father      Current Outpatient Medications   Medication Sig Dispense Refill   • acetaminophen (TYLENOL) 325 MG tablet Take 2 tablets by mouth Every 4 (Four) Hours As Needed for Mild Pain .     • amLODIPine (NORVASC) 5 MG tablet Take 1 tablet by mouth Daily.     • buPROPion SR (WELLBUTRIN SR) 150 MG 12 hr tablet Take 60 mg by mouth 2 (Two) Times a Day.     • castor oil-balsam peru (VENELEX) ointment Apply 1 application topically to the appropriate area as directed Every 12 (Twelve) Hours.     • clonazePAM (KlonoPIN) 1 MG tablet Take 1 mg by mouth 3 (Three) Times a Day.     • cyclobenzaprine (FLEXERIL) 5 MG tablet Take 1 tablet by mouth 3 (Three) Times a Day As Needed for Muscle Spasms.     • DULoxetine  (CYMBALTA) 60 MG capsule TAKE 2 CAPSULE BY MOUTH EVERY MORNING     • Guar Gum (Nutrisource fiber) pack pack Take 2 packets by mouth 3 (Three) Times a Day.     • hydrALAZINE (APRESOLINE) 50 MG tablet Take 1 tablet by mouth Every 8 (Eight) Hours.     • ipratropium-albuterol (DUO-NEB) 0.5-2.5 mg/3 ml nebulizer Take 3 mL by nebulization Every 6 (Six) Hours As Needed for Shortness of Air. 360 mL    • lactobacillus acidophilus (RISAQUAD) capsule capsule Take 1 capsule by mouth Daily.     • levETIRAcetam (KEPPRA) 500 MG tablet Take 1 tablet by mouth Every 12 (Twelve) Hours. Indications: Seizure     • levothyroxine (SYNTHROID, LEVOTHROID) 125 MCG tablet Take 1 tablet by mouth Daily. Indications: Underactive Thyroid     • melatonin 5 MG tablet tablet Take 1 tablet by mouth Every Night.     • multivitamin (multivitamin) tablet tablet Take 1 tablet by mouth Daily. 30 tablet    • nystatin (MYCOSTATIN) 854692 UNIT/GM powder Apply  topically to the appropriate area as directed Every 8 (Eight) Hours.     • ondansetron (ZOFRAN) 4 MG tablet Take 1 tablet by mouth Every 6 (Six) Hours As Needed for Nausea or Vomiting.     • oxyCODONE-acetaminophen (PERCOCET) 5-325 MG per tablet Take 1 tablet by mouth Every 6 (Six) Hours As Needed for Moderate Pain .     • pantoprazole (PROTONIX) 40 MG EC tablet Take 40 mg by mouth Daily.     • potassium chloride (K-DUR,KLOR-CON) 20 MEQ CR tablet Take 1 tablet by mouth 2 (Two) Times a Day. 6 tablet 0   • sennosides-docusate (PERICOLACE) 8.6-50 MG per tablet Take 1 tablet by mouth Every Night.       No current facility-administered medications for this visit.     Allergies   Allergen Reactions   • Imitrex [Sumatriptan] Other (See Comments)     Previous stroke   • Nsaids GI Bleeding   • Compazine [Prochlorperazine Edisylate] Dystonia   • Prednisone Anxiety   • Reglan [Metoclopramide] Dystonia   • Solu-Medrol [Methylprednisolone] Dystonia   • Zyprexa [Olanzapine] Swelling            Review of Systems  "  Constitutional: Negative.   HENT: Negative.    Eyes: Negative.    Cardiovascular: Positive for leg swelling.   Respiratory: Negative.    Endocrine: Negative.    Skin: Negative.    Musculoskeletal: Positive for back pain, joint pain, joint swelling and neck pain.        Pertinent positives listed in HPI   Gastrointestinal: Negative.    Genitourinary: Positive for urgency.   Neurological: Positive for numbness.   Psychiatric/Behavioral: Positive for depression. The patient has insomnia and is nervous/anxious.    Allergic/Immunologic: Negative.          Objective      Vitals:    06/07/22 1002   BP: 135/90   Pulse: 100   Weight: 75.8 kg (167 lb)   Height: 167.6 cm (66\")      BMI is >= 25 and <30. (Overweight) The following options were offered after discussion;: exercise counseling/recommendations and nutrition counseling/recommendations      Physical Exam  Vitals and nursing note reviewed.   Constitutional:       General: She is not in acute distress.     Appearance: She is not ill-appearing.   HENT:      Head: Normocephalic and atraumatic.      Right Ear: External ear normal.      Left Ear: External ear normal.      Nose: Nose normal. No congestion or rhinorrhea.   Eyes:      Extraocular Movements: Extraocular movements intact.      Conjunctiva/sclera: Conjunctivae normal.      Pupils: Pupils are equal, round, and reactive to light.   Cardiovascular:      Rate and Rhythm: Normal rate.      Pulses: Normal pulses.   Pulmonary:      Effort: Pulmonary effort is normal. No respiratory distress.      Breath sounds: No stridor.   Abdominal:      General: There is no distension.   Musculoskeletal:      Cervical back: Normal range of motion.      Comments: Left knee on examination today reveals obvious varus alignment with pseudolaxity upon valgus stress.  Patient exhibits medial joint line tenderness with palpation.  Patellofemoral crepitus upon flexion and extension.  Patient exhibits full flexion and extension with no " contractures.  Hermes's and Apley's grind test equivocal medially.  Neurovascular status grossly intact left lower extremity.   Skin:     General: Skin is warm and dry.      Capillary Refill: Capillary refill takes less than 2 seconds.   Neurological:      General: No focal deficit present.      Mental Status: She is alert and oriented to person, place, and time.   Psychiatric:         Mood and Affect: Mood normal.         Behavior: Behavior normal.         Thought Content: Thought content normal.         Judgment: Judgment normal.                 Radiology:      XR Knee 3 View Left    Result Date: 6/7/2022    Tricompartmental advanced degenerative changes in the knee consistent with osteoarthritis.  This report was finalized on 6/7/2022 10:07 AM by Dr. Miguel Roe MD.          Assessment/Plan      No diagnosis found.    56-year-old female with notable left knee osteoarthritis with medial joint space collapse.  Pseudolaxity upon valgus stress with historical meniscus tear and no MRI evidence of this.  Further discussion was had with the patient and with notable medial joint line collapse as well as the pain and symptoms associated today the patient was provided with intra-articular steroidal injection with 80 mg Depo-Medrol and lidocaine block into the intra-articular space of the left knee.  Patient tolerated this procedure well.  Patient was also provided with an order for a medial  brace to help offset the varus alignment.  We did discuss with her degree of osteoarthritis that knee arthroscopy would not be warranted and we will continue to exhaust all conservative treatment options before discussing total knee arthroplasty.  The patient verbalizes understanding and is agreeable to current treatment plan of care.      Large Joint Arthrocentesis: L knee  Date/Time: 6/7/2022 12:58 PM  Consent given by: patient  Site marked: site marked  Timeout: Immediately prior to procedure a time out was called to  verify the correct patient, procedure, equipment, support staff and site/side marked as required   Supporting Documentation  Indications: pain and diagnostic evaluation   Procedure Details  Location: knee - L knee  Needle size: 25 G  Approach: anterolateral  Medications administered: 80 mg methylPREDNISolone acetate 80 MG/ML; 5 mL lidocaine PF 1% 1 %  Patient tolerance: patient tolerated the procedure well with no immediate complications                      This document was signed by Cipriano Fuller PA-C June 7, 2022     CC: Zak Soto      Part of this note may be completed utilizing the dragon speech recognition software. This electronic transcription/translation of spoken language to printed text may contain Grammatical errors, random word insertions, pronoun errors, and incomplete sentences or occasional consequences of the system due to software limitations, ambient noise, and hardware issues.  Any questions or concerns about the content, text, or information contained within the body of this dictation should be directly addressed to the physician for clarification.

## 2022-06-26 RX ORDER — LIDOCAINE HYDROCHLORIDE 10 MG/ML
5 INJECTION, SOLUTION EPIDURAL; INFILTRATION; INTRACAUDAL; PERINEURAL
Status: COMPLETED | OUTPATIENT
Start: 2022-06-07 | End: 2022-06-07

## 2022-06-26 RX ORDER — METHYLPREDNISOLONE ACETATE 80 MG/ML
80 INJECTION, SUSPENSION INTRA-ARTICULAR; INTRALESIONAL; INTRAMUSCULAR; SOFT TISSUE
Status: COMPLETED | OUTPATIENT
Start: 2022-06-07 | End: 2022-06-07

## 2022-07-05 ENCOUNTER — OFFICE VISIT (OUTPATIENT)
Dept: ORTHOPEDIC SURGERY | Facility: CLINIC | Age: 57
End: 2022-07-05

## 2022-07-05 VITALS — BODY MASS INDEX: 26.86 KG/M2 | WEIGHT: 167.11 LBS | HEIGHT: 66 IN

## 2022-07-05 DIAGNOSIS — M17.12 PRIMARY OSTEOARTHRITIS OF LEFT KNEE: Primary | ICD-10-CM

## 2022-07-05 PROCEDURE — 99213 OFFICE O/P EST LOW 20 MIN: CPT | Performed by: PHYSICIAN ASSISTANT
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
## 2022-08-23 ENCOUNTER — TELEPHONE (OUTPATIENT)
Dept: ORTHOPEDIC SURGERY | Facility: CLINIC | Age: 57
End: 2022-08-23

## 2022-08-23 NOTE — TELEPHONE ENCOUNTER
Called patient multiple times to have her come in and be fitted for an  brace. Can't leave voicemail due to being full.

## 2022-09-21 ENCOUNTER — LAB REQUISITION (OUTPATIENT)
Dept: LAB | Facility: HOSPITAL | Age: 57
End: 2022-09-21

## 2022-09-21 DIAGNOSIS — R53.83 OTHER FATIGUE: ICD-10-CM

## 2022-09-21 DIAGNOSIS — I10 ESSENTIAL (PRIMARY) HYPERTENSION: ICD-10-CM

## 2022-09-21 DIAGNOSIS — Z03.6 ENCOUNTER FOR OBSERVATION FOR SUSPECTED TOXIC EFFECT FROM INGESTED SUBSTANCE RULED OUT: ICD-10-CM

## 2022-09-21 LAB
25(OH)D3 SERPL-MCNC: 31.8 NG/ML (ref 30–100)
ALBUMIN SERPL-MCNC: 3.16 G/DL (ref 3.5–5.2)
ALBUMIN/GLOB SERPL: 0.9 G/DL
ALP SERPL-CCNC: 151 U/L (ref 39–117)
ALT SERPL W P-5'-P-CCNC: 8 U/L (ref 1–33)
AMPHET+METHAMPHET UR QL: NEGATIVE
AMPHETAMINES UR QL: NEGATIVE
ANION GAP SERPL CALCULATED.3IONS-SCNC: 9.8 MMOL/L (ref 5–15)
AST SERPL-CCNC: 16 U/L (ref 1–32)
BARBITURATES UR QL SCN: NEGATIVE
BASOPHILS # BLD AUTO: 0.1 10*3/MM3 (ref 0–0.2)
BASOPHILS NFR BLD AUTO: 2.1 % (ref 0–1.5)
BENZODIAZ UR QL SCN: NEGATIVE
BILIRUB SERPL-MCNC: 0.2 MG/DL (ref 0–1.2)
BUN SERPL-MCNC: 20 MG/DL (ref 6–20)
BUN/CREAT SERPL: 18.5 (ref 7–25)
BUPRENORPHINE SERPL-MCNC: NEGATIVE NG/ML
CALCIUM SPEC-SCNC: 9.4 MG/DL (ref 8.6–10.5)
CANNABINOIDS SERPL QL: NEGATIVE
CHLORIDE SERPL-SCNC: 108 MMOL/L (ref 98–107)
CO2 SERPL-SCNC: 24.2 MMOL/L (ref 22–29)
COCAINE UR QL: NEGATIVE
CREAT SERPL-MCNC: 1.08 MG/DL (ref 0.57–1)
DEPRECATED RDW RBC AUTO: 48.3 FL (ref 37–54)
EGFRCR SERPLBLD CKD-EPI 2021: 60.4 ML/MIN/1.73
EOSINOPHIL # BLD AUTO: 0.52 10*3/MM3 (ref 0–0.4)
EOSINOPHIL NFR BLD AUTO: 10.7 % (ref 0.3–6.2)
ERYTHROCYTE [DISTWIDTH] IN BLOOD BY AUTOMATED COUNT: 15.5 % (ref 12.3–15.4)
GLOBULIN UR ELPH-MCNC: 3.3 GM/DL
GLUCOSE SERPL-MCNC: 154 MG/DL (ref 65–99)
HBA1C MFR BLD: 5 % (ref 4.8–5.6)
HCT VFR BLD AUTO: 28.5 % (ref 34–46.6)
HGB BLD-MCNC: 8.5 G/DL (ref 12–15.9)
IMM GRANULOCYTES # BLD AUTO: 0.01 10*3/MM3 (ref 0–0.05)
IMM GRANULOCYTES NFR BLD AUTO: 0.2 % (ref 0–0.5)
IRON 24H UR-MRATE: 39 MCG/DL (ref 37–145)
IRON SATN MFR SERPL: 14 % (ref 20–50)
LYMPHOCYTES # BLD AUTO: 1.25 10*3/MM3 (ref 0.7–3.1)
LYMPHOCYTES NFR BLD AUTO: 25.8 % (ref 19.6–45.3)
MCH RBC QN AUTO: 25.7 PG (ref 26.6–33)
MCHC RBC AUTO-ENTMCNC: 29.8 G/DL (ref 31.5–35.7)
MCV RBC AUTO: 86.1 FL (ref 79–97)
METHADONE UR QL SCN: NEGATIVE
MONOCYTES # BLD AUTO: 0.33 10*3/MM3 (ref 0.1–0.9)
MONOCYTES NFR BLD AUTO: 6.8 % (ref 5–12)
NEUTROPHILS NFR BLD AUTO: 2.64 10*3/MM3 (ref 1.7–7)
NEUTROPHILS NFR BLD AUTO: 54.4 % (ref 42.7–76)
NRBC BLD AUTO-RTO: 0 /100 WBC (ref 0–0.2)
OPIATES UR QL: NEGATIVE
OXYCODONE UR QL SCN: POSITIVE
PCP UR QL SCN: NEGATIVE
PLATELET # BLD AUTO: 275 10*3/MM3 (ref 140–450)
PMV BLD AUTO: 9.4 FL (ref 6–12)
POTASSIUM SERPL-SCNC: 4.1 MMOL/L (ref 3.5–5.2)
PROPOXYPH UR QL: NEGATIVE
PROT SERPL-MCNC: 6.5 G/DL (ref 6–8.5)
RBC # BLD AUTO: 3.31 10*6/MM3 (ref 3.77–5.28)
SODIUM SERPL-SCNC: 142 MMOL/L (ref 136–145)
TIBC SERPL-MCNC: 288 MCG/DL (ref 298–536)
TRANSFERRIN SERPL-MCNC: 193 MG/DL (ref 200–360)
TRICYCLICS UR QL SCN: NEGATIVE
TSH SERPL DL<=0.05 MIU/L-ACNC: 6.5 UIU/ML (ref 0.27–4.2)
WBC NRBC COR # BLD: 4.85 10*3/MM3 (ref 3.4–10.8)

## 2022-09-21 PROCEDURE — 82306 VITAMIN D 25 HYDROXY: CPT | Performed by: INTERNAL MEDICINE

## 2022-09-21 PROCEDURE — 80306 DRUG TEST PRSMV INSTRMNT: CPT | Performed by: INTERNAL MEDICINE

## 2022-09-21 PROCEDURE — 80053 COMPREHEN METABOLIC PANEL: CPT | Performed by: INTERNAL MEDICINE

## 2022-09-21 PROCEDURE — 83036 HEMOGLOBIN GLYCOSYLATED A1C: CPT | Performed by: INTERNAL MEDICINE

## 2022-09-21 PROCEDURE — 84443 ASSAY THYROID STIM HORMONE: CPT | Performed by: INTERNAL MEDICINE

## 2022-09-21 PROCEDURE — 83540 ASSAY OF IRON: CPT | Performed by: INTERNAL MEDICINE

## 2022-09-21 PROCEDURE — 84466 ASSAY OF TRANSFERRIN: CPT | Performed by: INTERNAL MEDICINE

## 2022-09-21 PROCEDURE — 85025 COMPLETE CBC W/AUTO DIFF WBC: CPT | Performed by: INTERNAL MEDICINE

## 2022-09-22 ENCOUNTER — HOSPITAL ENCOUNTER (EMERGENCY)
Facility: HOSPITAL | Age: 57
Discharge: PSYCHIATRIC OR SUBSTANCE USE FACILITY | End: 2022-09-22
Attending: STUDENT IN AN ORGANIZED HEALTH CARE EDUCATION/TRAINING PROGRAM | Admitting: STUDENT IN AN ORGANIZED HEALTH CARE EDUCATION/TRAINING PROGRAM

## 2022-09-22 ENCOUNTER — HOSPITAL ENCOUNTER (INPATIENT)
Facility: HOSPITAL | Age: 57
LOS: 5 days | Discharge: HOME | End: 2022-09-28
Attending: PSYCHIATRY & NEUROLOGY | Admitting: PSYCHIATRY & NEUROLOGY

## 2022-09-22 VITALS
TEMPERATURE: 97.7 F | RESPIRATION RATE: 18 BRPM | HEART RATE: 80 BPM | BODY MASS INDEX: 27.32 KG/M2 | DIASTOLIC BLOOD PRESSURE: 79 MMHG | SYSTOLIC BLOOD PRESSURE: 111 MMHG | WEIGHT: 170 LBS | HEIGHT: 66 IN | OXYGEN SATURATION: 100 %

## 2022-09-22 DIAGNOSIS — F19.10 SUBSTANCE ABUSE: Primary | ICD-10-CM

## 2022-09-22 PROBLEM — F11.20 OPIATE ADDICTION (HCC): Status: ACTIVE | Noted: 2022-09-22

## 2022-09-22 LAB
ALBUMIN SERPL-MCNC: 3.57 G/DL (ref 3.5–5.2)
ALBUMIN/GLOB SERPL: 1.1 G/DL
ALP SERPL-CCNC: 172 U/L (ref 39–117)
ALT SERPL W P-5'-P-CCNC: 22 U/L (ref 1–33)
AMPHET+METHAMPHET UR QL: NEGATIVE
AMPHETAMINES UR QL: NEGATIVE
ANION GAP SERPL CALCULATED.3IONS-SCNC: 10.6 MMOL/L (ref 5–15)
AST SERPL-CCNC: 38 U/L (ref 1–32)
B-HCG UR QL: NEGATIVE
BACTERIA UR QL AUTO: ABNORMAL /HPF
BARBITURATES UR QL SCN: NEGATIVE
BASOPHILS # BLD AUTO: 0.1 10*3/MM3 (ref 0–0.2)
BASOPHILS NFR BLD AUTO: 2.5 % (ref 0–1.5)
BENZODIAZ UR QL SCN: POSITIVE
BILIRUB SERPL-MCNC: 0.3 MG/DL (ref 0–1.2)
BILIRUB UR QL STRIP: NEGATIVE
BUN SERPL-MCNC: 19 MG/DL (ref 6–20)
BUN/CREAT SERPL: 19 (ref 7–25)
BUPRENORPHINE SERPL-MCNC: NEGATIVE NG/ML
CALCIUM SPEC-SCNC: 9.5 MG/DL (ref 8.6–10.5)
CANNABINOIDS SERPL QL: NEGATIVE
CHLORIDE SERPL-SCNC: 106 MMOL/L (ref 98–107)
CK MB SERPL-CCNC: 2.42 NG/ML
CLARITY UR: ABNORMAL
CO2 SERPL-SCNC: 23.4 MMOL/L (ref 22–29)
COCAINE UR QL: NEGATIVE
COLOR UR: YELLOW
CREAT SERPL-MCNC: 1 MG/DL (ref 0.57–1)
DEPRECATED RDW RBC AUTO: 49.7 FL (ref 37–54)
EGFRCR SERPLBLD CKD-EPI 2021: 66.3 ML/MIN/1.73
EOSINOPHIL # BLD AUTO: 0.48 10*3/MM3 (ref 0–0.4)
EOSINOPHIL NFR BLD AUTO: 11.9 % (ref 0.3–6.2)
ERYTHROCYTE [DISTWIDTH] IN BLOOD BY AUTOMATED COUNT: 15.6 % (ref 12.3–15.4)
ETHANOL BLD-MCNC: <10 MG/DL (ref 0–10)
ETHANOL UR QL: <0.01 %
FLUAV RNA RESP QL NAA+PROBE: NOT DETECTED
FLUBV RNA RESP QL NAA+PROBE: NOT DETECTED
GLOBULIN UR ELPH-MCNC: 3.3 GM/DL
GLUCOSE SERPL-MCNC: 94 MG/DL (ref 65–99)
GLUCOSE UR STRIP-MCNC: NEGATIVE MG/DL
HCT VFR BLD AUTO: 31.7 % (ref 34–46.6)
HGB BLD-MCNC: 9.5 G/DL (ref 12–15.9)
HGB UR QL STRIP.AUTO: ABNORMAL
HYALINE CASTS UR QL AUTO: ABNORMAL /LPF
IMM GRANULOCYTES # BLD AUTO: 0.01 10*3/MM3 (ref 0–0.05)
IMM GRANULOCYTES NFR BLD AUTO: 0.2 % (ref 0–0.5)
KETONES UR QL STRIP: NEGATIVE
LEUKOCYTE ESTERASE UR QL STRIP.AUTO: ABNORMAL
LYMPHOCYTES # BLD AUTO: 1.13 10*3/MM3 (ref 0.7–3.1)
LYMPHOCYTES NFR BLD AUTO: 28 % (ref 19.6–45.3)
MAGNESIUM SERPL-MCNC: 1.9 MG/DL (ref 1.6–2.6)
MCH RBC QN AUTO: 26 PG (ref 26.6–33)
MCHC RBC AUTO-ENTMCNC: 30 G/DL (ref 31.5–35.7)
MCV RBC AUTO: 86.6 FL (ref 79–97)
METHADONE UR QL SCN: NEGATIVE
MONOCYTES # BLD AUTO: 0.33 10*3/MM3 (ref 0.1–0.9)
MONOCYTES NFR BLD AUTO: 8.2 % (ref 5–12)
NEUTROPHILS NFR BLD AUTO: 1.99 10*3/MM3 (ref 1.7–7)
NEUTROPHILS NFR BLD AUTO: 49.2 % (ref 42.7–76)
NITRITE UR QL STRIP: NEGATIVE
NRBC BLD AUTO-RTO: 0 /100 WBC (ref 0–0.2)
OPIATES UR QL: NEGATIVE
OXYCODONE UR QL SCN: POSITIVE
PCP UR QL SCN: NEGATIVE
PH UR STRIP.AUTO: 5.5 [PH] (ref 5–8)
PLATELET # BLD AUTO: 246 10*3/MM3 (ref 140–450)
PMV BLD AUTO: 8.4 FL (ref 6–12)
POTASSIUM SERPL-SCNC: 4.3 MMOL/L (ref 3.5–5.2)
PROPOXYPH UR QL: NEGATIVE
PROT SERPL-MCNC: 6.9 G/DL (ref 6–8.5)
PROT UR QL STRIP: ABNORMAL
RBC # BLD AUTO: 3.66 10*6/MM3 (ref 3.77–5.28)
RBC # UR STRIP: ABNORMAL /HPF
REF LAB TEST METHOD: ABNORMAL
SARS-COV-2 RNA RESP QL NAA+PROBE: NOT DETECTED
SODIUM SERPL-SCNC: 140 MMOL/L (ref 136–145)
SP GR UR STRIP: 1.02 (ref 1–1.03)
SQUAMOUS #/AREA URNS HPF: ABNORMAL /HPF
TRICYCLICS UR QL SCN: NEGATIVE
TROPONIN T SERPL-MCNC: <0.01 NG/ML (ref 0–0.03)
TROPONIN T SERPL-MCNC: <0.01 NG/ML (ref 0–0.03)
UROBILINOGEN UR QL STRIP: ABNORMAL
WBC # UR STRIP: ABNORMAL /HPF
WBC NRBC COR # BLD: 4.04 10*3/MM3 (ref 3.4–10.8)

## 2022-09-22 PROCEDURE — 83735 ASSAY OF MAGNESIUM: CPT | Performed by: STUDENT IN AN ORGANIZED HEALTH CARE EDUCATION/TRAINING PROGRAM

## 2022-09-22 PROCEDURE — 80053 COMPREHEN METABOLIC PANEL: CPT | Performed by: STUDENT IN AN ORGANIZED HEALTH CARE EDUCATION/TRAINING PROGRAM

## 2022-09-22 PROCEDURE — 82553 CREATINE MB FRACTION: CPT | Performed by: PSYCHIATRY & NEUROLOGY

## 2022-09-22 PROCEDURE — 85025 COMPLETE CBC W/AUTO DIFF WBC: CPT | Performed by: STUDENT IN AN ORGANIZED HEALTH CARE EDUCATION/TRAINING PROGRAM

## 2022-09-22 PROCEDURE — G0378 HOSPITAL OBSERVATION PER HR: HCPCS

## 2022-09-22 PROCEDURE — 93005 ELECTROCARDIOGRAM TRACING: CPT | Performed by: PSYCHIATRY & NEUROLOGY

## 2022-09-22 PROCEDURE — 82077 ASSAY SPEC XCP UR&BREATH IA: CPT | Performed by: STUDENT IN AN ORGANIZED HEALTH CARE EDUCATION/TRAINING PROGRAM

## 2022-09-22 PROCEDURE — 36415 COLL VENOUS BLD VENIPUNCTURE: CPT

## 2022-09-22 PROCEDURE — 81001 URINALYSIS AUTO W/SCOPE: CPT | Performed by: STUDENT IN AN ORGANIZED HEALTH CARE EDUCATION/TRAINING PROGRAM

## 2022-09-22 PROCEDURE — 80306 DRUG TEST PRSMV INSTRMNT: CPT | Performed by: STUDENT IN AN ORGANIZED HEALTH CARE EDUCATION/TRAINING PROGRAM

## 2022-09-22 PROCEDURE — 84484 ASSAY OF TROPONIN QUANT: CPT | Performed by: PSYCHIATRY & NEUROLOGY

## 2022-09-22 PROCEDURE — 87636 SARSCOV2 & INF A&B AMP PRB: CPT | Performed by: STUDENT IN AN ORGANIZED HEALTH CARE EDUCATION/TRAINING PROGRAM

## 2022-09-22 PROCEDURE — C9803 HOPD COVID-19 SPEC COLLECT: HCPCS

## 2022-09-22 PROCEDURE — 99284 EMERGENCY DEPT VISIT MOD MDM: CPT

## 2022-09-22 PROCEDURE — 81025 URINE PREGNANCY TEST: CPT | Performed by: STUDENT IN AN ORGANIZED HEALTH CARE EDUCATION/TRAINING PROGRAM

## 2022-09-22 RX ORDER — OXYCODONE HYDROCHLORIDE 5 MG/1
10 TABLET ORAL EVERY 6 HOURS PRN
Status: DISCONTINUED | OUTPATIENT
Start: 2022-09-22 | End: 2022-09-24

## 2022-09-22 RX ORDER — ONDANSETRON 4 MG/1
4 TABLET, FILM COATED ORAL EVERY 6 HOURS PRN
Status: DISCONTINUED | OUTPATIENT
Start: 2022-09-22 | End: 2022-09-28 | Stop reason: HOSPADM

## 2022-09-22 RX ORDER — ACETAMINOPHEN 325 MG/1
650 TABLET ORAL EVERY 6 HOURS PRN
Status: DISCONTINUED | OUTPATIENT
Start: 2022-09-22 | End: 2022-09-28 | Stop reason: HOSPADM

## 2022-09-22 RX ORDER — HYDROXYCHLOROQUINE SULFATE 200 MG/1
200 TABLET, FILM COATED ORAL 2 TIMES DAILY
COMMUNITY

## 2022-09-22 RX ORDER — ECHINACEA PURPUREA EXTRACT 125 MG
2 TABLET ORAL AS NEEDED
Status: DISCONTINUED | OUTPATIENT
Start: 2022-09-22 | End: 2022-09-28 | Stop reason: HOSPADM

## 2022-09-22 RX ORDER — LEVOTHYROXINE SODIUM 0.2 MG/1
200 TABLET ORAL DAILY
COMMUNITY

## 2022-09-22 RX ORDER — LOPERAMIDE HYDROCHLORIDE 2 MG/1
2 CAPSULE ORAL
Status: DISCONTINUED | OUTPATIENT
Start: 2022-09-22 | End: 2022-09-28 | Stop reason: HOSPADM

## 2022-09-22 RX ORDER — INSULIN ASPART 100 [IU]/ML
0-12 INJECTION, SOLUTION INTRAVENOUS; SUBCUTANEOUS DAILY
Status: DISCONTINUED | OUTPATIENT
Start: 2022-09-23 | End: 2022-09-23

## 2022-09-22 RX ORDER — AMLODIPINE BESYLATE 2.5 MG/1
2.5 TABLET ORAL DAILY
Status: ON HOLD | COMMUNITY
End: 2022-09-28 | Stop reason: SDUPTHER

## 2022-09-22 RX ORDER — BENZTROPINE MESYLATE 1 MG/ML
1 INJECTION INTRAMUSCULAR; INTRAVENOUS ONCE AS NEEDED
Status: DISCONTINUED | OUTPATIENT
Start: 2022-09-22 | End: 2022-09-28 | Stop reason: HOSPADM

## 2022-09-22 RX ORDER — GABAPENTIN 300 MG/1
300 CAPSULE ORAL 3 TIMES DAILY
Status: DISCONTINUED | OUTPATIENT
Start: 2022-09-22 | End: 2022-09-27

## 2022-09-22 RX ORDER — AMOXICILLIN 250 MG
2 CAPSULE ORAL 2 TIMES DAILY
Status: DISCONTINUED | OUTPATIENT
Start: 2022-09-22 | End: 2022-09-28 | Stop reason: HOSPADM

## 2022-09-22 RX ORDER — AMLODIPINE BESYLATE 5 MG/1
2.5 TABLET ORAL DAILY
Status: DISCONTINUED | OUTPATIENT
Start: 2022-09-23 | End: 2022-09-28 | Stop reason: HOSPADM

## 2022-09-22 RX ORDER — BISACODYL 10 MG
10 SUPPOSITORY, RECTAL RECTAL DAILY PRN
COMMUNITY

## 2022-09-22 RX ORDER — LEVOTHYROXINE SODIUM 0.1 MG/1
200 TABLET ORAL DAILY
Status: DISCONTINUED | OUTPATIENT
Start: 2022-09-23 | End: 2022-09-28 | Stop reason: HOSPADM

## 2022-09-22 RX ORDER — LACTULOSE 10 G/15ML
20 SOLUTION ORAL DAILY PRN
Status: DISCONTINUED | OUTPATIENT
Start: 2022-09-22 | End: 2022-09-28 | Stop reason: HOSPADM

## 2022-09-22 RX ORDER — AMOXICILLIN 250 MG
2 CAPSULE ORAL 2 TIMES DAILY
COMMUNITY

## 2022-09-22 RX ORDER — BENZONATATE 100 MG/1
100 CAPSULE ORAL 3 TIMES DAILY PRN
Status: DISCONTINUED | OUTPATIENT
Start: 2022-09-22 | End: 2022-09-28 | Stop reason: HOSPADM

## 2022-09-22 RX ORDER — CLONIDINE HYDROCHLORIDE 0.1 MG/1
0.1 TABLET ORAL EVERY 6 HOURS PRN
Status: DISCONTINUED | OUTPATIENT
Start: 2022-09-22 | End: 2022-09-28 | Stop reason: HOSPADM

## 2022-09-22 RX ORDER — OXYCODONE HYDROCHLORIDE 10 MG/1
10 TABLET ORAL EVERY 6 HOURS PRN
COMMUNITY
End: 2022-09-28 | Stop reason: HOSPADM

## 2022-09-22 RX ORDER — FERROUS SULFATE 325(65) MG
325 TABLET ORAL
COMMUNITY

## 2022-09-22 RX ORDER — DULOXETIN HYDROCHLORIDE 60 MG/1
120 CAPSULE, DELAYED RELEASE ORAL DAILY
Status: DISCONTINUED | OUTPATIENT
Start: 2022-09-23 | End: 2022-09-28 | Stop reason: HOSPADM

## 2022-09-22 RX ORDER — CLONIDINE HYDROCHLORIDE 0.1 MG/1
0.1 TABLET ORAL EVERY 6 HOURS PRN
COMMUNITY

## 2022-09-22 RX ORDER — INSULIN LISPRO 100 [IU]/ML
0-12 INJECTION, SOLUTION INTRAVENOUS; SUBCUTANEOUS DAILY
COMMUNITY
End: 2022-09-28 | Stop reason: HOSPADM

## 2022-09-22 RX ORDER — LACTULOSE 10 G/15ML
20 SOLUTION ORAL DAILY PRN
COMMUNITY

## 2022-09-22 RX ORDER — ACETAMINOPHEN 500 MG
500 TABLET ORAL EVERY 4 HOURS PRN
Status: CANCELLED | OUTPATIENT
Start: 2022-09-22

## 2022-09-22 RX ORDER — ALUMINA, MAGNESIA, AND SIMETHICONE 2400; 2400; 240 MG/30ML; MG/30ML; MG/30ML
15 SUSPENSION ORAL EVERY 6 HOURS PRN
Status: DISCONTINUED | OUTPATIENT
Start: 2022-09-22 | End: 2022-09-28 | Stop reason: HOSPADM

## 2022-09-22 RX ORDER — ACETAMINOPHEN 500 MG
500 TABLET ORAL EVERY 4 HOURS PRN
COMMUNITY

## 2022-09-22 RX ORDER — BISACODYL 10 MG
10 SUPPOSITORY, RECTAL RECTAL DAILY PRN
Status: DISCONTINUED | OUTPATIENT
Start: 2022-09-22 | End: 2022-09-28 | Stop reason: HOSPADM

## 2022-09-22 RX ORDER — HYDROXYCHLOROQUINE SULFATE 200 MG/1
200 TABLET, FILM COATED ORAL 2 TIMES DAILY
Status: DISCONTINUED | OUTPATIENT
Start: 2022-09-22 | End: 2022-09-28 | Stop reason: HOSPADM

## 2022-09-22 RX ORDER — FERROUS SULFATE 325(65) MG
325 TABLET ORAL
Status: DISCONTINUED | OUTPATIENT
Start: 2022-09-23 | End: 2022-09-28 | Stop reason: HOSPADM

## 2022-09-22 RX ORDER — CLONAZEPAM 0.5 MG/1
1 TABLET ORAL EVERY 8 HOURS PRN
Status: DISCONTINUED | OUTPATIENT
Start: 2022-09-22 | End: 2022-09-28 | Stop reason: HOSPADM

## 2022-09-22 RX ORDER — HYDROXYZINE 50 MG/1
50 TABLET, FILM COATED ORAL EVERY 6 HOURS PRN
Status: DISCONTINUED | OUTPATIENT
Start: 2022-09-22 | End: 2022-09-28 | Stop reason: HOSPADM

## 2022-09-22 RX ORDER — GABAPENTIN 300 MG/1
300 CAPSULE ORAL 3 TIMES DAILY
COMMUNITY
End: 2022-09-28 | Stop reason: HOSPADM

## 2022-09-22 RX ORDER — CELECOXIB 100 MG/1
200 CAPSULE ORAL DAILY
Status: DISCONTINUED | OUTPATIENT
Start: 2022-09-23 | End: 2022-09-28 | Stop reason: HOSPADM

## 2022-09-22 RX ORDER — BUPROPION HYDROCHLORIDE 150 MG/1
150 TABLET, EXTENDED RELEASE ORAL 2 TIMES DAILY
Status: DISCONTINUED | OUTPATIENT
Start: 2022-09-22 | End: 2022-09-23

## 2022-09-22 RX ORDER — CELECOXIB 200 MG/1
200 CAPSULE ORAL DAILY
COMMUNITY

## 2022-09-22 RX ORDER — BENZTROPINE MESYLATE 1 MG/1
2 TABLET ORAL ONCE AS NEEDED
Status: DISCONTINUED | OUTPATIENT
Start: 2022-09-22 | End: 2022-09-28 | Stop reason: HOSPADM

## 2022-09-22 RX ORDER — FAMOTIDINE 20 MG/1
20 TABLET, FILM COATED ORAL 2 TIMES DAILY PRN
Status: DISCONTINUED | OUTPATIENT
Start: 2022-09-22 | End: 2022-09-28 | Stop reason: HOSPADM

## 2022-09-22 RX ORDER — TRAZODONE HYDROCHLORIDE 50 MG/1
50 TABLET ORAL NIGHTLY PRN
Status: DISCONTINUED | OUTPATIENT
Start: 2022-09-22 | End: 2022-09-27

## 2022-09-22 RX ADMIN — APIXABAN 2.5 MG: 2.5 TABLET, FILM COATED ORAL at 21:28

## 2022-09-22 RX ADMIN — Medication 5000 UNITS: at 21:28

## 2022-09-22 RX ADMIN — HYDROXYCHLOROQUINE SULFATE 200 MG: 200 TABLET ORAL at 21:28

## 2022-09-22 RX ADMIN — GABAPENTIN 300 MG: 300 CAPSULE ORAL at 21:29

## 2022-09-22 RX ADMIN — BUPROPION HYDROCHLORIDE 150 MG: 150 TABLET, EXTENDED RELEASE ORAL at 21:28

## 2022-09-22 RX ADMIN — MUPIROCIN 1 APPLICATION: 20 OINTMENT TOPICAL at 21:29

## 2022-09-22 RX ADMIN — DOCUSATE SODIUM 50 MG AND SENNOSIDES 8.6 MG 2 TABLET: 8.6; 5 TABLET, FILM COATED ORAL at 21:28

## 2022-09-23 PROBLEM — F13.20 SEVERE BENZODIAZEPINE USE DISORDER (HCC): Status: ACTIVE | Noted: 2022-09-23

## 2022-09-23 PROCEDURE — 63710000001 ONDANSETRON PER 8 MG: Performed by: PSYCHIATRY & NEUROLOGY

## 2022-09-23 PROCEDURE — 93005 ELECTROCARDIOGRAM TRACING: CPT | Performed by: PSYCHIATRY & NEUROLOGY

## 2022-09-23 PROCEDURE — 99223 1ST HOSP IP/OBS HIGH 75: CPT | Performed by: PSYCHIATRY & NEUROLOGY

## 2022-09-23 PROCEDURE — HZ2ZZZZ DETOXIFICATION SERVICES FOR SUBSTANCE ABUSE TREATMENT: ICD-10-PCS | Performed by: PSYCHIATRY & NEUROLOGY

## 2022-09-23 RX ADMIN — MUPIROCIN 1 APPLICATION: 20 OINTMENT TOPICAL at 21:58

## 2022-09-23 RX ADMIN — HYDROXYCHLOROQUINE SULFATE 200 MG: 200 TABLET ORAL at 08:35

## 2022-09-23 RX ADMIN — LEVOTHYROXINE SODIUM 200 MCG: 100 TABLET ORAL at 08:34

## 2022-09-23 RX ADMIN — AMLODIPINE BESYLATE 2.5 MG: 5 TABLET ORAL at 08:35

## 2022-09-23 RX ADMIN — DOCUSATE SODIUM 50 MG AND SENNOSIDES 8.6 MG 2 TABLET: 8.6; 5 TABLET, FILM COATED ORAL at 08:34

## 2022-09-23 RX ADMIN — DULOXETINE HYDROCHLORIDE 120 MG: 60 CAPSULE, DELAYED RELEASE ORAL at 08:34

## 2022-09-23 RX ADMIN — OXYCODONE 10 MG: 5 TABLET ORAL at 22:38

## 2022-09-23 RX ADMIN — GABAPENTIN 300 MG: 300 CAPSULE ORAL at 08:34

## 2022-09-23 RX ADMIN — Medication 5000 UNITS: at 21:58

## 2022-09-23 RX ADMIN — APIXABAN 2.5 MG: 2.5 TABLET, FILM COATED ORAL at 08:34

## 2022-09-23 RX ADMIN — HYDROXYZINE HYDROCHLORIDE 50 MG: 50 TABLET, FILM COATED ORAL at 07:00

## 2022-09-23 RX ADMIN — GABAPENTIN 300 MG: 300 CAPSULE ORAL at 21:58

## 2022-09-23 RX ADMIN — OXYCODONE 10 MG: 5 TABLET ORAL at 15:26

## 2022-09-23 RX ADMIN — CLONAZEPAM 1 MG: 0.5 TABLET ORAL at 04:40

## 2022-09-23 RX ADMIN — FERROUS SULFATE TAB 325 MG (65 MG ELEMENTAL FE) 325 MG: 325 (65 FE) TAB at 08:34

## 2022-09-23 RX ADMIN — ONDANSETRON HYDROCHLORIDE 4 MG: 4 TABLET, FILM COATED ORAL at 14:19

## 2022-09-23 RX ADMIN — Medication 5000 UNITS: at 08:34

## 2022-09-23 RX ADMIN — GABAPENTIN 300 MG: 300 CAPSULE ORAL at 15:26

## 2022-09-23 RX ADMIN — CELECOXIB 200 MG: 100 CAPSULE ORAL at 08:35

## 2022-09-23 RX ADMIN — ONDANSETRON HYDROCHLORIDE 4 MG: 4 TABLET, FILM COATED ORAL at 04:40

## 2022-09-23 RX ADMIN — DOCUSATE SODIUM 50 MG AND SENNOSIDES 8.6 MG 2 TABLET: 8.6; 5 TABLET, FILM COATED ORAL at 21:58

## 2022-09-23 RX ADMIN — BUPROPION HYDROCHLORIDE 150 MG: 150 TABLET, EXTENDED RELEASE ORAL at 08:34

## 2022-09-23 RX ADMIN — CLONAZEPAM 1 MG: 0.5 TABLET ORAL at 14:19

## 2022-09-23 RX ADMIN — HYDROXYCHLOROQUINE SULFATE 200 MG: 200 TABLET ORAL at 21:58

## 2022-09-24 LAB
QT INTERVAL: 432 MS
QT INTERVAL: 432 MS
QTC INTERVAL: 473 MS
QTC INTERVAL: 475 MS

## 2022-09-24 PROCEDURE — 99232 SBSQ HOSP IP/OBS MODERATE 35: CPT | Performed by: PSYCHIATRY & NEUROLOGY

## 2022-09-24 RX ORDER — BUPRENORPHINE 2 MG/1
2 TABLET SUBLINGUAL 2 TIMES DAILY
Status: COMPLETED | OUTPATIENT
Start: 2022-09-26 | End: 2022-09-27

## 2022-09-24 RX ORDER — BUPRENORPHINE 2 MG/1
2 TABLET SUBLINGUAL DAILY
Status: COMPLETED | OUTPATIENT
Start: 2022-09-28 | End: 2022-09-28

## 2022-09-24 RX ORDER — BUPRENORPHINE 2 MG/1
2 TABLET SUBLINGUAL 4 TIMES DAILY
Status: DISPENSED | OUTPATIENT
Start: 2022-09-24 | End: 2022-09-25

## 2022-09-24 RX ORDER — CYCLOBENZAPRINE HCL 10 MG
10 TABLET ORAL 3 TIMES DAILY PRN
Status: DISCONTINUED | OUTPATIENT
Start: 2022-09-24 | End: 2022-09-28 | Stop reason: HOSPADM

## 2022-09-24 RX ORDER — BUPRENORPHINE 2 MG/1
2 TABLET SUBLINGUAL 3 TIMES DAILY
Status: COMPLETED | OUTPATIENT
Start: 2022-09-25 | End: 2022-09-26

## 2022-09-24 RX ORDER — DICYCLOMINE HYDROCHLORIDE 10 MG/1
10 CAPSULE ORAL 3 TIMES DAILY PRN
Status: DISCONTINUED | OUTPATIENT
Start: 2022-09-24 | End: 2022-09-28 | Stop reason: HOSPADM

## 2022-09-24 RX ADMIN — CLONAZEPAM 1 MG: 0.5 TABLET ORAL at 22:40

## 2022-09-24 RX ADMIN — DULOXETINE HYDROCHLORIDE 120 MG: 60 CAPSULE, DELAYED RELEASE ORAL at 08:29

## 2022-09-24 RX ADMIN — BUPRENORPHINE HCL 2 MG: 2 TABLET SUBLINGUAL at 12:48

## 2022-09-24 RX ADMIN — CELECOXIB 200 MG: 100 CAPSULE ORAL at 08:29

## 2022-09-24 RX ADMIN — GABAPENTIN 300 MG: 300 CAPSULE ORAL at 15:15

## 2022-09-24 RX ADMIN — Medication 5000 UNITS: at 08:29

## 2022-09-24 RX ADMIN — LEVOTHYROXINE SODIUM 200 MCG: 100 TABLET ORAL at 08:29

## 2022-09-24 RX ADMIN — CLONAZEPAM 1 MG: 0.5 TABLET ORAL at 04:32

## 2022-09-24 RX ADMIN — HYDROXYCHLOROQUINE SULFATE 200 MG: 200 TABLET ORAL at 21:19

## 2022-09-24 RX ADMIN — BUPRENORPHINE HCL 2 MG: 2 TABLET SUBLINGUAL at 21:19

## 2022-09-24 RX ADMIN — FERROUS SULFATE TAB 325 MG (65 MG ELEMENTAL FE) 325 MG: 325 (65 FE) TAB at 08:29

## 2022-09-24 RX ADMIN — GABAPENTIN 300 MG: 300 CAPSULE ORAL at 08:29

## 2022-09-24 RX ADMIN — HYDROXYCHLOROQUINE SULFATE 200 MG: 200 TABLET ORAL at 08:29

## 2022-09-24 RX ADMIN — MUPIROCIN 1 APPLICATION: 20 OINTMENT TOPICAL at 08:31

## 2022-09-24 RX ADMIN — AMLODIPINE BESYLATE 2.5 MG: 5 TABLET ORAL at 08:30

## 2022-09-24 RX ADMIN — CLONAZEPAM 1 MG: 0.5 TABLET ORAL at 15:15

## 2022-09-24 RX ADMIN — GABAPENTIN 300 MG: 300 CAPSULE ORAL at 21:19

## 2022-09-24 RX ADMIN — MUPIROCIN 1 APPLICATION: 20 OINTMENT TOPICAL at 21:19

## 2022-09-24 RX ADMIN — OXYCODONE 10 MG: 5 TABLET ORAL at 08:31

## 2022-09-25 PROCEDURE — 99232 SBSQ HOSP IP/OBS MODERATE 35: CPT | Performed by: PSYCHIATRY & NEUROLOGY

## 2022-09-25 RX ADMIN — HYDROXYCHLOROQUINE SULFATE 200 MG: 200 TABLET ORAL at 08:11

## 2022-09-25 RX ADMIN — HYDROXYCHLOROQUINE SULFATE 200 MG: 200 TABLET ORAL at 21:35

## 2022-09-25 RX ADMIN — CLONAZEPAM 1 MG: 0.5 TABLET ORAL at 09:15

## 2022-09-25 RX ADMIN — BUPRENORPHINE HCL 2 MG: 2 TABLET SUBLINGUAL at 15:13

## 2022-09-25 RX ADMIN — MUPIROCIN 1 APPLICATION: 20 OINTMENT TOPICAL at 08:13

## 2022-09-25 RX ADMIN — DULOXETINE HYDROCHLORIDE 120 MG: 60 CAPSULE, DELAYED RELEASE ORAL at 08:12

## 2022-09-25 RX ADMIN — GABAPENTIN 300 MG: 300 CAPSULE ORAL at 08:12

## 2022-09-25 RX ADMIN — Medication 5000 UNITS: at 08:11

## 2022-09-25 RX ADMIN — LEVOTHYROXINE SODIUM 200 MCG: 100 TABLET ORAL at 08:11

## 2022-09-25 RX ADMIN — CELECOXIB 200 MG: 100 CAPSULE ORAL at 08:10

## 2022-09-25 RX ADMIN — BUPRENORPHINE HCL 2 MG: 2 TABLET SUBLINGUAL at 08:12

## 2022-09-25 RX ADMIN — Medication 5000 UNITS: at 21:35

## 2022-09-25 RX ADMIN — MUPIROCIN 1 APPLICATION: 20 OINTMENT TOPICAL at 21:35

## 2022-09-25 RX ADMIN — DOCUSATE SODIUM 50 MG AND SENNOSIDES 8.6 MG 2 TABLET: 8.6; 5 TABLET, FILM COATED ORAL at 08:10

## 2022-09-25 RX ADMIN — FERROUS SULFATE TAB 325 MG (65 MG ELEMENTAL FE) 325 MG: 325 (65 FE) TAB at 08:11

## 2022-09-25 RX ADMIN — BUPRENORPHINE HCL 2 MG: 2 TABLET SUBLINGUAL at 21:35

## 2022-09-25 RX ADMIN — GABAPENTIN 300 MG: 300 CAPSULE ORAL at 21:35

## 2022-09-25 RX ADMIN — AMLODIPINE BESYLATE 2.5 MG: 5 TABLET ORAL at 08:10

## 2022-09-25 RX ADMIN — GABAPENTIN 300 MG: 300 CAPSULE ORAL at 15:13

## 2022-09-26 PROCEDURE — 99232 SBSQ HOSP IP/OBS MODERATE 35: CPT | Performed by: PSYCHIATRY & NEUROLOGY

## 2022-09-26 RX ADMIN — GABAPENTIN 300 MG: 300 CAPSULE ORAL at 21:04

## 2022-09-26 RX ADMIN — FERROUS SULFATE TAB 325 MG (65 MG ELEMENTAL FE) 325 MG: 325 (65 FE) TAB at 08:09

## 2022-09-26 RX ADMIN — CELECOXIB 200 MG: 100 CAPSULE ORAL at 08:09

## 2022-09-26 RX ADMIN — BUPRENORPHINE HCL 2 MG: 2 TABLET SUBLINGUAL at 21:04

## 2022-09-26 RX ADMIN — AMLODIPINE BESYLATE 2.5 MG: 5 TABLET ORAL at 08:09

## 2022-09-26 RX ADMIN — Medication 5000 UNITS: at 08:09

## 2022-09-26 RX ADMIN — Medication 5000 UNITS: at 21:03

## 2022-09-26 RX ADMIN — MUPIROCIN 1 APPLICATION: 20 OINTMENT TOPICAL at 21:03

## 2022-09-26 RX ADMIN — CLONAZEPAM 1 MG: 0.5 TABLET ORAL at 00:45

## 2022-09-26 RX ADMIN — HYDROXYCHLOROQUINE SULFATE 200 MG: 200 TABLET ORAL at 08:09

## 2022-09-26 RX ADMIN — DOCUSATE SODIUM 50 MG AND SENNOSIDES 8.6 MG 2 TABLET: 8.6; 5 TABLET, FILM COATED ORAL at 21:04

## 2022-09-26 RX ADMIN — HYDROXYCHLOROQUINE SULFATE 200 MG: 200 TABLET ORAL at 21:03

## 2022-09-26 RX ADMIN — CLONAZEPAM 1 MG: 0.5 TABLET ORAL at 09:44

## 2022-09-26 RX ADMIN — LEVOTHYROXINE SODIUM 200 MCG: 100 TABLET ORAL at 08:09

## 2022-09-26 RX ADMIN — GABAPENTIN 300 MG: 300 CAPSULE ORAL at 15:28

## 2022-09-26 RX ADMIN — GABAPENTIN 300 MG: 300 CAPSULE ORAL at 08:10

## 2022-09-26 RX ADMIN — DOCUSATE SODIUM 50 MG AND SENNOSIDES 8.6 MG 2 TABLET: 8.6; 5 TABLET, FILM COATED ORAL at 08:09

## 2022-09-26 RX ADMIN — BUPRENORPHINE HCL 2 MG: 2 TABLET SUBLINGUAL at 08:10

## 2022-09-26 RX ADMIN — MUPIROCIN 1 APPLICATION: 20 OINTMENT TOPICAL at 08:10

## 2022-09-26 RX ADMIN — DULOXETINE HYDROCHLORIDE 120 MG: 60 CAPSULE, DELAYED RELEASE ORAL at 08:10

## 2022-09-27 PROCEDURE — 99232 SBSQ HOSP IP/OBS MODERATE 35: CPT | Performed by: PSYCHIATRY & NEUROLOGY

## 2022-09-27 RX ORDER — TRAZODONE HYDROCHLORIDE 50 MG/1
100 TABLET ORAL NIGHTLY PRN
Status: DISCONTINUED | OUTPATIENT
Start: 2022-09-27 | End: 2022-09-28 | Stop reason: HOSPADM

## 2022-09-27 RX ADMIN — AMLODIPINE BESYLATE 2.5 MG: 5 TABLET ORAL at 08:04

## 2022-09-27 RX ADMIN — HYDROXYCHLOROQUINE SULFATE 200 MG: 200 TABLET ORAL at 21:08

## 2022-09-27 RX ADMIN — MUPIROCIN 1 APPLICATION: 20 OINTMENT TOPICAL at 21:08

## 2022-09-27 RX ADMIN — DOCUSATE SODIUM 50 MG AND SENNOSIDES 8.6 MG 2 TABLET: 8.6; 5 TABLET, FILM COATED ORAL at 08:04

## 2022-09-27 RX ADMIN — CLONAZEPAM 1 MG: 0.5 TABLET ORAL at 00:08

## 2022-09-27 RX ADMIN — Medication 5000 UNITS: at 08:04

## 2022-09-27 RX ADMIN — TRAZODONE HYDROCHLORIDE 100 MG: 50 TABLET ORAL at 21:08

## 2022-09-27 RX ADMIN — CELECOXIB 200 MG: 100 CAPSULE ORAL at 08:04

## 2022-09-27 RX ADMIN — LEVOTHYROXINE SODIUM 200 MCG: 100 TABLET ORAL at 08:04

## 2022-09-27 RX ADMIN — BUPRENORPHINE HCL 2 MG: 2 TABLET SUBLINGUAL at 08:05

## 2022-09-27 RX ADMIN — FERROUS SULFATE TAB 325 MG (65 MG ELEMENTAL FE) 325 MG: 325 (65 FE) TAB at 08:04

## 2022-09-27 RX ADMIN — CLONAZEPAM 1 MG: 0.5 TABLET ORAL at 09:31

## 2022-09-27 RX ADMIN — HYDROXYCHLOROQUINE SULFATE 200 MG: 200 TABLET ORAL at 08:04

## 2022-09-27 RX ADMIN — MUPIROCIN 1 APPLICATION: 20 OINTMENT TOPICAL at 08:04

## 2022-09-27 RX ADMIN — CLONAZEPAM 1 MG: 0.5 TABLET ORAL at 17:43

## 2022-09-27 RX ADMIN — Medication 5000 UNITS: at 21:08

## 2022-09-27 RX ADMIN — DULOXETINE HYDROCHLORIDE 120 MG: 60 CAPSULE, DELAYED RELEASE ORAL at 08:05

## 2022-09-27 RX ADMIN — GABAPENTIN 300 MG: 300 CAPSULE ORAL at 08:05

## 2022-09-27 RX ADMIN — HYDROXYZINE HYDROCHLORIDE 50 MG: 50 TABLET, FILM COATED ORAL at 21:08

## 2022-09-28 VITALS
SYSTOLIC BLOOD PRESSURE: 104 MMHG | BODY MASS INDEX: 28.06 KG/M2 | RESPIRATION RATE: 18 BRPM | OXYGEN SATURATION: 98 % | TEMPERATURE: 97.1 F | HEIGHT: 66 IN | HEART RATE: 72 BPM | DIASTOLIC BLOOD PRESSURE: 74 MMHG | WEIGHT: 174.6 LBS

## 2022-09-28 PROCEDURE — 99238 HOSP IP/OBS DSCHRG MGMT 30/<: CPT | Performed by: PSYCHIATRY & NEUROLOGY

## 2022-09-28 RX ORDER — TRAZODONE HYDROCHLORIDE 100 MG/1
100 TABLET ORAL NIGHTLY PRN
Qty: 30 TABLET | Refills: 0 | Status: SHIPPED | OUTPATIENT
Start: 2022-09-28

## 2022-09-28 RX ORDER — AMLODIPINE BESYLATE 2.5 MG/1
2.5 TABLET ORAL DAILY
Qty: 30 TABLET | Refills: 0 | Status: SHIPPED | OUTPATIENT
Start: 2022-09-28

## 2022-09-28 RX ADMIN — FERROUS SULFATE TAB 325 MG (65 MG ELEMENTAL FE) 325 MG: 325 (65 FE) TAB at 08:18

## 2022-09-28 RX ADMIN — BUPRENORPHINE HCL 2 MG: 2 TABLET SUBLINGUAL at 08:18

## 2022-09-28 RX ADMIN — HYDROXYCHLOROQUINE SULFATE 200 MG: 200 TABLET ORAL at 08:17

## 2022-09-28 RX ADMIN — Medication 5000 UNITS: at 08:17

## 2022-09-28 RX ADMIN — LEVOTHYROXINE SODIUM 200 MCG: 100 TABLET ORAL at 08:18

## 2022-09-28 RX ADMIN — DULOXETINE HYDROCHLORIDE 120 MG: 60 CAPSULE, DELAYED RELEASE ORAL at 08:17

## 2022-09-28 RX ADMIN — CLONAZEPAM 1 MG: 0.5 TABLET ORAL at 10:14

## 2022-09-28 RX ADMIN — AMLODIPINE BESYLATE 2.5 MG: 5 TABLET ORAL at 08:18

## 2022-09-28 RX ADMIN — CELECOXIB 200 MG: 100 CAPSULE ORAL at 08:18

## 2022-12-05 NOTE — ED NOTES
Called [facility] for ALS transfer to [facility], declined due to having a truck already out of town.   [FreeTextEntry1] : #T2DM\par A1c today 8.6.\par - Increase ertuglifozin to 15 mg from 5 mg\par - Continue metformin 1g BID\par - RTC 3 months for repeat A1c\par - Ophthalmology referral for diabetic eye exam\par \par #CAD\par #HLD\par Advised on restarting ezetimibe \par Continue current medications\par \par Patient discussed with Dr. Isabel. Approximately 30 min spent on encounter

## 2022-12-18 NOTE — CASE MANAGEMENT/SOCIAL WORK
Case Management Discharge Note      Final Note: MSW confirmed with Josy with [facility]. Pt still has a bed tomorrow at [facility]. [facility] arranged for 9:00am tomorrow. PCS form is on the chart. MSW will fax discharge summary when ready to [phone]. Number to call report is [phone].         Selected Continued Care - Admitted Since 12/11/2021     Destination Coordination complete.    Service Provider Selected Services Address Phone Fax Patient Preferred    [facility] [address] [facility] [place] [address] [phone] --          Durable Medical Equipment    No services have been selected for the patient.              Dialysis/Infusion    No services have been selected for the patient.              Home Medical Care    No services have been selected for the patient.              Therapy    No services have been selected for the patient.              Community Resources    No services have been selected for the patient.              Community & DME    No services have been selected for the patient.                Selected Continued Care - Prior Encounters Includes selections from prior encounters from 9/12/2021 to 12/15/2021    Discharged on 12/11/2021 Admission date: 11/6/2021 - Discharge disposition: Skilled Nursing Facility (DC - External)    Destination     Service Provider Selected Services Address Phone Fax Patient Preferred    [facility] [address] [place] [address] [place] [address] [phone] --                         Final Discharge Disposition Code: 63 - [facility]   cp/ sob/ cough/ lightheadedness/ weakness,   sick contacts.  likely viral   ekg NSR, early repol unchanged from previous  _PERC negative, unlikey ACS.    r/o anemia, electrolyte abn, pancreatitis consider myocarditis?   labs, trop, cxr, fluids, toradol, reassess

## 2024-02-12 NOTE — DISCHARGE SUMMARY
[facility] Medicine Services  DISCHARGE SUMMARY    Patient Name: Karely Villarreal  : 1965  MRN: [id number]    Date of Admission: 2021  7:39 PM  Date of Discharge:  2021  Primary Care Physician: Tracie Levi APRN    Consults     Date and Time Order Name Status Description    2021 10:43 AM Inpatient Nephrology Consult Completed     2021 12:40 PM Inpatient Urology Consult Completed     2021  1:19 PM Inpatient Psychiatrist Consult      2021 12:35 AM Inpatient Cardiothoracic Surgery Consult Completed     2021 12:35 AM Inpatient Neurosurgery Consult Completed     2021 10:42 PM Inpatient Infectious Diseases Consult Completed     2021 10:13 AM Inpatient Cardiology Consult Completed     10/26/2021  3:29 PM Inpatient General Surgery Consult Completed     10/25/2021  8:04 AM Inpatient Infectious Diseases Consult Completed           Hospital Course     Presenting Problem:   Osteomyelitis (HCC) [M86.9]    Active Hospital Problems    Diagnosis  POA   • **Spinal cord compression [G95.20]  Yes   • Severe malnutrition (CMS/HCC) [E43]  Yes   • Quadriparesis [G82.50]  No   • COVID-19 virus detected [U07.1]  Yes   • MRSA bacteremia [R78.81, B95.62]  Yes   • Hepatitis C [B19.20]  Yes   • Seizures on Keppra [R56.9]  Yes   • History of CVA [Z86.73]  Not Applicable   • Acute postoperative respiratory insufficiency [J95.89]  No   • Pleural effusion, right [J90]  Yes   • Osteomyelitis of thoracic vertebra [M46.24]  Yes   • 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          Hospital Course:  Karely Villarreal is a 56 y.o. female  with h/o polysubstance abuse, HCV, HTN, Sz d/o, SLE,  Hypothyroidism, CVA, prior COVID infection and homelessness.    She was admitted to [facility] on 10/24/21 for sepsis d/t MRSA and was found to have a paravertebral abscess and was transferred to [facility] on 11/6.  Surgery was deferred initially but she developed worsening UE weakness and MRI showed progressive cord compression so she was taken to the OR emergently for decompression on 11/14.    She was extubated 11/15 but TF's were started d/t poor PO.  She was transferred initially to telemetry on 11/8 but developed worsening hypoxia with AMS and was transferred back to the ICU on bipap on 11/20.  She was transferred back to the hospitalist service on 11/23.    MRSA bacteremia  Sepsis  T9/T10 Paravertebral Abscess, T9/T10 Osteomyelitis with spinal cord compression  --s/p decompression, cervical laminectomy and debridement of epidural abscess on 11/14  --was on Vancomycin per ID, now switched to dapto due to worsening renal function. Will need long course at least 12 weeks given the extent of her spinal infection and high grade bacteremia (starting at time of surgery on 11/14).  --repeat MRI C and T spine showed shows 2 fluid collections dorsally (one at C4 and the other more superficial at C6/C7) and T9/T10 still c/w vertebral osteomyelitis.  Re-evaluated by Dr. Jama who noted severe phlegmon circumferentially around cervical and upper thoracic area but no worsening compression, rec'd continue abx, no surgery needed at this time. Follow up as needed.           Anemia  Hematuria   DOMO  - continues to drop, down to 7.5 today   - fob negative  -s/p 1 unit PRBC on 12/3  -Iron 19, Iron sat 7   -Received dose of IV iron   - suspect related to lupus , she had hematuria but it has mostly resolved   - urology consulted , follow up with DR Gardner outpatient   - nephrology following, lupus nephritis.    -urine culture with urogenital batool, hold abx for UTI treatment     Acute Respiratory failure  Right Pleural Effusion  Probable  Bacterial PNA  --improved  --tolerating daily PO Lasix well   -- s/p 7 days of merrem and doxy per ID     Malnutrition  --Status post Dobbhoff with tube feeds  --now on a regular diet, thin liquids.      Diarrhea  --s/p rectal tube, improved  --Wound care following for skin breakdown on gluteal tissue  --Continue fiber supplement      Bilateral Knee Pain  --Bilateral xrays showed large bilateral knee effusions, moderate osteoarthrosis worse in patellofemoral compartments.  -PT/OT following      LUE Edema  -LUE venous duplex on 11/7 negative for evidence of acute DVT  -Elevate extremity   -Repeat venous duplex 12/3 negative for DVT     HX Seizure Disorder  --continue keppra, home dose     Recent COVID PNA     Acute Right Axilla abscess  --culture positive for MRSA, continue daptomycin - ID following    - IV dapto for 12 weeks    Polysubstance abuse  --UDS positive for meth/opiates, self medicating at [facility] with klonopin and oxycodone and went into respiratory depression     SLE/Discoid lupus  --holding plaquenil due to severity of active infection, but suspect possible lupus nephritis v other GN with ongoing infection and off maintenance immunosuppression     Insomnia   -Melatonin PRN      Hx CVA  - PT/OT      Discharge Follow Up Recommendations for outpatient labs/diagnostics:   Follow up with PCP once discharged from [facility]  Infectious disease will follow at [facility]  Consider nephrology at [facility]  Follow up with Dr. Gardner, urology.     Day of Discharge     HPI:     Resting comfortably in bed this morning. Reports being sleepy today.  Didn't sleep well last night. Having pain in her lower legs, she thinks from working with PT yesterday. Agreeable to transfer to [facility] today.     Review of Systems  Gen- No fevers, chills, + fatigue  CV- No chest pain, palpitations  Resp- No cough, dyspnea  GI- No N/V/D, abd pain  Msk-see above    Vital Signs:   Temp:  [98 °F (36.7 °C)-98.3 °F (36.8 °C)] 98.3 °F (36.8 °C)  Heart Rate:   [100-106] 103  Resp:  [16-17] 16  BP: (120-132)/(71-88) 132/88     Physical Exam:  Constitutional: No acute distress, awake but drowsy, in bed, chronically ill appearing  HENT: NCAT, mucous membranes moist  Respiratory: Clear to auscultation bilaterally, respiratory effort normal on room air  Cardiovascular: RRR, no murmurs, rubs, or gallops  Gastrointestinal: Positive bowel sounds, soft, nontender, nondistended  Musculoskeletal:mild bilateral ankle edema  Psychiatric: Appropriate affect, cooperative  Neurologic: Oriented x 3, strength symmetric in all extremities, Cranial Nerves grossly intact to confrontation, speech clear  Skin: No rashes noted to exposed skin       Pertinent  and/or Most Recent Results     LAB RESULTS:      Lab 12/10/21  1212 12/09/21  1538 12/09/21  0814 12/08/21  0915 12/07/21  0658 12/06/21  0840   WBC 6.92  --  6.49 6.19 6.75 7.70   HEMOGLOBIN 7.5*  --  7.5* 8.0* 8.1* 9.2*   HEMATOCRIT 24.8*  --  24.6* 27.0* 27.5* 28.7*   PLATELETS 206  --  214 237 235 215   NEUTROS ABS 5.16  --  4.34 4.30 4.89 6.08   IMMATURE GRANS (ABS) 0.04  --  0.05 0.04 0.05 0.05   LYMPHS ABS 1.07  --  1.37 1.17 1.07 0.99   MONOS ABS 0.45  --  0.54 0.46 0.51 0.43   EOS ABS 0.17  --  0.14 0.17 0.19 0.10   MCV 88.6  --  88.5 90.6 89.3 86.4   LDH  --  173  --   --   --   --          Lab 12/10/21  1212 12/09/21  1538 12/09/21  0814 12/08/21  0915 12/07/21  0658 12/06/21 2014 12/06/21  0840 12/06/21  0840 12/05/21  0825 12/04/21  1128   SODIUM 141  --  138 138 138  --   --  140   < > 141   POTASSIUM 3.4*  --  3.2* 3.4* 3.7 4.6   < > 3.3*   < > 3.8   CHLORIDE 95*  --  93* 93* 95*  --   --  92*   < > 95*   CO2 39.0*  --  38.0* 37.0* 39.0*  --   --  38.0*   < > 38.0*   ANION GAP 7.0  --  7.0 8.0 4.0*  --   --  10.0   < > 8.0   BUN 25*  --  19 17 16  --   --  18   < > 20   CREATININE 1.41*  --  1.19* 1.12* 0.99  --   --  0.86   < > 0.79   GLUCOSE 139*  --  82 96 84  --   --  112*   < > 118*   CALCIUM 9.3  --  9.6 10.0 9.8  --    --  10.2   < > 9.8   PHOSPHORUS  --  4.2  --  4.0 3.4  --   --  3.2  --  4.2    < > = values in this interval not displayed.         Lab 12/08/21  0915 12/07/21  0658 12/06/21  0840 12/05/21  0825 12/04/21  1128   TOTAL PROTEIN  --   --   --  6.2  --    ALBUMIN 2.40* 2.50* 2.70* 2.40* 2.50*   GLOBULIN  --   --   --  3.8  --    ALT (SGPT)  --   --   --  23  --    AST (SGOT)  --   --   --  25  --    BILIRUBIN  --   --   --  0.3  --    ALK PHOS  --   --   --  182*  --                  Lab 12/04/21  1128   IRON 19*   IRON SATURATION 7*   TIBC 259*   TRANSFERRIN 174*   FERRITIN 526.90*   FOLATE 15.00   VITAMIN B 12 1,322*         Brief Urine Lab Results  (Last result in the past 365 days)      Color   Clarity   Blood   Leuk Est   Nitrite   Protein   CREAT   Urine HCG        12/03/21 1509 Red   Turbid   Large (3+)   Large (3+)   Negative   >=300 mg/dL (3+)               Microbiology Results (last 10 days)     Procedure Component Value - Date/Time    Urine Culture - Urine, Urine, Clean Catch [[id number]] Collected: 12/03/21 1509    Lab Status: Final result Specimen: Urine, Clean Catch Updated: 12/04/21 1130     Urine Culture 50,000 CFU/mL Normal Urogenital Jailyn          Duplex Venous Upper Extremity - Left CAR    Result Date: 12/4/2021  · Normal left upper extremity venous duplex scan.        Results for orders placed during the hospital encounter of 11/06/21    Duplex Venous Upper Extremity - Left CAR    Interpretation Summary  · Normal left upper extremity venous duplex scan.      Results for orders placed during the hospital encounter of 11/06/21    Duplex Venous Upper Extremity - Left CAR    Interpretation Summary  · Normal left upper extremity venous duplex scan.      Results for orders placed during the hospital encounter of 10/24/21    Adult Transthoracic Echo Complete W/ Cont if Necessary Per Protocol    Interpretation Summary  · Left ventricular ejection fraction appears to be 61 - 65%. Left ventricular systolic  function is normal.  · Left ventricular diastolic function was normal.  · Estimated right ventricular systolic pressure from tricuspid regurgitation is normal (<35 mmHg).  · No vegetations seen.  · This is a technically difficult study.      Plan for Follow-up of Pending Labs/Results: Per ID  Pending Labs     Order Current Status    AKIRA In process    Anti-DNA Antibody, Double-stranded In process    AFB Culture - Body Fluid, Spine, Thoracic Preliminary result    AFB Culture - Tissue, Spine, Cervical Preliminary result    Fungus Culture - Body Fluid, Spine, Thoracic Preliminary result    Fungus Culture - Tissue, Spine, Cervical Preliminary result        Discharge Details        Discharge Medications      New Medications      Instructions Start Date   amLODIPine 5 MG tablet  Commonly known as: NORVASC   5 mg, Oral, Every 24 Hours Scheduled   Start Date: December 12, 2021     cyclobenzaprine 5 MG tablet  Commonly known as: FLEXERIL   5 mg, Oral, 3 Times Daily PRN      DAPTOmycin 550 mg in sodium chloride 0.9 % 50 mL   8 mg/kg (550 mg), Intravenous, Every 24 Hours      ipratropium-albuterol 0.5-2.5 mg/3 ml nebulizer  Commonly known as: DUO-NEB   3 mL, Nebulization, Every 6 Hours PRN      LORazepam 1 MG tablet  Commonly known as: ATIVAN   1 mg, Oral, Every 6 Hours PRN      melatonin 5 MG tablet tablet   5 mg, Oral, Nightly      Nutrisource fiber pack pack   2 packets, Oral, 3 Times Daily      nystatin 516295 UNIT/GM powder  Commonly known as: MYCOSTATIN   Topical, Every 8 Hours Scheduled      ondansetron 4 MG tablet  Commonly known as: ZOFRAN   4 mg, Oral, Every 6 Hours PRN      oxyCODONE-acetaminophen 5-325 MG per tablet  Commonly known as: PERCOCET   1 tablet, Oral, Every 6 Hours PRN      sennosides-docusate 8.6-50 MG per tablet  Commonly known as: PERICOLACE   1 tablet, Oral, Nightly         Changes to Medications      Instructions Start Date   acetaminophen 325 MG tablet  Commonly known as: TYLENOL  What changed: when  to take this   650 mg, Oral, Every 4 Hours PRN      DULoxetine 30 MG capsule  Commonly known as: CYMBALTA  What changed:   · medication strength  · how much to take   30 mg, Oral, Every Morning   Start Date: December 12, 2021     hydrALAZINE 50 MG tablet  Commonly known as: APRESOLINE  What changed:   · medication strength  · how much to take  · Another medication with the same name was removed. Continue taking this medication, and follow the directions you see here.   50 mg, Oral, Every 8 Hours Scheduled         Continue These Medications      Instructions Start Date   buPROPion  MG 12 hr tablet  Commonly known as: WELLBUTRIN SR   60 mg, Oral, 2 Times Daily      castor oil-balsam peru ointment   1 application, Topical, Every 12 Hours Scheduled      lactobacillus acidophilus capsule capsule   1 capsule, Oral, Daily      levETIRAcetam 500 MG tablet  Commonly known as: KEPPRA   500 mg, Oral, Every 12 Hours Scheduled      levothyroxine 125 MCG tablet  Commonly known as: SYNTHROID, LEVOTHROID   125 mcg, Oral, Daily      multivitamin tablet tablet   1 tablet, Oral, Daily      pantoprazole 40 MG EC tablet  Commonly known as: PROTONIX   40 mg, Oral, Daily         Stop These Medications    calcium carbonate 500 MG chewable tablet  Commonly known as: TUMS     ceftaroline 600 mg in sodium chloride 0.9 % 100 mL IVPB     clonazePAM 0.5 MG tablet  Commonly known as: KlonoPIN     daptomycin solution IVPB  Commonly known as: CUBICIN     dexamethasone 4 MG/ML injection  Commonly known as: DECADRON     diphenhydrAMINE 50 MG/ML injection  Commonly known as: BENADRYL     enoxaparin 40 MG/0.4ML solution syringe  Commonly known as: LOVENOX     EPINEPHrine (Anaphylaxis) 1 MG/ML injection  Commonly known as: ADRENALIN     guaiFENesin-dextromethorphan 100-10 MG/5ML syrup  Commonly known as: ROBITUSSIN DM     magnesium sulfate 2 GM/50ML infusion     magnesium sulfate in D5W 1g/100mL (PREMIX) 1-5 GM/100ML-% IVPB     methylPREDNISolone  sodium succinate 125 MG injection  Commonly known as: SOLU-Medrol     metoprolol tartrate 25 MG tablet  Commonly known as: LOPRESSOR     metroNIDAZOLE 5-0.79 MG/ML-% IVPB  Commonly known as: FLAGYL     oxyCODONE 15 MG immediate release tablet  Commonly known as: ROXICODONE     potassium chloride 10 MEQ/100ML     potassium chloride 20 MEQ CR tablet  Commonly known as: K-DUR,KLOR-CON     potassium chloride 20 MEQ packet  Commonly known as: KLOR-CON     sodium chloride 0.9 % solution            Allergies   Allergen Reactions   • Compazine [Prochlorperazine Edisylate] Dystonia   • Imitrex [Sumatriptan] Other (See Comments)     Previous stroke   • Nsaids GI Bleeding   • Reglan [Metoclopramide] Dystonia   • Solu-Medrol [Methylprednisolone] Dystonia   • Zyprexa [Olanzapine] Swelling   • Prednisone Anxiety         Discharge Disposition:  Skilled Nursing Facility (DC - External)    Diet:  Hospital:  Diet Order   Procedures   • Diet Regular; Thin       Activity:  Activity Instructions     Activity as Tolerated     Additional Activity Instructions:    Activity as instructed by your physician              CODE STATUS:    Code Status and Medical Interventions:   Ordered at: 11/06/21 2242     Code Status (Patient has no pulse and is not breathing):    CPR (Attempt to Resuscitate)     Medical Interventions (Patient has pulse or is breathing):    Full Support       No future appointments.    Additional Instructions for the Follow-ups that You Need to Schedule     Discharge Follow-up with PCP   As directed       Currently Documented PCP:    Tracie Levi APRN    PCP Phone Number:    [phone]     Follow Up Details: once discharged from [facility]         Discharge Follow-up with Specified Provider: Infectious disease will follow at [facility]   As directed      To: Infectious disease will follow at [facility]         Discharge Follow-up with Specified Provider: Neurosurgery   As directed      To: Neurosurgery    Follow Up Details: as  needed                     Cora Rosas, MP  12/11/21      Time Spent on Discharge:  I spent  45  minutes on this discharge activity which included: face-to-face encounter with the patient, reviewing the data in the system, coordination of the care with the nursing staff as well as consultants, documentation, and entering orders.           No

## (undated) DEVICE — BANDAGE,GAUZE,BULKEE II,4.5"X4.1YD,STRL: Brand: MEDLINE

## (undated) DEVICE — DRSNG WND GZ PAD BORDERED 4X8IN STRL

## (undated) DEVICE — PAD GRND REM POLYHESIVE A/ DISP

## (undated) DEVICE — 3M™ STERI-STRIP™ REINFORCED ADHESIVE SKIN CLOSURES, R1547, 1/2 IN X 4 IN (12 MM X 100 MM), 6 STRIPS/ENVELOPE: Brand: 3M™ STERI-STRIP™

## (undated) DEVICE — DRAPE,LAPAROTOMY,PED,STERILE: Brand: MEDLINE

## (undated) DEVICE — SYR CONTRL LUERLOK 10CC

## (undated) DEVICE — GLV SURG PREMIERPRO MIC LTX PF SZ8 BRN

## (undated) DEVICE — TRY CVC VANTEX PI PLS 7F 3L 20CM 50

## (undated) DEVICE — GLV SURG BIOGEL LTX PF 8

## (undated) DEVICE — TOOL 14BA60 LEGEND 14CM 6MM: Brand: MIDAS REX ™

## (undated) DEVICE — PATIENT RETURN ELECTRODE, SINGLE-USE, CONTACT QUALITY MONITORING, ADULT, WITH 9FT CORD, FOR PATIENTS WEIGING OVER 33LBS. (15KG): Brand: MEGADYNE

## (undated) DEVICE — Device

## (undated) DEVICE — HOLDER: Brand: DEROYAL

## (undated) DEVICE — BNDG ELAS ELITE V/CLOSE 4IN 5YD LF STRL

## (undated) DEVICE — ANTIBACTERIAL UNDYED BRAIDED (POLYGLACTIN 910), SYNTHETIC ABSORBABLE SUTURE: Brand: COATED VICRYL

## (undated) DEVICE — PK BASIC 70

## (undated) DEVICE — DRAPE,UTILTY,TAPE,15X26, 4EA/PK: Brand: MEDLINE

## (undated) DEVICE — ELECTRD NDL MEGADYNE EZCLEAN NOSE 7CM

## (undated) DEVICE — APPL CHLORAPREP TINTED 26ML TEAL

## (undated) DEVICE — APPL CHLORAPREP HI/LITE 26ML ORNG

## (undated) DEVICE — BNDG ADHS CURAD FLX/FABRC 2X4IN STRL LF

## (undated) DEVICE — 3M™ DURAPORE™ SURGICAL TAPE 1538-3, 3 INCH X 10 YARD (7,5CM X 9,1M), 4 ROLLS/BOX: Brand: 3M™ DURAPORE™

## (undated) DEVICE — 3M™ STERI-STRIP™ REINFORCED ADHESIVE SKIN CLOSURES, R1546, 1/4 IN X 4 IN (6 MM X 100 MM), 10 STRIPS/ENVELOPE: Brand: 3M™ STERI-STRIP™

## (undated) DEVICE — STRAP POSTN KN/BDY FM 5X72IN DISP

## (undated) DEVICE — MAYFIELD® DISPOSABLE ADULT SKULL PIN (PLASTIC BASE): Brand: MAYFIELD®

## (undated) DEVICE — TOOL 14MH30 LEGEND 14CM 3MM: Brand: MIDAS REX ™

## (undated) DEVICE — C-ARM: Brand: UNBRANDED

## (undated) DEVICE — SUT VIC 3/0 SH 27IN J416H

## (undated) DEVICE — SYR LL TP 10ML STRL

## (undated) DEVICE — 2963 MEDIPORE SOFT CLOTH TAPE 3 IN X 10 YD 12 RLS/CS: Brand: 3M™ MEDIPORE™

## (undated) DEVICE — DRN PENRS RO SILCNE 1/4X12IN LF STRL

## (undated) DEVICE — BNDG GZ SOF-FORM CONFRM 3X75IN LF STRL

## (undated) DEVICE — SYR LUERLOK 30CC

## (undated) DEVICE — TRY CVC VANTEX PI 7F 3L 20CM CHG TEGADERM 10

## (undated) DEVICE — ELECTRD BLD EZ CLN MOD XLNG 2.75IN

## (undated) DEVICE — PROXIMATE RH ROTATING HEAD SKIN STAPLERS (35 WIDE) CONTAINS 35 STAINLESS STEEL STAPLES: Brand: PROXIMATE

## (undated) DEVICE — ENCORE® LATEX MICRO SIZE 7.5, STERILE LATEX POWDER-FREE SURGICAL GLOVE: Brand: ENCORE

## (undated) DEVICE — PK NEURO DISC 10

## (undated) DEVICE — DISPOSABLE BIPOLAR FORCEPS 7 3/4" (19.7CM) SCOVILLE BAYONET, INSULATED, 1.5MM TIP AND 12 FT. (3.6M) CABLE: Brand: KIRWAN

## (undated) DEVICE — PENCL ES MEGADINE EZ/CLEAN BUTN W/HOLSTR 10FT

## (undated) DEVICE — POWERLOC 22G X 0.75 INCH WITH Y-SITE: Brand: PORT ACCESS NEEDLE

## (undated) DEVICE — SPNG GZ WOVN 4X4IN 12PLY 10/BX STRL

## (undated) DEVICE — SUT MNCRYL PLS ANTIB UD 4/0 PS2 18IN

## (undated) DEVICE — NDL HYPO ECLPS SFTY 22G 1 1/2IN

## (undated) DEVICE — DRAPE,T,LAPARO,TRANS,STERILE: Brand: MEDLINE

## (undated) DEVICE — DRSNG PAD ABD 8X10IN STRL

## (undated) DEVICE — GLV SURG PREMIERPRO MIC LTX PF SZ7 BRN

## (undated) DEVICE — AMD ANTIMICROBIAL NON-ADHERENT ISLAND DRESSING,0.2% POLYHEXAMETHYLENE BIGUANIDE HCI (PHMB): Brand: TELFA

## (undated) DEVICE — NDL HYPO ECLPS SFTY 18G 1 1/2IN

## (undated) DEVICE — DRP UTIL 2/LAYR W/TP 15X26IN STRL PK/4

## (undated) DEVICE — TRY SKINPREP PVP SCRB W PAINT